# Patient Record
Sex: FEMALE | Race: WHITE | Employment: OTHER | URBAN - NONMETROPOLITAN AREA
[De-identification: names, ages, dates, MRNs, and addresses within clinical notes are randomized per-mention and may not be internally consistent; named-entity substitution may affect disease eponyms.]

---

## 2017-01-11 ENCOUNTER — ANTI-COAG VISIT (OUTPATIENT)
Dept: OTHER | Age: 77
End: 2017-01-11

## 2017-01-11 VITALS
DIASTOLIC BLOOD PRESSURE: 88 MMHG | BODY MASS INDEX: 23.05 KG/M2 | HEART RATE: 96 BPM | SYSTOLIC BLOOD PRESSURE: 142 MMHG | WEIGHT: 118 LBS

## 2017-01-11 DIAGNOSIS — I48.91 ATRIAL FIBRILLATION, UNSPECIFIED TYPE (HCC): ICD-10-CM

## 2017-01-11 LAB — POC INR: 1.8 (ref 0.8–1.2)

## 2017-01-11 PROCEDURE — 99999 PR OFFICE/OUTPT VISIT,PROCEDURE ONLY: CPT | Performed by: NURSE PRACTITIONER

## 2017-01-11 PROCEDURE — 85610 PROTHROMBIN TIME: CPT | Performed by: NURSE PRACTITIONER

## 2017-01-11 ASSESSMENT — ENCOUNTER SYMPTOMS
SHORTNESS OF BREATH: 0
CONSTIPATION: 0
BLOOD IN STOOL: 0
DIARRHEA: 0

## 2017-01-18 ENCOUNTER — TELEPHONE (OUTPATIENT)
Dept: OTHER | Age: 77
End: 2017-01-18

## 2017-01-23 ENCOUNTER — TELEPHONE (OUTPATIENT)
Dept: OTHER | Age: 77
End: 2017-01-23

## 2017-01-25 ENCOUNTER — ANTI-COAG VISIT (OUTPATIENT)
Dept: OTHER | Age: 77
End: 2017-01-25

## 2017-01-25 VITALS
WEIGHT: 116 LBS | DIASTOLIC BLOOD PRESSURE: 69 MMHG | BODY MASS INDEX: 20.55 KG/M2 | SYSTOLIC BLOOD PRESSURE: 146 MMHG | HEART RATE: 67 BPM

## 2017-01-25 DIAGNOSIS — I48.91 ATRIAL FIBRILLATION, UNSPECIFIED TYPE (HCC): ICD-10-CM

## 2017-01-25 LAB — POC INR: 2 (ref 0.8–1.2)

## 2017-01-25 PROCEDURE — 1090F PRES/ABSN URINE INCON ASSESS: CPT | Performed by: NURSE PRACTITIONER

## 2017-01-25 PROCEDURE — 99212 OFFICE O/P EST SF 10 MIN: CPT | Performed by: NURSE PRACTITIONER

## 2017-01-25 PROCEDURE — G8484 FLU IMMUNIZE NO ADMIN: HCPCS | Performed by: NURSE PRACTITIONER

## 2017-01-25 PROCEDURE — G8419 CALC BMI OUT NRM PARAM NOF/U: HCPCS | Performed by: NURSE PRACTITIONER

## 2017-01-25 PROCEDURE — G8400 PT W/DXA NO RESULTS DOC: HCPCS | Performed by: NURSE PRACTITIONER

## 2017-01-25 PROCEDURE — 4040F PNEUMOC VAC/ADMIN/RCVD: CPT | Performed by: NURSE PRACTITIONER

## 2017-01-25 PROCEDURE — 1036F TOBACCO NON-USER: CPT | Performed by: NURSE PRACTITIONER

## 2017-01-25 PROCEDURE — 1123F ACP DISCUSS/DSCN MKR DOCD: CPT | Performed by: NURSE PRACTITIONER

## 2017-01-25 PROCEDURE — 85610 PROTHROMBIN TIME: CPT | Performed by: NURSE PRACTITIONER

## 2017-01-25 PROCEDURE — G8427 DOCREV CUR MEDS BY ELIG CLIN: HCPCS | Performed by: NURSE PRACTITIONER

## 2017-01-25 ASSESSMENT — ENCOUNTER SYMPTOMS
BLOOD IN STOOL: 0
DIARRHEA: 0
CONSTIPATION: 0
SHORTNESS OF BREATH: 1

## 2017-02-22 ENCOUNTER — ANTI-COAG VISIT (OUTPATIENT)
Dept: OTHER | Age: 77
End: 2017-02-22

## 2017-02-22 VITALS
HEART RATE: 65 BPM | DIASTOLIC BLOOD PRESSURE: 71 MMHG | BODY MASS INDEX: 19.84 KG/M2 | WEIGHT: 112 LBS | SYSTOLIC BLOOD PRESSURE: 151 MMHG

## 2017-02-22 DIAGNOSIS — I48.91 ATRIAL FIBRILLATION, UNSPECIFIED TYPE (HCC): ICD-10-CM

## 2017-02-22 LAB — POC INR: 3.8 (ref 0.8–1.2)

## 2017-02-22 PROCEDURE — 85610 PROTHROMBIN TIME: CPT | Performed by: NURSE PRACTITIONER

## 2017-02-22 PROCEDURE — G8419 CALC BMI OUT NRM PARAM NOF/U: HCPCS | Performed by: NURSE PRACTITIONER

## 2017-02-22 PROCEDURE — 4040F PNEUMOC VAC/ADMIN/RCVD: CPT | Performed by: NURSE PRACTITIONER

## 2017-02-22 PROCEDURE — G8427 DOCREV CUR MEDS BY ELIG CLIN: HCPCS | Performed by: NURSE PRACTITIONER

## 2017-02-22 PROCEDURE — 99999 PR OFFICE/OUTPT VISIT,PROCEDURE ONLY: CPT | Performed by: NURSE PRACTITIONER

## 2017-02-22 RX ORDER — PREDNISONE 1 MG/1
1 TABLET ORAL 2 TIMES DAILY
COMMUNITY
End: 2017-08-02 | Stop reason: ALTCHOICE

## 2017-02-22 ASSESSMENT — ENCOUNTER SYMPTOMS
DIARRHEA: 0
BLOOD IN STOOL: 0
SHORTNESS OF BREATH: 0
CONSTIPATION: 0

## 2017-03-08 ENCOUNTER — ANTI-COAG VISIT (OUTPATIENT)
Dept: OTHER | Age: 77
End: 2017-03-08

## 2017-03-08 VITALS
DIASTOLIC BLOOD PRESSURE: 71 MMHG | SYSTOLIC BLOOD PRESSURE: 153 MMHG | HEART RATE: 61 BPM | WEIGHT: 111.2 LBS | BODY MASS INDEX: 19.7 KG/M2

## 2017-03-08 DIAGNOSIS — I48.91 ATRIAL FIBRILLATION, UNSPECIFIED TYPE (HCC): Primary | ICD-10-CM

## 2017-03-08 LAB — POC INR: 4.3 (ref 0.8–1.2)

## 2017-03-08 RX ORDER — AMLODIPINE BESYLATE 5 MG/1
5 TABLET ORAL DAILY
Status: ON HOLD | COMMUNITY
End: 2020-01-10

## 2017-03-08 ASSESSMENT — ENCOUNTER SYMPTOMS
SHORTNESS OF BREATH: 0
CONSTIPATION: 0
DIARRHEA: 0
BLOOD IN STOOL: 0

## 2017-03-20 DIAGNOSIS — I48.91 ATRIAL FIBRILLATION, UNSPECIFIED TYPE (HCC): Primary | ICD-10-CM

## 2017-03-22 ENCOUNTER — ANTI-COAG VISIT (OUTPATIENT)
Dept: OTHER | Age: 77
End: 2017-03-22

## 2017-03-22 VITALS
WEIGHT: 111.4 LBS | HEART RATE: 63 BPM | DIASTOLIC BLOOD PRESSURE: 69 MMHG | SYSTOLIC BLOOD PRESSURE: 155 MMHG | BODY MASS INDEX: 19.73 KG/M2

## 2017-03-22 DIAGNOSIS — I48.91 ATRIAL FIBRILLATION, UNSPECIFIED TYPE (HCC): ICD-10-CM

## 2017-03-22 LAB — POC INR: 3.5 (ref 0.8–1.2)

## 2017-03-22 ASSESSMENT — ENCOUNTER SYMPTOMS
CONSTIPATION: 0
DIARRHEA: 0
BLOOD IN STOOL: 0

## 2017-04-05 ENCOUNTER — ANTI-COAG VISIT (OUTPATIENT)
Dept: OTHER | Age: 77
End: 2017-04-05

## 2017-04-05 VITALS
WEIGHT: 110 LBS | HEART RATE: 73 BPM | BODY MASS INDEX: 19.49 KG/M2 | SYSTOLIC BLOOD PRESSURE: 134 MMHG | DIASTOLIC BLOOD PRESSURE: 69 MMHG

## 2017-04-05 DIAGNOSIS — I48.91 ATRIAL FIBRILLATION, UNSPECIFIED TYPE (HCC): ICD-10-CM

## 2017-04-05 LAB — POC INR: 2.8 (ref 0.8–1.2)

## 2017-04-05 ASSESSMENT — ENCOUNTER SYMPTOMS
SHORTNESS OF BREATH: 1
DIARRHEA: 0
CONSTIPATION: 0
BLOOD IN STOOL: 0
SHORTNESS OF BREATH: 0

## 2017-04-19 ENCOUNTER — ANTI-COAG VISIT (OUTPATIENT)
Dept: OTHER | Age: 77
End: 2017-04-19

## 2017-04-19 VITALS
WEIGHT: 112.6 LBS | DIASTOLIC BLOOD PRESSURE: 70 MMHG | BODY MASS INDEX: 19.95 KG/M2 | HEART RATE: 58 BPM | SYSTOLIC BLOOD PRESSURE: 138 MMHG

## 2017-04-19 DIAGNOSIS — I48.91 ATRIAL FIBRILLATION, UNSPECIFIED TYPE (HCC): ICD-10-CM

## 2017-04-19 LAB — POC INR: 2.1 (ref 0.8–1.2)

## 2017-04-19 RX ORDER — LIDOCAINE AND PRILOCAINE 25; 25 MG/G; MG/G
CREAM TOPICAL
COMMUNITY
Start: 2017-03-16 | End: 2017-08-02 | Stop reason: ALTCHOICE

## 2017-04-19 ASSESSMENT — ENCOUNTER SYMPTOMS
DIARRHEA: 0
BLOOD IN STOOL: 0
SHORTNESS OF BREATH: 1
CONSTIPATION: 0

## 2017-05-10 ENCOUNTER — ANTI-COAG VISIT (OUTPATIENT)
Dept: OTHER | Age: 77
End: 2017-05-10

## 2017-05-10 VITALS
DIASTOLIC BLOOD PRESSURE: 64 MMHG | HEART RATE: 61 BPM | WEIGHT: 112.8 LBS | SYSTOLIC BLOOD PRESSURE: 114 MMHG | BODY MASS INDEX: 19.98 KG/M2

## 2017-05-10 DIAGNOSIS — I48.91 ATRIAL FIBRILLATION, UNSPECIFIED TYPE (HCC): ICD-10-CM

## 2017-05-10 LAB — POC INR: 1.9 (ref 0.8–1.2)

## 2017-05-10 ASSESSMENT — ENCOUNTER SYMPTOMS
SHORTNESS OF BREATH: 1
DIARRHEA: 0
BLOOD IN STOOL: 0
CONSTIPATION: 0

## 2017-05-30 ENCOUNTER — TELEPHONE (OUTPATIENT)
Dept: OTHER | Age: 77
End: 2017-05-30

## 2017-06-07 ENCOUNTER — OFFICE VISIT (OUTPATIENT)
Dept: NEPHROLOGY | Age: 77
End: 2017-06-07

## 2017-06-07 VITALS
OXYGEN SATURATION: 96 % | WEIGHT: 109 LBS | DIASTOLIC BLOOD PRESSURE: 62 MMHG | RESPIRATION RATE: 18 BRPM | HEART RATE: 76 BPM | HEIGHT: 60 IN | BODY MASS INDEX: 21.4 KG/M2 | SYSTOLIC BLOOD PRESSURE: 128 MMHG

## 2017-06-07 DIAGNOSIS — N18.4 CHRONIC KIDNEY DISEASE (CKD), STAGE IV (SEVERE) (HCC): ICD-10-CM

## 2017-06-07 PROCEDURE — 1090F PRES/ABSN URINE INCON ASSESS: CPT | Performed by: INTERNAL MEDICINE

## 2017-06-07 PROCEDURE — 1036F TOBACCO NON-USER: CPT | Performed by: INTERNAL MEDICINE

## 2017-06-07 PROCEDURE — 99213 OFFICE O/P EST LOW 20 MIN: CPT | Performed by: INTERNAL MEDICINE

## 2017-06-07 PROCEDURE — 1123F ACP DISCUSS/DSCN MKR DOCD: CPT | Performed by: INTERNAL MEDICINE

## 2017-06-07 PROCEDURE — 4040F PNEUMOC VAC/ADMIN/RCVD: CPT | Performed by: INTERNAL MEDICINE

## 2017-06-07 PROCEDURE — G8419 CALC BMI OUT NRM PARAM NOF/U: HCPCS | Performed by: INTERNAL MEDICINE

## 2017-06-07 PROCEDURE — G8427 DOCREV CUR MEDS BY ELIG CLIN: HCPCS | Performed by: INTERNAL MEDICINE

## 2017-06-07 PROCEDURE — G8400 PT W/DXA NO RESULTS DOC: HCPCS | Performed by: INTERNAL MEDICINE

## 2017-06-07 RX ORDER — LOSARTAN POTASSIUM 100 MG/1
50 TABLET ORAL 2 TIMES DAILY
COMMUNITY
Start: 2017-05-26 | End: 2018-09-05 | Stop reason: DRUGHIGH

## 2017-07-26 ENCOUNTER — HOSPITAL ENCOUNTER (OUTPATIENT)
Age: 77
Discharge: HOME | End: 2017-07-26
Payer: MEDICARE

## 2017-07-26 DIAGNOSIS — N18.4 CHRONIC KIDNEY DISEASE (CKD), STAGE IV (SEVERE) (HCC): ICD-10-CM

## 2017-07-26 LAB
ANION GAP SERPL CALCULATED.3IONS-SCNC: 13 MEQ/L (ref 8–16)
BASOPHILS # BLD: 0.7 %
BASOPHILS ABSOLUTE: 0 THOU/MM3 (ref 0–0.1)
BUN BLDV-MCNC: 30 MG/DL (ref 7–22)
CALCIUM SERPL-MCNC: 9.9 MG/DL (ref 8.5–10.5)
CHLORIDE BLD-SCNC: 100 MEQ/L (ref 98–111)
CO2: 29 MEQ/L (ref 23–33)
CREAT SERPL-MCNC: 1.8 MG/DL (ref 0.4–1.2)
EOSINOPHIL # BLD: 6.5 %
EOSINOPHILS ABSOLUTE: 0.4 THOU/MM3 (ref 0–0.4)
GFR SERPL CREATININE-BSD FRML MDRD: 27 ML/MIN/1.73M2
GLUCOSE BLD-MCNC: 91 MG/DL (ref 70–108)
HCT VFR BLD CALC: 37.3 % (ref 37–47)
HEMOGLOBIN: 12.4 GM/DL (ref 12–16)
LYMPHOCYTES # BLD: 29.5 %
LYMPHOCYTES ABSOLUTE: 1.6 THOU/MM3 (ref 1–4.8)
MACROCYTES: ABNORMAL
MCH RBC QN AUTO: 33.5 PG (ref 27–31)
MCHC RBC AUTO-ENTMCNC: 33.3 GM/DL (ref 33–37)
MCV RBC AUTO: 100.8 FL (ref 81–99)
MONOCYTES # BLD: 12.8 %
MONOCYTES ABSOLUTE: 0.7 THOU/MM3 (ref 0.4–1.3)
NUCLEATED RED BLOOD CELLS: 0 /100 WBC
PDW BLD-RTO: 13.4 % (ref 11.5–14.5)
PLATELET # BLD: 181 THOU/MM3 (ref 130–400)
PMV BLD AUTO: 9.5 MCM (ref 7.4–10.4)
POTASSIUM SERPL-SCNC: 4.5 MEQ/L (ref 3.5–5.2)
RBC # BLD: 3.7 MILL/MM3 (ref 4.2–5.4)
RBC # BLD: NORMAL 10*6/UL
SEG NEUTROPHILS: 50.5 %
SEGMENTED NEUTROPHILS ABSOLUTE COUNT: 2.8 THOU/MM3 (ref 1.8–7.7)
SODIUM BLD-SCNC: 142 MEQ/L (ref 135–145)
WBC # BLD: 5.5 THOU/MM3 (ref 4.8–10.8)

## 2017-07-26 PROCEDURE — 36415 COLL VENOUS BLD VENIPUNCTURE: CPT

## 2017-07-26 PROCEDURE — 80048 BASIC METABOLIC PNL TOTAL CA: CPT

## 2017-07-26 PROCEDURE — 85025 COMPLETE CBC W/AUTO DIFF WBC: CPT

## 2017-08-02 ENCOUNTER — OFFICE VISIT (OUTPATIENT)
Dept: NEPHROLOGY | Age: 77
End: 2017-08-02
Payer: MEDICARE

## 2017-08-02 VITALS
SYSTOLIC BLOOD PRESSURE: 118 MMHG | HEIGHT: 60 IN | WEIGHT: 108 LBS | OXYGEN SATURATION: 97 % | HEART RATE: 63 BPM | RESPIRATION RATE: 18 BRPM | DIASTOLIC BLOOD PRESSURE: 68 MMHG | BODY MASS INDEX: 21.2 KG/M2

## 2017-08-02 DIAGNOSIS — N18.4 CKD (CHRONIC KIDNEY DISEASE), STAGE IV (HCC): Primary | ICD-10-CM

## 2017-08-02 PROCEDURE — 99213 OFFICE O/P EST LOW 20 MIN: CPT | Performed by: INTERNAL MEDICINE

## 2017-08-02 PROCEDURE — 1090F PRES/ABSN URINE INCON ASSESS: CPT | Performed by: INTERNAL MEDICINE

## 2017-08-02 PROCEDURE — 1123F ACP DISCUSS/DSCN MKR DOCD: CPT | Performed by: INTERNAL MEDICINE

## 2017-08-02 PROCEDURE — 4040F PNEUMOC VAC/ADMIN/RCVD: CPT | Performed by: INTERNAL MEDICINE

## 2017-08-02 PROCEDURE — G8420 CALC BMI NORM PARAMETERS: HCPCS | Performed by: INTERNAL MEDICINE

## 2017-08-02 PROCEDURE — G8427 DOCREV CUR MEDS BY ELIG CLIN: HCPCS | Performed by: INTERNAL MEDICINE

## 2017-08-02 PROCEDURE — G8400 PT W/DXA NO RESULTS DOC: HCPCS | Performed by: INTERNAL MEDICINE

## 2017-08-02 PROCEDURE — 1036F TOBACCO NON-USER: CPT | Performed by: INTERNAL MEDICINE

## 2017-08-11 ENCOUNTER — HOSPITAL ENCOUNTER (OUTPATIENT)
Dept: CT IMAGING | Age: 77
Discharge: HOME | End: 2017-08-11
Payer: MEDICARE

## 2017-08-11 VITALS
OXYGEN SATURATION: 98 % | HEART RATE: 59 BPM | SYSTOLIC BLOOD PRESSURE: 120 MMHG | TEMPERATURE: 97.6 F | HEIGHT: 60 IN | RESPIRATION RATE: 16 BRPM | WEIGHT: 107.6 LBS | DIASTOLIC BLOOD PRESSURE: 63 MMHG | BODY MASS INDEX: 21.13 KG/M2

## 2017-08-11 DIAGNOSIS — N18.4 CKD (CHRONIC KIDNEY DISEASE), STAGE IV (HCC): ICD-10-CM

## 2017-08-11 LAB
CREAT SERPL-MCNC: 1.6 MG/DL (ref 0.4–1.2)
GFR SERPL CREATININE-BSD FRML MDRD: 31 ML/MIN/1.73M2
HCT VFR BLD CALC: 39.3 % (ref 37–47)
HEMOGLOBIN: 13.1 GM/DL (ref 12–16)
MCH RBC QN AUTO: 33.6 PG (ref 27–31)
MCHC RBC AUTO-ENTMCNC: 33.5 GM/DL (ref 33–37)
MCV RBC AUTO: 100.5 FL (ref 81–99)
PDW BLD-RTO: 13.1 % (ref 11.5–14.5)
PLATELET # BLD: 145 THOU/MM3 (ref 130–400)
PMV BLD AUTO: 8.8 MCM (ref 7.4–10.4)
RBC # BLD: 3.91 MILL/MM3 (ref 4.2–5.4)
WBC # BLD: 5 THOU/MM3 (ref 4.8–10.8)

## 2017-08-11 PROCEDURE — 82565 ASSAY OF CREATININE: CPT

## 2017-08-11 PROCEDURE — 88313 SPECIAL STAINS GROUP 2: CPT

## 2017-08-11 PROCEDURE — 36415 COLL VENOUS BLD VENIPUNCTURE: CPT

## 2017-08-11 PROCEDURE — 85027 COMPLETE CBC AUTOMATED: CPT

## 2017-08-11 PROCEDURE — 6360000002 HC RX W HCPCS

## 2017-08-11 PROCEDURE — 88305 TISSUE EXAM BY PATHOLOGIST: CPT

## 2017-08-11 PROCEDURE — 88348 ELECTRON MICROSCOPY DX: CPT

## 2017-08-11 PROCEDURE — 6370000000 HC RX 637 (ALT 250 FOR IP)

## 2017-08-11 PROCEDURE — 77012 CT SCAN FOR NEEDLE BIOPSY: CPT

## 2017-08-11 PROCEDURE — 50200 RENAL BIOPSY PERQ: CPT

## 2017-08-11 PROCEDURE — 88350 IMFLUOR EA ADDL 1ANTB STN PX: CPT

## 2017-08-11 PROCEDURE — 2580000003 HC RX 258: Performed by: RADIOLOGY

## 2017-08-11 PROCEDURE — 88346 IMFLUOR 1ST 1ANTB STAIN PX: CPT

## 2017-08-11 RX ORDER — SODIUM CHLORIDE 450 MG/100ML
INJECTION, SOLUTION INTRAVENOUS CONTINUOUS
Status: DISCONTINUED | OUTPATIENT
Start: 2017-08-11 | End: 2017-08-12 | Stop reason: HOSPADM

## 2017-08-11 RX ORDER — FENTANYL CITRATE 50 UG/ML
50 INJECTION, SOLUTION INTRAMUSCULAR; INTRAVENOUS ONCE
Status: DISCONTINUED | OUTPATIENT
Start: 2017-08-11 | End: 2017-08-11

## 2017-08-11 RX ORDER — PROPAFENONE HYDROCHLORIDE 150 MG/1
150 TABLET, FILM COATED ORAL EVERY 8 HOURS
Status: ON HOLD | COMMUNITY
End: 2020-01-18 | Stop reason: HOSPADM

## 2017-08-11 RX ORDER — FENTANYL CITRATE 50 UG/ML
25 INJECTION, SOLUTION INTRAMUSCULAR; INTRAVENOUS ONCE
Status: COMPLETED | OUTPATIENT
Start: 2017-08-11 | End: 2017-08-11

## 2017-08-11 RX ORDER — DIAPER,BRIEF,INFANT-TODD,DISP
EACH MISCELLANEOUS ONCE
Status: COMPLETED | OUTPATIENT
Start: 2017-08-11 | End: 2017-08-11

## 2017-08-11 RX ORDER — MIDAZOLAM HYDROCHLORIDE 1 MG/ML
0.5 INJECTION INTRAMUSCULAR; INTRAVENOUS ONCE
Status: COMPLETED | OUTPATIENT
Start: 2017-08-11 | End: 2017-08-11

## 2017-08-11 RX ORDER — MIDAZOLAM HYDROCHLORIDE 1 MG/ML
1 INJECTION INTRAMUSCULAR; INTRAVENOUS ONCE
Status: DISCONTINUED | OUTPATIENT
Start: 2017-08-11 | End: 2017-08-11

## 2017-08-11 RX ADMIN — MIDAZOLAM HYDROCHLORIDE 0.5 MG: 1 INJECTION INTRAMUSCULAR; INTRAVENOUS at 10:40

## 2017-08-11 RX ADMIN — SODIUM CHLORIDE: 4.5 INJECTION, SOLUTION INTRAVENOUS at 09:08

## 2017-08-11 RX ADMIN — Medication 1 G: at 11:19

## 2017-08-11 RX ADMIN — FENTANYL CITRATE 25 MCG: 50 INJECTION, SOLUTION INTRAMUSCULAR; INTRAVENOUS at 10:40

## 2017-08-11 ASSESSMENT — PAIN SCALES - GENERAL
PAINLEVEL_OUTOF10: 2
PAINLEVEL_OUTOF10: 0
PAINLEVEL_OUTOF10: 3
PAINLEVEL_OUTOF10: 0
PAINLEVEL_OUTOF10: 3

## 2017-08-11 ASSESSMENT — PAIN DESCRIPTION - LOCATION
LOCATION: FLANK

## 2017-08-11 ASSESSMENT — PAIN DESCRIPTION - DESCRIPTORS
DESCRIPTORS: ACHING

## 2017-08-11 ASSESSMENT — PAIN DESCRIPTION - PAIN TYPE
TYPE: ACUTE PAIN

## 2017-08-11 ASSESSMENT — PAIN DESCRIPTION - ORIENTATION
ORIENTATION: LEFT

## 2017-08-11 ASSESSMENT — PAIN - FUNCTIONAL ASSESSMENT: PAIN_FUNCTIONAL_ASSESSMENT: 0-10

## 2017-08-16 LAB — MISC REFERENCE: NORMAL

## 2017-08-21 ENCOUNTER — HOSPITAL ENCOUNTER (OUTPATIENT)
Age: 77
Discharge: HOME | End: 2017-08-21
Payer: MEDICARE

## 2017-08-21 LAB
ANION GAP SERPL CALCULATED.3IONS-SCNC: 14 MEQ/L (ref 8–16)
BASOPHILS # BLD: 0.7 %
BASOPHILS ABSOLUTE: 0 THOU/MM3 (ref 0–0.1)
BUN BLDV-MCNC: 31 MG/DL (ref 7–22)
CALCIUM SERPL-MCNC: 9.9 MG/DL (ref 8.5–10.5)
CHLORIDE BLD-SCNC: 100 MEQ/L (ref 98–111)
CO2: 30 MEQ/L (ref 23–33)
CREAT SERPL-MCNC: 1.7 MG/DL (ref 0.4–1.2)
EOSINOPHIL # BLD: 6.6 %
EOSINOPHILS ABSOLUTE: 0.3 THOU/MM3 (ref 0–0.4)
GFR SERPL CREATININE-BSD FRML MDRD: 29 ML/MIN/1.73M2
GLUCOSE BLD-MCNC: 86 MG/DL (ref 70–108)
HCT VFR BLD CALC: 37.5 % (ref 37–47)
HEMOGLOBIN: 12.6 GM/DL (ref 12–16)
LYMPHOCYTES # BLD: 35.2 %
LYMPHOCYTES ABSOLUTE: 1.8 THOU/MM3 (ref 1–4.8)
MACROCYTES: ABNORMAL
MCH RBC QN AUTO: 34 PG (ref 27–31)
MCHC RBC AUTO-ENTMCNC: 33.6 GM/DL (ref 33–37)
MCV RBC AUTO: 101 FL (ref 81–99)
MONOCYTES # BLD: 12.5 %
MONOCYTES ABSOLUTE: 0.7 THOU/MM3 (ref 0.4–1.3)
NUCLEATED RED BLOOD CELLS: 0 /100 WBC
PDW BLD-RTO: 13.3 % (ref 11.5–14.5)
PLATELET # BLD: 185 THOU/MM3 (ref 130–400)
PMV BLD AUTO: 9.5 MCM (ref 7.4–10.4)
POTASSIUM SERPL-SCNC: 4.3 MEQ/L (ref 3.5–5.2)
RBC # BLD: 3.71 MILL/MM3 (ref 4.2–5.4)
RBC # BLD: NORMAL 10*6/UL
SEG NEUTROPHILS: 45 %
SEGMENTED NEUTROPHILS ABSOLUTE COUNT: 2.3 THOU/MM3 (ref 1.8–7.7)
SODIUM BLD-SCNC: 144 MEQ/L (ref 135–145)
WBC # BLD: 5.2 THOU/MM3 (ref 4.8–10.8)

## 2017-08-21 PROCEDURE — 85025 COMPLETE CBC W/AUTO DIFF WBC: CPT

## 2017-08-21 PROCEDURE — 80048 BASIC METABOLIC PNL TOTAL CA: CPT

## 2017-08-21 PROCEDURE — 36415 COLL VENOUS BLD VENIPUNCTURE: CPT

## 2017-08-28 ENCOUNTER — OFFICE VISIT (OUTPATIENT)
Dept: NEPHROLOGY | Age: 77
End: 2017-08-28
Payer: MEDICARE

## 2017-08-28 VITALS
WEIGHT: 108 LBS | SYSTOLIC BLOOD PRESSURE: 128 MMHG | HEIGHT: 60 IN | OXYGEN SATURATION: 97 % | BODY MASS INDEX: 21.2 KG/M2 | DIASTOLIC BLOOD PRESSURE: 68 MMHG | HEART RATE: 59 BPM | RESPIRATION RATE: 18 BRPM

## 2017-08-28 DIAGNOSIS — N18.4 CKD (CHRONIC KIDNEY DISEASE), STAGE IV (HCC): Primary | ICD-10-CM

## 2017-08-28 PROCEDURE — G8400 PT W/DXA NO RESULTS DOC: HCPCS | Performed by: INTERNAL MEDICINE

## 2017-08-28 PROCEDURE — 99213 OFFICE O/P EST LOW 20 MIN: CPT | Performed by: INTERNAL MEDICINE

## 2017-08-28 PROCEDURE — 1123F ACP DISCUSS/DSCN MKR DOCD: CPT | Performed by: INTERNAL MEDICINE

## 2017-08-28 PROCEDURE — 1036F TOBACCO NON-USER: CPT | Performed by: INTERNAL MEDICINE

## 2017-08-28 PROCEDURE — 1090F PRES/ABSN URINE INCON ASSESS: CPT | Performed by: INTERNAL MEDICINE

## 2017-08-28 PROCEDURE — G8427 DOCREV CUR MEDS BY ELIG CLIN: HCPCS | Performed by: INTERNAL MEDICINE

## 2017-08-28 PROCEDURE — 4040F PNEUMOC VAC/ADMIN/RCVD: CPT | Performed by: INTERNAL MEDICINE

## 2017-08-28 PROCEDURE — G8420 CALC BMI NORM PARAMETERS: HCPCS | Performed by: INTERNAL MEDICINE

## 2017-09-12 ENCOUNTER — HOSPITAL ENCOUNTER (OUTPATIENT)
Age: 77
Discharge: HOME | End: 2017-09-12
Payer: MEDICARE

## 2017-09-12 LAB
ALBUMIN SERPL-MCNC: 4.5 G/DL (ref 3.5–5.1)
ALP BLD-CCNC: 80 U/L (ref 38–126)
ALT SERPL-CCNC: 11 U/L (ref 11–66)
ANION GAP SERPL CALCULATED.3IONS-SCNC: 20 MEQ/L (ref 8–16)
AST SERPL-CCNC: 14 U/L (ref 5–40)
BILIRUB SERPL-MCNC: 0.4 MG/DL (ref 0.3–1.2)
BILIRUBIN DIRECT: < 0.2 MG/DL (ref 0–0.3)
BUN BLDV-MCNC: 24 MG/DL (ref 7–22)
CALCIUM SERPL-MCNC: 9.9 MG/DL (ref 8.5–10.5)
CHLORIDE BLD-SCNC: 97 MEQ/L (ref 98–111)
CHOLESTEROL, FASTING: 158 MG/DL (ref 100–199)
CO2: 28 MEQ/L (ref 23–33)
CREAT SERPL-MCNC: 1.7 MG/DL (ref 0.4–1.2)
GFR SERPL CREATININE-BSD FRML MDRD: 29 ML/MIN/1.73M2
GLUCOSE FASTING: 89 MG/DL (ref 70–108)
HCT VFR BLD CALC: 38.5 % (ref 37–47)
HDLC SERPL-MCNC: 66 MG/DL
HEMOGLOBIN: 12.9 GM/DL (ref 12–16)
LDL CHOLESTEROL CALCULATED: 75 MG/DL
MCH RBC QN AUTO: 33.8 PG (ref 27–31)
MCHC RBC AUTO-ENTMCNC: 33.6 GM/DL (ref 33–37)
MCV RBC AUTO: 100.6 FL (ref 81–99)
PDW BLD-RTO: 13.2 % (ref 11.5–14.5)
PLATELET # BLD: 159 THOU/MM3 (ref 130–400)
PMV BLD AUTO: 9.2 MCM (ref 7.4–10.4)
POTASSIUM SERPL-SCNC: 4.2 MEQ/L (ref 3.5–5.2)
RBC # BLD: 3.83 MILL/MM3 (ref 4.2–5.4)
SODIUM BLD-SCNC: 145 MEQ/L (ref 135–145)
TOTAL PROTEIN: 6.9 G/DL (ref 6.1–8)
TRIGLYCERIDE, FASTING: 83 MG/DL (ref 0–199)
TSH SERPL DL<=0.05 MIU/L-ACNC: 3.45 UIU/ML (ref 0.4–4.2)
WBC # BLD: 5 THOU/MM3 (ref 4.8–10.8)

## 2017-09-12 PROCEDURE — 80061 LIPID PANEL: CPT

## 2017-09-12 PROCEDURE — 85027 COMPLETE CBC AUTOMATED: CPT

## 2017-09-12 PROCEDURE — 36415 COLL VENOUS BLD VENIPUNCTURE: CPT

## 2017-09-12 PROCEDURE — 80076 HEPATIC FUNCTION PANEL: CPT

## 2017-09-12 PROCEDURE — 84443 ASSAY THYROID STIM HORMONE: CPT

## 2017-09-12 PROCEDURE — 80048 BASIC METABOLIC PNL TOTAL CA: CPT

## 2017-09-14 ENCOUNTER — TELEPHONE (OUTPATIENT)
Dept: ADMINISTRATIVE | Age: 77
End: 2017-09-14

## 2017-10-02 ENCOUNTER — HOSPITAL ENCOUNTER (OUTPATIENT)
Dept: GENERAL RADIOLOGY | Age: 77
Discharge: HOME | End: 2017-10-02
Payer: MEDICARE

## 2017-10-02 ENCOUNTER — HOSPITAL ENCOUNTER (OUTPATIENT)
Age: 77
Discharge: HOME | End: 2017-10-02
Payer: MEDICARE

## 2017-10-02 DIAGNOSIS — S60.211A CONTUSION OF RIGHT WRIST, INITIAL ENCOUNTER: ICD-10-CM

## 2017-10-02 PROCEDURE — 73110 X-RAY EXAM OF WRIST: CPT

## 2017-10-09 ENCOUNTER — HOSPITAL ENCOUNTER (OUTPATIENT)
Age: 77
Discharge: HOME | End: 2017-10-09
Payer: MEDICARE

## 2017-10-09 LAB
ANION GAP SERPL CALCULATED.3IONS-SCNC: 15 MEQ/L (ref 8–16)
BUN BLDV-MCNC: 27 MG/DL (ref 7–22)
CALCIUM SERPL-MCNC: 9.7 MG/DL (ref 8.5–10.5)
CHLORIDE BLD-SCNC: 99 MEQ/L (ref 98–111)
CO2: 26 MEQ/L (ref 23–33)
CREAT SERPL-MCNC: 1.6 MG/DL (ref 0.4–1.2)
GFR SERPL CREATININE-BSD FRML MDRD: 31 ML/MIN/1.73M2
GLUCOSE BLD-MCNC: 90 MG/DL (ref 70–108)
POTASSIUM SERPL-SCNC: 4.2 MEQ/L (ref 3.5–5.2)
SEDIMENTATION RATE, ERYTHROCYTE: 17 MM/HR (ref 0–20)
SODIUM BLD-SCNC: 140 MEQ/L (ref 135–145)
TSH SERPL DL<=0.05 MIU/L-ACNC: 2.7 UIU/ML (ref 0.4–4.2)

## 2017-10-09 PROCEDURE — 84443 ASSAY THYROID STIM HORMONE: CPT

## 2017-10-09 PROCEDURE — 85651 RBC SED RATE NONAUTOMATED: CPT

## 2017-10-09 PROCEDURE — 36415 COLL VENOUS BLD VENIPUNCTURE: CPT

## 2017-10-09 PROCEDURE — 80048 BASIC METABOLIC PNL TOTAL CA: CPT

## 2017-10-16 ENCOUNTER — HOSPITAL ENCOUNTER (OUTPATIENT)
Dept: GENERAL RADIOLOGY | Age: 77
Discharge: HOME | End: 2017-10-16
Payer: MEDICARE

## 2017-10-16 ENCOUNTER — HOSPITAL ENCOUNTER (OUTPATIENT)
Age: 77
Discharge: HOME | End: 2017-10-16
Payer: MEDICARE

## 2017-10-16 DIAGNOSIS — M25.551 RIGHT HIP PAIN: ICD-10-CM

## 2017-10-16 PROCEDURE — 73502 X-RAY EXAM HIP UNI 2-3 VIEWS: CPT

## 2017-10-23 ENCOUNTER — HOSPITAL ENCOUNTER (OUTPATIENT)
Age: 77
Discharge: HOME | End: 2017-10-23
Payer: MEDICARE

## 2017-10-23 DIAGNOSIS — N18.4 CKD (CHRONIC KIDNEY DISEASE), STAGE IV (HCC): ICD-10-CM

## 2017-10-23 LAB
ANION GAP SERPL CALCULATED.3IONS-SCNC: 15 MEQ/L (ref 8–16)
BASOPHILS # BLD: 0.6 %
BASOPHILS ABSOLUTE: 0 THOU/MM3 (ref 0–0.1)
BUN BLDV-MCNC: 32 MG/DL (ref 7–22)
CALCIUM SERPL-MCNC: 9.8 MG/DL (ref 8.5–10.5)
CHLORIDE BLD-SCNC: 101 MEQ/L (ref 98–111)
CO2: 26 MEQ/L (ref 23–33)
CREAT SERPL-MCNC: 1.6 MG/DL (ref 0.4–1.2)
EOSINOPHIL # BLD: 5.8 %
EOSINOPHILS ABSOLUTE: 0.3 THOU/MM3 (ref 0–0.4)
GFR SERPL CREATININE-BSD FRML MDRD: 31 ML/MIN/1.73M2
GLUCOSE BLD-MCNC: 96 MG/DL (ref 70–108)
HCT VFR BLD CALC: 37.2 % (ref 37–47)
HEMOGLOBIN: 12.2 GM/DL (ref 12–16)
LYMPHOCYTES # BLD: 32.9 %
LYMPHOCYTES ABSOLUTE: 1.9 THOU/MM3 (ref 1–4.8)
MACROCYTES: ABNORMAL
MCH RBC QN AUTO: 32.9 PG (ref 27–31)
MCHC RBC AUTO-ENTMCNC: 32.7 GM/DL (ref 33–37)
MCV RBC AUTO: 100.5 FL (ref 81–99)
MONOCYTES # BLD: 12.1 %
MONOCYTES ABSOLUTE: 0.7 THOU/MM3 (ref 0.4–1.3)
NUCLEATED RED BLOOD CELLS: 0 /100 WBC
PDW BLD-RTO: 14 % (ref 11.5–14.5)
PLATELET # BLD: 231 THOU/MM3 (ref 130–400)
PMV BLD AUTO: 8.6 MCM (ref 7.4–10.4)
POTASSIUM SERPL-SCNC: 4.3 MEQ/L (ref 3.5–5.2)
RBC # BLD: 3.7 MILL/MM3 (ref 4.2–5.4)
RBC # BLD: NORMAL 10*6/UL
SEG NEUTROPHILS: 48.6 %
SEGMENTED NEUTROPHILS ABSOLUTE COUNT: 2.9 THOU/MM3 (ref 1.8–7.7)
SODIUM BLD-SCNC: 142 MEQ/L (ref 135–145)
WBC # BLD: 5.9 THOU/MM3 (ref 4.8–10.8)

## 2017-10-23 PROCEDURE — 80048 BASIC METABOLIC PNL TOTAL CA: CPT

## 2017-10-23 PROCEDURE — 36415 COLL VENOUS BLD VENIPUNCTURE: CPT

## 2017-10-23 PROCEDURE — 85025 COMPLETE CBC W/AUTO DIFF WBC: CPT

## 2017-10-30 ENCOUNTER — OFFICE VISIT (OUTPATIENT)
Dept: NEPHROLOGY | Age: 77
End: 2017-10-30
Payer: MEDICARE

## 2017-10-30 VITALS
HEIGHT: 60 IN | HEART RATE: 86 BPM | WEIGHT: 107 LBS | BODY MASS INDEX: 21.01 KG/M2 | OXYGEN SATURATION: 96 % | DIASTOLIC BLOOD PRESSURE: 67 MMHG | SYSTOLIC BLOOD PRESSURE: 138 MMHG

## 2017-10-30 DIAGNOSIS — N18.4 CKD (CHRONIC KIDNEY DISEASE), STAGE IV (HCC): Primary | ICD-10-CM

## 2017-10-30 PROCEDURE — G8427 DOCREV CUR MEDS BY ELIG CLIN: HCPCS | Performed by: INTERNAL MEDICINE

## 2017-10-30 PROCEDURE — 99213 OFFICE O/P EST LOW 20 MIN: CPT | Performed by: INTERNAL MEDICINE

## 2017-10-30 PROCEDURE — G8420 CALC BMI NORM PARAMETERS: HCPCS | Performed by: INTERNAL MEDICINE

## 2017-10-30 PROCEDURE — 1123F ACP DISCUSS/DSCN MKR DOCD: CPT | Performed by: INTERNAL MEDICINE

## 2017-10-30 PROCEDURE — 1090F PRES/ABSN URINE INCON ASSESS: CPT | Performed by: INTERNAL MEDICINE

## 2017-10-30 PROCEDURE — 1036F TOBACCO NON-USER: CPT | Performed by: INTERNAL MEDICINE

## 2017-10-30 PROCEDURE — 4040F PNEUMOC VAC/ADMIN/RCVD: CPT | Performed by: INTERNAL MEDICINE

## 2017-10-30 PROCEDURE — G8400 PT W/DXA NO RESULTS DOC: HCPCS | Performed by: INTERNAL MEDICINE

## 2017-10-30 PROCEDURE — G8484 FLU IMMUNIZE NO ADMIN: HCPCS | Performed by: INTERNAL MEDICINE

## 2017-10-30 NOTE — PROGRESS NOTES
daily Indications: High Blood Pressure      vitamin D3 (CHOLECALCIFEROL) 400 UNITS TABS tablet Take 400 Units by mouth daily Indications: Treatment with Vitamin D       acetaminophen (TYLENOL ARTHRITIS PAIN) 650 MG extended release tablet Take 1,300 mg by mouth 2 times daily Indications: Arthritis      bumetanide (BUMEX) 2 MG tablet Take 2 mg by mouth daily Indications: Treatment with Diuretic Therapy       rosuvastatin (CRESTOR) 5 MG tablet Take 5 mg by mouth daily Indications: Blood Cholesterol Abnormal       potassium chloride SA (K-DUR;KLOR-CON M) 20 MEQ tablet Take 20 mEq by mouth daily Supplement; take with food.  levothyroxine (SYNTHROID) 50 MCG tablet Take 50 mcg by mouth daily Indications: Impaired Thyroid Function       Biotin 1000 MCG TABS Take 1,000 mcg by mouth daily as needed       aspirin 81 MG chewable tablet Take 81 mg by mouth daily Indications: Treatment to Prevent Blood Clotting Take with food. No current facility-administered medications for this visit. IMPRESSION:    Patient Active Problem List   Diagnosis Code    AF (atrial fibrillation) (HCC) I48.91    Anticoagulated on Coumadin Z51.81, Z79.01    HTN (hypertension) I10            PLAN:  1. We discussed the eGFR today. 2. We will continue all current medications without changes. 3. We will see the patient back in 4 months. Total time spent interviewing the patient and/or family, evaluating lab data and X-ray data and processing orders was 20 minutes. I personally spent more than 50% of the visit time face to face with the patient providing counseling and coordination of the patient's care.             _________________________________  Richard Garrett.  Shadia Mg DO  [facility]      CC  Francisca Tipton, ARIELA

## 2017-12-06 ENCOUNTER — HOSPITAL ENCOUNTER (OUTPATIENT)
Dept: GENERAL RADIOLOGY | Age: 77
Discharge: HOME | End: 2017-12-06
Payer: MEDICARE

## 2017-12-06 ENCOUNTER — HOSPITAL ENCOUNTER (OUTPATIENT)
Age: 77
Discharge: HOME | End: 2017-12-06
Payer: MEDICARE

## 2017-12-06 DIAGNOSIS — M25.561 RIGHT KNEE PAIN, UNSPECIFIED CHRONICITY: ICD-10-CM

## 2017-12-06 PROCEDURE — 73560 X-RAY EXAM OF KNEE 1 OR 2: CPT

## 2017-12-19 ENCOUNTER — HOSPITAL ENCOUNTER (OUTPATIENT)
Dept: WOMENS IMAGING | Age: 77
Discharge: HOME | End: 2017-12-19
Payer: MEDICARE

## 2017-12-19 DIAGNOSIS — Z12.31 VISIT FOR SCREENING MAMMOGRAM: ICD-10-CM

## 2017-12-19 PROCEDURE — 77063 BREAST TOMOSYNTHESIS BI: CPT

## 2018-02-26 ENCOUNTER — HOSPITAL ENCOUNTER (OUTPATIENT)
Age: 78
Discharge: HOME | End: 2018-02-26
Payer: MEDICARE

## 2018-02-26 LAB
ANION GAP SERPL CALCULATED.3IONS-SCNC: 14 MEQ/L (ref 8–16)
BASOPHILS # BLD: 0.7 %
BASOPHILS ABSOLUTE: 0 THOU/MM3 (ref 0–0.1)
BUN BLDV-MCNC: 28 MG/DL (ref 7–22)
CALCIUM SERPL-MCNC: 10 MG/DL (ref 8.5–10.5)
CHLORIDE BLD-SCNC: 102 MEQ/L (ref 98–111)
CO2: 27 MEQ/L (ref 23–33)
CREAT SERPL-MCNC: 1.6 MG/DL (ref 0.4–1.2)
EOSINOPHIL # BLD: 10.7 %
EOSINOPHILS ABSOLUTE: 0.5 THOU/MM3 (ref 0–0.4)
GFR SERPL CREATININE-BSD FRML MDRD: 31 ML/MIN/1.73M2
GLUCOSE BLD-MCNC: 91 MG/DL (ref 70–108)
HCT VFR BLD CALC: 36.4 % (ref 37–47)
HEMOGLOBIN: 12.2 GM/DL (ref 12–16)
LYMPHOCYTES # BLD: 34.6 %
LYMPHOCYTES ABSOLUTE: 1.6 THOU/MM3 (ref 1–4.8)
MACROCYTES: ABNORMAL
MCH RBC QN AUTO: 34 PG (ref 27–31)
MCHC RBC AUTO-ENTMCNC: 33.6 GM/DL (ref 33–37)
MCV RBC AUTO: 101.1 FL (ref 81–99)
MONOCYTES # BLD: 13.7 %
MONOCYTES ABSOLUTE: 0.6 THOU/MM3 (ref 0.4–1.3)
NUCLEATED RED BLOOD CELLS: 0 /100 WBC
PDW BLD-RTO: 12.8 % (ref 11.5–14.5)
PLATELET # BLD: 163 THOU/MM3 (ref 130–400)
PMV BLD AUTO: 9.2 FL (ref 7.4–10.4)
POTASSIUM SERPL-SCNC: 4.5 MEQ/L (ref 3.5–5.2)
RBC # BLD: 3.6 MILL/MM3 (ref 4.2–5.4)
SEG NEUTROPHILS: 40.3 %
SEGMENTED NEUTROPHILS ABSOLUTE COUNT: 1.8 THOU/MM3 (ref 1.8–7.7)
SODIUM BLD-SCNC: 143 MEQ/L (ref 135–145)
WBC # BLD: 4.5 THOU/MM3 (ref 4.8–10.8)

## 2018-02-26 PROCEDURE — 80048 BASIC METABOLIC PNL TOTAL CA: CPT

## 2018-02-26 PROCEDURE — 36415 COLL VENOUS BLD VENIPUNCTURE: CPT

## 2018-02-26 PROCEDURE — 85025 COMPLETE CBC W/AUTO DIFF WBC: CPT

## 2018-03-05 ENCOUNTER — OFFICE VISIT (OUTPATIENT)
Dept: NEPHROLOGY | Age: 78
End: 2018-03-05
Payer: MEDICARE

## 2018-03-05 VITALS
RESPIRATION RATE: 18 BRPM | WEIGHT: 109 LBS | BODY MASS INDEX: 21.4 KG/M2 | DIASTOLIC BLOOD PRESSURE: 64 MMHG | HEIGHT: 60 IN | HEART RATE: 63 BPM | SYSTOLIC BLOOD PRESSURE: 132 MMHG | OXYGEN SATURATION: 98 %

## 2018-03-05 DIAGNOSIS — N18.30 CKD (CHRONIC KIDNEY DISEASE), STAGE III (HCC): Primary | ICD-10-CM

## 2018-03-05 PROCEDURE — 4040F PNEUMOC VAC/ADMIN/RCVD: CPT | Performed by: INTERNAL MEDICINE

## 2018-03-05 PROCEDURE — 1090F PRES/ABSN URINE INCON ASSESS: CPT | Performed by: INTERNAL MEDICINE

## 2018-03-05 PROCEDURE — G8484 FLU IMMUNIZE NO ADMIN: HCPCS | Performed by: INTERNAL MEDICINE

## 2018-03-05 PROCEDURE — G8420 CALC BMI NORM PARAMETERS: HCPCS | Performed by: INTERNAL MEDICINE

## 2018-03-05 PROCEDURE — 1036F TOBACCO NON-USER: CPT | Performed by: INTERNAL MEDICINE

## 2018-03-05 PROCEDURE — 1123F ACP DISCUSS/DSCN MKR DOCD: CPT | Performed by: INTERNAL MEDICINE

## 2018-03-05 PROCEDURE — 99213 OFFICE O/P EST LOW 20 MIN: CPT | Performed by: INTERNAL MEDICINE

## 2018-03-05 PROCEDURE — G8427 DOCREV CUR MEDS BY ELIG CLIN: HCPCS | Performed by: INTERNAL MEDICINE

## 2018-03-05 PROCEDURE — G8400 PT W/DXA NO RESULTS DOC: HCPCS | Performed by: INTERNAL MEDICINE

## 2018-03-05 RX ORDER — POTASSIUM CHLORIDE 750 MG/1
10 CAPSULE, EXTENDED RELEASE ORAL DAILY
Status: ON HOLD | COMMUNITY
End: 2020-01-18 | Stop reason: HOSPADM

## 2018-03-05 RX ORDER — HYDROXYCHLOROQUINE SULFATE 200 MG/1
TABLET, FILM COATED ORAL DAILY
COMMUNITY
End: 2018-09-05 | Stop reason: ALTCHOICE

## 2018-03-05 NOTE — PROGRESS NOTES
[facility]    [facility]  [address], [address]  [phone]       Progress Note    3/5/2018 10:19 AM    Pt Name:    Amy Bennett  YOB: 1940  Primary Care Physician:  Norah Sanches CNP       Chief Complaint:   Chief Complaint   Patient presents with    Chronic Kidney Disease     iv        History of Chief Complaint: CKD stage IIIB from HTN and valvular heart disease. Subjective:  I last saw the patient in clinic 08/28/17. I follow the patient for Chronic Kidney disease stage IIIB. Since our last visit the patient has not been hospitalized. The patient is sleeping well at night with 1-2 times per night nocturia. The patient has a good appetite and is remaining active. The patient denied N/V/C/D/SOB/CP. EGFR=31 Ml/Min       Objective:  VITALS:  /64 (Site: Right Arm, Position: Sitting, Cuff Size: Small Adult)   Pulse 63   Resp 18   Ht 5' (1.524 m)   Wt 109 lb (49.4 kg)   SpO2 98%   BMI 21.29 kg/m²   Weight:   Wt Readings from Last 3 Encounters:   03/05/18 109 lb (49.4 kg)   10/30/17 107 lb (48.5 kg)   08/28/17 108 lb (49 kg)     Body mass index is 21.29 kg/m². Physical examination    General:  Alert and cooperative with exam  HEENT:  Head: Normocephalic, no lesions, without obvious abnormality. Neck:   No JVD and no bruits. Thyroid gland is normal  Lungs:  clear to auscultation bilaterally  Heart:  regular rate and rhythm, S1, S2 normal, no murmur, click, rub or gallop  Abdomen:  soft, non-tender; bowel sounds normal; no masses,  no organomegaly  Extremities:  extremities normal, atraumatic, no cyanosis or edema  Neurologic:  Mental status: Alert, oriented, thought content appropriate  Skin:                Warm and dry with no rashes. Muscles:         Hand  and leg strength are equal and strong bilaterally.      Lab Data      CBC:   Lab Results   Component Value Date    WBC 4.5 (L) 02/26/2018    HGB 12.2 02/26/2018    HCT

## 2018-04-03 ENCOUNTER — HOSPITAL ENCOUNTER (OUTPATIENT)
Age: 78
Discharge: HOME | End: 2018-04-03
Payer: MEDICARE

## 2018-04-03 LAB
ALBUMIN SERPL-MCNC: 4.3 G/DL (ref 3.5–5.1)
ALP BLD-CCNC: 93 U/L (ref 38–126)
ALT SERPL-CCNC: 8 U/L (ref 11–66)
ANION GAP SERPL CALCULATED.3IONS-SCNC: 13 MEQ/L (ref 8–16)
AST SERPL-CCNC: 19 U/L (ref 5–40)
BILIRUB SERPL-MCNC: 0.3 MG/DL (ref 0.3–1.2)
BILIRUBIN DIRECT: < 0.2 MG/DL (ref 0–0.3)
BUN BLDV-MCNC: 21 MG/DL (ref 7–22)
CALCIUM SERPL-MCNC: 10 MG/DL (ref 8.5–10.5)
CHLORIDE BLD-SCNC: 102 MEQ/L (ref 98–111)
CHOLESTEROL, FASTING: 153 MG/DL (ref 100–199)
CO2: 30 MEQ/L (ref 23–33)
CREAT SERPL-MCNC: 1.4 MG/DL (ref 0.4–1.2)
GFR SERPL CREATININE-BSD FRML MDRD: 36 ML/MIN/1.73M2
GLUCOSE FASTING: 83 MG/DL (ref 70–108)
HCT VFR BLD CALC: 37.8 % (ref 37–47)
HDLC SERPL-MCNC: 74 MG/DL
HEMOGLOBIN: 12.4 GM/DL (ref 12–16)
LDL CHOLESTEROL CALCULATED: 60 MG/DL
MCH RBC QN AUTO: 32.6 PG (ref 27–31)
MCHC RBC AUTO-ENTMCNC: 32.9 GM/DL (ref 33–37)
MCV RBC AUTO: 99 FL (ref 81–99)
PDW BLD-RTO: 13.3 % (ref 11.5–14.5)
PLATELET # BLD: 172 THOU/MM3 (ref 130–400)
PMV BLD AUTO: 9.1 FL (ref 7.4–10.4)
POTASSIUM SERPL-SCNC: 4.2 MEQ/L (ref 3.5–5.2)
RBC # BLD: 3.82 MILL/MM3 (ref 4.2–5.4)
SODIUM BLD-SCNC: 145 MEQ/L (ref 135–145)
TOTAL PROTEIN: 6.9 G/DL (ref 6.1–8)
TRIGLYCERIDE, FASTING: 97 MG/DL (ref 0–199)
TSH SERPL DL<=0.05 MIU/L-ACNC: 1.68 UIU/ML (ref 0.4–4.2)
WBC # BLD: 7.3 THOU/MM3 (ref 4.8–10.8)

## 2018-04-03 PROCEDURE — 84443 ASSAY THYROID STIM HORMONE: CPT

## 2018-04-03 PROCEDURE — 36415 COLL VENOUS BLD VENIPUNCTURE: CPT

## 2018-04-03 PROCEDURE — 80048 BASIC METABOLIC PNL TOTAL CA: CPT

## 2018-04-03 PROCEDURE — 85027 COMPLETE CBC AUTOMATED: CPT

## 2018-04-03 PROCEDURE — 80061 LIPID PANEL: CPT

## 2018-04-03 PROCEDURE — 80076 HEPATIC FUNCTION PANEL: CPT

## 2018-04-13 ENCOUNTER — APPOINTMENT (OUTPATIENT)
Dept: CT IMAGING | Age: 78
End: 2018-04-13
Payer: OTHER MISCELLANEOUS

## 2018-04-13 ENCOUNTER — APPOINTMENT (OUTPATIENT)
Dept: GENERAL RADIOLOGY | Age: 78
End: 2018-04-13
Payer: OTHER MISCELLANEOUS

## 2018-04-13 ENCOUNTER — HOSPITAL ENCOUNTER (EMERGENCY)
Age: 78
Discharge: HOME | End: 2018-04-13
Attending: EMERGENCY MEDICINE
Payer: OTHER MISCELLANEOUS

## 2018-04-13 VITALS
WEIGHT: 106 LBS | OXYGEN SATURATION: 98 % | HEART RATE: 68 BPM | SYSTOLIC BLOOD PRESSURE: 178 MMHG | TEMPERATURE: 98.8 F | HEIGHT: 60 IN | DIASTOLIC BLOOD PRESSURE: 79 MMHG | RESPIRATION RATE: 15 BRPM | BODY MASS INDEX: 20.81 KG/M2
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
## 2018-04-13 DIAGNOSIS — S16.1XXA ACUTE STRAIN OF NECK MUSCLE, INITIAL ENCOUNTER: ICD-10-CM

## 2018-04-13 DIAGNOSIS — S09.90XA CLOSED HEAD INJURY, INITIAL ENCOUNTER: Primary | ICD-10-CM

## 2018-04-13 DIAGNOSIS — S60.031A CONTUSION OF RIGHT MIDDLE FINGER WITHOUT DAMAGE TO NAIL, INITIAL ENCOUNTER: ICD-10-CM

## 2018-04-13 PROCEDURE — 72125 CT NECK SPINE W/O DYE: CPT

## 2018-04-13 PROCEDURE — 73140 X-RAY EXAM OF FINGER(S): CPT

## 2018-04-13 PROCEDURE — 99284 EMERGENCY DEPT VISIT MOD MDM: CPT

## 2018-04-13 PROCEDURE — 70450 CT HEAD/BRAIN W/O DYE: CPT

## 2018-04-13 ASSESSMENT — PAIN DESCRIPTION - LOCATION
LOCATION: NECK
LOCATION: NECK

## 2018-04-13 ASSESSMENT — PAIN SCALES - GENERAL
PAINLEVEL_OUTOF10: 6
PAINLEVEL_OUTOF10: 6

## 2018-04-18 ENCOUNTER — HOSPITAL ENCOUNTER (OUTPATIENT)
Age: 78
Discharge: HOME | End: 2018-04-18
Payer: MEDICARE

## 2018-04-18 LAB
ALBUMIN SERPL-MCNC: 4.2 G/DL (ref 3.5–5.1)
ALP BLD-CCNC: 94 U/L (ref 38–126)
ALT SERPL-CCNC: 9 U/L (ref 11–66)
ANION GAP SERPL CALCULATED.3IONS-SCNC: 15 MEQ/L (ref 8–16)
AST SERPL-CCNC: 17 U/L (ref 5–40)
BASOPHILS # BLD: 0.4 %
BASOPHILS ABSOLUTE: 0 THOU/MM3 (ref 0–0.1)
BILIRUB SERPL-MCNC: 0.4 MG/DL (ref 0.3–1.2)
BUN BLDV-MCNC: 28 MG/DL (ref 7–22)
CALCIUM SERPL-MCNC: 10.4 MG/DL (ref 8.5–10.5)
CHLORIDE BLD-SCNC: 103 MEQ/L (ref 98–111)
CO2: 27 MEQ/L (ref 23–33)
CREAT SERPL-MCNC: 1.6 MG/DL (ref 0.4–1.2)
EOSINOPHIL # BLD: 5.6 %
EOSINOPHILS ABSOLUTE: 0.3 THOU/MM3 (ref 0–0.4)
GFR SERPL CREATININE-BSD FRML MDRD: 31 ML/MIN/1.73M2
GLUCOSE BLD-MCNC: 93 MG/DL (ref 70–108)
HCT VFR BLD CALC: 36.9 % (ref 37–47)
HEMOGLOBIN: 12.4 GM/DL (ref 12–16)
LYMPHOCYTES # BLD: 27 %
LYMPHOCYTES ABSOLUTE: 1.6 THOU/MM3 (ref 1–4.8)
MCH RBC QN AUTO: 33 PG (ref 27–31)
MCHC RBC AUTO-ENTMCNC: 33.5 GM/DL (ref 33–37)
MCV RBC AUTO: 98.5 FL (ref 81–99)
MONOCYTES # BLD: 11.2 %
MONOCYTES ABSOLUTE: 0.7 THOU/MM3 (ref 0.4–1.3)
NUCLEATED RED BLOOD CELLS: 0 /100 WBC
PDW BLD-RTO: 13.6 % (ref 11.5–14.5)
PLATELET # BLD: 195 THOU/MM3 (ref 130–400)
PMV BLD AUTO: 9.2 FL (ref 7.4–10.4)
POTASSIUM SERPL-SCNC: 4.6 MEQ/L (ref 3.5–5.2)
RBC # BLD: 3.75 MILL/MM3 (ref 4.2–5.4)
SEG NEUTROPHILS: 55.8 %
SEGMENTED NEUTROPHILS ABSOLUTE COUNT: 3.4 THOU/MM3 (ref 1.8–7.7)
SODIUM BLD-SCNC: 145 MEQ/L (ref 135–145)
TOTAL PROTEIN: 7.2 G/DL (ref 6.1–8)
WBC # BLD: 6.1 THOU/MM3 (ref 4.8–10.8)

## 2018-04-18 PROCEDURE — 36415 COLL VENOUS BLD VENIPUNCTURE: CPT

## 2018-04-18 PROCEDURE — 85025 COMPLETE CBC W/AUTO DIFF WBC: CPT

## 2018-04-18 PROCEDURE — 80053 COMPREHEN METABOLIC PANEL: CPT

## 2018-04-18 PROCEDURE — 86300 IMMUNOASSAY TUMOR CA 15-3: CPT

## 2018-04-21 LAB — CA 27-29: 21 U/ML (ref 0–38)

## 2018-05-09 ENCOUNTER — HOSPITAL ENCOUNTER (OUTPATIENT)
Age: 78
Discharge: HOME | End: 2018-05-09
Payer: MEDICARE

## 2018-05-09 LAB
ALT SERPL-CCNC: 9 U/L (ref 11–66)
ANISOCYTOSIS: ABNORMAL
BASOPHILS # BLD: 0.6 %
BASOPHILS ABSOLUTE: 0 THOU/MM3 (ref 0–0.1)
CREAT SERPL-MCNC: 1.7 MG/DL (ref 0.4–1.2)
EOSINOPHIL # BLD: 6.7 %
EOSINOPHILS ABSOLUTE: 0.4 THOU/MM3 (ref 0–0.4)
FERRITIN: 64 NG/ML (ref 10–291)
FOLATE: 15.8 NG/ML (ref 4.8–24.2)
GFR SERPL CREATININE-BSD FRML MDRD: 29 ML/MIN/1.73M2
HCT VFR BLD CALC: 36.6 % (ref 37–47)
HEMOGLOBIN: 12.2 GM/DL (ref 12–16)
IRON SATURATION: 28 % (ref 20–50)
IRON: 74 UG/DL (ref 50–170)
LYMPHOCYTES # BLD: 35.3 %
LYMPHOCYTES ABSOLUTE: 1.9 THOU/MM3 (ref 1–4.8)
MCH RBC QN AUTO: 32.7 PG (ref 27–31)
MCHC RBC AUTO-ENTMCNC: 33.3 GM/DL (ref 33–37)
MCV RBC AUTO: 98 FL (ref 81–99)
MONOCYTES # BLD: 12.4 %
MONOCYTES ABSOLUTE: 0.7 THOU/MM3 (ref 0.4–1.3)
NUCLEATED RED BLOOD CELLS: 0 /100 WBC
PDW BLD-RTO: 14.6 % (ref 11.5–14.5)
PLATELET # BLD: 184 THOU/MM3 (ref 130–400)
PMV BLD AUTO: 9.1 FL (ref 7.4–10.4)
RBC # BLD: 3.73 MILL/MM3 (ref 4.2–5.4)
SEDIMENTATION RATE, ERYTHROCYTE: 14 MM/HR (ref 0–20)
SEG NEUTROPHILS: 45 %
SEGMENTED NEUTROPHILS ABSOLUTE COUNT: 2.5 THOU/MM3 (ref 1.8–7.7)
TOTAL IRON BINDING CAPACITY: 260 UG/DL (ref 171–450)
VITAMIN B-12: 569 PG/ML (ref 211–911)
WBC # BLD: 5.5 THOU/MM3 (ref 4.8–10.8)

## 2018-05-09 PROCEDURE — 82565 ASSAY OF CREATININE: CPT

## 2018-05-09 PROCEDURE — 85025 COMPLETE CBC W/AUTO DIFF WBC: CPT

## 2018-05-09 PROCEDURE — 83550 IRON BINDING TEST: CPT

## 2018-05-09 PROCEDURE — 82607 VITAMIN B-12: CPT

## 2018-05-09 PROCEDURE — 85651 RBC SED RATE NONAUTOMATED: CPT

## 2018-05-09 PROCEDURE — 82728 ASSAY OF FERRITIN: CPT

## 2018-05-09 PROCEDURE — 36415 COLL VENOUS BLD VENIPUNCTURE: CPT

## 2018-05-09 PROCEDURE — 84460 ALANINE AMINO (ALT) (SGPT): CPT

## 2018-05-09 PROCEDURE — 83540 ASSAY OF IRON: CPT

## 2018-05-09 PROCEDURE — 82746 ASSAY OF FOLIC ACID SERUM: CPT

## 2018-08-09 ENCOUNTER — HOSPITAL ENCOUNTER (OUTPATIENT)
Age: 78
Discharge: HOME | End: 2018-08-09
Payer: MEDICARE

## 2018-08-09 LAB
ALT SERPL-CCNC: 11 U/L (ref 11–66)
BASOPHILS # BLD: 0.9 %
BASOPHILS ABSOLUTE: 0 THOU/MM3 (ref 0–0.1)
CREAT SERPL-MCNC: 1.5 MG/DL (ref 0.4–1.2)
EOSINOPHIL # BLD: 11.2 %
EOSINOPHILS ABSOLUTE: 0.6 THOU/MM3 (ref 0–0.4)
ERYTHROCYTE [DISTWIDTH] IN BLOOD BY AUTOMATED COUNT: 13.2 % (ref 11.5–14.5)
ERYTHROCYTE [DISTWIDTH] IN BLOOD BY AUTOMATED COUNT: 51.2 FL (ref 35–45)
GFR SERPL CREATININE-BSD FRML MDRD: 33 ML/MIN/1.73M2
HCT VFR BLD CALC: 39.9 % (ref 37–47)
HEMOGLOBIN: 12.7 GM/DL (ref 12–16)
IMMATURE GRANS (ABS): 0.01 THOU/MM3 (ref 0–0.07)
IMMATURE GRANULOCYTES: 0.2 %
LYMPHOCYTES # BLD: 37.9 %
LYMPHOCYTES ABSOLUTE: 2 THOU/MM3 (ref 1–4.8)
MCH RBC QN AUTO: 33.2 PG (ref 26–33)
MCHC RBC AUTO-ENTMCNC: 31.8 GM/DL (ref 32.2–35.5)
MCV RBC AUTO: 104.2 FL (ref 81–99)
MONOCYTES # BLD: 13.3 %
MONOCYTES ABSOLUTE: 0.7 THOU/MM3 (ref 0.4–1.3)
NUCLEATED RED BLOOD CELLS: 0 /100 WBC
PLATELET # BLD: 171 THOU/MM3 (ref 130–400)
PMV BLD AUTO: 10.9 FL (ref 9.4–12.4)
RBC # BLD: 3.83 MILL/MM3 (ref 4.2–5.4)
SEG NEUTROPHILS: 36.5 %
SEGMENTED NEUTROPHILS ABSOLUTE COUNT: 1.9 THOU/MM3 (ref 1.8–7.7)
WBC # BLD: 5.3 THOU/MM3 (ref 4.8–10.8)

## 2018-08-09 PROCEDURE — 85025 COMPLETE CBC W/AUTO DIFF WBC: CPT

## 2018-08-09 PROCEDURE — 36415 COLL VENOUS BLD VENIPUNCTURE: CPT

## 2018-08-09 PROCEDURE — 82565 ASSAY OF CREATININE: CPT

## 2018-08-09 PROCEDURE — 84460 ALANINE AMINO (ALT) (SGPT): CPT

## 2018-08-29 ENCOUNTER — HOSPITAL ENCOUNTER (OUTPATIENT)
Age: 78
Discharge: HOME | End: 2018-08-29
Payer: MEDICARE

## 2018-08-29 DIAGNOSIS — N18.30 CKD (CHRONIC KIDNEY DISEASE), STAGE III (HCC): ICD-10-CM

## 2018-08-29 LAB
ANION GAP SERPL CALCULATED.3IONS-SCNC: 13 MEQ/L (ref 8–16)
BASOPHILS # BLD: 0.6 %
BASOPHILS ABSOLUTE: 0 THOU/MM3 (ref 0–0.1)
BUN BLDV-MCNC: 27 MG/DL (ref 7–22)
CALCIUM SERPL-MCNC: 10.2 MG/DL (ref 8.5–10.5)
CHLORIDE BLD-SCNC: 101 MEQ/L (ref 98–111)
CO2: 28 MEQ/L (ref 23–33)
CREAT SERPL-MCNC: 1.7 MG/DL (ref 0.4–1.2)
EOSINOPHIL # BLD: 8.1 %
EOSINOPHILS ABSOLUTE: 0.4 THOU/MM3 (ref 0–0.4)
ERYTHROCYTE [DISTWIDTH] IN BLOOD BY AUTOMATED COUNT: 13.4 % (ref 11.5–14.5)
ERYTHROCYTE [DISTWIDTH] IN BLOOD BY AUTOMATED COUNT: 51.7 FL (ref 35–45)
GFR SERPL CREATININE-BSD FRML MDRD: 29 ML/MIN/1.73M2
GLUCOSE BLD-MCNC: 90 MG/DL (ref 70–108)
HCT VFR BLD CALC: 40.5 % (ref 37–47)
HEMOGLOBIN: 13 GM/DL (ref 12–16)
IMMATURE GRANS (ABS): 0.01 THOU/MM3 (ref 0–0.07)
IMMATURE GRANULOCYTES: 0.2 %
LYMPHOCYTES # BLD: 35.4 %
LYMPHOCYTES ABSOLUTE: 1.9 THOU/MM3 (ref 1–4.8)
MCH RBC QN AUTO: 33.4 PG (ref 26–33)
MCHC RBC AUTO-ENTMCNC: 32.1 GM/DL (ref 32.2–35.5)
MCV RBC AUTO: 104.1 FL (ref 81–99)
MONOCYTES # BLD: 11.6 %
MONOCYTES ABSOLUTE: 0.6 THOU/MM3 (ref 0.4–1.3)
NUCLEATED RED BLOOD CELLS: 0 /100 WBC
PLATELET # BLD: 178 THOU/MM3 (ref 130–400)
PMV BLD AUTO: 10.8 FL (ref 9.4–12.4)
POTASSIUM SERPL-SCNC: 4 MEQ/L (ref 3.5–5.2)
RBC # BLD: 3.89 MILL/MM3 (ref 4.2–5.4)
SEG NEUTROPHILS: 44.1 %
SEGMENTED NEUTROPHILS ABSOLUTE COUNT: 2.4 THOU/MM3 (ref 1.8–7.7)
SODIUM BLD-SCNC: 142 MEQ/L (ref 135–145)
WBC # BLD: 5.5 THOU/MM3 (ref 4.8–10.8)

## 2018-08-29 PROCEDURE — 36415 COLL VENOUS BLD VENIPUNCTURE: CPT

## 2018-08-29 PROCEDURE — 80048 BASIC METABOLIC PNL TOTAL CA: CPT

## 2018-08-29 PROCEDURE — 85025 COMPLETE CBC W/AUTO DIFF WBC: CPT

## 2018-09-05 ENCOUNTER — OFFICE VISIT (OUTPATIENT)
Dept: NEPHROLOGY | Age: 78
End: 2018-09-05
Payer: MEDICARE

## 2018-09-05 VITALS
BODY MASS INDEX: 21.2 KG/M2 | HEIGHT: 60 IN | HEART RATE: 59 BPM | DIASTOLIC BLOOD PRESSURE: 67 MMHG | SYSTOLIC BLOOD PRESSURE: 129 MMHG | RESPIRATION RATE: 18 BRPM | WEIGHT: 108 LBS | OXYGEN SATURATION: 98 %

## 2018-09-05 DIAGNOSIS — N18.30 CKD (CHRONIC KIDNEY DISEASE), STAGE III (HCC): Primary | ICD-10-CM

## 2018-09-05 PROCEDURE — 4040F PNEUMOC VAC/ADMIN/RCVD: CPT | Performed by: INTERNAL MEDICINE

## 2018-09-05 PROCEDURE — 1101F PT FALLS ASSESS-DOCD LE1/YR: CPT | Performed by: INTERNAL MEDICINE

## 2018-09-05 PROCEDURE — G8427 DOCREV CUR MEDS BY ELIG CLIN: HCPCS | Performed by: INTERNAL MEDICINE

## 2018-09-05 PROCEDURE — 99213 OFFICE O/P EST LOW 20 MIN: CPT | Performed by: INTERNAL MEDICINE

## 2018-09-05 PROCEDURE — G8420 CALC BMI NORM PARAMETERS: HCPCS | Performed by: INTERNAL MEDICINE

## 2018-09-05 PROCEDURE — 1123F ACP DISCUSS/DSCN MKR DOCD: CPT | Performed by: INTERNAL MEDICINE

## 2018-09-05 PROCEDURE — G8400 PT W/DXA NO RESULTS DOC: HCPCS | Performed by: INTERNAL MEDICINE

## 2018-09-05 PROCEDURE — 1090F PRES/ABSN URINE INCON ASSESS: CPT | Performed by: INTERNAL MEDICINE

## 2018-09-05 PROCEDURE — 1036F TOBACCO NON-USER: CPT | Performed by: INTERNAL MEDICINE

## 2018-09-05 RX ORDER — ATENOLOL 25 MG/1
25 TABLET ORAL DAILY
COMMUNITY
Start: 2018-08-29 | End: 2018-09-05 | Stop reason: SDUPTHER

## 2018-09-05 RX ORDER — LOSARTAN POTASSIUM 50 MG/1
50 TABLET ORAL 2 TIMES DAILY
Status: ON HOLD | COMMUNITY
Start: 2018-06-27 | End: 2020-01-18 | Stop reason: HOSPADM

## 2018-09-05 NOTE — PROGRESS NOTES
tablet 50 mg 2 times daily      ATENOLOL PO Take 12.5 mg by mouth daily      potassium chloride (MICRO-K) 10 MEQ extended release capsule Take 10 mEq by mouth daily      propafenone (RYTHMOL) 150 MG tablet Take 150 mg by mouth every 8 hours      amLODIPine (NORVASC) 5 MG tablet Take 5 mg by mouth daily Indications: High Blood Pressure      acetaminophen (TYLENOL ARTHRITIS PAIN) 650 MG extended release tablet Take 650 mg by mouth as needed       bumetanide (BUMEX) 2 MG tablet Take 2 mg by mouth daily Indications: Treatment with Diuretic Therapy       rosuvastatin (CRESTOR) 5 MG tablet Take 5 mg by mouth daily Indications: Blood Cholesterol Abnormal       levothyroxine (SYNTHROID) 50 MCG tablet Take 50 mcg by mouth daily Indications: Impaired Thyroid Function       Biotin 1000 MCG TABS Take 1,000 mcg by mouth daily as needed       aspirin 81 MG chewable tablet Take 81 mg by mouth daily Indications: Treatment to Prevent Blood Clotting Take with food. No current facility-administered medications for this visit. IMPRESSIONS:      1. CKD stage IIIB  2. HTN  3. dehydration      Kidney disease:  Anemia:  Bone and mineral metabolism:       PLAN:  1. We discussed the eGFR today. 2. We will continue all current medications without changes. 3. She will drink more fluids  4. We will see the patient back in 3 months.               _________________________________  Mio Hardwick.  Susanne Dash DO  [facility]      CC  Kathy Fabian, APRN - CNP

## 2018-11-12 ENCOUNTER — HOSPITAL ENCOUNTER (OUTPATIENT)
Age: 78
Discharge: HOME | End: 2018-11-12
Payer: MEDICARE

## 2018-11-12 LAB
BASOPHILS # BLD: 0.7 %
BASOPHILS ABSOLUTE: 0 THOU/MM3 (ref 0–0.1)
EOSINOPHIL # BLD: 2.7 %
EOSINOPHILS ABSOLUTE: 0.2 THOU/MM3 (ref 0–0.4)
ERYTHROCYTE [DISTWIDTH] IN BLOOD BY AUTOMATED COUNT: 13 % (ref 11.5–14.5)
ERYTHROCYTE [DISTWIDTH] IN BLOOD BY AUTOMATED COUNT: 50.4 FL (ref 35–45)
FOLATE: 11.4 NG/ML (ref 4.8–24.2)
HCT VFR BLD CALC: 40 % (ref 37–47)
HEMOGLOBIN: 12.8 GM/DL (ref 12–16)
IMMATURE GRANS (ABS): 0.03 THOU/MM3 (ref 0–0.07)
IMMATURE GRANULOCYTES: 0.5 %
LYMPHOCYTES # BLD: 31 %
LYMPHOCYTES ABSOLUTE: 1.7 THOU/MM3 (ref 1–4.8)
MCH RBC QN AUTO: 33.6 PG (ref 26–33)
MCHC RBC AUTO-ENTMCNC: 32 GM/DL (ref 32.2–35.5)
MCV RBC AUTO: 105 FL (ref 81–99)
MONOCYTES # BLD: 12.6 %
MONOCYTES ABSOLUTE: 0.7 THOU/MM3 (ref 0.4–1.3)
NUCLEATED RED BLOOD CELLS: 0 /100 WBC
PLATELET # BLD: 194 THOU/MM3 (ref 130–400)
PMV BLD AUTO: 10.2 FL (ref 9.4–12.4)
RBC # BLD: 3.81 MILL/MM3 (ref 4.2–5.4)
SEG NEUTROPHILS: 52.5 %
SEGMENTED NEUTROPHILS ABSOLUTE COUNT: 2.9 THOU/MM3 (ref 1.8–7.7)
VITAMIN B-12: 429 PG/ML (ref 211–911)
WBC # BLD: 5.6 THOU/MM3 (ref 4.8–10.8)

## 2018-11-12 PROCEDURE — 36415 COLL VENOUS BLD VENIPUNCTURE: CPT

## 2018-11-12 PROCEDURE — 82746 ASSAY OF FOLIC ACID SERUM: CPT

## 2018-11-12 PROCEDURE — 85025 COMPLETE CBC W/AUTO DIFF WBC: CPT

## 2018-11-12 PROCEDURE — 82607 VITAMIN B-12: CPT

## 2018-11-28 ENCOUNTER — HOSPITAL ENCOUNTER (OUTPATIENT)
Age: 78
Discharge: HOME | End: 2018-11-28
Payer: MEDICARE

## 2018-11-28 DIAGNOSIS — N18.30 CKD (CHRONIC KIDNEY DISEASE), STAGE III (HCC): ICD-10-CM

## 2018-11-28 LAB
ANION GAP SERPL CALCULATED.3IONS-SCNC: 14 MEQ/L (ref 8–16)
BASOPHILS # BLD: 1 %
BASOPHILS ABSOLUTE: 0.1 THOU/MM3 (ref 0–0.1)
BUN BLDV-MCNC: 26 MG/DL (ref 7–22)
CALCIUM SERPL-MCNC: 10.4 MG/DL (ref 8.5–10.5)
CHLORIDE BLD-SCNC: 102 MEQ/L (ref 98–111)
CO2: 26 MEQ/L (ref 23–33)
CREAT SERPL-MCNC: 1.7 MG/DL (ref 0.4–1.2)
EOSINOPHIL # BLD: 5.5 %
EOSINOPHILS ABSOLUTE: 0.3 THOU/MM3 (ref 0–0.4)
ERYTHROCYTE [DISTWIDTH] IN BLOOD BY AUTOMATED COUNT: 13.3 % (ref 11.5–14.5)
ERYTHROCYTE [DISTWIDTH] IN BLOOD BY AUTOMATED COUNT: 53.2 FL (ref 35–45)
GFR SERPL CREATININE-BSD FRML MDRD: 29 ML/MIN/1.73M2
GLUCOSE BLD-MCNC: 84 MG/DL (ref 70–108)
HCT VFR BLD CALC: 40.9 % (ref 37–47)
HEMOGLOBIN: 12.7 GM/DL (ref 12–16)
IMMATURE GRANS (ABS): 0.01 THOU/MM3 (ref 0–0.07)
IMMATURE GRANULOCYTES: 0.2 %
LYMPHOCYTES # BLD: 36.4 %
LYMPHOCYTES ABSOLUTE: 1.9 THOU/MM3 (ref 1–4.8)
MCH RBC QN AUTO: 33.7 PG (ref 26–33)
MCHC RBC AUTO-ENTMCNC: 31.1 GM/DL (ref 32.2–35.5)
MCV RBC AUTO: 108.5 FL (ref 81–99)
MONOCYTES # BLD: 11.1 %
MONOCYTES ABSOLUTE: 0.6 THOU/MM3 (ref 0.4–1.3)
NUCLEATED RED BLOOD CELLS: 0 /100 WBC
PLATELET # BLD: 173 THOU/MM3 (ref 130–400)
PMV BLD AUTO: 10.8 FL (ref 9.4–12.4)
POTASSIUM SERPL-SCNC: 4.2 MEQ/L (ref 3.5–5.2)
RBC # BLD: 3.77 MILL/MM3 (ref 4.2–5.4)
SEG NEUTROPHILS: 45.8 %
SEGMENTED NEUTROPHILS ABSOLUTE COUNT: 2.3 THOU/MM3 (ref 1.8–7.7)
SODIUM BLD-SCNC: 142 MEQ/L (ref 135–145)
WBC # BLD: 5.1 THOU/MM3 (ref 4.8–10.8)

## 2018-11-28 PROCEDURE — 36415 COLL VENOUS BLD VENIPUNCTURE: CPT

## 2018-11-28 PROCEDURE — 85025 COMPLETE CBC W/AUTO DIFF WBC: CPT

## 2018-11-28 PROCEDURE — 80048 BASIC METABOLIC PNL TOTAL CA: CPT

## 2018-12-04 ENCOUNTER — HOSPITAL ENCOUNTER (OUTPATIENT)
Age: 78
Discharge: HOME | End: 2018-12-04
Payer: MEDICARE

## 2018-12-04 LAB
ALBUMIN SERPL-MCNC: 4.4 G/DL (ref 3.5–5.1)
ALP BLD-CCNC: 70 U/L (ref 38–126)
ALT SERPL-CCNC: 12 U/L (ref 11–66)
ANION GAP SERPL CALCULATED.3IONS-SCNC: 12 MEQ/L (ref 8–16)
AST SERPL-CCNC: 17 U/L (ref 5–40)
BILIRUB SERPL-MCNC: 0.5 MG/DL (ref 0.3–1.2)
BILIRUBIN DIRECT: < 0.2 MG/DL (ref 0–0.3)
BUN BLDV-MCNC: 36 MG/DL (ref 7–22)
CALCIUM SERPL-MCNC: 9.8 MG/DL (ref 8.5–10.5)
CHLORIDE BLD-SCNC: 102 MEQ/L (ref 98–111)
CHOLESTEROL, FASTING: 151 MG/DL (ref 100–199)
CO2: 29 MEQ/L (ref 23–33)
CREAT SERPL-MCNC: 1.7 MG/DL (ref 0.4–1.2)
ERYTHROCYTE [DISTWIDTH] IN BLOOD BY AUTOMATED COUNT: 13 % (ref 11.5–14.5)
ERYTHROCYTE [DISTWIDTH] IN BLOOD BY AUTOMATED COUNT: 50.4 FL (ref 35–45)
GFR SERPL CREATININE-BSD FRML MDRD: 29 ML/MIN/1.73M2
GLUCOSE FASTING: 89 MG/DL (ref 70–108)
HCT VFR BLD CALC: 38.9 % (ref 37–47)
HDLC SERPL-MCNC: 68 MG/DL
HEMOGLOBIN: 12.5 GM/DL (ref 12–16)
LDL CHOLESTEROL CALCULATED: 67 MG/DL
MCH RBC QN AUTO: 33.9 PG (ref 26–33)
MCHC RBC AUTO-ENTMCNC: 32.1 GM/DL (ref 32.2–35.5)
MCV RBC AUTO: 105.4 FL (ref 81–99)
PLATELET # BLD: 186 THOU/MM3 (ref 130–400)
PMV BLD AUTO: 10 FL (ref 9.4–12.4)
POTASSIUM SERPL-SCNC: 4.3 MEQ/L (ref 3.5–5.2)
RBC # BLD: 3.69 MILL/MM3 (ref 4.2–5.4)
SODIUM BLD-SCNC: 143 MEQ/L (ref 135–145)
TOTAL PROTEIN: 6.7 G/DL (ref 6.1–8)
TRIGLYCERIDE, FASTING: 80 MG/DL (ref 0–199)
TSH SERPL DL<=0.05 MIU/L-ACNC: 2.36 UIU/ML (ref 0.4–4.2)
WBC # BLD: 5.5 THOU/MM3 (ref 4.8–10.8)

## 2018-12-04 PROCEDURE — 84443 ASSAY THYROID STIM HORMONE: CPT

## 2018-12-04 PROCEDURE — 80048 BASIC METABOLIC PNL TOTAL CA: CPT

## 2018-12-04 PROCEDURE — 80076 HEPATIC FUNCTION PANEL: CPT

## 2018-12-04 PROCEDURE — 36415 COLL VENOUS BLD VENIPUNCTURE: CPT

## 2018-12-04 PROCEDURE — 85027 COMPLETE CBC AUTOMATED: CPT

## 2018-12-04 PROCEDURE — 80061 LIPID PANEL: CPT

## 2018-12-05 ENCOUNTER — OFFICE VISIT (OUTPATIENT)
Dept: NEPHROLOGY | Age: 78
End: 2018-12-05
Payer: MEDICARE

## 2018-12-05 VITALS
WEIGHT: 105 LBS | HEIGHT: 60 IN | RESPIRATION RATE: 18 BRPM | BODY MASS INDEX: 20.62 KG/M2 | SYSTOLIC BLOOD PRESSURE: 130 MMHG | HEART RATE: 62 BPM | OXYGEN SATURATION: 100 % | DIASTOLIC BLOOD PRESSURE: 64 MMHG

## 2018-12-05 DIAGNOSIS — N18.30 CKD (CHRONIC KIDNEY DISEASE), STAGE III (HCC): Primary | ICD-10-CM

## 2018-12-05 DIAGNOSIS — N18.4 CHRONIC KIDNEY DISEASE, STAGE IV (SEVERE) (HCC): ICD-10-CM

## 2018-12-05 PROCEDURE — G8400 PT W/DXA NO RESULTS DOC: HCPCS | Performed by: INTERNAL MEDICINE

## 2018-12-05 PROCEDURE — 4040F PNEUMOC VAC/ADMIN/RCVD: CPT | Performed by: INTERNAL MEDICINE

## 2018-12-05 PROCEDURE — 1123F ACP DISCUSS/DSCN MKR DOCD: CPT | Performed by: INTERNAL MEDICINE

## 2018-12-05 PROCEDURE — 1036F TOBACCO NON-USER: CPT | Performed by: INTERNAL MEDICINE

## 2018-12-05 PROCEDURE — G8484 FLU IMMUNIZE NO ADMIN: HCPCS | Performed by: INTERNAL MEDICINE

## 2018-12-05 PROCEDURE — 1090F PRES/ABSN URINE INCON ASSESS: CPT | Performed by: INTERNAL MEDICINE

## 2018-12-05 PROCEDURE — G8427 DOCREV CUR MEDS BY ELIG CLIN: HCPCS | Performed by: INTERNAL MEDICINE

## 2018-12-05 PROCEDURE — 1101F PT FALLS ASSESS-DOCD LE1/YR: CPT | Performed by: INTERNAL MEDICINE

## 2018-12-05 PROCEDURE — 99213 OFFICE O/P EST LOW 20 MIN: CPT | Performed by: INTERNAL MEDICINE

## 2018-12-05 PROCEDURE — G8420 CALC BMI NORM PARAMETERS: HCPCS | Performed by: INTERNAL MEDICINE

## 2018-12-20 ENCOUNTER — HOSPITAL ENCOUNTER (OUTPATIENT)
Dept: MAMMOGRAPHY | Age: 78
Discharge: HOME | End: 2018-12-20
Payer: MEDICARE

## 2018-12-20 DIAGNOSIS — Z12.39 BREAST SCREENING: ICD-10-CM

## 2018-12-20 PROCEDURE — 77063 BREAST TOMOSYNTHESIS BI: CPT

## 2019-02-12 ENCOUNTER — HOSPITAL ENCOUNTER (OUTPATIENT)
Age: 79
Discharge: HOME | End: 2019-02-12
Payer: MEDICARE

## 2019-02-12 LAB
BASOPHILS # BLD: 0.7 %
BASOPHILS ABSOLUTE: 0 THOU/MM3 (ref 0–0.1)
EOSINOPHIL # BLD: 2.8 %
EOSINOPHILS ABSOLUTE: 0.2 THOU/MM3 (ref 0–0.4)
ERYTHROCYTE [DISTWIDTH] IN BLOOD BY AUTOMATED COUNT: 13.1 % (ref 11.5–14.5)
ERYTHROCYTE [DISTWIDTH] IN BLOOD BY AUTOMATED COUNT: 52.3 FL (ref 35–45)
HCT VFR BLD CALC: 40.2 % (ref 37–47)
HEMOGLOBIN: 12.9 GM/DL (ref 12–16)
IMMATURE GRANS (ABS): 0.02 THOU/MM3 (ref 0–0.07)
IMMATURE GRANULOCYTES: 0.3 %
LYMPHOCYTES # BLD: 28.9 %
LYMPHOCYTES ABSOLUTE: 1.8 THOU/MM3 (ref 1–4.8)
MCH RBC QN AUTO: 34.5 PG (ref 26–33)
MCHC RBC AUTO-ENTMCNC: 32.1 GM/DL (ref 32.2–35.5)
MCV RBC AUTO: 107.5 FL (ref 81–99)
MONOCYTES # BLD: 10.2 %
MONOCYTES ABSOLUTE: 0.6 THOU/MM3 (ref 0.4–1.3)
NUCLEATED RED BLOOD CELLS: 0 /100 WBC
PLATELET # BLD: 199 THOU/MM3 (ref 130–400)
PMV BLD AUTO: 10.3 FL (ref 9.4–12.4)
RBC # BLD: 3.74 MILL/MM3 (ref 4.2–5.4)
SEG NEUTROPHILS: 57.1 %
SEGMENTED NEUTROPHILS ABSOLUTE COUNT: 3.5 THOU/MM3 (ref 1.8–7.7)
WBC # BLD: 6.1 THOU/MM3 (ref 4.8–10.8)

## 2019-02-12 PROCEDURE — 85025 COMPLETE CBC W/AUTO DIFF WBC: CPT

## 2019-02-12 PROCEDURE — 36415 COLL VENOUS BLD VENIPUNCTURE: CPT

## 2019-02-28 ENCOUNTER — HOSPITAL ENCOUNTER (OUTPATIENT)
Dept: MRI IMAGING | Age: 79
Discharge: HOME | End: 2019-02-28
Payer: MEDICARE

## 2019-02-28 DIAGNOSIS — M51.26 DISPLACEMENT OF LUMBAR INTERVERTEBRAL DISC WITHOUT MYELOPATHY: ICD-10-CM

## 2019-02-28 PROCEDURE — 72148 MRI LUMBAR SPINE W/O DYE: CPT

## 2019-03-06 ENCOUNTER — HOSPITAL ENCOUNTER (OUTPATIENT)
Age: 79
Discharge: HOME | End: 2019-03-06
Payer: MEDICARE

## 2019-03-06 DIAGNOSIS — N18.30 CKD (CHRONIC KIDNEY DISEASE), STAGE III (HCC): ICD-10-CM

## 2019-03-06 LAB
ANION GAP SERPL CALCULATED.3IONS-SCNC: 17 MEQ/L (ref 8–16)
BASOPHILS # BLD: 0.8 %
BASOPHILS ABSOLUTE: 0 THOU/MM3 (ref 0–0.1)
BUN BLDV-MCNC: 18 MG/DL (ref 7–22)
CALCIUM SERPL-MCNC: 10.1 MG/DL (ref 8.5–10.5)
CHLORIDE BLD-SCNC: 101 MEQ/L (ref 98–111)
CO2: 25 MEQ/L (ref 23–33)
CREAT SERPL-MCNC: 1.7 MG/DL (ref 0.4–1.2)
EOSINOPHIL # BLD: 5.8 %
EOSINOPHILS ABSOLUTE: 0.3 THOU/MM3 (ref 0–0.4)
ERYTHROCYTE [DISTWIDTH] IN BLOOD BY AUTOMATED COUNT: 12.7 % (ref 11.5–14.5)
ERYTHROCYTE [DISTWIDTH] IN BLOOD BY AUTOMATED COUNT: 51.2 FL (ref 35–45)
GFR SERPL CREATININE-BSD FRML MDRD: 29 ML/MIN/1.73M2
GLUCOSE BLD-MCNC: 89 MG/DL (ref 70–108)
HCT VFR BLD CALC: 38.4 % (ref 37–47)
HEMOGLOBIN: 12.1 GM/DL (ref 12–16)
IMMATURE GRANS (ABS): 0.01 THOU/MM3 (ref 0–0.07)
IMMATURE GRANULOCYTES: 0.2 %
LYMPHOCYTES # BLD: 34.1 %
LYMPHOCYTES ABSOLUTE: 1.8 THOU/MM3 (ref 1–4.8)
MCH RBC QN AUTO: 34.2 PG (ref 26–33)
MCHC RBC AUTO-ENTMCNC: 31.5 GM/DL (ref 32.2–35.5)
MCV RBC AUTO: 108.5 FL (ref 81–99)
MONOCYTES # BLD: 10.9 %
MONOCYTES ABSOLUTE: 0.6 THOU/MM3 (ref 0.4–1.3)
NUCLEATED RED BLOOD CELLS: 0 /100 WBC
PLATELET # BLD: 189 THOU/MM3 (ref 130–400)
PMV BLD AUTO: 10.6 FL (ref 9.4–12.4)
POTASSIUM SERPL-SCNC: 4.4 MEQ/L (ref 3.5–5.2)
RBC # BLD: 3.54 MILL/MM3 (ref 4.2–5.4)
SEG NEUTROPHILS: 48.2 %
SEGMENTED NEUTROPHILS ABSOLUTE COUNT: 2.6 THOU/MM3 (ref 1.8–7.7)
SODIUM BLD-SCNC: 143 MEQ/L (ref 135–145)
WBC # BLD: 5.3 THOU/MM3 (ref 4.8–10.8)

## 2019-03-06 PROCEDURE — 85025 COMPLETE CBC W/AUTO DIFF WBC: CPT

## 2019-03-06 PROCEDURE — 80048 BASIC METABOLIC PNL TOTAL CA: CPT

## 2019-03-06 PROCEDURE — 36415 COLL VENOUS BLD VENIPUNCTURE: CPT

## 2019-03-13 ENCOUNTER — OFFICE VISIT (OUTPATIENT)
Dept: NEPHROLOGY | Age: 79
End: 2019-03-13
Payer: MEDICARE

## 2019-03-13 VITALS
HEIGHT: 60 IN | SYSTOLIC BLOOD PRESSURE: 137 MMHG | OXYGEN SATURATION: 99 % | DIASTOLIC BLOOD PRESSURE: 70 MMHG | WEIGHT: 105 LBS | BODY MASS INDEX: 20.62 KG/M2 | RESPIRATION RATE: 18 BRPM | HEART RATE: 58 BPM

## 2019-03-13 DIAGNOSIS — N18.30 CKD (CHRONIC KIDNEY DISEASE), STAGE III (HCC): Primary | ICD-10-CM

## 2019-03-13 PROCEDURE — 1123F ACP DISCUSS/DSCN MKR DOCD: CPT | Performed by: INTERNAL MEDICINE

## 2019-03-13 PROCEDURE — G8484 FLU IMMUNIZE NO ADMIN: HCPCS | Performed by: INTERNAL MEDICINE

## 2019-03-13 PROCEDURE — 99213 OFFICE O/P EST LOW 20 MIN: CPT | Performed by: INTERNAL MEDICINE

## 2019-03-13 PROCEDURE — 1090F PRES/ABSN URINE INCON ASSESS: CPT | Performed by: INTERNAL MEDICINE

## 2019-03-13 PROCEDURE — 1101F PT FALLS ASSESS-DOCD LE1/YR: CPT | Performed by: INTERNAL MEDICINE

## 2019-03-13 PROCEDURE — 4040F PNEUMOC VAC/ADMIN/RCVD: CPT | Performed by: INTERNAL MEDICINE

## 2019-03-13 PROCEDURE — G8427 DOCREV CUR MEDS BY ELIG CLIN: HCPCS | Performed by: INTERNAL MEDICINE

## 2019-03-13 PROCEDURE — G8420 CALC BMI NORM PARAMETERS: HCPCS | Performed by: INTERNAL MEDICINE

## 2019-03-13 PROCEDURE — G8400 PT W/DXA NO RESULTS DOC: HCPCS | Performed by: INTERNAL MEDICINE

## 2019-03-13 PROCEDURE — 1036F TOBACCO NON-USER: CPT | Performed by: INTERNAL MEDICINE

## 2019-03-13 RX ORDER — ATENOLOL 25 MG/1
12.5 TABLET ORAL DAILY
Status: ON HOLD | COMMUNITY
Start: 2019-01-02 | End: 2019-07-31 | Stop reason: SDUPTHER

## 2019-03-13 RX ORDER — MELOXICAM 7.5 MG/1
7.5 TABLET ORAL DAILY
Qty: 90 TABLET | Refills: 1 | Status: SHIPPED | OUTPATIENT
Start: 2019-03-13 | End: 2019-12-26

## 2019-03-13 RX ORDER — POTASSIUM CHLORIDE 750 MG/1
10 TABLET, EXTENDED RELEASE ORAL DAILY
COMMUNITY
Start: 2019-01-22 | End: 2019-03-13 | Stop reason: SDUPTHER

## 2019-04-17 ENCOUNTER — HOSPITAL ENCOUNTER (OUTPATIENT)
Age: 79
Discharge: HOME | End: 2019-04-17
Payer: MEDICARE

## 2019-04-17 LAB
ALBUMIN SERPL-MCNC: 4.3 G/DL (ref 3.5–5.1)
ALP BLD-CCNC: 61 U/L (ref 38–126)
ALT SERPL-CCNC: 9 U/L (ref 11–66)
ANION GAP SERPL CALCULATED.3IONS-SCNC: 13 MEQ/L (ref 8–16)
AST SERPL-CCNC: 16 U/L (ref 5–40)
BASOPHILS # BLD: 0.9 %
BASOPHILS ABSOLUTE: 0 THOU/MM3 (ref 0–0.1)
BILIRUB SERPL-MCNC: 0.4 MG/DL (ref 0.3–1.2)
BUN BLDV-MCNC: 32 MG/DL (ref 7–22)
CALCIUM SERPL-MCNC: 10.3 MG/DL (ref 8.5–10.5)
CHLORIDE BLD-SCNC: 101 MEQ/L (ref 98–111)
CO2: 27 MEQ/L (ref 23–33)
CREAT SERPL-MCNC: 1.7 MG/DL (ref 0.4–1.2)
EOSINOPHIL # BLD: 8.7 %
EOSINOPHILS ABSOLUTE: 0.5 THOU/MM3 (ref 0–0.4)
ERYTHROCYTE [DISTWIDTH] IN BLOOD BY AUTOMATED COUNT: 12.8 % (ref 11.5–14.5)
ERYTHROCYTE [DISTWIDTH] IN BLOOD BY AUTOMATED COUNT: 50.6 FL (ref 35–45)
GFR SERPL CREATININE-BSD FRML MDRD: 29 ML/MIN/1.73M2
GLUCOSE BLD-MCNC: 84 MG/DL (ref 70–108)
HCT VFR BLD CALC: 37 % (ref 37–47)
HEMOGLOBIN: 11.7 GM/DL (ref 12–16)
IMMATURE GRANS (ABS): 0.02 THOU/MM3 (ref 0–0.07)
IMMATURE GRANULOCYTES: 0.4 %
LYMPHOCYTES # BLD: 33.5 %
LYMPHOCYTES ABSOLUTE: 1.8 THOU/MM3 (ref 1–4.8)
MCH RBC QN AUTO: 34 PG (ref 26–33)
MCHC RBC AUTO-ENTMCNC: 31.6 GM/DL (ref 32.2–35.5)
MCV RBC AUTO: 107.6 FL (ref 81–99)
MONOCYTES # BLD: 12.8 %
MONOCYTES ABSOLUTE: 0.7 THOU/MM3 (ref 0.4–1.3)
NUCLEATED RED BLOOD CELLS: 0 /100 WBC
PLATELET # BLD: 191 THOU/MM3 (ref 130–400)
PMV BLD AUTO: 10.7 FL (ref 9.4–12.4)
POTASSIUM SERPL-SCNC: 4.5 MEQ/L (ref 3.5–5.2)
RBC # BLD: 3.44 MILL/MM3 (ref 4.2–5.4)
SEG NEUTROPHILS: 43.7 %
SEGMENTED NEUTROPHILS ABSOLUTE COUNT: 2.3 THOU/MM3 (ref 1.8–7.7)
SODIUM BLD-SCNC: 141 MEQ/L (ref 135–145)
TOTAL PROTEIN: 6.6 G/DL (ref 6.1–8)
WBC # BLD: 5.3 THOU/MM3 (ref 4.8–10.8)

## 2019-04-17 PROCEDURE — 80053 COMPREHEN METABOLIC PANEL: CPT

## 2019-04-17 PROCEDURE — 36415 COLL VENOUS BLD VENIPUNCTURE: CPT

## 2019-04-17 PROCEDURE — 86300 IMMUNOASSAY TUMOR CA 15-3: CPT

## 2019-04-17 PROCEDURE — 85025 COMPLETE CBC W/AUTO DIFF WBC: CPT

## 2019-04-19 LAB — CA 27-29: 21 U/ML (ref 0–38)

## 2019-05-15 ENCOUNTER — HOSPITAL ENCOUNTER (OUTPATIENT)
Age: 79
Discharge: HOME | End: 2019-05-15
Payer: MEDICARE

## 2019-05-15 LAB
ALT SERPL-CCNC: 9 U/L (ref 11–66)
BASOPHILS # BLD: 1 %
BASOPHILS ABSOLUTE: 0 THOU/MM3 (ref 0–0.1)
CREAT SERPL-MCNC: 1.8 MG/DL (ref 0.4–1.2)
EOSINOPHIL # BLD: 10.4 %
EOSINOPHILS ABSOLUTE: 0.5 THOU/MM3 (ref 0–0.4)
ERYTHROCYTE [DISTWIDTH] IN BLOOD BY AUTOMATED COUNT: 12.2 % (ref 11.5–14.5)
ERYTHROCYTE [DISTWIDTH] IN BLOOD BY AUTOMATED COUNT: 48.3 FL (ref 35–45)
FOLATE: > 20 NG/ML (ref 4.8–24.2)
GFR SERPL CREATININE-BSD FRML MDRD: 27 ML/MIN/1.73M2
HCT VFR BLD CALC: 38.5 % (ref 37–47)
HEMOGLOBIN: 12.1 GM/DL (ref 12–16)
IMMATURE GRANS (ABS): 0.01 THOU/MM3 (ref 0–0.07)
IMMATURE GRANULOCYTES: 0.2 %
LYMPHOCYTES # BLD: 33.9 %
LYMPHOCYTES ABSOLUTE: 1.7 THOU/MM3 (ref 1–4.8)
MCH RBC QN AUTO: 33.7 PG (ref 26–33)
MCHC RBC AUTO-ENTMCNC: 31.4 GM/DL (ref 32.2–35.5)
MCV RBC AUTO: 107.2 FL (ref 81–99)
MONOCYTES # BLD: 13.8 %
MONOCYTES ABSOLUTE: 0.7 THOU/MM3 (ref 0.4–1.3)
NUCLEATED RED BLOOD CELLS: 0 /100 WBC
PLATELET # BLD: 180 THOU/MM3 (ref 130–400)
PMV BLD AUTO: 10.4 FL (ref 9.4–12.4)
RBC # BLD: 3.59 MILL/MM3 (ref 4.2–5.4)
SEG NEUTROPHILS: 40.7 %
SEGMENTED NEUTROPHILS ABSOLUTE COUNT: 2 THOU/MM3 (ref 1.8–7.7)
VITAMIN B-12: 578 PG/ML (ref 211–911)
WBC # BLD: 4.9 THOU/MM3 (ref 4.8–10.8)

## 2019-05-15 PROCEDURE — 84460 ALANINE AMINO (ALT) (SGPT): CPT

## 2019-05-15 PROCEDURE — 82565 ASSAY OF CREATININE: CPT

## 2019-05-15 PROCEDURE — 85025 COMPLETE CBC W/AUTO DIFF WBC: CPT

## 2019-05-15 PROCEDURE — 36415 COLL VENOUS BLD VENIPUNCTURE: CPT

## 2019-05-15 PROCEDURE — 82746 ASSAY OF FOLIC ACID SERUM: CPT

## 2019-05-15 PROCEDURE — 82607 VITAMIN B-12: CPT

## 2019-07-17 ENCOUNTER — HOSPITAL ENCOUNTER (OUTPATIENT)
Age: 79
Discharge: HOME | End: 2019-07-17
Payer: MEDICARE

## 2019-07-17 LAB
ANION GAP SERPL CALCULATED.3IONS-SCNC: 17 MEQ/L (ref 8–16)
BUN BLDV-MCNC: 25 MG/DL (ref 7–22)
CALCIUM SERPL-MCNC: 10.6 MG/DL (ref 8.5–10.5)
CHLORIDE BLD-SCNC: 106 MEQ/L (ref 98–111)
CO2: 24 MEQ/L (ref 23–33)
CREAT SERPL-MCNC: 1.5 MG/DL (ref 0.4–1.2)
ERYTHROCYTE [DISTWIDTH] IN BLOOD BY AUTOMATED COUNT: 13.3 % (ref 11.5–14.5)
ERYTHROCYTE [DISTWIDTH] IN BLOOD BY AUTOMATED COUNT: 52.8 FL (ref 35–45)
GFR SERPL CREATININE-BSD FRML MDRD: 33 ML/MIN/1.73M2
GLUCOSE BLD-MCNC: 88 MG/DL (ref 70–108)
HCT VFR BLD CALC: 36.8 % (ref 37–47)
HEMOGLOBIN: 11.4 GM/DL (ref 12–16)
MCH RBC QN AUTO: 33.3 PG (ref 26–33)
MCHC RBC AUTO-ENTMCNC: 31 GM/DL (ref 32.2–35.5)
MCV RBC AUTO: 107.6 FL (ref 81–99)
PLATELET # BLD: 209 THOU/MM3 (ref 130–400)
PMV BLD AUTO: 9.9 FL (ref 9.4–12.4)
POTASSIUM SERPL-SCNC: 4.3 MEQ/L (ref 3.5–5.2)
RBC # BLD: 3.42 MILL/MM3 (ref 4.2–5.4)
SODIUM BLD-SCNC: 147 MEQ/L (ref 135–145)
T4 FREE: 2.08 NG/DL (ref 0.93–1.76)
TSH SERPL DL<=0.05 MIU/L-ACNC: 1.06 UIU/ML (ref 0.4–4.2)
WBC # BLD: 4.3 THOU/MM3 (ref 4.8–10.8)

## 2019-07-17 PROCEDURE — 84439 ASSAY OF FREE THYROXINE: CPT

## 2019-07-17 PROCEDURE — 80048 BASIC METABOLIC PNL TOTAL CA: CPT

## 2019-07-17 PROCEDURE — 84443 ASSAY THYROID STIM HORMONE: CPT

## 2019-07-17 PROCEDURE — 85027 COMPLETE CBC AUTOMATED: CPT

## 2019-07-17 PROCEDURE — 36415 COLL VENOUS BLD VENIPUNCTURE: CPT

## 2019-07-24 VITALS — HEIGHT: 60 IN | WEIGHT: 97 LBS | BODY MASS INDEX: 19.04 KG/M2

## 2019-07-31 ENCOUNTER — HOSPITAL ENCOUNTER (OUTPATIENT)
Dept: INPATIENT UNIT | Age: 79
Discharge: HOME | End: 2019-07-31
Attending: INTERNAL MEDICINE | Admitting: INTERNAL MEDICINE
Payer: MEDICARE

## 2019-07-31 ENCOUNTER — HOSPITAL ENCOUNTER (OUTPATIENT)
Dept: INPATIENT UNIT | Age: 79
Discharge: HOME | End: 2019-07-31

## 2019-07-31 VITALS
WEIGHT: 97 LBS | RESPIRATION RATE: 18 BRPM | OXYGEN SATURATION: 97 % | SYSTOLIC BLOOD PRESSURE: 119 MMHG | HEIGHT: 60 IN | HEART RATE: 71 BPM | DIASTOLIC BLOOD PRESSURE: 71 MMHG | TEMPERATURE: 98.2 F | BODY MASS INDEX: 19.04 KG/M2

## 2019-07-31 LAB
ALBUMIN SERPL-MCNC: 4.5 G/DL (ref 3.5–5.1)
ALP BLD-CCNC: 58 U/L (ref 38–126)
ALT SERPL-CCNC: 10 U/L (ref 11–66)
ANION GAP SERPL CALCULATED.3IONS-SCNC: 18 MEQ/L (ref 8–16)
AST SERPL-CCNC: 19 U/L (ref 5–40)
BILIRUB SERPL-MCNC: 0.5 MG/DL (ref 0.3–1.2)
BUN BLDV-MCNC: 21 MG/DL (ref 7–22)
CALCIUM SERPL-MCNC: 10.3 MG/DL (ref 8.5–10.5)
CHLORIDE BLD-SCNC: 102 MEQ/L (ref 98–111)
CO2: 24 MEQ/L (ref 23–33)
CREAT SERPL-MCNC: 1.6 MG/DL (ref 0.4–1.2)
EKG ATRIAL RATE: 75 BPM
EKG ATRIAL RATE: 91 BPM
EKG P AXIS: 66 DEGREES
EKG P-R INTERVAL: 384 MS
EKG Q-T INTERVAL: 390 MS
EKG Q-T INTERVAL: 422 MS
EKG QRS DURATION: 100 MS
EKG QRS DURATION: 102 MS
EKG QTC CALCULATION (BAZETT): 453 MS
EKG QTC CALCULATION (BAZETT): 471 MS
EKG R AXIS: -15 DEGREES
EKG R AXIS: -19 DEGREES
EKG T AXIS: 41 DEGREES
EKG T AXIS: 7 DEGREES
EKG VENTRICULAR RATE: 75 BPM
EKG VENTRICULAR RATE: 81 BPM
ERYTHROCYTE [DISTWIDTH] IN BLOOD BY AUTOMATED COUNT: 13.3 % (ref 11.5–14.5)
ERYTHROCYTE [DISTWIDTH] IN BLOOD BY AUTOMATED COUNT: 52.3 FL (ref 35–45)
GFR SERPL CREATININE-BSD FRML MDRD: 31 ML/MIN/1.73M2
GLUCOSE BLD-MCNC: 109 MG/DL (ref 70–108)
HCT VFR BLD CALC: 39.3 % (ref 37–47)
HEMOGLOBIN: 12.3 GM/DL (ref 12–16)
INR BLD: 1.14 (ref 0.85–1.13)
MCH RBC QN AUTO: 33.1 PG (ref 26–33)
MCHC RBC AUTO-ENTMCNC: 31.3 GM/DL (ref 32.2–35.5)
MCV RBC AUTO: 105.6 FL (ref 81–99)
PLATELET # BLD: 184 THOU/MM3 (ref 130–400)
PMV BLD AUTO: 9.6 FL (ref 9.4–12.4)
POTASSIUM SERPL-SCNC: 4.2 MEQ/L (ref 3.5–5.2)
RBC # BLD: 3.72 MILL/MM3 (ref 4.2–5.4)
SODIUM BLD-SCNC: 144 MEQ/L (ref 135–145)
TOTAL PROTEIN: 6.6 G/DL (ref 6.1–8)
WBC # BLD: 4.6 THOU/MM3 (ref 4.8–10.8)

## 2019-07-31 PROCEDURE — 2500000003 HC RX 250 WO HCPCS: Performed by: INTERNAL MEDICINE

## 2019-07-31 PROCEDURE — 85610 PROTHROMBIN TIME: CPT

## 2019-07-31 PROCEDURE — 2709999900 HC NON-CHARGEABLE SUPPLY

## 2019-07-31 PROCEDURE — 93010 ELECTROCARDIOGRAM REPORT: CPT | Performed by: INTERNAL MEDICINE

## 2019-07-31 PROCEDURE — 93005 ELECTROCARDIOGRAM TRACING: CPT | Performed by: INTERNAL MEDICINE

## 2019-07-31 PROCEDURE — 85027 COMPLETE CBC AUTOMATED: CPT

## 2019-07-31 PROCEDURE — 94761 N-INVAS EAR/PLS OXIMETRY MLT: CPT

## 2019-07-31 PROCEDURE — 2500000003 HC RX 250 WO HCPCS

## 2019-07-31 PROCEDURE — 6360000002 HC RX W HCPCS

## 2019-07-31 PROCEDURE — 2580000003 HC RX 258: Performed by: INTERNAL MEDICINE

## 2019-07-31 PROCEDURE — 92960 CARDIOVERSION ELECTRIC EXT: CPT | Performed by: INTERNAL MEDICINE

## 2019-07-31 PROCEDURE — 36415 COLL VENOUS BLD VENIPUNCTURE: CPT

## 2019-07-31 PROCEDURE — 80053 COMPREHEN METABOLIC PANEL: CPT

## 2019-07-31 PROCEDURE — 6360000002 HC RX W HCPCS: Performed by: INTERNAL MEDICINE

## 2019-07-31 RX ORDER — SODIUM CHLORIDE 0.9 % (FLUSH) 0.9 %
10 SYRINGE (ML) INJECTION PRN
Status: DISCONTINUED | OUTPATIENT
Start: 2019-07-31 | End: 2019-07-31 | Stop reason: HOSPADM

## 2019-07-31 RX ORDER — METOPROLOL TARTRATE 5 MG/5ML
5 INJECTION INTRAVENOUS EVERY 5 MIN PRN
Status: COMPLETED | OUTPATIENT
Start: 2019-07-31 | End: 2019-07-31

## 2019-07-31 RX ORDER — ATENOLOL 25 MG/1
25 TABLET ORAL DAILY
Qty: 30 TABLET | Refills: 3 | Status: SHIPPED | OUTPATIENT
Start: 2019-07-31 | End: 2019-08-28

## 2019-07-31 RX ORDER — NALOXONE HYDROCHLORIDE 0.4 MG/ML
0.4 INJECTION, SOLUTION INTRAMUSCULAR; INTRAVENOUS; SUBCUTANEOUS ONCE
Status: COMPLETED | OUTPATIENT
Start: 2019-07-31 | End: 2019-07-31

## 2019-07-31 RX ORDER — FENTANYL CITRATE 50 UG/ML
50 INJECTION, SOLUTION INTRAMUSCULAR; INTRAVENOUS
Status: COMPLETED | OUTPATIENT
Start: 2019-07-31 | End: 2019-07-31

## 2019-07-31 RX ORDER — ATENOLOL 25 MG/1
12.5 TABLET ORAL 2 TIMES DAILY
Status: ON HOLD | COMMUNITY
End: 2020-01-18 | Stop reason: HOSPADM

## 2019-07-31 RX ORDER — SODIUM CHLORIDE 9 MG/ML
INJECTION, SOLUTION INTRAVENOUS CONTINUOUS
Status: DISCONTINUED | OUTPATIENT
Start: 2019-07-31 | End: 2019-07-31 | Stop reason: HOSPADM

## 2019-07-31 RX ORDER — SODIUM CHLORIDE 0.9 % (FLUSH) 0.9 %
10 SYRINGE (ML) INJECTION EVERY 12 HOURS SCHEDULED
Status: DISCONTINUED | OUTPATIENT
Start: 2019-07-31 | End: 2019-07-31 | Stop reason: HOSPADM

## 2019-07-31 RX ORDER — MIDAZOLAM HYDROCHLORIDE 1 MG/ML
2 INJECTION INTRAMUSCULAR; INTRAVENOUS
Status: COMPLETED | OUTPATIENT
Start: 2019-07-31 | End: 2019-07-31

## 2019-07-31 RX ORDER — FLUMAZENIL 0.1 MG/ML
0.4 INJECTION, SOLUTION INTRAVENOUS ONCE
Status: COMPLETED | OUTPATIENT
Start: 2019-07-31 | End: 2019-07-31

## 2019-07-31 RX ADMIN — METOPROLOL TARTRATE 5 MG: 5 INJECTION, SOLUTION INTRAVENOUS at 09:30

## 2019-07-31 RX ADMIN — MIDAZOLAM HYDROCHLORIDE 2 MG: 1 INJECTION, SOLUTION INTRAMUSCULAR; INTRAVENOUS at 10:07

## 2019-07-31 RX ADMIN — FENTANYL CITRATE 50 MCG: 50 INJECTION INTRAMUSCULAR; INTRAVENOUS at 10:03

## 2019-07-31 RX ADMIN — FLUMAZENIL 0.4 MG: 0.1 INJECTION, SOLUTION INTRAVENOUS at 10:05

## 2019-07-31 RX ADMIN — METOPROLOL TARTRATE 5 MG: 5 INJECTION, SOLUTION INTRAVENOUS at 09:33

## 2019-07-31 RX ADMIN — NALOXONE HYDROCHLORIDE 0.4 MG: 0.4 INJECTION, SOLUTION INTRAMUSCULAR; INTRAVENOUS; SUBCUTANEOUS at 10:06

## 2019-07-31 RX ADMIN — MIDAZOLAM HYDROCHLORIDE 2 MG: 1 INJECTION, SOLUTION INTRAMUSCULAR; INTRAVENOUS at 10:06

## 2019-07-31 RX ADMIN — SODIUM CHLORIDE: 9 INJECTION, SOLUTION INTRAVENOUS at 08:12

## 2019-07-31 RX ADMIN — MIDAZOLAM HYDROCHLORIDE 1 MG: 1 INJECTION, SOLUTION INTRAMUSCULAR; INTRAVENOUS at 10:08

## 2019-07-31 RX ADMIN — FENTANYL CITRATE 50 MCG: 50 INJECTION INTRAMUSCULAR; INTRAVENOUS at 10:30

## 2019-07-31 ASSESSMENT — PAIN SCALES - GENERAL
PAINLEVEL_OUTOF10: 0

## 2019-07-31 NOTE — H&P
[address] [address]   Sedation/Analgesia History and Physical    Pt Name: Angelic Da Silva  MRN: [id number]  YOB: 1940  Provider Performing Procedure: Maria C Rogers MD  Primary Care Physician: Lenka Melvin, APRN - CNP      Pre-Procedure: {CARDIAC SYMPTOMS:[id number]::\"Chest pain, possible coronary artery disease,  symotomatic atrial fib  Consent: I have discussed with the patient and/or the patient representative the indication, alternatives, and the possible risks and/or complications of the planned procedure and the anesthesia methods. The patient and/or representative appear to understand and agree to proceed. Medical History:   has a past medical history of Arthritis, Atrial fibrillation ([facility] [address] CAD (coronary artery disease), Cancer ([facility] [address] HTN (hypertension), Hyperlipidemia, Hypertension, Nausea & vomiting, Osteopenia, Rheumatic fever, Sinus infection, and Thyroid disease. .    Surgical History:     has a past surgical history that includes joint replacement (Right, 2010); Bunionectomy (Left, 2009); Dilation and curettage of uterus; Cardiac catheterization (2010); Tonsillectomy; and Breast lumpectomy (Left, 2011). Allergies: Allergies as of 07/31/2019 - Review Complete 07/31/2019   Allergen Reaction Noted    Ultram [tramadol] Nausea And Vomiting 02/22/2013       Medications:   Coumadin use last 5 days:  No  Antiplatelet drug therapy use last 5 days:  Yes  Other anticoagulant use last 5 days:  No   sodium chloride flush  10 mL Intravenous 2 times per day     Prior to Admission medications    Medication Sig Start Date End Date Taking?  Authorizing Provider   apixaban (ELIQUIS) 2.5 MG TABS tablet Take by mouth 2 times daily   Yes Historical Provider, MD   atenolol (TENORMIN) 25 MG tablet 12.5 mg daily  1/2/19  Yes Historical Provider, MD   Calcium Carbonate (CALCIUM 600 PO) Take by mouth 2 times daily   Yes Historical Provider, MD   meloxicam (MOBIC) 7.5 MG tablet Take 1 tablet by

## 2019-07-31 NOTE — PROGRESS NOTES
Assumed care from cath lab staff. Patient awake, alert, and denies discomfort. IV 0.9 NS infusing. See post procedure flow sheet.

## 2019-07-31 NOTE — PROGRESS NOTES
Patient admitted to  8  Ambulatory for cardioversion. Patient NPO. Patient accompanied by sister. Vital signs obtained. Assessment and data collection intiated. Oriented to room. Policies and procedures for  explained. All questions answered with no further questions at this time. Fall prevention and safety precautions discussed with patient. Care plan reviewed with patient and sister. Patient and sister verbalize understanding of the plan of care and contribute to goal setting. Reviewed with the patient the planned procedure, cardioverion, and patient verbalized the understanding. Questions and concerns addressed. Home medications with Patient.   Patient instructed to not take any home medications without first checking with staff first.

## 2019-09-04 ENCOUNTER — HOSPITAL ENCOUNTER (OUTPATIENT)
Age: 79
Discharge: HOME | End: 2019-09-04
Payer: MEDICARE

## 2019-09-04 DIAGNOSIS — N18.30 CKD (CHRONIC KIDNEY DISEASE), STAGE III (HCC): ICD-10-CM

## 2019-09-04 LAB
ANION GAP SERPL CALCULATED.3IONS-SCNC: 18 MEQ/L (ref 8–16)
BASOPHILS # BLD: 0.9 %
BASOPHILS ABSOLUTE: 0 THOU/MM3 (ref 0–0.1)
BUN BLDV-MCNC: 26 MG/DL (ref 7–22)
CALCIUM SERPL-MCNC: 11 MG/DL (ref 8.5–10.5)
CHLORIDE BLD-SCNC: 100 MEQ/L (ref 98–111)
CO2: 24 MEQ/L (ref 23–33)
CREAT SERPL-MCNC: 2.1 MG/DL (ref 0.4–1.2)
EOSINOPHIL # BLD: 9.8 %
EOSINOPHILS ABSOLUTE: 0.5 THOU/MM3 (ref 0–0.4)
ERYTHROCYTE [DISTWIDTH] IN BLOOD BY AUTOMATED COUNT: 13.9 % (ref 11.5–14.5)
ERYTHROCYTE [DISTWIDTH] IN BLOOD BY AUTOMATED COUNT: 55.2 FL (ref 35–45)
GFR SERPL CREATININE-BSD FRML MDRD: 23 ML/MIN/1.73M2
GLUCOSE BLD-MCNC: 91 MG/DL (ref 70–108)
HCT VFR BLD CALC: 37.3 % (ref 37–47)
HEMOGLOBIN: 11.4 GM/DL (ref 12–16)
IMMATURE GRANS (ABS): 0.01 THOU/MM3 (ref 0–0.07)
IMMATURE GRANULOCYTES: 0 %
LYMPHOCYTES # BLD: 35.9 %
LYMPHOCYTES ABSOLUTE: 1.9 THOU/MM3 (ref 1–4.8)
MCH RBC QN AUTO: 32.9 PG (ref 26–33)
MCHC RBC AUTO-ENTMCNC: 30.6 GM/DL (ref 32.2–35.5)
MCV RBC AUTO: 107.8 FL (ref 81–99)
MONOCYTES # BLD: 11.7 %
MONOCYTES ABSOLUTE: 0.6 THOU/MM3 (ref 0.4–1.3)
NUCLEATED RED BLOOD CELLS: 0 /100 WBC
PLATELET # BLD: 211 THOU/MM3 (ref 130–400)
PMV BLD AUTO: 10.5 FL (ref 9.4–12.4)
POTASSIUM SERPL-SCNC: 4.1 MEQ/L (ref 3.5–5.2)
RBC # BLD: 3.46 MILL/MM3 (ref 4.2–5.4)
SEG NEUTROPHILS: 41.5 %
SEGMENTED NEUTROPHILS ABSOLUTE COUNT: 2.2 THOU/MM3 (ref 1.8–7.7)
SODIUM BLD-SCNC: 142 MEQ/L (ref 135–145)
WBC # BLD: 5.4 THOU/MM3 (ref 4.8–10.8)

## 2019-09-04 PROCEDURE — 85025 COMPLETE CBC W/AUTO DIFF WBC: CPT

## 2019-09-04 PROCEDURE — 36415 COLL VENOUS BLD VENIPUNCTURE: CPT

## 2019-09-04 PROCEDURE — 80048 BASIC METABOLIC PNL TOTAL CA: CPT

## 2019-09-05 ENCOUNTER — HOSPITAL ENCOUNTER (OUTPATIENT)
Dept: INPATIENT UNIT | Age: 79
Discharge: HOME | End: 2019-09-05
Attending: INTERNAL MEDICINE | Admitting: INTERNAL MEDICINE
Payer: MEDICARE

## 2019-09-05 VITALS
OXYGEN SATURATION: 91 % | TEMPERATURE: 97.6 F | RESPIRATION RATE: 16 BRPM | HEIGHT: 60 IN | DIASTOLIC BLOOD PRESSURE: 72 MMHG | BODY MASS INDEX: 18.46 KG/M2 | SYSTOLIC BLOOD PRESSURE: 131 MMHG | WEIGHT: 94 LBS | HEART RATE: 62 BPM

## 2019-09-05 LAB
ABO: NORMAL
ALBUMIN SERPL-MCNC: 4.5 G/DL (ref 3.5–5.1)
ALP BLD-CCNC: 65 U/L (ref 38–126)
ALT SERPL-CCNC: 9 U/L (ref 11–66)
ANION GAP SERPL CALCULATED.3IONS-SCNC: 15 MEQ/L (ref 8–16)
ANTIBODY SCREEN: NORMAL
AST SERPL-CCNC: 15 U/L (ref 5–40)
BILIRUB SERPL-MCNC: 0.4 MG/DL (ref 0.3–1.2)
BUN BLDV-MCNC: 32 MG/DL (ref 7–22)
CALCIUM SERPL-MCNC: 10.6 MG/DL (ref 8.5–10.5)
CHLORIDE BLD-SCNC: 102 MEQ/L (ref 98–111)
CHOLESTEROL, TOTAL: 134 MG/DL (ref 100–199)
CO2: 23 MEQ/L (ref 23–33)
CREAT SERPL-MCNC: 1.9 MG/DL (ref 0.4–1.2)
EKG ATRIAL RATE: 42 BPM
EKG Q-T INTERVAL: 438 MS
EKG QRS DURATION: 104 MS
EKG QTC CALCULATION (BAZETT): 391 MS
EKG R AXIS: -13 DEGREES
EKG T AXIS: 35 DEGREES
EKG VENTRICULAR RATE: 48 BPM
ERYTHROCYTE [DISTWIDTH] IN BLOOD BY AUTOMATED COUNT: 13.8 % (ref 11.5–14.5)
ERYTHROCYTE [DISTWIDTH] IN BLOOD BY AUTOMATED COUNT: 54 FL (ref 35–45)
GFR SERPL CREATININE-BSD FRML MDRD: 25 ML/MIN/1.73M2
GLUCOSE BLD-MCNC: 98 MG/DL (ref 70–108)
HCT VFR BLD CALC: 37.9 % (ref 37–47)
HDLC SERPL-MCNC: 60 MG/DL
HEMOGLOBIN: 11.7 GM/DL (ref 12–16)
INR BLD: 0.98 (ref 0.85–1.13)
LDL CHOLESTEROL CALCULATED: 53 MG/DL
MCH RBC QN AUTO: 33.3 PG (ref 26–33)
MCHC RBC AUTO-ENTMCNC: 30.9 GM/DL (ref 32.2–35.5)
MCV RBC AUTO: 108 FL (ref 81–99)
PLATELET # BLD: 203 THOU/MM3 (ref 130–400)
PMV BLD AUTO: 9.6 FL (ref 9.4–12.4)
POTASSIUM REFLEX MAGNESIUM: 4 MEQ/L (ref 3.5–5.2)
RBC # BLD: 3.51 MILL/MM3 (ref 4.2–5.4)
RH FACTOR: NORMAL
SODIUM BLD-SCNC: 140 MEQ/L (ref 135–145)
TOTAL PROTEIN: 6.8 G/DL (ref 6.1–8)
TRIGL SERPL-MCNC: 103 MG/DL (ref 0–199)
WBC # BLD: 6.2 THOU/MM3 (ref 4.8–10.8)

## 2019-09-05 PROCEDURE — 6360000002 HC RX W HCPCS: Performed by: INTERNAL MEDICINE

## 2019-09-05 PROCEDURE — 6360000004 HC RX CONTRAST MEDICATION: Performed by: INTERNAL MEDICINE

## 2019-09-05 PROCEDURE — 80061 LIPID PANEL: CPT

## 2019-09-05 PROCEDURE — 86901 BLOOD TYPING SEROLOGIC RH(D): CPT

## 2019-09-05 PROCEDURE — C1769 GUIDE WIRE: HCPCS

## 2019-09-05 PROCEDURE — 2709999900 HC NON-CHARGEABLE SUPPLY

## 2019-09-05 PROCEDURE — C1894 INTRO/SHEATH, NON-LASER: HCPCS

## 2019-09-05 PROCEDURE — C1760 CLOSURE DEV, VASC: HCPCS

## 2019-09-05 PROCEDURE — 93005 ELECTROCARDIOGRAM TRACING: CPT | Performed by: INTERNAL MEDICINE

## 2019-09-05 PROCEDURE — 86900 BLOOD TYPING SEROLOGIC ABO: CPT

## 2019-09-05 PROCEDURE — 2580000003 HC RX 258: Performed by: INTERNAL MEDICINE

## 2019-09-05 PROCEDURE — 2500000003 HC RX 250 WO HCPCS

## 2019-09-05 PROCEDURE — 96360 HYDRATION IV INFUSION INIT: CPT

## 2019-09-05 PROCEDURE — 6360000002 HC RX W HCPCS

## 2019-09-05 PROCEDURE — 80053 COMPREHEN METABOLIC PANEL: CPT

## 2019-09-05 PROCEDURE — 96361 HYDRATE IV INFUSION ADD-ON: CPT

## 2019-09-05 PROCEDURE — 85610 PROTHROMBIN TIME: CPT

## 2019-09-05 PROCEDURE — 85027 COMPLETE CBC AUTOMATED: CPT

## 2019-09-05 PROCEDURE — 93458 L HRT ARTERY/VENTRICLE ANGIO: CPT | Performed by: INTERNAL MEDICINE

## 2019-09-05 PROCEDURE — 36415 COLL VENOUS BLD VENIPUNCTURE: CPT

## 2019-09-05 PROCEDURE — 86850 RBC ANTIBODY SCREEN: CPT

## 2019-09-05 RX ORDER — ASPIRIN 325 MG
325 TABLET ORAL ONCE
Status: DISCONTINUED | OUTPATIENT
Start: 2019-09-05 | End: 2019-09-05 | Stop reason: HOSPADM

## 2019-09-05 RX ORDER — SODIUM CHLORIDE 9 MG/ML
INJECTION, SOLUTION INTRAVENOUS CONTINUOUS
Status: DISCONTINUED | OUTPATIENT
Start: 2019-09-05 | End: 2019-09-05 | Stop reason: SDUPTHER

## 2019-09-05 RX ORDER — SODIUM CHLORIDE 0.9 % (FLUSH) 0.9 %
10 SYRINGE (ML) INJECTION PRN
Status: DISCONTINUED | OUTPATIENT
Start: 2019-09-05 | End: 2019-09-05 | Stop reason: SDUPTHER

## 2019-09-05 RX ORDER — NITROGLYCERIN 0.4 MG/1
0.4 TABLET SUBLINGUAL EVERY 5 MIN PRN
Status: DISCONTINUED | OUTPATIENT
Start: 2019-09-05 | End: 2019-09-05 | Stop reason: HOSPADM

## 2019-09-05 RX ORDER — SODIUM CHLORIDE 0.9 % (FLUSH) 0.9 %
10 SYRINGE (ML) INJECTION PRN
Status: DISCONTINUED | OUTPATIENT
Start: 2019-09-05 | End: 2019-09-05 | Stop reason: HOSPADM

## 2019-09-05 RX ORDER — SODIUM CHLORIDE 9 MG/ML
100 INJECTION, SOLUTION INTRAVENOUS CONTINUOUS
Status: DISCONTINUED | OUTPATIENT
Start: 2019-09-05 | End: 2019-09-05 | Stop reason: ALTCHOICE

## 2019-09-05 RX ORDER — SODIUM CHLORIDE 0.9 % (FLUSH) 0.9 %
10 SYRINGE (ML) INJECTION EVERY 12 HOURS SCHEDULED
Status: DISCONTINUED | OUTPATIENT
Start: 2019-09-05 | End: 2019-09-05 | Stop reason: SDUPTHER

## 2019-09-05 RX ORDER — SODIUM CHLORIDE 9 MG/ML
100 INJECTION, SOLUTION INTRAVENOUS CONTINUOUS
Status: DISCONTINUED | OUTPATIENT
Start: 2019-09-05 | End: 2019-09-05 | Stop reason: HOSPADM

## 2019-09-05 RX ORDER — SODIUM CHLORIDE 0.9 % (FLUSH) 0.9 %
10 SYRINGE (ML) INJECTION EVERY 12 HOURS SCHEDULED
Status: DISCONTINUED | OUTPATIENT
Start: 2019-09-05 | End: 2019-09-05 | Stop reason: HOSPADM

## 2019-09-05 RX ORDER — ONDANSETRON 2 MG/ML
4 INJECTION INTRAMUSCULAR; INTRAVENOUS EVERY 6 HOURS PRN
Status: DISCONTINUED | OUTPATIENT
Start: 2019-09-05 | End: 2019-09-05 | Stop reason: HOSPADM

## 2019-09-05 RX ORDER — ATROPINE SULFATE 0.4 MG/ML
0.5 AMPUL (ML) INJECTION
Status: DISCONTINUED | OUTPATIENT
Start: 2019-09-05 | End: 2019-09-05 | Stop reason: HOSPADM

## 2019-09-05 RX ORDER — ONDANSETRON 2 MG/ML
4 INJECTION INTRAMUSCULAR; INTRAVENOUS EVERY 6 HOURS PRN
Status: DISCONTINUED | OUTPATIENT
Start: 2019-09-05 | End: 2019-09-05 | Stop reason: SDUPTHER

## 2019-09-05 RX ORDER — ATROPINE SULFATE 0.4 MG/ML
0.5 AMPUL (ML) INJECTION
Status: DISCONTINUED | OUTPATIENT
Start: 2019-09-05 | End: 2019-09-05 | Stop reason: SDUPTHER

## 2019-09-05 RX ORDER — ACETYLCYSTEINE 200 MG/ML
1200 SOLUTION ORAL; RESPIRATORY (INHALATION) ONCE
Status: COMPLETED | OUTPATIENT
Start: 2019-09-05 | End: 2019-09-05

## 2019-09-05 RX ORDER — ACETAMINOPHEN 325 MG/1
650 TABLET ORAL EVERY 4 HOURS PRN
Status: DISCONTINUED | OUTPATIENT
Start: 2019-09-05 | End: 2019-09-05 | Stop reason: ALTCHOICE

## 2019-09-05 RX ORDER — SODIUM CHLORIDE 0.9 % (FLUSH) 0.9 %
10 SYRINGE (ML) INJECTION PRN
Status: DISCONTINUED | OUTPATIENT
Start: 2019-09-05 | End: 2019-09-05 | Stop reason: ALTCHOICE

## 2019-09-05 RX ORDER — ACETAMINOPHEN 325 MG/1
650 TABLET ORAL EVERY 4 HOURS PRN
Status: DISCONTINUED | OUTPATIENT
Start: 2019-09-05 | End: 2019-09-05 | Stop reason: HOSPADM

## 2019-09-05 RX ADMIN — ACETYLCYSTEINE 1200 MG: 200 SOLUTION ORAL; RESPIRATORY (INHALATION) at 08:38

## 2019-09-05 RX ADMIN — IOPAMIDOL 140 ML: 755 INJECTION, SOLUTION INTRAVENOUS at 09:25

## 2019-09-05 RX ADMIN — SODIUM CHLORIDE: 9 INJECTION, SOLUTION INTRAVENOUS at 07:01

## 2019-09-05 NOTE — PROGRESS NOTES
Discharged home in stable condition.   Patient and family verbalize understanding of discharge instructions and follow-up

## 2019-09-05 NOTE — OP NOTE
[address] [address]  Sedation/Analgesia Post Sedation Record      Pt Name: Ruby Ulloa  MRN: [id number]  YOB: 1940  Procedure Performed By: Collis Sever, MD  Primary Care Physician: VANDANA Levine - CNP    POST-PROCEDURE    Physician: Collis Sever, MD    Procedure Performed:  Left Heart Cath    Sedation/Anesthesia:  Local Anesthesia and IV Conscious Sedation with continuous O2 monitoring    Estimated Blood Loss:  Minimal    Specimens Removed:  None    Complications:  None     Post Procedure Diagnosis/Findings:  Coronary Artery Disease    Recommendations:  Medical treatment and review films.        Collis Sever, MD  Electronically signed 9/5/2019 at 9:31 AM

## 2019-09-06 PROCEDURE — 93010 ELECTROCARDIOGRAM REPORT: CPT | Performed by: INTERNAL MEDICINE

## 2019-09-11 ENCOUNTER — OFFICE VISIT (OUTPATIENT)
Dept: NEPHROLOGY | Age: 79
End: 2019-09-11
Payer: MEDICARE

## 2019-09-11 VITALS
DIASTOLIC BLOOD PRESSURE: 80 MMHG | SYSTOLIC BLOOD PRESSURE: 135 MMHG | OXYGEN SATURATION: 99 % | HEIGHT: 60 IN | HEART RATE: 60 BPM | WEIGHT: 95 LBS | RESPIRATION RATE: 18 BRPM | BODY MASS INDEX: 18.65 KG/M2

## 2019-09-11 DIAGNOSIS — N18.30 CKD (CHRONIC KIDNEY DISEASE), STAGE III (HCC): Primary | ICD-10-CM

## 2019-09-11 PROCEDURE — 99213 OFFICE O/P EST LOW 20 MIN: CPT | Performed by: INTERNAL MEDICINE

## 2019-09-11 PROCEDURE — 1090F PRES/ABSN URINE INCON ASSESS: CPT | Performed by: INTERNAL MEDICINE

## 2019-09-11 PROCEDURE — 1036F TOBACCO NON-USER: CPT | Performed by: INTERNAL MEDICINE

## 2019-09-11 PROCEDURE — G8420 CALC BMI NORM PARAMETERS: HCPCS | Performed by: INTERNAL MEDICINE

## 2019-09-11 PROCEDURE — G8400 PT W/DXA NO RESULTS DOC: HCPCS | Performed by: INTERNAL MEDICINE

## 2019-09-11 PROCEDURE — 1123F ACP DISCUSS/DSCN MKR DOCD: CPT | Performed by: INTERNAL MEDICINE

## 2019-09-11 PROCEDURE — G8427 DOCREV CUR MEDS BY ELIG CLIN: HCPCS | Performed by: INTERNAL MEDICINE

## 2019-09-11 PROCEDURE — 4040F PNEUMOC VAC/ADMIN/RCVD: CPT | Performed by: INTERNAL MEDICINE

## 2019-12-23 ENCOUNTER — APPOINTMENT (OUTPATIENT)
Dept: GENERAL RADIOLOGY | Age: 79
End: 2019-12-23
Payer: MEDICARE

## 2019-12-23 ENCOUNTER — HOSPITAL ENCOUNTER (EMERGENCY)
Age: 79
Discharge: HOME | End: 2019-12-23
Attending: FAMILY MEDICINE
Payer: MEDICARE

## 2019-12-23 VITALS
RESPIRATION RATE: 18 BRPM | SYSTOLIC BLOOD PRESSURE: 125 MMHG | TEMPERATURE: 97.4 F | DIASTOLIC BLOOD PRESSURE: 91 MMHG | OXYGEN SATURATION: 96 % | HEART RATE: 72 BPM

## 2019-12-23 DIAGNOSIS — N18.30 CHRONIC RENAL IMPAIRMENT, STAGE 3 (MODERATE) (HCC): Primary | ICD-10-CM

## 2019-12-23 DIAGNOSIS — E86.0 MILD DEHYDRATION: ICD-10-CM

## 2019-12-23 DIAGNOSIS — R11.0 NAUSEA: ICD-10-CM

## 2019-12-23 LAB
ALBUMIN SERPL-MCNC: 3.3 GM/DL (ref 3.4–5)
ALP BLD-CCNC: 65 U/L (ref 46–116)
ALT SERPL-CCNC: 17 U/L (ref 14–63)
AMORPHOUS: ABNORMAL
ANION GAP: 9 MEQ/L (ref 8–16)
AST SERPL-CCNC: 21 U/L (ref 15–37)
BACTERIA: ABNORMAL
BASOPHILS # BLD: 0.5 % (ref 0–3)
BILIRUB SERPL-MCNC: 0.9 MG/DL (ref 0.2–1)
BILIRUBIN URINE: ABNORMAL
BLOOD, URINE: NEGATIVE
BUN BLDV-MCNC: 44 MG/DL (ref 7–18)
CASTS UA: ABNORMAL /LPF
CHARACTER, URINE: ABNORMAL
CHLORIDE BLD-SCNC: 96 MEQ/L (ref 98–107)
CO2: 30 MEQ/L (ref 21–32)
COLOR: ABNORMAL
CREAT SERPL-MCNC: 2.1 MG/DL (ref 0.6–1.3)
CRYSTALS, UA: ABNORMAL
EOSINOPHILS RELATIVE PERCENT: 1.5 % (ref 0–4)
EPITHELIAL CELLS, UA: ABNORMAL /HPF
GFR, ESTIMATED: 24 ML/MIN/1.73M2
GLUCOSE BLD-MCNC: 113 MG/DL (ref 74–106)
GLUCOSE, URINE: NEGATIVE MG/DL
HCT VFR BLD CALC: 43.7 % (ref 37–47)
HEMOGLOBIN: 13.7 GM/DL (ref 12–16)
ICTOTEST: NEGATIVE
KETONES, URINE: NEGATIVE
LEUKOCYTE ESTERASE, URINE: ABNORMAL
LYMPHOCYTES # BLD: 15.5 % (ref 15–47)
MAGNESIUM: 2.4 MG/DL (ref 1.8–2.4)
MCH RBC QN AUTO: 31.4 PG (ref 27–31)
MCHC RBC AUTO-ENTMCNC: 31.3 GM/DL (ref 33–37)
MCV RBC AUTO: 100.3 FL (ref 81–99)
MONOCYTES: 6.1 % (ref 0–12)
MUCUS: ABNORMAL
NITRITE, URINE: NEGATIVE
PDW BLD-RTO: 15.1 % (ref 11.5–14.5)
PH UA: 5 (ref 5–9)
PLATELET # BLD: 142 THOU/MM3 (ref 130–400)
PMV BLD AUTO: 7.8 FL (ref 7.4–10.4)
POC CALCIUM: 10.1 MG/DL (ref 8.5–10.1)
POTASSIUM SERPL-SCNC: 3.4 MEQ/L (ref 3.5–5.1)
PROTEIN UA: ABNORMAL MG/DL
RBC # BLD: 4.35 MILL/MM3 (ref 4.2–5.4)
RBC UA: ABNORMAL /HPF
REFLEX TO URINE C & S: ABNORMAL
SEGS: 76.4 % (ref 43–75)
SODIUM BLD-SCNC: 135 MEQ/L (ref 136–145)
SPECIFIC GRAVITY UA: 1.02 (ref 1–1.03)
TOTAL PROTEIN: 6.7 GM/DL (ref 6.4–8.2)
UROBILINOGEN, URINE: 0.2 EU/DL (ref 0–1)
WBC # BLD: 7.1 THOU/MM3 (ref 4.8–10.8)
WBC UA: ABNORMAL /HPF

## 2019-12-23 PROCEDURE — 36415 COLL VENOUS BLD VENIPUNCTURE: CPT

## 2019-12-23 PROCEDURE — 2580000003 HC RX 258: Performed by: FAMILY MEDICINE

## 2019-12-23 PROCEDURE — 99283 EMERGENCY DEPT VISIT LOW MDM: CPT

## 2019-12-23 PROCEDURE — 80053 COMPREHEN METABOLIC PANEL: CPT

## 2019-12-23 PROCEDURE — 96360 HYDRATION IV INFUSION INIT: CPT

## 2019-12-23 PROCEDURE — 87186 SC STD MICRODIL/AGAR DIL: CPT

## 2019-12-23 PROCEDURE — 83735 ASSAY OF MAGNESIUM: CPT

## 2019-12-23 PROCEDURE — 81001 URINALYSIS AUTO W/SCOPE: CPT

## 2019-12-23 PROCEDURE — 85025 COMPLETE CBC W/AUTO DIFF WBC: CPT

## 2019-12-23 PROCEDURE — 87077 CULTURE AEROBIC IDENTIFY: CPT

## 2019-12-23 PROCEDURE — 87086 URINE CULTURE/COLONY COUNT: CPT

## 2019-12-23 PROCEDURE — 71046 X-RAY EXAM CHEST 2 VIEWS: CPT

## 2019-12-23 PROCEDURE — 2709999900 HC NON-CHARGEABLE SUPPLY

## 2019-12-23 RX ORDER — 0.9 % SODIUM CHLORIDE 0.9 %
500 INTRAVENOUS SOLUTION INTRAVENOUS ONCE
Status: COMPLETED | OUTPATIENT
Start: 2019-12-23 | End: 2019-12-23

## 2019-12-23 RX ADMIN — SODIUM CHLORIDE 500 ML: 9 INJECTION, SOLUTION INTRAVENOUS at 15:59

## 2019-12-23 ASSESSMENT — ENCOUNTER SYMPTOMS
WHEEZING: 0
ABDOMINAL DISTENTION: 0
ABDOMINAL PAIN: 0
FACIAL SWELLING: 0
EYE REDNESS: 0
EYE PAIN: 0
COLOR CHANGE: 0
SORE THROAT: 0
EYE DISCHARGE: 0
RHINORRHEA: 0
SHORTNESS OF BREATH: 0
VOICE CHANGE: 0
PHOTOPHOBIA: 0
CONSTIPATION: 0
VOMITING: 0
NAUSEA: 1
CHEST TIGHTNESS: 0
BACK PAIN: 0
DIARRHEA: 0
COUGH: 1
STRIDOR: 0

## 2019-12-25 LAB
ORGANISM: ABNORMAL
URINE CULTURE REFLEX: ABNORMAL

## 2019-12-26 ENCOUNTER — HOSPITAL ENCOUNTER (EMERGENCY)
Age: 79
Discharge: HOME | End: 2019-12-26
Attending: FAMILY MEDICINE
Payer: MEDICARE

## 2019-12-26 VITALS
DIASTOLIC BLOOD PRESSURE: 76 MMHG | TEMPERATURE: 98.9 F | WEIGHT: 90 LBS | SYSTOLIC BLOOD PRESSURE: 129 MMHG | RESPIRATION RATE: 14 BRPM | OXYGEN SATURATION: 99 % | HEART RATE: 73 BPM | BODY MASS INDEX: 17.67 KG/M2 | HEIGHT: 60 IN

## 2019-12-26 DIAGNOSIS — N30.00 ACUTE CYSTITIS WITHOUT HEMATURIA: ICD-10-CM

## 2019-12-26 DIAGNOSIS — R11.0 NAUSEA: Primary | ICD-10-CM

## 2019-12-26 PROCEDURE — 99282 EMERGENCY DEPT VISIT SF MDM: CPT

## 2019-12-26 RX ORDER — CIPROFLOXACIN 500 MG/1
500 TABLET, FILM COATED ORAL 2 TIMES DAILY
Qty: 14 TABLET | Refills: 0 | Status: ON HOLD | OUTPATIENT
Start: 2019-12-26 | End: 2020-01-10

## 2019-12-26 RX ORDER — ONDANSETRON 4 MG/1
4 TABLET, FILM COATED ORAL EVERY 8 HOURS PRN
Status: ON HOLD | COMMUNITY
End: 2020-01-10

## 2019-12-26 RX ORDER — PROMETHAZINE HYDROCHLORIDE 25 MG/1
25 TABLET ORAL EVERY 6 HOURS PRN
Qty: 20 TABLET | Refills: 0 | Status: ON HOLD | OUTPATIENT
Start: 2019-12-26 | End: 2020-01-10

## 2019-12-26 ASSESSMENT — PAIN DESCRIPTION - LOCATION
LOCATION: ABDOMEN
LOCATION: ABDOMEN

## 2019-12-26 ASSESSMENT — PAIN DESCRIPTION - DESCRIPTORS: DESCRIPTORS: DISCOMFORT

## 2019-12-26 ASSESSMENT — ENCOUNTER SYMPTOMS
SINUS PRESSURE: 0
NAUSEA: 1
VOMITING: 0
ABDOMINAL PAIN: 0
SINUS PAIN: 0

## 2019-12-26 ASSESSMENT — PAIN DESCRIPTION - ORIENTATION: ORIENTATION: MID;UPPER

## 2019-12-26 ASSESSMENT — PAIN SCALES - GENERAL
PAINLEVEL_OUTOF10: 4
PAINLEVEL_OUTOF10: 4

## 2020-01-02 ENCOUNTER — APPOINTMENT (OUTPATIENT)
Dept: GENERAL RADIOLOGY | Age: 80
DRG: 377 | End: 2020-01-02
Payer: MEDICARE

## 2020-01-02 ENCOUNTER — HOSPITAL ENCOUNTER (INPATIENT)
Age: 80
LOS: 9 days | Discharge: NURSING, REHAB OR LONG-TERM CARE | DRG: 377 | End: 2020-01-13
Attending: EMERGENCY MEDICINE | Admitting: OPHTHALMOLOGY
Payer: MEDICARE

## 2020-01-02 ENCOUNTER — APPOINTMENT (OUTPATIENT)
Dept: CT IMAGING | Age: 80
DRG: 377 | End: 2020-01-02
Payer: MEDICARE

## 2020-01-02 PROBLEM — R79.89 ELEVATED TROPONIN: Status: ACTIVE | Noted: 2020-01-02

## 2020-01-02 PROBLEM — R11.0 NAUSEA: Status: ACTIVE | Noted: 2020-01-02

## 2020-01-02 PROBLEM — R53.81 PHYSICAL DECONDITIONING: Status: ACTIVE | Noted: 2020-01-02

## 2020-01-02 PROBLEM — N18.30 CHRONIC RENAL FAILURE, STAGE 3 (MODERATE) (HCC): Status: ACTIVE | Noted: 2020-01-02

## 2020-01-02 PROBLEM — R63.0 LOSS OF APPETITE: Status: ACTIVE | Noted: 2020-01-02

## 2020-01-02 PROBLEM — R77.8 ELEVATED TROPONIN: Status: ACTIVE | Noted: 2020-01-02

## 2020-01-02 LAB
ANION GAP SERPL CALCULATED.3IONS-SCNC: 14 MEQ/L (ref 8–16)
BASOPHILS # BLD: 0.4 %
BASOPHILS ABSOLUTE: 0 THOU/MM3 (ref 0–0.1)
BUN BLDV-MCNC: 26 MG/DL (ref 7–22)
CALCIUM SERPL-MCNC: 10.1 MG/DL (ref 8.5–10.5)
CHLORIDE BLD-SCNC: 94 MEQ/L (ref 98–111)
CO2: 25 MEQ/L (ref 23–33)
CREAT SERPL-MCNC: 2 MG/DL (ref 0.4–1.2)
EKG ATRIAL RATE: 416 BPM
EKG Q-T INTERVAL: 372 MS
EKG QRS DURATION: 104 MS
EKG QTC CALCULATION (BAZETT): 465 MS
EKG R AXIS: -30 DEGREES
EKG T AXIS: 29 DEGREES
EKG VENTRICULAR RATE: 94 BPM
EOSINOPHIL # BLD: 1.3 %
EOSINOPHILS ABSOLUTE: 0.1 THOU/MM3 (ref 0–0.4)
ERYTHROCYTE [DISTWIDTH] IN BLOOD BY AUTOMATED COUNT: 14.5 % (ref 11.5–14.5)
ERYTHROCYTE [DISTWIDTH] IN BLOOD BY AUTOMATED COUNT: 54.1 FL (ref 35–45)
FLU A ANTIGEN: NEGATIVE
FLU B ANTIGEN: NEGATIVE
FOLATE: 17.9 NG/ML (ref 4.8–24.2)
GFR SERPL CREATININE-BSD FRML MDRD: 24 ML/MIN/1.73M2
GLUCOSE BLD-MCNC: 118 MG/DL (ref 70–108)
HCT VFR BLD CALC: 40.1 % (ref 37–47)
HEMOGLOBIN: 12.9 GM/DL (ref 12–16)
IMMATURE GRANS (ABS): 0.06 THOU/MM3 (ref 0–0.07)
IMMATURE GRANULOCYTES: 0.7 %
INR BLD: 1.59 (ref 0.85–1.13)
LYMPHOCYTES # BLD: 10.4 %
LYMPHOCYTES ABSOLUTE: 0.9 THOU/MM3 (ref 1–4.8)
MCH RBC QN AUTO: 32.5 PG (ref 26–33)
MCHC RBC AUTO-ENTMCNC: 32.2 GM/DL (ref 32.2–35.5)
MCV RBC AUTO: 101 FL (ref 81–99)
MONOCYTES # BLD: 7.1 %
MONOCYTES ABSOLUTE: 0.6 THOU/MM3 (ref 0.4–1.3)
NUCLEATED RED BLOOD CELLS: 0 /100 WBC
OSMOLALITY CALCULATION: 272.2 MOSMOL/KG (ref 275–300)
PLATELET # BLD: 333 THOU/MM3 (ref 130–400)
PMV BLD AUTO: 9.4 FL (ref 9.4–12.4)
POTASSIUM SERPL-SCNC: 4.5 MEQ/L (ref 3.5–5.2)
RBC # BLD: 3.97 MILL/MM3 (ref 4.2–5.4)
SEG NEUTROPHILS: 80.1 %
SEGMENTED NEUTROPHILS ABSOLUTE COUNT: 6.6 THOU/MM3 (ref 1.8–7.7)
SODIUM BLD-SCNC: 133 MEQ/L (ref 135–145)
TROPONIN T: 0.01 NG/ML
TROPONIN T: 0.02 NG/ML
TROPONIN T: < 0.01 NG/ML
TSH SERPL DL<=0.05 MIU/L-ACNC: 0.96 UIU/ML (ref 0.4–4.2)
VITAMIN B-12: > 2000 PG/ML (ref 211–911)
WBC # BLD: 8.2 THOU/MM3 (ref 4.8–10.8)

## 2020-01-02 PROCEDURE — 6370000000 HC RX 637 (ALT 250 FOR IP): Performed by: OPHTHALMOLOGY

## 2020-01-02 PROCEDURE — 85610 PROTHROMBIN TIME: CPT

## 2020-01-02 PROCEDURE — 82746 ASSAY OF FOLIC ACID SERUM: CPT

## 2020-01-02 PROCEDURE — 93005 ELECTROCARDIOGRAM TRACING: CPT | Performed by: EMERGENCY MEDICINE

## 2020-01-02 PROCEDURE — G0378 HOSPITAL OBSERVATION PER HR: HCPCS

## 2020-01-02 PROCEDURE — 82607 VITAMIN B-12: CPT

## 2020-01-02 PROCEDURE — 36415 COLL VENOUS BLD VENIPUNCTURE: CPT

## 2020-01-02 PROCEDURE — 93010 ELECTROCARDIOGRAM REPORT: CPT | Performed by: INTERNAL MEDICINE

## 2020-01-02 PROCEDURE — 87804 INFLUENZA ASSAY W/OPTIC: CPT

## 2020-01-02 PROCEDURE — 2580000003 HC RX 258: Performed by: INTERNAL MEDICINE

## 2020-01-02 PROCEDURE — 80048 BASIC METABOLIC PNL TOTAL CA: CPT

## 2020-01-02 PROCEDURE — 99205 OFFICE O/P NEW HI 60 MIN: CPT | Performed by: INTERNAL MEDICINE

## 2020-01-02 PROCEDURE — 71046 X-RAY EXAM CHEST 2 VIEWS: CPT

## 2020-01-02 PROCEDURE — 99285 EMERGENCY DEPT VISIT HI MDM: CPT

## 2020-01-02 PROCEDURE — 84443 ASSAY THYROID STIM HORMONE: CPT

## 2020-01-02 PROCEDURE — 84484 ASSAY OF TROPONIN QUANT: CPT

## 2020-01-02 PROCEDURE — 85025 COMPLETE CBC W/AUTO DIFF WBC: CPT

## 2020-01-02 PROCEDURE — 2580000003 HC RX 258: Performed by: EMERGENCY MEDICINE

## 2020-01-02 PROCEDURE — 2709999900 HC NON-CHARGEABLE SUPPLY

## 2020-01-02 PROCEDURE — 99220 PR INITIAL OBSERVATION CARE/DAY 70 MINUTES: CPT | Performed by: OPHTHALMOLOGY

## 2020-01-02 PROCEDURE — 96361 HYDRATE IV INFUSION ADD-ON: CPT

## 2020-01-02 RX ORDER — ACETAMINOPHEN 325 MG/1
650 TABLET ORAL PRN
Status: DISCONTINUED | OUTPATIENT
Start: 2020-01-02 | End: 2020-01-09 | Stop reason: SDUPTHER

## 2020-01-02 RX ORDER — POTASSIUM CHLORIDE 7.45 MG/ML
10 INJECTION INTRAVENOUS PRN
Status: DISCONTINUED | OUTPATIENT
Start: 2020-01-02 | End: 2020-01-13 | Stop reason: HOSPADM

## 2020-01-02 RX ORDER — LEVOTHYROXINE SODIUM 0.05 MG/1
50 TABLET ORAL DAILY
Status: DISCONTINUED | OUTPATIENT
Start: 2020-01-03 | End: 2020-01-05

## 2020-01-02 RX ORDER — ATENOLOL 25 MG/1
12.5 TABLET ORAL 2 TIMES DAILY
Status: DISCONTINUED | OUTPATIENT
Start: 2020-01-02 | End: 2020-01-05

## 2020-01-02 RX ORDER — BUMETANIDE 1 MG/1
2 TABLET ORAL DAILY
Status: DISCONTINUED | OUTPATIENT
Start: 2020-01-02 | End: 2020-01-03

## 2020-01-02 RX ORDER — ROSUVASTATIN CALCIUM 10 MG/1
5 TABLET, COATED ORAL EVERY EVENING
Status: DISCONTINUED | OUTPATIENT
Start: 2020-01-02 | End: 2020-01-13 | Stop reason: HOSPADM

## 2020-01-02 RX ORDER — SODIUM CHLORIDE 9 MG/ML
INJECTION, SOLUTION INTRAVENOUS CONTINUOUS
Status: ACTIVE | OUTPATIENT
Start: 2020-01-02 | End: 2020-01-03

## 2020-01-02 RX ORDER — PROPAFENONE HYDROCHLORIDE 150 MG/1
150 TABLET, FILM COATED ORAL EVERY 8 HOURS
Status: DISCONTINUED | OUTPATIENT
Start: 2020-01-02 | End: 2020-01-05

## 2020-01-02 RX ORDER — SENNA PLUS 8.6 MG/1
1 TABLET ORAL 2 TIMES DAILY
Status: DISCONTINUED | OUTPATIENT
Start: 2020-01-02 | End: 2020-01-13 | Stop reason: HOSPADM

## 2020-01-02 RX ORDER — PROMETHAZINE HYDROCHLORIDE 25 MG/1
25 TABLET ORAL EVERY 6 HOURS PRN
Status: DISCONTINUED | OUTPATIENT
Start: 2020-01-02 | End: 2020-01-05

## 2020-01-02 RX ORDER — VITAMIN B COMPLEX
1000 TABLET ORAL DAILY
Status: DISCONTINUED | OUTPATIENT
Start: 2020-01-02 | End: 2020-01-13 | Stop reason: HOSPADM

## 2020-01-02 RX ORDER — SODIUM CHLORIDE 9 MG/ML
INJECTION, SOLUTION INTRAVENOUS CONTINUOUS
Status: DISCONTINUED | OUTPATIENT
Start: 2020-01-02 | End: 2020-01-02

## 2020-01-02 RX ORDER — SODIUM CHLORIDE, SODIUM LACTATE, POTASSIUM CHLORIDE, CALCIUM CHLORIDE 600; 310; 30; 20 MG/100ML; MG/100ML; MG/100ML; MG/100ML
1000 INJECTION, SOLUTION INTRAVENOUS ONCE
Status: COMPLETED | OUTPATIENT
Start: 2020-01-02 | End: 2020-01-02

## 2020-01-02 RX ORDER — CIPROFLOXACIN 500 MG/1
500 TABLET, FILM COATED ORAL 2 TIMES DAILY
Status: DISCONTINUED | OUTPATIENT
Start: 2020-01-02 | End: 2020-01-03

## 2020-01-02 RX ORDER — GUAIFENESIN/DEXTROMETHORPHAN 100-10MG/5
10 SYRUP ORAL EVERY 4 HOURS PRN
Status: DISCONTINUED | OUTPATIENT
Start: 2020-01-02 | End: 2020-01-06

## 2020-01-02 RX ORDER — ONDANSETRON 2 MG/ML
4 INJECTION INTRAMUSCULAR; INTRAVENOUS EVERY 6 HOURS PRN
Status: DISCONTINUED | OUTPATIENT
Start: 2020-01-02 | End: 2020-01-13 | Stop reason: HOSPADM

## 2020-01-02 RX ORDER — LOSARTAN POTASSIUM 50 MG/1
50 TABLET ORAL 2 TIMES DAILY
Status: DISCONTINUED | OUTPATIENT
Start: 2020-01-02 | End: 2020-01-03

## 2020-01-02 RX ORDER — AMLODIPINE BESYLATE 5 MG/1
5 TABLET ORAL DAILY
Status: DISCONTINUED | OUTPATIENT
Start: 2020-01-02 | End: 2020-01-06

## 2020-01-02 RX ORDER — ASPIRIN 81 MG/1
81 TABLET, CHEWABLE ORAL DAILY
Status: DISCONTINUED | OUTPATIENT
Start: 2020-01-02 | End: 2020-01-05

## 2020-01-02 RX ORDER — POTASSIUM CHLORIDE 750 MG/1
10 TABLET, FILM COATED, EXTENDED RELEASE ORAL DAILY
Status: DISCONTINUED | OUTPATIENT
Start: 2020-01-02 | End: 2020-01-05

## 2020-01-02 RX ADMIN — SODIUM CHLORIDE, POTASSIUM CHLORIDE, SODIUM LACTATE AND CALCIUM CHLORIDE 1000 ML: 600; 310; 30; 20 INJECTION, SOLUTION INTRAVENOUS at 14:57

## 2020-01-02 RX ADMIN — ROSUVASTATIN CALCIUM 5 MG: 10 TABLET, FILM COATED ORAL at 20:54

## 2020-01-02 RX ADMIN — SENNOSIDES 8.6 MG: 8.6 TABLET ORAL at 20:47

## 2020-01-02 RX ADMIN — CIPROFLOXACIN 500 MG: 500 TABLET, FILM COATED ORAL at 20:54

## 2020-01-02 RX ADMIN — SODIUM CHLORIDE: 9 INJECTION, SOLUTION INTRAVENOUS at 20:44

## 2020-01-02 ASSESSMENT — ENCOUNTER SYMPTOMS
COUGH: 0
BACK PAIN: 0
VOMITING: 0
EYE PAIN: 0
RECTAL PAIN: 0
CHEST TIGHTNESS: 0
ABDOMINAL PAIN: 1
SINUS PRESSURE: 0
SINUS PAIN: 0
DIARRHEA: 1
SHORTNESS OF BREATH: 0
CONSTIPATION: 0
NAUSEA: 1
BLOOD IN STOOL: 0

## 2020-01-02 ASSESSMENT — PAIN DESCRIPTION - PAIN TYPE: TYPE: ACUTE PAIN

## 2020-01-02 ASSESSMENT — PAIN SCALES - GENERAL
PAINLEVEL_OUTOF10: 4
PAINLEVEL_OUTOF10: 0
PAINLEVEL_OUTOF10: 0

## 2020-01-02 NOTE — ED NOTES
Pt states symptoms have been on going for the past 3 weeks or so     Maurilio Norman, RN  01/02/20 [id number]

## 2020-01-02 NOTE — ED PROVIDER NOTES
[facility] [address]        CHIEF COMPLAINT       Chief Complaint   Patient presents with    Extremity Weakness    Shortness of Breath    Dizziness     History obtained from the patient. HISTORY OF PRESENT ILLNESS    HPI  Surya Arnold is a [de-identified] y.o. female who presents to the emergency department for evaluation of weakness, 2 falls, and decreased appetite. Patient's family reports she has been sick since 12/19 with flu-like symptoms which has gradually worsened since onset. She reports experiencing nausea, diarrhea for 1-2 days (resolved 5 days ago), abdominal pain with ingestion of food, increased fatigue, and weight loss. She denies vomiting and fever since onset. Patient reports being evaluated by her PCP 3 days ago due to not being able to eat food, and she was prescribed medication to help increased her appetite. Patient states this has not help, and has since noted her vision becoming blurry. Patient reports extending her arms to place items in the top of the closet when she \"blacked out\" and fell to the ground. She denies hitting her head. Patient's family reports she was unable to ambulate following the fall. They state she had to crawl and then be assisted back to her feet. Patient's family states she then fell again while trying to walk from the parking lot into the ED. She denies hitting her head with this incident. She states she currently is feeling fatigue. Patient has a past medical history of HTN, atrial fibrillation, CKD, CAD, Osteopenia, and thyroid disease. The patient has no other acute complaints at this time. HPI provided by the patient and patient's family. REVIEW OF SYSTEMS   Review of Systems   Constitutional: Positive for appetite change (decreased ) and fatigue. Negative for chills, fever and unexpected weight change. HENT: Negative for congestion, ear pain, nosebleeds, sinus pressure and sinus pain.     Eyes: Positive for visual disturbance (blurred ). Negative for pain. Respiratory: Negative for cough, chest tightness and shortness of breath. Cardiovascular: Negative for chest pain. Gastrointestinal: Positive for abdominal pain (after ingestion of food), diarrhea (occurred 5 days ago for 1-2 days now resolved) and nausea. Negative for blood in stool, constipation, rectal pain and vomiting. Endocrine: Negative for polydipsia and polyuria. Genitourinary: Negative for dysuria and flank pain. Musculoskeletal: Negative for arthralgias, back pain, myalgias and neck pain. Skin: Negative for rash. Neurological: Positive for syncope (with two falls) and weakness (generalized). Negative for tremors, seizures and headaches. All other systems reviewed and are negative.         PAST MEDICAL AND SURGICAL HISTORY     Past Medical History:   Diagnosis Date    Arthritis     Atrial fibrillation ([facility] [address]     CAD (coronary artery disease)     Cancer ([facility] [address]     BREAST    Chronic kidney disease     HTN (hypertension) 4/13/2016    Hypertension     Nausea & vomiting     Osteopenia     Rheumatic fever     as a child    Sinus infection     Thyroid disease      Past Surgical History:   Procedure Laterality Date    BREAST LUMPECTOMY Left 2011    BUNIONECTOMY Left 2009    CARDIAC CATHETERIZATION  2010    DILATION AND CURETTAGE OF UTERUS      X2; SEVERAL YEARS AGO    JOINT REPLACEMENT Right 2010    KNEE    TONSILLECTOMY           MEDICATIONS     Current Facility-Administered Medications:     aspirin chewable tablet 81 mg, 81 mg, Oral, Daily, MD Wendy Calderon  [START ON 1/3/2020] levothyroxine (SYNTHROID) tablet 50 mcg, 50 mcg, Oral, Daily, Gail Cuevas MD    [facility]) tablet 2 mg, 2 mg, Oral, Daily, Gail Cuevas MD    rosuvastatin (CRESTOR) tablet 5 mg, 5 mg, Oral, QPM, Gail Cuevas MD, 5 mg at 01/02/20 2054    amLODIPine (NORVASC) tablet 5 mg, 5 mg, Oral, Daily, Melodie Melvin Mike Mendez MD    propafenone (RYTHMOL) tablet 150 mg, 150 mg, Oral, Q8H, Jacquelynn Bosworth, MD    potassium chloride (KLOR-CON) extended release tablet 10 mEq, 10 mEq, Oral, Daily, Jacquelynn Bosworth, MD    losartan (COZAAR) tablet 50 mg, 50 mg, Oral, BID, Jacquelynn Bosworth, MD    vitamin D (CHOLECALCIFEROL) tablet 1,000 Units, 1,000 Units, Oral, Daily, Jacquelynn Bosworth, MD    atenolol (TENORMIN) tablet 12.5 mg, 12.5 mg, Oral, BID, Jacquelynn Bosworth, MD    promethazine (PHENERGAN) tablet 25 mg, 25 mg, Oral, Q6H PRN, Jacquelynn Bosworth, MD    ciprofloxacin (CIPRO) tablet 500 mg, 500 mg, Oral, BID, Jacquelynn Bosworth, MD, 500 mg at 01/02/20 2054    acetaminophen (TYLENOL) tablet 650 mg, 650 mg, Oral, PRN, Jacquelynn Bosworth, MD    [facility]) tablet 2.5 mg, 2.5 mg, Oral, BID, Jacquelynn Bosworth, MD, 2.5 mg at 01/02/20 2053    senna (SENOKOT) tablet 8.6 mg, 1 tablet, Oral, BID, Jacquelynn Bosworth, MD, 8.6 mg at 01/02/20 2047    potassium chloride 10 mEq/100 mL IVPB (Peripheral Line), 10 mEq, Intravenous, PRN, Jacquelynn Bosworth, MD    guaiFENesin-dextromethorphan (ROBITUSSIN DM) 100-10 MG/5ML syrup 10 mL, 10 mL, Oral, Q4H PRN, Jacquelynn Bosworth, MD    ondansetron [facility]) injection 4 mg, 4 mg, Intravenous, Q6H PRN, Jacquelynn Bosworth, MD    0.9 % sodium chloride infusion, , Intravenous, Continuous, Xavi Menendez MD, Last Rate: 75 mL/hr at 01/02/20 2044      SOCIAL HISTORY     Social History     Patient does not qualify to have social determinant information on file (likely too young).    Social History Narrative    Not on file     Social History     Tobacco Use    Smoking status: Never Smoker    Smokeless tobacco: Never Used   Substance Use Topics    Alcohol use: Not Currently     Alcohol/week: 1.0 standard drinks     Types: 1 Glasses of wine per week     Comment: A  MONTH    Drug use: No         ALLERGIES     Allergies   Allergen Reactions    Tramadol Nausea And Vomiting Given 1/2/20 1811)   rosuvastatin (CRESTOR) tablet 5 mg (5 mg Oral Given 1/2/20 2054)   amLODIPine (NORVASC) tablet 5 mg (5 mg Oral Not Given 1/2/20 1811)   propafenone (RYTHMOL) tablet 150 mg (150 mg Oral Not Given 1/2/20 1812)   potassium chloride (KLOR-CON) extended release tablet 10 mEq (10 mEq Oral Not Given 1/2/20 1812)   losartan (COZAAR) tablet 50 mg (50 mg Oral Not Given 1/2/20 2046)   vitamin D (CHOLECALCIFEROL) tablet 1,000 Units (1,000 Units Oral Not Given 1/2/20 1813)   atenolol (TENORMIN) tablet 12.5 mg (12.5 mg Oral Not Given 1/2/20 2045)   promethazine (PHENERGAN) tablet 25 mg (has no administration in time range)   ciprofloxacin (CIPRO) tablet 500 mg (500 mg Oral Given 1/2/20 2054)   acetaminophen (TYLENOL) tablet 650 mg (has no administration in time range)   apixaban (ELIQUIS) tablet 2.5 mg (2.5 mg Oral Given 1/2/20 2053)   senna (SENOKOT) tablet 8.6 mg (8.6 mg Oral Given 1/2/20 2047)   potassium chloride 10 mEq/100 mL IVPB (Peripheral Line) (has no administration in time range)   guaiFENesin-dextromethorphan (ROBITUSSIN DM) 100-10 MG/5ML syrup 10 mL (has no administration in time range)   ondansetron (ZOFRAN) injection 4 mg (has no administration in time range)   0.9 % sodium chloride infusion ( Intravenous New Bag 1/2/20 2044)   lactated ringers infusion 1,000 mL (0 mLs Intravenous Stopped 1/2/20 1815)         XR CHEST STANDARD (2 VW)   Final Result      No acute findings. **This report has been created using voice recognition software. It may contain minor errors which are inherent in voice recognition technology. **      Final report electronically signed by Dr. Calderon Leader on 1/2/2020 1:03 PM      CT ABDOMEN PELVIS WO CONTRAST Additional Contrast? Oral    (Results Pending)           ED COURSE          MEDICATION CHANGES     Current Discharge Medication List            FINAL DISPOSITION     Final diagnoses:   Dehydration   Falls frequently   Failure to thrive in adult     Condition:

## 2020-01-02 NOTE — H&P
History & Physical        Patient:  González Calloway  YOB: 1940    MRN: [id number]   Acct:  [de-identified]   Primary Care Physician: Kristian Rosas, APRN - CNP       Chief Complaint:  weakness    History of Present Illness:   History obtained from chart review and the patient. The patient is a [de-identified] y.o. female who presented to [place] [address] with weakness. Patient has not been eating well and losing weight for the past few months. She contributes that to poor PO intake and nausea. She came to ER twice last month for nausea. Zofran did not work well. She used phenergan which made her \"goofy\". She fell yesterday at home. No head injury. She dos not use waker at home but occasional use of cane. She was started on Cipro for UTI yesterday after having the cultures positive. Patient was not symptomatic though per her report.  + Dizziness  She was taking off Norvasc for a month but her BP was up so she was restarted on it again. Past Medical History:        Diagnosis Date    Arthritis     Atrial fibrillation ([address]     CAD (coronary artery disease)     Cancer ([facility] [address]     BREAST    Chronic kidney disease     HTN (hypertension) 4/13/2016    Hypertension     Nausea & vomiting     Osteopenia     Rheumatic fever     as a child    Sinus infection     Thyroid disease        Past Surgical History:        Procedure Laterality Date    BREAST LUMPECTOMY Left 2011    BUNIONECTOMY Left 2009    CARDIAC CATHETERIZATION  2010    DILATION AND CURETTAGE OF UTERUS      X2; SEVERAL YEARS AGO    JOINT REPLACEMENT Right 2010    KNEE    TONSILLECTOMY         Home Medications:    Prior to Admission medications    Medication Sig Start Date End Date Taking?  Authorizing Provider   ondansetron (ZOFRAN) 4 MG tablet Take 4 mg by mouth every 8 hours as needed for Nausea or Vomiting    Historical Provider, MD   promethazine (PHENERGAN) 25 MG tablet Take 1 tablet by mouth every 6 hours as needed for Nausea WARNING:  May cause drowsiness. May impair ability to operate vehicles or machinery. Do not use in combination with alcohol.  12/26/19 1/2/20  Melly Felipe MD   ciprofloxacin (CIPRO) 500 MG tablet Take 1 tablet by mouth 2 times daily for 7 days 12/26/19 1/2/20  Melly Felipe MD   atenolol (TENORMIN) 25 MG tablet Take 12.5 mg by mouth 2 times daily     Historical Provider, MD   apixaban (ELIQUIS) 2.5 MG TABS tablet Take by mouth 2 times daily    Historical Provider, MD   Calcium Carbonate (CALCIUM 600 PO) Take by mouth 2 times daily    Historical Provider, MD   Probiotic Product (PROBIOTIC-10 PO) Take by mouth daily    Historical Provider, MD   vitamin D (CHOLECALCIFEROL) 1000 UNIT TABS tablet Take 1,000 Units by mouth daily    Historical Provider, MD   losartan (COZAAR) 50 MG tablet 50 mg 2 times daily 6/27/18   Historical Provider, MD   potassium chloride (MICRO-K) 10 MEQ extended release capsule Take 10 mEq by mouth daily    Historical Provider, MD   propafenone (RYTHMOL) 150 MG tablet Take 150 mg by mouth every 8 hours    Historical Provider, MD   amLODIPine (NORVASC) 5 MG tablet Take 5 mg by mouth daily Indications: High Blood Pressure    Historical Provider, MD   acetaminophen (TYLENOL ARTHRITIS PAIN) 650 MG extended release tablet Take 650 mg by mouth as needed     Historical Provider, MD   bumetanide (BUMEX) 2 MG tablet Take 2 mg by mouth daily Indications: Treatment with Diuretic Therapy     Historical Provider, MD   rosuvastatin (CRESTOR) 5 MG tablet Take 5 mg by mouth every evening Indications: Blood Cholesterol Abnormal     Historical Provider, MD   levothyroxine (SYNTHROID) 50 MCG tablet Take 50 mcg by mouth daily Indications: Impaired Thyroid Function  1/11/16   Historical Provider, MD   Biotin 1000 MCG TABS Take 1,000 mcg by mouth nightly     Historical Provider, MD   aspirin 81 MG chewable tablet Take 81 mg by mouth daily Indications: Treatment to Prevent Blood Clotting Take with midline. Respiratory:  Normal respiratory effort. Decreased BS at bases bilaterally  without Rales/Wheezes/Rhonchi. Cardiovascular:  Regular rate and regular regular rhythm with normal S1/S2 without murmurs, rubs or gallops. Abdomen: Soft, non-tender, non-distended with normal bowel sounds. Musculoskeletal:  No clubbing, cyanosis. No edema bilaterally. Full range of motion without deformity. Skin: Skin color, texture, turgor normal.  No rashes or lesions. Neurologic:  Neurovascularly intact without any focal sensory/motor deficits. grossly non-focal.  Psychiatric:  Alert and oriented, thought content appropriate, normal insight  Capillary Refill: Brisk,< 3 seconds   Peripheral Pulses: +2 palpable, equal bilaterally     Review of Labs and Diagnostic Testing:  Labs:     Recent Labs     01/02/20  1248   WBC 8.2   HGB 12.9   HCT 40.1        Recent Labs     01/02/20  1248   *   K 4.5   CL 94*   CO2 25   BUN 26*   CREATININE 2.0*   CALCIUM 10.1     No results for input(s): AST, ALT, BILIDIR, BILITOT, ALKPHOS in the last 72 hours. No results found for: AMYLASE  Recent Labs     01/02/20  1248   INR 1.59*     Recent Labs     01/02/20  1248   TROPONINT 0.017*     No results for input(s): BNP in the last 72 hours. No results for input(s): LACTA in the last 72 hours. No results found for: PROCAL  No results found for: LABA1C  Lab Results   Component Value Date    TSH 1.060 07/17/2019       Urinalysis:   Lab Results   Component Value Date    NITRU NEGATIVE 12/23/2019    WBCUA 25-50W/CLUMPS 12/23/2019    BACTERIA MODERATE 12/23/2019    RBCUA NONE 12/23/2019    BLOODU NEGATIVE 12/23/2019    SPECGRAV 1.020 12/23/2019     Radiology:   Reviewed: see report for details  CXR: I have reviewed the report  EKG:  No acute changes:   XR CHEST STANDARD (2 VW)   Final Result      No acute findings. **This report has been created using voice recognition software.  It may contain minor errors which are inherent in voice recognition technology. **      Final report electronically signed by Dr. Estephanie Griffith on 1/2/2020 1:03 PM          Code Status: Prior    Assessment/Plan:  Active Problems:    Nausea    Physical deconditioning    Loss of appetite    Chronic renal failure, stage 3 (moderate) (HCC)    Elevated troponin  Resolved Problems:    * No resolved hospital problems. *     Patient with declining health and mobility. Kidney function appears stable. She has never had any colonoscopy in the past: she has nausea/constipation/weight loss: she may need GI workup. Will check CT of abd with oral contrast only and consult GI. Will cycle troponin: resume cardiac meds with parameters. Will consult Dr. Susan Mccoy since follows her regularly.    Will continue oral cipro given the recent urine culture    PT/OT Eval Status:consult  DVT prophylaxis: [] Lovenox                                 [] SCDs                                 [] SQ Heparin                                 [] Encourage ambulation           [x] Already on Anticoagulation    Nabor Mendez MD on 1/2/2020 at 2:30 PM  Admitting Hospitalist

## 2020-01-02 NOTE — ED NOTES
Pt transported to [facility] on cart in stable condition. Floor contacted before transport. Spoke with Anamika Granados.      Delano Thrasher  01/02/20 [id number]

## 2020-01-02 NOTE — CONSULTS
Chief Complaint   Patient presents with    Extremity Weakness    Shortness of Breath    Dizziness   Veronica Allen is a [de-identified] y.o. female who presents to the emergency department for evaluation of weakness, 2 falls, and decreased appetite. Patient's family reports she has been sick since 12/19 with flu-like symptoms which has gradually worsened since onset. She reports experiencing nausea, diarrhea for 1-2 days (resolved 5 days ago), abdominal pain with ingestion of food, increased fatigue, and weight loss. She denies vomiting and fever since onset. Patient reports being evaluated by her PCP 3 days ago due to not being able to eat food, and she was prescribed medication to help increased her appetite. Patient states this has not help, and has since noted her vision becoming blurry. Patient reports extending her arms to place items in the top of the closet when she \"blacked out\" and fell to the ground. She denies hitting her head. Patient's family reports she was unable to ambulate following the fall. They state she had to crawl and then be assisted back to her feet. Patient's family states she then fell again while trying to walk from the parking lot into the ED. She denies hitting her head with this incident.  She states she currently is feeling fatigue    Denied cp    Poor oral intake and lost 20 lb in 2 weeks      Patient Active Problem List   Diagnosis    AF (atrial fibrillation) (HCC)    Anticoagulated on Coumadin    HTN (hypertension)    Nausea    Physical deconditioning    Loss of appetite    Chronic renal failure, stage 3 (moderate) (HCC)    Elevated troponin       Past Surgical History:   Procedure Laterality Date    BREAST LUMPECTOMY Left 2011    BUNIONECTOMY Left 2009    CARDIAC CATHETERIZATION  2010    DILATION AND CURETTAGE OF UTERUS      X2; SEVERAL YEARS AGO    JOINT REPLACEMENT Right 2010    KNEE    TONSILLECTOMY         Allergies   Allergen Reactions    Tramadol Nausea And Vomiting 50 mcg Oral Daily Erika Snider MD        bumetanide [facility]) tablet 2 mg  2 mg Oral Daily Erika Snider MD        rosuvastatin (CRESTOR) tablet 5 mg  5 mg Oral QPM Erika Snider MD        amLODIPine (NORVASC) tablet 5 mg  5 mg Oral Daily Erika Snider MD        propafenone [facility]) tablet 150 mg  150 mg Oral Q8H Erika Snider MD        potassium chloride (KLOR-CON) extended release tablet 10 mEq  10 mEq Oral Daily Erika Snider MD        losartan (COZAAR) tablet 50 mg  50 mg Oral BID Erika Snider MD        vitamin D (CHOLECALCIFEROL) tablet 1,000 Units  1,000 Units Oral Daily Erika Snider MD        atenolol (TENORMIN) tablet 12.5 mg  12.5 mg Oral BID Erika Snider MD        promethazine (PHENERGAN) tablet 25 mg  25 mg Oral Q6H PRN Erika Snider MD        ciprofloxacin (CIPRO) tablet 500 mg  500 mg Oral BID Erika Snider MD        acetaminophen (TYLENOL) tablet 650 mg  650 mg Oral PRN Erika Snider MD        [facility]) tablet 2.5 mg  2.5 mg Oral BID Erika Snider MD        [facility]) tablet 8.6 mg  1 tablet Oral BID Erika Snider MD        potassium chloride 10 mEq/100 mL IVPB (Peripheral Line)  10 mEq Intravenous PRN Erika Snider MD        guaiFENesin-dextromethorphan [facility]) 100-10 MG/5ML syrup 10 mL  10 mL Oral Q4H PRN Erika Snider MD        ondansetron [facility]) injection 4 mg  4 mg Intravenous Q6H PRN Erika Snider MD           Review of Systems -     General ROS: negative  Psychological ROS: negative  Hematological and Lymphatic ROS: No history of blood clots or bleeding disorder.    Respiratory ROS: no cough,  or wheezing, the rest see HPI  Cardiovascular ROS: See HPI  Gastrointestinal ROS: negative  Genito-Urinary ROS: no dysuria, trouble voiding, or hematuria  Musculoskeletal ROS: negative  Neurological ROS: no TIA or stroke symptoms  Dermatological 12/23/2019    AST 21 12/23/2019    PROT 6.7 12/23/2019    BILITOT 0.9 12/23/2019    BILIDIR <0.2 12/04/2018    LABALBU 3.3 12/23/2019    LABALBU 4.5 03/15/2012     Magnesium:    Lab Results   Component Value Date    MG 2.4 12/23/2019     Warfarin PT/INR:  No components found for: PTPATWAR, PTINRWAR  HgBA1c:  No results found for: LABA1C  FLP:    Lab Results   Component Value Date    TRIG 103 09/05/2019    HDL 60 09/05/2019    LDLCALC 53 09/05/2019     TSH:    Lab Results   Component Value Date    TSH 0.962 01/02/2020          cath  IMPRESSION:       1. Mild elevation of the hemodynamics of the right side of the heart,             at this point it is insignificant. 2.    Preserved systolic function of the left ventricle, ejection             fraction about 55-60 percent. No gross wall motion abnormalities             were seen. 3.    No gradient across the aortic valve. 4.    No significant mitral regurgitation seen. 5.    Patent dominant right coronary artery. 6.    Short left main. 7.    Mild atheromatous nonobstructive disease in the proximal left             anterior descending coronary artery, distally the left anterior             descending coronary artery was patent. 8.    Circumflex artery was patent. 9.    No step-up in oxygen saturation from different chambers of the             right side of the heart. 10.   Hemodynamics of the rest of the heart are slightly elevated. This patient tolerated the procedure well. She has no acute complication  from the procedure. Kit Mcdonald M.D.     D: 02/28/2013 17:55         EKG atr fib with CVR      Assessment    S/p Fall with no LOC _ two episodes at home   Dizziness on standing and unable to stand  Generalized body weakness  Anorexia for 2 weeks  DHN  JONATHAN on CKD  Elevated troponin due to above or ? ?   Marked lose of wt 20 in 2 weeks  atr fib CVR  SOB  Hx of CHF was on bumex  Hx of HTN    Plan

## 2020-01-03 ENCOUNTER — APPOINTMENT (OUTPATIENT)
Dept: CT IMAGING | Age: 80
DRG: 377 | End: 2020-01-03
Payer: MEDICARE

## 2020-01-03 LAB
AMMONIA: 27 UMOL/L (ref 11–60)
ANION GAP SERPL CALCULATED.3IONS-SCNC: 11 MEQ/L (ref 8–16)
BILIRUBIN URINE: NEGATIVE
BLOOD, URINE: NEGATIVE
BUN BLDV-MCNC: 21 MG/DL (ref 7–22)
CALCIUM SERPL-MCNC: 8.9 MG/DL (ref 8.5–10.5)
CHARACTER, URINE: CLEAR
CHLORIDE BLD-SCNC: 98 MEQ/L (ref 98–111)
CO2: 23 MEQ/L (ref 23–33)
COLOR: YELLOW
CREAT SERPL-MCNC: 1.5 MG/DL (ref 0.4–1.2)
EKG ATRIAL RATE: 94 BPM
EKG Q-T INTERVAL: 396 MS
EKG QRS DURATION: 102 MS
EKG QTC CALCULATION (BAZETT): 467 MS
EKG R AXIS: 29 DEGREES
EKG T AXIS: 67 DEGREES
EKG VENTRICULAR RATE: 84 BPM
GFR SERPL CREATININE-BSD FRML MDRD: 33 ML/MIN/1.73M2
GLUCOSE BLD-MCNC: 73 MG/DL (ref 70–108)
GLUCOSE URINE: NEGATIVE MG/DL
KETONES, URINE: NEGATIVE
LACTIC ACID: 1 MMOL/L (ref 0.5–2.2)
LEUKOCYTE ESTERASE, URINE: NEGATIVE
LIPASE: 19.3 U/L (ref 5.6–51.3)
LV EF: 60 %
LVEF MODALITY: NORMAL
NITRITE, URINE: NEGATIVE
PH UA: 7.5 (ref 5–9)
PHOSPHORUS: 2.1 MG/DL (ref 2.4–4.7)
POTASSIUM SERPL-SCNC: 3.9 MEQ/L (ref 3.5–5.2)
PROTEIN UA: NEGATIVE
SODIUM BLD-SCNC: 132 MEQ/L (ref 135–145)
SPECIFIC GRAVITY, URINE: 1.01 (ref 1–1.03)
TROPONIN T: 0.01 NG/ML
UROBILINOGEN, URINE: 0.2 EU/DL (ref 0–1)

## 2020-01-03 PROCEDURE — 82140 ASSAY OF AMMONIA: CPT

## 2020-01-03 PROCEDURE — 97166 OT EVAL MOD COMPLEX 45 MIN: CPT

## 2020-01-03 PROCEDURE — G0378 HOSPITAL OBSERVATION PER HR: HCPCS

## 2020-01-03 PROCEDURE — 96366 THER/PROPH/DIAG IV INF ADDON: CPT

## 2020-01-03 PROCEDURE — 99232 SBSQ HOSP IP/OBS MODERATE 35: CPT | Performed by: NURSE PRACTITIONER

## 2020-01-03 PROCEDURE — 2500000003 HC RX 250 WO HCPCS: Performed by: NURSE PRACTITIONER

## 2020-01-03 PROCEDURE — 97535 SELF CARE MNGMENT TRAINING: CPT

## 2020-01-03 PROCEDURE — 36415 COLL VENOUS BLD VENIPUNCTURE: CPT

## 2020-01-03 PROCEDURE — 83605 ASSAY OF LACTIC ACID: CPT

## 2020-01-03 PROCEDURE — 93010 ELECTROCARDIOGRAM REPORT: CPT | Performed by: INTERNAL MEDICINE

## 2020-01-03 PROCEDURE — 6360000002 HC RX W HCPCS: Performed by: INTERNAL MEDICINE

## 2020-01-03 PROCEDURE — 83690 ASSAY OF LIPASE: CPT

## 2020-01-03 PROCEDURE — 6370000000 HC RX 637 (ALT 250 FOR IP): Performed by: OPHTHALMOLOGY

## 2020-01-03 PROCEDURE — 3609017100 HC EGD: Performed by: INTERNAL MEDICINE

## 2020-01-03 PROCEDURE — 99226 PR SBSQ OBSERVATION CARE/DAY 35 MINUTES: CPT | Performed by: NURSE PRACTITIONER

## 2020-01-03 PROCEDURE — 93005 ELECTROCARDIOGRAM TRACING: CPT | Performed by: INTERNAL MEDICINE

## 2020-01-03 PROCEDURE — 74176 CT ABD & PELVIS W/O CONTRAST: CPT

## 2020-01-03 PROCEDURE — 81003 URINALYSIS AUTO W/O SCOPE: CPT

## 2020-01-03 PROCEDURE — 2580000003 HC RX 258: Performed by: NURSE PRACTITIONER

## 2020-01-03 PROCEDURE — 96365 THER/PROPH/DIAG IV INF INIT: CPT

## 2020-01-03 PROCEDURE — 2580000003 HC RX 258: Performed by: INTERNAL MEDICINE

## 2020-01-03 PROCEDURE — 84484 ASSAY OF TROPONIN QUANT: CPT

## 2020-01-03 PROCEDURE — 6370000000 HC RX 637 (ALT 250 FOR IP): Performed by: INTERNAL MEDICINE

## 2020-01-03 PROCEDURE — 0DJ08ZZ INSPECTION OF UPPER INTESTINAL TRACT, VIA NATURAL OR ARTIFICIAL OPENING ENDOSCOPIC: ICD-10-PCS | Performed by: INTERNAL MEDICINE

## 2020-01-03 PROCEDURE — 93306 TTE W/DOPPLER COMPLETE: CPT

## 2020-01-03 PROCEDURE — 2709999900 HC NON-CHARGEABLE SUPPLY

## 2020-01-03 PROCEDURE — 84100 ASSAY OF PHOSPHORUS: CPT

## 2020-01-03 PROCEDURE — 80048 BASIC METABOLIC PNL TOTAL CA: CPT

## 2020-01-03 RX ORDER — MIDAZOLAM HYDROCHLORIDE 1 MG/ML
INJECTION INTRAMUSCULAR; INTRAVENOUS PRN
Status: DISCONTINUED | OUTPATIENT
Start: 2020-01-03 | End: 2020-01-03 | Stop reason: ALTCHOICE

## 2020-01-03 RX ORDER — PANTOPRAZOLE SODIUM 40 MG/1
40 TABLET, DELAYED RELEASE ORAL
Status: DISCONTINUED | OUTPATIENT
Start: 2020-01-03 | End: 2020-01-05

## 2020-01-03 RX ORDER — FENTANYL CITRATE 50 UG/ML
INJECTION, SOLUTION INTRAMUSCULAR; INTRAVENOUS PRN
Status: DISCONTINUED | OUTPATIENT
Start: 2020-01-03 | End: 2020-01-03 | Stop reason: ALTCHOICE

## 2020-01-03 RX ADMIN — POTASSIUM PHOSPHATE, MONOBASIC AND POTASSIUM PHOSPHATE, DIBASIC 9 MMOL: 224; 236 INJECTION, SOLUTION, CONCENTRATE INTRAVENOUS at 11:42

## 2020-01-03 RX ADMIN — VITAMIN D, TAB 1000IU (100/BT) 1000 UNITS: 25 TAB at 11:37

## 2020-01-03 RX ADMIN — PROPAFENONE HYDROCHLORIDE 150 MG: 150 TABLET, FILM COATED ORAL at 00:34

## 2020-01-03 RX ADMIN — SENNOSIDES 8.6 MG: 8.6 TABLET ORAL at 20:01

## 2020-01-03 RX ADMIN — LEVOTHYROXINE SODIUM 50 MCG: 0.05 TABLET ORAL at 11:34

## 2020-01-03 RX ADMIN — PANTOPRAZOLE SODIUM 40 MG: 40 TABLET, DELAYED RELEASE ORAL at 18:55

## 2020-01-03 RX ADMIN — SODIUM CHLORIDE: 9 INJECTION, SOLUTION INTRAVENOUS at 09:04

## 2020-01-03 RX ADMIN — POTASSIUM CHLORIDE 10 MEQ: 750 TABLET, FILM COATED, EXTENDED RELEASE ORAL at 11:34

## 2020-01-03 RX ADMIN — SENNOSIDES 8.6 MG: 8.6 TABLET ORAL at 11:37

## 2020-01-03 RX ADMIN — PROPAFENONE HYDROCHLORIDE 150 MG: 150 TABLET, FILM COATED ORAL at 18:51

## 2020-01-03 RX ADMIN — PROPAFENONE HYDROCHLORIDE 150 MG: 150 TABLET, FILM COATED ORAL at 11:34

## 2020-01-03 RX ADMIN — ROSUVASTATIN CALCIUM 5 MG: 10 TABLET, FILM COATED ORAL at 18:51

## 2020-01-03 RX ADMIN — ATENOLOL 12.5 MG: 25 TABLET ORAL at 11:34

## 2020-01-03 ASSESSMENT — PAIN SCALES - GENERAL
PAINLEVEL_OUTOF10: 0

## 2020-01-03 NOTE — PLAN OF CARE
Problem: Falls - Risk of:  Goal: Will remain free from falls  Description  Will remain free from falls  Outcome: Ongoing  Note:   The patient has been free of falls this shift. Bed is in low position, 2/4 rails are up, and alarm is active. Call light is in reach, 2/4 rails are up, and hourly rounding performed. Goal: Absence of physical injury  Description  Absence of physical injury  Outcome: Ongoing     Problem: Infection:  Goal: Will remain free from infection  Description  Will remain free from infection  Outcome: Ongoing     Problem: Safety:  Goal: Free from accidental physical injury  Description  Free from accidental physical injury  Outcome: Ongoing     Problem: Daily Care:  Goal: Daily care needs are met  Description  Daily care needs are met  Outcome: Ongoing     Problem: Pain:  Goal: Patient's pain/discomfort is manageable  Description  Patient's pain/discomfort is manageable  Outcome: Ongoing  Note:   The patient states that she gets intermittent discomfort in the abdomen. No c/o of it this shift. Problem: Skin Integrity:  Goal: Skin integrity will stabilize  Description  Skin integrity will stabilize  Outcome: Ongoing  Note:   No new skin breakdown this shift. Problem: Discharge Planning:  Goal: Patients continuum of care needs are met  Description  Patients continuum of care needs are met  Outcome: Ongoing  Note:   Discharge is pending at this time. Problem: Bowel Function - Altered:  Goal: Bowel elimination is within specified parameters  Description  Bowel elimination is within specified parameters  Outcome: Ongoing  Note:   The patient has been able to pass gas, but no BM.  Planning for an EGD in the AM.

## 2020-01-03 NOTE — BRIEF OP NOTE
Brief Postoperative Note  ______________________________________________________________    Patient: Delgado Kirby  YOB: 1940  MRN: [id number]  Date of Procedure: 1/3/2020    Pre-Op Diagnosis: nausea, abdominal pain , weight loss and dysphagia      Post-Op Diagnosis: GERD, erosive ulcerative gastritis and erosive duodenitis        Procedure(s):  EGD ESOPHAGOGASTRODUODENOSCOPY    Anesthesia: Anesthesia type not filed in the log.     Surgeon(s):  Gina Lomeli MD    Assistant: Ángel Villanueva RN     Estimated Blood Loss (mL): none    Complications: None    Specimens:   * No specimens in log *    Implants:  * No implants in log *      Drains: * No LDAs found *    Findings:  GERD, erosive ulcerative gastritis and erosive duodenitis     Gina Lomeli MD  Date: 1/3/2020  Time: 4:28 PM

## 2020-01-03 NOTE — PRE SEDATION
tablet 150 mg, 150 mg, Oral, Q8H, Laura Sahu MD, 150 mg at 01/03/20 1134    potassium chloride (KLOR-CON) extended release tablet 10 mEq, 10 mEq, Oral, Daily, Laura Sahu MD, 10 mEq at 01/03/20 1134    vitamin D (CHOLECALCIFEROL) tablet 1,000 Units, 1,000 Units, Oral, Daily, Laura Sahu MD, 1,000 Units at 01/03/20 1137    atenolol (TENORMIN) tablet 12.5 mg, 12.5 mg, Oral, BID, Laura Sahu MD, 12.5 mg at 01/03/20 1134    promethazine (PHENERGAN) tablet 25 mg, 25 mg, Oral, Q6H PRN, Laura Sahu MD    acetaminophen (TYLENOL) tablet 650 mg, 650 mg, Oral, PRN, Laura Sahu MD    apixaban [facility]) tablet 2.5 mg, 2.5 mg, Oral, BID, Laura Sahu MD, 2.5 mg at 01/02/20 2053    senna (SENOKOT) tablet 8.6 mg, 1 tablet, Oral, BID, Laura Sahu MD, 8.6 mg at 01/03/20 1137    potassium chloride 10 mEq/100 mL IVPB (Peripheral Line), 10 mEq, Intravenous, PRN, Laura Sahu MD    guaiFENesin-dextromethorphan (ROBITUSSIN DM) 100-10 MG/5ML syrup 10 mL, 10 mL, Oral, Q4H PRN, Laura Sahu MD    ondansetron [facility]) injection 4 mg, 4 mg, Intravenous, Q6H PRN, Laura Sahu MD    0.9 % sodium chloride infusion, , Intravenous, Continuous, Max Galdamez MD, Last Rate: 75 mL/hr at 01/03/20 8963  Prior to Admission medications    Medication Sig Start Date End Date Taking?  Authorizing Provider   ondansetron (ZOFRAN) 4 MG tablet Take 4 mg by mouth every 8 hours as needed for Nausea or Vomiting   Yes Historical Provider, MD   atenolol (TENORMIN) 25 MG tablet Take 12.5 mg by mouth 2 times daily    Yes Historical Provider, MD   apixaban (ELIQUIS) 2.5 MG TABS tablet Take by mouth 2 times daily   Yes Historical Provider, MD   Calcium Carbonate (CALCIUM 600 PO) Take by mouth 2 times daily   Yes Historical Provider, MD   Probiotic Product (PROBIOTIC-10 PO) Take by mouth daily   Yes Historical Provider, MD   vitamin D (CHOLECALCIFEROL) 1000 UNIT

## 2020-01-03 NOTE — PROGRESS NOTES
Cardiology Progress Note      Patient:  Keisha Van  YOB: 1940  MRN: [id number]   Acct: [de-identified]  [facility] Date:  1/2/2020  Primary Cardiologist: Dr. Sven Teran   Seen by Dr. Joyce Holter    Per prior cardiology consult note-  Aneesh Titus is a [de-identified] y.o. female who presents to the emergency department for evaluation of weakness, 2 falls, and decreased appetite. Patient's family reports she has been sick since 12/19 with flu-like symptoms which has gradually worsened since onset. She reports experiencing nausea, diarrhea for 1-2 days (resolved 5 days ago), abdominal pain with ingestion of food, increased fatigue, and weight loss. She denies vomiting and fever since onset. Patient reports being evaluated by her PCP 3 days ago due to not being able to eat food, and she was prescribed medication to help increased her appetite. Patient states this has not help, and has since noted her vision becoming blurry. Patient reports extending her arms to place items in the top of the closet when she \"blacked out\" and fell to the ground. She denies hitting her head. Patient's family reports she was unable to ambulate following the fall. They state she had to crawl and then be assisted back to her feet. Patient's family states she then fell again while trying to walk from the parking lot into the ED. She denies hitting her head with this incident.  She states she currently is feeling fatigue     Denied cp      Subjective (Events in last 24 hours):     Pt getting echo   She has no cardiac c/o- no chest pain or SOB or palpitations     She states dizziness for the past few weeks but no heart racing to correlate with it     AFB - CVR  VSS      Objective:   /75   Pulse 91   Temp 98.2 °F (36.8 °C) (Oral)   Resp 16   Ht 5' (1.524 m)   Wt 95 lb 14.4 oz (43.5 kg)   SpO2 95%   BMI 18.73 kg/m²        TELEMETRY: AFB CVR    Physical Exam:  General Appearance: alert and oriented to person, place and time, in no acute stenosis. There was mild regurgitation.     Aortic valve: There was mild regurgitation.     Tricuspid valve: There was mild regurgitation.     Atrial septum:  Contrast injection was performed. There was no right-to-left shunt, with provocative maneuvers to increase right atrial  pressure.     Prepared and signed by  Wale Ohara MD  Signed 19-Jul-2010      Left Heart Cath:   Left heart cath. Right femoral artery cannulated with a 6-Arabic  long sheath. The patient has extremely tortuous iliac artery and  abdominal aortic artery. Coronary angiogram showed the RCA is a  dominant artery and it was patent. Left main was patent, bifurcates to  the LAD and circumflex. The circumflex was patent. The LAD was patent. Nonobstructive disease of about 20% in mid LAD and the origin of the  diagonal artery, which the diagonal artery has about 70% narrowing in  its mid course. It is a small branch.     The left ventriculogram showed a preserved systolic function of left  ventricle and ejection fraction of about 55% to 60%, mitral valve  prolapse. No mitral regurgitation. No gradient across the aortic  valve. IMPRESSION:  1. Preserved systolic function of the left ventricle. Ejection  fraction about 55%. No mitral regurgitation. No gradient across the  aortic valve. 2.  Mitral valve prolapse. 3.  Patent dominant RCA. 4.  Patent left main. 5.  Patent circumflex. 6.  Nonobstructive disease of mid LAD about 20%. 7.  About 70% narrowing of the mid, moderate sized diagonal artery. 8.  Very tortuous abdominal aorta and successful deployment of the  Angio-Seal closure device.     RECOMMENDATION:  At this point, continue with the current treatment,  risk factor modification, periodic followup. Consider ablation  treatment for her atrial fib.   Deepak Morris M.D.  Lani Howard: 09/05/2019     Lab Data:    Cardiac Enzymes:  No results for input(s): CKTOTAL, CKMB, Earnest Brazen in the last 67

## 2020-01-03 NOTE — PROGRESS NOTES
with with CGA and no UE support in rpe for sinkside ADL asks and simple homemaking tasks   Short term goal 3: Pt to increase endurance to navigate throughout her environment using AD as needed with CGA, no LOB, no increase in fatigue or requiring vcs for safety to be able to obtain needed items from various locations for ADL supplies. Short term goal 4: Pt to be educated on bilateral UE strengthening HEp with use of handout requiring min cues for ease of ADL asks   Long term goals  Time Frame for Long term goals : nto est d/t ELOS          Following session, patient left in safe position with all fall risk precautions in place.

## 2020-01-03 NOTE — PROGRESS NOTES
from 12/23 with E coli    Urinalysis:      Lab Results   Component Value Date    NITRU NEGATIVE 12/23/2019    WBCUA 25-50W/CLUMPS 12/23/2019    BACTERIA MODERATE 12/23/2019    RBCUA NONE 12/23/2019    BLOODU NEGATIVE 12/23/2019    SPECGRAV 1.020 12/23/2019       Radiology:  CT ABDOMEN PELVIS WO CONTRAST Additional Contrast? Oral   Final Result   1. Bibasilar atelectasis/infiltrate. 2. No acute intra-abdominal or intrapelvic findings. Final report electronically signed by Dr. Alisa Campbell on 1/3/2020 10:13 AM      XR CHEST STANDARD (2 VW)   Final Result      No acute findings. **This report has been created using voice recognition software. It may contain minor errors which are inherent in voice recognition technology. **      Final report electronically signed by Dr. Allan Cantor on 1/2/2020 1:03 PM          DVT prophylaxis: [] Lovenox                                 [] SCDs                                 [] SQ Heparin                                 [] Encourage ambulation           [x] Already on Anticoagulation     Code Status: Prior    Tele:   [x] yes AF with HR 80's             [] no    Active Hospital Problems    Diagnosis Date Noted    Nausea [R11.0] 01/02/2020    Physical deconditioning [R53.81] 01/02/2020    Loss of appetite [R63.0] 01/02/2020    Chronic renal failure, stage 3 (moderate) ([facility] [address] [N18.3] 01/02/2020    Elevated troponin [R79.89] 01/02/2020       Electronically signed by VANDANA Villavicencio CNP on 1/3/2020 at 10:48 AM

## 2020-01-03 NOTE — CONSULTS
[address] [place] [address]                                  CONSULTATION    PATIENT NAME: Michelle Ohara                     :        1940  MED REC NO:   [id number]                           ROOM:       0010  ACCOUNT NO:   [de-identified]                           ADMIT DATE: 2020  PROVIDER:     GARO Omalley DATE:  2020    GI CONSULT    HISTORY OF PRESENT ILLNESS:  The patient is known to the practice. She  is with weakness . At this time the patient was admitted with weakness and  has not been eating well. She is eating snack. She lost weight. She has   got poor oral intake and fullness as well as abdominal discomfort. She has poor  intake. She had vomited twice with nausea. She has been given Zofran to  help with her nausea, it did not resolve completely. She has also taken  Phenergan, which made her more dizzy, goofy, not feeling well. Her  bowel movement usually is normal.  Has not moved her bowel over the last  two days. She has no blood with the stool. There is no mucus discharge  in stool. She has no melena. Appetite is not that good at  this time. She has been diagnosed with UTI lately. She was on Cipro. She is feeling an oral cavity kind of burning sensation. She has  fullness. Started on antibiotics. She was seen in my office in the  past for colon cancer screening on 2013 with a plan of  colonoscopy, but she changed her mind and did not go through with  procedure. PAST MEDICAL HISTORY:  Significant for AFib, coronary artery disease,  breast cancer, chronic kidney disease, nausea, vomit, hypertension,  osteopenia, Rheumatic fever as a child, thyroid disease. PAST SURGICAL HISTORY:  Breast lumpectomy, bunionectomy, D and C in the  past, tonsillectomy, joint replacement of the knee. MEDICATIONS:  She takes Zofran as needed, Phenergan as needed.   She has  been on

## 2020-01-04 PROBLEM — E43 SEVERE MALNUTRITION (HCC): Status: ACTIVE | Noted: 2020-01-04

## 2020-01-04 PROBLEM — R53.1 WEAKNESS: Status: ACTIVE | Noted: 2020-01-04

## 2020-01-04 LAB
ANION GAP SERPL CALCULATED.3IONS-SCNC: 13 MEQ/L (ref 8–16)
BUN BLDV-MCNC: 14 MG/DL (ref 7–22)
CALCIUM SERPL-MCNC: 8.8 MG/DL (ref 8.5–10.5)
CHLORIDE BLD-SCNC: 98 MEQ/L (ref 98–111)
CO2: 19 MEQ/L (ref 23–33)
CREAT SERPL-MCNC: 1.1 MG/DL (ref 0.4–1.2)
ERYTHROCYTE [DISTWIDTH] IN BLOOD BY AUTOMATED COUNT: 14.5 % (ref 11.5–14.5)
ERYTHROCYTE [DISTWIDTH] IN BLOOD BY AUTOMATED COUNT: 54.2 FL (ref 35–45)
GFR SERPL CREATININE-BSD FRML MDRD: 48 ML/MIN/1.73M2
GLUCOSE BLD-MCNC: 76 MG/DL (ref 70–108)
HCT VFR BLD CALC: 36.1 % (ref 37–47)
HEMOGLOBIN: 11.3 GM/DL (ref 12–16)
MCH RBC QN AUTO: 32.2 PG (ref 26–33)
MCHC RBC AUTO-ENTMCNC: 31.3 GM/DL (ref 32.2–35.5)
MCV RBC AUTO: 102.8 FL (ref 81–99)
PHOSPHORUS: 2.3 MG/DL (ref 2.4–4.7)
PLATELET # BLD: 283 THOU/MM3 (ref 130–400)
PMV BLD AUTO: 9 FL (ref 9.4–12.4)
POTASSIUM REFLEX MAGNESIUM: 4 MEQ/L (ref 3.5–5.2)
RBC # BLD: 3.51 MILL/MM3 (ref 4.2–5.4)
SODIUM BLD-SCNC: 130 MEQ/L (ref 135–145)
WBC # BLD: 7.3 THOU/MM3 (ref 4.8–10.8)

## 2020-01-04 PROCEDURE — 6370000000 HC RX 637 (ALT 250 FOR IP): Performed by: OPHTHALMOLOGY

## 2020-01-04 PROCEDURE — 6370000000 HC RX 637 (ALT 250 FOR IP): Performed by: INTERNAL MEDICINE

## 2020-01-04 PROCEDURE — 1200000003 HC TELEMETRY R&B

## 2020-01-04 PROCEDURE — 84100 ASSAY OF PHOSPHORUS: CPT

## 2020-01-04 PROCEDURE — 36415 COLL VENOUS BLD VENIPUNCTURE: CPT

## 2020-01-04 PROCEDURE — 99232 SBSQ HOSP IP/OBS MODERATE 35: CPT | Performed by: NURSE PRACTITIONER

## 2020-01-04 PROCEDURE — 85027 COMPLETE CBC AUTOMATED: CPT

## 2020-01-04 PROCEDURE — 2709999900 HC NON-CHARGEABLE SUPPLY

## 2020-01-04 PROCEDURE — 80048 BASIC METABOLIC PNL TOTAL CA: CPT

## 2020-01-04 RX ADMIN — VITAMIN D, TAB 1000IU (100/BT) 1000 UNITS: 25 TAB at 10:35

## 2020-01-04 RX ADMIN — PANTOPRAZOLE SODIUM 40 MG: 40 TABLET, DELAYED RELEASE ORAL at 05:26

## 2020-01-04 RX ADMIN — ROSUVASTATIN CALCIUM 5 MG: 10 TABLET, FILM COATED ORAL at 20:10

## 2020-01-04 RX ADMIN — ATENOLOL 12.5 MG: 25 TABLET ORAL at 10:36

## 2020-01-04 RX ADMIN — LEVOTHYROXINE SODIUM 50 MCG: 0.05 TABLET ORAL at 10:36

## 2020-01-04 RX ADMIN — PROPAFENONE HYDROCHLORIDE 150 MG: 150 TABLET, FILM COATED ORAL at 10:36

## 2020-01-04 RX ADMIN — GUAIFENESIN AND DEXTROMETHORPHAN 10 ML: 100; 10 SYRUP ORAL at 10:36

## 2020-01-04 RX ADMIN — PANTOPRAZOLE SODIUM 40 MG: 40 TABLET, DELAYED RELEASE ORAL at 17:35

## 2020-01-04 RX ADMIN — SENNOSIDES 8.6 MG: 8.6 TABLET ORAL at 20:10

## 2020-01-04 RX ADMIN — ACETAMINOPHEN 650 MG: 325 TABLET ORAL at 17:48

## 2020-01-04 RX ADMIN — PROPAFENONE HYDROCHLORIDE 150 MG: 150 TABLET, FILM COATED ORAL at 00:53

## 2020-01-04 RX ADMIN — PROPAFENONE HYDROCHLORIDE 150 MG: 150 TABLET, FILM COATED ORAL at 17:35

## 2020-01-04 RX ADMIN — ATENOLOL 12.5 MG: 25 TABLET ORAL at 20:10

## 2020-01-04 RX ADMIN — POTASSIUM CHLORIDE 10 MEQ: 750 TABLET, FILM COATED, EXTENDED RELEASE ORAL at 12:14

## 2020-01-04 RX ADMIN — AMLODIPINE BESYLATE 5 MG: 5 TABLET ORAL at 10:36

## 2020-01-04 RX ADMIN — SENNOSIDES 8.6 MG: 8.6 TABLET ORAL at 10:37

## 2020-01-04 RX ADMIN — ASPIRIN 81 MG: 81 TABLET, CHEWABLE ORAL at 12:14

## 2020-01-04 ASSESSMENT — PAIN SCALES - GENERAL
PAINLEVEL_OUTOF10: 0
PAINLEVEL_OUTOF10: 1

## 2020-01-04 NOTE — PROGRESS NOTES
III.     Estimated blood loss in none      DESCRIPTION OF PROCEDURE:  The patient was brought to GI lab. Consent  was obtained. Risks involved with the procedure were explained to the  patient. Informed consent was obtained. The patient was monitored  during the procedure with pulse oximetry, blood pressure monitoring, and  oxygen by nasal cannula. Sedation done by incremental doses of IV  Versed, total 1 mg of Versed and 50 mcg of fentanyl given in incremental  dosage during the procedure to achieve continuous conscious sedation.     PROCEDURE PERFORMED:  EGD.     Standard video 190 Olympus upper scope was advanced under direct vision  from the oral cavity up to the duodenum. Esophagus appeared normal.  No  erosions or ulcerations seen. The gastroesophageal junction was at 38  cm from the incisors. Scope was advanced to the stomach. Retroflex  examination of the cardia revealed mild gastritis. Ulcerative erosive  gastritis seen, distal part of the body of the antrum. No bleeding  seen. The ulcers are superficial.  Scope was advanced to the duodenum,  showed erosive duodenitis. The findings really cannot explain the  patient's discomfort. I elected not to take a biopsy as the patient  likewise at this time has high risk to bleed. The scope was withdrawn  with no immediate complications.     IMPRESSION:  1. Ulcerative, erosive gastritis in the antrum. 2.  Erosive duodenitis. 3.  Gastritis.     PLAN:  1. Start PPI, should be twice a day for now. Dose can be reduced to  once a day later once the patient clinically improves. 2.  Advance the diet as tolerated. 3.  Elective colonoscopy to be done as an outpatient.           Franci Kramer M.D.     Objective:   Vitals: /66   Pulse 77   Temp 97.9 °F (36.6 °C) (Oral)   Resp 16   Ht 5' (1.524 m)   Wt 104 lb 1.6 oz (47.2 kg)   SpO2 95%   BMI 20.33 kg/m²     Intake/Output Summary (Last 24 hours) at 1/4/2020 6097  Last data filed at 1/3/2020

## 2020-01-04 NOTE — CONSULTS
Nutrition Assessment    Type and Reason for Visit: Initial, Positive Nutrition Screen, Consult(Weight Loss/Decreased Appetite/Intake)    Nutrition Recommendations:   *Recommend a Multivitamin w/minerals daily. *Consider an appetite stimulant if po intake remains less than 50% meals. *Continue current diet. *Started Ensure [employer] and Niya-Tc TID. Nutrition Assessment: Pt. severely malnourished AEB criteria listed below. At risk for further nutritional compromise r/t ongoing decreased oral intake 2/2 poor appetite, underlying medical condition (hx CAD, CKD, HTN and breast cancer) and need for nutrition support. Nutrition recommendations/interventions as per above. Malnutrition Assessment:  · Malnutrition Status: Meets the criteria for severe malnutrition  · Context: Acute illness or injury  · Findings of the 6 clinical characteristics of malnutrition (Minimum of 2 out of 6 clinical characteristics is required to make the diagnosis of moderate or severe Protein Calorie Malnutrition based on AND/ASPEN Guidelines):  1. Energy Intake-Less than or equal to 50% of estimated energy requirement, Greater than or equal to 7 days    2. Fat Loss-Moderate subcutaneous fat loss, Orbital  3.  Muscle Loss-Moderate muscle mass loss, Temples (temporalis muscle), Clavicles (pectoralis and deltoids)    Nutrition Risk Level: High    Nutrient Needs:  · Estimated Daily Total Kcal: 2006-5179 kcal/day (30-35 kcal/kg - 47.2 kg on 1/4)  · Estimated Daily Protein (g): 47-56 g/day (1-1.2 g/kg - 47.2 kg on 1/4)- monitoring renal status    Nutrition Diagnosis:   · Problem: Severe malnutrition, In context of acute illness or injury  · Etiology: related to Insufficient energy/nutrient consumption     Signs and symptoms:  as evidenced by Diet history of poor intake, Moderate loss of subcutaneous fat, Moderate muscle loss    Objective Information:  · Nutrition-Focused Physical Findings: thin; frail; pt reports occasional nausea with no emesis; pt denies chewing/swallowing difficulty with food. Per family, pt ate nothing over the past week. Last BM x1 on 1/4.  Rx includes: Senna & Vitamin D  · Wound Type: None  · Current Nutrition Therapies:  · Oral Diet Orders: Cardiac   · Oral Diet intake: 1-25%, 0%, 26-50%(pt ate bites of lunch and 50% of a burger last night for suppoer)  · Oral Nutrition Supplement (ONS) Orders: Standard High Calorie Oral Supplement, Frozen Oral Supplement(Ensure Enlive (likes Strawberry best) & Magic Cup TID (Jerl Simba))  · ONS intake: Initiated today  · Anthropometric Measures:  · Ht: 5' (152.4 cm)   · Current Body Wt: 104 lb 1.6 oz (47.2 kg)(1/4; no edema noted)  · Admission Body Wt: 95 lb 14.4 oz (43.5 kg)(1/3; no edema noted)  · Usual Body Wt: 108 lb (49 kg)(per pt report.)  · % Weight Change: no actual weights per EMR to assess weight changes  · Ideal Body Wt: 100 lb (45.4 kg)   · BMI Classification: BMI 18.5 - 24.9 Normal Weight(20.4)    Nutrition Interventions:   Continue current diet, Start ONS, Vitamin Supplement  Continued Inpatient Monitoring, Education Initiated, Coordination of Care(Encouraged po intake of meals at best effort)    Nutrition Evaluation:   · Evaluation: Goals set   · Goals: Pt will consume 75% or more of meals during LOS    · Monitoring: Nutrition Progression, Meal Intake, Supplement Intake, Diet Tolerance, Weight, Pertinent Labs, Nausea or Vomiting, Constipation, Monitor Bowel Function    Electronically signed by Shaji Dobbs RD, LD on 1/4/20 at 2:10 PM    Contact Number: [phone]

## 2020-01-04 NOTE — PROGRESS NOTES
Pt is having some anxiety this afternoon. She states that she feels like the walls are coming in on her. She states that she needs out of \"this closet\". Pt requesting to move rooms closer to the nurses station. She states that it is too quiet in her room. TV is on. Lights were turned up. Pt vs are stable. Pt refuses to go for a walk states that she will just go to sleep.  Tylenol given to help with comfort suggested by Sunita Fernández NP

## 2020-01-04 NOTE — PLAN OF CARE
Problem: Falls - Risk of:  Goal: Will remain free from falls  Description  Will remain free from falls  Outcome: Ongoing  Note:   Assessment & interventions provided throughout shift. Bed locked & in low position, call light in reach, side-rails up x2, non-slip socks on when ambulating, reminded patient to use call light to call for assistance. Patient remained free from falls throughout shift. Patient is a high fall risk. Patient is unsteady with ambulation and needs redirected to walk slowly and steadily. Patient has bed alarm on zone 2. Problem: Infection:  Goal: Will remain free from infection  Description  Will remain free from infection  Outcome: Ongoing  Note:   Patient remained afebrile throughout the night. Monitoring labs for signs of infection. Problem: Daily Care:  Goal: Daily care needs are met  Description  Daily care needs are met  Outcome: Ongoing  Note:   Patient readily discusses care with the care team.  Patient states that their daily care needs are being met and denies further needs/questions/concerns upon hourly rounding assessing. Problem: Pain:  Goal: Patient's pain/discomfort is manageable  Description  Patient's pain/discomfort is manageable  Outcome: Ongoing  Note:   Patient denies pain so far this shift. Reminded patient to report any pain, pressure, or shortness of breath to the nurse. Will continue to monitor. Care plan reviewed with patient. Patient verbalizes understanding of the care plan and contributed to goal setting.

## 2020-01-04 NOTE — OP NOTE
[address] [place] [address]                                OPERATIVE REPORT    PATIENT NAME: Genie Prabhakar                     :        1940  MED REC NO:   [id number]                           ROOM:       0010  ACCOUNT NO:   [de-identified]                           ADMIT DATE: 2020  PROVIDER:     Marizol Aguilera M.D.    DATE OF PROCEDURE:  2020    INDICATIONS:  The patient with history of weight loss, abdominal  discomfort, inability to eat a lot, feels, nausea, vomit lately. Slight  difficulty to swallow. Plan today for EGD to evaluate. SURGEON:  Marizol Aguilera MD    ASA CLASSIFICATION:  III. Estimated blood loss in none     DESCRIPTION OF PROCEDURE:  The patient was brought to GI lab. Consent  was obtained. Risks involved with the procedure were explained to the  patient. Informed consent was obtained. The patient was monitored  during the procedure with pulse oximetry, blood pressure monitoring, and  oxygen by nasal cannula. Sedation done by incremental doses of IV  Versed, total 1 mg of Versed and 50 mcg of fentanyl given in incremental  dosage during the procedure to achieve continuous conscious sedation. PROCEDURE PERFORMED:  EGD. Standard video 190 Olympus upper scope was advanced under direct vision  from the oral cavity up to the duodenum. Esophagus appeared normal.  No  erosions or ulcerations seen. The gastroesophageal junction was at 38  cm from the incisors. Scope was advanced to the stomach. Retroflex  examination of the cardia revealed mild gastritis. Ulcerative erosive  gastritis seen, distal part of the body of the antrum. No bleeding  seen. The ulcers are superficial.  Scope was advanced to the duodenum,  showed erosive duodenitis. The findings really cannot explain the  patient's discomfort.   I elected not to take a biopsy as the patient  likewise at this time has high risk to bleed.  The scope was withdrawn  with no immediate complications. IMPRESSION:  1. Ulcerative, erosive gastritis in the antrum. 2.  Erosive duodenitis. 3.  Gastritis. PLAN:  1. Start PPI, should be twice a day for now. Dose can be reduced to  once a day later once the patient clinically improves. 2.  Advance the diet as tolerated. 3.  Elective colonoscopy to be done as an outpatient.         Aiden Jain M.D.    D: 01/03/2020 16:45:37       T: 01/03/2020 20:23:03     AT/ALMA [email or web address]  Job#: [id number]     Doc#: [id number]    CC:  Thiago Alcantara C.N.P.

## 2020-01-05 ENCOUNTER — APPOINTMENT (OUTPATIENT)
Dept: GENERAL RADIOLOGY | Age: 80
DRG: 377 | End: 2020-01-05
Payer: MEDICARE

## 2020-01-05 ENCOUNTER — APPOINTMENT (OUTPATIENT)
Dept: CT IMAGING | Age: 80
DRG: 377 | End: 2020-01-05
Payer: MEDICARE

## 2020-01-05 LAB
ANION GAP SERPL CALCULATED.3IONS-SCNC: 11 MEQ/L (ref 8–16)
BUN BLDV-MCNC: 14 MG/DL (ref 7–22)
CALCIUM SERPL-MCNC: 9.1 MG/DL (ref 8.5–10.5)
CHLORIDE BLD-SCNC: 93 MEQ/L (ref 98–111)
CO2: 20 MEQ/L (ref 23–33)
CREAT SERPL-MCNC: 1.2 MG/DL (ref 0.4–1.2)
EKG ATRIAL RATE: 170 BPM
EKG Q-T INTERVAL: 348 MS
EKG QRS DURATION: 128 MS
EKG QTC CALCULATION (BAZETT): 437 MS
EKG R AXIS: 92 DEGREES
EKG VENTRICULAR RATE: 95 BPM
ERYTHROCYTE [DISTWIDTH] IN BLOOD BY AUTOMATED COUNT: 14.5 % (ref 11.5–14.5)
ERYTHROCYTE [DISTWIDTH] IN BLOOD BY AUTOMATED COUNT: 52.5 FL (ref 35–45)
GFR SERPL CREATININE-BSD FRML MDRD: 43 ML/MIN/1.73M2
GLUCOSE BLD-MCNC: 138 MG/DL (ref 70–108)
GLUCOSE BLD-MCNC: 149 MG/DL (ref 70–108)
HCT VFR BLD CALC: 34 % (ref 37–47)
HEMOGLOBIN: 11.2 GM/DL (ref 12–16)
MAGNESIUM: 2.1 MG/DL (ref 1.6–2.4)
MCH RBC QN AUTO: 33 PG (ref 26–33)
MCHC RBC AUTO-ENTMCNC: 32.9 GM/DL (ref 32.2–35.5)
MCV RBC AUTO: 100.3 FL (ref 81–99)
MRSA SCREEN RT-PCR: NEGATIVE
PHOSPHORUS: 2.2 MG/DL (ref 2.4–4.7)
PLATELET # BLD: 328 THOU/MM3 (ref 130–400)
PMV BLD AUTO: 9.4 FL (ref 9.4–12.4)
POTASSIUM SERPL-SCNC: 4.5 MEQ/L (ref 3.5–5.2)
PRO-BNP: ABNORMAL PG/ML (ref 0–1800)
PROCALCITONIN: 0.24 NG/ML (ref 0.01–0.09)
RBC # BLD: 3.39 MILL/MM3 (ref 4.2–5.4)
SODIUM BLD-SCNC: 124 MEQ/L (ref 135–145)
TROPONIN T: 0.01 NG/ML
VANCOMYCIN RESISTANT ENTEROCOCCUS: ABNORMAL
WBC # BLD: 10.8 THOU/MM3 (ref 4.8–10.8)

## 2020-01-05 PROCEDURE — 99153 MOD SED SAME PHYS/QHP EA: CPT | Performed by: INTERNAL MEDICINE

## 2020-01-05 PROCEDURE — 2500000003 HC RX 250 WO HCPCS: Performed by: INTERNAL MEDICINE

## 2020-01-05 PROCEDURE — 2709999900 HC NON-CHARGEABLE SUPPLY: Performed by: INTERNAL MEDICINE

## 2020-01-05 PROCEDURE — 2580000003 HC RX 258: Performed by: NURSE PRACTITIONER

## 2020-01-05 PROCEDURE — 87205 SMEAR GRAM STAIN: CPT

## 2020-01-05 PROCEDURE — 0BH17EZ INSERTION OF ENDOTRACHEAL AIRWAY INTO TRACHEA, VIA NATURAL OR ARTIFICIAL OPENING: ICD-10-PCS | Performed by: INTERNAL MEDICINE

## 2020-01-05 PROCEDURE — 6360000002 HC RX W HCPCS

## 2020-01-05 PROCEDURE — 87081 CULTURE SCREEN ONLY: CPT

## 2020-01-05 PROCEDURE — 71046 X-RAY EXAM CHEST 2 VIEWS: CPT

## 2020-01-05 PROCEDURE — 6370000000 HC RX 637 (ALT 250 FOR IP): Performed by: INTERNAL MEDICINE

## 2020-01-05 PROCEDURE — 84145 PROCALCITONIN (PCT): CPT

## 2020-01-05 PROCEDURE — 84484 ASSAY OF TROPONIN QUANT: CPT

## 2020-01-05 PROCEDURE — 6370000000 HC RX 637 (ALT 250 FOR IP): Performed by: NURSE PRACTITIONER

## 2020-01-05 PROCEDURE — 93010 ELECTROCARDIOGRAM REPORT: CPT | Performed by: INTERNAL MEDICINE

## 2020-01-05 PROCEDURE — 2709999900 HC NON-CHARGEABLE SUPPLY

## 2020-01-05 PROCEDURE — 36415 COLL VENOUS BLD VENIPUNCTURE: CPT

## 2020-01-05 PROCEDURE — 94761 N-INVAS EAR/PLS OXIMETRY MLT: CPT

## 2020-01-05 PROCEDURE — 87070 CULTURE OTHR SPECIMN AEROBIC: CPT

## 2020-01-05 PROCEDURE — 2580000003 HC RX 258: Performed by: INTERNAL MEDICINE

## 2020-01-05 PROCEDURE — 94002 VENT MGMT INPAT INIT DAY: CPT

## 2020-01-05 PROCEDURE — 5A1945Z RESPIRATORY VENTILATION, 24-96 CONSECUTIVE HOURS: ICD-10-PCS | Performed by: INTERNAL MEDICINE

## 2020-01-05 PROCEDURE — 87086 URINE CULTURE/COLONY COUNT: CPT

## 2020-01-05 PROCEDURE — C9113 INJ PANTOPRAZOLE SODIUM, VIA: HCPCS | Performed by: INTERNAL MEDICINE

## 2020-01-05 PROCEDURE — 0BJ08ZZ INSPECTION OF TRACHEOBRONCHIAL TREE, VIA NATURAL OR ARTIFICIAL OPENING ENDOSCOPIC: ICD-10-PCS | Performed by: INTERNAL MEDICINE

## 2020-01-05 PROCEDURE — 83735 ASSAY OF MAGNESIUM: CPT

## 2020-01-05 PROCEDURE — 6360000002 HC RX W HCPCS: Performed by: NURSE PRACTITIONER

## 2020-01-05 PROCEDURE — 36556 INSERT NON-TUNNEL CV CATH: CPT | Performed by: INTERNAL MEDICINE

## 2020-01-05 PROCEDURE — 94640 AIRWAY INHALATION TREATMENT: CPT

## 2020-01-05 PROCEDURE — 99152 MOD SED SAME PHYS/QHP 5/>YRS: CPT | Performed by: INTERNAL MEDICINE

## 2020-01-05 PROCEDURE — 87641 MR-STAPH DNA AMP PROBE: CPT

## 2020-01-05 PROCEDURE — 2000000000 HC ICU R&B

## 2020-01-05 PROCEDURE — C1751 CATH, INF, PER/CENT/MIDLINE: HCPCS

## 2020-01-05 PROCEDURE — 3609027000 HC BRONCHOSCOPY: Performed by: INTERNAL MEDICINE

## 2020-01-05 PROCEDURE — 82948 REAGENT STRIP/BLOOD GLUCOSE: CPT

## 2020-01-05 PROCEDURE — 99233 SBSQ HOSP IP/OBS HIGH 50: CPT | Performed by: NURSE PRACTITIONER

## 2020-01-05 PROCEDURE — 71045 X-RAY EXAM CHEST 1 VIEW: CPT

## 2020-01-05 PROCEDURE — 87500 VANOMYCIN DNA AMP PROBE: CPT

## 2020-01-05 PROCEDURE — 6370000000 HC RX 637 (ALT 250 FOR IP): Performed by: OPHTHALMOLOGY

## 2020-01-05 PROCEDURE — 31622 DX BRONCHOSCOPE/WASH: CPT | Performed by: INTERNAL MEDICINE

## 2020-01-05 PROCEDURE — 80048 BASIC METABOLIC PNL TOTAL CA: CPT

## 2020-01-05 PROCEDURE — 94770 HC ETCO2 MONITOR DAILY: CPT

## 2020-01-05 PROCEDURE — 31500 INSERT EMERGENCY AIRWAY: CPT | Performed by: INTERNAL MEDICINE

## 2020-01-05 PROCEDURE — 6360000002 HC RX W HCPCS: Performed by: INTERNAL MEDICINE

## 2020-01-05 PROCEDURE — 99291 CRITICAL CARE FIRST HOUR: CPT | Performed by: INTERNAL MEDICINE

## 2020-01-05 PROCEDURE — 83880 ASSAY OF NATRIURETIC PEPTIDE: CPT

## 2020-01-05 PROCEDURE — 93005 ELECTROCARDIOGRAM TRACING: CPT | Performed by: NURSE PRACTITIONER

## 2020-01-05 PROCEDURE — 2700000000 HC OXYGEN THERAPY PER DAY

## 2020-01-05 PROCEDURE — 85027 COMPLETE CBC AUTOMATED: CPT

## 2020-01-05 PROCEDURE — 02HV33Z INSERTION OF INFUSION DEVICE INTO SUPERIOR VENA CAVA, PERCUTANEOUS APPROACH: ICD-10-PCS | Performed by: INTERNAL MEDICINE

## 2020-01-05 PROCEDURE — 84100 ASSAY OF PHOSPHORUS: CPT

## 2020-01-05 RX ORDER — FENTANYL CITRATE 50 UG/ML
INJECTION, SOLUTION INTRAMUSCULAR; INTRAVENOUS
Status: COMPLETED | OUTPATIENT
Start: 2020-01-05 | End: 2020-01-05

## 2020-01-05 RX ORDER — MIDAZOLAM HYDROCHLORIDE 1 MG/ML
INJECTION INTRAMUSCULAR; INTRAVENOUS
Status: COMPLETED | OUTPATIENT
Start: 2020-01-05 | End: 2020-01-05

## 2020-01-05 RX ORDER — MIDAZOLAM HYDROCHLORIDE 1 MG/ML
INJECTION INTRAMUSCULAR; INTRAVENOUS
Status: DISPENSED
Start: 2020-01-05 | End: 2020-01-05

## 2020-01-05 RX ORDER — PANTOPRAZOLE SODIUM 40 MG/10ML
40 INJECTION, POWDER, LYOPHILIZED, FOR SOLUTION INTRAVENOUS 2 TIMES DAILY
Status: DISCONTINUED | OUTPATIENT
Start: 2020-01-05 | End: 2020-01-07

## 2020-01-05 RX ORDER — PANTOPRAZOLE SODIUM 40 MG/1
40 TABLET, DELAYED RELEASE ORAL
Status: DISCONTINUED | OUTPATIENT
Start: 2020-01-05 | End: 2020-01-05

## 2020-01-05 RX ORDER — POTASSIUM CHLORIDE 750 MG/1
10 TABLET, FILM COATED, EXTENDED RELEASE ORAL DAILY
Status: DISCONTINUED | OUTPATIENT
Start: 2020-01-05 | End: 2020-01-05 | Stop reason: ALTCHOICE

## 2020-01-05 RX ORDER — LEVOTHYROXINE SODIUM 0.05 MG/1
50 TABLET ORAL DAILY
Status: DISCONTINUED | OUTPATIENT
Start: 2020-01-05 | End: 2020-01-13 | Stop reason: HOSPADM

## 2020-01-05 RX ORDER — PROPAFENONE HYDROCHLORIDE 150 MG/1
150 TABLET, FILM COATED ORAL EVERY 8 HOURS
Status: DISCONTINUED | OUTPATIENT
Start: 2020-01-05 | End: 2020-01-10

## 2020-01-05 RX ORDER — FENTANYL CITRATE 50 UG/ML
INJECTION, SOLUTION INTRAMUSCULAR; INTRAVENOUS
Status: DISPENSED
Start: 2020-01-05 | End: 2020-01-05

## 2020-01-05 RX ORDER — ATENOLOL 25 MG/1
12.5 TABLET ORAL 2 TIMES DAILY
Status: DISCONTINUED | OUTPATIENT
Start: 2020-01-05 | End: 2020-01-06

## 2020-01-05 RX ORDER — IPRATROPIUM BROMIDE AND ALBUTEROL SULFATE 2.5; .5 MG/3ML; MG/3ML
1 SOLUTION RESPIRATORY (INHALATION)
Status: DISCONTINUED | OUTPATIENT
Start: 2020-01-05 | End: 2020-01-06

## 2020-01-05 RX ORDER — SODIUM CHLORIDE 9 MG/ML
INJECTION, SOLUTION INTRAVENOUS CONTINUOUS
Status: DISCONTINUED | OUTPATIENT
Start: 2020-01-05 | End: 2020-01-07

## 2020-01-05 RX ORDER — ASPIRIN 81 MG/1
81 TABLET, CHEWABLE ORAL DAILY
Status: DISCONTINUED | OUTPATIENT
Start: 2020-01-05 | End: 2020-01-06

## 2020-01-05 RX ORDER — PROMETHAZINE HYDROCHLORIDE 25 MG/1
25 TABLET ORAL EVERY 6 HOURS PRN
Status: DISCONTINUED | OUTPATIENT
Start: 2020-01-05 | End: 2020-01-13 | Stop reason: HOSPADM

## 2020-01-05 RX ORDER — PROPOFOL 10 MG/ML
10 INJECTION, EMULSION INTRAVENOUS
Status: DISCONTINUED | OUTPATIENT
Start: 2020-01-05 | End: 2020-01-06

## 2020-01-05 RX ORDER — PROPOFOL 10 MG/ML
INJECTION, EMULSION INTRAVENOUS
Status: COMPLETED
Start: 2020-01-05 | End: 2020-01-05

## 2020-01-05 RX ADMIN — FENTANYL CITRATE 50 MCG: 50 INJECTION INTRAMUSCULAR; INTRAVENOUS at 09:27

## 2020-01-05 RX ADMIN — MIDAZOLAM 2 MG: 1 INJECTION INTRAMUSCULAR; INTRAVENOUS at 09:25

## 2020-01-05 RX ADMIN — ASPIRIN 81 MG 81 MG: 81 TABLET ORAL at 12:50

## 2020-01-05 RX ADMIN — MIDAZOLAM 2 MG: 1 INJECTION INTRAMUSCULAR; INTRAVENOUS at 09:31

## 2020-01-05 RX ADMIN — POTASSIUM BICARBONATE 10 MEQ: 391 TABLET, EFFERVESCENT ORAL at 12:50

## 2020-01-05 RX ADMIN — PIPERACILLIN AND TAZOBACTAM 3.38 G: 3; .375 INJECTION, POWDER, FOR SOLUTION INTRAVENOUS at 22:12

## 2020-01-05 RX ADMIN — SENNOSIDES 8.6 MG: 8.6 TABLET ORAL at 14:23

## 2020-01-05 RX ADMIN — Medication 5 MCG/MIN: at 10:12

## 2020-01-05 RX ADMIN — MIDAZOLAM 2 MG: 1 INJECTION INTRAMUSCULAR; INTRAVENOUS at 09:30

## 2020-01-05 RX ADMIN — PROPOFOL 20 MCG/KG/MIN: 10 INJECTION, EMULSION INTRAVENOUS at 09:27

## 2020-01-05 RX ADMIN — PANTOPRAZOLE SODIUM 40 MG: 40 TABLET, DELAYED RELEASE ORAL at 06:48

## 2020-01-05 RX ADMIN — PANTOPRAZOLE SODIUM 40 MG: 40 INJECTION, POWDER, FOR SOLUTION INTRAVENOUS at 21:00

## 2020-01-05 RX ADMIN — PROPOFOL 25 MCG/KG/MIN: 10 INJECTION, EMULSION INTRAVENOUS at 22:05

## 2020-01-05 RX ADMIN — VITAMIN D, TAB 1000IU (100/BT) 1000 UNITS: 25 TAB at 14:23

## 2020-01-05 RX ADMIN — APIXABAN 2.5 MG: 2.5 TABLET, FILM COATED ORAL at 12:51

## 2020-01-05 RX ADMIN — FENTANYL CITRATE 50 MCG: 50 INJECTION INTRAMUSCULAR; INTRAVENOUS at 09:38

## 2020-01-05 RX ADMIN — PROPAFENONE HYDROCHLORIDE 150 MG: 150 TABLET, FILM COATED ORAL at 18:01

## 2020-01-05 RX ADMIN — SODIUM CHLORIDE: 9 INJECTION, SOLUTION INTRAVENOUS at 18:01

## 2020-01-05 RX ADMIN — PIPERACILLIN AND TAZOBACTAM 3.38 G: 3; .375 INJECTION, POWDER, FOR SOLUTION INTRAVENOUS at 14:24

## 2020-01-05 RX ADMIN — IPRATROPIUM BROMIDE AND ALBUTEROL SULFATE 1 AMPULE: .5; 3 SOLUTION RESPIRATORY (INHALATION) at 12:50

## 2020-01-05 RX ADMIN — APIXABAN 2.5 MG: 2.5 TABLET, FILM COATED ORAL at 21:00

## 2020-01-05 RX ADMIN — PROPAFENONE HYDROCHLORIDE 150 MG: 150 TABLET, FILM COATED ORAL at 01:33

## 2020-01-05 RX ADMIN — FENTANYL CITRATE 25 MCG/HR: 50 INJECTION, SOLUTION INTRAMUSCULAR; INTRAVENOUS at 12:46

## 2020-01-05 RX ADMIN — SENNOSIDES 8.6 MG: 8.6 TABLET ORAL at 21:00

## 2020-01-05 RX ADMIN — IPRATROPIUM BROMIDE AND ALBUTEROL SULFATE 1 AMPULE: .5; 3 SOLUTION RESPIRATORY (INHALATION) at 17:47

## 2020-01-05 RX ADMIN — LEVOTHYROXINE SODIUM 50 MCG: 50 TABLET ORAL at 12:51

## 2020-01-05 RX ADMIN — SODIUM CHLORIDE: 9 INJECTION, SOLUTION INTRAVENOUS at 06:49

## 2020-01-05 RX ADMIN — ROSUVASTATIN CALCIUM 5 MG: 10 TABLET, FILM COATED ORAL at 21:00

## 2020-01-05 RX ADMIN — IPRATROPIUM BROMIDE AND ALBUTEROL SULFATE 1 AMPULE: .5; 3 SOLUTION RESPIRATORY (INHALATION) at 22:28

## 2020-01-05 ASSESSMENT — PULMONARY FUNCTION TESTS
PIF_VALUE: 25
PIF_VALUE: 23
PIF_VALUE: 23
PIF_VALUE: 21

## 2020-01-05 NOTE — H&P
NEGATIVE   BILIRUBINUR NEGATIVE   UROBILINOGEN 0.2   KETUA NEGATIVE     Echo     Summary   Normal left ventricle size and systolic function. Ejection fraction was   estimated at 60 %. There were no regional left ventricular wall motion   abnormalities and wall thickness was within normal limits. The left atrium is Severely dilated. Moderately enlarged right atrium size. Moderate tricuspid regurgitation visualized. Right ventricular systolic pressure measures 40mmhg. Moderate-to-severe mitral regurgitation with centrally directed jet. Mild to moderate mitral stenosis. Mitral valve area by doppler pressure half-time 1.6 cm2 . The peak mitral valve velocity was 2.5 m/s, peak gradient was 20 mmHg, and   the mean gradient was 9 mmHg. Mild-to-moderate aortic regurgitation is noted.     Cultures    Procalcitonin  Lab Results   Component Value Date    PROCAL 0.24 01/05/2020     Radiology    CXR    CT Scans    (See actual reports for details)    Assessment   Acute hypoxic respiratory failure on MV  Bilateral pneumonia vs fluid overload   Cachexia  Generalized weakness  JONATHAN on Chronic kidney disease stage 3  Metabolic acidosis  Hyponatremia  Recommendations     Patient was intubated and started on MV 01/05/20  Bronchoscopy was performed and showed purulent secretions in the central airway   Will start the patient on IV antibiotics  Obtain CT chest  Repeat 2D ECHO to rule out CHF  DVT prophylaxis with Eliquis-renally adjusted   GI prophylaxis with PPI     CCT was 45 mins excluding procedure time     Electronically signed by     Jamel Ordaz MD on 1/5/2020 at 1:06 PM

## 2020-01-05 NOTE — PROGRESS NOTES
Pt received in 4d01 from [address] [place] [address] . jessica rn for primary . Dr Dahlia Davis in to see pt . Pt awakens readily to verbal stimuli . Pt does bilateral hand graps to command. Wiggles toes bilateral to command . Pt on NRB mask upon arrival .   0920 called pts daughter nura and informed of pt condtition and plans for bronch and intubation  - pt in agreement  And expresses understanding of procedure . informed consent signed per pt .   4241 endo cart here- Dr Aleta Crouch at bedside . Preparing for intubation . RT at bedside assisting 1335 successfully intubated with # 7.0 oral ett per Dr Aleta Crouch - positive color change , bilateral breath sounds . ett pulled back to 21 cm after tube placement visualized during bronch .    1000 peripheral access gone - Dr Aleta Crouch here to place central line   1010 right subclavian central line placed by Dr Aleta Crouch without difficulty

## 2020-01-05 NOTE — PROGRESS NOTES
Gastroenterology  Progress Note    2020 8:32 AM  Subjective:   Admit Date: 2020    Interval History: She is BPB today, no chest pain , discuss care with hospitalist to evaluated respiratory status ,. Stable from GI point, need to eat , hs has no nausea, no vomiting, no abdominal pain , poor oral intake , discuss the EGD finding with patient , plan colonoscopy on hold for out patient, continue PPI   Diet: DIET CARDIAC; Dietary Nutrition Supplements: Standard High Calorie Oral Supplement  Dietary Nutrition Supplements: Frozen Oral Supplement    Medications:   Scheduled Meds:   piperacillin-tazobactam  3.375 g Intravenous Q8H    phosphorus replacement protocol   Other RX Placeholder    pantoprazole  40 mg Oral BID AC    aspirin  81 mg Oral Daily    levothyroxine  50 mcg Oral Daily    rosuvastatin  5 mg Oral QPM    amLODIPine  5 mg Oral Daily    propafenone  150 mg Oral Q8H    potassium chloride  10 mEq Oral Daily    Vitamin D  1,000 Units Oral Daily    atenolol  12.5 mg Oral BID    apixaban  2.5 mg Oral BID    senna  1 tablet Oral BID     Continuous Infusions:   sodium chloride 75 mL/hr at 20 0649     CBC:   Recent Labs     20  1248 20  0748   WBC 8.2 7.3   HGB 12.9 11.3*    283     BMP:    Recent Labs     20  0450 20  0748 20  0524   * 130* 124*   K 3.9 4.0 4.5   CL 98 98 93*   CO2 23 19* 20*   BUN 21 14 14   CREATININE 1.5* 1.1 1.2   GLUCOSE 73 76 138*     Hepatic: No results for input(s): AST, ALT, ALB, BILITOT, ALKPHOS in the last 72 hours. INR:   Recent Labs     20  1248   INR 1.59*     Xray:   [place] [address]                                 OPERATIVE REPORT     PATIENT NAME: Samuel Card                     :        1940  MED REC NO:   [id number]                           ROOM:       0010  ACCOUNT NO:   [de-identified] tolerated. 3.  Elective colonoscopy to be done as an outpatient.           Florence Mei M.D. Objective:   Vitals: BP (!) 143/75   Pulse 90   Temp 97.7 °F (36.5 °C) (Oral)   Resp 16   Ht 5' (1.524 m)   Wt 101 lb 9.6 oz (46.1 kg)   SpO2 91%   BMI 19.84 kg/m²     Intake/Output Summary (Last 24 hours) at 1/5/2020 8610  Last data filed at 1/4/2020 2046  Gross per 24 hour   Intake 250 ml   Output --   Net 250 ml     General appearance: alert and cooperative with exam  Lungs: clear to auscultation bilaterally  Heart: regular rate and rhythm, S1, S2 normal, no murmur, click, rub or gallop  Abdomen: soft, non-tender; bowel sounds normal; no masses,  no organomegaly  Extremities: extremities normal, atraumatic, no cyanosis or edema    Assessment and Plan:   1. Erosive ulcerative gastris and erosive duodenitis, continue PPI, avoid NSIAD's   2.  Elective colonoscopy as out patient       Follow up in GI Clinic after discharge in 2 week(s)    Patient Active Problem List:     AF (atrial fibrillation) (HCC)     Anticoagulated on Coumadin     HTN (hypertension)     Nausea     Physical deconditioning     Loss of appetite     Chronic renal failure, stage 3 (moderate) (HCC)     Elevated troponin      Taras Koenig MD

## 2020-01-05 NOTE — PROCEDURES
Bronchoscopy Procedure Note      Pre-op Diagnosis: Acute respiratory failure, multilobar pneumonia     Post-op Diagnosis: Diffuse mucopurulent secretion of the right and left bronchial segments     Anesthesia: Propofol drip     Procedure: Flexible fiberoptic bronchoscopy     Estimated Blood Loss: Minimal    Complications: No immediate complications       Consent to Procedure  The risks, benefits, complications, treatment options and expected outcomes were discussed with the family. The possibilities of reaction to medication, pulmonary aspiration, perforation of a viscus, bleeding, failure to diagnose a condition and creating a complication requiring transfusion or operation were discussed with the family who agreed to proceed with the procedure. Description of Procedure  Procedure verified as Flexible Fiberoptic Bronchoscopy was done by bedside. A Time Out was held and the above information confirmed. The patient was monitored with non-invasive blood pressure monitoring, pulse oximetry, and continuoius ECG. The bronchoscope was then passed into the right nares, larynx, vocal cords, and trachea. After careful inspection of the tracheal, the bronchoscope was sequentially passed into all segments of the left and right endobronchial trees to the second and/or third divisions.     Endobronchial findings    Trachea  Normal mucosa  Jojo  Normal mucosa    Right Main Stem Bronchus  Normal mucosa, purulent secretions   Right Upper Lobe Bronchi Normal mucosa, purulent secretions   Right Middle Lobe Bronchi  Normal mucosa  Right Lower Lobe Bronchi (including the Superior segment)  purulent secretions     Left Main Stem Bronchus Normal Mucosa  Left Upper Lobe Bronchus, Upper Division purulent secretions   Left Upper Lobe Bronchus, Lingula  Normal mucosa  Left Lower Lobe Bronchus (including the Superior segment)  purulent secretions     Samples:     Bronchial washings: Culture       Leila Adkins MD  Pulmonary/CCM

## 2020-01-05 NOTE — PROGRESS NOTES
Hospitalist Progress Note    Patient:  [facility]      Unit/Bed:8A-10/010-A    YOB: 1940    MRN: [id number]       Acct: [de-identified]     PCP: VANDANA Puri CNP    Date of Admission: 1/2/2020    Assessment/Plan:    1. Acute Hypoxic Respiratory Failure--on Eliquis; check CXR STAT  2. Pneumonia, not POA, possible aspiration-- add Zosyn 1/5  3. Weakness--TSH at 0.962; appreciate cardiology and GI input; BP on lower side~2nd to too many medications? 4. Falls at home--orthostatics (-) but BP on lower side; PT/OT to eval  5. Minimal troponin elevation--per cardiology; echo with EF 60%; needs F/U per Dr Junaid King as cardiology feels mild elevation 2nd to falls  6. Unintentional weight loss--per GI; EGD 1/3; CT abd/pelvis (-); planning outpt colonoscopy  7. JONATHAN on CKD Stage 3--better with gentle hydration; stopped Bumex and ARB  8. GERD, erosive ulcerative gastritis and erosive duodenitis per EGD 1/3/2010--per GI; started on Protonix BID  9. Hyponatremia-- monitor  10. Hypophosphatemia--replace per protocol  11. Recent UTI with E coli (POA)--Cipro~stopped as pt asymptomatic and recheck U/A is clean  12. Essential HTN, uncontrolled--Norvasc, BB; monitor  13. PAF--Eliquis, ASA, BB, Rhythmol; maintain tele  14. Moderate TVR, moderate to severe MR, mild to moderate MS, mild to moderate AR  15. Severe Malnutrition--per dietician  16. Hx breast cancer  17. Chronic Diastolic HF--EF 80% per echo 1/3/2020  18.  Physical deconditioning--PT/OT; SW to see    Expected discharge date:     Disposition:    [x] Home       [] TCU       [] Rehab       [] Psych       [] SNF       [] [place]       [] Other-    Chief Complaint: weakness    Hospital Course:  Presented with a few month hx of weakness, loosing weight, nausea; states she has lost 20# in past few months; she fell yesterday 2nd to being weak; states she is on a lot of BP meds; denies pain; relates to dizziness, lightheadedness    1/4-->had EGD~GERD, erosive ulcerative gastritis and erosive duodenitis; VSS; planning outpt colonoscopy    1/5-->pt in respiratory distress; sats noted of 60% in the night; pt tachypneic and using accessory muscles to breath; I ordered STAT CXR and spoke with Dr Isac Martinez from ICU who came to evaluate and agreed needed transferred to ICU for intubation and bronch    Subjective (past 24 hours): c/o shortness of breath; denies chest pain; awake and alert x 4    Medications:  Reviewed    Infusion Medications     Scheduled Medications    phosphorus replacement protocol   Other RX Placeholder    pantoprazole  40 mg Oral BID AC    aspirin  81 mg Oral Daily    levothyroxine  50 mcg Oral Daily    rosuvastatin  5 mg Oral QPM    amLODIPine  5 mg Oral Daily    propafenone  150 mg Oral Q8H    potassium chloride  10 mEq Oral Daily    Vitamin D  1,000 Units Oral Daily    atenolol  12.5 mg Oral BID    apixaban  2.5 mg Oral BID    senna  1 tablet Oral BID     PRN Meds: promethazine, acetaminophen, potassium chloride, guaiFENesin-dextromethorphan, ondansetron      Intake/Output Summary (Last 24 hours) at 1/5/2020 0606  Last data filed at 1/4/2020 2046  Gross per 24 hour   Intake 250 ml   Output --   Net 250 ml       Diet:  DIET CARDIAC; Dietary Nutrition Supplements: Standard High Calorie Oral Supplement  Dietary Nutrition Supplements: Frozen Oral Supplement    Exam:  BP (!) 143/75   Pulse 90   Temp 97.7 °F (36.5 °C) (Oral)   Resp 16   Ht 5' (1.524 m)   Wt 104 lb 1.6 oz (47.2 kg)   SpO2 91%   BMI 20.33 kg/m²     General appearance: in respiratory distress, appears stated age and cooperative. HEENT: Pupils equal, round, and reactive to light. Conjunctivae/corneas clear. Neck: Supple, with full range of motion. No jugular venous distention. Trachea midline. Respiratory:  Tachypneic, using accessory muscles; (+) rhonchi throughout.   Cardiovascular: Regular rate and rhythm with normal S1/S2 without murmurs, rubs or gallops. Abdomen: Soft, non-tender, non-distended with normal bowel sounds. Musculoskeletal: passive and active ROM x 4 extremities. Skin: Skin color, texture, turgor normal.    Neurologic:  Neurovascularly intact without any focal sensory/motor deficits. Cranial nerves: II-XII intact, grossly non-focal.  Psychiatric: Alert and oriented x 4  Capillary Refill: Brisk,< 3 seconds   Peripheral Pulses: +2 palpable, equal bilaterally       Labs:   Recent Labs     01/02/20  1248 01/04/20  0748   WBC 8.2 7.3   HGB 12.9 11.3*   HCT 40.1 36.1*    283     Recent Labs     01/03/20  0450 01/04/20  0748 01/05/20  0524   * 130* 124*   K 3.9 4.0 4.5   CL 98 98 93*   CO2 23 19* 20*   BUN 21 14 14   CREATININE 1.5* 1.1 1.2   CALCIUM 8.9 8.8 9.1   PHOS 2.1* 2.3* 2.2*     No results for input(s): AST, ALT, BILIDIR, BILITOT, ALKPHOS in the last 72 hours. Recent Labs     01/02/20  1248   INR 1.59*     No results for input(s): Antonio Shall in the last 72 hours. Microbiology:    Influenza A/B (-)  Urine cx from 12/23 with E coli    Urinalysis:      Lab Results   Component Value Date    NITRU NEGATIVE 01/03/2020    WBCUA 25-50W/CLUMPS 12/23/2019    BACTERIA MODERATE 12/23/2019    RBCUA NONE 12/23/2019    BLOODU NEGATIVE 01/03/2020    SPECGRAV 1.020 12/23/2019    GLUCOSEU NEGATIVE 01/03/2020       Radiology:  CT ABDOMEN PELVIS WO CONTRAST Additional Contrast? Oral   Final Result   1. Bibasilar atelectasis/infiltrate. 2. No acute intra-abdominal or intrapelvic findings. Final report electronically signed by Dr. Tyesha Walton on 1/3/2020 10:13 AM      XR CHEST STANDARD (2 VW)   Final Result      No acute findings. **This report has been created using voice recognition software. It may contain minor errors which are inherent in voice recognition technology. **      Final report electronically signed by Dr. Alan Montenegro on 1/2/2020 1:03 PM          DVT prophylaxis: [] Lovenox [] SCDs                                 [] SQ Heparin                                 [] Encourage ambulation           [x] Already on Anticoagulation     Code Status: Prior    Tele:   [x] yes AF with HR 92             [] no    Active Hospital Problems    Diagnosis Date Noted    Weakness [R53.1] 01/04/2020    [facility] [E43] 01/04/2020     Class: Acute    Nausea [R11.0] 01/02/2020    Physical deconditioning [R53.81] 01/02/2020    Loss of appetite [R63.0] 01/02/2020    Chronic renal failure, stage 3 (moderate) ([facility] [address] [N18.3] 01/02/2020    Elevated troponin [R79.89] 01/02/2020       Electronically signed by VANDANA Shelby CNP on 1/5/2020 at 6:06 AM

## 2020-01-05 NOTE — PROCEDURES
PROCEDURE: Right SC Central line Placement     INDICATION: Venous access    CONTRAINDICATIONS: None    PROCEDURE : Zayda Archibald MD      CONSENT: Consent obtained from patient family      A time-out was completed verifying correct patient, procedure, site, positioning, and equipment. PROCEDURE SUMMARY:    The patient was placed in supine position. The Right chest region were prepped and draped in sterile fashion using chlorohexidine scrub. The Right SC vein was accessed with 18 G needle using landmark and non-pulsatile venous blood was withdrawn. Using modified Saldinger technique a guide wire was then advanced into the lumen of the vein. A 2 mm incision was then made at needle-wire entry site. While maintaining control of the guide wire, the needle was removed from the site. A dilator was advanced and then removed over the wire successfully dilating the vein. A 16 cm line, which was flushed prior to insertion, was then advanced into place and guide wire was removed. All the ports were flushed successfully. The line was secured in place with simple sutures. A sterile occlusive dressing was applied over catheter hub. CXR was ordered. No evidence of pneumothorax was appreciated.  Line position was confirmed in the SVC           COMPLICATIONS: None    ESTIMATED BLOOD LOSS: 5 ml    Electronically signed by Zayda Archibald MD

## 2020-01-06 ENCOUNTER — APPOINTMENT (OUTPATIENT)
Dept: GENERAL RADIOLOGY | Age: 80
DRG: 377 | End: 2020-01-06
Payer: MEDICARE

## 2020-01-06 PROCEDURE — 6370000000 HC RX 637 (ALT 250 FOR IP): Performed by: INTERNAL MEDICINE

## 2020-01-06 PROCEDURE — 71045 X-RAY EXAM CHEST 1 VIEW: CPT

## 2020-01-06 PROCEDURE — 99291 CRITICAL CARE FIRST HOUR: CPT | Performed by: INTERNAL MEDICINE

## 2020-01-06 PROCEDURE — 6360000002 HC RX W HCPCS: Performed by: NURSE PRACTITIONER

## 2020-01-06 PROCEDURE — C9113 INJ PANTOPRAZOLE SODIUM, VIA: HCPCS | Performed by: INTERNAL MEDICINE

## 2020-01-06 PROCEDURE — 2580000003 HC RX 258: Performed by: NURSE PRACTITIONER

## 2020-01-06 PROCEDURE — 97530 THERAPEUTIC ACTIVITIES: CPT

## 2020-01-06 PROCEDURE — 97110 THERAPEUTIC EXERCISES: CPT

## 2020-01-06 PROCEDURE — 6370000000 HC RX 637 (ALT 250 FOR IP): Performed by: OPHTHALMOLOGY

## 2020-01-06 PROCEDURE — 94003 VENT MGMT INPAT SUBQ DAY: CPT

## 2020-01-06 PROCEDURE — 6360000002 HC RX W HCPCS: Performed by: INTERNAL MEDICINE

## 2020-01-06 PROCEDURE — 94640 AIRWAY INHALATION TREATMENT: CPT

## 2020-01-06 PROCEDURE — 93005 ELECTROCARDIOGRAM TRACING: CPT | Performed by: PHYSICIAN ASSISTANT

## 2020-01-06 PROCEDURE — 2709999900 HC NON-CHARGEABLE SUPPLY

## 2020-01-06 PROCEDURE — 94761 N-INVAS EAR/PLS OXIMETRY MLT: CPT

## 2020-01-06 PROCEDURE — 99292 CRITICAL CARE ADDL 30 MIN: CPT | Performed by: INTERNAL MEDICINE

## 2020-01-06 PROCEDURE — 1200000003 HC TELEMETRY R&B

## 2020-01-06 PROCEDURE — 2700000000 HC OXYGEN THERAPY PER DAY

## 2020-01-06 PROCEDURE — 6370000000 HC RX 637 (ALT 250 FOR IP): Performed by: NURSE PRACTITIONER

## 2020-01-06 PROCEDURE — 94770 HC ETCO2 MONITOR DAILY: CPT

## 2020-01-06 PROCEDURE — 2580000003 HC RX 258: Performed by: INTERNAL MEDICINE

## 2020-01-06 RX ORDER — LISINOPRIL 5 MG/1
5 TABLET ORAL DAILY
Status: DISCONTINUED | OUTPATIENT
Start: 2020-01-06 | End: 2020-01-10

## 2020-01-06 RX ORDER — IPRATROPIUM BROMIDE AND ALBUTEROL SULFATE 2.5; .5 MG/3ML; MG/3ML
1 SOLUTION RESPIRATORY (INHALATION)
Status: DISCONTINUED | OUTPATIENT
Start: 2020-01-07 | End: 2020-01-06

## 2020-01-06 RX ORDER — POTASSIUM CHLORIDE 20 MEQ/1
20 TABLET, EXTENDED RELEASE ORAL
Status: DISCONTINUED | OUTPATIENT
Start: 2020-01-06 | End: 2020-01-13 | Stop reason: HOSPADM

## 2020-01-06 RX ORDER — IPRATROPIUM BROMIDE AND ALBUTEROL SULFATE 2.5; .5 MG/3ML; MG/3ML
1 SOLUTION RESPIRATORY (INHALATION) EVERY 4 HOURS PRN
Status: DISCONTINUED | OUTPATIENT
Start: 2020-01-06 | End: 2020-01-07

## 2020-01-06 RX ORDER — FUROSEMIDE 10 MG/ML
40 INJECTION INTRAMUSCULAR; INTRAVENOUS DAILY
Status: DISCONTINUED | OUTPATIENT
Start: 2020-01-06 | End: 2020-01-13 | Stop reason: HOSPADM

## 2020-01-06 RX ADMIN — LISINOPRIL 5 MG: 5 TABLET ORAL at 12:09

## 2020-01-06 RX ADMIN — FUROSEMIDE 40 MG: 40 INJECTION, SOLUTION INTRAMUSCULAR; INTRAVENOUS at 12:04

## 2020-01-06 RX ADMIN — Medication 500000 UNITS: at 12:16

## 2020-01-06 RX ADMIN — ROSUVASTATIN CALCIUM 5 MG: 10 TABLET, FILM COATED ORAL at 23:49

## 2020-01-06 RX ADMIN — PROPAFENONE HYDROCHLORIDE 150 MG: 150 TABLET, FILM COATED ORAL at 12:04

## 2020-01-06 RX ADMIN — SODIUM CHLORIDE: 9 INJECTION, SOLUTION INTRAVENOUS at 15:16

## 2020-01-06 RX ADMIN — PANTOPRAZOLE SODIUM 40 MG: 40 INJECTION, POWDER, FOR SOLUTION INTRAVENOUS at 23:50

## 2020-01-06 RX ADMIN — SENNOSIDES 8.6 MG: 8.6 TABLET ORAL at 10:45

## 2020-01-06 RX ADMIN — IPRATROPIUM BROMIDE AND ALBUTEROL SULFATE 1 AMPULE: .5; 3 SOLUTION RESPIRATORY (INHALATION) at 08:03

## 2020-01-06 RX ADMIN — PIPERACILLIN AND TAZOBACTAM 3.38 G: 3; .375 INJECTION, POWDER, FOR SOLUTION INTRAVENOUS at 05:58

## 2020-01-06 RX ADMIN — SENNOSIDES 8.6 MG: 8.6 TABLET ORAL at 23:49

## 2020-01-06 RX ADMIN — ACETAMINOPHEN 650 MG: 325 TABLET ORAL at 22:25

## 2020-01-06 RX ADMIN — APIXABAN 2.5 MG: 2.5 TABLET, FILM COATED ORAL at 10:45

## 2020-01-06 RX ADMIN — POTASSIUM BICARBONATE 20 MEQ: 782 TABLET, EFFERVESCENT ORAL at 17:20

## 2020-01-06 RX ADMIN — VITAMIN D, TAB 1000IU (100/BT) 1000 UNITS: 25 TAB at 10:45

## 2020-01-06 RX ADMIN — PROPOFOL 30 MCG/KG/MIN: 10 INJECTION, EMULSION INTRAVENOUS at 05:58

## 2020-01-06 RX ADMIN — PANTOPRAZOLE SODIUM 40 MG: 40 INJECTION, POWDER, FOR SOLUTION INTRAVENOUS at 10:45

## 2020-01-06 RX ADMIN — PIPERACILLIN AND TAZOBACTAM 3.38 G: 3; .375 INJECTION, POWDER, FOR SOLUTION INTRAVENOUS at 22:25

## 2020-01-06 RX ADMIN — LEVOTHYROXINE SODIUM 50 MCG: 50 TABLET ORAL at 05:59

## 2020-01-06 RX ADMIN — FENTANYL CITRATE 25 MCG/HR: 50 INJECTION, SOLUTION INTRAMUSCULAR; INTRAVENOUS at 04:56

## 2020-01-06 RX ADMIN — Medication 500000 UNITS: at 17:22

## 2020-01-06 RX ADMIN — PROPAFENONE HYDROCHLORIDE 150 MG: 150 TABLET, FILM COATED ORAL at 00:39

## 2020-01-06 RX ADMIN — PIPERACILLIN AND TAZOBACTAM 3.38 G: 3; .375 INJECTION, POWDER, FOR SOLUTION INTRAVENOUS at 15:16

## 2020-01-06 RX ADMIN — APIXABAN 2.5 MG: 2.5 TABLET, FILM COATED ORAL at 23:49

## 2020-01-06 RX ADMIN — PROPAFENONE HYDROCHLORIDE 150 MG: 150 TABLET, FILM COATED ORAL at 23:48

## 2020-01-06 ASSESSMENT — PULMONARY FUNCTION TESTS
PIF_VALUE: 24
PIF_VALUE: 25
PIF_VALUE: 24

## 2020-01-06 ASSESSMENT — PAIN SCALES - GENERAL
PAINLEVEL_OUTOF10: 3
PAINLEVEL_OUTOF10: 2
PAINLEVEL_OUTOF10: 2

## 2020-01-06 ASSESSMENT — PAIN DESCRIPTION - DESCRIPTORS
DESCRIPTORS: ACHING;PRESSURE
DESCRIPTORS: ACHING;PRESSURE

## 2020-01-06 ASSESSMENT — PAIN DESCRIPTION - PAIN TYPE
TYPE: ACUTE PAIN
TYPE: ACUTE PAIN

## 2020-01-06 ASSESSMENT — PAIN DESCRIPTION - LOCATION
LOCATION: RIB CAGE
LOCATION: RIB CAGE

## 2020-01-06 ASSESSMENT — PAIN DESCRIPTION - PROGRESSION
CLINICAL_PROGRESSION: NOT CHANGED
CLINICAL_PROGRESSION: GRADUALLY IMPROVING

## 2020-01-06 ASSESSMENT — PAIN DESCRIPTION - FREQUENCY
FREQUENCY: INTERMITTENT
FREQUENCY: INTERMITTENT

## 2020-01-06 ASSESSMENT — PAIN DESCRIPTION - ORIENTATION
ORIENTATION: RIGHT;LEFT
ORIENTATION: RIGHT;LEFT

## 2020-01-06 ASSESSMENT — PAIN DESCRIPTION - ONSET
ONSET: ON-GOING
ONSET: AWAKENED FROM SLEEP

## 2020-01-06 ASSESSMENT — PAIN - FUNCTIONAL ASSESSMENT
PAIN_FUNCTIONAL_ASSESSMENT: ACTIVITIES ARE NOT PREVENTED
PAIN_FUNCTIONAL_ASSESSMENT: ACTIVITIES ARE NOT PREVENTED

## 2020-01-06 NOTE — PROGRESS NOTES
6847-2725 kcal/day (30-35 kcal/kg - 47.2 kg on 1/4)  · Estimated Daily Protein (g): 47-56 g/day (1-1.2 g/kg - 47.2 kg on 1/4)- monitoring renal status  Nutrition Diagnosis:   · Problem: Severe malnutrition, In context of acute illness or injury  · Etiology: related to Insufficient energy/nutrient consumption     Signs and symptoms:  as evidenced by Diet history of poor intake, Moderate loss of subcutaneous fat, Moderate muscle loss    Objective Information:  · Nutrition-Focused Physical Findings: Pt intubated, diprivan off. Pt NPO & per RN plan extubating this morning & Dr as already ordered [place] diet. Pt had reported prior to intubation no chewing or swallowing difficultes. Pt is malnourished, 2 bms recorded 1/4  · Wound Type: None  · Current Nutrition Therapies:   · Oral Diet intake: NPO(Dr has ordered [place] diet when extubated)  · Oral Nutrition Supplement (ONS) Orders: Standard High Calorie Oral Supplement, Frozen Oral Supplement(Ensure Enlive (likes Strawberry best) & Magic Cup TID (Berry & Vanilla))  · ONS intake: (restarting diet when extubated)  · Anthropometric Measures:  · Ht: 5' (152.4 cm)   · Current Body Wt: 104 lb 1.6 oz (47.2 kg)(1/4; no edema noted)  · Admission Body Wt: 95 lb 14.4 oz (43.5 kg)(1/3; no edema noted)  · Usual Body Wt: 108 lb (49 kg)(per pt report.)  · % Weight Change:  ,  no actual weights per EMR to assess weight changes  · Ideal Body Wt: 100 lb (45.4 kg),     · BMI Classification: BMI 18.5 - 24.9 Normal Weight(20.4)    Nutrition Interventions:   (Diet per Dr & SLP.  Restarting ONS when diet is [place] more)  Continued Inpatient Monitoring, Education Initiated, Coordination of Care(Encouraged po intake of meals at best effort)    Nutrition Evaluation:   · Evaluation: No progress toward goals   · Goals: Pt will consume 75% or more of meals during LOS    · Monitoring: Nutrition Progression, Meal Intake, Supplement Intake, Diet Tolerance, Weight, Pertinent Labs, Nausea or Vomiting, Constipation, Monitor Bowel Function      Electronically signed by Rossy Carrillo RD, LD on 1/6/20 at 10:35 AM    Contact Number: [phone]

## 2020-01-06 NOTE — PROGRESS NOTES
[facility] THERAPY MISSED TREATMENT NOTE  [facility] ICU 4D      Date: 2020  Patient Name: Mateus Canales        MRN: [id number]    : 1940  ([de-identified] y.o.)    REASON FOR MISSED TREATMENT: Patient not medically appropriate for skilled ST on this date d/t intubation/ventilation. ST will continue to monitor and complete evaluation as medically appropriate and/or available. Yaya Conner MA., OHE-EUO

## 2020-01-06 NOTE — PLAN OF CARE
Problem: Falls - Risk of:  Goal: Will remain free from falls  Description  Will remain free from falls  1/6/2020 1452 by Janeth Hogue RN  Outcome: Met This Shift     Problem: Falls - Risk of:  Goal: Absence of physical injury  Description  Absence of physical injury  1/6/2020 1452 by Janeth Hogue RN  Outcome: Met This Shift     Problem: Safety:  Goal: Free from accidental physical injury  Description  Free from accidental physical injury  1/6/2020 1452 by Janeth Hogue RN  Outcome: Met This Shift     Problem: Daily Care:  Goal: Daily care needs are met  Description  Daily care needs are met  1/6/2020 1452 by Janeth Hogue RN  Outcome: Met This Shift     Problem: Skin Integrity:  Goal: Skin integrity will stabilize  Description  Skin integrity will stabilize  1/6/2020 1452 by Janeth Hogue RN  Outcome: Met This Shift     Problem: Cardiac:  Goal: Hemodynamic stability will improve  Description  Hemodynamic stability will improve  1/6/2020 1452 by Janeth Hogue RN  Outcome: Met This Shift     Problem: Respiratory:  Goal: Ability to maintain a clear airway will improve  Description  Ability to maintain a clear airway will improve  1/6/2020 1452 by Janeth Hogue RN  Outcome: Met This Shift     Problem: Infection:  Goal: Will remain free from infection  Description  Will remain free from infection  1/6/2020 1452 by Janeth Hogue RN  Outcome: Ongoing     Problem: Discharge Planning:  Goal: Patients continuum of care needs are met  Description  Patients continuum of care needs are met  1/6/2020 1452 by Mike Alvarez RN  Outcome: Ongoing     Problem:  Bowel Function - Altered:  Goal: Bowel elimination is within specified parameters  Description  Bowel elimination is within specified parameters  1/6/2020 1452 by Janeth Hogue RN  Outcome: Ongoing     Problem: Nutrition  Goal: Optimal nutrition therapy  1/6/2020 1452 by Janeth Hogue RN  Outcome: Ongoing     Problem: Pain:  Goal: Patient's pain/discomfort is manageable  Description  Patient's pain/discomfort is manageable  1/6/2020 0058 by Lico Peterson  Outcome: Ongoing  Note:   Patient at risk for increased pain due to ongoing chronic health issues. Pt unable to verbalize the 0-10 pain scale or utilize appropriately. Pt pain scaled off of RASS assessment and CPOT (intubated) scores. Patient is unable to state pain goal, so goal set at 4/10. Interventions in place to manage pain level include pain medication per [facility] and non pharmacological options.            Problem: Pain:  Goal: Patient's pain/discomfort is manageable  Description  Patient's pain/discomfort is manageable  1/6/2020 1452 by Kala Hernandez RN  Outcome: Completed

## 2020-01-06 NOTE — FLOWSHEET NOTE
[address]  PHYSICAL THERAPY MISSED TREATMENT NOTE  ACUTE CARE    Date: 2020  Patient Name: Keisha Van        MRN: [id number]   : 1940  ([de-identified] y.o.)  Gender: female   Referring Practitioner: Dr. Edna Gavin  Diagnosis: elevated troponin         REASON FOR MISSED TREATMENT:  Hold treatment per nursing request. Pt on spontaneous breathing trial with possible extubation today, will check back tomorrow.

## 2020-01-06 NOTE — PROGRESS NOTES
[address] ICU 4D  Occupational Therapy  Daily Note  Time:    Time In: 1734  Time Out: 1425  Timed Code Treatment Minutes: 30 Minutes  Minutes: 30          Date: 2020  Patient Name: Kaylah Golden,   Gender: female      Room: -001-A  MRN: [id number]  : 1940  ([de-identified] y.o.)  Referring Practitioner: Dr. Bryant Brooke  Diagnosis: elevated troponin  Additional Pertinent Hx: [de-identified] y.o. female who presents to the emergency department on 20 for evaluation of weakness, 2 falls, and decreased appetite. Patient's family reports she has been sick since  with flu-like symptoms which has gradually worsened since onset. She reports experiencing nausea, diarrhea for 1-2 days (resolved 5 days ago), abdominal pain with ingestion of food, increased fatigue, and weight loss. She denies vomiting and fever since onset. Patient reports being evaluated by her PCP 3 days ago due to not being able to eat food, and she was prescribed medication to help increased her appetite. Patient states this has not help, and has since noted her vision becoming blurry. Patient reports extending her arms to place items in the top of the closet when she \"blacked out\" and fell to the ground. She denies hitting her head. Patient's family reports she was unable to ambulate following the fall    Restrictions/Precautions:  Restrictions/Precautions: Fall Risk, General Precautions    SUBJECTIVE: Initially attempt pt continued to be intubated with expectation of extubation soon. 2nd attemtp this pm with Brianna Moore RN approving session and OOB as pts tolerates. Pt was transferred to ICU from [address] [place] [address] on 19 with respiratory distress. Pt had a bronch completed and remained intubated after. PAIN: did not stat/10: Pt stating she was feeling better and had no complaints of pain     COGNITION: Slow Processing, Decreased Problem Solving, Decreased Safety Awareness and Difficulty Following Commands    ADL:   Grooming: Stand By Assistance.   use 4: Pt to be educated on bilateral UE strengthening HEp with use of handout requiring min cues for ease of ADL asks   Long term goals  Time Frame for Long term goals : nto est d/t ELOS     Following session, patient left in safe position with all fall risk precautions in place.

## 2020-01-06 NOTE — PLAN OF CARE
(intubated) scores. Patient is unable to state pain goal, so goal set at 4/10. Interventions in place to manage pain level include pain medication per [facility] and non pharmacological options. Problem: Skin Integrity:  Goal: Skin integrity will stabilize  Description  Skin integrity will stabilize  Outcome: Met This Shift  Note:   Pt at increased risk of decreased skin integrity due to patient intubated, on bedrest, and unable to reposition self. Pt turned q2 hours and prn. Skin assessments performed q4 hours and prn. Pt remains free from new skin breakdown at this time. Pt admitted to unit with multiple bruises. Will continue to monitor     Problem: Discharge Planning:  Goal: Patients continuum of care needs are met  Description  Patients continuum of care needs are met  Outcome: Met This Shift  Note:   Pt not a candidate for discharge at this time. Pt remains in ICU on mechanical ventilator support. Pt to transition to a lower level of care when appropriate. Discussed plan of care with pt, pt unable to verbalize understanding/agreeance at this time due to ventilator support. Case management on board. Problem: Bowel Function - Altered:  Goal: Bowel elimination is within specified parameters  Description  Bowel elimination is within specified parameters  Outcome: Met This Shift  Note:   Pt with small bowel movement this shift. Bowel sounds active. Problem: Nutrition  Goal: Optimal nutrition therapy  Outcome: Not Met This Shift  Note:   Pt NPO this shift as pt intubated without enteral feeding orders. Pt on IV fluids and receiving some fats from propofol for sedation     Problem: Cardiac:  Goal: Hemodynamic stability will improve  Description  Hemodynamic stability will improve  Outcome: Ongoing  Note:   Pt weaned of Levo this shift. Goal map>65. Pt remains in controlled afib. Pulses palpable to all extremities and pt free from edema.       Problem: Respiratory:  Goal: Ability to maintain a clear airway will improve  Description  Ability to maintain a clear airway will improve  Outcome: Ongoing

## 2020-01-06 NOTE — PROGRESS NOTES
Assessment and Plan:        1. Acute respiratory failure: Associated with hypoxemia. Patient intubated on 1/5/2020. Associated with a combination of pulmonary edema and multi lobar pneumonia. Pulmonary edema related to valvular heart disease. Patient doing well with spontaneous breathing trial.  On CPAP 6, pressure support 10, FiO2 30%: Oxygen saturation is 95% with a spontaneous respiratory rate of 14 and a spontaneous tidal volume of 320. Proceed with extubation. 2. Multi lobar pneumonia: Status post bronchoscopy 1/5/2019. Was not noted on admission. Zosyn initiated 1/5/2019. Gram-positive rods noted on Gram stain. Corynebacterium versus Listeria are the most common organisms associated with the respiratory tract. Bacillus species and clostridia species are much less commonly associated with respiratory tract infections. 3. Pulmonary edema: Secondary to valvular heart disease. Diurese. 4. Valvular heart disease: Mitral valve includes moderate to severe mitral regurgitation with mild to moderate mitral stenosis. Aortic valve is mild to moderate with regurgitation. Patient will need ACE inhibitor therapy to decrease the afterload and improve forward flow. Clearly she will require diuretics. Rufina Griffin 5. Atrial fibrillation: Chronically on Eliquis. 6. Hypotension: Primarily secondary to sedation and positive pressure ventilation. Weaning pressors as tolerated. 7. Hypertension: Patient will require adjustment of her medications given the Jehovah's witness of valvular heart disease. 8. Chronic renal failure: Stage III disease. Assess response to ACE inhibitor therapy and diuretics. 9. Failure to thrive: Depleted status secondary to severe malnutrition worsened by progressive valvular dysfunction. 10. Severe protein calorie malnutrition: Secondary to poor oral intake over the past 3 to 4 months. Primarily due to symptomatic gastritis. 11. Gastritis: Diagnosed on EGD completed 1/3/2020.   Treat with proton pump Inhalation Q4H [place]    apixaban  2.5 mg Oral BID    aspirin  81 mg Oral Daily    atenolol  12.5 mg Oral BID    levothyroxine  50 mcg Oral Daily    propafenone  150 mg Oral Q8H    potassium bicarb-citric acid  10 mEq Oral Daily    pantoprazole  40 mg Intravenous BID    phosphorus replacement protocol   Other RX Placeholder    rosuvastatin  5 mg Oral QPM    amLODIPine  5 mg Oral Daily    Vitamin D  1,000 Units Oral Daily    senna  1 tablet Oral BID       Vital Signs: T: 99.1: P: 79 RR: 17 B/P: 117/71: FiO2: 30: O2 Sat:95: I/O: 1693/1195  CAM-ICU:   RASS -2. General:   Frail appearing elderly white female. HEENT:  normocephalic and atraumatic. No scleral icterus. PERR  Neck: supple. No Thyromegaly. Lungs: Soft rhonchi centrally. No retractions  Cardiac: Irregular rhythm. No JVD. Abdomen: soft. Nontender. Extremities:  No clubbing, cyanosis, or edema x 4. Vasculature: capillary refill < 3 seconds. Palpable dorsalis pedis pulses. Skin:  warm and dry. Psych: Arousable on mechanical ventilator. Able to follow simple requests. Lymph:  No supraclavicular adenopathy. Neurologic:  No focal deficit. No seizures. Data: (All radiographs, tracings, PFTs, and imaging are personally viewed and interpreted unless otherwise noted).  Chest x-ray is consistent with pulmonary edema and shows cardiomegaly.  Echocardiogram report 1/3/2020: Ejection fraction 60%. Moderately enlarged right atrium. Moderate tricuspid regurgitation. Pulmonary artery pressure estimated at 40 mmHg. Moderate to severe mitral regurgitation. Mild to moderate mitral stenosis. Mild to moderate aortic regurgitation.  Bronchoscopy report 1/5/2020: Purulent secretions noted from the left lower lobe, left upper lobe, right upper lobe, right mainstem bronchus, and the right lower lobe.  Bronchoscopy culture collected 1/5/2020: Positive for gram-positive rods.  EGD report 1/3/2020: Ulcerative erosive gastritis at the antrum. Erosive duodenitis.  Cardiac catheterization report 9/5/2019: Mitral valve prolapse. Dominant right coronary artery. Patent left main. Patent circumflex. Nonobstructive disease to the mid LAD of 20%. 70% narrowing of the mid moderate size diagonal artery.  Sodium 124, potassium 4.5, chloride 93, bicarb 20, BUN 14, creatinine 1.2, glucose 138. Procalcitonin 0.24.  proBNP 14,807. Troponin 0 0.015. White blood cell count 10.8, hemoglobin 11.2, platelets 835.  Telemetry shows atrial flutter/atrial fibrillation. .    CC time 80 minutes. Time was discontiguous. Electronically signed by Bijan Barlow M.D.

## 2020-01-07 ENCOUNTER — APPOINTMENT (OUTPATIENT)
Dept: CT IMAGING | Age: 80
DRG: 377 | End: 2020-01-07
Payer: MEDICARE

## 2020-01-07 ENCOUNTER — APPOINTMENT (OUTPATIENT)
Dept: GENERAL RADIOLOGY | Age: 80
DRG: 377 | End: 2020-01-07
Payer: MEDICARE

## 2020-01-07 ENCOUNTER — APPOINTMENT (OUTPATIENT)
Dept: ULTRASOUND IMAGING | Age: 80
DRG: 377 | End: 2020-01-07
Payer: MEDICARE

## 2020-01-07 LAB
ANION GAP SERPL CALCULATED.3IONS-SCNC: 11 MEQ/L (ref 8–16)
APTT: 30.3 SECONDS (ref 22–38)
BASOPHILS # BLD: 0.1 %
BASOPHILS ABSOLUTE: 0 THOU/MM3 (ref 0–0.1)
BUN BLDV-MCNC: 14 MG/DL (ref 7–22)
CALCIUM SERPL-MCNC: 8.6 MG/DL (ref 8.5–10.5)
CHLORIDE BLD-SCNC: 93 MEQ/L (ref 98–111)
CO2: 24 MEQ/L (ref 23–33)
CREAT SERPL-MCNC: 1.1 MG/DL (ref 0.4–1.2)
EKG ATRIAL RATE: 241 BPM
EKG Q-T INTERVAL: 386 MS
EKG QRS DURATION: 102 MS
EKG QTC CALCULATION (BAZETT): 453 MS
EKG R AXIS: -23 DEGREES
EKG T AXIS: -13 DEGREES
EKG VENTRICULAR RATE: 83 BPM
EOSINOPHIL # BLD: 0.5 %
EOSINOPHILS ABSOLUTE: 0 THOU/MM3 (ref 0–0.4)
ERYTHROCYTE [DISTWIDTH] IN BLOOD BY AUTOMATED COUNT: 15.1 % (ref 11.5–14.5)
ERYTHROCYTE [DISTWIDTH] IN BLOOD BY AUTOMATED COUNT: 15.3 % (ref 11.5–14.5)
ERYTHROCYTE [DISTWIDTH] IN BLOOD BY AUTOMATED COUNT: 54.3 FL (ref 35–45)
ERYTHROCYTE [DISTWIDTH] IN BLOOD BY AUTOMATED COUNT: 54.7 FL (ref 35–45)
FILM ARRAY ADENOVIRUS: NOT DETECTED
FILM ARRAY BORDETELLA PERTUSSIS: NOT DETECTED
FILM ARRAY CHLAMYDOPHILIA PNEUMONIAE: NOT DETECTED
FILM ARRAY CORONAVIRUS 229E: NOT DETECTED
FILM ARRAY CORONAVIRUS HKU1: NOT DETECTED
FILM ARRAY CORONAVIRUS NL63: NOT DETECTED
FILM ARRAY CORONAVIRUS OC43: NOT DETECTED
FILM ARRAY INFLUENZA A VIRUS 09H1: NORMAL
FILM ARRAY INFLUENZA A VIRUS H1: NORMAL
FILM ARRAY INFLUENZA A VIRUS H3: NORMAL
FILM ARRAY INFLUENZA A VIRUS: NOT DETECTED
FILM ARRAY INFLUENZA B: NOT DETECTED
FILM ARRAY METAPNEUMOVIRUS: NOT DETECTED
FILM ARRAY MYCOPLASMA PNEUMONIAE: NOT DETECTED
FILM ARRAY PARAINFLUENZA VIRUS 1: NOT DETECTED
FILM ARRAY PARAINFLUENZA VIRUS 2: NOT DETECTED
FILM ARRAY PARAINFLUENZA VIRUS 3: NOT DETECTED
FILM ARRAY PARAINFLUENZA VIRUS 4: NOT DETECTED
FILM ARRAY RESPIRATORY SYNCITIAL VIRUS: NOT DETECTED
FILM ARRAY RHINOVIRUS/ENTEROVIRUS: NOT DETECTED
GFR SERPL CREATININE-BSD FRML MDRD: 48 ML/MIN/1.73M2
GLUCOSE BLD-MCNC: 112 MG/DL (ref 70–108)
HCT VFR BLD CALC: 32.2 % (ref 37–47)
HCT VFR BLD CALC: 33.3 % (ref 37–47)
HEMOGLOBIN: 10.5 GM/DL (ref 12–16)
HEMOGLOBIN: 11.1 GM/DL (ref 12–16)
IMMATURE GRANS (ABS): 0.05 THOU/MM3 (ref 0–0.07)
IMMATURE GRANULOCYTES: 0.7 %
LYMPHOCYTES # BLD: 6.7 %
LYMPHOCYTES ABSOLUTE: 0.5 THOU/MM3 (ref 1–4.8)
MCH RBC QN AUTO: 32 PG (ref 26–33)
MCH RBC QN AUTO: 32.7 PG (ref 26–33)
MCHC RBC AUTO-ENTMCNC: 32.6 GM/DL (ref 32.2–35.5)
MCHC RBC AUTO-ENTMCNC: 33.3 GM/DL (ref 32.2–35.5)
MCV RBC AUTO: 98.2 FL (ref 81–99)
MCV RBC AUTO: 98.2 FL (ref 81–99)
MONOCYTES # BLD: 9.5 %
MONOCYTES ABSOLUTE: 0.7 THOU/MM3 (ref 0.4–1.3)
MRSA SCREEN: NORMAL
NUCLEATED RED BLOOD CELLS: 0 /100 WBC
PLATELET # BLD: 267 THOU/MM3 (ref 130–400)
PLATELET # BLD: 279 THOU/MM3 (ref 130–400)
PMV BLD AUTO: 9.3 FL (ref 9.4–12.4)
PMV BLD AUTO: 9.3 FL (ref 9.4–12.4)
POTASSIUM SERPL-SCNC: 3.8 MEQ/L (ref 3.5–5.2)
PRO-BNP: 9662 PG/ML (ref 0–1800)
PROCALCITONIN: 0.53 NG/ML (ref 0.01–0.09)
RBC # BLD: 3.28 MILL/MM3 (ref 4.2–5.4)
RBC # BLD: 3.39 MILL/MM3 (ref 4.2–5.4)
SEG NEUTROPHILS: 82.5 %
SEGMENTED NEUTROPHILS ABSOLUTE COUNT: 6.1 THOU/MM3 (ref 1.8–7.7)
SODIUM BLD-SCNC: 128 MEQ/L (ref 135–145)
URINE CULTURE, ROUTINE: NORMAL
VRE CULTURE: NORMAL
WBC # BLD: 6.9 THOU/MM3 (ref 4.8–10.8)
WBC # BLD: 7.4 THOU/MM3 (ref 4.8–10.8)

## 2020-01-07 PROCEDURE — 6370000000 HC RX 637 (ALT 250 FOR IP): Performed by: NURSE PRACTITIONER

## 2020-01-07 PROCEDURE — 6360000002 HC RX W HCPCS: Performed by: NURSE PRACTITIONER

## 2020-01-07 PROCEDURE — 85025 COMPLETE CBC W/AUTO DIFF WBC: CPT

## 2020-01-07 PROCEDURE — 1200000003 HC TELEMETRY R&B

## 2020-01-07 PROCEDURE — 2580000003 HC RX 258: Performed by: NURSE PRACTITIONER

## 2020-01-07 PROCEDURE — 6370000000 HC RX 637 (ALT 250 FOR IP): Performed by: INTERNAL MEDICINE

## 2020-01-07 PROCEDURE — 80048 BASIC METABOLIC PNL TOTAL CA: CPT

## 2020-01-07 PROCEDURE — 97162 PT EVAL MOD COMPLEX 30 MIN: CPT

## 2020-01-07 PROCEDURE — C9113 INJ PANTOPRAZOLE SODIUM, VIA: HCPCS | Performed by: INTERNAL MEDICINE

## 2020-01-07 PROCEDURE — 99233 SBSQ HOSP IP/OBS HIGH 50: CPT | Performed by: INTERNAL MEDICINE

## 2020-01-07 PROCEDURE — 87633 RESP VIRUS 12-25 TARGETS: CPT

## 2020-01-07 PROCEDURE — 76604 US EXAM CHEST: CPT

## 2020-01-07 PROCEDURE — 36415 COLL VENOUS BLD VENIPUNCTURE: CPT

## 2020-01-07 PROCEDURE — 6360000002 HC RX W HCPCS: Performed by: INTERNAL MEDICINE

## 2020-01-07 PROCEDURE — 94640 AIRWAY INHALATION TREATMENT: CPT

## 2020-01-07 PROCEDURE — 94761 N-INVAS EAR/PLS OXIMETRY MLT: CPT

## 2020-01-07 PROCEDURE — 83880 ASSAY OF NATRIURETIC PEPTIDE: CPT

## 2020-01-07 PROCEDURE — 71046 X-RAY EXAM CHEST 2 VIEWS: CPT

## 2020-01-07 PROCEDURE — 6370000000 HC RX 637 (ALT 250 FOR IP): Performed by: PHYSICIAN ASSISTANT

## 2020-01-07 PROCEDURE — 99233 SBSQ HOSP IP/OBS HIGH 50: CPT | Performed by: NURSE PRACTITIONER

## 2020-01-07 PROCEDURE — 6370000000 HC RX 637 (ALT 250 FOR IP): Performed by: OPHTHALMOLOGY

## 2020-01-07 PROCEDURE — 84145 PROCALCITONIN (PCT): CPT

## 2020-01-07 PROCEDURE — 71275 CT ANGIOGRAPHY CHEST: CPT

## 2020-01-07 PROCEDURE — 85027 COMPLETE CBC AUTOMATED: CPT

## 2020-01-07 PROCEDURE — 85730 THROMBOPLASTIN TIME PARTIAL: CPT

## 2020-01-07 PROCEDURE — 92610 EVALUATE SWALLOWING FUNCTION: CPT

## 2020-01-07 PROCEDURE — 97110 THERAPEUTIC EXERCISES: CPT

## 2020-01-07 PROCEDURE — APPSS30 APP SPLIT SHARED TIME 16-30 MINUTES: Performed by: NURSE PRACTITIONER

## 2020-01-07 PROCEDURE — 2700000000 HC OXYGEN THERAPY PER DAY

## 2020-01-07 PROCEDURE — 6360000004 HC RX CONTRAST MEDICATION: Performed by: INTERNAL MEDICINE

## 2020-01-07 PROCEDURE — 93010 ELECTROCARDIOGRAM REPORT: CPT | Performed by: INTERNAL MEDICINE

## 2020-01-07 PROCEDURE — 2709999900 HC NON-CHARGEABLE SUPPLY

## 2020-01-07 PROCEDURE — 97530 THERAPEUTIC ACTIVITIES: CPT

## 2020-01-07 RX ORDER — HEPARIN SODIUM 1000 [USP'U]/ML
80 INJECTION, SOLUTION INTRAVENOUS; SUBCUTANEOUS PRN
Status: DISCONTINUED | OUTPATIENT
Start: 2020-01-07 | End: 2020-01-11

## 2020-01-07 RX ORDER — PANTOPRAZOLE SODIUM 40 MG/1
40 TABLET, DELAYED RELEASE ORAL 2 TIMES DAILY
Status: DISCONTINUED | OUTPATIENT
Start: 2020-01-07 | End: 2020-01-09 | Stop reason: DRUGHIGH

## 2020-01-07 RX ORDER — HEPARIN SODIUM 1000 [USP'U]/ML
80 INJECTION, SOLUTION INTRAVENOUS; SUBCUTANEOUS ONCE
Status: COMPLETED | OUTPATIENT
Start: 2020-01-07 | End: 2020-01-07

## 2020-01-07 RX ORDER — HEPARIN SODIUM 1000 [USP'U]/ML
40 INJECTION, SOLUTION INTRAVENOUS; SUBCUTANEOUS PRN
Status: DISCONTINUED | OUTPATIENT
Start: 2020-01-07 | End: 2020-01-11

## 2020-01-07 RX ORDER — IPRATROPIUM BROMIDE AND ALBUTEROL SULFATE 2.5; .5 MG/3ML; MG/3ML
1 SOLUTION RESPIRATORY (INHALATION)
Status: DISCONTINUED | OUTPATIENT
Start: 2020-01-07 | End: 2020-01-10

## 2020-01-07 RX ORDER — HEPARIN SODIUM 10000 [USP'U]/100ML
18 INJECTION, SOLUTION INTRAVENOUS CONTINUOUS
Status: DISCONTINUED | OUTPATIENT
Start: 2020-01-07 | End: 2020-01-11

## 2020-01-07 RX ADMIN — IPRATROPIUM BROMIDE AND ALBUTEROL SULFATE 1 AMPULE: .5; 3 SOLUTION RESPIRATORY (INHALATION) at 01:01

## 2020-01-07 RX ADMIN — IPRATROPIUM BROMIDE AND ALBUTEROL SULFATE 1 AMPULE: .5; 3 SOLUTION RESPIRATORY (INHALATION) at 17:22

## 2020-01-07 RX ADMIN — Medication 500000 UNITS: at 19:05

## 2020-01-07 RX ADMIN — IOPAMIDOL 80 ML: 755 INJECTION, SOLUTION INTRAVENOUS at 07:34

## 2020-01-07 RX ADMIN — Medication 500000 UNITS: at 12:52

## 2020-01-07 RX ADMIN — Medication 500000 UNITS: at 21:37

## 2020-01-07 RX ADMIN — HEPARIN SODIUM 3790 UNITS: 1000 INJECTION INTRAVENOUS; SUBCUTANEOUS at 15:34

## 2020-01-07 RX ADMIN — ACETAMINOPHEN 650 MG: 325 TABLET ORAL at 17:41

## 2020-01-07 RX ADMIN — PROPAFENONE HYDROCHLORIDE 150 MG: 150 TABLET, FILM COATED ORAL at 03:31

## 2020-01-07 RX ADMIN — HEPARIN SODIUM AND DEXTROSE 18 UNITS/KG/HR: 10000; 5 INJECTION INTRAVENOUS at 15:34

## 2020-01-07 RX ADMIN — PANTOPRAZOLE SODIUM 40 MG: 40 TABLET, DELAYED RELEASE ORAL at 21:37

## 2020-01-07 RX ADMIN — LEVOTHYROXINE SODIUM 50 MCG: 50 TABLET ORAL at 05:42

## 2020-01-07 RX ADMIN — PIPERACILLIN AND TAZOBACTAM 3.38 G: 3; .375 INJECTION, POWDER, FOR SOLUTION INTRAVENOUS at 05:38

## 2020-01-07 RX ADMIN — PHENOL 1 SPRAY: 1.4 SPRAY ORAL at 21:52

## 2020-01-07 RX ADMIN — Medication 500000 UNITS: at 08:35

## 2020-01-07 RX ADMIN — IPRATROPIUM BROMIDE AND ALBUTEROL SULFATE 1 AMPULE: .5; 3 SOLUTION RESPIRATORY (INHALATION) at 06:29

## 2020-01-07 RX ADMIN — ROSUVASTATIN CALCIUM 5 MG: 10 TABLET, FILM COATED ORAL at 21:37

## 2020-01-07 RX ADMIN — PIPERACILLIN AND TAZOBACTAM 3.38 G: 3; .375 INJECTION, POWDER, FOR SOLUTION INTRAVENOUS at 21:37

## 2020-01-07 RX ADMIN — PIPERACILLIN AND TAZOBACTAM 3.38 G: 3; .375 INJECTION, POWDER, FOR SOLUTION INTRAVENOUS at 15:11

## 2020-01-07 RX ADMIN — POTASSIUM CHLORIDE 20 MEQ: 1500 TABLET, EXTENDED RELEASE ORAL at 08:35

## 2020-01-07 RX ADMIN — PANTOPRAZOLE SODIUM 40 MG: 40 INJECTION, POWDER, FOR SOLUTION INTRAVENOUS at 08:28

## 2020-01-07 RX ADMIN — APIXABAN 2.5 MG: 2.5 TABLET, FILM COATED ORAL at 08:35

## 2020-01-07 RX ADMIN — IPRATROPIUM BROMIDE AND ALBUTEROL SULFATE 1 AMPULE: .5; 3 SOLUTION RESPIRATORY (INHALATION) at 10:37

## 2020-01-07 RX ADMIN — FUROSEMIDE 40 MG: 40 INJECTION, SOLUTION INTRAMUSCULAR; INTRAVENOUS at 08:28

## 2020-01-07 RX ADMIN — SENNOSIDES 8.6 MG: 8.6 TABLET ORAL at 08:35

## 2020-01-07 RX ADMIN — VITAMIN D, TAB 1000IU (100/BT) 1000 UNITS: 25 TAB at 10:05

## 2020-01-07 RX ADMIN — PROPAFENONE HYDROCHLORIDE 150 MG: 150 TABLET, FILM COATED ORAL at 10:05

## 2020-01-07 RX ADMIN — PROPAFENONE HYDROCHLORIDE 150 MG: 150 TABLET, FILM COATED ORAL at 18:39

## 2020-01-07 RX ADMIN — PHENOL 1 SPRAY: 1.4 SPRAY ORAL at 12:04

## 2020-01-07 ASSESSMENT — PAIN DESCRIPTION - LOCATION
LOCATION: RIB CAGE
LOCATION: RIB CAGE

## 2020-01-07 ASSESSMENT — PAIN DESCRIPTION - PROGRESSION
CLINICAL_PROGRESSION: GRADUALLY IMPROVING
CLINICAL_PROGRESSION: NOT CHANGED

## 2020-01-07 ASSESSMENT — PAIN DESCRIPTION - PAIN TYPE
TYPE: ACUTE PAIN
TYPE: ACUTE PAIN

## 2020-01-07 ASSESSMENT — PAIN DESCRIPTION - FREQUENCY
FREQUENCY: INTERMITTENT
FREQUENCY: INTERMITTENT

## 2020-01-07 ASSESSMENT — PAIN DESCRIPTION - DIRECTION: RADIATING_TOWARDS: NO

## 2020-01-07 ASSESSMENT — PAIN DESCRIPTION - ORIENTATION
ORIENTATION: RIGHT;LEFT
ORIENTATION: RIGHT;LEFT

## 2020-01-07 ASSESSMENT — PAIN SCALES - GENERAL
PAINLEVEL_OUTOF10: 3
PAINLEVEL_OUTOF10: 2
PAINLEVEL_OUTOF10: 1
PAINLEVEL_OUTOF10: 2
PAINLEVEL_OUTOF10: 0

## 2020-01-07 ASSESSMENT — PAIN DESCRIPTION - DESCRIPTORS
DESCRIPTORS: ACHING;DISCOMFORT
DESCRIPTORS: ACHING

## 2020-01-07 ASSESSMENT — PAIN DESCRIPTION - ONSET
ONSET: ON-GOING
ONSET: ON-GOING

## 2020-01-07 NOTE — CONSULTS
[facility]      Patient - Veronica Allen   MRN -  [id number]   [facility]   - 1940      Date of Admission -  2020 11:50 AM  Date of evaluation -  2020  Room - --A   Hospital Day - [address], [place] Primary Care Physician - Janine Rosas, APRN - CNP     Problem List      Active Hospital Problems    Diagnosis Date Noted    Acute respiratory failure with hypoxia ([address] [J96.01]     Pneumonia of both lungs due to infectious organism [J18.9]     Hyponatremia [Y12.7]     Metabolic acidosis [X05.4]     Weakness [R53.1] 2020    Severe malnutrition ([address] [E43] 2020     Class: Acute    Nausea [R11.0] 2020    Physical deconditioning [R53.81] 2020    Loss of appetite [R63.0] 2020    Chronic renal failure, stage 3 (moderate) ([address] [N18.3] 2020    Elevated troponin [R79.89] 2020     Reason for Consult    For management of pleural effusion. HPI   History Obtained From: Patient and electronic medical record. Veronica Allen is an 77-year-old white female lifetime non-smoker. Patient has a history of left breast cancer diagnosed in  and treated with lumpectomy. She receives chronic anticoagulation with Eliquis for atrial fibrillation. She has a history of hypothyroidism, osteopenia, hypertension, and chronic kidney disease. Patient presented to the emergency room and was admitted on 2020. She presented with complaints of poor oral intake with significant weight loss over the past 3 months. She has a very poor appetite and feels nauseated when eating. As her nutritional status depleted, she became more and more fatigued. Patient was seen by gastroenterology and underwent EGD on 1/3/2020. This demonstrated ulcerative erosive gastritis and duodenitis. Patient deteriorated on 2020 associated with respiratory failure.   She developed hypoxemia and required intubation. She underwent bronchoscopy 1/5/2020. This demonstrated purulent pneumonia bilaterally. She was started on Zosyn 1/5/2020. Pulmonary medicine was consulted for further management of pleural effusion. 1/3 CT Abd/pelvis: Bibasilar atelectasis/infiltrate; No acute intra-abdominal or intrapelvic findings  1/3 Echo with EF 60%; mod to severe mitral regurg; mild to mod mitral stenosis; mild to mod aortic regurg  1/3 EGD: GERD, erosive ulcerative gastritis and erosive duodenitis   1/5 Intubated  1/5 Bronch w/washings sent  1/5 CVC Right subclavian  1/5 CXR: Scattered opacities are seen in the right lung which are new since the previous exam which was performed less than 4 hours prior. This could be related to developing infiltrates.  Aspiration could have a similar appearance; Cardiomegaly with mild pulmonary vascular congestion  1/6 Extubated moved to  31  PMHx   Past Medical History      Diagnosis Date    Arthritis     [facility]     CAD (coronary artery disease)     Cancer ([facility]     BREAST    Chronic kidney disease     HTN (hypertension) 4/13/2016    Hypertension     Nausea & vomiting     Osteopenia     Pneumonia of both lungs due to infectious organism     Rheumatic fever     as a child    Sinus infection     Thyroid disease       Past Surgical History        Procedure Laterality Date    BREAST LUMPECTOMY Left 2011    BRONCHOSCOPY N/A 1/5/2020    BRONCHOSCOPY performed by Mickie Bhakta MD at [address] [place] [address] Left 2009   [address]  2010    DILATION AND CURETTAGE OF UTERUS      X2; SEVERAL YEARS AGO    JOINT REPLACEMENT Right 2010    KNEE    TONSILLECTOMY      UPPER GASTROINTESTINAL ENDOSCOPY N/A 1/3/2020    EGD ESOPHAGOGASTRODUODENOSCOPY performed by Eldred Goodell, MD at [facility]     Meds    Current Medications    nystatin  5 mL Oral 4x Daily    lisinopril  5 mg Oral Daily    furosemide  40 mg Intravenous Daily    potassium chloride  20 mEq Oral Daily with breakfast    piperacillin-tazobactam  3.375 g Intravenous Q8H    apixaban  2.5 mg Oral BID    levothyroxine  50 mcg Oral Daily    propafenone  150 mg Oral Q8H    pantoprazole  40 mg Intravenous BID    phosphorus replacement protocol   Other RX Placeholder    rosuvastatin  5 mg Oral QPM    Vitamin D  1,000 Units Oral Daily    senna  1 tablet Oral BID     phenol, ipratropium-albuterol, promethazine, acetaminophen, potassium chloride, ondansetron  IV Drips/Infusions    Home Medications  Medications Prior to Admission: ondansetron (ZOFRAN) 4 MG tablet, Take 4 mg by mouth every 8 hours as needed for Nausea or Vomiting  [] promethazine (PHENERGAN) 25 MG tablet, Take 1 tablet by mouth every 6 hours as needed for Nausea WARNING:  May cause drowsiness. May impair ability to operate vehicles or machinery. Do not use in combination with alcohol.   [] ciprofloxacin (CIPRO) 500 MG tablet, Take 1 tablet by mouth 2 times daily for 7 days  atenolol (TENORMIN) 25 MG tablet, Take 12.5 mg by mouth 2 times daily   apixaban (ELIQUIS) 2.5 MG TABS tablet, Take by mouth 2 times daily  Calcium Carbonate (CALCIUM 600 PO), Take by mouth 2 times daily  Probiotic Product (PROBIOTIC-10 PO), Take by mouth daily  vitamin D (CHOLECALCIFEROL) 1000 UNIT TABS tablet, Take 1,000 Units by mouth daily  losartan (COZAAR) 50 MG tablet, 50 mg 2 times daily  potassium chloride (MICRO-K) 10 MEQ extended release capsule, Take 10 mEq by mouth daily  propafenone (RYTHMOL) 150 MG tablet, Take 150 mg by mouth every 8 hours  amLODIPine (NORVASC) 5 MG tablet, Take 5 mg by mouth daily Indications: High Blood Pressure  acetaminophen (TYLENOL ARTHRITIS PAIN) 650 MG extended release tablet, Take 650 mg by mouth as needed   bumetanide (BUMEX) 2 MG tablet, Take 2 mg by mouth daily Indications: Treatment with Diuretic Therapy   rosuvastatin (CRESTOR) 5 MG tablet, Take 5 mg by mouth every evening Indications: Blood NITRU, GLUCOSEU, BILIRUBINUR, UROBILINOGEN, KETUA, LABCAST, LABCASTTY, AMORPHOS in the last 72 hours. Invalid input(s): CRYSTALS. PFTs               Sleep studies   N/A    Cultures    VRE (-)  MRSA (-)  Urine Culture: no growth prelim  Rapid Influenza A/B (-)    1/5/20  Respiratory Culture Normal gabbie- preliminary      Gram Stain Result Moderate segmented neutrophils observed. Rare epithelial cells observed. Many small gram positive bacilli. EKG     Echocardiogram   01/03/2020   ECHOCARDIOGRAM COMPLETE 2D W DOPPLER W COLOR. Conclusions      Summary   Normal left ventricle size and systolic function. Ejection fraction was   estimated at 60 %. There were no regional left ventricular wall motion   abnormalities and wall thickness was within normal limits. The left atrium is Severely dilated. Moderately enlarged right atrium size. Moderate tricuspid regurgitation visualized. Right ventricular systolic pressure measures 40mmhg. Moderate-to-severe mitral regurgitation with centrally directed jet. Mild to moderate mitral stenosis. Mitral valve area by doppler pressure half-time 1.6 cm2 . The peak mitral valve velocity was 2.5 m/s, peak gradient was 20 mmHg, and   the mean gradient was 9 mmHg. Mild-to-moderate aortic regurgitation is noted. Signature      ----------------------------------------------------------------   Electronically signed by Marybeth Cornejo MD (Interpreting   physician) on 01/03/2020 at 09:04 PM      Radiology    CXR  1/7/2020   XR CHEST (2 VW)   Bilateral pleural effusions with bibasilar atelectasis or infiltrate. Mild cardiomegaly. 1/6/2020   XR CHEST PORTABLE   Interval removal of the endotracheal and nasogastric tubes. Worsening bibasilar pneumonia and/or atelectasis. Resolved interstitial pulmonary edema. Small right pleural effusion. CT Scans  (See actual reports for details)  Jan 7, 2020   PROCEDURE: CTA CHEST W WO CONTRAST   1.  There is no pulmonary embolus. 2. There are bilateral pleural effusions, moderate on the right and small on the left. There is consolidation adjacent to both pleural effusions. There are also mild scattered groundglass infiltrates within the lung fields bilaterally. In addition, there  is pleural-based consolidation along the anterior aspect of the left mid chest.    3. Numerous additional findings as described in the body the report. 4. Appropriate clinical management recommended. A follow-up CT examination of the chest with intravenous contrast in one month is also recommended to confirm resolution. 1/7/2020   US CHEST INCLUDING MEDIASTINUM   1.  Mild to moderate sized pleural effusions bilaterally Correlation advised       Assessment   -Pleural effusion on right side of uncertain etiology. Differential includes benign Vs neoplastic process. -Dyspnea due to pleural effusion Vs other etiologies. -Acute respiratory failure with hypoxia  -Multi lobar Pneumonia  -Pulmonary Edema  -Valvular heart disease  -Afib  -Hypotension- resolved  -HTN  -CRF III  -Failure to thrive  -Remote hx of breast CA  -Secondary pulmonary HTN- moderate in severity with pulmonary arterial pressure 40 mmHg. Secondary to valvular heart disease   -Full Code    Plan   -US chest today to evaluate for right thoracentesis- reviewed   -Improved SOB over night no hypoxia no distress per primary RN   -CXR 2-view STAT today to evaluate for right pleural effusion- reviewed will proceed with thoracentesis  -Dr. Nima Salazar pts cardiologist discussed holding Eliquis if needed for tap (pt on for chronic Afib) - ok with this  -Will schedule patient for ultrasound guided thoracentesis on right side of chest with interventional radiology service.   Bailee Sher was educated and informed about planned thoracentesis procedure and complications associated with it including but not limited to bleeding, infection, development of pneumothorax with requirement of development of pneumothorax with requirement of chest tube placement and prolonged hospital stay. She agreed to take risk. She verbalizes understading of complications.     David Verde MD 1/7/2020 7:23 PM

## 2020-01-07 NOTE — PROGRESS NOTES
met and/or pt plateaus in function. Specific interventions for next session may include: dysphagia tx, monitor dysphonia. PATIENT GOAL:    Return to prior level of function. SHORT TERM GOALS:  Short-term Goals  Timeframe for Short-term Goals: 2 weeks  Goal 1: Patient will consume regular diet with thin liquids without s/s of aspiration in order to safely maintain adequate hydration and nutrition  Goal 2: Monitor vocal quality, if baseline is not acheived recommend referal to ENT for furthur evaluation or completion of VLS as clinicialy appropriate.        LONG TERM GOALS:  No LTGs due to short [address] [place] [address].[place] [address]

## 2020-01-07 NOTE — PROGRESS NOTES
2245: patient suddenly short of breath at rest, elevated head of bed all the way. Oxygen 93% on 3L NC. Paged [facility], new orders for PRN duonebs, Stat EKG, Stat Chest XR, BMP, CBC, BNP, ProCal and Respiratory virus antigen panel. CT of chest for AM.     0100: Duoneb with minimal help, patient still elevated in bed all the way, continues to tolerate 3L NC but dyspnea continues. BNP from 40349 to 9000, gets daily Lasix. 0630: Suddenly SOB again. Updated Hospitalist Thu and questioned possible need for stepdown bed with patient acuity, called respiratory for Tx. New orders for CT of chest to be STAT. Updated order and called CT for transport.

## 2020-01-07 NOTE — FLOWSHEET NOTE
Pt was alone at the time of the visit. She was weak but hopeful. She was anointed     01/07/20 1523   Encounter Summary   Services provided to: Patient   Referral/Consult From: Rounding   Place of [address] [place] [address] Visiting Yes  (1/7 )   Complexity of Encounter Low   Length of Encounter 15 minutes   Routine   Type Follow up   Assessment Approachable;Calm   Intervention [place];Prayer;Nurtured hope   Outcome Acceptance;Expressed gratitude;Encouraged; Hopeful;Receptive

## 2020-01-07 NOTE — PROGRESS NOTES
[facility]       [] [place]       [] Other-    Chief Complaint: weakness    Hospital Course:  Presented with a few month hx of weakness, loosing weight, nausea; states she has lost 20# in past few months; she fell yesterday 2nd to being weak; states she is on a lot of BP meds; denies pain; relates to dizziness, lightheadedness    1/4-->had EGD~GERD, erosive ulcerative gastritis and erosive duodenitis; VSS; planning outpt colonoscopy    1/5-->pt in respiratory distress; sats noted of 60% in the night; pt tachypneic and using accessory muscles to breath; I ordered STAT CXR and spoke with Dr Charbel Billingsley from ICU who came to evaluate and agreed needed transferred to ICU for intubation and bronch    1/7-->was extubated 1/6 and transferred out of ICU; pt c/o dyspnea all night; CXR done along with CTA chest; currently pt is sitting in high fowlers and feeling better; denies pain except a sore throat; able to speak in complete sentences; CTA chest reviewed that showed bilateral pleural effusions    Subjective (past 24 hours): c/o shortness of breath and sore throat ; denies chest pain; awake and alert x 4    Medications:  Reviewed    Infusion Medications     Scheduled Medications    nystatin  5 mL Oral 4x Daily    lisinopril  5 mg Oral Daily    furosemide  40 mg Intravenous Daily    potassium chloride  20 mEq Oral Daily with breakfast    piperacillin-tazobactam  3.375 g Intravenous Q8H    apixaban  2.5 mg Oral BID    levothyroxine  50 mcg Oral Daily    propafenone  150 mg Oral Q8H    pantoprazole  40 mg Intravenous BID    phosphorus replacement protocol   Other RX Placeholder    rosuvastatin  5 mg Oral QPM    Vitamin D  1,000 Units Oral Daily    senna  1 tablet Oral BID     PRN Meds: ipratropium-albuterol, promethazine, acetaminophen, potassium chloride, ondansetron      Intake/Output Summary (Last 24 hours) at 1/7/2020 0905  Last data filed at 1/7/2020 0323  Gross per 24 hour   Intake 2057.41 ml   Output 3400 ml   Net -1342.59 ml       Diet:  Dietary Nutrition Supplements: Standard High Calorie Oral Supplement  Dietary Nutrition Supplements: Frozen Oral Supplement  DIET FULL LIQUID;    Exam:  /80   Pulse 99   Temp 98.3 °F (36.8 °C) (Oral)   Resp 16   Ht 5' (1.524 m)   Wt 104 lb 8 oz (47.4 kg)   SpO2 97%   BMI 20.41 kg/m²     General appearance: not in distress, appears stated age and cooperative. HEENT: Pupils equal, round, and reactive to light. Conjunctivae/corneas clear. Neck: Supple, with full range of motion. No jugular venous distention. Trachea midline. Respiratory:  Diminished with occasional rhonchi throughout. Cardiovascular: Regular rate and rhythm with normal S1/S2 without murmurs, rubs or gallops. Abdomen: Soft, non-tender, non-distended with normal bowel sounds. Musculoskeletal: passive and active ROM x 4 extremities. Skin: Skin color, texture, turgor normal.    Neurologic:  Neurovascularly intact without any focal sensory/motor deficits. Cranial nerves: II-XII intact, grossly non-focal.  Psychiatric: Alert and oriented x 4  Capillary Refill: Brisk,< 3 seconds   Peripheral Pulses: +2 palpable, equal bilaterally       Labs:   Recent Labs     01/05/20  1050 01/07/20  0040   WBC 10.8 7.4   HGB 11.2* 10.5*   HCT 34.0* 32.2*    267     Recent Labs     01/05/20  0524 01/07/20  0040   * 128*   K 4.5 3.8   CL 93* 93*   CO2 20* 24   BUN 14 14   CREATININE 1.2 1.1   CALCIUM 9.1 8.6   PHOS 2.2*  --      No results for input(s): AST, ALT, BILIDIR, BILITOT, ALKPHOS in the last 72 hours. No results for input(s): INR in the last 72 hours. No results for input(s): Eugune Ran in the last 72 hours.     Microbiology:    Influenza A/B (-)  Urine cx from 12/23 with E coli    Urinalysis:      Lab Results   Component Value Date    NITRU NEGATIVE 01/03/2020    WBCUA 25-50W/CLUMPS 12/23/2019    BACTERIA MODERATE 12/23/2019    RBCUA NONE 12/23/2019    BLOODU NEGATIVE 01/03/2020 recognition technology. **      Final report electronically signed by Dr Ninfa Wooten on 1/5/2020 10:51 AM      XR CHEST STANDARD (2 VW)   Final Result   Increasing opacities in the right hilar and suprahilar regions which may be due to developing pneumonic infiltrates. **This report has been created using voice recognition software. It may contain minor errors which are inherent in voice recognition technology. **      Final report electronically signed by Dr Ninfa Wooten on 1/5/2020 7:11 AM      CT ABDOMEN PELVIS WO CONTRAST Additional Contrast? Oral   Final Result   1. Bibasilar atelectasis/infiltrate. 2. No acute intra-abdominal or intrapelvic findings. Final report electronically signed by Dr. Isabella Mejía on 1/3/2020 10:13 AM      XR CHEST STANDARD (2 VW)   Final Result      No acute findings. **This report has been created using voice recognition software. It may contain minor errors which are inherent in voice recognition technology. **      Final report electronically signed by Dr. Natalia Willis on 1/2/2020 1:03 PM          DVT prophylaxis: [] Lovenox                                 [] SCDs                                 [] SQ Heparin                                 [] Encourage ambulation           [x] Already on Anticoagulation     Code Status: Prior    Tele:   [x] yes AF with              [] no    Active Hospital Problems    Diagnosis Date Noted    Acute respiratory failure with hypoxia ([address] [J96.01]     Pneumonia of both lungs due to infectious organism [J18.9]     Hyponatremia [O84.4]     Metabolic acidosis [I77.7]     Weakness [R53.1] 01/04/2020    Severe malnutrition ([address] [E43] 01/04/2020     Class: Acute    Nausea [R11.0] 01/02/2020    Physical deconditioning [R53.81] 01/02/2020    Loss of appetite [R63.0] 01/02/2020    Chronic renal failure, stage 3 (moderate) ([address] [N18.3] 01/02/2020    Elevated troponin [R79.89] 01/02/2020       Electronically

## 2020-01-07 NOTE — PROGRESS NOTES
[address] [place] [address] 8B  Occupational Therapy  Daily Note  Time:     Time In: 981  Time Out: 4298  Timed Code Treatment Minutes: 12 Minutes  Minutes: 12          Date: 2020  Patient Name: Emily Moran,   Gender: female      Room: -031-A  MRN: [id number]  : 1940  ([de-identified] y.o.)  Referring Practitioner: Dr. Rocio Miranda  Diagnosis: elevated troponin  Additional Pertinent Hx: [de-identified] y.o. female who presents to the emergency department on 20 for evaluation of weakness, 2 falls, and decreased appetite. Patient's family reports she has been sick since  with flu-like symptoms which has gradually worsened since onset. She reports experiencing nausea, diarrhea for 1-2 days (resolved 5 days ago), abdominal pain with ingestion of food, increased fatigue, and weight loss. She denies vomiting and fever since onset. Patient reports being evaluated by her PCP 3 days ago due to not being able to eat food, and she was prescribed medication to help increased her appetite. Patient states this has not help, and has since noted her vision becoming blurry. Patient reports extending her arms to place items in the top of the closet when she \"blacked out\" and fell to the ground. She denies hitting her head. Patient's family reports she was unable to ambulate following the fall    Restrictions/Precautions:  Restrictions/Precautions: Fall Risk, General Precautions    SUBJECTIVE: Pt up in recliner, reluctant to participation. Nurse okayed session to what the Pt can tolerate. Reports feeling SOB, pulse O2 at 98% upon arrival of therapist & maintained above 90%    PAIN: 0/10: no c/o pain    COGNITION: WFL    ADL:   No ADL's completed this session. Ana Dickinson BALANCE:  Standing Balance: Minimal Assistance, unsteadiness noted    TRANSFERS:  Sit to Stand:  Minimal Assistance.  from recliner (Pt reported bottom hurting, therapist helping Pt reposition for comfort-pillow placed underneath-nurse getting air

## 2020-01-07 NOTE — CARE COORDINATION
1/7/20, 12:20 PM    DISCHARGE ONGOING EVALUATION:     Srinivasan 90 day: 3  Location: [facility]/031-A Reason for admit: Elevated troponin [R79.89]  Weakness [R53.1]   Treatment Plan of Care: Transferred from ICU last evening, extubated yesterday. Pt on 3L/NC with exp wheezes and rhonchi. PRN duonebs available. Still c/o SOB but improved from night. Pt uses tripod positioning. CTA Chest today reveals bilat pleural effusions with additional consolidation adjacent to effusions. IV lasix 40 mg daily. IV Zosyn. PT/OT/ST. Barriers to Discharge: Discharge plan uncertain pending pt progression.

## 2020-01-07 NOTE — PLAN OF CARE
Problem: Falls - Risk of:  Goal: Will remain free from falls  Description  Will remain free from falls  Outcome: Ongoing  Note:   Call light within reach, bed in lowest position, non skid footwear on, room door open, bed alarm on. Pt using call light appropriately. Problem: Infection:  Goal: Will remain free from infection  Description  Will remain free from infection  Outcome: Ongoing  Note:   Afebrile. Denies chills/sweats. Problem: Pain:  Goal: Pain level will decrease  Description  Pain level will decrease  Outcome: Ongoing  Note:   Patient has rib cage pain that is chronic rating pain 2/10 with a goal of 2/10. Pt denies wanting pain medications at this time. Pt states repositioning in bed helps with pain      Problem: Skin Integrity:  Goal: Skin integrity will stabilize  Description  Skin integrity will stabilize  Outcome: Ongoing  Note:   Pt educated on repositioning every 2 hours and prn. Patient refuses to turn as recommended. Pt states she wants sitting up in high fowlers due to breathing. Education provided regarding the benefits of repositioning and the risk to skin integrity associated with not repositioning as recommended. Problem: Discharge Planning:  Goal: Patients continuum of care needs are met  Description  Patients continuum of care needs are met  Outcome: Ongoing  Note:   Patient comes from home alone.  following for discharge needs. Problem: Bowel Function - Altered:  Goal: Bowel elimination is within specified parameters  Description  Bowel elimination is within specified parameters  Outcome: Ongoing  Note:   Patient having active bowl sounds. Denies abdominal pain. Denies nausea. Problem: Cardiac:  Goal: Hemodynamic stability will improve  Description  Hemodynamic stability will improve  Outcome: Ongoing  Note:   Denies chest pain, pt does have rib cage pain. No edema noted. Afib controlled on rhythm.       Problem: Respiratory:  Goal: Ability to maintain a clear airway will improve  Description  Ability to maintain a clear airway will improve  Outcome: Ongoing  Note:   Pt on 3 liters nasal cannula. Expiratory wheezing and rhonchi noted in lung sounds. Pt short of breath, but states her breathing is better than during the night. Pt leaning over in tripod position and states it helps with her breathing. Care plan reviewed with patient. Patient verbalizes understanding of the plan of care and contributes to goal setting.

## 2020-01-08 ENCOUNTER — APPOINTMENT (OUTPATIENT)
Dept: GENERAL RADIOLOGY | Age: 80
DRG: 377 | End: 2020-01-08
Payer: MEDICARE

## 2020-01-08 LAB
ANION GAP SERPL CALCULATED.3IONS-SCNC: 12 MEQ/L (ref 8–16)
APTT: 117.5 SECONDS (ref 22–38)
APTT: 36.9 SECONDS (ref 22–38)
APTT: 67.9 SECONDS (ref 22–38)
APTT: 81.1 SECONDS (ref 22–38)
BUN BLDV-MCNC: 13 MG/DL (ref 7–22)
CALCIUM IONIZED: 1.11 MMOL/L (ref 1.12–1.32)
CALCIUM SERPL-MCNC: 8.4 MG/DL (ref 8.5–10.5)
CHLORIDE BLD-SCNC: 98 MEQ/L (ref 98–111)
CO2: 26 MEQ/L (ref 23–33)
CREAT SERPL-MCNC: 1.5 MG/DL (ref 0.4–1.2)
ERYTHROCYTE [DISTWIDTH] IN BLOOD BY AUTOMATED COUNT: 15.4 % (ref 11.5–14.5)
ERYTHROCYTE [DISTWIDTH] IN BLOOD BY AUTOMATED COUNT: 56.2 FL (ref 35–45)
GFR SERPL CREATININE-BSD FRML MDRD: 33 ML/MIN/1.73M2
GLUCOSE BLD-MCNC: 75 MG/DL (ref 70–108)
HCT VFR BLD CALC: 31.7 % (ref 37–47)
HEMOGLOBIN: 10.2 GM/DL (ref 12–16)
MAGNESIUM: 2.1 MG/DL (ref 1.6–2.4)
MCH RBC QN AUTO: 32.4 PG (ref 26–33)
MCHC RBC AUTO-ENTMCNC: 32.2 GM/DL (ref 32.2–35.5)
MCV RBC AUTO: 100.6 FL (ref 81–99)
PHOSPHORUS: 2.5 MG/DL (ref 2.4–4.7)
PLATELET # BLD: 250 THOU/MM3 (ref 130–400)
PMV BLD AUTO: 9.6 FL (ref 9.4–12.4)
POTASSIUM SERPL-SCNC: 3.2 MEQ/L (ref 3.5–5.2)
RBC # BLD: 3.15 MILL/MM3 (ref 4.2–5.4)
SODIUM BLD-SCNC: 136 MEQ/L (ref 135–145)
WBC # BLD: 6 THOU/MM3 (ref 4.8–10.8)

## 2020-01-08 PROCEDURE — 6360000002 HC RX W HCPCS: Performed by: INTERNAL MEDICINE

## 2020-01-08 PROCEDURE — 6360000002 HC RX W HCPCS: Performed by: NURSE PRACTITIONER

## 2020-01-08 PROCEDURE — 71045 X-RAY EXAM CHEST 1 VIEW: CPT

## 2020-01-08 PROCEDURE — 85027 COMPLETE CBC AUTOMATED: CPT

## 2020-01-08 PROCEDURE — 82330 ASSAY OF CALCIUM: CPT

## 2020-01-08 PROCEDURE — 6370000000 HC RX 637 (ALT 250 FOR IP): Performed by: INTERNAL MEDICINE

## 2020-01-08 PROCEDURE — 94761 N-INVAS EAR/PLS OXIMETRY MLT: CPT

## 2020-01-08 PROCEDURE — 85730 THROMBOPLASTIN TIME PARTIAL: CPT

## 2020-01-08 PROCEDURE — 99233 SBSQ HOSP IP/OBS HIGH 50: CPT | Performed by: INTERNAL MEDICINE

## 2020-01-08 PROCEDURE — 6370000000 HC RX 637 (ALT 250 FOR IP): Performed by: OPHTHALMOLOGY

## 2020-01-08 PROCEDURE — 6370000000 HC RX 637 (ALT 250 FOR IP): Performed by: NURSE PRACTITIONER

## 2020-01-08 PROCEDURE — 2700000000 HC OXYGEN THERAPY PER DAY

## 2020-01-08 PROCEDURE — APPSS30 APP SPLIT SHARED TIME 16-30 MINUTES: Performed by: NURSE PRACTITIONER

## 2020-01-08 PROCEDURE — 2580000003 HC RX 258: Performed by: NURSE PRACTITIONER

## 2020-01-08 PROCEDURE — 97530 THERAPEUTIC ACTIVITIES: CPT

## 2020-01-08 PROCEDURE — 97110 THERAPEUTIC EXERCISES: CPT

## 2020-01-08 PROCEDURE — 99232 SBSQ HOSP IP/OBS MODERATE 35: CPT | Performed by: INTERNAL MEDICINE

## 2020-01-08 PROCEDURE — 36415 COLL VENOUS BLD VENIPUNCTURE: CPT

## 2020-01-08 PROCEDURE — 80048 BASIC METABOLIC PNL TOTAL CA: CPT

## 2020-01-08 PROCEDURE — P9045 ALBUMIN (HUMAN), 5%, 250 ML: HCPCS | Performed by: INTERNAL MEDICINE

## 2020-01-08 PROCEDURE — 2709999900 HC NON-CHARGEABLE SUPPLY

## 2020-01-08 PROCEDURE — 83735 ASSAY OF MAGNESIUM: CPT

## 2020-01-08 PROCEDURE — 1200000003 HC TELEMETRY R&B

## 2020-01-08 PROCEDURE — 84100 ASSAY OF PHOSPHORUS: CPT

## 2020-01-08 PROCEDURE — 94640 AIRWAY INHALATION TREATMENT: CPT

## 2020-01-08 RX ORDER — ALBUMIN, HUMAN INJ 5% 5 %
12.5 SOLUTION INTRAVENOUS
Status: COMPLETED | OUTPATIENT
Start: 2020-01-08 | End: 2020-01-09

## 2020-01-08 RX ORDER — MULTIVITAMIN WITH FOLIC ACID 400 MCG
1 TABLET ORAL DAILY
Status: DISCONTINUED | OUTPATIENT
Start: 2020-01-09 | End: 2020-01-13 | Stop reason: HOSPADM

## 2020-01-08 RX ORDER — POTASSIUM CHLORIDE 750 MG/1
40 TABLET, FILM COATED, EXTENDED RELEASE ORAL ONCE
Status: COMPLETED | OUTPATIENT
Start: 2020-01-08 | End: 2020-01-08

## 2020-01-08 RX ADMIN — FUROSEMIDE 40 MG: 40 INJECTION, SOLUTION INTRAMUSCULAR; INTRAVENOUS at 09:38

## 2020-01-08 RX ADMIN — ROSUVASTATIN CALCIUM 5 MG: 10 TABLET, FILM COATED ORAL at 20:27

## 2020-01-08 RX ADMIN — ACETAMINOPHEN 650 MG: 325 TABLET ORAL at 20:26

## 2020-01-08 RX ADMIN — PROPAFENONE HYDROCHLORIDE 150 MG: 150 TABLET, FILM COATED ORAL at 01:35

## 2020-01-08 RX ADMIN — PANTOPRAZOLE SODIUM 40 MG: 40 TABLET, DELAYED RELEASE ORAL at 09:37

## 2020-01-08 RX ADMIN — ALBUMIN (HUMAN) 12.5 G: 12.5 INJECTION, SOLUTION INTRAVENOUS at 15:37

## 2020-01-08 RX ADMIN — IPRATROPIUM BROMIDE AND ALBUTEROL SULFATE 1 AMPULE: .5; 3 SOLUTION RESPIRATORY (INHALATION) at 12:06

## 2020-01-08 RX ADMIN — LEVOTHYROXINE SODIUM 50 MCG: 50 TABLET ORAL at 06:40

## 2020-01-08 RX ADMIN — IPRATROPIUM BROMIDE AND ALBUTEROL SULFATE 1 AMPULE: .5; 3 SOLUTION RESPIRATORY (INHALATION) at 15:48

## 2020-01-08 RX ADMIN — SENNOSIDES 8.6 MG: 8.6 TABLET ORAL at 09:37

## 2020-01-08 RX ADMIN — PROPAFENONE HYDROCHLORIDE 150 MG: 150 TABLET, FILM COATED ORAL at 17:21

## 2020-01-08 RX ADMIN — SENNOSIDES 8.6 MG: 8.6 TABLET ORAL at 20:27

## 2020-01-08 RX ADMIN — HEPARIN SODIUM AND DEXTROSE 18 UNITS/KG/HR: 10000; 5 INJECTION INTRAVENOUS at 20:24

## 2020-01-08 RX ADMIN — Medication 500000 UNITS: at 17:21

## 2020-01-08 RX ADMIN — VITAMIN D, TAB 1000IU (100/BT) 1000 UNITS: 25 TAB at 09:37

## 2020-01-08 RX ADMIN — HEPARIN SODIUM 1.9 UNITS: 1000 INJECTION INTRAVENOUS; SUBCUTANEOUS at 14:08

## 2020-01-08 RX ADMIN — PIPERACILLIN AND TAZOBACTAM 3.38 G: 3; .375 INJECTION, POWDER, FOR SOLUTION INTRAVENOUS at 06:40

## 2020-01-08 RX ADMIN — PROPAFENONE HYDROCHLORIDE 150 MG: 150 TABLET, FILM COATED ORAL at 09:37

## 2020-01-08 RX ADMIN — LISINOPRIL 5 MG: 5 TABLET ORAL at 09:37

## 2020-01-08 RX ADMIN — PANTOPRAZOLE SODIUM 40 MG: 40 TABLET, DELAYED RELEASE ORAL at 20:27

## 2020-01-08 RX ADMIN — Medication 500000 UNITS: at 13:32

## 2020-01-08 RX ADMIN — PIPERACILLIN AND TAZOBACTAM 3.38 G: 3; .375 INJECTION, POWDER, FOR SOLUTION INTRAVENOUS at 17:21

## 2020-01-08 RX ADMIN — POTASSIUM CHLORIDE 40 MEQ: 750 TABLET, FILM COATED, EXTENDED RELEASE ORAL at 09:37

## 2020-01-08 RX ADMIN — IPRATROPIUM BROMIDE AND ALBUTEROL SULFATE 1 AMPULE: .5; 3 SOLUTION RESPIRATORY (INHALATION) at 06:08

## 2020-01-08 RX ADMIN — POTASSIUM CHLORIDE 20 MEQ: 1500 TABLET, EXTENDED RELEASE ORAL at 09:37

## 2020-01-08 RX ADMIN — Medication 500000 UNITS: at 09:37

## 2020-01-08 ASSESSMENT — PAIN DESCRIPTION - DESCRIPTORS: DESCRIPTORS: ACHING;SHARP

## 2020-01-08 ASSESSMENT — PAIN DESCRIPTION - LOCATION: LOCATION: HIP

## 2020-01-08 ASSESSMENT — PAIN - FUNCTIONAL ASSESSMENT: PAIN_FUNCTIONAL_ASSESSMENT: PREVENTS OR INTERFERES SOME ACTIVE ACTIVITIES AND ADLS

## 2020-01-08 ASSESSMENT — PAIN DESCRIPTION - ORIENTATION: ORIENTATION: LEFT

## 2020-01-08 ASSESSMENT — PAIN DESCRIPTION - PAIN TYPE: TYPE: ACUTE PAIN

## 2020-01-08 ASSESSMENT — PAIN DESCRIPTION - PROGRESSION: CLINICAL_PROGRESSION: GRADUALLY WORSENING

## 2020-01-08 ASSESSMENT — PAIN SCALES - GENERAL
PAINLEVEL_OUTOF10: 0
PAINLEVEL_OUTOF10: 9
PAINLEVEL_OUTOF10: 0

## 2020-01-08 ASSESSMENT — PAIN DESCRIPTION - FREQUENCY: FREQUENCY: INTERMITTENT

## 2020-01-08 ASSESSMENT — PAIN DESCRIPTION - ONSET: ONSET: GRADUAL

## 2020-01-08 NOTE — PROGRESS NOTES
New Consult -   0830 - Rose Marie - Met with pt. Who is agreeable. Pt. In bed, denies pain, shortness of breath or other complaints, is a & O. Pt. Came to hospital with weakness and ended up with respiratory failure on the ventilator for a short period. She is feeling better each day and hopes to go home per self when discharged. Pt. Has a son and 2 daughters as well as siblings who live in the area - she is in contact with family daily. Her son stays with her sometimes. Palliative care and concepts discussed, reviewed and acknowledged as understood. Pt. Denies needs at this time. Brochure left for further reference. In discussing pt. Goals of care, she states she would like to continue with aggressive treatments including being intubated again, having CPR and or shock if necessary. She will remain a FULL code. Will see as needed. Primary RN, Derian Apo is updated.

## 2020-01-08 NOTE — PROGRESS NOTES
Ultrasound called and said they would want a 3 day hold of eliquis before doing thoracentesis unless the physician would like to order any vitamin K, or other  Medications. To clarify Fani Hatfield NP was given Dr. Emily Powell, radiology, extension to discuss further details.

## 2020-01-08 NOTE — CARE COORDINATION
1/8/20, 2:51 PM    DISCHARGE ONGOING EVALUATION:     Srinivasan 90 day: 4  Location: [facility]31/031-A Reason for admit: Elevated troponin [R79.89]  Weakness [R53.1]   Treatment Plan of Care: CXR yesterday showed small bilat pleural effusions. Thoracentesis needed but on thinners. Transitioned to Heparin gtt now, Eliquis on hold. Plan thoracentesis for 1-10. Pneumonia continues to be treated with Zosyn. Afebrile. Pt now on room air with O2 sat 94%. Barriers to Discharge: Consult to TCU/Rehab.

## 2020-01-08 NOTE — PROGRESS NOTES
Attempted to place IV in right arm patient is heavily bruised and has a limb alert on right. Explained to primary nurse Anna the dilemma we have with placing a PIV. Patient is on Heparin and with out the CVC she will not have any where to draw labs from.

## 2020-01-08 NOTE — PROGRESS NOTES
[facility]      Patient - Candelaria Patrick   MRN -  [id number]   [facility]   - 1940      Date of Admission -  2020 11:50 AM  Date of evaluation -  2020  [facility]   Hospital Day - 3692 Veronika Holland MD Primary Care Physician - Baltazar Rosas, APRN - CNP     Problem List      Active Hospital Problems    Diagnosis Date Noted    Acute respiratory failure with hypoxia ([address] [J96.01]     Pneumonia of both lungs due to infectious organism [J18.9]     Hyponatremia [V60.2]     Metabolic acidosis [T27.9]     Weakness [R53.1] 2020    Severe malnutrition ([address] [E43] 2020     Class: Acute    Nausea [R11.0] 2020    Physical deconditioning [R53.81] 2020    Loss of appetite [R63.0] 2020    Chronic renal failure, stage 3 (moderate) ([address] [N18.3] 2020    Elevated troponin [R79.89] 2020     Reason for Consult    For management of pleural effusion. HPI   History Obtained From: Patient and electronic medical record. Candelaria Patrick is an 30-year-old white female lifetime non-smoker. Patient has a history of left breast cancer diagnosed in  and treated with lumpectomy. She receives chronic anticoagulation with Eliquis for atrial fibrillation. She has a history of hypothyroidism, osteopenia, hypertension, and chronic kidney disease. Patient presented to the emergency room and was admitted on 2020. She presented with complaints of poor oral intake with significant weight loss over the past 3 months. She has a very poor appetite and feels nauseated when eating. As her nutritional status depleted, she became more and more fatigued. Patient was seen by gastroenterology and underwent EGD on 1/3/2020. This demonstrated ulcerative erosive gastritis and duodenitis. Patient deteriorated on 2020 associated with respiratory failure.   She developed hypoxemia and required intubation. She underwent bronchoscopy 1/5/2020. This demonstrated purulent pneumonia bilaterally. She was started on Zosyn 1/5/2020. Pulmonary medicine was consulted for further management of pleural effusion. 1/3 CT Abd/pelvis: Bibasilar atelectasis/infiltrate; No acute intra-abdominal or intrapelvic findings  1/3 Echo with EF 60%; mod to severe mitral regurg; mild to mod mitral stenosis; mild to mod aortic regurg  1/3 EGD: GERD, erosive ulcerative gastritis and erosive duodenitis   1/5 Intubated  1/5 Bronch w/washings sent  1/5 CVC Right subclavian  1/5 CXR: Scattered opacities are seen in the right lung which are new since the previous exam which was performed less than 4 hours prior. This could be related to developing infiltrates. Aspiration could have a similar appearance; Cardiomegaly with mild pulmonary vascular congestion  1/6 Extubated moved to  31      Past 24 Hours   -Stable on 1LPM -98%  -Afebrile  -No increased SOB/CP    -All systems reviewed.    PMHx   Past Medical History      Diagnosis Date    Arthritis     Atrial fibrillation ([address]     CAD (coronary artery disease)     Cancer (HCC)     BREAST    Chronic kidney disease     HTN (hypertension) 4/13/2016    Hypertension     Nausea & vomiting     Osteopenia     Pneumonia of both lungs due to infectious organism     Rheumatic fever     as a child    Sinus infection     Thyroid disease       Past Surgical History        Procedure Laterality Date    BREAST LUMPECTOMY Left 2011    BRONCHOSCOPY N/A 1/5/2020    BRONCHOSCOPY performed by Jono Hong MD at [address] [place] [address] Left 2009   [address]  2010    DILATION AND CURETTAGE OF UTERUS      X2; SEVERAL YEARS AGO    JOINT REPLACEMENT Right 2010    KNEE    TONSILLECTOMY      UPPER GASTROINTESTINAL ENDOSCOPY N/A 1/3/2020    EGD ESOPHAGOGASTRODUODENOSCOPY performed by Eileen Erickson MD at [facility]     Meds    Current Medications    extended release tablet, Take 650 mg by mouth as needed   bumetanide (BUMEX) 2 MG tablet, Take 2 mg by mouth daily Indications: Treatment with Diuretic Therapy   rosuvastatin (CRESTOR) 5 MG tablet, Take 5 mg by mouth every evening Indications: Blood Cholesterol Abnormal   levothyroxine (SYNTHROID) 50 MCG tablet, Take 50 mcg by mouth daily Indications: Impaired Thyroid Function   Biotin 1000 MCG TABS, Take 1,000 mcg by mouth nightly   aspirin 81 MG chewable tablet, Take 81 mg by mouth daily Indications: Treatment to Prevent Blood Clotting Take with food. Diet    Dietary Nutrition Supplements: Standard High Calorie Oral Supplement  Dietary Nutrition Supplements: Frozen Oral Supplement  DIET GENERAL;  Allergies    Tramadol  Family History          Problem Relation Age of Onset    Arthritis Mother     High Blood Pressure Mother     Cancer Sister         LEUKEMIA    Arthritis Brother     Asthma Brother     Colon Polyps Brother 58    Heart Disease Brother      Sleep History    Never diagnosed with sleep apnea in the past    Social History     Social History     Socioeconomic History    Marital status:       Spouse name: Not on file    Number of children: Not on file    Years of education: Not on file    Highest education level: Not on file   Occupational History    Not on file   Social Needs    Financial resource strain: Not on file    Food insecurity:     Worry: Not on file     Inability: Not on file    Transportation needs:     Medical: Not on file     Non-medical: Not on file   Tobacco Use    Smoking status: Never Smoker    Smokeless tobacco: Never Used   Substance and Sexual Activity    Alcohol use: Not Currently     Alcohol/week: 1.0 standard drinks     Types: 1 Glasses of wine per week     Comment: A  MONTH    Drug use: No    Sexual activity: Not on file   Lifestyle    Physical activity:     Days per week: Not on file     Minutes per session: Not on file    Stress: Not on file Relationships    Social connections:     Talks on phone: Not on file     Gets together: Not on file     Attends Alevism service: Not on file     Active member of club or organization: Not on file     Attends meetings of clubs or organizations: Not on file     Relationship status: Not on file    Intimate partner violence:     Fear of current or ex partner: Not on file     Emotionally abused: Not on file     Physically abused: Not on file     Forced sexual activity: Not on file   Other Topics Concern    Not on file   Social History Narrative    Not on file       Vitals     height is 5' (1.524 m) and weight is 104 lb 8 oz (47.4 kg). Her oral temperature is 97.8 °F (36.6 °C). Her blood pressure is 114/78 and her pulse is 107. Her respiration is 18 and oxygen saturation is 98%. Body mass index is 20.41 kg/m². SUPPLEMENTAL O2: O2 Flow Rate (L/min): 1 L/min       I/O        Intake/Output Summary (Last 24 hours) at 1/8/2020 1120  Last data filed at 1/8/2020 0503  Gross per 24 hour   Intake 556.32 ml   Output 3450 ml   Net -2893.68 ml     I/O last 3 completed shifts: In: 556.3 [P.O.:320; I.V.:236.3]  Out: 3450 [Urine:3450]   Patient Vitals for the past 96 hrs (Last 3 readings):   Weight   01/06/20 0400 104 lb 8 oz (47.4 kg)   01/05/20 0645 101 lb 9.6 oz (46.1 kg)       Exam   Physical Exam   Constitutional: No distress on O2 1LPM per NC. Patient appears elderly and grail. OOB to chair  Head: Normocephalic and atraumatic. Mouth/Throat: Oropharynx is clear and moist.  No oral thrush. Eyes: Conjunctivae are normal. Pupils are equal, round. No scleral icterus. Neck: Neck supple. No tracheal deviation present. No JVD  Cardiovascular: IRR. No peripheral edema  Pulmonary/Chest: Normal effort with bilateral air entry, clear breath sounds. No stridor. No respiratory distress. Patient exhibits no tenderness. Abdominal: Soft. Bowel sounds audible. No distension or tenderness to palp.    Musculoskeletal: Moves all gram positive bacilli. EKG     Echocardiogram   01/03/2020   ECHOCARDIOGRAM COMPLETE 2D W DOPPLER W COLOR. Conclusions      Summary   Normal left ventricle size and systolic function. Ejection fraction was   estimated at 60 %. There were no regional left ventricular wall motion   abnormalities and wall thickness was within normal limits. The left atrium is Severely dilated. Moderately enlarged right atrium size. Moderate tricuspid regurgitation visualized. Right ventricular systolic pressure measures 40mmhg. Moderate-to-severe mitral regurgitation with centrally directed jet. Mild to moderate mitral stenosis. Mitral valve area by doppler pressure half-time 1.6 cm2 . The peak mitral valve velocity was 2.5 m/s, peak gradient was 20 mmHg, and   the mean gradient was 9 mmHg. Mild-to-moderate aortic regurgitation is noted. Signature      ----------------------------------------------------------------   Electronically signed by Kate Aquino MD (Interpreting   physician) on 01/03/2020 at 09:04 PM      Radiology    CXR  1/8/2020   XR CHEST PORTABLE    Stable small bilateral pleural effusions, larger on the right. Improved right basilar pneumonia and left lower lobe atelectasis versus pneumonia. 1/7/2020   XR CHEST (2 VW)   Bilateral pleural effusions with bibasilar atelectasis or infiltrate. Mild cardiomegaly. 1/6/2020   XR CHEST PORTABLE   Interval removal of the endotracheal and nasogastric tubes. Worsening bibasilar pneumonia and/or atelectasis. Resolved interstitial pulmonary edema. Small right pleural effusion. CT Scans  (See actual reports for details)  Jan 7, 2020   PROCEDURE: CTA CHEST W WO CONTRAST   1. There is no pulmonary embolus. 2. There are bilateral pleural effusions, moderate on the right and small on the left. There is consolidation adjacent to both pleural effusions.  There are also mild scattered groundglass infiltrates within the lung fields

## 2020-01-08 NOTE — PROGRESS NOTES
[address] [place] [address] 8B  Occupational Therapy  Daily Note  Time:    Time In: 1520  Time Out: 1536  Timed Code Treatment Minutes: 16 Minutes  Minutes: 16          Date: 2020  Patient Name: Rebecca Montenegro,   Gender: female      Room: [facility]031-A  MRN: [id number]  : 1940  ([de-identified] y.o.)  Referring Practitioner: Dr. Jia Goldstein  Diagnosis: elevated troponin  Additional Pertinent Hx: [de-identified] y.o. female who presents to the emergency department on 20 for evaluation of weakness, 2 falls, and decreased appetite. Patient's family reports she has been sick since  with flu-like symptoms which has gradually worsened since onset. She reports experiencing nausea, diarrhea for 1-2 days (resolved 5 days ago), abdominal pain with ingestion of food, increased fatigue, and weight loss. She denies vomiting and fever since onset. Patient reports being evaluated by her PCP 3 days ago due to not being able to eat food, and she was prescribed medication to help increased her appetite. Patient states this has not help, and has since noted her vision becoming blurry. Patient reports extending her arms to place items in the top of the closet when she \"blacked out\" and fell to the ground. She denies hitting her head. Patient's family reports she was unable to ambulate following the fall    Restrictions/Precautions:  Restrictions/Precautions: Fall Risk, General Precautions    SUBJECTIVE: cooperative with encouragement    PAIN: 0/10:     COGNITION: WFL    ADL:   Grooming: Contact Guard Assistance. stood 2 min then 1 min with brushing teeth then combing hair. BALANCE:  Standing Balance: Contact Guard Assistance    BED MOBILITY:  Supine to Sit: Stand By Assistance HOB elevated & using bedrail  Sit to Supine: Minimal Assistance      TRANSFERS:  Sit to Stand:  Contact Guard Assistance.  from EOB      ADDITIONAL ACTIVITIES:  Completed BUE AROM exercises with deep breathing for shoulder flexion, horizontal add/abduction,

## 2020-01-08 NOTE — PROGRESS NOTES
deconditioning: PT/OT. Plan to discharge to [facility]    PLAN     1. R thoracentesis planned for 1/10. May need repeat CXR in am  2. F/U CT chest in 1 month recommended  3. Hold Lasix today, give albumin and reassess BP later today  4. Continue to monitor    Anticipated Discharge in: 3 days. Plan to discharge to ECF/inpatient rehab    Code Status: Prior    Electronically signed by Samira Nicole MD on 1/8/2020 at 2:05 PM      Chief Complaint     Generalized weakness    SUBJECTIVE     The patient is a [de-identified] y.o. Terald New yo female w/ a PMH of chronic atrial fibrillation on anticoagulation with Eliquis, essential HTN, CKD 3 who presented on 1/2/2020 with symptoms of nausea, generalized weakness, decreased appetitie, poor oral intake and weight loss. Weakness. One day prior to admission she fell and was being treated for a UTI with ciprofloxacin. She did mention symptoms of lightheadedness. She was taking off Norvasc for a month but her BP was up so she was restarted on it again prior to admission.        OBJECTIVE     Medications:  Reviewed    Infusion Medications    heparin (porcine) 15 Units/kg/hr (01/08/20 0950)     Scheduled Medications    albumin human  12.5 g Intravenous Q2H    ipratropium-albuterol  1 ampule Inhalation Q4H WA    pantoprazole  40 mg Oral BID    nystatin  5 mL Oral 4x Daily    lisinopril  5 mg Oral Daily    [Held by provider] furosemide  40 mg Intravenous Daily    potassium chloride  20 mEq Oral Daily with breakfast    piperacillin-tazobactam  3.375 g Intravenous Q8H    [Held by provider] apixaban  2.5 mg Oral BID    levothyroxine  50 mcg Oral Daily    propafenone  150 mg Oral Q8H    phosphorus replacement protocol   Other RX Placeholder    rosuvastatin  5 mg Oral QPM    Vitamin D  1,000 Units Oral Daily    senna  1 tablet Oral BID     PRN Meds: phenol, heparin (porcine), heparin (porcine), promethazine, acetaminophen, potassium chloride, ondansetron    Ins and outs:      Intake/Output Summary (Last 24 hours) at 1/8/2020 1405  Last data filed at 1/8/2020 0503  Gross per 24 hour   Intake 556.32 ml   Output 3450 ml   Net -2893.68 ml       Physical Examination     BP (!) 86/59   Pulse 111   Temp 98.2 °F (36.8 °C) (Oral)   Resp 18   Ht 5' (1.524 m)   Wt 104 lb 8 oz (47.4 kg)   SpO2 94%   BMI 20.41 kg/m²     General appearance: No apparent distress. HEENT: Extraocular motion intact. Neck: Supple  Respiratory:  Decreased breath sounds at bilateral lung bases. Ronchorous cough  Cardiovascular: Irregular rate and rhythm. 3/6 VALERIE , rubs or gallops. Abdomen: Soft, non-tender, non-distended with normal bowel sounds. Musculoskeletal: Patient is moving extremities x 4 spontaneously  Neurologic: Grossly non focal. CN: II-XII intact  Psychiatric: Alert and oriented  Vascular: Dorsalis pedis palpable bilaterally. Radial pulses palpable bilaterally. Skin:  No visible rashes or lesions. LINES: R subclavian CVC  Granger catheter in place on clear urine    Labs     Recent Labs     01/07/20  0040 01/07/20  1525 01/08/20  0625   WBC 7.4 6.9 6.0   HGB 10.5* 11.1* 10.2*   HCT 32.2* 33.3* 31.7*    279 250     Recent Labs     01/07/20  0040 01/08/20  0625   * 136   K 3.8 3.2*   CL 93* 98   CO2 24 26   BUN 14 13   CREATININE 1.1 1.5*   CALCIUM 8.6 8.4*   PHOS  --  2.5     No results for input(s): AST, ALT, BILIDIR, BILITOT, ALKPHOS in the last 72 hours. No results for input(s): INR in the last 72 hours. No results for input(s): Yun Dapper in the last 72 hours.     Urinalysis:      Lab Results   Component Value Date    NITRU NEGATIVE 01/03/2020    WBCUA 25-50W/CLUMPS 12/23/2019    BACTERIA MODERATE 12/23/2019    RBCUA NONE 12/23/2019    BLOODU NEGATIVE 01/03/2020    SPECGRAV 1.020 12/23/2019    GLUCOSEU NEGATIVE 01/03/2020       Diagnostic imaging/procedures     Ct Abdomen Pelvis Wo Contrast Additional Contrast? Oral    Result Date: 1/3/2020  PROCEDURE: CT ABDOMEN PELVIS WO CONTRAST CLINICAL INFORMATION: Weight loss, nausea TECHNIQUE: CT of the abdomen and pelvis was performed following administration of oral contrast only. Intravenous contrast was not administered at the request of the ordering physician. Axial images as well as coronal and sagittal reconstructions were obtained. All CT scans at this facility use dose modulation, iterative reconstruction, and/or weight-based dosing when appropriate to reduce radiation dose to as low as reasonably achievable. COMPARISON: None FINDINGS: Lower thorax: There is pleural thickening at the bilateral lung bases. Minimal alveolar and reticular opacities are seen. The heart is enlarged. Abdomen: Evaluation is limited due to absence of intravenous contrast. There is no free intraperitoneal air or fluid. Bowel is normal in course and caliber without evidence of obstruction. A 1.4 cm hypoattenuating lesion at the lower right hepatic lobe may be a cyst but is incompletely characterized on the current noncontrast study (image 23). The gallbladder, pancreas, spleen, adrenal glands and right kidney are normal within limits of a noncontrast examination. A hypoattenuating lesion at the upper left kidney may be a cyst but is incompletely characterized (image 16). Atherosclerotic calcifications are seen in a tortuous abdominal aorta without evidence of aneurysm. There is no mesenteric or retroperitoneal lymphadenopathy. Severe degenerative and  scoliotic changes are noted in the lumbar spine without evidence of aggressive osseous lesions. Pelvis: The urinary bladder is markedly distended. There are phleboliths in the pelvis. There is no pelvic or inguinal lymphadenopathy. Degenerative changes are present in the pelvis without evidence of osseous lesions. 1. Bibasilar atelectasis/infiltrate. 2. No acute intra-abdominal or intrapelvic findings.  Final report electronically signed by Dr. Eileen Wooten on 1/3/2020 10:13 AM    Xr Chest Standard (2 Vw)    Result Date: 1/7/2020  PROCEDURE: XR CHEST (2 VW) CLINICAL INFORMATION: right pleural effusion. COMPARISON: January 6, 2020 TECHNIQUE: PA and lateral views the chest. FINDINGS: Right-sided central line with tip in the distal SVC/right atrium. Bilateral pleural effusions with bibasilar atelectasis or infiltrate. Mild cardiomegaly. Atherosclerotic changes aortic arch. No acute osseous findings. S-shaped scoliosis in the thoracolumbar spine. Osteopenia. Bilateral pleural effusions with bibasilar atelectasis or infiltrate. Mild cardiomegaly. **This report has been created using voice recognition software. It may contain minor errors which are inherent in voice recognition technology. ** Final report electronically signed by Dr. Kailash Sanchez on 1/7/2020 1:01 PM    Xr Chest Standard (2 Vw)    Result Date: 1/5/2020  PROCEDURE: XR CHEST (2 VW) CLINICAL INFORMATION: 78-year-old female with shortness of breath. Nausea. COMPARISON: Chest x-ray 01 0-20. TECHNIQUE: AP and lateral views of the chest were obtained. FINDINGS: There are increasing opacities in the right hilar and suprahilar regions which may be on the basis of developing infiltrates. There is cardiomegaly. The pulmonary vasculature is within normal limits. There is no significant pleural effusion or pneumothorax. Some dense contrast material is seen in the region of the splenic flexure. The upper abdominal structures are otherwise within normal limits. The osseous structures are intact. The bones are osteopenic. No acute fractures or suspicious osseous lesions. Increasing opacities in the right hilar and suprahilar regions which may be due to developing pneumonic infiltrates. **This report has been created using voice recognition software. It may contain minor errors which are inherent in voice recognition technology. ** Final report electronically signed by Dr Bobby Murillo on 1/5/2020 7:11 AM    Xr Chest Standard (2 Vw)    Result Date: 1/2/2020  PROCEDURE: XR CHEST (2 VW) CLINICAL INFORMATION: weakness. COMPARISON: December 23, 2019. TECHNIQUE: PA and lateral views the chest. FINDINGS: Osteopenia. Cardiomegaly. Ectatic thoracic aorta. Atherosclerotic changes aortic arch. Costophrenic recesses are preserved. No lobar consolidation. No acute findings. **This report has been created using voice recognition software. It may contain minor errors which are inherent in voice recognition technology. ** Final report electronically signed by Dr. Raven Camacho on 1/2/2020 1:03 PM    Xr Chest Standard (2 Vw)    Result Date: 12/23/2019  PROCEDURE: XR CHEST (2 VW) CLINICAL INFORMATION: 18-year-old female with coarse lung sounds and cough for 2 weeks. COMPARISON: X-ray dated 03/04/2016. TECHNIQUE: PA and lateral views of the chest were obtained. FINDINGS: The lungs are hyperinflated and there is flattening of the hemidiaphragms. There is no infiltrate or consolidation. The cardiac silhouette and pulmonary vasculature are within normal limits. There is no significant pleural effusion or pneumothorax. Visualized portions of the upper abdomen are within normal limits. The osseous structures are intact. There is levoscoliosis of the thoracic spine. No acute fractures or suspicious osseous lesions. There is no acute intrathoracic process. **This report has been created using voice recognition software. It may contain minor errors which are inherent in voice recognition technology. ** Final report electronically signed by Dr Kristen Frye on 12/23/2019 4:13 PM    Cta Chest W Wo Contrast    Result Date: 1/7/2020  PROCEDURE: CTA CHEST W WO CONTRAST CLINICAL INFORMATION: Worsening shortness of breath and cough. COMPARISON: Chest radiograph dated 1/6/2020 and CT abdomen/pelvis examination dated 1/3/2020. TECHNIQUE: 1.5 mm axial images were obtained through the chest after the administration of IV contrast. A non-contrast localizer was obtained.  2mm MIP reconstructions of the chest performed in was also present on the previous CT abdomen/pelvis examination dated 1/3/2020. OTHER: None. 1. There is no pulmonary embolus. 2. There are bilateral pleural effusions, moderate on the right and small on the left. There is consolidation adjacent to both pleural effusions. There are also mild scattered groundglass infiltrates within the lung fields bilaterally. In addition, there  is pleural-based consolidation along the anterior aspect of the left mid chest. 3. Numerous additional findings as described in the body the report. 4. Appropriate clinical management recommended. A follow-up CT examination of the chest with intravenous contrast in one month is also recommended to confirm resolution. **This report has been created using voice recognition software. It may contain minor errors which are inherent in voice recognition technology. ** Final report electronically signed by Dr. Lacie Rose on 1/7/2020 7:53 AM    Xr Chest Portable    Result Date: 1/8/2020  PROCEDURE: XR CHEST PORTABLE CLINICAL INFORMATION: bilateral pleural effusions. COMPARISON: Chest x-ray dated 1/7/2020 TECHNIQUE: AP Portable chest xray FINDINGS: Lines/tubes/devices: Tip of the right subclavian central venous catheter remains in the right atrium. Lungs/pleura: There are stable small bilateral pleural effusions, larger on the right. There is improvement in the right basilar pneumonia and left lower lobe atelectasis versus pneumonia. There is no pneumothorax. Heart: There is stable mild cardiomegaly. Mediastinum/rashad: No obvious mass or adenopathy. Skeleton: There is again an S-shaped scoliosis of the thoracolumbar spine. Stable small bilateral pleural effusions, larger on the right. Improved right basilar pneumonia and left lower lobe atelectasis versus pneumonia. **This report has been created using voice recognition software. It may contain minor errors which are inherent in voice recognition technology. ** Final report electronically signed by Dr. Niko Quiroz on 1/8/2020 3:47 AM    Xr Chest Portable    Result Date: 1/6/2020  PROCEDURE: XR CHEST PORTABLE CLINICAL INFORMATION: shortness of breath. COMPARISON: Chest x-ray dated 1/5/2020 TECHNIQUE: AP Portable chest xray FINDINGS: Lines/tubes/devices: There has been interval removal of the endotracheal and nasogastric tubes. Right subclavian central venous catheter tip remains in the region of the right atrium. Lungs/pleura: There is a small right pleural effusion. There are worsening bibasilar infiltrates consistent with pneumonia and/or atelectasis, more severe on the right. . Previously noted interstitial infiltrates have resolved consistent with resolved pulmonary edema. There is no pneumothorax. Heart: Heart size is normal. Mediastinum/rashad: No obvious mass or adenopathy. Skeleton: There is again rotary dextroscoliosis of the lower thoracic and lumbar spine. Upper abdomen: Residual contrast is noted in the colon in the left upper quadrant. Interval removal of the endotracheal and nasogastric tubes. Worsening bibasilar pneumonia and/or atelectasis. Resolved interstitial pulmonary edema. Small right pleural effusion. **This report has been created using voice recognition software. It may contain minor errors which are inherent in voice recognition technology. ** Final report electronically signed by Dr. Niko Quiroz on 1/6/2020 11:46 PM    Xr Chest Portable    Result Date: 1/5/2020  PROCEDURE: XR CHEST PORTABLE CLINICAL INFORMATION: 67-year-old female who had an endotracheal tube, orogastric tube and central line placed. COMPARISON: X-ray dated 1/5/2020 6:42 AM. TECHNIQUE: AP supine view of the chest was obtained. FINDINGS: There has been interval placement of a right-sided subclavian vein access central venous catheter which terminates at the right atrium. An endotracheal tube is in place approximately 2.5 cm from the ramirez.  A nasogastric tube is seen coursing along the route of the

## 2020-01-08 NOTE — PROGRESS NOTES
Consult received. Chart reviewed. Will follow patients progress with therapy. Await thoracentesis to be completed Friday.

## 2020-01-08 NOTE — PROGRESS NOTES
[facility]  INPATIENT PHYSICAL THERAPY  DAILY NOTE  [facility] MED SURG 8B - 8B-31/031-A  Time In: 0935  Time Out: 1005  Timed Code Treatment Minutes: 30 Minutes  Minutes: 30          Date: 2020  Patient Name: Mikael Noriega,  Gender:  female        MRN: [id number]  : 1940  ([de-identified] y.o.)     Referring Practitioner: Radha Amaya MD  Diagnosis: Elevated troponin  Additional Pertinent Hx: Patient is an 79-year-old white female lifetime non-smoker. Patient has a history of left breast cancer diagnosed in  and treated with lumpectomy. She receives chronic anticoagulation with Eliquis for atrial fibrillation. She has a history of hypothyroidism, osteopenia, hypertension, and chronic kidney disease. Patient presented to the emergency room and was admitted on 2020. She presented with complaints of poor oral intake with significant weight loss over the past 3 months. She has a very poor appetite and feels nauseated when eating. As her nutritional status depleted, she became more and more fatigued. Patient was seen by gastroenterology and underwent EGD on 1/3/2020. This demonstrated ulcerative erosive gastritis and duodenitis. Patient deteriorated on 2020 associated with respiratory failure. She developed hypoxemia and required intubation. She underwent bronchoscopy 2020. This demonstrated purulent pneumonia bilaterally. She was started on Zosyn 2020.      Prior Level of Function:  Lives With: Alone(Pt stating her son stays with her for 2 weeks at a time)  Type of Home: House  Home Layout: One level  Home Equipment: Rolling walker   Bathroom Shower/Tub: Walk-in shower  Bathroom Toilet: Standard  Bathroom Accessibility: Accessible    ADL Assistance: Independent  Homemaking Assistance: Independent  Ambulation Assistance: Independent  Transfer Assistance: Independent  Active : Yes  IADL Comments: Pt reporting she \"did it all\"  Additional Comments: Pt stating she was

## 2020-01-09 ENCOUNTER — APPOINTMENT (OUTPATIENT)
Dept: GENERAL RADIOLOGY | Age: 80
DRG: 377 | End: 2020-01-09
Payer: MEDICARE

## 2020-01-09 LAB
ABO: NORMAL
ANION GAP SERPL CALCULATED.3IONS-SCNC: 12 MEQ/L (ref 8–16)
ANTIBODY SCREEN: NORMAL
APTT: 61.8 SECONDS (ref 22–38)
APTT: 73.9 SECONDS (ref 22–38)
BUN BLDV-MCNC: 18 MG/DL (ref 7–22)
CALCIUM SERPL-MCNC: 8.6 MG/DL (ref 8.5–10.5)
CHLORIDE BLD-SCNC: 101 MEQ/L (ref 98–111)
CO2: 26 MEQ/L (ref 23–33)
CREAT SERPL-MCNC: 1.4 MG/DL (ref 0.4–1.2)
ERYTHROCYTE [DISTWIDTH] IN BLOOD BY AUTOMATED COUNT: 15.6 % (ref 11.5–14.5)
ERYTHROCYTE [DISTWIDTH] IN BLOOD BY AUTOMATED COUNT: 57.6 FL (ref 35–45)
GFR SERPL CREATININE-BSD FRML MDRD: 36 ML/MIN/1.73M2
GLUCOSE BLD-MCNC: 148 MG/DL (ref 70–108)
GRAM STAIN RESULT: ABNORMAL
HCT VFR BLD CALC: 23.4 % (ref 37–47)
HCT VFR BLD CALC: 27.6 % (ref 37–47)
HEMOCCULT STL QL: NEGATIVE
HEMOGLOBIN: 7.2 GM/DL (ref 12–16)
HEMOGLOBIN: 7.5 GM/DL (ref 12–16)
HEMOGLOBIN: 9 GM/DL (ref 12–16)
INR BLD: 1.31 (ref 0.85–1.13)
INR BLD: 1.41 (ref 0.85–1.13)
MAGNESIUM: 1.9 MG/DL (ref 1.6–2.4)
MCH RBC QN AUTO: 32.6 PG (ref 26–33)
MCHC RBC AUTO-ENTMCNC: 32.1 GM/DL (ref 32.2–35.5)
MCV RBC AUTO: 101.7 FL (ref 81–99)
ORGANISM: ABNORMAL
PLATELET # BLD: 222 THOU/MM3 (ref 130–400)
PMV BLD AUTO: 9.5 FL (ref 9.4–12.4)
POTASSIUM SERPL-SCNC: 3.6 MEQ/L (ref 3.5–5.2)
RBC # BLD: 2.3 MILL/MM3 (ref 4.2–5.4)
RESPIRATORY CULTURE: ABNORMAL
RESPIRATORY CULTURE: ABNORMAL
RH FACTOR: NORMAL
SODIUM BLD-SCNC: 139 MEQ/L (ref 135–145)
WBC # BLD: 6.2 THOU/MM3 (ref 4.8–10.8)

## 2020-01-09 PROCEDURE — 71045 X-RAY EXAM CHEST 1 VIEW: CPT

## 2020-01-09 PROCEDURE — 82272 OCCULT BLD FECES 1-3 TESTS: CPT

## 2020-01-09 PROCEDURE — 2580000003 HC RX 258: Performed by: PHYSICIAN ASSISTANT

## 2020-01-09 PROCEDURE — 36430 TRANSFUSION BLD/BLD COMPNT: CPT

## 2020-01-09 PROCEDURE — 2580000003 HC RX 258: Performed by: NURSE PRACTITIONER

## 2020-01-09 PROCEDURE — 36415 COLL VENOUS BLD VENIPUNCTURE: CPT

## 2020-01-09 PROCEDURE — 51701 INSERT BLADDER CATHETER: CPT

## 2020-01-09 PROCEDURE — 6360000002 HC RX W HCPCS: Performed by: OPHTHALMOLOGY

## 2020-01-09 PROCEDURE — P9016 RBC LEUKOCYTES REDUCED: HCPCS

## 2020-01-09 PROCEDURE — P9045 ALBUMIN (HUMAN), 5%, 250 ML: HCPCS | Performed by: INTERNAL MEDICINE

## 2020-01-09 PROCEDURE — 51798 US URINE CAPACITY MEASURE: CPT

## 2020-01-09 PROCEDURE — 85027 COMPLETE CBC AUTOMATED: CPT

## 2020-01-09 PROCEDURE — 85730 THROMBOPLASTIN TIME PARTIAL: CPT

## 2020-01-09 PROCEDURE — 94761 N-INVAS EAR/PLS OXIMETRY MLT: CPT

## 2020-01-09 PROCEDURE — 86850 RBC ANTIBODY SCREEN: CPT

## 2020-01-09 PROCEDURE — 85014 HEMATOCRIT: CPT

## 2020-01-09 PROCEDURE — 6360000002 HC RX W HCPCS: Performed by: NURSE PRACTITIONER

## 2020-01-09 PROCEDURE — 6370000000 HC RX 637 (ALT 250 FOR IP): Performed by: INTERNAL MEDICINE

## 2020-01-09 PROCEDURE — 86923 COMPATIBILITY TEST ELECTRIC: CPT

## 2020-01-09 PROCEDURE — 6360000002 HC RX W HCPCS: Performed by: INTERNAL MEDICINE

## 2020-01-09 PROCEDURE — 99232 SBSQ HOSP IP/OBS MODERATE 35: CPT | Performed by: INTERNAL MEDICINE

## 2020-01-09 PROCEDURE — 86900 BLOOD TYPING SEROLOGIC ABO: CPT

## 2020-01-09 PROCEDURE — 2140000000 HC CCU INTERMEDIATE R&B

## 2020-01-09 PROCEDURE — 2709999900 HC NON-CHARGEABLE SUPPLY

## 2020-01-09 PROCEDURE — 80048 BASIC METABOLIC PNL TOTAL CA: CPT

## 2020-01-09 PROCEDURE — 2580000003 HC RX 258: Performed by: INTERNAL MEDICINE

## 2020-01-09 PROCEDURE — 94640 AIRWAY INHALATION TREATMENT: CPT

## 2020-01-09 PROCEDURE — APPSS30 APP SPLIT SHARED TIME 16-30 MINUTES: Performed by: NURSE PRACTITIONER

## 2020-01-09 PROCEDURE — C9113 INJ PANTOPRAZOLE SODIUM, VIA: HCPCS | Performed by: INTERNAL MEDICINE

## 2020-01-09 PROCEDURE — 6370000000 HC RX 637 (ALT 250 FOR IP): Performed by: PHYSICIAN ASSISTANT

## 2020-01-09 PROCEDURE — 99291 CRITICAL CARE FIRST HOUR: CPT | Performed by: INTERNAL MEDICINE

## 2020-01-09 PROCEDURE — 86901 BLOOD TYPING SEROLOGIC RH(D): CPT

## 2020-01-09 PROCEDURE — 85018 HEMOGLOBIN: CPT

## 2020-01-09 PROCEDURE — 6370000000 HC RX 637 (ALT 250 FOR IP): Performed by: NURSE PRACTITIONER

## 2020-01-09 PROCEDURE — 85610 PROTHROMBIN TIME: CPT

## 2020-01-09 PROCEDURE — 83735 ASSAY OF MAGNESIUM: CPT

## 2020-01-09 PROCEDURE — 6370000000 HC RX 637 (ALT 250 FOR IP): Performed by: OPHTHALMOLOGY

## 2020-01-09 RX ORDER — SODIUM CHLORIDE 0.9 % (FLUSH) 0.9 %
10 SYRINGE (ML) INJECTION PRN
Status: DISCONTINUED | OUTPATIENT
Start: 2020-01-09 | End: 2020-01-13 | Stop reason: HOSPADM

## 2020-01-09 RX ORDER — SODIUM CHLORIDE 0.9 % (FLUSH) 0.9 %
10 SYRINGE (ML) INJECTION 2 TIMES DAILY
Status: DISCONTINUED | OUTPATIENT
Start: 2020-01-09 | End: 2020-01-13 | Stop reason: HOSPADM

## 2020-01-09 RX ORDER — PANTOPRAZOLE SODIUM 40 MG/1
40 TABLET, DELAYED RELEASE ORAL
Status: DISCONTINUED | OUTPATIENT
Start: 2020-01-13 | End: 2020-01-13 | Stop reason: HOSPADM

## 2020-01-09 RX ORDER — 0.9 % SODIUM CHLORIDE 0.9 %
250 INTRAVENOUS SOLUTION INTRAVENOUS ONCE
Status: COMPLETED | OUTPATIENT
Start: 2020-01-09 | End: 2020-01-09

## 2020-01-09 RX ORDER — ACETAMINOPHEN 325 MG/1
650 TABLET ORAL EVERY 4 HOURS PRN
Status: DISCONTINUED | OUTPATIENT
Start: 2020-01-09 | End: 2020-01-13 | Stop reason: HOSPADM

## 2020-01-09 RX ORDER — HYDROXYZINE PAMOATE 25 MG/1
25 CAPSULE ORAL NIGHTLY PRN
Status: DISCONTINUED | OUTPATIENT
Start: 2020-01-09 | End: 2020-01-13 | Stop reason: HOSPADM

## 2020-01-09 RX ORDER — PANTOPRAZOLE SODIUM 40 MG/1
40 TABLET, DELAYED RELEASE ORAL
Status: DISCONTINUED | OUTPATIENT
Start: 2020-01-10 | End: 2020-01-09

## 2020-01-09 RX ORDER — FUROSEMIDE 10 MG/ML
20 INJECTION INTRAMUSCULAR; INTRAVENOUS PRN
Status: DISCONTINUED | OUTPATIENT
Start: 2020-01-09 | End: 2020-01-13 | Stop reason: HOSPADM

## 2020-01-09 RX ADMIN — SODIUM CHLORIDE 250 ML: 9 INJECTION, SOLUTION INTRAVENOUS at 12:30

## 2020-01-09 RX ADMIN — LEVOTHYROXINE SODIUM 50 MCG: 50 TABLET ORAL at 05:49

## 2020-01-09 RX ADMIN — ALBUMIN (HUMAN) 12.5 G: 12.5 INJECTION, SOLUTION INTRAVENOUS at 01:36

## 2020-01-09 RX ADMIN — ONDANSETRON 4 MG: 2 INJECTION INTRAMUSCULAR; INTRAVENOUS at 15:29

## 2020-01-09 RX ADMIN — HYDROXYZINE PAMOATE 25 MG: 25 CAPSULE ORAL at 02:16

## 2020-01-09 RX ADMIN — PROPAFENONE HYDROCHLORIDE 150 MG: 150 TABLET, FILM COATED ORAL at 02:02

## 2020-01-09 RX ADMIN — Medication 500000 UNITS: at 17:39

## 2020-01-09 RX ADMIN — ONDANSETRON 4 MG: 2 INJECTION INTRAMUSCULAR; INTRAVENOUS at 02:04

## 2020-01-09 RX ADMIN — ACETAMINOPHEN 650 MG: 325 TABLET ORAL at 15:14

## 2020-01-09 RX ADMIN — IPRATROPIUM BROMIDE AND ALBUTEROL SULFATE 1 AMPULE: .5; 3 SOLUTION RESPIRATORY (INHALATION) at 18:16

## 2020-01-09 RX ADMIN — SODIUM CHLORIDE 250 ML: 9 INJECTION, SOLUTION INTRAVENOUS at 15:41

## 2020-01-09 RX ADMIN — PIPERACILLIN AND TAZOBACTAM 3.38 G: 3; .375 INJECTION, POWDER, FOR SOLUTION INTRAVENOUS at 16:00

## 2020-01-09 RX ADMIN — POTASSIUM CHLORIDE 20 MEQ: 1500 TABLET, EXTENDED RELEASE ORAL at 15:14

## 2020-01-09 RX ADMIN — PIPERACILLIN AND TAZOBACTAM 3.38 G: 3; .375 INJECTION, POWDER, FOR SOLUTION INTRAVENOUS at 02:20

## 2020-01-09 RX ADMIN — SENNOSIDES 8.6 MG: 8.6 TABLET ORAL at 21:54

## 2020-01-09 RX ADMIN — PROPAFENONE HYDROCHLORIDE 150 MG: 150 TABLET, FILM COATED ORAL at 17:40

## 2020-01-09 RX ADMIN — ACETAMINOPHEN 650 MG: 325 TABLET ORAL at 02:16

## 2020-01-09 RX ADMIN — ROSUVASTATIN CALCIUM 5 MG: 10 TABLET, FILM COATED ORAL at 21:54

## 2020-01-09 RX ADMIN — Medication 10 ML: at 22:30

## 2020-01-09 RX ADMIN — SODIUM CHLORIDE 80 MG: 9 INJECTION, SOLUTION INTRAVENOUS at 15:14

## 2020-01-09 RX ADMIN — ONDANSETRON 4 MG: 2 INJECTION INTRAMUSCULAR; INTRAVENOUS at 10:06

## 2020-01-09 RX ADMIN — IPRATROPIUM BROMIDE AND ALBUTEROL SULFATE 1 AMPULE: .5; 3 SOLUTION RESPIRATORY (INHALATION) at 09:19

## 2020-01-09 RX ADMIN — SODIUM CHLORIDE 8 MG/HR: 9 INJECTION, SOLUTION INTRAVENOUS at 15:53

## 2020-01-09 ASSESSMENT — PAIN DESCRIPTION - ONSET
ONSET: ON-GOING
ONSET: ON-GOING

## 2020-01-09 ASSESSMENT — PAIN SCALES - GENERAL
PAINLEVEL_OUTOF10: 9
PAINLEVEL_OUTOF10: 5
PAINLEVEL_OUTOF10: 6
PAINLEVEL_OUTOF10: 5

## 2020-01-09 ASSESSMENT — PAIN DESCRIPTION - FREQUENCY
FREQUENCY: CONTINUOUS

## 2020-01-09 ASSESSMENT — PAIN DESCRIPTION - PAIN TYPE
TYPE: ACUTE PAIN

## 2020-01-09 ASSESSMENT — PAIN DESCRIPTION - LOCATION
LOCATION: HIP
LOCATION: ABDOMEN
LOCATION: ABDOMEN;HIP
LOCATION: HIP

## 2020-01-09 ASSESSMENT — PAIN DESCRIPTION - PROGRESSION
CLINICAL_PROGRESSION: RAPIDLY WORSENING
CLINICAL_PROGRESSION: NOT CHANGED
CLINICAL_PROGRESSION: NOT CHANGED

## 2020-01-09 ASSESSMENT — PAIN DESCRIPTION - ORIENTATION
ORIENTATION: ANTERIOR
ORIENTATION: LEFT
ORIENTATION: LEFT

## 2020-01-09 ASSESSMENT — PAIN DESCRIPTION - DESCRIPTORS
DESCRIPTORS: ACHING;SHARP
DESCRIPTORS: CONSTANT;SHARP
DESCRIPTORS: ACHING

## 2020-01-09 ASSESSMENT — PAIN - FUNCTIONAL ASSESSMENT
PAIN_FUNCTIONAL_ASSESSMENT: PREVENTS OR INTERFERES SOME ACTIVE ACTIVITIES AND ADLS
PAIN_FUNCTIONAL_ASSESSMENT: ACTIVITIES ARE NOT PREVENTED

## 2020-01-09 NOTE — PLAN OF CARE
Problem: Falls - Risk of:  Goal: Will remain free from falls  Description  Will remain free from falls  Outcome: Ongoing  Note:   Patient free from falls this shift. Patient continues on falling star program. Encouraged patient to wear non-skid slippers when ambulating. Patient is 1-2 assist. Side rails up x2. Continuing hourly checks, 5 p's monitored. Uses call light appropriately. Call light within reach. Problem: Falls - Risk of:  Goal: Absence of physical injury  Description  Absence of physical injury  Outcome: Ongoing  Note:   Pt remains free from physical injury this shift. Problem: Infection:  Goal: Will remain free from infection  Description  Will remain free from infection  Outcome: Ongoing  Note:   Pt on IV antibiotics. No temperature or s/s of infection noted this shift. Labwork in AM.     Problem: Safety:  Goal: Free from accidental physical injury  Description  Free from accidental physical injury  Outcome: Ongoing  Note:   Pt remains free from physical injury this shift. Problem: Daily Care:  Goal: Daily care needs are met  Description  Daily care needs are met  Outcome: Ongoing  Note:   Pt needs met during purposefully hourly rounding and as needed by using call light appropriately. Problem: Pain:  Goal: Pain level will decrease  Description  Pain level will decrease  Outcome: Ongoing  Note:   Patient voices pain 9/10. Patients pain goal is 0/10. PRN pain medications given as ordered. Non-pharmacological interventions include: repositioning, heat/kpad applied, and ice pack applied. Problem: Skin Integrity:  Goal: Skin integrity will stabilize  Description  Skin integrity will stabilize  Outcome: Ongoing  Note:   Pt able to reposition self. Reminded to reposition self during purposefully hourly rounding and educated on importance of weight distribution to prevent breakdown in high risk/bony prominences.       Problem: Discharge Planning:  Goal: Patients continuum of care

## 2020-01-09 NOTE — PROGRESS NOTES
[facility]  OCCUPATIONAL THERAPY MISSED TREATMENT NOTE  [facility] MED SURG 8B  8B-31/031-A      Date: 2020  Patient Name: Veronica Allen        CSN: [id number]   : 1940  ([de-identified] y.o.)  Gender: female   Referring Practitioner: Dr. Jovan Phoenix  Diagnosis: elevated troponin         REASON FOR MISSED TREATMENT: Patient Refused  Pt politely declines pt feeling nauseous

## 2020-01-09 NOTE — PROGRESS NOTES
Gastroenterology  Progress Note    1/9/2020 5:48 PM  Subjective:   Admit Date: 1/2/2020    Interval History:   Labs reviewed, acute GI blood loss anemia noted. Pt reports no evidence of melena or hematochezia. Transfusion, continue PPI and hold anticoagulation/. EGD for confirm active GI bleeding    Pt denies abdominal pain, N/V. Denies GERD/heartburn. Pt reports fair appetite, tolerating diet. Diet: Dietary Nutrition Supplements: Standard High Calorie Oral Supplement  Dietary Nutrition Supplements: Frozen Oral Supplement  DIET GENERAL;    Medications:   Scheduled Meds:   alteplase  1 mg Intracatheter Once    [START ON 1/13/2020] pantoprazole  40 mg Oral BID AC    multivitamin  1 tablet Oral Daily    ipratropium-albuterol  1 ampule Inhalation Q4H WA    nystatin  5 mL Oral 4x Daily    lisinopril  5 mg Oral Daily    [Held by provider] furosemide  40 mg Intravenous Daily    potassium chloride  20 mEq Oral Daily with breakfast    piperacillin-tazobactam  3.375 g Intravenous Q8H    [Held by provider] apixaban  2.5 mg Oral BID    levothyroxine  50 mcg Oral Daily    propafenone  150 mg Oral Q8H    phosphorus replacement protocol   Other RX Placeholder    rosuvastatin  5 mg Oral QPM    Vitamin D  1,000 Units Oral Daily    senna  1 tablet Oral BID     Continuous Infusions:   pantoprozole (PROTONIX) infusion 8 mg/hr (01/09/20 1553)    [Held by provider] heparin (porcine) Stopped (01/09/20 1501)     CBC:   Recent Labs     01/07/20  1525 01/08/20  0625 01/09/20  0601 01/09/20  0949   WBC 6.9 6.0 6.2  --    HGB 11.1* 10.2* 7.5* 7.2*    250 222  --      BMP:    Recent Labs     01/07/20  0040 01/08/20  0625 01/09/20  0601   * 136 139   K 3.8 3.2* 3.6   CL 93* 98 101   CO2 24 26 26   BUN 14 13 18   CREATININE 1.1 1.5* 1.4*   GLUCOSE 112* 75 148*     Hepatic: No results for input(s): AST, ALT, ALB, BILITOT, ALKPHOS in the last 72 hours.   INR:   Recent Labs     01/09/20  0601 01/09/20  1455   INR 1.41* 1.31*     Xray:   [place] [address]                                 OPERATIVE REPORT     PATIENT NAME: Alberto Subramanian                     :        1940  MED REC NO:   [id number]                           ROOM:       0010  ACCOUNT NO:   [de-identified]                           ADMIT DATE: 2020  PROVIDER:     Sue Ahuja M.D.     DATE OF PROCEDURE:  2020     INDICATIONS:  The patient with history of weight loss, abdominal  discomfort, inability to eat a lot, feels, nausea, vomit lately. Slight  difficulty to swallow. Plan today for EGD to evaluate.     SURGEON:  Sue Ahuja MD     ASA CLASSIFICATION:  III.     Estimated blood loss in none      DESCRIPTION OF PROCEDURE:  The patient was brought to GI lab. Consent  was obtained. Risks involved with the procedure were explained to the  patient. Informed consent was obtained. The patient was monitored  during the procedure with pulse oximetry, blood pressure monitoring, and  oxygen by nasal cannula. Sedation done by incremental doses of IV  Versed, total 1 mg of Versed and 50 mcg of fentanyl given in incremental  dosage during the procedure to achieve continuous conscious sedation.     PROCEDURE PERFORMED:  EGD.     Standard video 190 Olympus upper scope was advanced under direct vision  from the oral cavity up to the duodenum. Esophagus appeared normal.  No  erosions or ulcerations seen. The gastroesophageal junction was at 38  cm from the incisors. Scope was advanced to the stomach. Retroflex  examination of the cardia revealed mild gastritis. Ulcerative erosive  gastritis seen, distal part of the body of the antrum. No bleeding  seen. The ulcers are superficial.  Scope was advanced to the duodenum,  showed erosive duodenitis. The findings really cannot explain the  patient's discomfort.   I elected not to take a biopsy as the patient  likewise at this time has high risk to bleed. The scope was withdrawn  with no immediate complications.     IMPRESSION:  1. Ulcerative, erosive gastritis in the antrum. 2.  Erosive duodenitis. 3.  Gastritis.     PLAN:  1. Start PPI, should be twice a day for now. Dose can be reduced to  once a day later once the patient clinically improves. 2.  Advance the diet as tolerated. 3.  Elective colonoscopy to be done as an outpatient.      Kash Garcia M.D. Objective:   Vitals: BP (!) 103/51   Pulse 127   Temp 98.1 °F (36.7 °C) (Oral)   Resp 16   Ht 5' (1.524 m)   Wt 104 lb 8 oz (47.4 kg)   SpO2 95%   BMI 20.41 kg/m²     Intake/Output Summary (Last 24 hours) at 1/9/2020 1748  Last data filed at 1/9/2020 1630  Gross per 24 hour   Intake 978.11 ml   Output --   Net 978.11 ml     General appearance: alert and cooperative with exam.  Well groomed, well nourished  female, appears ill, not toxic. Lungs: clear to auscultation bilaterally  Heart: regular rate and rhythm, S1, S2 normal, no murmur, click, rub or gallop  Abdomen: soft, non-tender; bowel sounds normal; no masses,  no organomegaly  Extremities: extremities normal, atraumatic, no cyanosis or edema    Assessment and Plan:   1. Acute GI blood loss anemia due to possible GI bleeding. Patient had an erosive ulcerative gastris and erosive duodenitis, continue PPI, avoid NSAIDs. 2. Elective colonoscopy as out patient   3. Generalized weakness - continue PT/OT per Attending.         Follow up in GI Clinic after discharge in 2 week(s)    Patient Active Problem List:     AF (atrial fibrillation) (HCC)     Anticoagulated on Coumadin     HTN (hypertension)     Nausea     Physical deconditioning     Loss of appetite     Chronic renal failure, stage 3 (moderate) (HCC)     Elevated troponin    Christian Archer MD

## 2020-01-09 NOTE — PROGRESS NOTES
without rupture measuring 4 cm: f/u as outpatient  9. CKD 3: Stable, monitor  10. History of left breast CA s/p lumpectomy  11. Chronic atrial fibrillation on Eliquis; Eliquis currently held- on heparin drip, Hgb started to decrease as of 1/9.  2 un  12. Severe protein calorie malnutrition: Dietitian following; daily multivitamin recommended; appetite stimulant also recommended but will hold off for now  13. Essential HTN: stable  14. Acquired hypothyroidism: levothyroxine  15. Recent UTI: currently on Zosyn. Was on ciprofloxacin as an outpatient. Repeat urine culture not resulted as yet   16. Fall prior to admission without head trauma: CT head deferred  17. Physical deconditioning: PT/OT. Plan to discharge to [facility]    PLAN     1. Patent hemodynamically unstable at the moment and will be transferred to step down unit. 2. 2 units of PRBCs ordered. GI re-consulted. Started on Protonix drip. FOBT ordered and heparin drip held. Check INR  3. R thoracentesis planned for 1/10, but may need to postpone until patient more stable. Latest CXR obtained on 1/9 shows that patient may not have significant bilateral pleural effusions any longer and may have been diuresed sufficiently and may not need thoracentesis  4. F/U CT chest in 1 month recommended after discharge  5. Continue to hold Lasix and Xarelto  6. Continue to monitor    Anticipated Discharge in: TBD. Plan to discharge to ECF/inpatient rehab    Code Status: Prior    Electronically signed by Funmilayo Macias MD on 1/9/2020 at 2:10 PM   Critical care time: 40 minutes      Chief Complaint     Generalized weakness    SUBJECTIVE     The patient is a [de-identified] y.o. Aneita Kathie yo female w/ a PMH of chronic atrial fibrillation on anticoagulation with Eliquis, essential HTN, CKD 3 who presented on 1/2/2020 with symptoms of nausea, generalized weakness, decreased appetitie, poor oral intake and weight loss. Weakness.  One day prior to admission she fell and was being treated for a UTI with ciprofloxacin. She did mention symptoms of lightheadedness. She was taking off Norvasc for a month but her BP was up so she was restarted on it again prior to admission.     - Hypotensive this am with SBP to 80s again. Hgb to 7.2 No overt evidence of internal bleeding but patient did have duodenitis and gastritis on recent EGD. She is currently on a heparin drip. Patient does have symptoms of lightheadedness and generalized weakness.  Her appeitie is improving      OBJECTIVE     Medications:  Reviewed    Infusion Medications    pantoprozole (PROTONIX) infusion      [Held by provider] heparin (porcine) 18 Units/kg/hr (01/09/20 0716)     Scheduled Medications    alteplase  1 mg Intracatheter Once    0.9 % sodium chloride  250 mL Intravenous Once    [START ON 1/10/2020] pantoprazole  40 mg Oral QAM AC    pantoprazole (PROTONIX) bolus  80 mg Intravenous Once    multivitamin  1 tablet Oral Daily    ipratropium-albuterol  1 ampule Inhalation Q4H WA    [Held by provider] pantoprazole  40 mg Oral BID    nystatin  5 mL Oral 4x Daily    lisinopril  5 mg Oral Daily    [Held by provider] furosemide  40 mg Intravenous Daily    potassium chloride  20 mEq Oral Daily with breakfast    piperacillin-tazobactam  3.375 g Intravenous Q8H    [Held by provider] apixaban  2.5 mg Oral BID    levothyroxine  50 mcg Oral Daily    propafenone  150 mg Oral Q8H    phosphorus replacement protocol   Other RX Placeholder    rosuvastatin  5 mg Oral QPM    Vitamin D  1,000 Units Oral Daily    senna  1 tablet Oral BID     PRN Meds: acetaminophen, hydrOXYzine, menthol, phenol, [Held by provider] heparin (porcine), [Held by provider] heparin (porcine), promethazine, potassium chloride, ondansetron    Ins and outs:      Intake/Output Summary (Last 24 hours) at 1/9/2020 1410  Last data filed at 1/8/2020 2106  Gross per 24 hour   Intake 878.11 ml   Output 900 ml   Net -21.89 ml       Physical Examination     /71   Pulse 108 Temp 97.6 °F (36.4 °C) (Oral)   Resp 16   Ht 5' (1.524 m)   Wt 104 lb 8 oz (47.4 kg)   SpO2 95%   BMI 20.41 kg/m²     General appearance: No apparent distress. Pale appearing. Appears ill  HEENT: Extraocular motion intact. Trachea midline, Oral mucosa dry  Neck: Supple  Respiratory:  Decreased breath sounds at bilateral lung bases. Ronchorous cough  Cardiovascular: Irregular rate and rhythm. 3/6 VALERIE , rubs or gallops. Abdomen: Soft, non-tender, non-distended with normal bowel sounds. Musculoskeletal: Patient is moving extremities x 4 spontaneously  Neurologic: Grossly non focal. CN: II-XII intact  Psychiatric: Alert and oriented  Vascular: Dorsalis pedis palpable bilaterally. Radial pulses palpable bilaterally. Mild bilateral LE edema. Skin:  No visible rashes or lesions. LINES: R subclavian CVC  Interval removal of [place] catheter    Labs     Recent Labs     01/07/20  1525 01/08/20  0625 01/09/20  0601 01/09/20  0949   WBC 6.9 6.0 6.2  --    HGB 11.1* 10.2* 7.5* 7.2*   HCT 33.3* 31.7* 23.4*  --     250 222  --      Recent Labs     01/07/20  0040 01/08/20  0625 01/09/20  0601   * 136 139   K 3.8 3.2* 3.6   CL 93* 98 101   CO2 24 26 26   BUN 14 13 18   CREATININE 1.1 1.5* 1.4*   CALCIUM 8.6 8.4* 8.6   PHOS  --  2.5  --      No results for input(s): AST, ALT, BILIDIR, BILITOT, ALKPHOS in the last 72 hours. Recent Labs     01/09/20  0601   INR 1.41*     No results for input(s): Antonio Shall in the last 72 hours.     Urinalysis:      Lab Results   Component Value Date    NITRU NEGATIVE 01/03/2020    WBCUA 25-50W/CLUMPS 12/23/2019    BACTERIA MODERATE 12/23/2019    RBCUA NONE 12/23/2019    BLOODU NEGATIVE 01/03/2020    SPECGRAV 1.020 12/23/2019    GLUCOSEU NEGATIVE 01/03/2020       Diagnostic imaging/procedures     Ct Abdomen Pelvis Wo Contrast Additional Contrast? Oral    Result Date: 1/3/2020  PROCEDURE: CT ABDOMEN PELVIS WO CONTRAST CLINICAL INFORMATION: Weight loss, nausea TECHNIQUE: CT of the abdomen and pelvis was performed following administration of oral contrast only. Intravenous contrast was not administered at the request of the ordering physician. Axial images as well as coronal and sagittal reconstructions were obtained. All CT scans at this facility use dose modulation, iterative reconstruction, and/or weight-based dosing when appropriate to reduce radiation dose to as low as reasonably achievable. COMPARISON: None FINDINGS: Lower thorax: There is pleural thickening at the bilateral lung bases. Minimal alveolar and reticular opacities are seen. The heart is enlarged. Abdomen: Evaluation is limited due to absence of intravenous contrast. There is no free intraperitoneal air or fluid. Bowel is normal in course and caliber without evidence of obstruction. A 1.4 cm hypoattenuating lesion at the lower right hepatic lobe may be a cyst but is incompletely characterized on the current noncontrast study (image 23). The gallbladder, pancreas, spleen, adrenal glands and right kidney are normal within limits of a noncontrast examination. A hypoattenuating lesion at the upper left kidney may be a cyst but is incompletely characterized (image 16). Atherosclerotic calcifications are seen in a tortuous abdominal aorta without evidence of aneurysm. There is no mesenteric or retroperitoneal lymphadenopathy. Severe degenerative and  scoliotic changes are noted in the lumbar spine without evidence of aggressive osseous lesions. Pelvis: The urinary bladder is markedly distended. There are phleboliths in the pelvis. There is no pelvic or inguinal lymphadenopathy. Degenerative changes are present in the pelvis without evidence of osseous lesions. 1. Bibasilar atelectasis/infiltrate. 2. No acute intra-abdominal or intrapelvic findings.  Final report electronically signed by Dr. Vipul Baez on 1/3/2020 10:13 AM    Xr Chest Standard (2 Vw)    Result Date: 1/7/2020  PROCEDURE: XR CHEST (2 VW) CLINICAL scans at this facility use dose modulation, iterative reconstruction, and/or weight based dosing when appropriate to reduce the radiation dose to as low as reasonably achievable. CONTRAST: 80 mL Isovue-370 intravenously. FINDINGS: POSTOPERATIVE CHANGES: None. LINES/TUBES/MECHANICAL DEVICES: 1. There is a right subclavian central venous catheter with the distal tip extending to the junction of the superior vena cava and right atrium. HEART/MEDIASTINUM: 1. There is mild cardiomegaly. 2. There is no pericardial fluid or thickening. PULMONARY ARTERIES: 1. There is no pulmonary embolus. THORACIC AORTA: 1. 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 2. There is atheromatous plaque along the thoracic and proximal aorta. LUNG FIELDS - INFILTRATE/PLEURAL EFFUSION: 1. There are bilateral pleural effusions, moderate on the right and small on the left. There is consolidation adjacent to both pleural effusions. There are also mild scattered groundglass infiltrates within the lung fields bilaterally. In addition, there  is pleural-based consolidation along the anterior aspect of the left mid chest. LUNG FIELDS - MASS/NODULE: 1. There are no other suspicious masses or nodules within the lung fields. LYMPHADENOPATHY: 1. There are a few middle mediastinal lymph nodes, largest measuring 1.4 x 0.9 cm. PNEUMOTHORAX: None. THYROID GLAND: Unremarkable. TRACHEA/ESOPHAGUS: 1. There is a distended gas-filled esophagus. 2. The trachea is unremarkable. MUSCULOSKELETAL: 1. There is no acute musculoskeletal abnormality. 2. There are degenerative changes at numerous levels along the spine. 3. There is levoscoliosis of the thoracic spine. UPPER ABDOMEN: 1. Atheromatous calcification abdominal aorta.  2. There is a partially imaged 1.8 cm hypodense nodule projecting from the posterolateral margin of the left kidney measuring 14 Hounsfield units suggesting a cyst. This was also present on the previous CT abdomen/pelvis examination dated 1/3/2020. OTHER: None. 1. There is no pulmonary embolus. 2. There are bilateral pleural effusions, moderate on the right and small on the left. There is consolidation adjacent to both pleural effusions. There are also mild scattered groundglass infiltrates within the lung fields bilaterally. In addition, there  is pleural-based consolidation along the anterior aspect of the left mid chest. 3. Numerous additional findings as described in the body the report. 4. Appropriate clinical management recommended. A follow-up CT examination of the chest with intravenous contrast in one month is also recommended to confirm resolution. **This report has been created using voice recognition software. It may contain minor errors which are inherent in voice recognition technology. ** Final report electronically signed by Dr. Rosalie Hair on 1/7/2020 7:53 AM    Xr Chest Portable    Result Date: 1/8/2020  PROCEDURE: XR CHEST PORTABLE CLINICAL INFORMATION: bilateral pleural effusions. COMPARISON: Chest x-ray dated 1/7/2020 TECHNIQUE: AP Portable chest xray FINDINGS: Lines/tubes/devices: Tip of the right subclavian central venous catheter remains in the right atrium. Lungs/pleura: There are stable small bilateral pleural effusions, larger on the right. There is improvement in the right basilar pneumonia and left lower lobe atelectasis versus pneumonia. There is no pneumothorax. Heart: There is stable mild cardiomegaly. Mediastinum/rashad: No obvious mass or adenopathy. Skeleton: There is again an S-shaped scoliosis of the thoracolumbar spine. Stable small bilateral pleural effusions, larger on the right. Improved right basilar pneumonia and left lower lobe atelectasis versus pneumonia. **This report has been created using voice recognition software. It may contain minor errors which are inherent in voice recognition technology. ** Final report electronically signed by Dr. Niko Quiroz on 1/8/2020 3:47 AM    Xr Chest Portable    Result Date: 1/6/2020  PROCEDURE: XR CHEST PORTABLE CLINICAL INFORMATION: shortness of breath. COMPARISON: Chest x-ray dated 1/5/2020 TECHNIQUE: AP Portable chest xray FINDINGS: Lines/tubes/devices: There has been interval removal of the endotracheal and nasogastric tubes. Right subclavian central venous catheter tip remains in the region of the right atrium. Lungs/pleura: There is a small right pleural effusion. There are worsening bibasilar infiltrates consistent with pneumonia and/or atelectasis, more severe on the right. . Previously noted interstitial infiltrates have resolved consistent with resolved pulmonary edema. There is no pneumothorax. Heart: Heart size is normal. Mediastinum/rashad: No obvious mass or adenopathy. Skeleton: There is again rotary dextroscoliosis of the lower thoracic and lumbar spine. Upper abdomen: Residual contrast is noted in the colon in the left upper quadrant. Interval removal of the endotracheal and nasogastric tubes. Worsening bibasilar pneumonia and/or atelectasis. Resolved interstitial pulmonary edema. Small right pleural effusion. **This report has been created using voice recognition software. It may contain minor errors which are inherent in voice recognition technology. ** Final report electronically signed by Dr. Triston Dubon on 1/6/2020 11:46 PM    Xr Chest Portable    Result Date: 1/5/2020  PROCEDURE: XR CHEST PORTABLE CLINICAL INFORMATION: 44-year-old female who had an endotracheal tube, orogastric tube and central line placed. COMPARISON: X-ray dated 1/5/2020 6:42 AM. TECHNIQUE: AP supine view of the chest was obtained. FINDINGS: There has been interval placement of a right-sided subclavian vein access central venous catheter which terminates at the right atrium. An endotracheal tube is in place approximately 2.5 cm from the ramirez.  A nasogastric tube is seen coursing along the route of the esophagus and coiled within the lumen of the stomach. There is cardiomegaly. There is some pulmonary vascular congestion. There are increasing opacities throughout the right lung which could be on the basis of a developing infiltrate. Atherosclerotic changes are seen in the thoracic aorta. There is no significant pleural effusion or pneumothorax. Visualized portions of the upper abdomen are within normal limits. The osseous structures are intact. No acute fractures or suspicious osseous lesions. 1. Medical devices as described above. 2. Scattered opacities are seen in the right lung which are new since the previous exam which was performed less than 4 hours prior. This could be related to developing infiltrates. Aspiration could have a similar appearance. 3. Cardiomegaly with mild pulmonary vascular congestion. **This report has been created using voice recognition software. It may contain minor errors which are inherent in voice recognition technology. ** Final report electronically signed by Dr Kandy aHrvey on 1/5/2020 10:51 AM    Us Chest Including Mediastinum    Result Date: 1/7/2020  PROCEDURE: US CHEST INCLUDING MEDIASTINUM CLINICAL INFORMATION: right pleural effusion . COMPARISON: CT chest January 7, 2020 at 7:31 AM TECHNIQUE: Grayscale imaging of the mediastinum and lower thorax was performed. FINDINGS: Mild to moderate sized pleural effusions bilaterally. 1.  Mild to moderate sized pleural effusions bilaterally Correlation advised **This report has been created using voice recognition software. It may contain minor errors which are inherent in voice recognition technology. ** Final report electronically signed by Dr. Indio Newsome on 1/7/2020 12:50 PM      DVT prophylaxis: [x] Heparin drip ( to be held today)                     Disposition:    [x] Rehab facility

## 2020-01-09 NOTE — PROGRESS NOTES
 Nausea & vomiting     Osteopenia     Pneumonia of both lungs due to infectious organism     Rheumatic fever     as a child    Sinus infection     Thyroid disease       Past Surgical History        Procedure Laterality Date    BREAST LUMPECTOMY Left     BRONCHOSCOPY N/A 2020    BRONCHOSCOPY performed by Roxy Weeks MD at [address] [place] [address] Street Left    [address]      DILATION AND CURETTAGE OF UTERUS      X2; SEVERAL YEARS AGO    JOINT REPLACEMENT Right 2010    KNEE    TONSILLECTOMY      UPPER GASTROINTESTINAL ENDOSCOPY N/A 1/3/2020    EGD ESOPHAGOGASTRODUODENOSCOPY performed by Sue Ahuja MD at [facility]     Meds    Current Medications    alteplase  1 mg Intracatheter Once    multivitamin  1 tablet Oral Daily    ipratropium-albuterol  1 ampule Inhalation Q4H WA    pantoprazole  40 mg Oral BID    nystatin  5 mL Oral 4x Daily    lisinopril  5 mg Oral Daily    [Held by provider] furosemide  40 mg Intravenous Daily    potassium chloride  20 mEq Oral Daily with breakfast    piperacillin-tazobactam  3.375 g Intravenous Q8H    [Held by provider] apixaban  2.5 mg Oral BID    levothyroxine  50 mcg Oral Daily    propafenone  150 mg Oral Q8H    phosphorus replacement protocol   Other RX Placeholder    rosuvastatin  5 mg Oral QPM    Vitamin D  1,000 Units Oral Daily    senna  1 tablet Oral BID     acetaminophen, hydrOXYzine, menthol, phenol, heparin (porcine), heparin (porcine), promethazine, potassium chloride, ondansetron  IV Drips/Infusions   heparin (porcine) 18 Units/kg/hr (20 0716)     Home Medications  Medications Prior to Admission: ondansetron (ZOFRAN) 4 MG tablet, Take 4 mg by mouth every 8 hours as needed for Nausea or Vomiting  [] promethazine (PHENERGAN) 25 MG tablet, Take 1 tablet by mouth every 6 hours as needed for Nausea WARNING:  May cause drowsiness. May impair ability to operate vehicles or machinery.   Do not use in combination with alcohol. [] ciprofloxacin (CIPRO) 500 MG tablet, Take 1 tablet by mouth 2 times daily for 7 days  atenolol (TENORMIN) 25 MG tablet, Take 12.5 mg by mouth 2 times daily   apixaban (ELIQUIS) 2.5 MG TABS tablet, Take by mouth 2 times daily  Calcium Carbonate (CALCIUM 600 PO), Take by mouth 2 times daily  Probiotic Product (PROBIOTIC-10 PO), Take by mouth daily  vitamin D (CHOLECALCIFEROL) 1000 UNIT TABS tablet, Take 1,000 Units by mouth daily  losartan (COZAAR) 50 MG tablet, 50 mg 2 times daily  potassium chloride (MICRO-K) 10 MEQ extended release capsule, Take 10 mEq by mouth daily  propafenone (RYTHMOL) 150 MG tablet, Take 150 mg by mouth every 8 hours  amLODIPine (NORVASC) 5 MG tablet, Take 5 mg by mouth daily Indications: High Blood Pressure  acetaminophen (TYLENOL ARTHRITIS PAIN) 650 MG extended release tablet, Take 650 mg by mouth as needed   bumetanide (BUMEX) 2 MG tablet, Take 2 mg by mouth daily Indications: Treatment with Diuretic Therapy   rosuvastatin (CRESTOR) 5 MG tablet, Take 5 mg by mouth every evening Indications: Blood Cholesterol Abnormal   levothyroxine (SYNTHROID) 50 MCG tablet, Take 50 mcg by mouth daily Indications: Impaired Thyroid Function   Biotin 1000 MCG TABS, Take 1,000 mcg by mouth nightly   aspirin 81 MG chewable tablet, Take 81 mg by mouth daily Indications: Treatment to Prevent Blood Clotting Take with food.   Diet    Dietary Nutrition Supplements: Standard High Calorie Oral Supplement  Dietary Nutrition Supplements: Frozen Oral Supplement  DIET GENERAL;  Allergies    Tramadol  Family History          Problem Relation Age of Onset    Arthritis Mother     High Blood Pressure Mother     Cancer Sister         LEUKEMIA    Arthritis Brother     Asthma Brother     Colon Polyps Brother 58    Heart Disease Brother      Sleep History    Never diagnosed with sleep apnea in the past    Social History     Social History     Socioeconomic History    Marital status: [[facility]]   Patient Vitals for the past 96 hrs (Last 3 readings):   Weight   20 0400 104 lb 8 oz (47.4 kg)       Exam   Physical Exam   Constitutional: No distress on room air. Patient appears elderly and grail. OOB to chair  Head: Normocephalic and atraumatic. Mouth/Throat: Oropharynx is clear and moist.  No oral thrush. Eyes: Conjunctivae are normal. Pupils are equal, round. No scleral icterus. Neck: Neck supple. No tracheal deviation present. No JVD  Cardiovascular: IRR. No peripheral edema  Pulmonary/Chest: Normal effort with bilateral air entry, clear breath sounds. No stridor. No respiratory distress. Patient exhibits no tenderness. Abdominal: Soft. Bowel sounds audible. No distension or tenderness to palp. Musculoskeletal: Moves all extremities; no clubbing/cyanosis  Neurological: Patient is alert and oriented to person, place, and time. Skin: Warm and dry. No bruising/bleeding   Labs  - Old records and notes have been reviewed in [facility]   ABG  No results found for: PH, PO2, PCO2, HCO3, O2SAT  No results found for: IFIO2, MODE, SETTIDVOL, SETPEEP  CBC  Recent Labs     20  1525 20  0625 20  0601 20  0949   WBC 6.9 6.0 6.2  --    RBC 3.39* 3.15* 2.30*  --    HGB 11.1* 10.2* 7.5* 7.2*   HCT 33.3* 31.7* 23.4*  --    MCV 98.2 100.6* 101.7*  --    MCH 32.7 32.4 32.6  --    MCHC 33.3 32.2 32.1*  --     250 222  --    MPV 9.3* 9.6 9.5  --       BMP  Recent Labs     20  0040 20  0625 20  0601   * 136 139   K 3.8 3.2* 3.6   CL 93* 98 101   CO2 24 26 26   BUN 14 13 18   CREATININE 1.1 1.5* 1.4*   GLUCOSE 112* 75 148*   MG  --  2.1 1.9   PHOS  --  2.5  --    CALCIUM 8.6 8.4* 8.6     LFT  No results for input(s): AST, ALT, ALB, BILITOT, ALKPHOS, LIPASE in the last 72 hours. Invalid input(s):   AMYLASE  TROP  Lab Results   Component Value Date    TROPONINT 0.015 2020    TROPONINT 0.013 2020    TROPONINT < 0.010 2020     BNP  No results for input(s): BNP in the last 72 hours. Lactic Acid  No results for input(s): LACTA in the last 72 hours. INR  Recent Labs     01/09/20  0601   INR 1.41*     PTT  Recent Labs     01/08/20  1847 01/09/20  0124 01/09/20  0601   APTT 81.1* 61.8* 73.9*     Glucose  No results for input(s): POCGLU in the last 72 hours. UA No results for input(s): SPECGRAV, PHUR, COLORU, CLARITYU, MUCUS, PROTEINU, BLOODU, RBCUA, WBCUA, BACTERIA, NITRU, GLUCOSEU, BILIRUBINUR, UROBILINOGEN, KETUA, LABCAST, LABCASTTY, AMORPHOS in the last 72 hours. Invalid input(s): CRYSTALS. PFTs               Sleep studies   N/A    Cultures    VRE (-)  MRSA (-)  Urine Culture: no growth prelim  Rapid Influenza A/B (-)    1/5/20  Respiratory Culture Normal gabbie- preliminary      Gram Stain Result Moderate segmented neutrophils observed. Rare epithelial cells observed. Many small gram positive bacilli. EKG     Echocardiogram   01/03/2020   ECHOCARDIOGRAM COMPLETE 2D W DOPPLER W COLOR. Conclusions      Summary   Normal left ventricle size and systolic function. Ejection fraction was   estimated at 60 %. There were no regional left ventricular wall motion   abnormalities and wall thickness was within normal limits. The left atrium is Severely dilated. Moderately enlarged right atrium size. Moderate tricuspid regurgitation visualized. Right ventricular systolic pressure measures 40mmhg. Moderate-to-severe mitral regurgitation with centrally directed jet. Mild to moderate mitral stenosis. Mitral valve area by doppler pressure half-time 1.6 cm2 . The peak mitral valve velocity was 2.5 m/s, peak gradient was 20 mmHg, and   the mean gradient was 9 mmHg. Mild-to-moderate aortic regurgitation is noted.       Signature      ----------------------------------------------------------------   Electronically signed by Dank Rivera MD (Interpreting   physician) on 01/03/2020 at 09:04 PM      Radiology    CXR  1/9/2020   XR CHEST mmHg. Secondary to valvular heart disease   -Nonsmoker   -Full Code    Plan   CXR reviewed today showing much smaller right pleural effusion  Patient stable on room air with no SOB/CP  Hgb drop today- ? PRBC today defer to primary and spoke with primary RN regarding this  CXR 2-view in AM to re-evaluate need for thoracentesis  -Plan is for right thoracentesis for 1/10/19 after Eliquis is held for 3 days  -Will schedule patient for ultrasound guided thoracentesis on right side of chest with interventional radiology service.  -Send pleural fluid to routine analysis including PH, total protein, LDH, triglycerides, Amylase, cell count, Cytology with cell block, Gram stain and cultures and  Fungal cultures. Please send simultaneous serum total protein and LDH for comparison.  -Dr. Lamont Coleman pts cardiologist discussed holding Eliquis if needed for tap (pt on for chronic Afib) - ok with this  -Ani Plummer was educated and informed about planned thoracentesis procedure and complications associated with it including but not limited to bleeding, infection, development of pneumothorax with requirement of chest tube placement and prolonged hospital stay. She agreed to take risk. She verbalizes understading of complications.  -Titrate Oxygen to keep Spo2 >90%. -Will hold anticoagulation for planned procedure.  -Heparin gtt started and Eliquis on HOLD- Message sent to Dr. Lamont Coleman about RX hold   -Will send necessary pre thoracentesis labs per interventional radiology protocol.  -Duonebs every 4 hours w/a for SOB/wheezng    -Encouraged activity OOB to chair for meals  -PT/OT  -ATB per primary   -Duonebs every 4 hours  -GI prophylaxis: Protonix  -DVT prophylaxis: Eliquis  -Stable currently from a pulmonary standpoint        Case discussed with nurse and patient. Pb BARRIOS Ruheeducated about my impression and plan. She verbalizes understanding. Questions and concerns addressed .      Electronically signed by   Checo Sal

## 2020-01-09 NOTE — PROGRESS NOTES
Patient stated this morning she was not feeling well, she said her hip hurt and she felt very week. Patient has a heating pad on left hip and patient stated she felt a little better. She requested to take her medication later in the afternoon after she got zofran. Thi nurse has asked her every hour during rounds if she would like her medication and she refused. Patient also stated she does not want her zosyn at this time because of having diarrhea and request immodium. Hospitalist stated she can have immodium if patient does not have cdiff, waiting of stool sample. Patient hemoglobin was 7.5 this morning, redrew her hemoglobin which then showed 7. 2. hospitalist notified and wanted a bolus of .9 started and to stop her heparin at 1300 and to give 2 units of red blood cells. hospitalist stated she wanted patient transferred to step down floor. Report was called to RAUL razo, patient will be transferred as soon as the room is clean.

## 2020-01-09 NOTE — PROGRESS NOTES
Patient had 2 soft formed stools and only two episodes of loose stool the night before which does not meet the protocol to send a specimen for C.diff testing.

## 2020-01-09 NOTE — PROGRESS NOTES
[facility]  PHYSICAL THERAPY MISSED TREATMENT NOTE  ACUTE CARE  STRZ MED SURG 8B       Pt declines all activity this date.  Pt appears very ill feeling this date and nurse in room states pt feeling nauseous , tired, etc. Pt with emesis bucket near bed and heat pad over abdomen       Missed Treatment  Catrachita Bowman PTA [id number]

## 2020-01-09 NOTE — PROGRESS NOTES
Hospitalist Progress Note    Patient:  Guanakito Neves  YOB: 1940  MRN: [id number]   PCP: VANDANA Pitt CNP         Acct: [de-identified]  Unit/Bed: [address] [place] [address]-    Date of Admission: 1/2/2020      ASSESSMENT   1. Acute respiratory failure with hypoxia with findings of bilateral pleural effusions and evidence for Corynebacterium pneumonia. Rapid response call s/p ICU stay. CTA chest negative for PE, but showed bilateral pleural effusions moderate on R and small on L. On Zosyn. Respiratory culture shows Corynebacterium so far. Seen by Pulmonlogy and R thoracentesis planned for 1/10. Currently weaned to RA  2. Bilateral pleural effusions in the setting of mild to moderate mitral stenosis, moderate to severe MR, pulmonary HTN, moderate TR and persistent atrial fibrillation. As seen on Echo and US. Cardiology following. Needs MV repair? Lasix currently held as patient hypotensive. Planning for R thoracentesis on 1/10 if patient stable- may need to be delayed secondary to hypotension today  3. Hypotension with evidence for acute blood loss anemia:  Hypotension initially noted on 1/8. Given Albumin and improved. Decreased again on 1/9 with evidence for acute blood loss anemia with Hgb to 7.2 from 10.2 while on heparin drip. Heparin drip held. 2 units of PRBCs ordered. GI to be reconsulted and patient to be placed on protonix drip. Also given 250 mL bolus of NS.  4. Erosive ulcerative gastritis and erosive duodenitis s/p EGD on 1/3: Seen by GI Dr. Ed Gregory. Was tolerating heparin drip up until 1/9 when Hgb decreased. Patient then transitioned from PPI to Protonix drip  5. Esophageal candidiasis: on nystatin  6. Elevated troponin thought to be related to demand ischemia related to #1 and #2, seen by Cardiology. Echo showed EF of 60% with no wall motion abnormalities. 7. Hyponatremia: hypervolemic hyponatremia. Likely to improve with diuretics.   8. Incidental thoracic ascending aorta aneurysm ciprofloxacin. She did mention symptoms of lightheadedness. She was taking off Norvasc for a month but her BP was up so she was restarted on it again prior to admission.     - Hypotensive this am with SBP to 80s again. Hgb to 7.2 No overt evidence of internal bleeding but patient did have duodenitis and gastritis on recent EGD. She is currently on a heparin drip. Patient does have symptoms of lightheadedness and generalized weakness.  Her appeitie is improving      OBJECTIVE     Medications:  Reviewed    Infusion Medications    pantoprozole (PROTONIX) infusion 8 mg/hr (01/09/20 8423)    [Held by provider] heparin (porcine) Stopped (01/09/20 1501)     Scheduled Medications    alteplase  1 mg Intracatheter Once    [START ON 1/13/2020] pantoprazole  40 mg Oral BID AC    multivitamin  1 tablet Oral Daily    ipratropium-albuterol  1 ampule Inhalation Q4H WA    nystatin  5 mL Oral 4x Daily    lisinopril  5 mg Oral Daily    [Held by provider] furosemide  40 mg Intravenous Daily    potassium chloride  20 mEq Oral Daily with breakfast    piperacillin-tazobactam  3.375 g Intravenous Q8H    [Held by provider] apixaban  2.5 mg Oral BID    levothyroxine  50 mcg Oral Daily    propafenone  150 mg Oral Q8H    phosphorus replacement protocol   Other RX Placeholder    rosuvastatin  5 mg Oral QPM    Vitamin D  1,000 Units Oral Daily    senna  1 tablet Oral BID     PRN Meds: acetaminophen, hydrOXYzine, furosemide, menthol, phenol, [Held by provider] heparin (porcine), [Held by provider] heparin (porcine), promethazine, potassium chloride, ondansetron    Ins and outs:      Intake/Output Summary (Last 24 hours) at 1/9/2020 1757  Last data filed at 1/9/2020 1630  Gross per 24 hour   Intake 978.11 ml   Output --   Net 978.11 ml       Physical Examination     /65   Pulse 115   Temp 98.1 °F (36.7 °C) (Oral)   Resp 16   Ht 5' (1.524 m)   Wt 104 lb 8 oz (47.4 kg)   SpO2 93%   BMI 20.41 kg/m²     General appearance: No apparent distress. Pale appearing. Appears ill  HEENT: Extraocular motion intact. Trachea midline, Oral mucosa dry  Neck: Supple  Respiratory:  Decreased breath sounds at bilateral lung bases. Ronchorous cough  Cardiovascular: Irregular rate and rhythm. 3/6 VALERIE , rubs or gallops. Abdomen: Soft, non-tender, non-distended with normal bowel sounds. Musculoskeletal: Patient is moving extremities x 4 spontaneously  Neurologic: Grossly non focal. CN: II-XII intact  Psychiatric: Alert and oriented  Vascular: Dorsalis pedis palpable bilaterally. Radial pulses palpable bilaterally. Mild bilateral LE edema. Skin:  No visible rashes or lesions. LINES: R subclavian CVC  Interval removal of [place] catheter    Labs     Recent Labs     01/07/20  1525 01/08/20  0625 01/09/20  0601 01/09/20  0949   WBC 6.9 6.0 6.2  --    HGB 11.1* 10.2* 7.5* 7.2*   HCT 33.3* 31.7* 23.4*  --     250 222  --      Recent Labs     01/07/20  0040 01/08/20  0625 01/09/20  0601   * 136 139   K 3.8 3.2* 3.6   CL 93* 98 101   CO2 24 26 26   BUN 14 13 18   CREATININE 1.1 1.5* 1.4*   CALCIUM 8.6 8.4* 8.6   PHOS  --  2.5  --      No results for input(s): AST, ALT, BILIDIR, BILITOT, ALKPHOS in the last 72 hours. Recent Labs     01/09/20  0601 01/09/20  1455   INR 1.41* 1.31*     No results for input(s): Elise Flatter in the last 72 hours. Urinalysis:      Lab Results   Component Value Date    NITRU NEGATIVE 01/03/2020    WBCUA 25-50W/CLUMPS 12/23/2019    BACTERIA MODERATE 12/23/2019    RBCUA NONE 12/23/2019    BLOODU NEGATIVE 01/03/2020    SPECGRAV 1.020 12/23/2019    GLUCOSEU NEGATIVE 01/03/2020       Diagnostic imaging/procedures     Ct Abdomen Pelvis Wo Contrast Additional Contrast? Oral    Result Date: 1/3/2020  PROCEDURE: CT ABDOMEN PELVIS WO CONTRAST CLINICAL INFORMATION: Weight loss, nausea TECHNIQUE: CT of the abdomen and pelvis was performed following administration of oral contrast only.  Intravenous contrast reduce the radiation dose to as low as reasonably achievable. CONTRAST: 80 mL Isovue-370 intravenously. FINDINGS: POSTOPERATIVE CHANGES: None. LINES/TUBES/MECHANICAL DEVICES: 1. There is a right subclavian central venous catheter with the distal tip extending to the junction of the superior vena cava and right atrium. HEART/MEDIASTINUM: 1. There is mild cardiomegaly. 2. There is no pericardial fluid or thickening. PULMONARY ARTERIES: 1. There is no pulmonary embolus. THORACIC AORTA: 1. 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 2. There is atheromatous plaque along the thoracic and proximal aorta. LUNG FIELDS - INFILTRATE/PLEURAL EFFUSION: 1. There are bilateral pleural effusions, moderate on the right and small on the left. There is consolidation adjacent to both pleural effusions. There are also mild scattered groundglass infiltrates within the lung fields bilaterally. In addition, there  is pleural-based consolidation along the anterior aspect of the left mid chest. LUNG FIELDS - MASS/NODULE: 1. There are no other suspicious masses or nodules within the lung fields. LYMPHADENOPATHY: 1. There are a few middle mediastinal lymph nodes, largest measuring 1.4 x 0.9 cm. PNEUMOTHORAX: None. THYROID GLAND: Unremarkable. TRACHEA/ESOPHAGUS: 1. There is a distended gas-filled esophagus. 2. The trachea is unremarkable. MUSCULOSKELETAL: 1. There is no acute musculoskeletal abnormality. 2. There are degenerative changes at numerous levels along the spine. 3. There is levoscoliosis of the thoracic spine. UPPER ABDOMEN: 1. Atheromatous calcification abdominal aorta. 2. There is a partially imaged 1.8 cm hypodense nodule projecting from the posterolateral margin of the left kidney measuring 14 Hounsfield units suggesting a cyst. This was also present on the previous CT abdomen/pelvis examination dated 1/3/2020. OTHER: None. 1. There is no pulmonary embolus.  2. There are bilateral pleural effusions, moderate on the right and small on the left. There is consolidation adjacent to both pleural effusions. There are also mild scattered groundglass infiltrates within the lung fields bilaterally. In addition, there  is pleural-based consolidation along the anterior aspect of the left mid chest. 3. Numerous additional findings as described in the body the report. 4. Appropriate clinical management recommended. A follow-up CT examination of the chest with intravenous contrast in one month is also recommended to confirm resolution. **This report has been created using voice recognition software. It may contain minor errors which are inherent in voice recognition technology. ** Final report electronically signed by Dr. Jason Devine on 1/7/2020 7:53 AM    Xr Chest Portable    Result Date: 1/8/2020  PROCEDURE: XR CHEST PORTABLE CLINICAL INFORMATION: bilateral pleural effusions. COMPARISON: Chest x-ray dated 1/7/2020 TECHNIQUE: AP Portable chest xray FINDINGS: Lines/tubes/devices: Tip of the right subclavian central venous catheter remains in the right atrium. Lungs/pleura: There are stable small bilateral pleural effusions, larger on the right. There is improvement in the right basilar pneumonia and left lower lobe atelectasis versus pneumonia. There is no pneumothorax. Heart: There is stable mild cardiomegaly. Mediastinum/rashad: No obvious mass or adenopathy. Skeleton: There is again an S-shaped scoliosis of the thoracolumbar spine. Stable small bilateral pleural effusions, larger on the right. Improved right basilar pneumonia and left lower lobe atelectasis versus pneumonia. **This report has been created using voice recognition software. It may contain minor errors which are inherent in voice recognition technology. ** Final report electronically signed by Dr. Harrison Davis on 1/8/2020 3:47 AM    Xr Chest Portable    Result Date: 1/6/2020  PROCEDURE: XR CHEST PORTABLE CLINICAL INFORMATION: shortness of breath. COMPARISON: Chest x-ray dated 1/5/2020 TECHNIQUE: AP Portable chest xray FINDINGS: Lines/tubes/devices: There has been interval removal of the endotracheal and nasogastric tubes. Right subclavian central venous catheter tip remains in the region of the right atrium. Lungs/pleura: There is a small right pleural effusion. There are worsening bibasilar infiltrates consistent with pneumonia and/or atelectasis, more severe on the right. . Previously noted interstitial infiltrates have resolved consistent with resolved pulmonary edema. There is no pneumothorax. Heart: Heart size is normal. Mediastinum/rashad: No obvious mass or adenopathy. Skeleton: There is again rotary dextroscoliosis of the lower thoracic and lumbar spine. Upper abdomen: Residual contrast is noted in the colon in the left upper quadrant. Interval removal of the endotracheal and nasogastric tubes. Worsening bibasilar pneumonia and/or atelectasis. Resolved interstitial pulmonary edema. Small right pleural effusion. **This report has been created using voice recognition software. It may contain minor errors which are inherent in voice recognition technology. ** Final report electronically signed by Dr. Willow Donnelly on 1/6/2020 11:46 PM    Xr Chest Portable    Result Date: 1/5/2020  PROCEDURE: XR CHEST PORTABLE CLINICAL INFORMATION: 79-year-old female who had an endotracheal tube, orogastric tube and central line placed. COMPARISON: X-ray dated 1/5/2020 6:42 AM. TECHNIQUE: AP supine view of the chest was obtained. FINDINGS: There has been interval placement of a right-sided subclavian vein access central venous catheter which terminates at the right atrium. An endotracheal tube is in place approximately 2.5 cm from the ramirez. A nasogastric tube is seen coursing along the route of the esophagus and coiled within the lumen of the stomach. There is cardiomegaly. There is some pulmonary vascular congestion.  There are increasing opacities

## 2020-01-10 ENCOUNTER — APPOINTMENT (OUTPATIENT)
Dept: GENERAL RADIOLOGY | Age: 80
DRG: 377 | End: 2020-01-10
Payer: MEDICARE

## 2020-01-10 ENCOUNTER — APPOINTMENT (OUTPATIENT)
Dept: ULTRASOUND IMAGING | Age: 80
DRG: 377 | End: 2020-01-10
Payer: MEDICARE

## 2020-01-10 LAB
ERYTHROCYTE [DISTWIDTH] IN BLOOD BY AUTOMATED COUNT: 20 % (ref 11.5–14.5)
ERYTHROCYTE [DISTWIDTH] IN BLOOD BY AUTOMATED COUNT: 65.6 FL (ref 35–45)
HCT VFR BLD CALC: 22 % (ref 37–47)
HCT VFR BLD CALC: 24.6 % (ref 37–47)
HCT VFR BLD CALC: 25.3 % (ref 37–47)
HEMOGLOBIN: 7.1 GM/DL (ref 12–16)
HEMOGLOBIN: 8 GM/DL (ref 12–16)
HEMOGLOBIN: 8.4 GM/DL (ref 12–16)
LD: 289 U/L (ref 100–190)
MCH RBC QN AUTO: 30.1 PG (ref 26–33)
MCHC RBC AUTO-ENTMCNC: 32.5 GM/DL (ref 32.2–35.5)
MCV RBC AUTO: 92.5 FL (ref 81–99)
PHOSPHORUS: 3.1 MG/DL (ref 2.4–4.7)
PLATELET # BLD: 174 THOU/MM3 (ref 130–400)
PMV BLD AUTO: 9.5 FL (ref 9.4–12.4)
RBC # BLD: 2.66 MILL/MM3 (ref 4.2–5.4)
TOTAL PROTEIN: 4.9 G/DL (ref 6.1–8)
WBC # BLD: 8.8 THOU/MM3 (ref 4.8–10.8)

## 2020-01-10 PROCEDURE — 99233 SBSQ HOSP IP/OBS HIGH 50: CPT | Performed by: INTERNAL MEDICINE

## 2020-01-10 PROCEDURE — 97116 GAIT TRAINING THERAPY: CPT

## 2020-01-10 PROCEDURE — 2580000003 HC RX 258: Performed by: NURSE PRACTITIONER

## 2020-01-10 PROCEDURE — 2580000003 HC RX 258: Performed by: INTERNAL MEDICINE

## 2020-01-10 PROCEDURE — 6370000000 HC RX 637 (ALT 250 FOR IP): Performed by: NURSE PRACTITIONER

## 2020-01-10 PROCEDURE — 6360000002 HC RX W HCPCS: Performed by: INTERNAL MEDICINE

## 2020-01-10 PROCEDURE — 2580000003 HC RX 258: Performed by: PHYSICIAN ASSISTANT

## 2020-01-10 PROCEDURE — 6360000002 HC RX W HCPCS: Performed by: NURSE PRACTITIONER

## 2020-01-10 PROCEDURE — 85018 HEMOGLOBIN: CPT

## 2020-01-10 PROCEDURE — APPSS30 APP SPLIT SHARED TIME 16-30 MINUTES: Performed by: NURSE PRACTITIONER

## 2020-01-10 PROCEDURE — 94640 AIRWAY INHALATION TREATMENT: CPT

## 2020-01-10 PROCEDURE — 84155 ASSAY OF PROTEIN SERUM: CPT

## 2020-01-10 PROCEDURE — 94760 N-INVAS EAR/PLS OXIMETRY 1: CPT

## 2020-01-10 PROCEDURE — 6370000000 HC RX 637 (ALT 250 FOR IP): Performed by: INTERNAL MEDICINE

## 2020-01-10 PROCEDURE — 36415 COLL VENOUS BLD VENIPUNCTURE: CPT

## 2020-01-10 PROCEDURE — 85014 HEMATOCRIT: CPT

## 2020-01-10 PROCEDURE — 2140000000 HC CCU INTERMEDIATE R&B

## 2020-01-10 PROCEDURE — 6370000000 HC RX 637 (ALT 250 FOR IP): Performed by: OPHTHALMOLOGY

## 2020-01-10 PROCEDURE — 76604 US EXAM CHEST: CPT

## 2020-01-10 PROCEDURE — 71046 X-RAY EXAM CHEST 2 VIEWS: CPT

## 2020-01-10 PROCEDURE — 36430 TRANSFUSION BLD/BLD COMPNT: CPT

## 2020-01-10 PROCEDURE — 99232 SBSQ HOSP IP/OBS MODERATE 35: CPT | Performed by: INTERNAL MEDICINE

## 2020-01-10 PROCEDURE — 2709999900 HC NON-CHARGEABLE SUPPLY

## 2020-01-10 PROCEDURE — 84100 ASSAY OF PHOSPHORUS: CPT

## 2020-01-10 PROCEDURE — 51798 US URINE CAPACITY MEASURE: CPT

## 2020-01-10 PROCEDURE — 97110 THERAPEUTIC EXERCISES: CPT

## 2020-01-10 PROCEDURE — C9113 INJ PANTOPRAZOLE SODIUM, VIA: HCPCS | Performed by: INTERNAL MEDICINE

## 2020-01-10 PROCEDURE — 85027 COMPLETE CBC AUTOMATED: CPT

## 2020-01-10 PROCEDURE — 83615 LACTATE (LD) (LDH) ENZYME: CPT

## 2020-01-10 RX ORDER — FUROSEMIDE 10 MG/ML
20 INJECTION INTRAMUSCULAR; INTRAVENOUS ONCE
Status: COMPLETED | OUTPATIENT
Start: 2020-01-10 | End: 2020-01-10

## 2020-01-10 RX ORDER — SODIUM CHLORIDE 0.9 % (FLUSH) 0.9 %
10 SYRINGE (ML) INJECTION EVERY 12 HOURS SCHEDULED
Status: DISCONTINUED | OUTPATIENT
Start: 2020-01-10 | End: 2020-01-13 | Stop reason: HOSPADM

## 2020-01-10 RX ORDER — SODIUM CHLORIDE 0.9 % (FLUSH) 0.9 %
10 SYRINGE (ML) INJECTION PRN
Status: DISCONTINUED | OUTPATIENT
Start: 2020-01-10 | End: 2020-01-13 | Stop reason: HOSPADM

## 2020-01-10 RX ORDER — IPRATROPIUM BROMIDE AND ALBUTEROL SULFATE 2.5; .5 MG/3ML; MG/3ML
1 SOLUTION RESPIRATORY (INHALATION) EVERY 4 HOURS PRN
Status: DISCONTINUED | OUTPATIENT
Start: 2020-01-10 | End: 2020-01-13 | Stop reason: HOSPADM

## 2020-01-10 RX ORDER — DIGOXIN 125 MCG
125 TABLET ORAL EVERY 4 HOURS
Status: COMPLETED | OUTPATIENT
Start: 2020-01-10 | End: 2020-01-10

## 2020-01-10 RX ORDER — 0.9 % SODIUM CHLORIDE 0.9 %
250 INTRAVENOUS SOLUTION INTRAVENOUS ONCE
Status: COMPLETED | OUTPATIENT
Start: 2020-01-10 | End: 2020-01-10

## 2020-01-10 RX ADMIN — PROPAFENONE HYDROCHLORIDE 150 MG: 150 TABLET, FILM COATED ORAL at 00:46

## 2020-01-10 RX ADMIN — SODIUM CHLORIDE 8 MG/HR: 9 INJECTION, SOLUTION INTRAVENOUS at 23:34

## 2020-01-10 RX ADMIN — PIPERACILLIN AND TAZOBACTAM 3.38 G: 3; .375 INJECTION, POWDER, FOR SOLUTION INTRAVENOUS at 00:35

## 2020-01-10 RX ADMIN — ROSUVASTATIN CALCIUM 5 MG: 10 TABLET, FILM COATED ORAL at 20:02

## 2020-01-10 RX ADMIN — PROPAFENONE HYDROCHLORIDE 150 MG: 150 TABLET, FILM COATED ORAL at 08:40

## 2020-01-10 RX ADMIN — POTASSIUM CHLORIDE 20 MEQ: 1500 TABLET, EXTENDED RELEASE ORAL at 08:40

## 2020-01-10 RX ADMIN — Medication 500000 UNITS: at 08:40

## 2020-01-10 RX ADMIN — DILTIAZEM HYDROCHLORIDE 30 MG: 30 TABLET, FILM COATED ORAL at 18:24

## 2020-01-10 RX ADMIN — Medication 10 ML: at 19:50

## 2020-01-10 RX ADMIN — VITAMIN D, TAB 1000IU (100/BT) 1000 UNITS: 25 TAB at 08:40

## 2020-01-10 RX ADMIN — SODIUM CHLORIDE 8 MG/HR: 9 INJECTION, SOLUTION INTRAVENOUS at 01:31

## 2020-01-10 RX ADMIN — Medication 10 ML: at 21:55

## 2020-01-10 RX ADMIN — SENNOSIDES 8.6 MG: 8.6 TABLET ORAL at 20:03

## 2020-01-10 RX ADMIN — Medication 3.3 MG: at 08:49

## 2020-01-10 RX ADMIN — Medication 10 ML: at 04:16

## 2020-01-10 RX ADMIN — Medication 10 ML: at 08:41

## 2020-01-10 RX ADMIN — SODIUM CHLORIDE 8 MG/HR: 9 INJECTION, SOLUTION INTRAVENOUS at 13:00

## 2020-01-10 RX ADMIN — IPRATROPIUM BROMIDE AND ALBUTEROL SULFATE 1 AMPULE: .5; 3 SOLUTION RESPIRATORY (INHALATION) at 07:42

## 2020-01-10 RX ADMIN — SODIUM CHLORIDE 250 ML: 9 INJECTION, SOLUTION INTRAVENOUS at 16:03

## 2020-01-10 RX ADMIN — LISINOPRIL 5 MG: 5 TABLET ORAL at 08:40

## 2020-01-10 RX ADMIN — DIGOXIN 125 MCG: 125 TABLET ORAL at 12:02

## 2020-01-10 RX ADMIN — LEVOTHYROXINE SODIUM 50 MCG: 50 TABLET ORAL at 06:50

## 2020-01-10 RX ADMIN — Medication 500000 UNITS: at 12:03

## 2020-01-10 RX ADMIN — SENNOSIDES 8.6 MG: 8.6 TABLET ORAL at 08:40

## 2020-01-10 RX ADMIN — FUROSEMIDE 20 MG: 40 INJECTION, SOLUTION INTRAMUSCULAR; INTRAVENOUS at 16:35

## 2020-01-10 RX ADMIN — PIPERACILLIN AND TAZOBACTAM 3.38 G: 3; .375 INJECTION, POWDER, FOR SOLUTION INTRAVENOUS at 08:39

## 2020-01-10 RX ADMIN — DIGOXIN 125 MCG: 125 TABLET ORAL at 16:35

## 2020-01-10 RX ADMIN — DILTIAZEM HYDROCHLORIDE 30 MG: 30 TABLET, FILM COATED ORAL at 23:35

## 2020-01-10 RX ADMIN — THERA TABS 1 TABLET: TAB at 08:40

## 2020-01-10 RX ADMIN — Medication 500000 UNITS: at 16:35

## 2020-01-10 RX ADMIN — Medication 500000 UNITS: at 00:37

## 2020-01-10 ASSESSMENT — PAIN SCALES - GENERAL
PAINLEVEL_OUTOF10: 0

## 2020-01-10 NOTE — PROGRESS NOTES
to person, place, and time  Neck - supple, no significant adenopathy, no JVD, or carotid bruits  Chest - clear to auscultation, no wheezes, rales or rhonchi, symmetric air entry  Heart - irregularly irregular rhythm with rate 100  Abdomen - soft, nontender, nondistended, no masses or organomegaly  Neurological - alert, oriented, normal speech, no focal findings or movement disorder noted  Musculoskeletal - no muscular tenderness noted  Extremities - no pedal edema noted  Skin - normal coloration and turgor, no rashes, no suspicious skin lesions noted    Data: (All radiographs, tracings, PFTs, and imaging are personally viewed and interpreted unless otherwise noted).     Reviewed labs, xr         Electronically signed by Ama Salmon MD on 1/10/2020 at 11:10 AM

## 2020-01-10 NOTE — PROGRESS NOTES
anticoagulation with Eliquis for atrial fibrillation. She has a history of hypothyroidism, osteopenia, hypertension, and chronic kidney disease. Patient presented to the emergency room and was admitted on 1/4/2020. She presented with complaints of poor oral intake with significant weight loss over the past 3 months. She has a very poor appetite and feels nauseated when eating. As her nutritional status depleted, she became more and more fatigued. Patient was seen by gastroenterology and underwent EGD on 1/3/2020. This demonstrated ulcerative erosive gastritis and duodenitis. Patient deteriorated on 1/5/2020 associated with respiratory failure. She developed hypoxemia and required intubation. She underwent bronchoscopy 1/5/2020. This demonstrated purulent pneumonia bilaterally. She was started on Zosyn 1/5/2020. Pulmonary medicine was consulted for further management of pleural effusion. 1/3 CT Abd/pelvis: Bibasilar atelectasis/infiltrate; No acute intra-abdominal or intrapelvic findings  1/3 Echo with EF 60%; mod to severe mitral regurg; mild to mod mitral stenosis; mild to mod aortic regurg  1/3 EGD: GERD, erosive ulcerative gastritis and erosive duodenitis   1/5 Intubated  1/5 Bronch w/washings sent  1/5 CVC Right subclavian  1/5 CXR: Scattered opacities are seen in the right lung which are new since the previous exam which was performed less than 4 hours prior. This could be related to developing infiltrates. Aspiration could have a similar appearance; Cardiomegaly with mild pulmonary vascular congestion  1/6 Extubated moved to  31      Past 24 Hours   -Stable on room air  -transferred to  yest- Hypotension, anemia, NV- better today  -2 units PRBC yest. hgb today 8.0  -No thoracentesis today insufficient amount of fluid  -Corynebacterium species in respiratory culture no c/s yet    -All systems reviewed.    PMHx   Past Medical History      Diagnosis Date    Arthritis     [facility]     CAD (coronary artery disease)     Cancer ([facility] [address] [facility]     BREAST    Chronic kidney disease     HTN (hypertension) 4/13/2016    Hypertension     Nausea & vomiting     Osteopenia     Pneumonia of both lungs due to infectious organism     Rheumatic fever     as a child    Sinus infection     Thyroid disease       Past Surgical History        Procedure Laterality Date    BREAST LUMPECTOMY Left 2011    BRONCHOSCOPY N/A 1/5/2020    BRONCHOSCOPY performed by Macario López MD at [address] Left 2009   [address]  2010    DILATION AND CURETTAGE OF UTERUS      X2; SEVERAL YEARS AGO    JOINT REPLACEMENT Right 2010    KNEE    TONSILLECTOMY      UPPER GASTROINTESTINAL ENDOSCOPY N/A 1/3/2020    EGD ESOPHAGOGASTRODUODENOSCOPY performed by Katherine Gutiérrez MD at [facility]     Meds    Current Medications    alteplase  1 mg Intracatheter Once    [START ON 1/13/2020] pantoprazole  40 mg Oral BID AC    sodium chloride flush  10 mL Intravenous BID    multivitamin  1 tablet Oral Daily    ipratropium-albuterol  1 ampule Inhalation Q4H WA    nystatin  5 mL Oral 4x Daily    lisinopril  5 mg Oral Daily    [Held by provider] furosemide  40 mg Intravenous Daily    potassium chloride  20 mEq Oral Daily with breakfast    piperacillin-tazobactam  3.375 g Intravenous Q8H    [Held by provider] apixaban  2.5 mg Oral BID    levothyroxine  50 mcg Oral Daily    propafenone  150 mg Oral Q8H    phosphorus replacement protocol   Other RX Placeholder    rosuvastatin  5 mg Oral QPM    Vitamin D  1,000 Units Oral Daily    senna  1 tablet Oral BID     acetaminophen, hydrOXYzine, furosemide, sodium chloride flush, menthol, phenol, [Held by provider] heparin (porcine), [Held by provider] heparin (porcine), promethazine, potassium chloride, ondansetron  IV Drips/Infusions   pantoprozole (PROTONIX) infusion 8 mg/hr (01/10/20 0131)    [Held by provider] heparin (porcine) Stopped (01/09/20 5814)     Home Medications  Medications Prior to Admission: ondansetron (ZOFRAN) 4 MG tablet, Take 4 mg by mouth every 8 hours as needed for Nausea or Vomiting  [] promethazine (PHENERGAN) 25 MG tablet, Take 1 tablet by mouth every 6 hours as needed for Nausea WARNING:  May cause drowsiness. May impair ability to operate vehicles or machinery. Do not use in combination with alcohol. [] ciprofloxacin (CIPRO) 500 MG tablet, Take 1 tablet by mouth 2 times daily for 7 days  atenolol (TENORMIN) 25 MG tablet, Take 12.5 mg by mouth 2 times daily   apixaban (ELIQUIS) 2.5 MG TABS tablet, Take by mouth 2 times daily  Calcium Carbonate (CALCIUM 600 PO), Take by mouth 2 times daily  Probiotic Product (PROBIOTIC-10 PO), Take by mouth daily  vitamin D (CHOLECALCIFEROL) 1000 UNIT TABS tablet, Take 1,000 Units by mouth daily  losartan (COZAAR) 50 MG tablet, 50 mg 2 times daily  potassium chloride (MICRO-K) 10 MEQ extended release capsule, Take 10 mEq by mouth daily  propafenone (RYTHMOL) 150 MG tablet, Take 150 mg by mouth every 8 hours  amLODIPine (NORVASC) 5 MG tablet, Take 5 mg by mouth daily Indications: High Blood Pressure  acetaminophen (TYLENOL ARTHRITIS PAIN) 650 MG extended release tablet, Take 650 mg by mouth as needed   bumetanide (BUMEX) 2 MG tablet, Take 2 mg by mouth daily Indications: Treatment with Diuretic Therapy   rosuvastatin (CRESTOR) 5 MG tablet, Take 5 mg by mouth every evening Indications: Blood Cholesterol Abnormal   levothyroxine (SYNTHROID) 50 MCG tablet, Take 50 mcg by mouth daily Indications: Impaired Thyroid Function   Biotin 1000 MCG TABS, Take 1,000 mcg by mouth nightly   aspirin 81 MG chewable tablet, Take 81 mg by mouth daily Indications: Treatment to Prevent Blood Clotting Take with food.   Diet    Dietary Nutrition Supplements: Standard High Calorie Oral Supplement  Dietary Nutrition Supplements: Frozen Oral Supplement  DIET GENERAL;  Allergies    Tramadol  Family History Problem Relation Age of Onset    Arthritis Mother     High Blood Pressure Mother     Cancer Sister         LEUKEMIA    Arthritis Brother     Asthma Brother     Colon Polyps Brother 58    Heart Disease Brother      Sleep History    Never diagnosed with sleep apnea in the past    Social History     Social History     Socioeconomic History    Marital status:      Spouse name: Not on file    Number of children: Not on file    Years of education: Not on file    Highest education level: Not on file   Occupational History    Not on file   Social Needs    Financial resource strain: Not on file    Food insecurity:     Worry: Not on file     Inability: Not on file    Transportation needs:     Medical: Not on file     Non-medical: Not on file   Tobacco Use    Smoking status: Never Smoker    Smokeless tobacco: Never Used   Substance and Sexual Activity    Alcohol use: Not Currently     Alcohol/week: 1.0 standard drinks     Types: 1 Glasses of wine per week     Comment: A  MONTH    Drug use: No    Sexual activity: Not on file   Lifestyle    Physical activity:     Days per week: Not on file     Minutes per session: Not on file    Stress: Not on file   Relationships    Social connections:     Talks on phone: Not on file     Gets together: Not on file     Attends Jewish service: Not on file     Active member of club or organization: Not on file     Attends meetings of clubs or organizations: Not on file     Relationship status: Not on file    Intimate partner violence:     Fear of current or ex partner: Not on file     Emotionally abused: Not on file     Physically abused: Not on file     Forced sexual activity: Not on file   Other Topics Concern    Not on file   Social History Narrative    Not on file       Vitals     height is 5' (1.524 m) and weight is 104 lb 8 oz (47.4 kg). Her oral temperature is 98.1 °F (36.7 °C). Her blood pressure is 107/58 (abnormal) and her pulse is 103.  Her respiration is 16 and oxygen saturation is 94%. Body mass index is 20.41 kg/m². SUPPLEMENTAL O2: O2 Flow Rate (L/min): 2 L/min       I/O        Intake/Output Summary (Last 24 hours) at 1/10/2020 0808  Last data filed at 1/10/2020 0416  Gross per 24 hour   Intake 2817.42 ml   Output 600 ml   Net 2217.42 ml     I/O last 3 completed shifts: In: 2817.4 [P.O.:100; I.V.:2097.4; Blood:620]  Out: 600 [Urine:600]   Patient Vitals for the past 96 hrs (Last 3 readings):   Weight   01/10/20 0346 104 lb 8 oz (47.4 kg)       Exam   Physical Exam   Constitutional: No distress on room air. Patient appears elderly and frail. Neck: Neck supple. No tracheal deviation present. No JVd  Cardiovascular: IRR. No peripheral edema  Pulmonary/Chest: Normal effort with bilateral air entry, clear breath sounds with slightly diminished bases bilaterally. No stridor. No respiratory distress. Patient exhibits no tenderness. Abdominal: Soft. Bowel sounds audible. No distension or tenderness to palp. Musculoskeletal: Moves all extremities; no clubbing/cyanois  Neurological: Patient is alert and oriented to person, place, and time. Skin: Warm and dry. Pale. No bruising or bleeding.    Labs  - Old records and notes have been reviewed in [facility]   ABG  No results found for: PH, PO2, PCO2, HCO3, O2SAT  No results found for: KRISS Torre  CBC  Recent Labs     01/08/20  0625 01/09/20  0601 01/09/20  0949 01/09/20  1901 01/10/20  0425   WBC 6.0 6.2  --   --  8.8   RBC 3.15* 2.30*  --   --  2.66*   HGB 10.2* 7.5* 7.2* 9.0* 8.0*   HCT 31.7* 23.4*  --  27.6* 24.6*   .6* 101.7*  --   --  92.5   MCH 32.4 32.6  --   --  30.1   MCHC 32.2 32.1*  --   --  32.5    222  --   --  174   MPV 9.6 9.5  --   --  9.5      BMP  Recent Labs     01/08/20  0625 01/09/20  0601 01/10/20  0425    139  --    K 3.2* 3.6  --    CL 98 101  --    CO2 26 26  --    BUN 13 18  --    CREATININE 1.5* 1.4*  --    GLUCOSE 75 148*  --    MG 2.1 1.9  --    PHOS 2.5  --  3.1   CALCIUM 8.4* 8.6  --      LFT  No results for input(s): AST, ALT, ALB, BILITOT, ALKPHOS, LIPASE in the last 72 hours. Invalid input(s): AMYLASE  TROP  Lab Results   Component Value Date    TROPONINT 0.015 01/05/2020    TROPONINT 0.013 01/03/2020    TROPONINT < 0.010 01/02/2020     BNP  No results for input(s): BNP in the last 72 hours. Lactic Acid  No results for input(s): LACTA in the last 72 hours. INR  Recent Labs     01/09/20  0601 01/09/20  1455   INR 1.41* 1.31*     PTT  Recent Labs     01/08/20  1847 01/09/20  0124 01/09/20  0601   APTT 81.1* 61.8* 73.9*     Glucose  No results for input(s): POCGLU in the last 72 hours. UA No results for input(s): SPECGRAV, PHUR, COLORU, CLARITYU, MUCUS, PROTEINU, BLOODU, RBCUA, WBCUA, BACTERIA, NITRU, GLUCOSEU, BILIRUBINUR, UROBILINOGEN, KETUA, LABCAST, LABCASTTY, AMORPHOS in the last 72 hours. Invalid input(s): CRYSTALS. PFTs               Sleep studies   N/A    Cultures    VRE (-)  MRSA (-)  Urine Culture: no growth prelim  Rapid Influenza A/B (-)    1/5/20  Respiratory Culture:   Organism Corynebacterium species     EKG     Echocardiogram   01/03/2020   ECHOCARDIOGRAM COMPLETE 2D W DOPPLER W COLOR. Conclusions      Summary   Normal left ventricle size and systolic function. Ejection fraction was   estimated at 60 %. There were no regional left ventricular wall motion   abnormalities and wall thickness was within normal limits. The left atrium is Severely dilated. Moderately enlarged right atrium size. Moderate tricuspid regurgitation visualized. Right ventricular systolic pressure measures 40mmhg. Moderate-to-severe mitral regurgitation with centrally directed jet. Mild to moderate mitral stenosis. Mitral valve area by doppler pressure half-time 1.6 cm2 . The peak mitral valve velocity was 2.5 m/s, peak gradient was 20 mmHg, and   the mean gradient was 9 mmHg.    Mild-to-moderate aortic regurgitation is uncertain etiology. Differential includes benign Vs neoplastic process. 1/10/20 unable to tap  -Dyspnea due to pleural effusion Vs other etiologies. -Acute respiratory failure with hypoxia- resolved  -Multi lobar Pneumonia-   -Pulmonary Edema- resolved  -Valvular heart disease  -Afib  -Hypotension  -CRF III  -Failure to thrive  -Remote hx of breast CA  -Secondary pulmonary HTN- moderate in severity with pulmonary arterial pressure 40 mmHg. Secondary to valvular heart disease   -Nonsmoker - last PFT minimal obstruction, severe diffusion defect- mild restriction DLCO 45  -Full Code    Plan   -Right thoracentesis unable to be done insufficient amount of fluid  -CXR reviewed from today   -Titrate Oxygen to keep Spo2 >90%. -Duonebs every 4 hours w/a for SOB/wheezng    -Encouraged activity OOB to chair for meals  -PT/OT  -ATB per primary   -Duonebs every 4 hours  -GI prophylaxis: Protonix  -DVT prophylaxis: Eliquis  -Would continue to HOLD Eliquis till GI is done assessing patient ? Endo procedure  -Continue Heparin gtt  -Stable from pulmonary standpoint        Case discussed with nurse and patient. Vijay BARRIOS Ruheeducated about my impression and plan. She verbalizes understanding. Questions and concerns addressed . Meds and orders reviewed. [facility] 2 months with Beth Joyner CNP with HRCT/PFT prior to office appt- testing ordered in [facility]    Electronically signed by   VANDANA Romero CNP on 1/10/2020 at 8:08 AM     Addendum by Dr. Claudia Jacob MD:  I have seen and examined the patient independently. Face to face evaluation and examination was performed. The above evaluation and note has been reviewed. Labs and radiographs were reviewed. I Have discussed with NETTA Crane CNP about this patient in detail. The above assessment and plan has been reviewed. Please see my modifications mentioned below. My modifications: On 2LPM via nasal cannula.   Feels

## 2020-01-10 NOTE — PROCEDURES
A Bladder scan was performed at 2055  . The patient's last void was at 0400 per nursing documentation . The residual amount was measured to be >870 ML. Report of results was given to Hall.

## 2020-01-10 NOTE — PROGRESS NOTES
she was very indep and did everything herself. Restrictions/Precautions:  Restrictions/Precautions: Fall Risk, General Precautions    SUBJECTIVE: RN approved session. Pt resting in bed at arrival, pleasant and agreeable to session. PAIN: 0/10:       OBJECTIVE:  Bed Mobility:  Rolling to Right: Contact Guard Assistance   Supine to Sit: Contact Guard Assistance  Scooting: Contact Guard Assistance    Transfers:  Sit to Stand: Contact Guard Assistance, with increased time for completion, with verbal cues  Stand to [place] [id number], with increased time for completion, with verbal cues    Ambulation:  Contact Guard Assistance  Distance: 6ftx1  Surface: Level Tile  Device:Rolling Walker  Gait Deviations: Forward Flexed Posture, Slow Rebecca, Decreased Step Length Bilaterally, Decreased Trunk Rotation, Decreased Gait Speed, Decreased Heel Strike Bilaterally and Narrow Base of Support    Balance:  Static Sitting Balance:  Stand By Assistance    Exercise:  Patient was guided in seated set(s) 10 reps of exercise to R pt does not perform some therex with L d/t pain in hip lower extremities. Ankle pumps, Glut sets, Long arc quads, Hip abduction/adduction and Seated hip flexion. Exercises were completed for increased independence with functional mobility. Functional Outcome Measures: Completed  -PAC Inpatient Mobility without Stair Climbing Raw Score : 15  AM-PAC Inpatient without Stair Climbing T-Scale Score : 43.03    ASSESSMENT:  Assessment: Patient progressing toward established goals. Activity Tolerance:  Patient tolerance of  treatment: good. Equipment Recommendations: Other: has RW  Discharge Recommendations:    Subacute/Skilled Nursing Facility(TCU)    Plan: Times per week: 5 X GM  Current Treatment Recommendations: Strengthening, Functional Mobility Training, Transfer Training, Balance Training, Endurance Training, Gait Training, Stair training, Home Exercise Program, Equipment Evaluation, Education, & procurement, Safety Education & Training, Patient/Caregiver Education & Training    Patient Education  Patient Education: Plan of Care, Bed Mobility, Transfers    Goals:  Patient goals : to get better  Short term goals  Time Frame for Short term goals: by discharge  Short term goal 1: Pt to transfer supine <--> sit S to enable pt to get in/out of bed. Short term goal 2: Pt to transfer sit <--> stand S for increased functional mobility. Short term goal 3: Pt to ambulate >50 feet with LRAD SBA for household ambulation. Long term goals  Time Frame for Long term goals : NA due to short length of stay. Following session, patient left in safe position with all fall risk precautions in place.

## 2020-01-10 NOTE — PROGRESS NOTES
Gastroenterology  Progress Note    1/10/2020 2:19 PM  Subjective:   Admit Date: 2020    Interval History:   Labs reviewed, H&H decreasing. Received 2 units PRBC yesterday. FOB  (-). No evidence of melena or hematochezia per pt and RN. Pt denies abdominal pain, N/V. Pt denies chest pain, SOB. C/o fatigue. Discussed repeating EGD and pt agreeable to proceed. Diet: Dietary Nutrition Supplements: Standard High Calorie Oral Supplement  DIET GENERAL;  Dietary Nutrition Supplements: Other Oral Supplement (see comment)    Medications:   Scheduled Meds:   digoxin  125 mcg Oral Q4H    alteplase  1 mg Intracatheter Once    [START ON 2020] pantoprazole  40 mg Oral BID AC    sodium chloride flush  10 mL Intravenous BID    multivitamin  1 tablet Oral Daily    nystatin  5 mL Oral 4x Daily    [Held by provider] furosemide  40 mg Intravenous Daily    potassium chloride  20 mEq Oral Daily with breakfast    [Held by provider] apixaban  2.5 mg Oral BID    levothyroxine  50 mcg Oral Daily    phosphorus replacement protocol   Other RX Placeholder    rosuvastatin  5 mg Oral QPM    Vitamin D  1,000 Units Oral Daily    senna  1 tablet Oral BID     Continuous Infusions:   pantoprozole (PROTONIX) infusion 8 mg/hr (01/10/20 0131)    [Held by provider] heparin (porcine) Stopped (20 1501)     CBC:   Recent Labs     20  0625 20  0601 20  0949 20  1901 01/10/20  0425   WBC 6.0 6.2  --   --  8.8   HGB 10.2* 7.5* 7.2* 9.0* 8.0*    222  --   --  174     BMP:    Recent Labs     20  0625 20  0601    139   K 3.2* 3.6   CL 98 101   CO2 26 26   BUN 13 18   CREATININE 1.5* 1.4*   GLUCOSE 75 148*     Hepatic: No results for input(s): AST, ALT, ALB, BILITOT, ALKPHOS in the last 72 hours. INR:   Recent Labs     20  0601 20  1455   INR 1.41* 1.31*     Xray:   [facility] [place] [address]                                 OPERATIVE REPORT     PATIENT NAME: Abdi Valdivia                     :        1940  MED REC NO:   [id number]                           ROOM:       0010  ACCOUNT NO:   [de-identified]                           ADMIT DATE: 2020  PROVIDER:     Luis Romero M.D.     DATE OF PROCEDURE:  2020     INDICATIONS:  The patient with history of weight loss, abdominal  discomfort, inability to eat a lot, feels, nausea, vomit lately. Slight  difficulty to swallow. Plan today for EGD to evaluate.     SURGEON:  Luis Romero MD     ASA CLASSIFICATION:  III.     Estimated blood loss in none      DESCRIPTION OF PROCEDURE:  The patient was brought to GI lab. Consent  was obtained. Risks involved with the procedure were explained to the  patient. Informed consent was obtained. The patient was monitored  during the procedure with pulse oximetry, blood pressure monitoring, and  oxygen by nasal cannula. Sedation done by incremental doses of IV  Versed, total 1 mg of Versed and 50 mcg of fentanyl given in incremental  dosage during the procedure to achieve continuous conscious sedation.     PROCEDURE PERFORMED:  EGD.     Standard video 190 Olympus upper scope was advanced under direct vision  from the oral cavity up to the duodenum. Esophagus appeared normal.  No  erosions or ulcerations seen. The gastroesophageal junction was at 38  cm from the incisors. Scope was advanced to the stomach. Retroflex  examination of the cardia revealed mild gastritis. Ulcerative erosive  gastritis seen, distal part of the body of the antrum. No bleeding  seen. The ulcers are superficial.  Scope was advanced to the duodenum,  showed erosive duodenitis. The findings really cannot explain the  patient's discomfort. I elected not to take a biopsy as the patient  likewise at this time has high risk to bleed.   The scope was withdrawn  with no immediate complications.     IMPRESSION:  1. Ulcerative, erosive gastritis in the antrum. 2.  Erosive duodenitis. 3.  Gastritis.     PLAN:  1. Start PPI, should be twice a day for now. Dose can be reduced to  once a day later once the patient clinically improves. 2.  Advance the diet as tolerated. 3.  Elective colonoscopy to be done as an outpatient.      Adriane Fitzgerald M.D. Objective:   Vitals: /60 Comment: manual  Pulse 120   Temp 99 °F (37.2 °C) (Oral)   Resp 18   Ht 5' (1.524 m)   Wt 104 lb 8 oz (47.4 kg)   SpO2 94%   BMI 20.41 kg/m²     Intake/Output Summary (Last 24 hours) at 1/10/2020 1419  Last data filed at 1/10/2020 1403  Gross per 24 hour   Intake 3029.42 ml   Output 910 ml   Net 2119.42 ml     General appearance: alert and cooperative with exam.  Well groomed, well nourished  female  Lungs: clear to auscultation bilaterally  Heart: regular rate and rhythm, S1, S2 normal, no murmur, click, rub or gallop  Abdomen: soft, non-tender; bowel sounds normal; no masses,  no organomegaly  Extremities: extremities normal, atraumatic, no cyanosis or edema    Assessment and Plan:   1. Acute GI blood loss anemia due to possible GI bleeding. Patient had an erosive ulcerative gastris and erosive duodenitis, continue PPI, avoid NSAIDs. With recent drop in H&H, will repeat EGD in am - see orders. Continue to monitor H&H and transfuse prn. Repeat CBC and iron studies in the am.  2. Elective colonoscopy as out patient   3. Generalized weakness - continue PT/OT per Attending.         Follow up in GI Clinic after discharge in 2 week(s)    Patient Active Problem List:     AF (atrial fibrillation) (HCC)     Anticoagulated on Coumadin     HTN (hypertension)     Nausea     Physical deconditioning     Loss of appetite     Chronic renal failure, stage 3 (moderate) (HCC)     Elevated troponin    Gissel Luna, APRN - CNP      No gross GI bleeding, no melena, eat small amount of food today, plan for

## 2020-01-10 NOTE — PROGRESS NOTES
Pharmacy Medication History Note      List of current medications patient is taking is complete. Source of information: patient, medication dispense history    Changes made to medication list:  Medications removed (include reason, ex. therapy complete or physician discontinued):  Amlodipine 5 mg - patient is no longer taking  Ciprofloxacin 500 mg - patient is no longer taking  Ondansetron 4 mg - patient is no longer taking  Promethazine 25 mg - patient is no longer taking    Medications added/doses adjusted:  Adjusted acetaminophen to 650 mg - Take 1 tablet PO BID PRN   Adjusted calcium carbonate to 600 mg - Take 1 tablet PO daily    Other notes (ex. Recent course of antibiotics, Coumadin dosing):  Patient states that she is no longer taking her amlodipine because she had swelling around her ankles and her doctor told her to stop it about a month ago. Patient took ciprofloxacin 500 mg for 7 days starting on 12/26/19. Denies use of other OTC or herbal medications.       Allergies reviewed      Electronically signed by Arlester Heart on 1/10/2020 at 10:42 AM

## 2020-01-10 NOTE — PLAN OF CARE
Problem: Nutrition  Goal: Optimal nutrition therapy  1/10/2020 1415 by Abdiel Townsend RD, LD  Outcome: Ongoing   Nutrition Problem: Severe malnutrition, In context of acute illness or injury  Intervention: Food and/or Nutrient Delivery: Continue current diet, Modify current ONS  Nutritional Goals: Pt will consume 75% or more of meals during LOS

## 2020-01-10 NOTE — PROGRESS NOTES
Spoke with Ishmael Galolway regarding patient being in chronic fib and still taking rhythmol.   Stated we can discontinue medication

## 2020-01-10 NOTE — PROGRESS NOTES
[facility] [address] 59  OCCUPATIONAL THERAPY MISSED TREATMENT NOTE  [facility]-STEPDOWN 3B  3B-23/023-A      Date: 1/10/2020  Patient Name: González Calloway        CSN: [id number]   : 1940  ([de-identified] y.o.)  Gender: female   Referring Practitioner: Dr. Jose Juan Pena  Diagnosis: elevated troponin         REASON FOR MISSED TREATMENT: Patient Unavailable Patient being taken off floor for thoracentesis.  Will check back as time allows

## 2020-01-10 NOTE — PROCEDURES
A Bladder scan was performed at 1125 . The patients cath was removed on 01/10/2020 . The residual amount was measured to be 374 ML. Report of results was given to [facility].

## 2020-01-10 NOTE — CARE COORDINATION
1/10/20, 12:37 PM    DISCHARGE ONGOING EVALUATION:     Kadiebrandynkellie 90 day: 6  Location: [facility]23/023-A Reason for admit: Elevated troponin [R79.89]  Weakness [R53.1]   Treatment Plan of Care: Hospitalist, GI, pulm following. Not enough fluid to complete thoracentesis. PT/OT. SLP eval-patient declined session. ? Repeat EGD. Hgb 8 (down from 9 yesterday). Protonix gtt. PRN zofran. Room air. Barriers to Discharge: Await medical clearance, TCU is following and will re-evaluate on Monday.

## 2020-01-10 NOTE — FLOWSHEET NOTE
Pt was alone at the time of the visit. She said that she started to feel better but wanted prayer to cope with medical issues. Prayer was appreciated. 01/10/20 3620   Encounter Summary   Services provided to: Patient   Referral/Consult From: Rounding   Support System Children   Continue Visiting Yes  (1/10)   Complexity of Encounter Low   Length of Encounter 15 minutes   Routine   Type Follow up   Assessment Approachable;Calm   Intervention [place];Prayer;Nurtured hope;Empowerment;Sustaining presence/ Ministry of presence; Active listening   Outcome Acceptance;Expressed gratitude;Encouraged; Hopeful;Receptive

## 2020-01-10 NOTE — PLAN OF CARE
Problem: Falls - Risk of:  Goal: Will remain free from falls  Description  Will remain free from falls  Outcome: Met This Shift  Note:   Assessment & interventions provided throughout shift. Bed locked & in low position, call light in reach, side-rails up x2, non-slip socks on when ambulating, reminded patient to use call light to call for assistance. Hourly rounding continued and bed alarm utilized. Goal: Absence of physical injury  Description  Absence of physical injury  Outcome: Met This Shift  Note:   No apparent injury tonight     Problem: Infection:  Goal: Will remain free from infection  Description  Will remain free from infection  Outcome: Ongoing  Note:   No elevated temp tonight and WBC continues to decrease. Problem: Safety:  Goal: Free from accidental physical injury  Description  Free from accidental physical injury  Outcome: Met This Shift  Note:   No injury tonight     Problem: Daily Care:  Goal: Daily care needs are met  Description  Daily care needs are met  Outcome: Ongoing  Note:   Currently daily needs are being met; patient at least is drinking her Ensure, still not eating much solid food though. Bath was completed at [facility] per patient preference. Problem: Pain:  Goal: Pain level will decrease  Description  Pain level will decrease  Outcome: Ongoing  Note:   Patient has chronic pain left leg and needs help to pick left leg up to get in bed. Goal: Control of acute pain  Description  Control of acute pain  Outcome: Met This Shift  Goal: Control of chronic pain  Description  Control of chronic pain  Outcome: Ongoing  Note:   As above     Problem: Skin Integrity:  Goal: Skin integrity will stabilize  Description  Skin integrity will stabilize  Outcome: Ongoing  Note:   Bruising remains as before. Patient does turn/reposition herself frequently.      Problem: Discharge Planning:  Goal: Patients continuum of care needs are met  Description  Patients continuum of care needs are met  Outcome: Ongoing  Note:   Patient will need help to care for self before she is able to return home. She still has several unresolved problems: bleeding/drop in Hgb and urinary retention     Problem: Bowel Function - Altered:  Goal: Bowel elimination is within specified parameters  Description  Bowel elimination is within specified parameters  Outcome: Ongoing  Note:   No bm tonight, soft bm's yesterday. Problem: Nutrition  Goal: Optimal nutrition therapy  Outcome: Ongoing  Note:   Only intake is her Ensure     Problem: Cardiac:  Goal: Hemodynamic stability will improve  Description  Hemodynamic stability will improve  Outcome: Ongoing  Note:   Vital signs have improved since 2 units PRC's yesterday     Problem: Respiratory:  Goal: Ability to maintain a clear airway will improve  Description  Ability to maintain a clear airway will improve  Outcome: Ongoing  Note:   Lung sounds have significantly improved since admission.

## 2020-01-11 LAB
ANION GAP SERPL CALCULATED.3IONS-SCNC: 12 MEQ/L (ref 8–16)
ANION GAP SERPL CALCULATED.3IONS-SCNC: 14 MEQ/L (ref 8–16)
BASOPHILS # BLD: 0.1 %
BASOPHILS ABSOLUTE: 0 THOU/MM3 (ref 0–0.1)
BUN BLDV-MCNC: 21 MG/DL (ref 7–22)
BUN BLDV-MCNC: 25 MG/DL (ref 7–22)
CALCIUM SERPL-MCNC: 8.4 MG/DL (ref 8.5–10.5)
CALCIUM SERPL-MCNC: 8.7 MG/DL (ref 8.5–10.5)
CHLORIDE BLD-SCNC: 102 MEQ/L (ref 98–111)
CHLORIDE BLD-SCNC: 104 MEQ/L (ref 98–111)
CO2: 23 MEQ/L (ref 23–33)
CO2: 24 MEQ/L (ref 23–33)
CREAT SERPL-MCNC: 1.1 MG/DL (ref 0.4–1.2)
CREAT SERPL-MCNC: 1.5 MG/DL (ref 0.4–1.2)
DIGOXIN LEVEL: 0.5 NG/ML (ref 0.5–2)
EOSINOPHIL # BLD: 0.3 %
EOSINOPHILS ABSOLUTE: 0 THOU/MM3 (ref 0–0.4)
ERYTHROCYTE [DISTWIDTH] IN BLOOD BY AUTOMATED COUNT: 18.2 % (ref 11.5–14.5)
ERYTHROCYTE [DISTWIDTH] IN BLOOD BY AUTOMATED COUNT: 61 FL (ref 35–45)
FERRITIN: 206 NG/ML (ref 10–291)
GFR SERPL CREATININE-BSD FRML MDRD: 33 ML/MIN/1.73M2
GFR SERPL CREATININE-BSD FRML MDRD: 48 ML/MIN/1.73M2
GLUCOSE BLD-MCNC: 72 MG/DL (ref 70–108)
GLUCOSE BLD-MCNC: 86 MG/DL (ref 70–108)
HCT VFR BLD CALC: 24 % (ref 37–47)
HCT VFR BLD CALC: 26.5 % (ref 37–47)
HEMOCCULT STL QL: NEGATIVE
HEMOGLOBIN: 7.9 GM/DL (ref 12–16)
HEMOGLOBIN: 8.6 GM/DL (ref 12–16)
IMMATURE GRANS (ABS): 0.15 THOU/MM3 (ref 0–0.07)
IMMATURE GRANULOCYTES: 1.9 %
IRON SATURATION: 55 % (ref 20–50)
IRON: 78 UG/DL (ref 50–170)
LYMPHOCYTES # BLD: 12.8 %
LYMPHOCYTES ABSOLUTE: 1 THOU/MM3 (ref 1–4.8)
MCH RBC QN AUTO: 30.9 PG (ref 26–33)
MCHC RBC AUTO-ENTMCNC: 32.9 GM/DL (ref 32.2–35.5)
MCV RBC AUTO: 93.8 FL (ref 81–99)
MONOCYTES # BLD: 12.5 %
MONOCYTES ABSOLUTE: 1 THOU/MM3 (ref 0.4–1.3)
NUCLEATED RED BLOOD CELLS: 0 /100 WBC
PLATELET # BLD: 131 THOU/MM3 (ref 130–400)
PMV BLD AUTO: 9.4 FL (ref 9.4–12.4)
POTASSIUM SERPL-SCNC: 3.8 MEQ/L (ref 3.5–5.2)
POTASSIUM SERPL-SCNC: 3.9 MEQ/L (ref 3.5–5.2)
RBC # BLD: 2.56 MILL/MM3 (ref 4.2–5.4)
SEG NEUTROPHILS: 72.4 %
SEGMENTED NEUTROPHILS ABSOLUTE COUNT: 5.7 THOU/MM3 (ref 1.8–7.7)
SODIUM BLD-SCNC: 139 MEQ/L (ref 135–145)
SODIUM BLD-SCNC: 140 MEQ/L (ref 135–145)
TOTAL IRON BINDING CAPACITY: 142 UG/DL (ref 171–450)
WBC # BLD: 7.9 THOU/MM3 (ref 4.8–10.8)

## 2020-01-11 PROCEDURE — 0DJ08ZZ INSPECTION OF UPPER INTESTINAL TRACT, VIA NATURAL OR ARTIFICIAL OPENING ENDOSCOPIC: ICD-10-PCS | Performed by: INTERNAL MEDICINE

## 2020-01-11 PROCEDURE — 85018 HEMOGLOBIN: CPT

## 2020-01-11 PROCEDURE — 97110 THERAPEUTIC EXERCISES: CPT

## 2020-01-11 PROCEDURE — 6360000002 HC RX W HCPCS: Performed by: INTERNAL MEDICINE

## 2020-01-11 PROCEDURE — 82272 OCCULT BLD FECES 1-3 TESTS: CPT

## 2020-01-11 PROCEDURE — 6370000000 HC RX 637 (ALT 250 FOR IP): Performed by: INTERNAL MEDICINE

## 2020-01-11 PROCEDURE — 2580000003 HC RX 258: Performed by: INTERNAL MEDICINE

## 2020-01-11 PROCEDURE — 97530 THERAPEUTIC ACTIVITIES: CPT

## 2020-01-11 PROCEDURE — 83550 IRON BINDING TEST: CPT

## 2020-01-11 PROCEDURE — 99232 SBSQ HOSP IP/OBS MODERATE 35: CPT | Performed by: INTERNAL MEDICINE

## 2020-01-11 PROCEDURE — 2580000003 HC RX 258: Performed by: NURSE PRACTITIONER

## 2020-01-11 PROCEDURE — 99152 MOD SED SAME PHYS/QHP 5/>YRS: CPT | Performed by: INTERNAL MEDICINE

## 2020-01-11 PROCEDURE — 80162 ASSAY OF DIGOXIN TOTAL: CPT

## 2020-01-11 PROCEDURE — 6370000000 HC RX 637 (ALT 250 FOR IP): Performed by: OPHTHALMOLOGY

## 2020-01-11 PROCEDURE — 2709999900 HC NON-CHARGEABLE SUPPLY

## 2020-01-11 PROCEDURE — 2709999900 HC NON-CHARGEABLE SUPPLY: Performed by: INTERNAL MEDICINE

## 2020-01-11 PROCEDURE — 83010 ASSAY OF HAPTOGLOBIN QUANT: CPT

## 2020-01-11 PROCEDURE — 85014 HEMATOCRIT: CPT

## 2020-01-11 PROCEDURE — 80048 BASIC METABOLIC PNL TOTAL CA: CPT

## 2020-01-11 PROCEDURE — C9113 INJ PANTOPRAZOLE SODIUM, VIA: HCPCS | Performed by: INTERNAL MEDICINE

## 2020-01-11 PROCEDURE — 85025 COMPLETE CBC W/AUTO DIFF WBC: CPT

## 2020-01-11 PROCEDURE — 3609017100 HC EGD: Performed by: INTERNAL MEDICINE

## 2020-01-11 PROCEDURE — 2140000000 HC CCU INTERMEDIATE R&B

## 2020-01-11 PROCEDURE — 2720000010 HC SURG SUPPLY STERILE: Performed by: INTERNAL MEDICINE

## 2020-01-11 PROCEDURE — 97116 GAIT TRAINING THERAPY: CPT

## 2020-01-11 PROCEDURE — 36415 COLL VENOUS BLD VENIPUNCTURE: CPT

## 2020-01-11 PROCEDURE — 82728 ASSAY OF FERRITIN: CPT

## 2020-01-11 PROCEDURE — 2580000003 HC RX 258: Performed by: PHYSICIAN ASSISTANT

## 2020-01-11 PROCEDURE — 83540 ASSAY OF IRON: CPT

## 2020-01-11 RX ORDER — WARFARIN SODIUM 2 MG/1
2 TABLET ORAL
Status: COMPLETED | OUTPATIENT
Start: 2020-01-11 | End: 2020-01-11

## 2020-01-11 RX ORDER — FENTANYL CITRATE 50 UG/ML
INJECTION, SOLUTION INTRAMUSCULAR; INTRAVENOUS PRN
Status: DISCONTINUED | OUTPATIENT
Start: 2020-01-11 | End: 2020-01-11 | Stop reason: ALTCHOICE

## 2020-01-11 RX ORDER — SODIUM CHLORIDE 9 MG/ML
INJECTION, SOLUTION INTRAVENOUS CONTINUOUS
Status: DISCONTINUED | OUTPATIENT
Start: 2020-01-11 | End: 2020-01-12

## 2020-01-11 RX ORDER — MIDAZOLAM HYDROCHLORIDE 5 MG/ML
INJECTION INTRAMUSCULAR; INTRAVENOUS PRN
Status: DISCONTINUED | OUTPATIENT
Start: 2020-01-11 | End: 2020-01-11 | Stop reason: ALTCHOICE

## 2020-01-11 RX ADMIN — Medication 500000 UNITS: at 18:31

## 2020-01-11 RX ADMIN — SODIUM CHLORIDE 8 MG/HR: 9 INJECTION, SOLUTION INTRAVENOUS at 23:28

## 2020-01-11 RX ADMIN — SENNOSIDES 8.6 MG: 8.6 TABLET ORAL at 20:25

## 2020-01-11 RX ADMIN — WARFARIN SODIUM 2 MG: 2 TABLET ORAL at 18:30

## 2020-01-11 RX ADMIN — Medication 500000 UNITS: at 20:24

## 2020-01-11 RX ADMIN — Medication 500000 UNITS: at 08:31

## 2020-01-11 RX ADMIN — SODIUM CHLORIDE: 9 INJECTION, SOLUTION INTRAVENOUS at 08:34

## 2020-01-11 RX ADMIN — LEVOTHYROXINE SODIUM 50 MCG: 50 TABLET ORAL at 08:31

## 2020-01-11 RX ADMIN — ROSUVASTATIN CALCIUM 5 MG: 10 TABLET, FILM COATED ORAL at 20:24

## 2020-01-11 RX ADMIN — ENOXAPARIN SODIUM 40 MG: 40 INJECTION SUBCUTANEOUS at 20:31

## 2020-01-11 RX ADMIN — Medication 10 ML: at 08:33

## 2020-01-11 RX ADMIN — Medication 10 ML: at 20:26

## 2020-01-11 RX ADMIN — SENNOSIDES 8.6 MG: 8.6 TABLET ORAL at 08:31

## 2020-01-11 RX ADMIN — SODIUM CHLORIDE 8 MG/HR: 9 INJECTION, SOLUTION INTRAVENOUS at 11:52

## 2020-01-11 RX ADMIN — DILTIAZEM HYDROCHLORIDE 30 MG: 30 TABLET, FILM COATED ORAL at 18:30

## 2020-01-11 RX ADMIN — POTASSIUM CHLORIDE 20 MEQ: 1500 TABLET, EXTENDED RELEASE ORAL at 18:30

## 2020-01-11 RX ADMIN — Medication 10 ML: at 08:32

## 2020-01-11 ASSESSMENT — PAIN - FUNCTIONAL ASSESSMENT
PAIN_FUNCTIONAL_ASSESSMENT: 0-10
PAIN_FUNCTIONAL_ASSESSMENT: ACTIVITIES ARE NOT PREVENTED

## 2020-01-11 ASSESSMENT — PAIN DESCRIPTION - PAIN TYPE: TYPE: CHRONIC PAIN

## 2020-01-11 ASSESSMENT — PAIN SCALES - GENERAL
PAINLEVEL_OUTOF10: 0
PAINLEVEL_OUTOF10: 2
PAINLEVEL_OUTOF10: 0

## 2020-01-11 ASSESSMENT — PAIN DESCRIPTION - ORIENTATION: ORIENTATION: LEFT

## 2020-01-11 ASSESSMENT — PAIN DESCRIPTION - LOCATION: LOCATION: HIP

## 2020-01-11 NOTE — PROGRESS NOTES
 Cancer ([facility] [address]     BREAST    Chronic kidney disease     HTN (hypertension) 4/13/2016    Hypertension     Nausea & vomiting     Osteopenia     Pneumonia of both lungs due to infectious organism     Rheumatic fever     as a child    Sinus infection     Thyroid disease       Past Surgical History        Procedure Laterality Date    BREAST LUMPECTOMY Left 2011    BRONCHOSCOPY N/A 1/5/2020    BRONCHOSCOPY performed by Lauryn Drummond MD at [address] Left 2009   [address]  2010    DILATION AND CURETTAGE OF UTERUS      X2; SEVERAL YEARS AGO    JOINT REPLACEMENT Right 2010    KNEE    TONSILLECTOMY      UPPER GASTROINTESTINAL ENDOSCOPY N/A 1/3/2020    EGD ESOPHAGOGASTRODUODENOSCOPY performed by Qasim Hannah MD at [facility]     Meds    Current Medications    sodium chloride flush  10 mL Intravenous 2 times per day    diltiazem  30 mg Oral 4 times per day    alteplase  1 mg Intracatheter Once    [START ON 1/13/2020] pantoprazole  40 mg Oral BID AC    sodium chloride flush  10 mL Intravenous BID    multivitamin  1 tablet Oral Daily    nystatin  5 mL Oral 4x Daily    [Held by provider] furosemide  40 mg Intravenous Daily    potassium chloride  20 mEq Oral Daily with breakfast    [Held by provider] apixaban  2.5 mg Oral BID    levothyroxine  50 mcg Oral Daily    phosphorus replacement protocol   Other RX Placeholder    rosuvastatin  5 mg Oral QPM    Vitamin D  1,000 Units Oral Daily    senna  1 tablet Oral BID     ipratropium-albuterol, sodium chloride flush, acetaminophen, hydrOXYzine, furosemide, sodium chloride flush, menthol, phenol, promethazine, potassium chloride, ondansetron  IV Drips/Infusions   sodium chloride 40 mL/hr at 01/11/20 0834    pantoprozole (PROTONIX) infusion 8 mg/hr (01/11/20 1152)     Home Medications  Medications Prior to Admission: atenolol (TENORMIN) 25 MG tablet, Take 12.5 mg by mouth 2 times daily   apixaban (ELIQUIS) 2.5 MG TABS tablet, Take by mouth 2 times daily  Calcium Carbonate (CALCIUM 600 PO), Take 1 tablet by mouth daily   Probiotic Product (PROBIOTIC-10 PO), Take 1 capsule by mouth daily   vitamin D (CHOLECALCIFEROL) 1000 UNIT TABS tablet, Take 1,000 Units by mouth daily  losartan (COZAAR) 50 MG tablet, Take 50 mg by mouth 2 times daily   potassium chloride (MICRO-K) 10 MEQ extended release capsule, Take 10 mEq by mouth daily  propafenone (RYTHMOL) 150 MG tablet, Take 150 mg by mouth every 8 hours  acetaminophen (TYLENOL ARTHRITIS PAIN) 650 MG extended release tablet, Take 650 mg by mouth 2 times daily as needed Indications: Arthritis   bumetanide (BUMEX) 2 MG tablet, Take 2 mg by mouth daily Indications: Treatment with Diuretic Therapy   rosuvastatin (CRESTOR) 5 MG tablet, Take 5 mg by mouth every evening Indications: Blood Cholesterol Abnormal   levothyroxine (SYNTHROID) 50 MCG tablet, Take 50 mcg by mouth daily Indications: Impaired Thyroid Function   Biotin 1000 MCG TABS, Take 1,000 mcg by mouth nightly   aspirin 81 MG chewable tablet, Take 81 mg by mouth daily Indications: Treatment to Prevent Blood Clotting Take with food. [DISCONTINUED] ondansetron (ZOFRAN) 4 MG tablet, Take 4 mg by mouth every 8 hours as needed for Nausea or Vomiting  [DISCONTINUED] promethazine (PHENERGAN) 25 MG tablet, Take 1 tablet by mouth every 6 hours as needed for Nausea WARNING:  May cause drowsiness. May impair ability to operate vehicles or machinery. Do not use in combination with alcohol.   [DISCONTINUED] ciprofloxacin (CIPRO) 500 MG tablet, Take 1 tablet by mouth 2 times daily for 7 days  [DISCONTINUED] amLODIPine (NORVASC) 5 MG tablet, Take 5 mg by mouth daily Indications: High Blood Pressure  Diet    Diet NPO Time Specified  Allergies    Tramadol  Family History          Problem Relation Age of Onset    Arthritis Mother     High Blood Pressure Mother     Cancer Sister         LEUKEMIA    Arthritis Brother     Asthma Brother     Colon Polyps Brother 58    Heart Disease Brother      Sleep History    Never diagnosed with sleep apnea in the past    Social History     Social History     Socioeconomic History    Marital status:      Spouse name: Not on file    Number of children: Not on file    Years of education: Not on file    Highest education level: Not on file   Occupational History    Not on file   Social Needs    Financial resource strain: Not on file    Food insecurity:     Worry: Not on file     Inability: Not on file    Transportation needs:     Medical: Not on file     Non-medical: Not on file   Tobacco Use    Smoking status: Never Smoker    Smokeless tobacco: Never Used   Substance and Sexual Activity    Alcohol use: Not Currently     Alcohol/week: 1.0 standard drinks     Types: 1 Glasses of wine per week     Comment: A  MONTH    Drug use: No    Sexual activity: Not on file   Lifestyle    Physical activity:     Days per week: Not on file     Minutes per session: Not on file    Stress: Not on file   Relationships    Social connections:     Talks on phone: Not on file     Gets together: Not on file     Attends Hoahaoism service: Not on file     Active member of club or organization: Not on file     Attends meetings of clubs or organizations: Not on file     Relationship status: Not on file    Intimate partner violence:     Fear of current or ex partner: Not on file     Emotionally abused: Not on file     Physically abused: Not on file     Forced sexual activity: Not on file   Other Topics Concern    Not on file   Social History Narrative    Not on file       Vitals     height is 5' (1.524 m) and weight is 97 lb (44 kg). Her temporal temperature is 98.3 °F (36.8 °C). Her blood pressure is 133/78 and her pulse is 109. Her respiration is 18 and oxygen saturation is 100%. Body mass index is 18.94 kg/m².     SUPPLEMENTAL O2: O2 Flow Rate (L/min): 2 L/min       I/O        Intake/Output Summary (Last 24 hours) at Component Value Date    TROPONINT 0.015 01/05/2020    TROPONINT 0.013 01/03/2020    TROPONINT < 0.010 01/02/2020     BNP  No results for input(s): BNP in the last 72 hours. Lactic Acid  No results for input(s): LACTA in the last 72 hours. INR  Recent Labs     01/09/20  0601 01/09/20  1455   INR 1.41* 1.31*     PTT  Recent Labs     01/08/20  1847 01/09/20  0124 01/09/20  0601   APTT 81.1* 61.8* 73.9*     Glucose  No results for input(s): POCGLU in the last 72 hours. UA No results for input(s): SPECGRAV, PHUR, COLORU, CLARITYU, MUCUS, PROTEINU, BLOODU, RBCUA, WBCUA, BACTERIA, NITRU, GLUCOSEU, BILIRUBINUR, UROBILINOGEN, KETUA, LABCAST, LABCASTTY, AMORPHOS in the last 72 hours. Invalid input(s): CRYSTALS. PFTs               Sleep studies   N/A    Cultures    VRE (-)  MRSA (-)  Urine Culture: no growth prelim  Rapid Influenza A/B (-)    1/5/20  Respiratory Culture:   Organism Corynebacterium species     EKG     Echocardiogram   01/03/2020   ECHOCARDIOGRAM COMPLETE 2D W DOPPLER W COLOR. Conclusions      Summary   Normal left ventricle size and systolic function. Ejection fraction was   estimated at 60 %. There were no regional left ventricular wall motion   abnormalities and wall thickness was within normal limits. The left atrium is Severely dilated. Moderately enlarged right atrium size. Moderate tricuspid regurgitation visualized. Right ventricular systolic pressure measures 40mmhg. Moderate-to-severe mitral regurgitation with centrally directed jet. Mild to moderate mitral stenosis. Mitral valve area by doppler pressure half-time 1.6 cm2 . The peak mitral valve velocity was 2.5 m/s, peak gradient was 20 mmHg, and   the mean gradient was 9 mmHg. Mild-to-moderate aortic regurgitation is noted.       Signature      ----------------------------------------------------------------   Electronically signed by Ivor Ahumada MD (Interpreting   physician) on 01/03/2020 at 09:04 PM      Radiology meals  -PT/OT  -ATB per primary   -Duonebs every 4 hours  -GI prophylaxis: Protonix  -DVT prophylaxis: Eliquis  -Would continue to HOLD Eliquis till GI is done assessing patient ? Endo procedure  -Continue Heparin gtt  -Stable from pulmonary standpoint     Case discussed with nurse Ms [facility] and patient. Jacquieannemarie Newell SOPHIE Ruheeducated about my impression and plan. She verbalizes understanding. Questions and concerns addressed . Meds and orders reviewed. [facility] 2 months with Dora Evans CNP with HRCT/PFT prior to office appt- testing ordered in [facility].  -Will sign off on the case from pulmonary point of view. -Will follow PRN. -Call [phone] with questions.       Electronically signed by   Meenakshi Crenshaw MD on 1/11/2020 at 1:39 PM     A

## 2020-01-11 NOTE — PRE SEDATION
VANDANA Beach CNP, 10 mL at 01/11/20 7997    sodium chloride flush 0.9 % injection 10 mL, 10 mL, Intravenous, PRN, VANDANA Beach CNP    diltiazem (CARDIZEM) tablet 30 mg, 30 mg, Oral, 4 times per day, Ama Salmon MD, 30 mg at 01/10/20 2335    acetaminophen (TYLENOL) tablet 650 mg, 650 mg, Oral, Q4H PRN, [facility], 650 mg at 01/09/20 1514    hydrOXYzine (VISTARIL) capsule 25 mg, 25 mg, Oral, Nightly PRN, [facility], 25 mg at 01/09/20 0216    alteplase (CATHFLO) injection 1 mg, 1 mg, Intracatheter, Once, Juni Dumont MD    pantoprazole (PROTONIX) 80 mg in sodium chloride 0.9 % 100 mL infusion, 8 mg/hr, Intravenous, Continuous, Juni Dumont MD, Last Rate: 10 mL/hr at 01/11/20 1152, 8 mg/hr at 01/11/20 1152    [START ON 1/13/2020] pantoprazole (PROTONIX) tablet 40 mg, 40 mg, Oral, BID AC, Juni Dumont MD    furosemide (LASIX) injection 20 mg, 20 mg, Intravenous, PRN, Juni Dumont MD    sodium chloride flush 0.9 % injection 10 mL, 10 mL, Intravenous, PRN, [facility], 10 mL at 01/10/20 0416    sodium chloride flush 0.9 % injection 10 mL, 10 mL, Intravenous, BID, [facility], 10 mL at 01/11/20 4843    multivitamin 1 tablet, 1 tablet, Oral, Daily, Juni Dumont MD, Stopped at 01/11/20 0829    menthol (VICKS) lozenge 3.3 mg, 1 lozenge, Mouth/Throat, Q2H PRN, Juni Dumont MD, 3.3 mg at 01/10/20 0849    phenol 1.4 % mouth spray 1 spray, 1 spray, Mouth/Throat, Q2H PRN, VANDANA Johnson CNP, 1 spray at 01/07/20 2152    nystatin (MYCOSTATIN) 201744 UNIT/ML suspension 500,000 Units, 5 mL, Oral, 4x Daily, Melissa Godoy MD, 500,000 Units at 01/11/20 0831    [Held by provider] furosemide (LASIX) injection 40 mg, 40 mg, Intravenous, Daily, Melissa Godoy MD, 40 mg at 01/08/20 8206    potassium chloride (KLOR-CON M) extended release tablet 20 mEq, 20 mEq, Oral, Daily with breakfast, Melissa Godoy MD, 20 mEq at 01/10/20 0840    [Held by tablet Take 650 mg by mouth 2 times daily as needed Indications: Arthritis    Yes Historical Provider, MD   bumetanide (BUMEX) 2 MG tablet Take 2 mg by mouth daily Indications: Treatment with Diuretic Therapy    Yes Historical Provider, MD   rosuvastatin (CRESTOR) 5 MG tablet Take 5 mg by mouth every evening Indications: Blood Cholesterol Abnormal    Yes Historical Provider, MD   levothyroxine (SYNTHROID) 50 MCG tablet Take 50 mcg by mouth daily Indications: Impaired Thyroid Function  1/11/16  Yes Historical Provider, MD   Biotin 1000 MCG TABS Take 1,000 mcg by mouth nightly    Yes Historical Provider, MD   aspirin 81 MG chewable tablet Take 81 mg by mouth daily Indications: Treatment to Prevent Blood Clotting Take with food.    Yes Historical Provider, MD     Additional information:       PHYSICAL:   Heart:  [x]Regular rate and rhythm  []Other:    Lungs:  [x]Clear    []Other:    Abdomen: [x]Soft    []Other:    Mental Status: [x]Alert & Oriented  []Other:      VITAL SIGNS   Patient Vitals for the past 24 hrs:   BP Temp Temp src Pulse Resp SpO2 Weight   01/11/20 1255 133/78 98.3 °F (36.8 °C) Temporal 109 18 100 % --   01/11/20 1158 (!) 145/65 98 °F (36.7 °C) Oral 103 18 97 % --   01/11/20 0815 (!) 140/81 98.7 °F (37.1 °C) Oral 103 16 93 % --   01/11/20 0530 (!) 99/48 97.5 °F (36.4 °C) Axillary 90 18 95 % 97 lb (44 kg)   01/10/20 2330 118/73 -- Oral 98 16 -- --   01/10/20 1950 119/64 98.4 °F (36.9 °C) Oral 90 16 95 % --   01/10/20 1820 123/84 -- -- 101 -- -- --   01/10/20 1730 120/74 98.3 °F (36.8 °C) Oral 105 18 93 % --   01/10/20 1709 117/82 99.3 °F (37.4 °C) Oral 112 18 95 % --   01/10/20 1634 -- -- -- 113 -- -- --   01/10/20 1536 (!) 130/56 99.3 °F (37.4 °C) Oral 99 16 93 % --       PLANNED PROCEDURE   [x]EGD  []Colonoscopy []Flex Sigmoid  []ERCP []EUS   []Cystoscopy  [] CATH [] BRONCH   Consent: I have discussed with the patient and/or the patient representative the indication, alternatives, and the possible risks and/or complications of the planned procedure and the anesthesia methods. The patient and/or patient representative appear to understand and agree to proceed. SEDATION  Planned agent:[x]Midazolam []Meperidine [x]Sublimaze []Morphine  []Diazepam  []Other:     ASA Classification: Class 3 - A patient with severe systemic disease that limits activity but is not incapacitating    Airway Assessment: Mallampati Class II - (soft palate, fauces & uvula are visible)    Monitoring and Safety: The patient will be placed on a cardiac monitor and vital signs, pulse oximetry and level of consciousness will be continuously evaluated throughout the procedure. The patient will be closely monitored until recovery from the medications is complete and the patient has returned to baseline status. Respiratory therapy will be on standby during the procedure. [x]Pre-procedure diagnostic studies complete and results available. Comment:    [x]Previous sedation/anesthesia experiences assessed. Comment:    [x]The patient is an appropriate candidate to undergo the planned procedure sedation and anesthesia. (Refer to nursing sedation/analgesia documentation record)  [x]Formulation and discussion of sedation/procedure plan, risks, and expectations with patient and/or responsible adult completed. [x]Patient examined immediately prior to the procedure.  (Refer to nursing sedation/analgesia documentation record)    Katherine Gutiérrez MD   Electronically signed 1/11/2020 at 1:51 PM

## 2020-01-11 NOTE — PLAN OF CARE
Problem: Falls - Risk of:  Goal: Will remain free from falls  Outcome: Met This Shift  Note:   Patient is without falls for night shift. Patient uses call light before attempting to ambulate at bedside. Goal: Absence of physical injury  Outcome: Met This Shift     Problem: Safety:  Goal: Free from accidental physical injury  Outcome: Met This Shift     Problem: Pain:  Goal: Pain level will decrease  Outcome: Met This Shift  Note:   Patient denied pain for night shift. Goal: Control of acute pain  Outcome: Met This Shift     Problem: Bleeding:  Goal: Will show no signs and symptoms of excessive bleeding  Outcome: Met This Shift     Problem: Infection:  Goal: Will remain free from infection  Outcome: Ongoing  Note:   Patient is without fever, or chills. Problem: Daily Care:  Goal: Daily care needs are met  Outcome: Ongoing  Note:   Patient was unable to urinate, after multiple bladder scans and straight caths for day shift, the day shift hospital ordered a alexandra cath. Patient has had appropriate output. Problem: Skin Integrity:  Goal: Skin integrity will stabilize  Outcome: Ongoing  Note:   Patient is without further breakdown. Patient durns and repositions self in bed frequently. Problem: Discharge Planning:  Goal: Patients continuum of care needs are met  Outcome: Ongoing  Note:   Patient's planning discharge to TCU, after a long stay in the hospital, patient is weak and will need rehab therapy. Problem: Bowel Function - Altered:  Goal: Bowel elimination is within specified parameters  Outcome: Ongoing  Note:   Patient stated she only had a small bowel movement. Patient was asked id sh would like something to assist, she stated she would like to wait until after her EGD. Problem: Nutrition  Goal: Optimal nutrition therapy  Outcome: Ongoing  Note:   Patient still has poor appetitive.       Problem: Cardiac:  Goal: Hemodynamic stability will improve  Outcome: Ongoing  Note:   Patient's

## 2020-01-11 NOTE — PROGRESS NOTES
Assessment and Plan:        1. GASTRITIS- ON PPI; to have repeat egd today  2. Anemia- trending  3. Afib/rvr; will add digoxin to try control rate. Propafenone stopped. Will  Need coumadin due to mitral stenosis  4. Deconditioning- to go to TCU when stable  5. She has elevated LDH; will check for hemolysis    CC:  weak  HPI: pt with MR/MS, presented with weakness. Became sob needing vent. Had bronch with removal of secretions. She has afib, rate uncontrolled. bp marginal; trying to add diltiazem. ROS (12 point review of systems completed. Pertinent positives noted.  Otherwise ROS is negative) :     PMH:  Per HPI  SHX:  , nonsmoker  FHX: nc  Allergies: See above    Medications:     pantoprozole (PROTONIX) infusion 8 mg/hr (01/10/20 4650)      sodium chloride flush  10 mL Intravenous 2 times per day    diltiazem  30 mg Oral 4 times per day    alteplase  1 mg Intracatheter Once    [START ON 1/13/2020] pantoprazole  40 mg Oral BID AC    sodium chloride flush  10 mL Intravenous BID    multivitamin  1 tablet Oral Daily    nystatin  5 mL Oral 4x Daily    [Held by provider] furosemide  40 mg Intravenous Daily    potassium chloride  20 mEq Oral Daily with breakfast    [Held by provider] apixaban  2.5 mg Oral BID    levothyroxine  50 mcg Oral Daily    phosphorus replacement protocol   Other RX Placeholder    rosuvastatin  5 mg Oral QPM    Vitamin D  1,000 Units Oral Daily    senna  1 tablet Oral BID       Vital Signs:   BP (!) 99/48   Pulse 90   Temp 97.5 °F (36.4 °C) (Axillary)   Resp 18   Ht 5' (1.524 m)   Wt 97 lb (44 kg)   SpO2 95%   BMI 18.94 kg/m²      Intake/Output Summary (Last 24 hours) at 1/11/2020 3466  Last data filed at 1/10/2020 2155  Gross per 24 hour   Intake 1267.57 ml   Output 1010 ml   Net 257.57 ml        Physical Examination: General appearance - chronically ill appearing  Mental status - alert, oriented to person, place, and time  Neck - supple, no significant adenopathy, no JVD, or carotid bruits  Chest - few crackles bases  Heart - irregularly irregular rhythm with rate 100; difficult to hear murmurs  Abdomen - soft, nontender, nondistended, no masses or organomegaly  Neurological - alert, oriented, normal speech, no focal findings or movement disorder noted  Musculoskeletal - no muscular tenderness noted  Extremities - no pedal edema noted  Skin - normal coloration and turgor, no rashes, no suspicious skin lesions noted    Data: (All radiographs, tracings, PFTs, and imaging are personally viewed and interpreted unless otherwise noted).     Reviewed labs, xr         Electronically signed by Don Polk MD on 1/11/2020 at 6:39 AM

## 2020-01-11 NOTE — BRIEF OP NOTE
Brief Postoperative Note  ______________________________________________________________    Patient: Edith Godwin  YOB: 1940  MRN: [id number]  Date of Procedure: 1/11/2020    Pre-Op Diagnosis: GI bleed, history of erosive gastritis and gastric ulcer    Post-Op Diagnosis: GERD and healed ulcer. Procedure(s):  EGD DIAGNOSTIC ONLY    Anesthesia: Anesthesia type not filed in the log. Surgeon(s):  Sea Orourke MD    Assistant: Yolanda Cruz RN     Estimated Blood Loss (mL): none    Complications: None    Specimens:   * No specimens in log *    Implants:  * No implants in log *      Drains:   Urethral Catheter Double-lumen 16 fr (Active)   Catheter Indications Acute urinary retention/obstruction 1/10/2020  7:39 PM   Site Assessment [place] 1/10/2020  7:39 PM   Urine Color Yellow 1/10/2020  7:39 PM   Urine Appearance Clear 1/10/2020  7:39 PM   Output (mL) 500 mL 1/11/2020  5:30 AM       [REMOVED] NG/OG/NJ/NE Tube Orogastric 18 fr (Removed)   Surrounding Skin Dry; Intact 1/5/2020  3:57 PM   Status Suction-low intermittent 1/5/2020  3:57 PM   Placement Verified by External Catheter Length 1/5/2020  3:57 PM   Tube Feeding Supplement Amount (mL) 0 1/6/2020  4:10 AM   Tube Feeding Intake (mL) 0 ml 1/6/2020  4:10 AM   Output (mL) 0 ml 1/6/2020  4:10 AM       [REMOVED] Urethral Catheter 16 fr (Removed)   Catheter Indications Need for fluid management in critically ill patients in a critical care setting not able to be managed by other means such as BSC with hat, bedpan, urinal, condom catheter, or short term intermittent urethral catherization 1/8/2020  4:00 PM   Site Assessment [place] 1/8/2020  4:00 PM   Urine Color Yellow 1/8/2020  4:00 PM   Urine Appearance Clear 1/8/2020  4:00 PM   Output (mL) 900 mL 1/8/2020  3:02 PM       Findings:  GERD and healed ulcer.      Sea Orourke MD  Date: 1/11/2020  Time: 1:59 PM

## 2020-01-11 NOTE — PROGRESS NOTES
[address] [address]  INPATIENT PHYSICAL THERAPY  DAILY NOTE  [facility] CCU-STEPDOWN 3B - 3B-23/023-A      Time In: 8601  Time Out: 0816  Timed Code Treatment Minutes: 44 Minutes  Minutes: 39          Date: 2020  Patient Name: Surya Arnold,  Gender:  female        MRN: [id number]  : 1940  ([de-identified] y.o.)     Referring Practitioner: Shahbaz Jackson MD  Diagnosis: Elevated troponin  Additional Pertinent Hx: Patient is an 25-year-old white female lifetime non-smoker. Patient has a history of left breast cancer diagnosed in  and treated with lumpectomy. She receives chronic anticoagulation with Eliquis for atrial fibrillation. She has a history of hypothyroidism, osteopenia, hypertension, and chronic kidney disease. Patient presented to the emergency room and was admitted on 2020. She presented with complaints of poor oral intake with significant weight loss over the past 3 months. She has a very poor appetite and feels nauseated when eating. As her nutritional status depleted, she became more and more fatigued. Patient was seen by gastroenterology and underwent EGD on 1/3/2020. This demonstrated ulcerative erosive gastritis and duodenitis. Patient deteriorated on 2020 associated with respiratory failure. She developed hypoxemia and required intubation. She underwent bronchoscopy 2020. This demonstrated purulent pneumonia bilaterally. She was started on Zosyn 2020.      Prior Level of Function:  Lives With: Alone(Pt stating her son stays with her for 2 weeks at a time)  Type of Home: House  Home Layout: One level  Home Equipment: Rolling walker   Bathroom Shower/Tub: Walk-in shower  Bathroom Toilet: Standard  Bathroom Accessibility: Accessible    ADL Assistance: Independent  Homemaking Assistance: Independent  Ambulation Assistance: Independent  Transfer Assistance: Independent  Active : Yes  IADL Comments: Pt reporting she \"did it all\"  Additional Comments: Pt stating

## 2020-01-12 LAB
ERYTHROCYTE [DISTWIDTH] IN BLOOD BY AUTOMATED COUNT: 17.6 % (ref 11.5–14.5)
ERYTHROCYTE [DISTWIDTH] IN BLOOD BY AUTOMATED COUNT: 59.1 FL (ref 35–45)
HCT VFR BLD CALC: 27.7 % (ref 37–47)
HEMOGLOBIN: 8.9 GM/DL (ref 12–16)
INR BLD: 1.15 (ref 0.85–1.13)
MCH RBC QN AUTO: 30.3 PG (ref 26–33)
MCHC RBC AUTO-ENTMCNC: 32.1 GM/DL (ref 32.2–35.5)
MCV RBC AUTO: 94.2 FL (ref 81–99)
PLATELET # BLD: 155 THOU/MM3 (ref 130–400)
PMV BLD AUTO: 9.1 FL (ref 9.4–12.4)
RBC # BLD: 2.94 MILL/MM3 (ref 4.2–5.4)
WBC # BLD: 7.3 THOU/MM3 (ref 4.8–10.8)

## 2020-01-12 PROCEDURE — 36415 COLL VENOUS BLD VENIPUNCTURE: CPT

## 2020-01-12 PROCEDURE — 6370000000 HC RX 637 (ALT 250 FOR IP): Performed by: INTERNAL MEDICINE

## 2020-01-12 PROCEDURE — 99232 SBSQ HOSP IP/OBS MODERATE 35: CPT | Performed by: INTERNAL MEDICINE

## 2020-01-12 PROCEDURE — 85610 PROTHROMBIN TIME: CPT

## 2020-01-12 PROCEDURE — 85027 COMPLETE CBC AUTOMATED: CPT

## 2020-01-12 PROCEDURE — 2580000003 HC RX 258: Performed by: PHYSICIAN ASSISTANT

## 2020-01-12 PROCEDURE — 6370000000 HC RX 637 (ALT 250 FOR IP): Performed by: PHYSICIAN ASSISTANT

## 2020-01-12 PROCEDURE — 2709999900 HC NON-CHARGEABLE SUPPLY

## 2020-01-12 PROCEDURE — 2140000000 HC CCU INTERMEDIATE R&B

## 2020-01-12 PROCEDURE — 6360000002 HC RX W HCPCS: Performed by: INTERNAL MEDICINE

## 2020-01-12 PROCEDURE — 2580000003 HC RX 258: Performed by: NURSE PRACTITIONER

## 2020-01-12 PROCEDURE — 6370000000 HC RX 637 (ALT 250 FOR IP): Performed by: OPHTHALMOLOGY

## 2020-01-12 PROCEDURE — 6370000000 HC RX 637 (ALT 250 FOR IP): Performed by: PHARMACIST

## 2020-01-12 RX ORDER — DIGOXIN 0.25 MG/ML
125 INJECTION INTRAMUSCULAR; INTRAVENOUS ONCE
Status: COMPLETED | OUTPATIENT
Start: 2020-01-12 | End: 2020-01-12

## 2020-01-12 RX ORDER — DIGOXIN 125 MCG
125 TABLET ORAL DAILY
Status: DISCONTINUED | OUTPATIENT
Start: 2020-01-13 | End: 2020-01-13 | Stop reason: HOSPADM

## 2020-01-12 RX ORDER — DILTIAZEM HYDROCHLORIDE 180 MG/1
180 CAPSULE, COATED, EXTENDED RELEASE ORAL DAILY
Status: DISCONTINUED | OUTPATIENT
Start: 2020-01-13 | End: 2020-01-13

## 2020-01-12 RX ORDER — WARFARIN SODIUM 4 MG/1
4 TABLET ORAL ONCE
Status: COMPLETED | OUTPATIENT
Start: 2020-01-12 | End: 2020-01-12

## 2020-01-12 RX ORDER — DILTIAZEM HYDROCHLORIDE 60 MG/1
60 TABLET, FILM COATED ORAL EVERY 6 HOURS SCHEDULED
Status: DISCONTINUED | OUTPATIENT
Start: 2020-01-12 | End: 2020-01-12

## 2020-01-12 RX ADMIN — SENNOSIDES 8.6 MG: 8.6 TABLET ORAL at 20:32

## 2020-01-12 RX ADMIN — Medication 500000 UNITS: at 08:54

## 2020-01-12 RX ADMIN — POTASSIUM CHLORIDE 20 MEQ: 1500 TABLET, EXTENDED RELEASE ORAL at 08:55

## 2020-01-12 RX ADMIN — WARFARIN SODIUM 4 MG: 4 TABLET ORAL at 18:01

## 2020-01-12 RX ADMIN — ACETAMINOPHEN 650 MG: 325 TABLET ORAL at 13:22

## 2020-01-12 RX ADMIN — LEVOTHYROXINE SODIUM 50 MCG: 50 TABLET ORAL at 04:25

## 2020-01-12 RX ADMIN — DILTIAZEM HYDROCHLORIDE 45 MG: 30 TABLET, FILM COATED ORAL at 18:00

## 2020-01-12 RX ADMIN — DILTIAZEM HYDROCHLORIDE 30 MG: 30 TABLET, FILM COATED ORAL at 04:00

## 2020-01-12 RX ADMIN — ENOXAPARIN SODIUM 40 MG: 40 INJECTION SUBCUTANEOUS at 20:32

## 2020-01-12 RX ADMIN — Medication 500000 UNITS: at 11:35

## 2020-01-12 RX ADMIN — THERA TABS 1 TABLET: TAB at 08:55

## 2020-01-12 RX ADMIN — DIGOXIN 125 MCG: 0.25 INJECTION INTRAMUSCULAR; INTRAVENOUS at 08:55

## 2020-01-12 RX ADMIN — ACETAMINOPHEN 650 MG: 325 TABLET ORAL at 20:32

## 2020-01-12 RX ADMIN — ROSUVASTATIN CALCIUM 5 MG: 10 TABLET, FILM COATED ORAL at 20:32

## 2020-01-12 RX ADMIN — Medication 10 ML: at 20:32

## 2020-01-12 RX ADMIN — VITAMIN D, TAB 1000IU (100/BT) 1000 UNITS: 25 TAB at 08:55

## 2020-01-12 RX ADMIN — Medication 10 ML: at 08:57

## 2020-01-12 RX ADMIN — Medication 500000 UNITS: at 18:00

## 2020-01-12 RX ADMIN — DILTIAZEM HYDROCHLORIDE 45 MG: 30 TABLET, FILM COATED ORAL at 23:26

## 2020-01-12 RX ADMIN — Medication 500000 UNITS: at 20:33

## 2020-01-12 RX ADMIN — ACETAMINOPHEN 650 MG: 325 TABLET ORAL at 03:58

## 2020-01-12 RX ADMIN — ACETAMINOPHEN 650 MG: 325 TABLET ORAL at 08:55

## 2020-01-12 RX ADMIN — DILTIAZEM HYDROCHLORIDE 45 MG: 30 TABLET, FILM COATED ORAL at 11:34

## 2020-01-12 RX ADMIN — ENOXAPARIN SODIUM 40 MG: 40 INJECTION SUBCUTANEOUS at 08:56

## 2020-01-12 ASSESSMENT — PAIN DESCRIPTION - ONSET: ONSET: ON-GOING

## 2020-01-12 ASSESSMENT — PAIN SCALES - GENERAL
PAINLEVEL_OUTOF10: 3
PAINLEVEL_OUTOF10: 0
PAINLEVEL_OUTOF10: 2
PAINLEVEL_OUTOF10: 1
PAINLEVEL_OUTOF10: 5
PAINLEVEL_OUTOF10: 3
PAINLEVEL_OUTOF10: 3
PAINLEVEL_OUTOF10: 5

## 2020-01-12 ASSESSMENT — PAIN DESCRIPTION - PAIN TYPE: TYPE: CHRONIC PAIN

## 2020-01-12 ASSESSMENT — PAIN - FUNCTIONAL ASSESSMENT: PAIN_FUNCTIONAL_ASSESSMENT: ACTIVITIES ARE NOT PREVENTED

## 2020-01-12 ASSESSMENT — PAIN DESCRIPTION - PROGRESSION: CLINICAL_PROGRESSION: NOT CHANGED

## 2020-01-12 ASSESSMENT — PAIN DESCRIPTION - ORIENTATION: ORIENTATION: LEFT

## 2020-01-12 ASSESSMENT — PAIN DESCRIPTION - LOCATION: LOCATION: HIP

## 2020-01-12 ASSESSMENT — PAIN DESCRIPTION - DESCRIPTORS: DESCRIPTORS: ACHING;NUMBNESS

## 2020-01-12 ASSESSMENT — PAIN DESCRIPTION - FREQUENCY: FREQUENCY: INTERMITTENT

## 2020-01-12 NOTE — PROGRESS NOTES
Examination: General appearance - chronically ill appearing  Mental status - alert, oriented to person, place, and time  Neck - supple, no significant adenopathy, no JVD, or carotid bruits  Chest - clear  Heart - irregularly irregular rhythm with rate 100; questionable apical diastolic rumble  Abdomen - soft, nontender, nondistended, no masses or organomegaly  Neurological - alert, oriented, normal speech, no focal findings or movement disorder noted  Musculoskeletal - no muscular tenderness noted  Extremities - no pedal edema noted  Skin - normal coloration and turgor, no rashes, no suspicious skin lesions noted    Data: (All radiographs, tracings, PFTs, and imaging are personally viewed and interpreted unless otherwise noted).     Reviewed labs, xr         Electronically signed by David Delatorre MD on 1/12/2020 at 6:05 AM

## 2020-01-12 NOTE — OP NOTE
seen, not actively bleeding. 2.  Mild acid reflux. PLAN:  1. Resume diet. 2.  Monitor H and H closely. 3.  Elective colonoscopy when the patient is more clinically stable.         Alicia Mancuso M.D.    D: 01/11/2020 14:14:37       T: 01/11/2020 20:02:35     [id number]  Job#: [id number]     Doc#: [id number]    CC:  Silke Grande C.N.P.

## 2020-01-12 NOTE — PROGRESS NOTES
Gastroenterology  Progress Note    2020 9:11 AM  Subjective:   Admit Date: 2020    Interval History:   Labs reviewed, H&H is up and stable , EGD showed healed ulcer, . No evidence of melena or hematochezia per pt and RN. Pt denies abdominal pain, N/V. Pt denies chest pain, SOB. C/o fatigue. Discussed repeating EGD and pt agreeable to proceed. Diet: DIET CARDIAC;    Medications:   Scheduled Meds:   [START ON 2020] digoxin  125 mcg Oral Daily    diltiazem  45 mg Oral 4 times per day    enoxaparin  1 mg/kg Subcutaneous BID    warfarin (COUMADIN) daily dosing (placeholder)   Other RX Placeholder    sodium chloride flush  10 mL Intravenous 2 times per day    alteplase  1 mg Intracatheter Once    [START ON 2020] pantoprazole  40 mg Oral BID AC    sodium chloride flush  10 mL Intravenous BID    multivitamin  1 tablet Oral Daily    nystatin  5 mL Oral 4x Daily    [Held by provider] furosemide  40 mg Intravenous Daily    potassium chloride  20 mEq Oral Daily with breakfast    levothyroxine  50 mcg Oral Daily    phosphorus replacement protocol   Other RX Placeholder    rosuvastatin  5 mg Oral QPM    Vitamin D  1,000 Units Oral Daily    senna  1 tablet Oral BID     Continuous Infusions:    CBC:   Recent Labs     01/10/20  0425  20  0519 20  1244 20  0415   WBC 8.8  --  7.9  --  7.3   HGB 8.0*   < > 7.9* 8.6* 8.9*     --  131  --  155    < > = values in this interval not displayed. BMP:    Recent Labs     20  0519 20  1244    139   K 3.9 3.8    102   CO2 24 23   BUN 25* 21   CREATININE 1.5* 1.1   GLUCOSE 86 72     Hepatic: No results for input(s): AST, ALT, ALB, BILITOT, ALKPHOS in the last 72 hours. INR:   Recent Labs     20  1455 20  0415   INR 1.31* 1.15*     Xray:   [facility] [id number]                                 OPERATIVE time, no  upper GI bleed seen. Scope was advanced to the duodenum. No active GI  bleed seen. No biopsy obtained. Scope was withdrawn with no immediate  complications.     IMPRESSION:  1. Erosive ulcerative gastritis has healed, but a superficial site of  the ulcer was seen, not actively bleeding. 2.  Mild acid reflux.     PLAN:  1. Resume diet. 2.  Monitor H and H closely. 3.  Elective colonoscopy when the patient is more clinically stable.           Adriane Fitzgerald M.D.     D: 01/11/2020 14:14:37       T: 01/11/2020 20:02:35     AT/[id number]  Job#: [id number]     Doc#: [id number]     CC:  PAULINE SanchezNSOLANGE           Last signed by: Aida Madrid MD at 1/12/2020  5:08 AM        Objective:   Vitals: BP (!) 144/74   Pulse 98   Temp 97.4 °F (36.3 °C) (Oral)   Resp 16   Ht 5' (1.524 m)   Wt 97 lb (44 kg)   SpO2 93%   BMI 18.94 kg/m²     Intake/Output Summary (Last 24 hours) at 1/12/2020 0911  Last data filed at 1/12/2020 0426  Gross per 24 hour   Intake 1022.7 ml   Output 800 ml   Net 222.7 ml     General appearance: alert and cooperative with exam.  Well groomed, well nourished  female  Lungs: clear to auscultation bilaterally  Heart: regular rate and rhythm, S1, S2 normal, no murmur, click, rub or gallop  Abdomen: soft, non-tender; bowel sounds normal; no masses,  no organomegaly  Extremities: extremities normal, atraumatic, no cyanosis or edema    Assessment and Plan:   1. History of acute GI blood loss anemia due to possible GI bleeding. EGD showed healed ulcers , continue PPI, avoid NSAIDs. 2. Elective colonoscopy as out patient   3. Generalized weakness - continue PT/OT per Attending.         Follow up in GI Clinic after discharge in 2 week(s)    Patient Active Problem List:     AF (atrial fibrillation) (HCC)     Anticoagulated on Coumadin     HTN (hypertension)     Nausea     Physical deconditioning     Loss of appetite     Chronic renal failure, stage 3 (moderate) (HCC)     Elevated troponin    Apolonia Moore, MD

## 2020-01-13 ENCOUNTER — HOSPITAL ENCOUNTER (INPATIENT)
Age: 80
LOS: 5 days | Discharge: HOME | DRG: 555 | End: 2020-01-18
Attending: FAMILY MEDICINE | Admitting: FAMILY MEDICINE
Payer: MEDICARE

## 2020-01-13 ENCOUNTER — APPOINTMENT (OUTPATIENT)
Dept: GENERAL RADIOLOGY | Age: 80
DRG: 377 | End: 2020-01-13
Payer: MEDICARE

## 2020-01-13 VITALS
HEART RATE: 70 BPM | BODY MASS INDEX: 19.52 KG/M2 | WEIGHT: 99.4 LBS | OXYGEN SATURATION: 96 % | TEMPERATURE: 98.6 F | HEIGHT: 60 IN | SYSTOLIC BLOOD PRESSURE: 140 MMHG | DIASTOLIC BLOOD PRESSURE: 65 MMHG | RESPIRATION RATE: 18 BRPM

## 2020-01-13 PROBLEM — R29.898 MUSCULAR DECONDITIONING: Status: ACTIVE | Noted: 2020-01-13

## 2020-01-13 LAB
BILIRUBIN URINE: NEGATIVE
BLOOD, URINE: NEGATIVE
CHARACTER, URINE: CLEAR
COLOR: YELLOW
GLUCOSE URINE: NEGATIVE MG/DL
HCT VFR BLD CALC: 29.4 % (ref 37–47)
HEMOGLOBIN: 9.5 GM/DL (ref 12–16)
INR BLD: 1.34 (ref 0.85–1.13)
KETONES, URINE: NEGATIVE
LEUKOCYTE ESTERASE, URINE: NEGATIVE
NITRITE, URINE: NEGATIVE
PH UA: 8.5 (ref 5–9)
PHOSPHORUS: 2.2 MG/DL (ref 2.4–4.7)
POTASSIUM SERPL-SCNC: 4 MEQ/L (ref 3.5–5.2)
PROTEIN UA: NEGATIVE
SPECIFIC GRAVITY, URINE: 1.01 (ref 1–1.03)
UROBILINOGEN, URINE: 0.2 EU/DL (ref 0–1)

## 2020-01-13 PROCEDURE — 97110 THERAPEUTIC EXERCISES: CPT

## 2020-01-13 PROCEDURE — 97535 SELF CARE MNGMENT TRAINING: CPT

## 2020-01-13 PROCEDURE — 36415 COLL VENOUS BLD VENIPUNCTURE: CPT

## 2020-01-13 PROCEDURE — 84132 ASSAY OF SERUM POTASSIUM: CPT

## 2020-01-13 PROCEDURE — 2709999900 HC NON-CHARGEABLE SUPPLY

## 2020-01-13 PROCEDURE — 84100 ASSAY OF PHOSPHORUS: CPT

## 2020-01-13 PROCEDURE — 6370000000 HC RX 637 (ALT 250 FOR IP): Performed by: INTERNAL MEDICINE

## 2020-01-13 PROCEDURE — 6360000002 HC RX W HCPCS: Performed by: INTERNAL MEDICINE

## 2020-01-13 PROCEDURE — 1290000000 HC SEMI PRIVATE OTHER R&B

## 2020-01-13 PROCEDURE — 85610 PROTHROMBIN TIME: CPT

## 2020-01-13 PROCEDURE — 71045 X-RAY EXAM CHEST 1 VIEW: CPT

## 2020-01-13 PROCEDURE — 85018 HEMOGLOBIN: CPT

## 2020-01-13 PROCEDURE — 81003 URINALYSIS AUTO W/O SCOPE: CPT

## 2020-01-13 PROCEDURE — 99239 HOSP IP/OBS DSCHRG MGMT >30: CPT | Performed by: INTERNAL MEDICINE

## 2020-01-13 PROCEDURE — 0220000000 HC SKILLED NURSING FACILITY

## 2020-01-13 PROCEDURE — 85014 HEMATOCRIT: CPT

## 2020-01-13 PROCEDURE — 6370000000 HC RX 637 (ALT 250 FOR IP): Performed by: OPHTHALMOLOGY

## 2020-01-13 PROCEDURE — 6370000000 HC RX 637 (ALT 250 FOR IP): Performed by: PHYSICIAN ASSISTANT

## 2020-01-13 PROCEDURE — 97116 GAIT TRAINING THERAPY: CPT

## 2020-01-13 RX ORDER — SENNA PLUS 8.6 MG/1
1 TABLET ORAL 2 TIMES DAILY
Status: DISCONTINUED | OUTPATIENT
Start: 2020-01-13 | End: 2020-01-18 | Stop reason: HOSPADM

## 2020-01-13 RX ORDER — IPRATROPIUM BROMIDE AND ALBUTEROL SULFATE 2.5; .5 MG/3ML; MG/3ML
1 SOLUTION RESPIRATORY (INHALATION) EVERY 4 HOURS PRN
Status: DISCONTINUED | OUTPATIENT
Start: 2020-01-13 | End: 2020-01-18 | Stop reason: HOSPADM

## 2020-01-13 RX ORDER — DILTIAZEM HYDROCHLORIDE 180 MG/1
180 CAPSULE, COATED, EXTENDED RELEASE ORAL DAILY
Status: CANCELLED | OUTPATIENT
Start: 2020-01-14

## 2020-01-13 RX ORDER — ACETAMINOPHEN 325 MG/1
650 TABLET ORAL EVERY 4 HOURS PRN
Status: DISCONTINUED | OUTPATIENT
Start: 2020-01-13 | End: 2020-01-18 | Stop reason: HOSPADM

## 2020-01-13 RX ORDER — POTASSIUM CHLORIDE 20 MEQ/1
10 TABLET, EXTENDED RELEASE ORAL
Status: CANCELLED | OUTPATIENT
Start: 2020-01-13

## 2020-01-13 RX ORDER — MULTIVITAMIN WITH FOLIC ACID 400 MCG
1 TABLET ORAL DAILY
Status: CANCELLED | OUTPATIENT
Start: 2020-01-13

## 2020-01-13 RX ORDER — PROMETHAZINE HYDROCHLORIDE 25 MG/1
25 TABLET ORAL EVERY 6 HOURS PRN
Status: CANCELLED | OUTPATIENT
Start: 2020-01-13

## 2020-01-13 RX ORDER — LEVOTHYROXINE SODIUM 0.05 MG/1
50 TABLET ORAL DAILY
Status: CANCELLED | OUTPATIENT
Start: 2020-01-14

## 2020-01-13 RX ORDER — PROMETHAZINE HYDROCHLORIDE 25 MG/1
25 TABLET ORAL EVERY 6 HOURS PRN
Status: DISCONTINUED | OUTPATIENT
Start: 2020-01-13 | End: 2020-01-18 | Stop reason: HOSPADM

## 2020-01-13 RX ORDER — DILTIAZEM HYDROCHLORIDE 180 MG/1
180 CAPSULE, COATED, EXTENDED RELEASE ORAL DAILY
Status: DISCONTINUED | OUTPATIENT
Start: 2020-01-13 | End: 2020-01-13 | Stop reason: HOSPADM

## 2020-01-13 RX ORDER — DIGOXIN 125 MCG
125 TABLET ORAL DAILY
Status: DISCONTINUED | OUTPATIENT
Start: 2020-01-14 | End: 2020-01-18 | Stop reason: HOSPADM

## 2020-01-13 RX ORDER — MULTIVITAMIN WITH FOLIC ACID 400 MCG
1 TABLET ORAL DAILY
Status: DISCONTINUED | OUTPATIENT
Start: 2020-01-14 | End: 2020-01-18 | Stop reason: HOSPADM

## 2020-01-13 RX ORDER — BUMETANIDE 1 MG/1
0.5 TABLET ORAL
Status: DISCONTINUED | OUTPATIENT
Start: 2020-01-13 | End: 2020-01-18 | Stop reason: HOSPADM

## 2020-01-13 RX ORDER — SENNA PLUS 8.6 MG/1
1 TABLET ORAL 2 TIMES DAILY
Status: CANCELLED | OUTPATIENT
Start: 2020-01-13

## 2020-01-13 RX ORDER — PANTOPRAZOLE SODIUM 40 MG/1
40 TABLET, DELAYED RELEASE ORAL
Status: CANCELLED | OUTPATIENT
Start: 2020-01-13

## 2020-01-13 RX ORDER — WARFARIN SODIUM 4 MG/1
4 TABLET ORAL ONCE
Status: DISCONTINUED | OUTPATIENT
Start: 2020-01-13 | End: 2020-01-13 | Stop reason: HOSPADM

## 2020-01-13 RX ORDER — IPRATROPIUM BROMIDE AND ALBUTEROL SULFATE 2.5; .5 MG/3ML; MG/3ML
1 SOLUTION RESPIRATORY (INHALATION) EVERY 4 HOURS PRN
Status: CANCELLED | OUTPATIENT
Start: 2020-01-13

## 2020-01-13 RX ORDER — ACETAMINOPHEN 325 MG/1
650 TABLET ORAL EVERY 4 HOURS PRN
Status: CANCELLED | OUTPATIENT
Start: 2020-01-13

## 2020-01-13 RX ORDER — DILTIAZEM HYDROCHLORIDE 180 MG/1
180 CAPSULE, COATED, EXTENDED RELEASE ORAL DAILY
Status: DISCONTINUED | OUTPATIENT
Start: 2020-01-14 | End: 2020-01-18 | Stop reason: HOSPADM

## 2020-01-13 RX ORDER — ROSUVASTATIN CALCIUM 10 MG/1
5 TABLET, COATED ORAL EVERY EVENING
Status: DISCONTINUED | OUTPATIENT
Start: 2020-01-13 | End: 2020-01-18 | Stop reason: HOSPADM

## 2020-01-13 RX ORDER — VITAMIN B COMPLEX
1000 TABLET ORAL DAILY
Status: CANCELLED | OUTPATIENT
Start: 2020-01-13

## 2020-01-13 RX ORDER — DIGOXIN 125 MCG
125 TABLET ORAL DAILY
Status: CANCELLED | OUTPATIENT
Start: 2020-01-13

## 2020-01-13 RX ORDER — ROSUVASTATIN CALCIUM 10 MG/1
5 TABLET, COATED ORAL EVERY EVENING
Status: CANCELLED | OUTPATIENT
Start: 2020-01-13

## 2020-01-13 RX ORDER — WARFARIN SODIUM 4 MG/1
4 TABLET ORAL ONCE
Status: COMPLETED | OUTPATIENT
Start: 2020-01-13 | End: 2020-01-13

## 2020-01-13 RX ORDER — PANTOPRAZOLE SODIUM 40 MG/1
40 TABLET, DELAYED RELEASE ORAL
Status: DISCONTINUED | OUTPATIENT
Start: 2020-01-13 | End: 2020-01-18 | Stop reason: HOSPADM

## 2020-01-13 RX ORDER — BUMETANIDE 1 MG/1
0.5 TABLET ORAL
Status: CANCELLED | OUTPATIENT
Start: 2020-01-13

## 2020-01-13 RX ORDER — VITAMIN B COMPLEX
1000 TABLET ORAL DAILY
Status: DISCONTINUED | OUTPATIENT
Start: 2020-01-14 | End: 2020-01-18 | Stop reason: HOSPADM

## 2020-01-13 RX ORDER — HYDROXYZINE PAMOATE 25 MG/1
25 CAPSULE ORAL NIGHTLY PRN
Status: CANCELLED | OUTPATIENT
Start: 2020-01-13

## 2020-01-13 RX ORDER — POTASSIUM CHLORIDE 20 MEQ/1
10 TABLET, EXTENDED RELEASE ORAL
Status: DISCONTINUED | OUTPATIENT
Start: 2020-01-13 | End: 2020-01-18 | Stop reason: HOSPADM

## 2020-01-13 RX ORDER — LEVOTHYROXINE SODIUM 0.05 MG/1
50 TABLET ORAL DAILY
Status: DISCONTINUED | OUTPATIENT
Start: 2020-01-14 | End: 2020-01-18 | Stop reason: HOSPADM

## 2020-01-13 RX ORDER — HYDROXYZINE PAMOATE 25 MG/1
25 CAPSULE ORAL NIGHTLY PRN
Status: DISCONTINUED | OUTPATIENT
Start: 2020-01-13 | End: 2020-01-18 | Stop reason: HOSPADM

## 2020-01-13 RX ADMIN — PANTOPRAZOLE SODIUM 40 MG: 40 TABLET, DELAYED RELEASE ORAL at 05:56

## 2020-01-13 RX ADMIN — ACETAMINOPHEN 650 MG: 325 TABLET ORAL at 08:39

## 2020-01-13 RX ADMIN — NYSTATIN 500000 UNITS: 100000 SUSPENSION ORAL at 15:49

## 2020-01-13 RX ADMIN — PANTOPRAZOLE SODIUM 40 MG: 40 TABLET, DELAYED RELEASE ORAL at 15:43

## 2020-01-13 RX ADMIN — Medication 500000 UNITS: at 08:39

## 2020-01-13 RX ADMIN — ROSUVASTATIN CALCIUM 5 MG: 10 TABLET, FILM COATED ORAL at 21:05

## 2020-01-13 RX ADMIN — POTASSIUM CHLORIDE 10 MEQ: 1500 TABLET, EXTENDED RELEASE ORAL at 15:42

## 2020-01-13 RX ADMIN — ENOXAPARIN SODIUM 40 MG: 40 INJECTION SUBCUTANEOUS at 21:06

## 2020-01-13 RX ADMIN — BUMETANIDE 0.5 MG: 1 TABLET ORAL at 15:40

## 2020-01-13 RX ADMIN — ACETAMINOPHEN 650 MG: 325 TABLET ORAL at 21:05

## 2020-01-13 RX ADMIN — THERA TABS 1 TABLET: TAB at 08:38

## 2020-01-13 RX ADMIN — LEVOTHYROXINE SODIUM 50 MCG: 50 TABLET ORAL at 05:54

## 2020-01-13 RX ADMIN — NYSTATIN 500000 UNITS: 100000 SUSPENSION ORAL at 21:06

## 2020-01-13 RX ADMIN — DIGOXIN 125 MCG: 125 TABLET ORAL at 08:38

## 2020-01-13 RX ADMIN — DILTIAZEM HYDROCHLORIDE 180 MG: 180 CAPSULE, COATED, EXTENDED RELEASE ORAL at 05:55

## 2020-01-13 RX ADMIN — SENNA PLUS 8.6 MG: 8.6 TABLET ORAL at 21:05

## 2020-01-13 RX ADMIN — VITAMIN D, TAB 1000IU (100/BT) 1000 UNITS: 25 TAB at 08:38

## 2020-01-13 RX ADMIN — WARFARIN SODIUM 4 MG: 4 TABLET ORAL at 18:50

## 2020-01-13 ASSESSMENT — PAIN DESCRIPTION - ONSET
ONSET: ON-GOING
ONSET: GRADUAL

## 2020-01-13 ASSESSMENT — PAIN DESCRIPTION - LOCATION
LOCATION: HIP
LOCATION: HIP

## 2020-01-13 ASSESSMENT — PAIN DESCRIPTION - PROGRESSION
CLINICAL_PROGRESSION: NOT CHANGED
CLINICAL_PROGRESSION: NOT CHANGED

## 2020-01-13 ASSESSMENT — PAIN DESCRIPTION - ORIENTATION
ORIENTATION: LEFT
ORIENTATION: LEFT

## 2020-01-13 ASSESSMENT — PAIN SCALES - GENERAL
PAINLEVEL_OUTOF10: 2
PAINLEVEL_OUTOF10: 4
PAINLEVEL_OUTOF10: 2
PAINLEVEL_OUTOF10: 2
PAINLEVEL_OUTOF10: 0

## 2020-01-13 ASSESSMENT — PAIN DESCRIPTION - DIRECTION: RADIATING_TOWARDS: NO

## 2020-01-13 ASSESSMENT — PAIN DESCRIPTION - DESCRIPTORS: DESCRIPTORS: ACHING

## 2020-01-13 ASSESSMENT — PAIN DESCRIPTION - PAIN TYPE
TYPE: CHRONIC PAIN
TYPE: CHRONIC PAIN

## 2020-01-13 ASSESSMENT — PAIN - FUNCTIONAL ASSESSMENT
PAIN_FUNCTIONAL_ASSESSMENT: PREVENTS OR INTERFERES SOME ACTIVE ACTIVITIES AND ADLS
PAIN_FUNCTIONAL_ASSESSMENT: PREVENTS OR INTERFERES SOME ACTIVE ACTIVITIES AND ADLS

## 2020-01-13 ASSESSMENT — PAIN DESCRIPTION - FREQUENCY
FREQUENCY: INTERMITTENT
FREQUENCY: INTERMITTENT

## 2020-01-13 NOTE — PROGRESS NOTES
Spoke with patient this am about going to TCU for continued therapy prior to returning home. Patient is agreeable. Plan TCU if patient is medically cleared. Patient will go to [phone]. Denita JARRELL and Primitivo MODI made aware.

## 2020-01-13 NOTE — DISCHARGE SUMMARY
Hospital Medicine Discharge Summary      Patient Identification:   Marcial Granado   : 1940  MRN: [id number]   Account: [de-identified]      Patient's PCP: VANDANA Henson CNP    Admit Date: 2020     Discharge Date: 2020      Admitting Physician: Michelle Esparza MD     Discharge Physician: Flory Bowers MD     Discharge Diagnoses: Active Hospital Problems    Diagnosis Date Noted    Other persistent atrial fibrillation [I48.19] 2013     Priority: High    Acute blood loss anemia [D62]     Bilateral pleural effusion [J90]     Mitral valve stenosis [I05.0]     Mitral stenosis with insufficiency [I05.2]     Pulmonary HTN (HCC) [I27.20]     Moderate tricuspid regurgitation [I07.1]     Gastritis and duodenitis [K29.90]     Esophageal candidiasis (HCC) [B37.81]     Hypotension [I95.9]     Demand ischemia (HCC) [I24.8]     Thoracic aortic aneurysm without rupture (HCC) [I71.2]     CKD (chronic kidney disease) stage 3, GFR 30-59 ml/min ([facility]) [N18.3]     History of breast cancer [Z85.3]     Long term current use of anticoagulant [Z79.01]     Acquired hypothyroidism [E03.9]     Recent urinary tract infection [Z87.440]     Fall [W19. XXXA]     Acute respiratory failure with hypoxia (HCC) [J96.01]     Bacterial pneumonia [J15.9]     Hyponatremia [B16.8]     Metabolic acidosis [T96.9]     Weakness [R53.1] 2020    Severe protein-calorie malnutrition ([address] [E43] 2020     Class: Acute    Nausea [R11.0] 2020    Physical deconditioning [R53.81] 2020    Loss of appetite [R63.0] 2020    Chronic renal failure, stage 3 (moderate) ([address] [N18.3] 2020    Elevated troponin [R79.89] 2020       The patient was seen and examined on day of discharge and this discharge summary is in conjunction with any daily progress note from day of discharge.     Hospital Course:   Marcial Granado is a [de-identified] y.o. female admitted to [address] [place] [address] [place] on 1/2/2020 for weakness, nausea, sob. She had several ER evaluations prior. She came to ER and was admitted. Three days into her stay she apparently decompensated from the respiratory standpoint and was transferred to ICU and intubated. She underwent bronchoscopy with removal of secretions. She was felt to have pneumonia and chf and treated for such. She was ultimately extubated and transferred to . She was in atrial fibrillation and rate was difficult to control. I made multiple adjustments till rate was adequate. She was seen by Dr Herb Hendrix and had EGD due to the nausea and gastritis and duodenitis wa noted. She was treated for such. Her hgb seemed to waver and she needed transfusions. She underwent repeat egd which showed no sign of bleeding and her stool was hemoccult negative. A haptoglobin was pending at discharge. She was given physical therapy and due to continued deconditioning she was transferred to [facility]. The patient will follow up with pcp and consultants as appropriate. DUE TO HER MITRAL STENOSIS AND ATRIAL FIBRILLATION SHE WAS TRANSITIONED TO [facility] WHICH IS THE PREFERRED [address] [place] [address] Road. Exam:     Vitals:  Vitals:    01/13/20 0448 01/13/20 0800 01/13/20 0838 01/13/20 1048   BP: (!) 131/56 133/68  (!) 140/65   Pulse: 74 80  70   Resp: 18 16  18   Temp: 98 °F (36.7 °C) 98.1 °F (36.7 °C)  98.6 °F (37 °C)   TempSrc: Oral Oral  Oral   SpO2: 94%  95% 96%   Weight: 99 lb 6.4 oz (45.1 kg)      Height:         Weight: Weight: 99 lb 6.4 oz (45.1 kg)     24 hour intake/output:    Intake/Output Summary (Last 24 hours) at 1/13/2020 3673  Last data filed at 1/13/2020 0447  Gross per 24 hour   Intake 230 ml   Output 525 ml   Net -295 ml         General appearance:  No apparent distress, appears stated age and cooperative. Chronically ill appearing    HEENT:  Normal cephalic, atraumatic without obvious deformity. Pupils equal, round, and reactive to light.   Extra ocular **This report has been created using voice recognition software. It may contain minor errors which are inherent in voice recognition technology. **      Final report electronically signed by Dr. Eden Bettencourt on 1/7/2020 12:50 PM      [address]   Final Result   1. There is no pulmonary embolus. 2. There are bilateral pleural effusions, moderate on the right and small on the left. There is consolidation adjacent to both pleural effusions. There are also mild scattered groundglass infiltrates within the lung fields bilaterally. In addition, there    is pleural-based consolidation along the anterior aspect of the left mid chest.      3. Numerous additional findings as described in the body the report. 4. Appropriate clinical management recommended. A follow-up CT examination of the chest with intravenous contrast in one month is also recommended to confirm resolution. **This report has been created using voice recognition software. It may contain minor errors which are inherent in voice recognition technology. **      Final report electronically signed by Dr. Darby Hanson on 1/7/2020 7:53 AM      XR CHEST PORTABLE   Final Result      Interval removal of the endotracheal and nasogastric tubes. Worsening bibasilar pneumonia and/or atelectasis. Resolved interstitial pulmonary edema. Small right pleural effusion. **This report has been created using voice recognition software. It may contain minor errors which are inherent in voice recognition technology. **      Final report electronically signed by Dr. Mikel Garcia on 1/6/2020 11:46 PM      XR CHEST PORTABLE   Final Result      1. Medical devices as described above. 2. Scattered opacities are seen in the right lung which are new since the previous exam which was performed less than 4 hours prior. This could be related to developing infiltrates. Aspiration could have a similar appearance.    3. Cardiomegaly with in 1 week  1 week after discharge    Clau Fred, APRN - CNP  [address]    Schedule an appointment as soon as possible for a visit in 2 months  HRCT/PFT prior to office appt     Meño Hall MD  [address] [facility]. [address]  [phone]    In 2 weeks  call for F/U 2 weeks after discharge         Discharge Medications:      [address] 2 Medication Instructions [id number]    Printed on:01/13/20 1476   Medication Information                      acetaminophen (TYLENOL ARTHRITIS PAIN) 650 MG extended release tablet  Take 650 mg by mouth 2 times daily as needed Indications: Arthritis              apixaban (ELIQUIS) 2.5 MG TABS tablet  Take by mouth 2 times daily             aspirin 81 MG chewable tablet  Take 81 mg by mouth daily Indications: Treatment to Prevent Blood Clotting Take with food.              atenolol (TENORMIN) 25 MG tablet  Take 12.5 mg by mouth 2 times daily              Biotin 1000 MCG TABS  Take 1,000 mcg by mouth nightly              bumetanide (BUMEX) 2 MG tablet  Take 2 mg by mouth daily Indications: Treatment with Diuretic Therapy              Calcium Carbonate (CALCIUM 600 PO)  Take 1 tablet by mouth daily              levothyroxine (SYNTHROID) 50 MCG tablet  Take 50 mcg by mouth daily Indications: Impaired Thyroid Function              losartan (COZAAR) 50 MG tablet  Take 50 mg by mouth 2 times daily              potassium chloride (MICRO-K) 10 MEQ extended release capsule  Take 10 mEq by mouth daily             Probiotic Product (PROBIOTIC-10 PO)  Take 1 capsule by mouth daily              propafenone (RYTHMOL) 150 MG tablet  Take 150 mg by mouth every 8 hours             rosuvastatin (CRESTOR) 5 MG tablet  Take 5 mg by mouth every evening Indications: Blood Cholesterol Abnormal              vitamin D (CHOLECALCIFEROL) 1000 UNIT TABS tablet  Take 1,000 Units by mouth daily                 Time Spent on discharge is more than

## 2020-01-13 NOTE — PROGRESS NOTES
[address] [place] [address]  Occupational Therapy  Daily Note  Time:   Time In: 3788  Time Out: 8748  Timed Code Treatment Minutes: 25 Minutes  Minutes: 25          Date: 2020  Patient Name: Sandra Prince,   Gender: female      Room: -/023-A  MRN: [id number]  : 1940  ([de-identified] y.o.)  Referring Practitioner: Dr. Sid Dinero  Diagnosis: elevated troponin  Additional Pertinent Hx: [de-identified] y.o. female who presents to the emergency department on 20 for evaluation of weakness, 2 falls, and decreased appetite. Patient's family reports she has been sick since  with flu-like symptoms which has gradually worsened since onset. She reports experiencing nausea, diarrhea for 1-2 days (resolved 5 days ago), abdominal pain with ingestion of food, increased fatigue, and weight loss. She denies vomiting and fever since onset. Patient reports being evaluated by her PCP 3 days ago due to not being able to eat food, and she was prescribed medication to help increased her appetite. Patient states this has not help, and has since noted her vision becoming blurry. Patient reports extending her arms to place items in the top of the closet when she \"blacked out\" and fell to the ground. She denies hitting her head. Patient's family reports she was unable to ambulate following the fall    Restrictions/Precautions:  Restrictions/Precautions: Fall Risk, General Precautions    SUBJECTIVE: Pt seated in bedside chair upon agreeable to OT session. RN okayed therapy session. PAIN: Denies. COGNITION: Decreased Problem Solving and Decreased Safety Awareness    ADL:   Grooming: Contact Guard Assistance. Standing sink side for oral care. BALANCE:  Sitting Balance:  Stand By Assistance  Standing Balance: Contact Guard Assistance  x3 minutes standing sink side for grooming tasks. TRANSFERS:  Sit to Stand:  Contact Guard Assistance. Stand to Sit: Contact Guard Assistance.      FUNCTIONAL MOBILITY:  Assistive with use of handout requiring min cues for ease of ADL asks   Long term goals  Time Frame for Long term goals : nto est d/t ELOS     Following session, patient left in safe position with all fall risk precautions in place.

## 2020-01-13 NOTE — PLAN OF CARE
Problem: Falls - Risk of:  Goal: Will remain free from falls  Description  Will remain free from falls  Outcome: Met This Shift  Note:   Assessment & interventions provided throughout shift. Bed locked & in low position, call light in reach, side-rails up x2, non-slip socks on when ambulating, reminded patient to use call light to call for assistance. Hourly rounding continued as well as bed alarm utilized  Goal: Absence of physical injury  Description  Absence of physical injury  Outcome: Met This Shift  Note:   No apparent physical injury tonight     Problem: Infection:  Goal: Will remain free from infection  Description  Will remain free from infection  Outcome: Met This Shift  Note:   Temp WNL tonight. Problem: Safety:  Goal: Free from accidental physical injury  Description  Free from accidental physical injury  Outcome: Met This Shift  Note:   No apparent injury tonight     Problem: Daily Care:  Goal: Daily care needs are met  Description  Daily care needs are met  Outcome: Met This Shift  Note:   ADL's complete; patient continues to get stronger every day. She's eating better although not great, but at least isn't nauseated nor vomiting anymore. She helps with her bath and with all personal care. Problem: Pain:  Goal: Pain level will decrease  Description  Pain level will decrease  Outcome: Ongoing  Note:   Pain goal is #2 and with Tylenol patient's left hip pain/numbness is that and patient has been able to sleep without problem. Sore throat is almost gone especially with use of Nystatin swish & swallow. Goal: Control of acute pain  Description  Control of acute pain  Outcome: Met This Shift  Note:   Patient only c/o chronic pain, except for her sore throat which is almost gone when asked tonight. Goal: Control of chronic pain  Description  Control of chronic pain  Outcome: Ongoing  Note:   Chronic left hip numbness is controlled by Tylenol and allows patient to be able to sleep.      Problem: Skin Integrity:  Goal: Skin integrity will stabilize  Description  Skin integrity will stabilize  Outcome: Ongoing  Note:   Ongoing assessment & interventions provided throughout shift. Skin assessments provided. Encouraging/assisting patient to turn as needed. No longer is sacral prophylaxis in place. Patient does turn/reposition self frequently. Problem: Discharge Planning:  Goal: Patients continuum of care needs are met  Description  Patients continuum of care needs are met  Outcome: Ongoing  Note: Both Dr. Johnathon Amato have signed off case, so now just Hospitalist to medically clear for TCU whom is following and will re-eval today. Problem: Bowel Function - Altered:  Goal: Bowel elimination is within specified parameters  Description  Bowel elimination is within specified parameters  Outcome: Ongoing  Note:   Senna continued; patient stated bm's still loose, but it doesn't take much for patient to be constipated. Problem: Nutrition  Goal: Optimal nutrition therapy  Outcome: Ongoing  Note:   Patient was \"failure to thrive\" upon admission, had lost 20# since December. She is better and at least has some appetite, but still has a ways to go. Problem: Cardiac:  Goal: Hemodynamic stability will improve  Description  Hemodynamic stability will improve  Outcome: Ongoing  Note:   Vital signs WNL; last dose of Cardizem 45mg was given at midnight and now today Cardizem CD 180mg daily with start today. Patient did have 5 beats VT with rate of 115 at 2147, but none after Cardizem given.      Problem: Respiratory:  Goal: Ability to maintain a clear airway will improve  Description  Ability to maintain a clear airway will improve  Outcome: Ongoing  Note:   Lung sounds are clear/diminished; she uses incentive spirometer, but again only able to achieve 800ml     Problem: Bleeding:  Goal: Will show no signs and symptoms of excessive bleeding  Description  Will show no signs and symptoms of excessive bleeding  Outcome: Met This Shift     Care plan reviewed with patient. Patient verbalizes understanding of the care plan and contributed to goal setting.

## 2020-01-13 NOTE — CARE COORDINATION
1/13/20, 10:51 AM    DISCHARGE ONGOING EVALUATION:     Srinivasan 90 day: 9  Location: [address] [place] [address] Reason for admit: Elevated troponin [R79.89]  Weakness [R53.1]   Treatment Plan of Care: Discussed with Dr. Amelia Bahena this am, planning TCU. Call placed to [facility], they are able to accept pt on TCU today. Discussed IMM with pt and sister, both verbalized understanding. Pt ready to go to TCU. Barriers to Discharge: None, going to TCU.     1/13/20, 10:52 AM    Patient goals/plan/ treatment preferences discussed by  and . Patient goals/plan/ treatment preferences reviewed with patient/ family. Patient/ family verbalize understanding of discharge plan and are in agreement with goal/plan/treatment preferences. Understanding was demonstrated using the teach back method. AVS provided by RN at time of discharge, which includes all necessary medical information pertaining to the patients current course of illness, treatment, post-discharge goals of care, and treatment preferences.         IMM Letter  IMM Letter given to Patient/Family/Significant other/Guardian/POA/by[de-identified]   IMM Letter date given[de-identified] 01/13/20  IMM Letter time given[de-identified] 1042       Electronically signed by Charlotte Pettit RN on 1/13/2020 at 10:52 AM

## 2020-01-13 NOTE — PROGRESS NOTES
Clinical Pharmacy Note    Warfarin consult follow-up    Recent Labs     01/13/20  0500   INR 1.34*     Recent Labs     01/11/20  0519 01/11/20  1244 01/12/20  0415 01/13/20  0500   HGB 7.9* 8.6* 8.9* 9.5*   HCT 24.0* 26.5* 27.7* 29.4*     --  155  --        Significant Drug-Drug Interactions:  New warfarin drug-drug interactions: none  Discontinued drug-drug interactions: none  Current warfarin drug-drug interactions: enoxaparin, levothyroxine, rosuvastatin     Date INR Warfarin Dose   1/11/2020 1.31 (1/9/2020) 2 mg   1/12/2020  1.15   4 mg   1/13/2020  1.34  4 mg                                        Notes:                   Probable TCU transfer today  Daily PT/INR until stable within therapeutic range.

## 2020-01-13 NOTE — PROGRESS NOTES
IV therapy called to remove central line; order was verified. The dressing was removed; insertion site is intact, free of redness and non-tender per patient. Both sutures were removed. The insertion site and surrounding areas were cleaned with Chloraprep. The central line was removed in one motion, and the green tip was intact. Pressure was held with sterile gauze until bleeding subsided. New sterile gauze was placed over the site, along with a clear occlusive dressing. The patient was educated on dressing care, such as keeping the dressing dry and intact for 24-48 hours. The patient was instructed to hold pressure and call for help if the insertion site would continuously bleed. This RN also informed the patient that they must remain in bed for one hour post removal, and slight drainage is expected.

## 2020-01-13 NOTE — PROGRESS NOTES
1940  MED REC NO:   [id number]                           ROOM:       0010  ACCOUNT NO:   [de-identified]                           ADMIT DATE: 01/02/2020  PROVIDER:     Eldred Goodell, M.D.     DATE OF PROCEDURE:  01/03/2020     INDICATIONS:  The patient with history of weight loss, abdominal  discomfort, inability to eat a lot, feels, nausea, vomit lately. Slight  difficulty to swallow. Plan today for EGD to evaluate.     SURGEON:  Eldred Goodell, MD     ASA CLASSIFICATION:  III.     Estimated blood loss in none      DESCRIPTION OF PROCEDURE:  The patient was brought to GI lab. Consent  was obtained. Risks involved with the procedure were explained to the  patient. Informed consent was obtained. The patient was monitored  during the procedure with pulse oximetry, blood pressure monitoring, and  oxygen by nasal cannula. Sedation done by incremental doses of IV  Versed, total 1 mg of Versed and 50 mcg of fentanyl given in incremental  dosage during the procedure to achieve continuous conscious sedation.     PROCEDURE PERFORMED:  EGD.     Standard video 190 Olympus upper scope was advanced under direct vision  from the oral cavity up to the duodenum. Esophagus appeared normal.  No  erosions or ulcerations seen. The gastroesophageal junction was at 38  cm from the incisors. Scope was advanced to the stomach. Retroflex  examination of the cardia revealed mild gastritis. Ulcerative erosive  gastritis seen, distal part of the body of the antrum. No bleeding  seen. The ulcers are superficial.  Scope was advanced to the duodenum,  showed erosive duodenitis. The findings really cannot explain the  patient's discomfort. I elected not to take a biopsy as the patient  likewise at this time has high risk to bleed. The scope was withdrawn  with no immediate complications.     IMPRESSION:  1. Ulcerative, erosive gastritis in the antrum. 2.  Erosive duodenitis. 3.  Gastritis.     PLAN:  1.   Start PPI, should be twice MD

## 2020-01-13 NOTE — PROGRESS NOTES
[address] [address]  Transitional Care Unit Preadmission Assessment    Patient Name: Mateus Canales        MRN: [id number]    [place]: [de-identified]    : 1940  ([de-identified] y.o.)  Gender: female     Admitted From:  [x][facility] []Outside Admission - Location:  Date of Admission to the hospital:2020  Date patient eligible for admission: 19  Primary Diagnosis Gastritis    Did patient have surgery?   [] Yes- Explain:   [x] No    Physicians: Dr. Cortney Sweet, ,   Risk for clinical complications/co-morbidities:   Past Medical History:   Diagnosis Date    Arthritis     [facility]     CAD (coronary artery disease)     Cancer ([facility]     BREAST    Chronic kidney disease     HTN (hypertension) 2016    Hypertension     Nausea & vomiting     Neuromuscular disorder ([facility]     Osteopenia     Pneumonia of both lungs due to infectious organism     Rheumatic fever     as a child    Sinus infection     Thyroid disease      Financial Information  Primary insurance:  [x]Medicare [] Medicare HMO  []Commercial insurance    []Medicaid   []Workers Compensation      Secondary Insurance:  []Medicare [] Medicare HMO  [x]Commercial insurance    []Medicaid   []Workers Compensation []None    Precautions:   []Cardiac Precautions  []Total hip precautions    []Weight Bearing status:  [x]Safety Precautions/Concerns fall precautions  []Visually impaired   []Hard of Hearing     Isolation Precautions:         []Yes  [x]No  If Yes:  [] Droplet  []Contact []Airborne                   []VRE          []MRSA               []C-diff         [] TB  [] Other:       Skills:   [x]Physical therapy     [x]Occupational Therapy   []IV Antibiotics   [] IV meds   [] TPN     []PEG tube Feedings      []New Colostomy   [] Ileostomy care/teaching    [] Speech Therapy   []Wound Vac   []Complex Dressing Changes    []Terminal care    []Other       Has patient had Prior Skilled care in the Last 60 days:  []Yes  []NO   If Yes: Skilled Facility:    How many skilled days were used?

## 2020-01-14 LAB — INR BLD: 1.79 (ref 0.85–1.13)

## 2020-01-14 PROCEDURE — 97116 GAIT TRAINING THERAPY: CPT

## 2020-01-14 PROCEDURE — 97110 THERAPEUTIC EXERCISES: CPT

## 2020-01-14 PROCEDURE — 85610 PROTHROMBIN TIME: CPT

## 2020-01-14 PROCEDURE — 6370000000 HC RX 637 (ALT 250 FOR IP)

## 2020-01-14 PROCEDURE — 97162 PT EVAL MOD COMPLEX 30 MIN: CPT

## 2020-01-14 PROCEDURE — 1290000000 HC SEMI PRIVATE OTHER R&B

## 2020-01-14 PROCEDURE — 6370000000 HC RX 637 (ALT 250 FOR IP): Performed by: INTERNAL MEDICINE

## 2020-01-14 PROCEDURE — 97535 SELF CARE MNGMENT TRAINING: CPT

## 2020-01-14 PROCEDURE — 6360000002 HC RX W HCPCS: Performed by: INTERNAL MEDICINE

## 2020-01-14 PROCEDURE — 36415 COLL VENOUS BLD VENIPUNCTURE: CPT

## 2020-01-14 PROCEDURE — 97166 OT EVAL MOD COMPLEX 45 MIN: CPT

## 2020-01-14 RX ORDER — WARFARIN SODIUM 2 MG/1
2 TABLET ORAL
Status: COMPLETED | OUTPATIENT
Start: 2020-01-14 | End: 2020-01-14

## 2020-01-14 RX ADMIN — LEVOTHYROXINE SODIUM 50 MCG: 50 TABLET ORAL at 05:51

## 2020-01-14 RX ADMIN — VITAMIN D, TAB 1000IU (100/BT) 1000 UNITS: 25 TAB at 08:59

## 2020-01-14 RX ADMIN — ENOXAPARIN SODIUM 40 MG: 40 INJECTION SUBCUTANEOUS at 08:58

## 2020-01-14 RX ADMIN — NYSTATIN 500000 UNITS: 100000 SUSPENSION ORAL at 08:59

## 2020-01-14 RX ADMIN — PANTOPRAZOLE SODIUM 40 MG: 40 TABLET, DELAYED RELEASE ORAL at 17:35

## 2020-01-14 RX ADMIN — PANTOPRAZOLE SODIUM 40 MG: 40 TABLET, DELAYED RELEASE ORAL at 05:51

## 2020-01-14 RX ADMIN — DIGOXIN 125 MCG: 125 TABLET ORAL at 08:59

## 2020-01-14 RX ADMIN — ACETAMINOPHEN 650 MG: 325 TABLET ORAL at 08:59

## 2020-01-14 RX ADMIN — WARFARIN SODIUM 2 MG: 2 TABLET ORAL at 17:35

## 2020-01-14 RX ADMIN — DILTIAZEM HYDROCHLORIDE 180 MG: 180 CAPSULE, COATED, EXTENDED RELEASE ORAL at 08:59

## 2020-01-14 RX ADMIN — ENOXAPARIN SODIUM 40 MG: 40 INJECTION SUBCUTANEOUS at 21:02

## 2020-01-14 RX ADMIN — ROSUVASTATIN CALCIUM 5 MG: 10 TABLET, FILM COATED ORAL at 21:03

## 2020-01-14 RX ADMIN — THERA TABS 1 TABLET: TAB at 08:59

## 2020-01-14 RX ADMIN — NYSTATIN 500000 UNITS: 100000 SUSPENSION ORAL at 21:02

## 2020-01-14 RX ADMIN — NYSTATIN 500000 UNITS: 100000 SUSPENSION ORAL at 17:35

## 2020-01-14 RX ADMIN — ACETAMINOPHEN 650 MG: 325 TABLET ORAL at 21:02

## 2020-01-14 ASSESSMENT — PAIN SCALES - GENERAL
PAINLEVEL_OUTOF10: 6
PAINLEVEL_OUTOF10: 3
PAINLEVEL_OUTOF10: 0

## 2020-01-14 ASSESSMENT — PAIN DESCRIPTION - FREQUENCY: FREQUENCY: INTERMITTENT

## 2020-01-14 ASSESSMENT — PAIN DESCRIPTION - PAIN TYPE
TYPE: ACUTE PAIN
TYPE: ACUTE PAIN

## 2020-01-14 ASSESSMENT — PAIN DESCRIPTION - DESCRIPTORS: DESCRIPTORS: ACHING

## 2020-01-14 ASSESSMENT — PAIN DESCRIPTION - LOCATION
LOCATION: BUTTOCKS
LOCATION: BACK

## 2020-01-14 NOTE — PROGRESS NOTES
[facility]  Recreational Therapy  Evaluation  Transitional Care Unit      Time Spent with Patient: 23 minutes    Date:  1/14/2020       Patient Name: Viviana Collet      MRN: [id number]       YOB: 1940 [de-identified] y.o.)       Gender: female  Diagnosis: Gastritis  Referring Practitioner: Ordering: Imani López MD Attending: Dr. Tiajuana Mcburney    RESTRICTIONS/PRECAUTIONS:  Restrictions/Precautions: Fall Risk, General Precautions  Vision: Impaired  Hearing: Within functional limits    PAIN: 0-no pain sitting     SUBJECTIVE:  Pt lives alone-son stays with her for 2 weeks at a time    VISION:  Glasses    HEARING: Within Normal Limit    LEISURE INTERESTS:   Pt states she is independent at home with her cooking, cleaning and driving-she enjoys reading and has materials in room, she enjoys being outside when weather is nice, attends [employer] and enjoys going out to eat-she would like to get her hair done by our beautician tomorrow -pleasant -requested to eat her lunch in her room today and enjoyed a [place] paid for by [facility]. Ritas -appreciative -states she doesn't think she will be here much longer    BARRIERS TO LEISURE INTERESTS:    Decreased endurance           Patient Education  New Education Provided: Importance of Leisure, RT Plan of Care    Plan:  Continue to follow patient through this admission  Include patient in appropriate groups  See patient individually    Electronically signed by: CLARICE Quinonez  Date: 1/14/2020

## 2020-01-14 NOTE — PROGRESS NOTES
[facility]  INPATIENT PHYSICAL THERAPY  EVALUATION  [facility] SKILLED NURSING UNIT - [id number]    Time In: 9800  Time Out: 1229  Timed Code Treatment Minutes: 36 Minutes  Minutes: 56          Date: 2020  Patient Name: Amy Dimas,  Gender:  female        MRN: [id number]  : 1940  ([de-identified] y.o.)  Referral Date : 20   Referring Practitioner: Marylene Scotland, MD (referring), Ananya Sanchez MD (attending)  Diagnosis: Gastritis  Treatment Diagnosis: muscle weakness  Additional Pertinent Hx: Patient is an 59-year-old white female lifetime non-smoker. Patient has a history of left breast cancer diagnosed in  and treated with lumpectomy. She receives chronic anticoagulation with Eliquis for atrial fibrillation. She has a history of hypothyroidism, osteopenia, hypertension, and chronic kidney disease. Patient presented to the emergency room and was admitted on 2020. She presented with complaints of poor oral intake with significant weight loss over the past 3 months. She has a very poor appetite and feels nauseated when eating. As her nutritional status depleted, she became more and more fatigued. Patient was seen by gastroenterology and underwent EGD on 1/3/2020. This demonstrated ulcerative erosive gastritis and duodenitis. Patient deteriorated on 2020 associated with respiratory failure. She developed hypoxemia and required intubation. She underwent bronchoscopy 2020. This demonstrated purulent pneumonia bilaterally. She was started on Zosyn 2020. Restrictions/Precautions:  Restrictions/Precautions: Fall Risk, General Precautions    Subjective:  Chart Reviewed: Yes  Patient assessed for rehabilitation services?: Yes  Family / Caregiver Present: No  Subjective: Pt in room, agreeable to PT.      General:  Overall Orientation Status: Within Functional Limits    Vision: Impaired  Vision Exceptions: Wears glasses at all times    Hearing: Within functional limits Janie 230, verbal cues for hand placement  Stand Pivot:Contact Guard Assistance  Car:Contact Guard Assistance    Ambulation:  Contact Guard Assistance  Distance: 140', 20' x 2, 100'  Surface: Level Tile  Device:Rolling Walker  Gait Deviations: Forward Flexed Posture, Slow Rebecca and Decreased Heel Strike Bilaterally    Contact Guard Assistance  Distance: 50', 10'  Surface: Level Tile  Device:No Device  Gait Deviations:  Slow Rebecca, Decreased Step Length Bilaterally, Decreased Arm Swing, Decreased Heel Strike Bilaterally and Mild Path Deviations    Stairs:  Contact Guard Assistance  Number of Steps: 4  Height: 6\" step with Bilateral Handrails   Verbal cues for lower extremity sequencing    Stairs:  Contact Guard Assistance  Number of Steps: 1  Height: 6\" step with Rolling Walker   Verbal cues for RW placement     Exercise:  Patient was guided in 1 set(s) 10 reps of exercise to both lower extremities. Seated: ankle pumps x 20, long arc quad 5\" x 10 right x 10 left, march right only, standing heel raises, right hip flexion x 10. Exercises were completed for increased independence with functional mobility. Functional Outcome Measures: Completed  Timed Up and Go: 36 seconds    ASSESSMENT:  Activity Tolerance:  Patient tolerance of  treatment: good. Treatment Initiated: Treatment and education initiated within context of evaluation. Evaluation time included review of current medical information, gathering information related to past medical, social and functional history, completion of standardized testing, formal and informal observation of tasks, assessment of data and development of plan of care and goals. Treatment time included skilled education and facilitation of tasks to increase safety and independence with functional mobility for improved independence and quality of life.    Gait training completed to improve patient's ability to navigate her living environment  Therapeutic exercise completed to improve patient's lower extremity strength. Ice applied to left hip at end of treatment to attempt to reduce pain/discomfort. Education on benefits of ice. Assessment: Body structures, Functions, Activity limitations: Decreased functional mobility , Decreased strength, Decreased endurance, Decreased balance  Assessment: The evaluation and examination of Ms. Titus indicates a decline in functional mobility compared to baseline. Pt independent with all mobility at PLOF with no AD and at this time requiring SBA to CGA to complete functional mobility with a RW. Pt presents with lower extremity weakness and requires frequent rest breaks due to lower extremity fatigue. Patient would benefit from skilled PT services to improve her ability to complete functional mobility, reduce her risk for falls and allow patient to return to home safely.   Prognosis: Good   Co-morbidities: CKD, arthritis, pneumonia, pt lives alone, pt anxious to return to home although therapy recommended at this time  Evolving status: increased pain left hip and buttocks, decreased strength, needing increased assist with mobility       Discharge Recommendations:  Discharge Recommendations: Continue to assess pending progress(home with home health versus outpatient)    Patient Education:  PT Education: Goals, PT Role, Plan of Care, Transfer Training, Gait Training    Equipment Recommendations:  Equipment Needed: No(has RW)    Plan:  Times per week: 6x/wk  Times per day: Daily  Plan weeks: 3  Current Treatment Recommendations: Strengthening, Functional Mobility Training, Neuromuscular Re-education, Home Exercise Program, Transfer Training, Gait Training, Safety Education & Training, Balance Training, Endurance Training, Stair training, Patient/Caregiver Education & Training    Goals:  Patient goals : go home and return to independent PLOF  Short term goals  Time Frame for Short term goals: 1 weeks  Short term goal 1: Patient will complete supine  <> sit with modified independence to transfer in/out of bed safely. Short term goal 2: Patient will complete sit  <> stand with supervision to stand to ambulate with decreased difficulty. Short term goal 3: Patient will ambulate Bernadette Kenzie 1560' with no AD and SBA to progress towards safe household ambulation. Short term goal 4: Patient will complete car transfer with supervision to transfer in/out of vehicle safely. Short term goal 5: Patient will complete TUG in lesss than or equal to 25 seconds to reduce risk for falls. Long term goals  Time Frame for Long term goals : 3 weeks  Long term goal 1: Patient will complete sit < > stand and stand pivot transfers with modified independence to transfer surface to surface safely. Long term goal 2: Patient will ambulate 80' with no AD and modified independence to navigate living environment safely. Long term goal 3: Patient will complete car transfer with modified independence to transfer to/from home and appointments safely. Long term goal 4: Patient will ascend/descend 1 step with no AD and SBA for community entry/appointments. Long term goal 5: Patient will complete TUG in less than or equal to 14 seconds to reduce risk for falls. Following session, patient left in safe position with all fall risk precautions in place.

## 2020-01-14 NOTE — PROGRESS NOTES
Certification for TCU Admission    Name:  Mateus Canales    MR#:    [id number]  Denis: [de-identified]      :   1940    Long Term Care Facility Type: Transitional Care Unit     Dx:   Gastritis      Patient Active Problem List   Diagnosis    Other persistent atrial fibrillation    Anticoagulated on Coumadin    HTN (hypertension)    Nausea    Physical deconditioning    Loss of appetite    Chronic renal failure, stage 3 (moderate) ([facility])    Elevated troponin    Weakness    Severe protein-calorie malnutrition ([facility])    Acute respiratory failure with hypoxia ([facility])    Bacterial pneumonia    Hyponatremia    Metabolic acidosis    Bilateral pleural effusion    Mitral valve stenosis    Mitral stenosis with insufficiency    Pulmonary HTN ([address]    Moderate tricuspid regurgitation    Gastritis and duodenitis    Esophageal candidiasis ([address]    Hypotension    Demand ischemia ([facility]    Thoracic aortic aneurysm without rupture ([facility])    CKD (chronic kidney disease) stage 3, GFR 30-59 ml/min ([facility])    History of breast cancer    Long term current use of anticoagulant    Acquired hypothyroidism    Recent urinary tract infection    Fall    Acute blood loss anemia    Muscular deconditioning       Code Status: Full Code      Rehabilitation Potential: Good     Prognosis: Good     Stability: Stable     History & Physical current: Yes   If No, note changes in H&P update     Level of Care Recommendation/Request: Skilled     Physician Certification: I certify that I have reviewed the information contained in the discharge summary and that the information is a true and accurate reflection of the individual's condition. I certify this resident is appropriate for skilled services provided in the TCU/ECF for a condition which the individual received inpatient care.      Certification: I certify the orders, information, and transfer of said patient is necessary for the continuing treatment of the diagnosis listed in a skilled care setting providing skilled nursing and/or skilled rehabilitative services. The patient will require on a daily basis SNF covered care.   Electronically signed by Alicia Chester MD on 1/14/2020 at 8:00 AM

## 2020-01-14 NOTE — PLAN OF CARE
Care plan reviewed with patient. Patient verbalize understanding of the plan of care and contribute to goal setting. Problem: Pain:  Goal: Pain level will decrease  Description  Pain level will decrease  Outcome: Ongoing  Note:   Patient informs staff of onset of pain      Problem: Bleeding:  Goal: Will show no signs and symptoms of excessive bleeding  Description  Will show no signs and symptoms of excessive bleeding  Outcome: Ongoing  Note:   No sign or symptom of excessive bleeding noted      Problem: Mobility - Impaired:  Goal: Mobility will improve  Description  Mobility will improve  Outcome: Ongoing  Note:   Patient working with therapy to improve mobility      Problem: Skin Integrity:  Goal: Will show no infection signs and symptoms  Description  Will show no infection signs and symptoms  Outcome: Ongoing  Note:   No sign or symptom of infection noted      Problem: Falls - Risk of:  Goal: Will remain free from falls  Description  Will remain free from falls  Outcome: Ongoing  Note:   Fall protocol initiated. Patient uses call light appropriately      Problem: Risk for Impaired Skin Integrity  Goal: Tissue integrity - skin and mucous membranes  Description  Structural intactness and normal physiological function of skin and  mucous membranes.   Outcome: Ongoing  Note:   Patient repositioned throughout shift

## 2020-01-14 NOTE — PROGRESS NOTES
[facility]  INPATIENT OCCUPATIONAL THERAPY  [facility] SKILLED NURSING UNIT  EVALUATION    Time:   Time In: 3979  Time Out: 2462  Timed Code Treatment Minutes: 39 Minutes  Minutes: 58          Date: 2020  Patient Name: Amy Dimas,   Gender: female      MRN: [id number]  : 1940  ([de-identified] y.o.)  Referring Practitioner: Ordering: Marylene Scotland, MD Attending: Dr. Jesse Og  Diagnosis: Gastritis  Additional Pertinent Hx: [de-identified] y.o. female who presents to the emergency department on 20 for evaluation of weakness, 2 falls, and decreased appetite. Patient's family reports she has been sick since  with flu-like symptoms which has gradually worsened since onset. She reports experiencing nausea, diarrhea for 1-2 days (resolved 5 days ago), abdominal pain with ingestion of food, increased fatigue, and weight loss. She denies vomiting and fever since onset. Patient reports being evaluated by her PCP 3 days ago due to not being able to eat food, and she was prescribed medication to help increased her appetite. Patient states this has not help, and has since noted her vision becoming blurry. Patient reports extending her arms to place items in the top of the closet when she \"blacked out\" and fell to the ground. She denies hitting her head. Patient's family reports she was unable to ambulate following the fall. To TCU 19    Restrictions/Precautions:  Restrictions/Precautions: Fall Risk, General Precautions    Subjective  Chart Reviewed: Yes, History and Physical, Orders, Progress Notes, Previous Admission  Patient assessed for rehabilitation services?: Yes  Family / Caregiver Present: No    Subjective:  On initial attempt pt was still eating breakfast d/t late trays however on 2nd attempt was agreeable and eager for ADLs  Comments: RN ok'd OT and shower    Pain:  Pain Assessment  Patient Currently in Pain: Denies    Social/Functional History:  Lives With: Alone(son there half of the time, son is a Conservation  Patient Education: TCU, impotance of activit    Equipment Recommendations: Other: follow for LHAE needs    Plan:  Times per week: 6x  Current Treatment Recommendations: Strengthening, Functional Mobility Training, Patient/Caregiver Education & Training, Endurance Training, Balance Training, Safety Education & Training, Self-Care / ADL, Equipment Evaluation, Education, & procurement, Home Management Training    Goals:  Patient goals : go home Saturday  Short term goals  Time Frame for Short term goals: 1 week  Short term goal 1: Pt to demonstrate LB ADLs using LHAE prn and SBA for increased independence with self cares  Short term goal 2: Pt to demonstrate DYN standing with 1-2 UE release at SUP >5 mins for increased independence with SMP  Short term goal 3: Pt to demonstate improved activity tolerance as evidenced by completing functional mobility with LRAD at household distances with no cues for safety for increased independence with item retrieval  Short term goal 4: Pt to tolerate 15 reps of BUE strengthening for increasing endurance required for bathing tasks  Long term goals  Time Frame for Long term goals : 2 weeks  Long term goal 1: Pt to complete BADL routine with Mod I and 0 cues for safety or ECT for increased safety for return to home enviornment  Long term goal 2: Pt to demonstrate basic IADL tasks with Mod I and no cues for safety for increased independnece with laundry and homemakign tasks         Following session, patient left in safe position with all fall risk precautions in place.

## 2020-01-14 NOTE — H&P
TCU History and Physical      Chief Complaint and Reason for TCU Admission:   The need to continue time with therapies following her acute hospital stay. History of Present Illness:  Delgado Kirby  is a [de-identified] y.o. female admitted to the transitional care unit on 1/13/2020. She was admitted with weakness and pneumonia. She improved to the point that she could be discharged but was too deconditioned to return home. She now comes to the TCU in order to improve her strength and independence prior to discharge.     Past Medical History:      Diagnosis Date    Arthritis     Atrial fibrillation ([address]     CAD (coronary artery disease)     Cancer (HCC)     BREAST    Chronic kidney disease     HTN (hypertension) 4/13/2016    Hypertension     Nausea & vomiting     Neuromuscular disorder (HCC)     Osteopenia     Pneumonia of both lungs due to infectious organism     Rheumatic fever     as a child    Sinus infection     Thyroid disease        Primary care provider: VANDANA Kyle - CNP     Past Surgical History:      Procedure Laterality Date    BREAST LUMPECTOMY Left 2011    BRONCHOSCOPY N/A 1/5/2020    BRONCHOSCOPY performed by Zayda Archibald MD at [address] [place] [address] Left 2009    CARDIAC CATHETERIZATION  2010    DILATION AND CURETTAGE OF UTERUS      X2; SEVERAL YEARS AGO    JOINT REPLACEMENT Right 2010    KNEE    TONSILLECTOMY      UPPER GASTROINTESTINAL ENDOSCOPY N/A 1/3/2020    EGD ESOPHAGOGASTRODUODENOSCOPY performed by Gina Lomeli MD at [address] [place] [address] N/A 1/11/2020    EGD DIAGNOSTIC ONLY performed by Gina Lomeli MD at [facility]       Allergies:    Tramadol    Current Medications:    Current Facility-Administered Medications: warfarin (COUMADIN) tablet 2 mg, 2 mg, Oral, Once  acetaminophen (TYLENOL) tablet 650 mg, 650 mg, Oral, Q4H PRN  bumetanide (BUMEX) tablet 0.5 mg, 0.5 mg, Oral, Q48H  digoxin (LANOXIN) tablet 125 mcg, 125 mcg, Oral, Daily  diltiazem (CARDIZEM CD) extended release capsule 180 mg, 180 mg, Oral, Daily  enoxaparin (LOVENOX) injection 40 mg, 1 mg/kg, Subcutaneous, BID  hydrOXYzine (VISTARIL) capsule 25 mg, 25 mg, Oral, Nightly PRN  ipratropium-albuterol (DUONEB) nebulizer solution 1 ampule, 1 ampule, Inhalation, Q4H PRN  levothyroxine (SYNTHROID) tablet 50 mcg, 50 mcg, Oral, Daily  multivitamin 1 tablet, 1 tablet, Oral, Daily  nystatin (MYCOSTATIN) 785738 UNIT/ML suspension 500,000 Units, 5 mL, Oral, 4x Daily  pantoprazole (PROTONIX) tablet 40 mg, 40 mg, Oral, BID AC  phenol 1.4 % mouth spray 1 spray, 1 spray, Mouth/Throat, Q2H PRN  [START ON 1/2/2021] phosphorus replacement protocol, , Other, RX Placeholder  potassium chloride (KLOR-CON M) extended release tablet 10 mEq, 10 mEq, Oral, Q48H  [START ON 1/16/2020] ppd (tuberculin skin test) read, , Does not apply, Weekly  promethazine (PHENERGAN) tablet 25 mg, 25 mg, Oral, Q6H PRN  rosuvastatin (CRESTOR) tablet 5 mg, 5 mg, Oral, QPM  senna (SENOKOT) tablet 8.6 mg, 1 tablet, Oral, BID  tuberculin injection 5 Units, 5 Units, Intradermal, Weekly  Vitamin D (CHOLECALCIFEROL) tablet 1,000 Units, 1,000 Units, Oral, Daily  [START ON 1/10/2021] warfarin (COUMADIN) daily dosing (placeholder), , Other, RX Placeholder     Resident is taking an antipsychotic medication? No     If yes, was Gradual Dose Reduction (GDR) attempted? NA     No- GDR is clinically contraindicated due to the fact that tapering the medication  would not achieve the desired therapeutic effects and the current dose is  necessary to maintain or improve the resident's function, well-being, safety, and  quality of life. Social History:  Social History     Socioeconomic History    Marital status:       Spouse name: Not on file    Number of children: Not on file    Years of education: Not on file    Highest education level: Not on file   Occupational History    Not on file   Social Needs    Financial resource strain: Not on file    Food insecurity:     Worry: Not on file     Inability: Not on file    Transportation needs:     Medical: Not on file     Non-medical: Not on file   Tobacco Use    Smoking status: Never Smoker    Smokeless tobacco: Never Used   Substance and Sexual Activity    Alcohol use: Not Currently     Alcohol/week: 1.0 standard drinks     Types: 1 Glasses of wine per week     Comment: A  MONTH    Drug use: No    Sexual activity: Not on file   Lifestyle    Physical activity:     Days per week: Not on file     Minutes per session: Not on file    Stress: Not on file   Relationships    Social connections:     Talks on phone: Not on file     Gets together: Not on file     Attends Christianity service: Not on file     Active member of club or organization: Not on file     Attends meetings of clubs or organizations: Not on file     Relationship status: Not on file    Intimate partner violence:     Fear of current or ex partner: Not on file     Emotionally abused: Not on file     Physically abused: Not on file     Forced sexual activity: Not on file   Other Topics Concern    Not on file   Social History Narrative    Not on file           Family History:       Problem Relation Age of Onset    Arthritis Mother     High Blood Pressure Mother     Cancer Sister         LEUKEMIA    Arthritis Brother     Asthma Brother     Colon Polyps Brother 58    Heart Disease Brother        Review of Systems:  CONSTITUTIONAL:  positive for  fatigue  EYES:  positive for  glasses  HEENT:  negative for  epistaxis  RESPIRATORY:  positive for  dyspnea  CARDIOVASCULAR:  negative for  chest pain  GASTROINTESTINAL:  negative for constipation  GENITOURINARY:  negative for dysuria  SKIN:  negative  HEMATOLOGIC/LYMPHATIC:  negative for swelling/edema  MUSCULOSKELETAL:  positive for  myalgias and arthralgias  NEUROLOGICAL:  negative for syncope  BEHAVIOR/PSYCH:  negative for increased agitation  10 point system review GI: protonix. · Pain: tylenol  · Nutrition:  Consultation to dietician for nutritional counseling and recommendations.    · Bladder: continent  · Bowel: senokot, mom, glycolax  ·  and case management consultations for coordination of care and discharge planning    Satish Lovell MD

## 2020-01-15 LAB
ANION GAP SERPL CALCULATED.3IONS-SCNC: 9 MEQ/L (ref 8–16)
BUN BLDV-MCNC: 15 MG/DL (ref 7–22)
CALCIUM SERPL-MCNC: 9.2 MG/DL (ref 8.5–10.5)
CHLORIDE BLD-SCNC: 108 MEQ/L (ref 98–111)
CO2: 23 MEQ/L (ref 23–33)
CREAT SERPL-MCNC: 1 MG/DL (ref 0.4–1.2)
GFR SERPL CREATININE-BSD FRML MDRD: 53 ML/MIN/1.73M2
GLUCOSE BLD-MCNC: 90 MG/DL (ref 70–108)
HAPTOGLOBIN: 252 MG/DL (ref 30–200)
INR BLD: 3.02 (ref 0.85–1.13)
POTASSIUM SERPL-SCNC: 4.3 MEQ/L (ref 3.5–5.2)
SODIUM BLD-SCNC: 140 MEQ/L (ref 135–145)

## 2020-01-15 PROCEDURE — 97116 GAIT TRAINING THERAPY: CPT

## 2020-01-15 PROCEDURE — 6370000000 HC RX 637 (ALT 250 FOR IP): Performed by: INTERNAL MEDICINE

## 2020-01-15 PROCEDURE — 1290000000 HC SEMI PRIVATE OTHER R&B

## 2020-01-15 PROCEDURE — 85610 PROTHROMBIN TIME: CPT

## 2020-01-15 PROCEDURE — 80048 BASIC METABOLIC PNL TOTAL CA: CPT

## 2020-01-15 PROCEDURE — 36415 COLL VENOUS BLD VENIPUNCTURE: CPT

## 2020-01-15 PROCEDURE — 97530 THERAPEUTIC ACTIVITIES: CPT

## 2020-01-15 PROCEDURE — 97110 THERAPEUTIC EXERCISES: CPT

## 2020-01-15 PROCEDURE — 97535 SELF CARE MNGMENT TRAINING: CPT

## 2020-01-15 RX ADMIN — NYSTATIN 500000 UNITS: 100000 SUSPENSION ORAL at 20:30

## 2020-01-15 RX ADMIN — NYSTATIN 500000 UNITS: 100000 SUSPENSION ORAL at 13:49

## 2020-01-15 RX ADMIN — DILTIAZEM HYDROCHLORIDE 180 MG: 180 CAPSULE, COATED, EXTENDED RELEASE ORAL at 09:44

## 2020-01-15 RX ADMIN — PANTOPRAZOLE SODIUM 40 MG: 40 TABLET, DELAYED RELEASE ORAL at 17:17

## 2020-01-15 RX ADMIN — LEVOTHYROXINE SODIUM 50 MCG: 50 TABLET ORAL at 06:20

## 2020-01-15 RX ADMIN — NYSTATIN 500000 UNITS: 100000 SUSPENSION ORAL at 17:17

## 2020-01-15 RX ADMIN — VITAMIN D, TAB 1000IU (100/BT) 1000 UNITS: 25 TAB at 09:45

## 2020-01-15 RX ADMIN — NYSTATIN 500000 UNITS: 100000 SUSPENSION ORAL at 09:45

## 2020-01-15 RX ADMIN — PANTOPRAZOLE SODIUM 40 MG: 40 TABLET, DELAYED RELEASE ORAL at 06:20

## 2020-01-15 RX ADMIN — BUMETANIDE 0.5 MG: 1 TABLET ORAL at 17:17

## 2020-01-15 RX ADMIN — SENNA PLUS 8.6 MG: 8.6 TABLET ORAL at 20:30

## 2020-01-15 RX ADMIN — THERA TABS 1 TABLET: TAB at 09:45

## 2020-01-15 RX ADMIN — POTASSIUM CHLORIDE 10 MEQ: 1500 TABLET, EXTENDED RELEASE ORAL at 17:17

## 2020-01-15 RX ADMIN — ROSUVASTATIN CALCIUM 5 MG: 10 TABLET, FILM COATED ORAL at 20:30

## 2020-01-15 RX ADMIN — DIGOXIN 125 MCG: 125 TABLET ORAL at 09:44

## 2020-01-15 ASSESSMENT — PAIN SCALES - GENERAL
PAINLEVEL_OUTOF10: 0
PAINLEVEL_OUTOF10: 0

## 2020-01-15 NOTE — PLAN OF CARE
Problem: Pain:  Goal: Pain level will decrease  Description  Pain level will decrease  Outcome: Ongoing  Reposition frequently to alleviate pain. Pt denies pain at this time. Problem: Mobility - Impaired:  Goal: Mobility will improve  Description  Mobility will improve  Outcome: Ongoing   Pt will participate in PT daily as directed to increase mobility. Problem: Sensory:  Goal: Ability to demonstrate ways to enhance comfort will improve  Description  Ability to demonstrate ways to enhance comfort will improve  Outcome: Ongoing     Problem: Skin Integrity:  Goal: Will show no infection signs and symptoms  Description  Will show no infection signs and symptoms  Outcome: Ongoing  Skin assessment every shift. Red, blanching redness on coccyx, epc applied, encouraged pt to reposition self and ambulate through the halls to increase strength and stamina. Problem: Falls - Risk of:  Goal: Will remain free from falls  Description  Will remain free from falls  Outcome: Ongoing  Pt will remain free of falls. Pt is standby assist.     Problem: Risk for Impaired Skin Integrity  Goal: Tissue integrity - skin and mucous membranes  Description  Structural intactness and normal physiological function of skin and  mucous membranes. Outcome: Ongoing  Skin assessment every shift. Blanchable redness to coccyx, EPC,and pillow support, off loading encouraged, pt does follow suggestions.

## 2020-01-15 NOTE — PLAN OF CARE
Problem: Nutrition  Goal: Optimal nutrition therapy  Outcome: Ongoing   Nutrition Problem: Severe malnutrition, In context of acute illness or injury  Intervention: Food and/or Nutrient Delivery: Start ONS, Vitamin Supplement(Consider liberalizing diet to GINNA. )  Nutritional Goals: Pt. will consume 75% or more at meals during LOS.

## 2020-01-15 NOTE — PROGRESS NOTES
Nutrition Assessment    Type and Reason for Visit: Initial, Consult(New TCU admit)    Nutrition Recommendations:   Consider liberalizing diet to GINNA d/t moderate malnutrition. Send ensure enlive TID. Encouraged between meal snacks ( May order with meals )  Continue MVI, Vitamin D. Nutrition Assessment:    Pt. severely malnourished AEB criteria listed below. At risk for further nutritional compromise r/t poor intake and underlying medical condition (Gastritis, Severe malnutrition,A Fib with RVR, Anemia CAD, CKD, HTN). Nutrition recommendations/interventions as per above. Malnutrition Assessment:  · Malnutrition Status: Meets the criteria for severe malnutrition  · Context: Acute illness or injury  · Findings of the 6 clinical characteristics of malnutrition (Minimum of 2 out of 6 clinical characteristics is required to make the diagnosis of moderate or severe Protein Calorie Malnutrition based on AND/ASPEN Guidelines):  1. Energy Intake-(1-100% varied ),      2. Weight Loss-Unable to assess,    3. Fat Loss-Moderate subcutaneous fat loss, Orbital, Fat overlying the ribs  4. Muscle Loss-Moderate muscle mass loss, Temples (temporalis muscle), Clavicles (pectoralis and deltoids), Scapula (trapezius)    Nutrition Risk Level: High    Nutrient Needs:  · Estimated Daily Total Kcal: ~1323-1544kcals (30-35kcals/kgm wt. of 44.1kgm 1/15)  · Estimated Daily Protein (g): 44 grams as renal function tolerates (1 gram protein/kgm wt. off 44.1kgm 1/15)  Nutrition Diagnosis:   · Problem: Severe malnutrition, In context of acute illness or injury  · Etiology: related to Insufficient energy/nutrient consumption     Signs and symptoms:  as evidenced by Diet history of poor intake, Moderate muscle loss    Objective Information:  · Nutrition-Focused Physical Findings: Pt. admitted Gastritis. Mentions fair appetite, denies c/s difficulty.  Labs: (1/13) Phos 2.2, Hemoglobin 9.5, Meds: Bumex, MVI, Phos replacement, Vitamin D,

## 2020-01-15 NOTE — PROGRESS NOTES
Clinical Pharmacy Note    Warfarin consult follow-up    Recent Labs     01/15/20  0528   INR 3.02*     Recent Labs     01/13/20  0500   HGB 9.5*   HCT 29.4*       Significant Drug-Drug Interactions:  New warfarin drug-drug interactions: none  Discontinued drug-drug interactions: none   Current warfarin drug-drug interactions: enoxaparin 1mg/kg BID, levothyroxine, rosuvastatin     Date INR Warfarin Dose   1/11/2020 1.31 (1/9/2020) 2 mg   1/12/2020  1.15   4 mg   1/13/2020  1.34  4 mg     1/14/2020  1.79 2 mg   1/15/2020    3.02  no warfarin                       Notes:                   Daily PT/INR until stable within therapeutic range. Recommend d/c enoxaparin bridge. Denia LANDEROS  [facility], PharmD  PGY-1 Pharmacy Resident  1/15/2020, 8:45 AM

## 2020-01-15 NOTE — PROGRESS NOTES
[facility]  INPATIENT PHYSICAL THERAPY  DAILY NOTE  [facility] SKILLED NURSING UNIT - [id number]    Time In: 4130  Time Out: 1201  Timed Code Treatment Minutes: 64 Minutes  Minutes: 64          Date: 1/15/2020  Patient Name: Surya Arnold,  Gender:  female        MRN: [id number]  : 1940  ([de-identified] y.o.)  Referral Date : 20  Referring Practitioner: Preethi Sandoval MD (referring), Paz Obregon MD (attending)  Diagnosis: Gastritis  Treatment Diagnosis: muscle weakness  Additional Pertinent Hx: Patient is an 50-year-old white female lifetime non-smoker. Patient has a history of left breast cancer diagnosed in  and treated with lumpectomy. She receives chronic anticoagulation with Eliquis for atrial fibrillation. She has a history of hypothyroidism, osteopenia, hypertension, and chronic kidney disease. Patient presented to the emergency room and was admitted on 2020. She presented with complaints of poor oral intake with significant weight loss over the past 3 months. She has a very poor appetite and feels nauseated when eating. As her nutritional status depleted, she became more and more fatigued. Patient was seen by gastroenterology and underwent EGD on 1/3/2020. This demonstrated ulcerative erosive gastritis and duodenitis. Patient deteriorated on 2020 associated with respiratory failure. She developed hypoxemia and required intubation. She underwent bronchoscopy 2020. This demonstrated purulent pneumonia bilaterally. She was started on Zosyn 2020.      Prior Level of Function:  Lives With: Alone(son there half of the time, son is a )  Type of Home: House  Home Layout: One level(has basement but does not use alone)  Home Access: Level entry  Home Equipment: Cane, Rolling walker   Bathroom Shower/Tub: Tub/Shower unit, Walk-in shower(uses both)  Bathroom Toilet: Standard  Bathroom Equipment: Grab bars in shower, Shower chair, Built-in shower seat(has access to abduction/adduction, Mini squats and hip flexion kicks. Exercises were completed for increased independence with functional mobility. Functional Outcome Measures: Not completed       ASSESSMENT:  Assessment: Patient progressing toward established goals. Activity Tolerance:  Patient tolerance of  treatment: good. Equipment Recommendations:Equipment Needed: No(has RW)  Discharge Recommendations:  Continue to assess pending progress(home with home health versus outpatient)    Plan: Times per week: 6x/wk  Times per day: Daily  Plan weeks: 3  Current Treatment Recommendations: Strengthening, Functional Mobility Training, Neuromuscular Re-education, Home Exercise Program, Transfer Training, Gait Training, Safety Education & Training, Balance Training, Endurance Training, Stair training, Patient/Caregiver Education & Training    Patient Education  Patient Education: Plan of Care, Transfers, Gait, Stairs, Verbal Exercise Instruction, educated on [facility] vs Outpatient therapy    Goals:  Patient goals : go home and return to independent PLOF  Short term goals  Time Frame for Short term goals: 1 weeks  Short term goal 1: Patient will complete supine  <> sit with modified independence to transfer in/out of bed safely. Short term goal 2: Patient will complete sit  <> stand with supervision to stand to ambulate with decreased difficulty. Short term goal 3: Patient will ambulate [address] 1560' with no AD and SBA to progress towards safe household ambulation. Short term goal 4: Patient will complete car transfer with supervision to transfer in/out of vehicle safely. Short term goal 5: Patient will complete TUG in lesss than or equal to 25 seconds to reduce risk for falls. Long term goals  Time Frame for Long term goals : 3 weeks  Long term goal 1: Patient will complete sit < > stand and stand pivot transfers with modified independence to transfer surface to surface safely.   Long term goal 2: Patient will ambulate 80' with no AD

## 2020-01-15 NOTE — PROGRESS NOTES
including B feet while seated. Upper Extremity Dressing: Stand By Assistance and with set-up. To [facility] gown and don bra/shirt while seated EOB. Lower Extremity Dressing: Minimal Assistance. Nico to thread RLE into pants. SBA to pull undergarment/pants over hips with 2 UE release. S to thread BLE into undergarment and LLE into pants seated EOB. Toileting: Stand By Assistance. For hygeine and clothing management after void/BM. Toilet Transfer: Stand By Assistance. RTS. BALANCE:  Sitting Balance:  Stand By Assistance. Standing Balance: Stand By Assistance, [address] x1 minute within ADLs; x1 minute x2 trials for dynamic standing task. BED MOBILITY:  Supine to Sit: Stand By Assistance   Sit to Supine: Stand By Assistance   Scooting: Stand By Assistance EOB. TRANSFERS:  Sit to Stand:  Stand By Assistance. Stand to Sit: Stand By Assistance. FUNCTIONAL MOBILITY:  Assistive Device: Rolling Walker and None  Assist Level:  Stand By Assistance and Contact Guard Assistance. Distance: To and from bathroom and To and from therapy gym  Completed functional mobility to/from BR without use of AD as pt states, \"I don't need that. \" no LOB noted with min unsteadiness as pt reaching for furniture to stabilize self. Additional trial of mobility to/from therapy gym with use of RW at slow pace, no LOB noted. Pt requires seated rest break after trial of mobility, min fatigue noted. ADDITIONAL ACTIVITIES:  Completed BUE exercises x10 reps x1 sets using min resistance band in all joints/planes to increase strength and endurance required for ADLs. Pt required rest break between each exercise and min v/c for proper technique following. Pt participated in dynamic standing activity to challenge balance and facilitate functional reach into various planes for rings and toss onto target with BUE and 1 UE release from RW.  Pt stood for x1 minute with SBA before requiring seated rest break, min fatigue noted. Completed to increase safety with dynamic standing required for showering tasks. ASSESSMENT:     Activity Tolerance:  Patient tolerance of  treatment: good. Discharge Recommendations: Continue to assess pending progress  Equipment Recommendations: Other: follow for LHAE needs  Plan: Times per week: 6x  Current Treatment Recommendations: Strengthening, Functional Mobility Training, Patient/Caregiver Education & Training, Endurance Training, Balance Training, Safety Education & Training, Self-Care / ADL, Equipment Evaluation, Education, & procurement, Home Management Training    Patient Education  Patient Education: ADL's, Home Exercise Program, Importance of Increasing Activity, Assistive Device Safety and dynamic standing balance, Safety with transfers and mobility. Goals  Short term goals  Time Frame for Short term goals: 1 week  Short term goal 1: Pt to demonstrate LB ADLs using LHAE prn and SBA for increased independence with self cares  Short term goal 2: Pt to demonstrate DYN standing with 1-2 UE release at SUP >5 mins for increased independence with SMP  Short term goal 3: Pt to demonstate improved activity tolerance as evidenced by completing functional mobility with LRAD at household distances with no cues for safety for increased independence with item retrieval  Short term goal 4: Pt to tolerate 15 reps of BUE strengthening for increasing endurance required for bathing tasks  Long term goals  Time Frame for Long term goals : 2 weeks  Long term goal 1: Pt to complete BADL routine with Mod I and 0 cues for safety or ECT for increased safety for return to home enviornment  Long term goal 2: Pt to demonstrate basic IADL tasks with Mod I and no cues for safety for increased independnece with laundry and homemakign tasks    Following session, patient left in safe position with all fall risk precautions in place.

## 2020-01-16 LAB — INR BLD: 2.49 (ref 0.85–1.13)

## 2020-01-16 PROCEDURE — 85610 PROTHROMBIN TIME: CPT

## 2020-01-16 PROCEDURE — 97530 THERAPEUTIC ACTIVITIES: CPT

## 2020-01-16 PROCEDURE — 6370000000 HC RX 637 (ALT 250 FOR IP): Performed by: INTERNAL MEDICINE

## 2020-01-16 PROCEDURE — 1290000000 HC SEMI PRIVATE OTHER R&B

## 2020-01-16 PROCEDURE — 6370000000 HC RX 637 (ALT 250 FOR IP)

## 2020-01-16 PROCEDURE — 97110 THERAPEUTIC EXERCISES: CPT

## 2020-01-16 PROCEDURE — 36415 COLL VENOUS BLD VENIPUNCTURE: CPT

## 2020-01-16 PROCEDURE — 97535 SELF CARE MNGMENT TRAINING: CPT

## 2020-01-16 PROCEDURE — 97116 GAIT TRAINING THERAPY: CPT

## 2020-01-16 RX ORDER — WARFARIN SODIUM 2 MG/1
2 TABLET ORAL
Status: COMPLETED | OUTPATIENT
Start: 2020-01-16 | End: 2020-01-16

## 2020-01-16 RX ADMIN — ROSUVASTATIN CALCIUM 5 MG: 10 TABLET, FILM COATED ORAL at 20:51

## 2020-01-16 RX ADMIN — ACETAMINOPHEN 650 MG: 325 TABLET ORAL at 13:48

## 2020-01-16 RX ADMIN — SENNA PLUS 8.6 MG: 8.6 TABLET ORAL at 09:47

## 2020-01-16 RX ADMIN — PANTOPRAZOLE SODIUM 40 MG: 40 TABLET, DELAYED RELEASE ORAL at 17:50

## 2020-01-16 RX ADMIN — PANTOPRAZOLE SODIUM 40 MG: 40 TABLET, DELAYED RELEASE ORAL at 05:41

## 2020-01-16 RX ADMIN — NYSTATIN 500000 UNITS: 100000 SUSPENSION ORAL at 09:47

## 2020-01-16 RX ADMIN — LEVOTHYROXINE SODIUM 50 MCG: 50 TABLET ORAL at 05:41

## 2020-01-16 RX ADMIN — DILTIAZEM HYDROCHLORIDE 180 MG: 180 CAPSULE, COATED, EXTENDED RELEASE ORAL at 09:47

## 2020-01-16 RX ADMIN — WARFARIN SODIUM 2 MG: 2 TABLET ORAL at 17:50

## 2020-01-16 RX ADMIN — DIGOXIN 125 MCG: 125 TABLET ORAL at 09:47

## 2020-01-16 RX ADMIN — THERA TABS 1 TABLET: TAB at 09:47

## 2020-01-16 RX ADMIN — SENNA PLUS 8.6 MG: 8.6 TABLET ORAL at 20:51

## 2020-01-16 RX ADMIN — VITAMIN D, TAB 1000IU (100/BT) 1000 UNITS: 25 TAB at 09:47

## 2020-01-16 ASSESSMENT — PAIN SCALES - GENERAL
PAINLEVEL_OUTOF10: 0
PAINLEVEL_OUTOF10: 1
PAINLEVEL_OUTOF10: 0
PAINLEVEL_OUTOF10: 2
PAINLEVEL_OUTOF10: 0
PAINLEVEL_OUTOF10: 0

## 2020-01-16 NOTE — PROGRESS NOTES
also stood at [facility] and used reacher to  object from floor level    Exercise:  Patient was guided in 1 set(s) 15 reps of exercise to both lower extremities. Long arc quads, Seated hip flexion, Seated hamstring curls, Seated isometric hip adduction and NuStep level 1 for 4min. Exercises were completed for increased independence with functional mobility. Educated on HEP and provided handouts. Functional Outcome Measures: Not completed       ASSESSMENT:  Assessment: Patient progressing toward established goals. Activity Tolerance:  Patient tolerance of  treatment: good. Equipment Recommendations:Equipment Needed: No(has RW)  Discharge Recommendations:  Continue to assess pending progress(home with home health versus outpatient)    Plan: Times per week: 6x/wk  Times per day: Daily  Plan weeks: 3  Current Treatment Recommendations: Strengthening, Functional Mobility Training, Neuromuscular Re-education, Home Exercise Program, Transfer Training, Gait Training, Safety Education & Training, Balance Training, Endurance Training, Stair training, Patient/Caregiver Education & Training    Patient Education  Patient Education: Plan of Care, Home Exercise Program, [employer] Mobility, Transfers, Gait, Stairs, Up in Chair for All Meals, Verbal Exercise Instruction    Goals:  Patient goals : go home and return to independent PLOF  Short term goals  Time Frame for Short term goals: 1 weeks  Short term goal 1: Patient will complete supine  <> sit with modified independence to transfer in/out of bed safely. Short term goal 2: Patient will complete sit  <> stand with supervision to stand to ambulate with decreased difficulty. Short term goal 3: Patient will ambulate [address] 1560' with no AD and SBA to progress towards safe household ambulation. Short term goal 4: Patient will complete car transfer with supervision to transfer in/out of vehicle safely.   Short term goal 5: Patient will complete TUG in lesss than or equal to 25 seconds

## 2020-01-16 NOTE — PLAN OF CARE
Problem: Nutrition  Goal: Optimal nutrition therapy  Outcome: Ongoing   Nutrition Problem: Severe malnutrition, In context of acute illness or injury  Intervention: Food and/or Nutrient Delivery: Continue current diet, Continue current ONS  Nutritional Goals: Pt. will consume 75% or more at meals during LOS.

## 2020-01-16 NOTE — PROGRESS NOTES
Clinical Pharmacy Note    Warfarin consult follow-up    Recent Labs     01/16/20  0541   INR 2.49*     No results for input(s): HGB, HCT, PLT in the last 72 hours. Significant Drug-Drug Interactions:  New warfarin drug-drug interactions: None  Discontinued drug-drug interactions: Lovenox  Current warfarin drug-drug interactions: levothyroxine, rosuvastatin     Date INR Warfarin Dose   1/11/2020 1.31 (1/9/2020) 2 mg   1/12/2020  1.15   4 mg   1/13/2020  1.34  4 mg     1/14/2020  1.79 2 mg   1/15/2020    3.02  no warfarin   1/16/2020   2.49  2 mg                 Notes:                     Daily PT/INR until stable within therapeutic range.      Birdie Lundborg, PharmD, BCPS  1/16/2020  10:28 AM

## 2020-01-16 NOTE — PLAN OF CARE
[facility]  Physical Medicine Case Management Assessment    [] Inpatient Rehabilitation Unit  [x] Transitional Care Unit    Patient Name: Nova Daley        MRN: [id number]    : 1940  ([de-identified] y.o.)  Gender: female   Date of Admission: 2020 12:33 PM    Family/Social/Home Environment: Social/Functional History: Patient is am [de-identified]year old admitted to [address] [place] [address] following a stay on acute for Gastritis. She had a fall at home prior to her hospital stay. Patient has 2 daughters who are very much involved with her care and visit regularly. Patient states she has a strong support system in place with immediate and extended family members. Lives With: Alone  Type of Home: House  Home Layout: One level  Home Access: Level entry  Bathroom Shower/Tub: Tub/Shower unit, Walk-in shower(uses both)  Bathroom Toilet: Standard  Bathroom Equipment: Grab bars in shower, Shower chair, Built-in shower seat(has access to shower chair from , walk in has built in seat)  Bathroom Accessibility: [place]: Ryan Smith  Receives Help From: Family  ADL Assistance: Independent  Homemaking Assistance: [address] [facility] [address]: Independent  Transfer Assistance: Independent  Active : No  Patient's  Info: Daughter  Mode of Transportation: Car  Education: [employer]  Occupation: Retired  Leisure & Hobbies: Spend time with family, watch movies  Additional Comments: Pt reports complete independence PTA      [employer] Utilized: None      Anticipated Needs/Discharge Plans: Patient plans to return home once she is discharged from [address] [place] [address]. Patient states her sister plans to stay with her  for a few days initially. Patient states she was managing her own care prior to hospitalization. IMM Letter  Notice of Medicare Non-Coverage Letter Not Given?  (Post Acute): Patient Initiated Discharge     Discharge Planning:SW met with patient gave an overview of TCU,Team Conference/Team Conference schedule and this SW's role in discharge planning. SW will monitor progress and maintain contact with patient and patient's family regarding length of stay and recommendations. SW to follow and maintain involvement in discharge planning. Care plan reviewed with patient. Patient verbalized understanding of the plan of care and contributed to goal setting.     Living Arrangements: Alone  Support Systems: Children  Potential Assistance Needed: Home Care  Potential Assistance Purchasing Medications: No  Meds-to-Beds: Does the patient want to have any new prescriptions delivered to bedside prior to discharge?: No  Type of Home Care Services: Nursing Services, Skilled Therapy  Patient expects to be discharged to[de-identified] Back Home  Expected Discharge Date: (undetermined)  Follow Up Appointment: Best Day/Time : Monday AM      Rick Boo 1/16/2020 10:41 AM

## 2020-01-16 NOTE — PROGRESS NOTES
[facility]  [address] SKILLED NURSING UNIT  Occupational Therapy  Daily Note  Time:    Time In: 1030  Time Out: 1130  Timed Code Treatment Minutes: 60 Minutes  Minutes: 60        Date: 2020  Patient Name: Aric Blake,   Gender: female      Room: [facility]62/062-A  MRN: [id number]  : 1940  ([de-identified] y.o.)  Referring Practitioner: Ordering: Sung Silverio MD Attending: Dr. Yulia Thomas  Diagnosis: Gastritis  Additional Pertinent Hx: [de-identified] y.o. female who presents to the emergency department on 20 for evaluation of weakness, 2 falls, and decreased appetite. Patient's family reports she has been sick since  with flu-like symptoms which has gradually worsened since onset. She reports experiencing nausea, diarrhea for 1-2 days (resolved 5 days ago), abdominal pain with ingestion of food, increased fatigue, and weight loss. She denies vomiting and fever since onset. Patient reports being evaluated by her PCP 3 days ago due to not being able to eat food, and she was prescribed medication to help increased her appetite. Patient states this has not help, and has since noted her vision becoming blurry. Patient reports extending her arms to place items in the top of the closet when she \"blacked out\" and fell to the ground. She denies hitting her head. Patient's family reports she was unable to ambulate following the fall. To TCU 19    Restrictions/Precautions:  Restrictions/Precautions: Fall Risk, General Precautions    SUBJECTIVE: Pt pleasant and cooperative. Pt seated in chair and requesting to use bathroom upon arrival. Pt motivated to achieve goals and return home. PAIN: 4/10: tailbone    COGNITION: decreased safety and insight, memory deficits    ADL:   Grooming: Stand By Assistance. standing at sink to wash hands  Toileting: Stand By Assistance. Toilet Transfer: Stand By Assistance. with use of grab bar. BALANCE:  Sitting Balance:  Supervision.     Standing Balance: Contact Guard Assistance. without walker with 2 hand release   Pt unsteady with balance with lack of knowledge at times    TRANSFERS:  Sit to Stand:  Stand By Assistance. from chair  Stand to Sit: Stand By Assistance. to chair    FUNCTIONAL MOBILITY:  Assistive Device: None  Assist Level:  Contact Guard Assistance. Distance: To and from bathroom  Pt ambulated at slow pace. Pt also ambulated in hallway a short distance without RW with hesitancy noted and frequent steadying on hand rail and wall. Pt resumed task with RW with improved posture, pace and tolerance noted. ADDITIONAL ACTIVITIES:  Pt completed BUE AROM HEP with medium resistant exercise band in all planes x 10 reps with vcs and rest breaks needed. Exs completed to increase strength and activity tolerance for ADLs. Discussed home environment and safety as pt presents with some memory deficits and is largely dismissive of safety concerns by CHAVEZ. Pt states that \"she will be fine\". Provided pt with specific examples and situations with increased risk of falling such as transporting items without walker and certain homemaking tasks. Pt did admit to safety concerns after being given examples, however, had difficult time problem solving solutions. Despite conversation, pt inquired if she would be able to resume driving \"short distances just to get out\". CHAVEZ strongly discouraged driving due to delayed processing and reaction times as well as pt fatiguing easily with activities. Pt again reports understanding after education provided. Son arrived at end of session and educated on conversation and safety concerns. Pt also educated on ECTs as pt fatigues easily and acknowledges that it is tiring to visit with family. Pt would continue to benefit from education on safety. ASSESSMENT:     Activity Tolerance:  Patient tolerance of  treatment: fair. Discharge Recommendations: Continue to assess pending progress  Equipment Recommendations:  Other: follow for [facility] needs  Plan: Times per week: 6x  Current Treatment Recommendations: Strengthening, Functional Mobility Training, Patient/Caregiver Education & Training, Endurance Training, Balance Training, Safety Education & Training, Self-Care / ADL, Equipment Evaluation, Education, & procurement, Home Management Training    Patient Education  Patient Education: role of OT, ADLs, safety with transfers and ambulation, HEP    Goals  Short term goals  Time Frame for Short term goals: 1 week  Short term goal 1: Pt to demonstrate LB ADLs using LHAE prn and SBA for increased independence with self cares  Short term goal 2: Pt to demonstrate DYN standing with 1-2 UE release at SUP >5 mins for increased independence with SMP  Short term goal 3: Pt to demonstate improved activity tolerance as evidenced by completing functional mobility with LRAD at household distances with no cues for safety for increased independence with item retrieval  Short term goal 4: Pt to tolerate 15 reps of BUE strengthening for increasing endurance required for bathing tasks  Long term goals  Time Frame for Long term goals : 2 weeks  Long term goal 1: Pt to complete BADL routine with Mod I and 0 cues for safety or ECT for increased safety for return to home enviornment  Long term goal 2: Pt to demonstrate basic IADL tasks with Mod I and no cues for safety for increased independnece with laundry and homemakign tasks    Following session, patient left in safe position with all fall risk precautions in place.

## 2020-01-16 NOTE — PROGRESS NOTES
Nutrition Assessment    Type and Reason for Visit: Reassess(follow-up)    Nutrition Recommendations:   Continue diet  Continue ONS TID  Encourage between meal snacks  Continue multivitamin    Nutrition Assessment: Pt improving from a nutritional standpoint AEB improved intake noted (% yesterday), patient reports acceptance and use of ONS. Remains at risk for further nutritional compromise r/t low weight and underlying medical condition (gastritis, malnutrition, CAD, breast cancer). Nutrition recommendations/interventions as per above. Malnutrition Assessment:  · Malnutrition Status: Meets the criteria for severe malnutrition  · Context: Acute illness or injury  · Findings of the 6 clinical characteristics of malnutrition (Minimum of 2 out of 6 clinical characteristics is required to make the diagnosis of moderate or severe Protein Calorie Malnutrition based on AND/ASPEN Guidelines):  1. Energy Intake-(1-100% varied ),      2. Weight Loss-Unable to assess,    3. Fat Loss-Moderate subcutaneous fat loss, Orbital, Fat overlying the ribs  4. Muscle Loss-Moderate muscle mass loss, Temples (temporalis muscle), Clavicles (pectoralis and deltoids), Scapula (trapezius)  5. Fluid Accumulation-mild, extremeties  6.  Strength-not measured    Nutrition Risk Level: High    Nutrient Needs:  · Estimated Daily Total Kcal: ~1323-1544kcals (30-35kcals/kgm wt. of 44.1kgm 1/15)  · Estimated Daily Protein (g): 44 grams as renal function tolerates (1 gram protein/kgm wt. off 44.1kgm 1/15)  · Estimated Daily Total Fluid (ml/day): per MD    Nutrition Diagnosis:   · Problem: Severe malnutrition, In context of acute illness or injury  · Etiology: related to Insufficient energy/nutrient consumption     Signs and symptoms:  as evidenced by Diet history of poor intake, Moderate muscle loss    Objective Information:  · Nutrition-Focused Physical Findings: Patient admitted with gastritis.   Patient seen and reports that she is getting her ensure and is drinking them. Patient reports that breakfast was good, she had almost all of an omelette. Yesterday intake is noted to be breakfast: 100%, lunch: 75%, dinner: 100% of meal and 100% of ONS. Patient states that she is counting down the time that she gets to go home, patient reports plan is for home on Sat. Patient denies any nutritional needs. · Wound Type: None  · Current Nutrition Therapies:  · Oral Diet Orders: Cardiac   · Oral Diet intake: 51-75%, %  · Oral Nutrition Supplement (ONS) Orders: (ensure enlive TID)  · ONS intake: %  · Anthropometric Measures:  · Ht: 5' (152.4 cm)   · Current Body Wt: 92 lb (41.7 kg)(1/16/20, no weight source, +1 BLE edema)  · Admission Body Wt: 97 lb 3.6 oz (44.1 kg)((1/15) + 1 RLE & LLE )  · Usual Body Wt: (per EMR : (1/10/20): 104# 8oz no edema, , (1/3/20): 95# 14.4oz no edema, 105-107# per pt. )  · % Weight Change:  ,  difficult to assess wts # 1/3-1/10  · Ideal Body Wt: 100 lb (45.4 kg)  · BMI Classification: BMI <18.5 Underweight(18)    Nutrition Interventions:   Continue current diet, Continue current ONS  Continued Inpatient Monitoring, Education Initiated, Coordination of Care(Encouraged bewteen meal snacks, may order with meals. )    Nutrition Evaluation:   · Evaluation: Progressing toward goals   · Goals: Pt. will consume 75% or more at meals during LOS.     · Monitoring: Nutrition Progression, Meal Intake, Supplement Intake, Diet Tolerance, Skin Integrity, I&O, Weight, Pertinent Labs, Monitor Bowel Function      Electronically signed by Daniel Peacock RD, LD on 1/16/20 at 10:32 AM    Contact Number: [phone]

## 2020-01-17 LAB — INR BLD: 2.12 (ref 0.85–1.13)

## 2020-01-17 PROCEDURE — 36415 COLL VENOUS BLD VENIPUNCTURE: CPT

## 2020-01-17 PROCEDURE — 85610 PROTHROMBIN TIME: CPT

## 2020-01-17 PROCEDURE — 1290000000 HC SEMI PRIVATE OTHER R&B

## 2020-01-17 PROCEDURE — 97535 SELF CARE MNGMENT TRAINING: CPT

## 2020-01-17 PROCEDURE — 97116 GAIT TRAINING THERAPY: CPT

## 2020-01-17 PROCEDURE — 97110 THERAPEUTIC EXERCISES: CPT

## 2020-01-17 PROCEDURE — 6370000000 HC RX 637 (ALT 250 FOR IP)

## 2020-01-17 PROCEDURE — 6370000000 HC RX 637 (ALT 250 FOR IP): Performed by: INTERNAL MEDICINE

## 2020-01-17 RX ORDER — WARFARIN SODIUM 2 MG/1
2 TABLET ORAL
Status: COMPLETED | OUTPATIENT
Start: 2020-01-17 | End: 2020-01-17

## 2020-01-17 RX ADMIN — SENNA PLUS 8.6 MG: 8.6 TABLET ORAL at 08:32

## 2020-01-17 RX ADMIN — WARFARIN SODIUM 2 MG: 2 TABLET ORAL at 18:05

## 2020-01-17 RX ADMIN — POTASSIUM CHLORIDE 10 MEQ: 1500 TABLET, EXTENDED RELEASE ORAL at 18:05

## 2020-01-17 RX ADMIN — VITAMIN D, TAB 1000IU (100/BT) 1000 UNITS: 25 TAB at 08:32

## 2020-01-17 RX ADMIN — ROSUVASTATIN CALCIUM 5 MG: 10 TABLET, FILM COATED ORAL at 23:03

## 2020-01-17 RX ADMIN — BUMETANIDE 0.5 MG: 1 TABLET ORAL at 18:04

## 2020-01-17 RX ADMIN — THERA TABS 1 TABLET: TAB at 08:32

## 2020-01-17 RX ADMIN — ACETAMINOPHEN 650 MG: 325 TABLET ORAL at 11:01

## 2020-01-17 RX ADMIN — LEVOTHYROXINE SODIUM 50 MCG: 50 TABLET ORAL at 05:56

## 2020-01-17 RX ADMIN — PANTOPRAZOLE SODIUM 40 MG: 40 TABLET, DELAYED RELEASE ORAL at 18:04

## 2020-01-17 RX ADMIN — PANTOPRAZOLE SODIUM 40 MG: 40 TABLET, DELAYED RELEASE ORAL at 05:56

## 2020-01-17 RX ADMIN — DILTIAZEM HYDROCHLORIDE 180 MG: 180 CAPSULE, COATED, EXTENDED RELEASE ORAL at 08:33

## 2020-01-17 RX ADMIN — DIGOXIN 125 MCG: 125 TABLET ORAL at 08:33

## 2020-01-17 ASSESSMENT — PAIN SCALES - GENERAL
PAINLEVEL_OUTOF10: 2
PAINLEVEL_OUTOF10: 0
PAINLEVEL_OUTOF10: 0
PAINLEVEL_OUTOF10: 3
PAINLEVEL_OUTOF10: 0
PAINLEVEL_OUTOF10: 0
PAINLEVEL_OUTOF10: 3
PAINLEVEL_OUTOF10: 0
PAINLEVEL_OUTOF10: 5
PAINLEVEL_OUTOF10: 3

## 2020-01-17 ASSESSMENT — PAIN DESCRIPTION - FREQUENCY
FREQUENCY: INTERMITTENT
FREQUENCY: INTERMITTENT

## 2020-01-17 ASSESSMENT — PAIN DESCRIPTION - PAIN TYPE
TYPE: CHRONIC PAIN
TYPE: ACUTE PAIN
TYPE: ACUTE PAIN

## 2020-01-17 ASSESSMENT — PAIN DESCRIPTION - LOCATION
LOCATION: COCCYX;HIP;LEG
LOCATION: COCCYX
LOCATION: GENERALIZED

## 2020-01-17 ASSESSMENT — PAIN DESCRIPTION - DESCRIPTORS
DESCRIPTORS: NAGGING
DESCRIPTORS: NAGGING
DESCRIPTORS: ACHING

## 2020-01-17 ASSESSMENT — PAIN DESCRIPTION - PROGRESSION
CLINICAL_PROGRESSION: NOT CHANGED
CLINICAL_PROGRESSION: GRADUALLY IMPROVING

## 2020-01-17 ASSESSMENT — PAIN DESCRIPTION - ONSET
ONSET: GRADUAL
ONSET: GRADUAL

## 2020-01-17 ASSESSMENT — PAIN DESCRIPTION - ORIENTATION
ORIENTATION: LOWER
ORIENTATION: LEFT;UPPER

## 2020-01-17 NOTE — PROGRESS NOTES
[address] [address]  INPATIENT PHYSICAL THERAPY  Discharge Note  [facility] SKILLED NURSING UNIT - [id number]    Time In: 1033  Time Out: 1052  Timed Code Treatment Minutes: 19 Minutes  Minutes: 19          Date: 2020  Patient Name: Rajan Tirado,  Gender:  female        MRN: [id number]  : 1940  ([de-identified] y.o.)  Referral Date : 20  Referring Practitioner: Jesus Childers MD (referring), Chet Lagunas MD (attending)  Diagnosis: Gastritis  Treatment Diagnosis: muscle weakness  Additional Pertinent Hx: Patient is an 71-year-old white female lifetime non-smoker. Patient has a history of left breast cancer diagnosed in  and treated with lumpectomy. She receives chronic anticoagulation with Eliquis for atrial fibrillation. She has a history of hypothyroidism, osteopenia, hypertension, and chronic kidney disease. Patient presented to the emergency room and was admitted on 2020. She presented with complaints of poor oral intake with significant weight loss over the past 3 months. She has a very poor appetite and feels nauseated when eating. As her nutritional status depleted, she became more and more fatigued. Patient was seen by gastroenterology and underwent EGD on 1/3/2020. This demonstrated ulcerative erosive gastritis and duodenitis. Patient deteriorated on 2020 associated with respiratory failure. She developed hypoxemia and required intubation. She underwent bronchoscopy 2020. This demonstrated purulent pneumonia bilaterally. She was started on Zosyn 2020. Prior Level of Function:  Lives With: Alone  Type of Home: House  Home Layout: One level  Home Access: Level entry  Home Equipment: Cane, Rolling walker    Restrictions/Precautions:  Restrictions/Precautions: Fall Risk, General Precautions    SUBJECTIVE: Pt is seated in recliner, agreeable to PT, eager to return home tomorrow.     PAIN: denies    OBJECTIVE:    Transfers:  Sit to Stand: Modified Independent  Stand to Sit:Modified Independent    Ambulation:  Modified Independent  Distance: 100 feet, 200 feet  Surface: Level Tile  Device:Rolling Walker  Gait Deviations:  Slow Rebecca, Decreased Step Length Bilaterally and Decreased Gait Speed      Functional Outcome Measures: Completed     Timed Up and Go: 17.28(with RW)    ASSESSMENT:  Assessment:   Pt with good progress towards goals since evaluation on . Pt is mod I for bed mobility, transfers and gait with RW, required SBA-CGA at evaluation. Pt performs stair negotiation with Supervision, required CGA at evaluation. Pt improved TUG test from 36 seconds to 17.28 seconds. Pt to DC home tomorrow  with OP PT. Activity Tolerance:  Patient tolerance of  treatment: good. Equipment Recommendations:Equipment Needed: No(has RW)  Discharge Recommendations:  Discharge Recommendations: Home with assist PRN, Outpatient PT    Plan:  DC home alone     Patient Education  Patient Education: Plan of Care, Gait    Goals:  Patient goals : go home and return to independent PLOF  Short term goals  Time Frame for Short term goals: 1 weeks  Short term goal 1: Patient will complete supine  <> sit with modified independence to transfer in/out of bed safely. GOAL MET  Short term goal 2: Patient will complete sit  <> stand with supervision to stand to ambulate with decreased difficulty. GOAL MET  Short term goal 3: Patient will ambulate Bernadette  1560' with no AD and SBA to progress towards safe household ambulation. GOAL MET  Short term goal 4: Patient will complete car transfer with supervision to transfer in/out of vehicle safely. GOAL MET  Short term goal 5: Patient will complete TUG in lesss than or equal to 25 seconds to reduce risk for falls. GOAL MET  Long term goals  Time Frame for Long term goals : 3 weeks  Long term goal 1: Patient will complete sit < > stand and stand pivot transfers with modified independence to transfer surface to surface safely.  GOAL MET  Long term goal 2: Patient will ambulate 150' with no AD and modified independence to navigate living environment safely. GOAL MET  Long term goal 3: Patient will complete car transfer with modified independence to transfer to/from home and appointments safely. GOAL MET  Long term goal 4: Patient will ascend/descend 1 step with no AD and SBA for community entry/appointments. GOAL MET  Long term goal 5: Patient will complete TUG in less than or equal to 14 seconds to reduce risk for falls.  GOAL NOT MET

## 2020-01-17 NOTE — DISCHARGE INSTR - ACTIVITY
Learning About Activities of Daily Living  What are activities of daily living? Activities of daily living (ADLs) are the basic self-care tasks you do every day. As you age, and if you have health problems, you may find that it's harder to do these things for yourself. That's when you may need some help. Your doctor uses ADLs to measure how much help you need. Knowing what you can and can't do for yourself is an important first step to getting help. And when you have the help you need, you can stay as independent as possible. Your doctor will want to know if you are able to do tasks such as:  · Take a bath or shower without help. · Go to the bathroom by yourself. · Dress and undress without help. · Shave, comb your hair, and brush teeth on your own. · Get in and out of bed or a chair without help. · Feed yourself without help. If you are having trouble doing basic self-care tasks, talk with your doctor. You may want to bring a caregiver or family member who can help the doctor understand your needs and abilities. How will a doctor assess your ADLs? Asking about ADLs is part of a routine health checkup your doctor will likely do as you age. Your health check might be done in a doctor's office, in your home, or at a hospital. The goal is to find out if you are having any problems that could make your health problems worse or that make it unsafe for you to be on your own. To measure your ADLs, your doctor will ask how hard it is for you to do routine tasks. He or she may also want to know if you have changed the way you do a task because of a health problem. He or she may watch how you:  · Walk back and forth. · Keep your balance while you stand or walk. · Move from sitting to standing or from a bed to a chair. · Button or unbutton a shirt or sweater. · Remove and put on your shoes. It's normal to feel a little worried or anxious if you find you can't do all the things you used to be able to do. Talking with your doctor about ADLs isn't a test that you either pass or fail. It's just a way to get more information about your health and safety. Follow-up care is a key part of your treatment and safety. Be sure to make and go to all appointments, and call your doctor if you are having problems. It's also a good idea to know your test results and keep a list of the medicines you take. · Rest when you feel tired. · Get some physical activity every day, but do not get too tired.

## 2020-01-17 NOTE — PROGRESS NOTES
Alert and oriented to person, place, year. AMBER 5mm to 4mm. Mucous membranes pink and moist. Sclera white. Hair dry and shiny. Skin warm and dry. Speech clear. Denies difficulty swallowing states she gets short winded while drinking water while taking medication. Lung sounds clear anterior, posterior, lateral. Heart sounds strong and irregular. No cough noted. Respirations easy and regular. Bowel sounds hyperactive all 4 quadrants. Denies tenderness to palpation. Abdomen flat and soft. Denies pain while urinating. States last bowel movement this morning. States passing gas. Radial pulse strong bilaterally. Hand grasp strong bilaterally. Arm drift negative bilaterally. Capillary refill less than 3 seconds. Skin turgor sluggish. Pedal pulse strong bilaterally. Pedal push and pull strong bilaterally. Negative Luis Felipe's sign bilaterally. Leg lift strong bilaterally. Slight non-pitting edema to ankles. States numbness and tingling to left thigh from hip to knee while sitting and lying down. Up in chair with wheels locked. Call light and bed side table within reach. Denies needs at this time.

## 2020-01-17 NOTE — PROGRESS NOTES
[address] [address]  Recreational Therapy  Discharge Note  Transitional Care Unit           Date:  1/17/2020       Patient Name: Amy Dimas      MRN: [id number]       YOB: 1940 ([de-identified] y.o.)       Gender: female  Diagnosis: Gastritis  Referring Practitioner: Ordering: Marylene Scotland, MD Attending: Dr. Jesse Og    Patient discharged from Recreational Therapy at this time. See recreational therapy notes for details.     Electronically signed by: CLARICE Banuelos  Date: 1/17/2020

## 2020-01-17 NOTE — PROGRESS NOTES
Alert and oriented to person, place, year. Mucous membranes pink and moist. Hair dry and shiny. Sclera white. AMBER 5mm to 4mm. Speech clear. Denies difficulty swallowing. Skin warm and dry. Lung sounds clear anterior, posterior, lateral. Heart sounds strong and regular. Respirations easy and regular. No cough noted. Bowel sounds active all 4 quadrants. Abdomen flat and soft. Denies tenderness to palpation. States passing gas. States last bowel movement yesterday. Denies pain while urinating. Radial pulse strong bilaterally. Hand grasp strong bilaterally. Arm drift negative bilaterally. Capillary refill less than 3 seconds. Skin turgor sluggish. Pedal pulse strong bilaterally. Pedal push and pull strong bilaterally. Leg lift strong bilaterally. Negative Luis Felipe's sign bilaterally. States tingling to right thigh from hip to knee when touching. Up to chair with one assist, gait belt and walker. Wheels locked. Call light and bed side table within reach. Denies needs at this time.  [employer]

## 2020-01-17 NOTE — PROGRESS NOTES
Clinical Pharmacy Note    Warfarin consult follow-up    Recent Labs     01/17/20  0448   INR 2.12*     No results for input(s): HGB, HCT, PLT in the last 72 hours. Significant Drug-Drug Interactions:  New warfarin drug-drug interactions: None  Discontinued drug-drug interactions: None  Current warfarin drug-drug interactions: levothyroxine, rosuvastatin     Date INR Warfarin Dose   1/11/2020 1.31 (1/9/2020) 2 mg   1/12/2020  1.15   4 mg   1/13/2020  1.34  4 mg     1/14/2020  1.79 2 mg   1/15/2020    3.02  No warfarin   1/16/2020   2.49  2 mg    1/17/2020  2.12                      2 mg                      Notes:                     Daily PT/INR until stable within therapeutic range.      Jessi ParksD, BCPS  1/17/2020  9:22 AM

## 2020-01-17 NOTE — PLAN OF CARE
Problem: Bleeding:  Goal: Will show no signs and symptoms of excessive bleeding  Description  Will show no signs and symptoms of excessive bleeding  Outcome: Ongoing  Pt will have no active bleeding. Problem: Mobility - Impaired:  Goal: Mobility will improve  Description  Mobility will improve  Outcome: Ongoing  Pt will participate in PT daily as directed to increase mobility. \\     Problem: Discharge Planning:  Goal: Patients continuum of care needs are met  Description  Patients continuum of care needs are met  Outcome: Ongoing  Discharage appts made     Problem: Skin Integrity:  Goal: Will show no infection signs and symptoms  Description  Will show no infection signs and symptoms  Outcome: Ongoing   Skin assessment every shift. Problem: Falls - Risk of:  Goal: Will remain free from falls  Description  Will remain free from falls  Outcome: Ongoing  Pt will remain free of falls.   Pt is mod I in room

## 2020-01-18 VITALS
BODY MASS INDEX: 19.26 KG/M2 | DIASTOLIC BLOOD PRESSURE: 84 MMHG | HEART RATE: 70 BPM | OXYGEN SATURATION: 95 % | RESPIRATION RATE: 16 BRPM | SYSTOLIC BLOOD PRESSURE: 128 MMHG | TEMPERATURE: 98.3 F | HEIGHT: 60 IN | WEIGHT: 98.1 LBS

## 2020-01-18 LAB — INR BLD: 2.41 (ref 0.85–1.13)

## 2020-01-18 PROCEDURE — 0220000000 HC SKILLED NURSING FACILITY

## 2020-01-18 PROCEDURE — 6370000000 HC RX 637 (ALT 250 FOR IP): Performed by: INTERNAL MEDICINE

## 2020-01-18 PROCEDURE — 85610 PROTHROMBIN TIME: CPT

## 2020-01-18 PROCEDURE — 36415 COLL VENOUS BLD VENIPUNCTURE: CPT

## 2020-01-18 RX ORDER — WARFARIN SODIUM 2 MG/1
TABLET ORAL
Qty: 30 TABLET | Refills: 0 | Status: SHIPPED | OUTPATIENT
Start: 2020-01-18

## 2020-01-18 RX ORDER — POTASSIUM CHLORIDE 750 MG/1
10 TABLET, EXTENDED RELEASE ORAL DAILY
COMMUNITY
Start: 2020-01-19 | End: 2020-07-27 | Stop reason: SDUPTHER

## 2020-01-18 RX ORDER — PANTOPRAZOLE SODIUM 40 MG/1
40 TABLET, DELAYED RELEASE ORAL
Qty: 60 TABLET | Refills: 0 | Status: SHIPPED | OUTPATIENT
Start: 2020-01-18 | End: 2020-07-27 | Stop reason: SDUPTHER

## 2020-01-18 RX ORDER — DIGOXIN 125 MCG
125 TABLET ORAL DAILY
Qty: 30 TABLET | Refills: 0 | Status: SHIPPED | OUTPATIENT
Start: 2020-01-19 | End: 2020-07-27 | Stop reason: SDUPTHER

## 2020-01-18 RX ORDER — BUMETANIDE 0.5 MG/1
0.5 TABLET ORAL
Qty: 15 TABLET | Refills: 0 | Status: SHIPPED
Start: 2020-01-19 | End: 2020-07-27 | Stop reason: ALTCHOICE

## 2020-01-18 RX ORDER — PROMETHAZINE HYDROCHLORIDE 25 MG/1
25 TABLET ORAL EVERY 6 HOURS PRN
COMMUNITY
Start: 2020-01-18 | End: 2020-02-11

## 2020-01-18 RX ORDER — DILTIAZEM HYDROCHLORIDE 180 MG/1
180 CAPSULE, COATED, EXTENDED RELEASE ORAL DAILY
Qty: 30 CAPSULE | Refills: 0 | Status: SHIPPED | OUTPATIENT
Start: 2020-01-19 | End: 2020-09-09 | Stop reason: SDUPTHER

## 2020-01-18 RX ORDER — MULTIVITAMIN WITH FOLIC ACID 400 MCG
1 TABLET ORAL DAILY
COMMUNITY
Start: 2020-01-19 | End: 2022-08-08

## 2020-01-18 RX ADMIN — DIGOXIN 125 MCG: 125 TABLET ORAL at 08:13

## 2020-01-18 RX ADMIN — DILTIAZEM HYDROCHLORIDE 180 MG: 180 CAPSULE, COATED, EXTENDED RELEASE ORAL at 08:13

## 2020-01-18 RX ADMIN — LEVOTHYROXINE SODIUM 50 MCG: 50 TABLET ORAL at 05:42

## 2020-01-18 RX ADMIN — VITAMIN D, TAB 1000IU (100/BT) 1000 UNITS: 25 TAB at 08:13

## 2020-01-18 RX ADMIN — THERA TABS 1 TABLET: TAB at 08:13

## 2020-01-18 RX ADMIN — PANTOPRAZOLE SODIUM 40 MG: 40 TABLET, DELAYED RELEASE ORAL at 05:42

## 2020-01-18 ASSESSMENT — PAIN SCALES - GENERAL
PAINLEVEL_OUTOF10: 0

## 2020-01-18 NOTE — DISCHARGE INSTR - OTHER ORDERS
Follow up CT chest with contrast for follow up pleural effusion prior to appointment with Shane Prince on 03/18/20. Can be scheduled in OP cental scheduling at [phone].

## 2020-01-18 NOTE — DISCHARGE SUMMARY
TCU  Discharge Summary     Patient Identification:  Osmin Cordero  : 1940  Admit date: 2020  Discharge date: 20   Attending provider: Angel Hudson MD        Primary care provider: VANDANA Dow CNP     Discharge Diagnoses: Active Hospital Problems    Diagnosis Date Noted    Other persistent atrial fibrillation [I48.19] 2013     Priority: High    Muscular deconditioning [R29.898] 2020    Gastritis and duodenitis [K29.90]     Bilateral pleural effusion [J90]     Mitral valve stenosis [I05.0]     Pulmonary HTN (HCC) [I27.20]     Esophageal candidiasis (HCC) [B37.81]     Thoracic aortic aneurysm without rupture (HCC) [I71.2]     CKD (chronic kidney disease) stage 3, GFR 30-59 ml/min (HCC) [N18.3]     Bacterial pneumonia [J15.9]     Severe malnutrition ([address] [E43] 2020     Class: Acute    Anticoagulated on Coumadin [Z79.01] 2016       TCU Course:   Osmin Cordero is a [de-identified] y.o. female admitted to the transitional care unit on 2020 for the continued time with therapies following the acute hospital stay for afib and respiratory failure. She participated well with therapies and made quick progress. She was offered additional time on the unit but insisted on going home against the wishes of her family. She will have outpatient physical therapy arraged. Pharmacy followed the protimes and will determine the home going dose and time for the next INR. She completed the nystatin liquid for the yeast seen on the EGD done during the acute stay. She was medically stable during the TCU stay and will be discharged in stable condition. Consults:   none    Significant Diagnostics:   Results for Nikita Maher (MRN [id number]) as of 2020 10:31   Ref.  Range 1/15/2020 05:28   Sodium Latest Ref Range: 135 - 145 meq/L 140   Potassium Latest Ref Range: 3.5 - 5.2 meq/L 4.3   Chloride Latest Ref Range: 98 - 111 meq/L 108   CO2 Latest Ref Range: 23 - 33 meq/L 23   BUN Latest Ref Range: 7 - 22 mg/dL 15   Creatinine Latest Ref Range: 0.4 - 1.2 mg/dL 1.0   Anion Gap Latest Ref Range: 8.0 - 16.0 meq/L 9.0   Est, Glom Filt Rate Latest Units: ml/min/1.73m2 53 (A)   Glucose Latest Ref Range: 70 - 108 mg/dL 90   Calcium Latest Ref Range: 8.5 - 10.5 mg/dL 9.2   INR Latest Ref Range: 0.85 - 1.13  3.02 (H)     Results for Teressa Shaw (MRN [id number]) as of 1/18/2020 10:31   Ref. Range 1/12/2020 04:15 1/13/2020 05:00   WBC Latest Ref Range: 4.8 - 10.8 thou/mm3 7.3    RBC Latest Ref Range: 4.20 - 5.40 mill/mm3 2.94 (L)    Hemoglobin Quant Latest Ref Range: 12.0 - 16.0 gm/dl 8.9 (L) 9.5 (L)   Hematocrit Latest Ref Range: 37.0 - 47.0 % 27.7 (L) 29.4 (L)   MCV Latest Ref Range: 81.0 - 99.0 fL 94.2    MCH Latest Ref Range: 26.0 - 33.0 pg 30.3    MCHC Latest Ref Range: 32.2 - 35.5 gm/dl 32.1 (L)    MPV Latest Ref Range: 9.4 - 12.4 fL 9.1 (L)    RDW-CV Latest Ref Range: 11.5 - 14.5 % 17.6 (H)    RDW-SD Latest Ref Range: 35.0 - 45.0 fL 59.1 (H)    Platelet Count Latest Ref Range: 130 - 400 thou/mm3 155      No results for input(s): POCGLU in the last 72 hours. Patient Instructions:      The [address] will determine the home going dose of coumadin and also when the next INR would be needed.     Follow-up visits: See after visit summary from hospitalization    Discharge Medications:   Whit Titus Constance Medication Instructions VIB:[id number]    Printed on:01/18/20 [id number]   Medication Information                      acetaminophen (TYLENOL ARTHRITIS PAIN) 650 MG extended release tablet  Take 650 mg by mouth 2 times daily as needed Indications: Arthritis              Biotin 1000 MCG TABS  Take 1,000 mcg by mouth nightly              bumetanide (BUMEX) 0.5 MG tablet  Take 1 tablet by mouth every 48 hours Additional RF per her PCP             Calcium Carbonate (CALCIUM 600 PO)  Take 1 tablet by mouth daily              digoxin (LANOXIN) 125 MCG tablet  Take 1 tablet by mouth daily Additional RF per her PCP             diltiazem (CARDIZEM CD) 180 MG extended release capsule  Take 1 capsule by mouth daily Additional RF per her PCP             levothyroxine (SYNTHROID) 50 MCG tablet  Take 50 mcg by mouth daily Indications: Impaired Thyroid Function              Multiple Vitamin (MULTIVITAMIN) tablet  Take 1 tablet by mouth daily             pantoprazole (PROTONIX) 40 MG tablet  Take 1 tablet by mouth 2 times daily (before meals) Additional RF per her PCP             potassium chloride (KLOR-CON M) 10 MEQ extended release tablet  Take 1 tablet by mouth every 48 hours             promethazine (PHENERGAN) 25 MG tablet  Take 1 tablet by mouth every 6 hours as needed for Nausea             rosuvastatin (CRESTOR) 5 MG tablet  Take 5 mg by mouth every evening Indications: Blood Cholesterol Abnormal              vitamin D (CHOLECALCIFEROL) 1000 UNIT TABS tablet  Take 1,000 Units by mouth daily             warfarin (COUMADIN) 2 MG tablet  Take as instructed by the physician following the INR results                 35 minutes spent preparing the patient for discharge    Satish Lovell MD

## 2020-01-20 NOTE — PROGRESS NOTES
Patient Name: Delgado Kirby        MRN: [id number]    : 1940  ([de-identified] y.o.)  Gender: female   Principal Problem: <principal problem not specified>    Section D - Mood     Should Resident Mood Interview be Conducted? - Attempt to conduct interview with all residents   0. No (resident is rarely/never understood) à Skip to and complete -, Staff Assessment of Mood (PHQ 9-OV)  1. Yes à Continue to , Resident Mood Interview (PHQ-9) Enter Code    1   . Resident Mood Interview (PHQ-9)   Say to resident: Rafiq Riley the last 2 weeks, have you been bothered by any of the following problems?    If symptom is present, enter 1(yes) in column 1, Symptom Presence. Then move to column 2, Symptom Frequency, and indicate symptom frequency. 1. Symptom Presence                   2. Symptom                                                                  Frequency  0. No (enter 0 in column 2)          0. Never or 1 day          1. Yes (enter 0-3 in column 2)      1. 2-6 days           9. No Response                               2.  7-11 days                                                 3.  12-14 days   1. Symptom Presence 2. Symptom  Frequency        Enter Scores in boxes below   A. Little interest or pleasure in doing things 0 0   B. Feeling down, depressed, or hopeless 0 0   C. Trouble falling or staying asleep, or sleeping too much 1 2   D. Feeling tired or having little energy 1 2   E. Poor appetite or overeating 1 1   F. Feeling bad about yourself - or that you are a failure, or have let your family down 0 0   G. Trouble concentrating on things, such as reading the newspaper or watching television 0 0   H. Moving or speaking so slowly that other people have noticed. Or the opposite-being so fidgety or restless that s/he has been moving around a lot more than usual   0   0   I. Thoughts that you would be better off dead, or of hurting yourself in some way.  0 0              Patient Name: Pierre Weinstein Raz Denson        MRN: [id number]    : 1940  [de-identified] y.o.)  Gender: female   Principal Problem: <principal problem not specified>    Section J    Health Conditions    Should Pain Assessment Interview be Conducted? Attempt to conduct interview with all residents. If resident is comatose, skip to , Shortness of Breath (dyspnea)   Enter Code  1 0 - No à (resident is rarely/never understood) à Skip to [address] of Breath  1 - Yes à Continue to , Pain Presence   Pain Assessment Interview   Pain Presence   Enter Code  1 Ask resident: Noelle Swan you had pain or hurting at any time in the last 5 days?   0 - No à Skip to , Shortness of Breath  1 - Yes  à Continue to , Pain Frequency  9 - Unable to answer à Skip to , Shortness of Breath    Pain Frequency   Enter Code          3 Ask resident: Edy Shave much of the time have you experienced pain or hurting over the last 5 days?   1 - Almost constantly  2 - Frequently  3 - Occasionally  4 - Rarely  9 - Unable to answer    Pain Effect on Function   Enter Code      0 Ask resident: Marcos Nissen the last 5 days, has pain made it hard for you to sleep at night?   0 - No   1 - Yes   9 - Unable to answer    Enter Code      0 Ask resident: Marcos Nissen the last 5 days, have you limited your day-to-day activities because of pain?   0 - No   1 - Yes   9 - Unable to answer     Pain Intensity   Enter Code      05 A. Numeric Rating Scale (00-10)  Ask resident: Azael Elijah rate your worst pain over the last 5 days on a zero to ten scale, with zero being no pain and ten as the worst pain you can imagine.  (Show resident 00-10 pain scale)  Enter two-digit response. Enter 99 if unable to answer.

## 2020-02-01 PROBLEM — R77.8 ELEVATED TROPONIN: Status: RESOLVED | Noted: 2020-01-02 | Resolved: 2020-02-01

## 2020-02-01 PROBLEM — R79.89 ELEVATED TROPONIN: Status: RESOLVED | Noted: 2020-01-02 | Resolved: 2020-02-01

## 2020-02-12 ENCOUNTER — HOSPITAL ENCOUNTER (OUTPATIENT)
Age: 80
Discharge: HOME | End: 2020-02-12
Payer: MEDICARE

## 2020-02-12 LAB
ANION GAP SERPL CALCULATED.3IONS-SCNC: 14 MEQ/L (ref 8–16)
BUN BLDV-MCNC: 16 MG/DL (ref 7–22)
CALCIUM SERPL-MCNC: 10 MG/DL (ref 8.5–10.5)
CHLORIDE BLD-SCNC: 107 MEQ/L (ref 98–111)
CO2: 23 MEQ/L (ref 23–33)
CREAT SERPL-MCNC: 1 MG/DL (ref 0.4–1.2)
ERYTHROCYTE [DISTWIDTH] IN BLOOD BY AUTOMATED COUNT: 18.8 % (ref 11.5–14.5)
ERYTHROCYTE [DISTWIDTH] IN BLOOD BY AUTOMATED COUNT: 74.5 FL (ref 35–45)
FOLATE: > 20 NG/ML (ref 4.8–24.2)
GFR SERPL CREATININE-BSD FRML MDRD: 53 ML/MIN/1.73M2
GLUCOSE BLD-MCNC: 87 MG/DL (ref 70–108)
HCT VFR BLD CALC: 42 % (ref 37–47)
HEMOGLOBIN: 13 GM/DL (ref 12–16)
MCH RBC QN AUTO: 33.2 PG (ref 26–33)
MCHC RBC AUTO-ENTMCNC: 31 GM/DL (ref 32.2–35.5)
MCV RBC AUTO: 107.1 FL (ref 81–99)
PLATELET # BLD: 197 THOU/MM3 (ref 130–400)
PMV BLD AUTO: 10.1 FL (ref 9.4–12.4)
POTASSIUM SERPL-SCNC: 4.2 MEQ/L (ref 3.5–5.2)
RBC # BLD: 3.92 MILL/MM3 (ref 4.2–5.4)
SODIUM BLD-SCNC: 144 MEQ/L (ref 135–145)
T4 FREE: 2.98 NG/DL (ref 0.93–1.76)
TSH SERPL DL<=0.05 MIU/L-ACNC: 1.16 UIU/ML (ref 0.4–4.2)
VITAMIN B-12: 1092 PG/ML (ref 211–911)
WBC # BLD: 5.5 THOU/MM3 (ref 4.8–10.8)

## 2020-02-12 PROCEDURE — 85027 COMPLETE CBC AUTOMATED: CPT

## 2020-02-12 PROCEDURE — 82746 ASSAY OF FOLIC ACID SERUM: CPT

## 2020-02-12 PROCEDURE — 80048 BASIC METABOLIC PNL TOTAL CA: CPT

## 2020-02-12 PROCEDURE — 36415 COLL VENOUS BLD VENIPUNCTURE: CPT

## 2020-02-12 PROCEDURE — 84439 ASSAY OF FREE THYROXINE: CPT

## 2020-02-12 PROCEDURE — 84443 ASSAY THYROID STIM HORMONE: CPT

## 2020-02-12 PROCEDURE — 82607 VITAMIN B-12: CPT

## 2020-02-26 ENCOUNTER — HOSPITAL ENCOUNTER (OUTPATIENT)
Age: 80
Discharge: HOME | End: 2020-02-26
Payer: MEDICARE

## 2020-02-26 DIAGNOSIS — N18.30 CKD (CHRONIC KIDNEY DISEASE), STAGE III (HCC): ICD-10-CM

## 2020-02-26 LAB
ANION GAP SERPL CALCULATED.3IONS-SCNC: 14 MEQ/L (ref 8–16)
ANISOCYTOSIS: PRESENT
BASOPHILS # BLD: 0.6 %
BASOPHILS ABSOLUTE: 0 THOU/MM3 (ref 0–0.1)
BUN BLDV-MCNC: 20 MG/DL (ref 7–22)
CALCIUM SERPL-MCNC: 10.4 MG/DL (ref 8.5–10.5)
CHLORIDE BLD-SCNC: 104 MEQ/L (ref 98–111)
CO2: 25 MEQ/L (ref 23–33)
CREAT SERPL-MCNC: 1.2 MG/DL (ref 0.4–1.2)
EOSINOPHIL # BLD: 5.8 %
EOSINOPHILS ABSOLUTE: 0.4 THOU/MM3 (ref 0–0.4)
ERYTHROCYTE [DISTWIDTH] IN BLOOD BY AUTOMATED COUNT: 16.8 % (ref 11.5–14.5)
ERYTHROCYTE [DISTWIDTH] IN BLOOD BY AUTOMATED COUNT: 67.6 FL (ref 35–45)
GFR SERPL CREATININE-BSD FRML MDRD: 43 ML/MIN/1.73M2
GLUCOSE BLD-MCNC: 100 MG/DL (ref 70–108)
HCT VFR BLD CALC: 42 % (ref 37–47)
HEMOGLOBIN: 12.7 GM/DL (ref 12–16)
IMMATURE GRANS (ABS): 0.02 THOU/MM3 (ref 0–0.07)
IMMATURE GRANULOCYTES: 0.3 %
LYMPHOCYTES # BLD: 40.4 %
LYMPHOCYTES ABSOLUTE: 2.5 THOU/MM3 (ref 1–4.8)
MCH RBC QN AUTO: 33 PG (ref 26–33)
MCHC RBC AUTO-ENTMCNC: 30.2 GM/DL (ref 32.2–35.5)
MCV RBC AUTO: 109.1 FL (ref 81–99)
MONOCYTES # BLD: 9.2 %
MONOCYTES ABSOLUTE: 0.6 THOU/MM3 (ref 0.4–1.3)
NUCLEATED RED BLOOD CELLS: 0 /100 WBC
PLATELET # BLD: 243 THOU/MM3 (ref 130–400)
PMV BLD AUTO: 10.3 FL (ref 9.4–12.4)
POTASSIUM SERPL-SCNC: 3.8 MEQ/L (ref 3.5–5.2)
RBC # BLD: 3.85 MILL/MM3 (ref 4.2–5.4)
SCAN OF BLOOD SMEAR: NORMAL
SEG NEUTROPHILS: 43.7 %
SEGMENTED NEUTROPHILS ABSOLUTE COUNT: 2.7 THOU/MM3 (ref 1.8–7.7)
SODIUM BLD-SCNC: 143 MEQ/L (ref 135–145)
WBC # BLD: 6.2 THOU/MM3 (ref 4.8–10.8)

## 2020-02-26 PROCEDURE — 85025 COMPLETE CBC W/AUTO DIFF WBC: CPT

## 2020-02-26 PROCEDURE — 80048 BASIC METABOLIC PNL TOTAL CA: CPT

## 2020-02-26 PROCEDURE — 36415 COLL VENOUS BLD VENIPUNCTURE: CPT

## 2020-03-02 ENCOUNTER — OFFICE VISIT (OUTPATIENT)
Dept: NEPHROLOGY | Age: 80
End: 2020-03-02
Payer: MEDICARE

## 2020-03-02 VITALS
BODY MASS INDEX: 19.35 KG/M2 | OXYGEN SATURATION: 99 % | HEART RATE: 64 BPM | DIASTOLIC BLOOD PRESSURE: 82 MMHG | WEIGHT: 96 LBS | HEIGHT: 59 IN | SYSTOLIC BLOOD PRESSURE: 160 MMHG

## 2020-03-02 PROCEDURE — 4040F PNEUMOC VAC/ADMIN/RCVD: CPT | Performed by: INTERNAL MEDICINE

## 2020-03-02 PROCEDURE — 1123F ACP DISCUSS/DSCN MKR DOCD: CPT | Performed by: INTERNAL MEDICINE

## 2020-03-02 PROCEDURE — G8427 DOCREV CUR MEDS BY ELIG CLIN: HCPCS | Performed by: INTERNAL MEDICINE

## 2020-03-02 PROCEDURE — 1090F PRES/ABSN URINE INCON ASSESS: CPT | Performed by: INTERNAL MEDICINE

## 2020-03-02 PROCEDURE — G8400 PT W/DXA NO RESULTS DOC: HCPCS | Performed by: INTERNAL MEDICINE

## 2020-03-02 PROCEDURE — G8484 FLU IMMUNIZE NO ADMIN: HCPCS | Performed by: INTERNAL MEDICINE

## 2020-03-02 PROCEDURE — 99213 OFFICE O/P EST LOW 20 MIN: CPT | Performed by: INTERNAL MEDICINE

## 2020-03-02 PROCEDURE — G8420 CALC BMI NORM PARAMETERS: HCPCS | Performed by: INTERNAL MEDICINE

## 2020-03-02 PROCEDURE — 1036F TOBACCO NON-USER: CPT | Performed by: INTERNAL MEDICINE

## 2020-03-02 RX ORDER — LOSARTAN POTASSIUM 100 MG/1
100 TABLET ORAL DAILY
COMMUNITY
Start: 2020-02-25 | End: 2020-07-27 | Stop reason: SDUPTHER

## 2020-03-02 RX ORDER — DILTIAZEM HYDROCHLORIDE 180 MG/1
180 CAPSULE, EXTENDED RELEASE ORAL DAILY
COMMUNITY
Start: 2020-02-13 | End: 2020-07-06 | Stop reason: SDUPTHER

## 2020-03-02 NOTE — PROGRESS NOTES
[facility]    [address] [facility] [address]  [address], [address]  [phone]       Progress Note    3/2/2020 9:09 AM    Pt Name:    Delgado Kirby  YOB: 1940  Primary Care Physician:  VANDANA Kyle CNP       Chief Complaint:   Chief Complaint   Patient presents with    Chronic Kidney Disease    Hypertension        History of Chief Complaint: CKD stage IIIA from HTN and valvular heart disease. Subjective:  I last saw the patient in clinic 09/11/19. I follow the patient for Chronic Kidney disease stage IIIA. Since our last visit the patient has been hospitalized at [facility] for dehydration, gastritis and pneumonia. The patient is sleeping well at night with 1-2 times per night nocturia. The patient has a good appetite and is remaining active. The patient denied N/V/C/D/SOB/CP. Her ankles swell during the day. Last six eGFR readings:  Lab Results   Component Value Date    LABGLOM 43 02/26/2020    LABGLOM 53 02/12/2020    LABGLOM 53 01/15/2020    LABGLOM 48 01/11/2020    LABGLOM 33 01/11/2020    LABGLOM 36 01/09/2020          Objective:  VITALS:  BP (!) 160/82 (Site: Right Upper Arm, Position: Sitting, Cuff Size: Small Adult)   Pulse 64   Ht 4' 11\" (1.499 m)   Wt 96 lb (43.5 kg)   SpO2 99%   BMI 19.39 kg/m²   Weight:   Wt Readings from Last 3 Encounters:   03/02/20 96 lb (43.5 kg)   02/11/20 96 lb (43.5 kg)   01/18/20 98 lb 1.6 oz (44.5 kg)     Body mass index is 19.39 kg/m². Physical examination    General:  Alert and cooperative with exam  HEENT:  Normocephalic. No lesions nor rashes. PURNIMA. EOMI  Neck:   No JVD and no bruits. Thyroid gland is normal  Lungs:  Breathing easily. No rales nor rhonchi. No cough nor sputum production. Heart[de-identified]            RRR. No murmurs nor rubs. PMI is not enlarged nor displaced. Abdomen:  Soft and non tender. Bowel sounds are active in all four quadrants. Extremities:  No edema  Neurologic:  CN II-XII are intact.  No deficits noted. Muscle strength and tone are equal throughout. Skin:                Warm and dry with no rashes. Muscles:         Hand  and leg strength are equal and strong bilaterally.      Lab Data      CBC:   Lab Results   Component Value Date    WBC 6.2 02/26/2020    HGB 12.7 02/26/2020    HCT 42.0 02/26/2020    .1 (H) 02/26/2020     02/26/2020     BMP:    Lab Results   Component Value Date     02/26/2020     02/12/2020     01/15/2020    K 3.8 02/26/2020    K 4.2 02/12/2020    K 4.3 01/15/2020     02/26/2020     02/12/2020     01/15/2020    CO2 25 02/26/2020    CO2 23 02/12/2020    CO2 23 01/15/2020    BUN 20 02/26/2020    BUN 16 02/12/2020    BUN 15 01/15/2020    CREATININE 1.2 02/26/2020    CREATININE 1.0 02/12/2020    CREATININE 1.0 01/15/2020    GLUCOSE 100 02/26/2020    GLUCOSE 87 02/12/2020    GLUCOSE 90 01/15/2020      Hepatic:   Lab Results   Component Value Date    AST 21 12/23/2019    AST 15 09/05/2019    AST 19 07/31/2019    ALT 17 12/23/2019    ALT 9 (L) 09/05/2019    ALT 10 (L) 07/31/2019    BILITOT 0.9 12/23/2019    BILITOT 0.4 09/05/2019    BILITOT 0.5 07/31/2019    ALKPHOS 65 12/23/2019    ALKPHOS 65 09/05/2019    ALKPHOS 58 07/31/2019     BNP: No results found for: BNP  Lipids:   Lab Results   Component Value Date    CHOL 134 09/05/2019    HDL 60 09/05/2019     INR:   Lab Results   Component Value Date    INR 2.41 (H) 01/18/2020    INR 2.12 (H) 01/17/2020    INR 2.49 (H) 01/16/2020     URINE: No results found for: NAUR, PROTUR  Lab Results   Component Value Date    NITRU NEGATIVE 01/13/2020    COLORU YELLOW 01/13/2020    PHUR 8.5 01/13/2020    WBCUA 25-50W/CLUMPS 12/23/2019    RBCUA NONE 12/23/2019    MUCUS NONE SEEN 12/23/2019    BACTERIA MODERATE 12/23/2019    SPECGRAV 1.020 12/23/2019    LEUKOCYTESUR NEGATIVE 01/13/2020    UROBILINOGEN 0.2 01/13/2020    BILIRUBINUR NEGATIVE 01/13/2020    BLOODU NEGATIVE 01/13/2020    GLUCOSEU NEGATIVE metabolism: stable       PLAN:  1. We discussed the eGFR today. 2. We will continue all current medications without changes. 3. Checking iron stores. 4. We will see the patient back in 4 months.               _________________________________  Otis Chan.  Leigha Golden DO  [facility]      CC  Kaitlin Gee, APRN - CNP

## 2020-03-04 ENCOUNTER — HOSPITAL ENCOUNTER (OUTPATIENT)
Dept: MAMMOGRAPHY | Age: 80
Discharge: HOME | End: 2020-03-04
Payer: MEDICARE

## 2020-03-04 PROCEDURE — 77063 BREAST TOMOSYNTHESIS BI: CPT

## 2020-03-05 ENCOUNTER — TELEPHONE (OUTPATIENT)
Dept: CARDIOLOGY CLINIC | Age: 80
End: 2020-03-05

## 2020-03-12 ENCOUNTER — HOSPITAL ENCOUNTER (OUTPATIENT)
Dept: WOMENS IMAGING | Age: 80
Discharge: HOME | End: 2020-03-12
Payer: MEDICARE

## 2020-03-12 PROCEDURE — 76642 ULTRASOUND BREAST LIMITED: CPT

## 2020-03-12 PROCEDURE — G0279 TOMOSYNTHESIS, MAMMO: HCPCS

## 2020-03-26 ENCOUNTER — HOSPITAL ENCOUNTER (OUTPATIENT)
Dept: WOMENS IMAGING | Age: 80
Discharge: HOME | End: 2020-03-26
Payer: MEDICARE

## 2020-03-26 PROCEDURE — 88305 TISSUE EXAM BY PATHOLOGIST: CPT

## 2020-03-26 PROCEDURE — C1894 INTRO/SHEATH, NON-LASER: HCPCS

## 2020-03-26 PROCEDURE — 19084 BX BREAST ADD LESION US IMAG: CPT

## 2020-03-26 PROCEDURE — 19083 BX BREAST 1ST LESION US IMAG: CPT

## 2020-03-26 PROCEDURE — A4648 IMPLANTABLE TISSUE MARKER: HCPCS

## 2020-03-26 PROCEDURE — G0279 TOMOSYNTHESIS, MAMMO: HCPCS

## 2020-03-26 PROCEDURE — 2709999900 HC NON-CHARGEABLE SUPPLY

## 2020-03-26 NOTE — PROGRESS NOTES
[facility] [address]  Pre-Biopsy Assessment      Patient Education    Written information about procedure Yes  left   Procedural steps explained Yes Ultrasound Biopsy   Post-op potential: bruising, hematoma, pain Yes    Self-care: activity, care of dressing Yes    Patient verbalized understanding Yes    Consent signed and witnessed Yes      Hormone Therapy Status: not now    Recent Medication: N/A Last Dose: no                                     Hormone Replacement Therapy: no    Previous Breast Biopsy: yes - left    Previous Diagnosis Cancer: yes - left breast cancer age 66, lumpectomy RT, no chemo    Hysterectomy:no    Emotional Status: Nervous    Language or Physical Barriers: none    Comments: none      Electronically signed by Casa Adams RN on 3/26/2020 at 8:51 AM

## 2020-03-26 NOTE — PROGRESS NOTES
Breast Biopsy Flowsheet/Post-Operative Care    Date of Procedure: 3/26/2020  Physician: Dr. Praneeth Dover  Technologist: Agata JOHNSON(R)(M)    Ruben Dus guided breast biopsy  Lesion type: Non-palpable  Breast: left    Clock face position: Site #1: Lower outer aspect middle depth     Site #2: Lower outer aspect middle depth     Site #3 Lower outer aspect middle depth    Primary Method of Detection: Mammogram      Microcalcification's: no   Distribution: N/A    Asymmetry: symmetric    Biopsy Method:   Sertera:    Site # 1    Gauge: 14    # of Passes: 4     Clip: [place]:    Site # 2    Gauge: 14    # of Passes: 5     Clip:  U      Sertera:    Site # 3    Gauge: 14    # of Passes: 4     Clip: Maryuri    Pre-Op Assessment: (BI-RADS)   4. Suspicious Abnormality    Patient Tolerated Procedure: good  Complications: none  Comments: none    Post Operative Care  Steri strips: Yes  Dressing: Gauze, Tape   Ice Applied to Site:  Yes  Evidence of Bleeding:  No    Pain Verbalized: No      Written Discharge Instructions: Yes  Condition at Discharge: good  Time of Discharge: 1000    Electronically signed by Pamela Vela RN on 3/26/2020 at 10:24 AM

## 2020-04-01 ENCOUNTER — OFFICE VISIT (OUTPATIENT)
Dept: CARDIOLOGY CLINIC | Age: 80
End: 2020-04-01
Payer: MEDICARE

## 2020-04-01 VITALS
BODY MASS INDEX: 18.95 KG/M2 | HEIGHT: 59 IN | DIASTOLIC BLOOD PRESSURE: 66 MMHG | WEIGHT: 94 LBS | SYSTOLIC BLOOD PRESSURE: 160 MMHG | HEART RATE: 80 BPM

## 2020-04-01 PROCEDURE — 99214 OFFICE O/P EST MOD 30 MIN: CPT | Performed by: NUCLEAR MEDICINE

## 2020-04-01 PROCEDURE — 1123F ACP DISCUSS/DSCN MKR DOCD: CPT | Performed by: NUCLEAR MEDICINE

## 2020-04-01 PROCEDURE — 1090F PRES/ABSN URINE INCON ASSESS: CPT | Performed by: NUCLEAR MEDICINE

## 2020-04-01 PROCEDURE — G8427 DOCREV CUR MEDS BY ELIG CLIN: HCPCS | Performed by: NUCLEAR MEDICINE

## 2020-04-01 PROCEDURE — G8420 CALC BMI NORM PARAMETERS: HCPCS | Performed by: NUCLEAR MEDICINE

## 2020-04-01 PROCEDURE — 4040F PNEUMOC VAC/ADMIN/RCVD: CPT | Performed by: NUCLEAR MEDICINE

## 2020-04-01 PROCEDURE — 1036F TOBACCO NON-USER: CPT | Performed by: NUCLEAR MEDICINE

## 2020-04-01 PROCEDURE — G8400 PT W/DXA NO RESULTS DOC: HCPCS | Performed by: NUCLEAR MEDICINE

## 2020-04-01 ASSESSMENT — ENCOUNTER SYMPTOMS
BACK PAIN: 0
VOMITING: 0
ANAL BLEEDING: 0
PHOTOPHOBIA: 0
SHORTNESS OF BREATH: 1
CHEST TIGHTNESS: 0
CONSTIPATION: 0
DIARRHEA: 0
BLOOD IN STOOL: 0
NAUSEA: 0
ABDOMINAL PAIN: 0
ABDOMINAL DISTENTION: 0
RECTAL PAIN: 0

## 2020-04-01 NOTE — PROGRESS NOTES
Pt Denies CP,  HA, or fatigue.     C/o SOB with exertion, occasional dizziness, AFIB, swelling in lower legs and feet

## 2020-04-01 NOTE — PROGRESS NOTES
[address] [facility],[address]  [address] [place] [address]  Dept: [phone]  Dept Fax: [phone]  Loc: [phone]    Visit Date: 4/1/2020    Keven Huff is a 79 y.o. female who presents todayfor:  Chief Complaint   Patient presents with    New Patient    Atrial Fibrillation    Cardiac Valve Problem    Hypertension    Coronary Artery Disease   here for the first time  Used to see marcelo   Known A fib   Failed cardioversion before  Had it for a while  On coumadin   Basically on rate control   Does have moderate to severe MR  Does have baseline dyspnea  Dyspnea on exertion   No active chest pain   Cath 2019   Mild CAD  Normal EF   No stents needed  No obvious CHF  Does have HTN   On medical RX  Some dizziness  Family history of CAD      HPI:  HPI  Past Medical History:   Diagnosis Date    Arthritis     Atrial fibrillation ([address]     CAD (coronary artery disease)     Cancer ([address]     BREAST    Chronic kidney disease     HTN (hypertension) 4/13/2016    Hypertension     Nausea & vomiting     Neuromuscular disorder ([address]     Osteopenia     Pneumonia of both lungs due to infectious organism     Rheumatic fever     as a child    Sinus infection     Thyroid disease       Past Surgical History:   Procedure Laterality Date    BREAST LUMPECTOMY Left 2011    BRONCHOSCOPY N/A 1/5/2020    BRONCHOSCOPY performed by Bonifacio Beal MD at [address] [place] [address] Left 2009   [address]  2010    DILATION AND CURETTAGE OF UTERUS      X2; SEVERAL YEARS AGO    JOINT REPLACEMENT Right 2010    KNEE    TONSILLECTOMY      UPPER GASTROINTESTINAL ENDOSCOPY N/A 1/3/2020    EGD ESOPHAGOGASTRODUODENOSCOPY performed by Brynn Cheatham MD at [facility]    UPPER GASTROINTESTINAL ENDOSCOPY N/A 1/11/2020    EGD DIAGNOSTIC ONLY performed by Brynn Cheatham MD at [facility]     Family History   Problem Relation Age of Onset    Arthritis

## 2020-04-02 ENCOUNTER — HOSPITAL ENCOUNTER (OUTPATIENT)
Dept: CT IMAGING | Age: 80
Discharge: HOME | End: 2020-04-02
Payer: MEDICARE

## 2020-04-02 PROCEDURE — 71250 CT THORAX DX C-: CPT

## 2020-04-14 ENCOUNTER — OFFICE VISIT (OUTPATIENT)
Dept: PULMONOLOGY | Age: 80
End: 2020-04-14
Payer: MEDICARE

## 2020-04-14 VITALS
TEMPERATURE: 97.4 F | HEART RATE: 72 BPM | BODY MASS INDEX: 18.71 KG/M2 | DIASTOLIC BLOOD PRESSURE: 68 MMHG | OXYGEN SATURATION: 99 % | WEIGHT: 92.8 LBS | HEIGHT: 59 IN | SYSTOLIC BLOOD PRESSURE: 138 MMHG

## 2020-04-14 PROCEDURE — 99214 OFFICE O/P EST MOD 30 MIN: CPT | Performed by: NURSE PRACTITIONER

## 2020-04-14 PROCEDURE — G8420 CALC BMI NORM PARAMETERS: HCPCS | Performed by: NURSE PRACTITIONER

## 2020-04-14 PROCEDURE — 1036F TOBACCO NON-USER: CPT | Performed by: NURSE PRACTITIONER

## 2020-04-14 PROCEDURE — 4040F PNEUMOC VAC/ADMIN/RCVD: CPT | Performed by: NURSE PRACTITIONER

## 2020-04-14 PROCEDURE — 1090F PRES/ABSN URINE INCON ASSESS: CPT | Performed by: NURSE PRACTITIONER

## 2020-04-14 PROCEDURE — 1123F ACP DISCUSS/DSCN MKR DOCD: CPT | Performed by: NURSE PRACTITIONER

## 2020-04-14 PROCEDURE — G8400 PT W/DXA NO RESULTS DOC: HCPCS | Performed by: NURSE PRACTITIONER

## 2020-04-14 PROCEDURE — G8427 DOCREV CUR MEDS BY ELIG CLIN: HCPCS | Performed by: NURSE PRACTITIONER

## 2020-04-14 ASSESSMENT — ENCOUNTER SYMPTOMS
NAUSEA: 0
VOMITING: 0
DIARRHEA: 0
ABDOMINAL PAIN: 0
WHEEZING: 0
EYES NEGATIVE: 1
APNEA: 0
SHORTNESS OF BREATH: 1
COUGH: 0
CHEST TIGHTNESS: 0

## 2020-04-14 NOTE — PROGRESS NOTES
[facility]     Patient: Betsy Conner, 79 y.o.   : 1940  2020    Pt of Dr. Rajeev Mansfield   Patient presents with    Follow-up     hospital follow up dc'd 2020 for pleural effusion. bronch 2020 CBC 3/2/2020 CT 2020    Other     DNQ         HPI  Rupert Goldberg is here for follow up for hospital follow up for pleural effusion, new to office. Seen in hospital as new consult per Dr Melanie Zavala MD and Alfa Seo, CNP  Was sent for US thora, but there was not enough fluid to drain. She currently states has some SOB with exertional activity and activity is limited due to arthritis pains. No cough, fever, chills, or wheezing. Overall is feeling much better than when she was in hospital.   Had biopsy of mass from left mass last month- negative for malignancy     Copied from hospital HPI:  Tripp Tena is an 48-year-old white female lifetime non-smoker. Neeru Ambriz has a history of left breast cancer diagnosed in  and treated with lumpectomy.  She receives chronic anticoagulation with Eliquis for atrial fibrillation.  She has a history of hypothyroidism, osteopenia, hypertension, and chronic kidney disease. Patient presented to the emergency room and was admitted on 2020, with complaints of poor oral intake, significant weight loss over the past 3 months. Raya Burgos has a very poor appetite and feels nauseated when eating.  As her nutritional status depleted, she became more and more fatigued. Patient was seen by gastroenterology and underwent EGD on 1/3/2020.  This demonstrated ulcerative erosive gastritis and duodenitis. Patient deteriorated on 2020 associated with respiratory failure.  She developed hypoxemia and required intubation.  She underwent bronchoscopy 2020.  This demonstrated purulent pneumonia bilaterally.  She was started on Zosyn 2020. Pulmonary medicine was consulted for further management of (SYNTHROID) 50 MCG tablet Take 50 mcg by mouth daily Indications: Impaired Thyroid Function       Biotin 1000 MCG TABS Take 1,000 mcg by mouth nightly        No current facility-administered medications for this visit. Geoff SHUKLA   Review of Systems   Constitutional: Negative for activity change, appetite change, chills, fatigue, fever and unexpected weight change. HENT: Negative. Eyes: Negative. Respiratory: Positive for shortness of breath. Negative for apnea, cough, chest tightness and wheezing. Cardiovascular: Negative for chest pain, palpitations and leg swelling. Gastrointestinal: Negative for abdominal pain, diarrhea, nausea and vomiting. Genitourinary: Negative. Musculoskeletal: Positive for arthralgias and gait problem. Skin: Negative. Hematological: Does not bruise/bleed easily. Psychiatric/Behavioral: Negative for sleep disturbance and suicidal ideas. Physical exam   /68 (Site: Right Upper Arm, Position: Sitting, Cuff Size: Medium Adult)   Pulse 72   Temp 97.4 °F (36.3 °C)   Ht 4' 11\" (1.499 m)   Wt 92 lb 12.8 oz (42.1 kg)   SpO2 99% Comment: on room air at rest  BMI 18.74 kg/m²        Physical Exam  Vitals signs and nursing note reviewed. Constitutional:       General: She is not in acute distress. Appearance: She is underweight. Comments: Malnourished appearance    HENT:      Mouth/Throat:      Lips: Pink. Mouth: Mucous membranes are moist.      Pharynx: Oropharynx is clear. No oropharyngeal exudate or posterior oropharyngeal erythema. Eyes:      Conjunctiva/sclera: Conjunctivae normal.   Neck:      Vascular: No JVD. Cardiovascular:      Rate and Rhythm: Normal rate. Rhythm regularly irregular. Heart sounds: Murmur present. Systolic murmur present. No friction rub. Pulmonary:      Effort: Pulmonary effort is normal. No accessory muscle usage or respiratory distress. Breath sounds: Normal breath sounds.  No wheezing, rhonchi or rales. Chest:      Chest wall: No tenderness. Musculoskeletal:      Right lower leg: No edema. Left lower leg: No edema. Skin:     General: Skin is warm and dry. Capillary Refill: Capillary refill takes less than 2 seconds. Nails: There is no clubbing. Neurological:      Mental Status: She is alert. Psychiatric:         Mood and Affect: Mood normal.         Behavior: Behavior normal.         Thought Content: Thought content normal.         Judgment: Judgment normal.          Test results   Lung Nodule Screening     [] Qualifies    [x]Does not qualify   [] Declined    [] Completed     US chest 1/7/2020     Impression   1.  Mild to moderate sized pleural effusions bilaterally Correlation advised     US chest 1/10/2020     Impression   Small right pleural effusion. No thoracentesis could be performed.           **This report has been created using voice recognition software. It may contain minor errors which are inherent in voice recognition technology. **       Final report electronically signed by Dr. Joe Saunders on 1/10/2020 10:42 AM     Left breast biopsy 3/20/20  FINAL DIAGNOSIS:  A through C: Left breast mass, left outer quadrant, tribell clip #1,  U-Clip #2, barbell clip #3, core biopsies:     Benign mammary fibrous tissue.               Negative for malignancy. CT chest wo contrast 4/2/20     Impression       1. Markedly improved appearance of the chest when compared to previous CT dated 01/07/2020. No pulmonary infiltrates or consolidation. The pleural effusions have also resolved. 2. No evidence of honeycombing on high-resolution images. Questionable minimal bronchiectasis extending to the lower lobes.                   **This report has been created using voice recognition software. It may contain minor errors which are inherent in voice recognition technology. **       Final report electronically signed by Dr Luis Spears on 4/2/2020 1:33 PM

## 2020-04-15 ENCOUNTER — HOSPITAL ENCOUNTER (OUTPATIENT)
Age: 80
Discharge: HOME | End: 2020-04-15
Payer: MEDICARE

## 2020-04-15 LAB
ALBUMIN SERPL-MCNC: 4.8 G/DL (ref 3.5–5.1)
ALP BLD-CCNC: 93 U/L (ref 38–126)
ALT SERPL-CCNC: 14 U/L (ref 11–66)
ANION GAP SERPL CALCULATED.3IONS-SCNC: 17 MEQ/L (ref 8–16)
AST SERPL-CCNC: 21 U/L (ref 5–40)
BASOPHILS # BLD: 0.5 %
BASOPHILS ABSOLUTE: 0 THOU/MM3 (ref 0–0.1)
BILIRUB SERPL-MCNC: 0.4 MG/DL (ref 0.3–1.2)
BUN BLDV-MCNC: 23 MG/DL (ref 7–22)
CALCIUM SERPL-MCNC: 10.6 MG/DL (ref 8.5–10.5)
CHLORIDE BLD-SCNC: 98 MEQ/L (ref 98–111)
CO2: 25 MEQ/L (ref 23–33)
CREAT SERPL-MCNC: 1.3 MG/DL (ref 0.4–1.2)
EOSINOPHIL # BLD: 2.9 %
EOSINOPHILS ABSOLUTE: 0.2 THOU/MM3 (ref 0–0.4)
ERYTHROCYTE [DISTWIDTH] IN BLOOD BY AUTOMATED COUNT: 12.9 % (ref 11.5–14.5)
ERYTHROCYTE [DISTWIDTH] IN BLOOD BY AUTOMATED COUNT: 51.8 FL (ref 35–45)
GFR SERPL CREATININE-BSD FRML MDRD: 39 ML/MIN/1.73M2
GLUCOSE BLD-MCNC: 87 MG/DL (ref 70–108)
HCT VFR BLD CALC: 45.9 % (ref 37–47)
HEMOGLOBIN: 14.2 GM/DL (ref 12–16)
IMMATURE GRANS (ABS): 0.02 THOU/MM3 (ref 0–0.07)
IMMATURE GRANULOCYTES: 0.3 %
LYMPHOCYTES # BLD: 29.5 %
LYMPHOCYTES ABSOLUTE: 1.7 THOU/MM3 (ref 1–4.8)
MCH RBC QN AUTO: 33.3 PG (ref 26–33)
MCHC RBC AUTO-ENTMCNC: 30.9 GM/DL (ref 32.2–35.5)
MCV RBC AUTO: 107.7 FL (ref 81–99)
MONOCYTES # BLD: 8.6 %
MONOCYTES ABSOLUTE: 0.5 THOU/MM3 (ref 0.4–1.3)
NUCLEATED RED BLOOD CELLS: 0 /100 WBC
PLATELET # BLD: 200 THOU/MM3 (ref 130–400)
PMV BLD AUTO: 10.3 FL (ref 9.4–12.4)
POTASSIUM SERPL-SCNC: 3.9 MEQ/L (ref 3.5–5.2)
RBC # BLD: 4.26 MILL/MM3 (ref 4.2–5.4)
SEG NEUTROPHILS: 58.2 %
SEGMENTED NEUTROPHILS ABSOLUTE COUNT: 3.4 THOU/MM3 (ref 1.8–7.7)
SODIUM BLD-SCNC: 140 MEQ/L (ref 135–145)
TOTAL PROTEIN: 7.1 G/DL (ref 6.1–8)
WBC # BLD: 5.8 THOU/MM3 (ref 4.8–10.8)

## 2020-04-15 PROCEDURE — 80053 COMPREHEN METABOLIC PANEL: CPT

## 2020-04-15 PROCEDURE — 85025 COMPLETE CBC W/AUTO DIFF WBC: CPT

## 2020-04-15 PROCEDURE — 86300 IMMUNOASSAY TUMOR CA 15-3: CPT

## 2020-04-15 PROCEDURE — 36415 COLL VENOUS BLD VENIPUNCTURE: CPT

## 2020-04-17 LAB — CA 27-29: 26 U/ML (ref 0–38)

## 2020-05-08 ENCOUNTER — HOSPITAL ENCOUNTER (OUTPATIENT)
Age: 80
Discharge: HOME | End: 2020-05-08
Payer: MEDICARE

## 2020-05-08 DIAGNOSIS — K29.31 CHRONIC SUPERFICIAL GASTRITIS WITH BLEEDING: ICD-10-CM

## 2020-05-08 DIAGNOSIS — D50.0 IRON DEFICIENCY ANEMIA DUE TO CHRONIC BLOOD LOSS: ICD-10-CM

## 2020-05-08 DIAGNOSIS — K21.00 GASTROESOPHAGEAL REFLUX DISEASE WITH ESOPHAGITIS: ICD-10-CM

## 2020-05-08 LAB
ERYTHROCYTE [DISTWIDTH] IN BLOOD BY AUTOMATED COUNT: 12.7 % (ref 11.5–14.5)
ERYTHROCYTE [DISTWIDTH] IN BLOOD BY AUTOMATED COUNT: 49.4 FL (ref 35–45)
FERRITIN: 89 NG/ML (ref 10–291)
HCT VFR BLD CALC: 45.5 % (ref 37–47)
HEMOGLOBIN: 14.2 GM/DL (ref 12–16)
IRON: 78 UG/DL (ref 50–170)
MCH RBC QN AUTO: 33.1 PG (ref 26–33)
MCHC RBC AUTO-ENTMCNC: 31.2 GM/DL (ref 32.2–35.5)
MCV RBC AUTO: 106.1 FL (ref 81–99)
PLATELET # BLD: 184 THOU/MM3 (ref 130–400)
PMV BLD AUTO: 10.7 FL (ref 9.4–12.4)
RBC # BLD: 4.29 MILL/MM3 (ref 4.2–5.4)
WBC # BLD: 5.4 THOU/MM3 (ref 4.8–10.8)

## 2020-05-08 PROCEDURE — 85027 COMPLETE CBC AUTOMATED: CPT

## 2020-05-08 PROCEDURE — 83540 ASSAY OF IRON: CPT

## 2020-05-08 PROCEDURE — 36415 COLL VENOUS BLD VENIPUNCTURE: CPT

## 2020-05-08 PROCEDURE — 82728 ASSAY OF FERRITIN: CPT

## 2020-07-01 ENCOUNTER — HOSPITAL ENCOUNTER (OUTPATIENT)
Age: 80
Discharge: HOME | End: 2020-07-01
Payer: MEDICARE

## 2020-07-01 DIAGNOSIS — D50.0 IRON DEFICIENCY ANEMIA DUE TO CHRONIC BLOOD LOSS: ICD-10-CM

## 2020-07-01 DIAGNOSIS — N18.30 CKD (CHRONIC KIDNEY DISEASE), STAGE III (HCC): ICD-10-CM

## 2020-07-01 LAB
ANION GAP SERPL CALCULATED.3IONS-SCNC: 11 MEQ/L (ref 8–16)
BASOPHILS # BLD: 1 %
BASOPHILS ABSOLUTE: 0.1 THOU/MM3 (ref 0–0.1)
BUN BLDV-MCNC: 27 MG/DL (ref 7–22)
CALCIUM SERPL-MCNC: 10.6 MG/DL (ref 8.5–10.5)
CHLORIDE BLD-SCNC: 102 MEQ/L (ref 98–111)
CO2: 27 MEQ/L (ref 23–33)
CREAT SERPL-MCNC: 1.7 MG/DL (ref 0.4–1.2)
EOSINOPHIL # BLD: 5.4 %
EOSINOPHILS ABSOLUTE: 0.3 THOU/MM3 (ref 0–0.4)
ERYTHROCYTE [DISTWIDTH] IN BLOOD BY AUTOMATED COUNT: 13 % (ref 11.5–14.5)
ERYTHROCYTE [DISTWIDTH] IN BLOOD BY AUTOMATED COUNT: 50 FL (ref 35–45)
GFR SERPL CREATININE-BSD FRML MDRD: 29 ML/MIN/1.73M2
GLUCOSE BLD-MCNC: 94 MG/DL (ref 70–108)
HCT VFR BLD CALC: 40.7 % (ref 37–47)
HEMOGLOBIN: 12.7 GM/DL (ref 12–16)
IMMATURE GRANS (ABS): 0.01 THOU/MM3 (ref 0–0.07)
IMMATURE GRANULOCYTES: 0.2 %
IRON SATURATION: 30 % (ref 20–50)
IRON: 57 UG/DL (ref 50–170)
LYMPHOCYTES # BLD: 30.5 %
LYMPHOCYTES ABSOLUTE: 1.5 THOU/MM3 (ref 1–4.8)
MCH RBC QN AUTO: 32.9 PG (ref 26–33)
MCHC RBC AUTO-ENTMCNC: 31.2 GM/DL (ref 32.2–35.5)
MCV RBC AUTO: 105.4 FL (ref 81–99)
MONOCYTES # BLD: 10.8 %
MONOCYTES ABSOLUTE: 0.5 THOU/MM3 (ref 0.4–1.3)
NUCLEATED RED BLOOD CELLS: 0 /100 WBC
PLATELET # BLD: 182 THOU/MM3 (ref 130–400)
PMV BLD AUTO: 10.4 FL (ref 9.4–12.4)
POTASSIUM SERPL-SCNC: 4 MEQ/L (ref 3.5–5.2)
RBC # BLD: 3.86 MILL/MM3 (ref 4.2–5.4)
SEG NEUTROPHILS: 52.1 %
SEGMENTED NEUTROPHILS ABSOLUTE COUNT: 2.6 THOU/MM3 (ref 1.8–7.7)
SODIUM BLD-SCNC: 140 MEQ/L (ref 135–145)
TOTAL IRON BINDING CAPACITY: 191 UG/DL (ref 171–450)
WBC # BLD: 5 THOU/MM3 (ref 4.8–10.8)

## 2020-07-01 PROCEDURE — 83540 ASSAY OF IRON: CPT

## 2020-07-01 PROCEDURE — 36415 COLL VENOUS BLD VENIPUNCTURE: CPT

## 2020-07-01 PROCEDURE — 83550 IRON BINDING TEST: CPT

## 2020-07-01 PROCEDURE — 80048 BASIC METABOLIC PNL TOTAL CA: CPT

## 2020-07-01 PROCEDURE — 85025 COMPLETE CBC W/AUTO DIFF WBC: CPT

## 2020-07-06 ENCOUNTER — TELEPHONE (OUTPATIENT)
Dept: CARDIOLOGY CLINIC | Age: 80
End: 2020-07-06

## 2020-07-06 ENCOUNTER — OFFICE VISIT (OUTPATIENT)
Dept: NEPHROLOGY | Age: 80
End: 2020-07-06
Payer: MEDICARE

## 2020-07-06 VITALS
OXYGEN SATURATION: 99 % | SYSTOLIC BLOOD PRESSURE: 148 MMHG | HEIGHT: 60 IN | BODY MASS INDEX: 18.65 KG/M2 | WEIGHT: 95 LBS | DIASTOLIC BLOOD PRESSURE: 78 MMHG | HEART RATE: 68 BPM | TEMPERATURE: 98 F

## 2020-07-06 PROBLEM — M47.816 SPONDYLOSIS OF LUMBAR SPINE: Status: ACTIVE | Noted: 2020-07-06

## 2020-07-06 PROBLEM — M17.0 PRIMARY OSTEOARTHRITIS OF BOTH KNEES: Status: ACTIVE | Noted: 2020-07-06

## 2020-07-06 PROBLEM — K21.00 GASTRO-ESOPHAGEAL REFLUX DISEASE WITH ESOPHAGITIS: Status: ACTIVE | Noted: 2020-07-06

## 2020-07-06 PROBLEM — C50.919 MALIGNANT NEOPLASM OF BREAST (HCC): Status: ACTIVE | Noted: 2020-07-06

## 2020-07-06 PROBLEM — M19.072 OSTEOARTHRITIS OF LEFT ANKLE: Status: ACTIVE | Noted: 2020-07-06

## 2020-07-06 PROBLEM — R92.8 ABNORMAL MAMMOGRAM: Status: ACTIVE | Noted: 2020-07-06

## 2020-07-06 PROBLEM — E78.2 MIXED HYPERLIPIDEMIA: Status: ACTIVE | Noted: 2020-07-06

## 2020-07-06 PROBLEM — M81.0 OSTEOPOROSIS: Status: ACTIVE | Noted: 2020-07-06

## 2020-07-06 PROBLEM — N63.20 MASS OF LEFT BREAST: Status: ACTIVE | Noted: 2020-07-06

## 2020-07-06 PROBLEM — M51.26 HERNIATED LUMBAR INTERVERTEBRAL DISC: Status: ACTIVE | Noted: 2020-07-06

## 2020-07-06 PROCEDURE — 1036F TOBACCO NON-USER: CPT | Performed by: INTERNAL MEDICINE

## 2020-07-06 PROCEDURE — 1090F PRES/ABSN URINE INCON ASSESS: CPT | Performed by: INTERNAL MEDICINE

## 2020-07-06 PROCEDURE — G8420 CALC BMI NORM PARAMETERS: HCPCS | Performed by: INTERNAL MEDICINE

## 2020-07-06 PROCEDURE — G8400 PT W/DXA NO RESULTS DOC: HCPCS | Performed by: INTERNAL MEDICINE

## 2020-07-06 PROCEDURE — 4040F PNEUMOC VAC/ADMIN/RCVD: CPT | Performed by: INTERNAL MEDICINE

## 2020-07-06 PROCEDURE — G8427 DOCREV CUR MEDS BY ELIG CLIN: HCPCS | Performed by: INTERNAL MEDICINE

## 2020-07-06 PROCEDURE — 99213 OFFICE O/P EST LOW 20 MIN: CPT | Performed by: INTERNAL MEDICINE

## 2020-07-06 PROCEDURE — 1123F ACP DISCUSS/DSCN MKR DOCD: CPT | Performed by: INTERNAL MEDICINE

## 2020-07-06 NOTE — TELEPHONE ENCOUNTER
Dr. Lawson Thomas called nurse line and LM stating he saw patient in clinic today and her kidney function is worsening, she has a lot of fatigue and ankle swelling and wants her evaluated in clinic by Dr. Sidra Jones ASAP. Dr. Sidra Jones please advise as your schedule and Cynthia's schedule is full.

## 2020-07-06 NOTE — PROGRESS NOTES
[facility]    [address] [facility]  [address], [facility]  [phone]       Progress Note    7/6/2020 11:04 AM    Pt Name:    Hilary Fleischer  YOB: 1940  Primary Care Physician:  Renea Kim, VANDANA - CNP       Chief Complaint:   Chief Complaint   Patient presents with    Anemia    Hypertension    Acute Renal Failure        History of Chief Complaint: CKD stage G-IV-A1 from HTN and valvular heart disease. Subjective:  I last saw the patient in clinic 03/02/20. I follow the patient for Chronic Kidney disease stage G-IV-A1. Since our last visit the patient has not been hospitalized. The patient is sleeping well at night with 1-2 times per night nocturia. The patient has a good appetite and is remaining active. The patient denied N/V/C/D/SOB/CP. She feels very weak and has noticed increase ankle swelling over the last 2 months. She is to have cataract surgery 06/07/20      Protein/creatinine ratio:       Last six eGFR readings:  Lab Results   Component Value Date    LABGLOM 29 07/01/2020    LABGLOM 39 04/15/2020    LABGLOM 43 02/26/2020    LABGLOM 53 02/12/2020    LABGLOM 53 01/15/2020    LABGLOM 48 01/11/2020          Objective:  VITALS:  BP (!) 148/78 (Site: Right Upper Arm, Position: Sitting, Cuff Size: Small Adult)   Pulse 68   Temp 98 °F (36.7 °C)   Ht 5' (1.524 m)   Wt 95 lb (43.1 kg)   SpO2 99%   BMI 18.55 kg/m²   Weight:   Wt Readings from Last 3 Encounters:   07/06/20 95 lb (43.1 kg)   05/14/20 94 lb (42.6 kg)   04/14/20 92 lb 12.8 oz (42.1 kg)     Body mass index is 18.55 kg/m². Physical examination    General:  Alert and cooperative with exam  HEENT:  Normocephalic. No lesions nor rashes. PURNIMA. EOMI  Neck:   No JVD and no bruits. Thyroid gland is normal  Lungs:  Breathing easily. No rales nor rhonchi. No cough nor sputum production. Heart[de-identified]            RRR. No murmurs nor rubs. PMI is not enlarged nor displaced.   Abdomen:  Soft and non tender. Bowel sounds are active in all four quadrants. Extremities:  No edema  Neurologic:  CN II-XII are intact. No deficits noted. Muscle strength and tone are equal throughout. Skin:                Warm and dry with no rashes. Muscles:         Hand  and leg strength are equal and strong bilaterally.      Lab Data      CBC:   Lab Results   Component Value Date    WBC 5.0 07/01/2020    HGB 12.7 07/01/2020    HCT 40.7 07/01/2020    .4 (H) 07/01/2020     07/01/2020     BMP:    Lab Results   Component Value Date     07/01/2020     04/15/2020     02/26/2020    K 4.0 07/01/2020    K 3.9 04/15/2020    K 3.8 02/26/2020     07/01/2020    CL 98 04/15/2020     02/26/2020    CO2 27 07/01/2020    CO2 25 04/15/2020    CO2 25 02/26/2020    BUN 27 (H) 07/01/2020    BUN 23 (H) 04/15/2020    BUN 20 02/26/2020    CREATININE 1.7 (H) 07/01/2020    CREATININE 1.3 (H) 04/15/2020    CREATININE 1.2 02/26/2020    GLUCOSE 94 07/01/2020    GLUCOSE 87 04/15/2020    GLUCOSE 100 02/26/2020      Hepatic:   Lab Results   Component Value Date    AST 21 04/15/2020    AST 21 12/23/2019    AST 15 09/05/2019    ALT 14 04/15/2020    ALT 17 12/23/2019    ALT 9 (L) 09/05/2019    BILITOT 0.4 04/15/2020    BILITOT 0.9 12/23/2019    BILITOT 0.4 09/05/2019    ALKPHOS 93 04/15/2020    ALKPHOS 65 12/23/2019    ALKPHOS 65 09/05/2019     BNP: No results found for: BNP  Lipids:   Lab Results   Component Value Date    CHOL 134 09/05/2019    HDL 60 09/05/2019     INR:   Lab Results   Component Value Date    INR 2.41 (H) 01/18/2020    INR 2.12 (H) 01/17/2020    INR 2.49 (H) 01/16/2020     URINE: No results found for: NAUR, PROTUR  Lab Results   Component Value Date    NITRU NEGATIVE 01/13/2020    COLORU YELLOW 01/13/2020    PHUR 8.5 01/13/2020    WBCUA 25-50W/CLUMPS 12/23/2019    RBCUA NONE 12/23/2019    MUCUS NONE SEEN 12/23/2019    BACTERIA MODERATE 12/23/2019    SPECGRAV 1.020 12/23/2019    LEUKOCYTESUR NEGATIVE 01/13/2020    UROBILINOGEN 0.2 01/13/2020    BILIRUBINUR NEGATIVE 01/13/2020    BLOODU NEGATIVE 01/13/2020    GLUCOSEU NEGATIVE 01/13/2020    KETUA NEGATIVE 01/13/2020    AMORPHOUS NONE SEEN 12/23/2019      Microalbumen/Creatinine ratio:  No components found for: RUCREAT        Medications:    Current Outpatient Medications   Medication Sig Dispense Refill    losartan (COZAAR) 100 MG tablet 100 mg daily       digoxin (LANOXIN) 125 MCG tablet Take 1 tablet by mouth daily Additional RF per her PCP 30 tablet 0    diltiazem (CARDIZEM CD) 180 MG extended release capsule Take 1 capsule by mouth daily Additional RF per her PCP 30 capsule 0    bumetanide (BUMEX) 0.5 MG tablet Take 1 tablet by mouth every 48 hours Additional RF per her PCP (Patient taking differently: Take 0.5 mg by mouth daily Additional RF per her PCP) 15 tablet 0    potassium chloride (KLOR-CON M) 10 MEQ extended release tablet Take 10 mEq by mouth daily       Multiple Vitamin (MULTIVITAMIN) tablet Take 1 tablet by mouth daily      pantoprazole (PROTONIX) 40 MG tablet Take 1 tablet by mouth 2 times daily (before meals) Additional RF per her PCP 60 tablet 0    warfarin (COUMADIN) 2 MG tablet Take as instructed by the physician following the INR results 30 tablet 0    Calcium Carbonate (CALCIUM 600 PO) Take 1 tablet by mouth daily       vitamin D (CHOLECALCIFEROL) 1000 UNIT TABS tablet Take 1,000 Units by mouth daily      acetaminophen (TYLENOL ARTHRITIS PAIN) 650 MG extended release tablet Take 650 mg by mouth 2 times daily as needed Indications: Arthritis       rosuvastatin (CRESTOR) 5 MG tablet Take 5 mg by mouth every evening Indications: Blood Cholesterol Abnormal       levothyroxine (SYNTHROID) 50 MCG tablet Take 50 mcg by mouth daily Indications: Impaired Thyroid Function       Biotin 1000 MCG TABS Take 1,000 mcg by mouth nightly        No current facility-administered medications for this visit.             IMPRESSIONS:        Kidney

## 2020-07-06 NOTE — PATIENT INSTRUCTIONS
KNOW YOUR KIDNEY NUMBERS    Your kidney speed (eGFR) was 29 ml/min this visit (normal is  ml/min)(Ml/min=milliliters of blood filtered per minute). The higher this number is, the better your kidney function is. Your serum creatinine was 1.7 (normal 0.8-1.2 mg/dl at most labs). The higher this number is, the worse your kidney function is. You are in stage G-IV-A1 of chronic kidney disease. Your kidney function has declined as compared to your last visit. Your last eGFR was  43 Ml/Min. Stages of kidney disease    EGFR (estimated glomerular filtration rate)    G-I > 90 ml/min Kidney damage with normal kidney function (blood or protein in the urine)  G-II 60-89 ml/min Normal kidney function with mild damage with or without blood or protein in the urine  G-IIIA 45-59 ml/min Mild to moderate loss of kidney function. G-IIIB 30-44 ml/min Moderate to severe loss of kidney function  G-IV 15-29 ml/min Severe loss of kidney function  G-V < 15 mlmin     May need dialysis or kidney transplant    ACR (urine albumin/creatinine ratio) (Mg/Gm)    A-1      ACR<30 Normal to mildly increased protein in the urine. A-2 ACR  Moderate increase in urine protein loss. A-3 ACR >300 Severe increase in urine protein loss    Our goal is to keep your eGFR going as fast as possible ( ml/min is normal). If your eGFR declines to 15-24 ml/min and stays there without recovery,  we will begin to educate you about dialysis or kidney transplant. We also want to keep the protein in your urine as low as possible. The leading cause of kidney disease in the world is diabetes mellitus. Keep your sugar  as much as possible. The second leading cause is hypertension. Keep Your blood pressure 120-140/70-80 as much as possible. If you need refills, call the office or your drug store. You may call the office any time with any questions or concerns. DUE TO THE CORONAVIRUS CONCERN, PLEASE LIMIT YOUR TIME IN PUBLIC. [address] YOUR HANDS COMPLETELY AND FREQUENTLY. Roselia Wagoner

## 2020-07-13 ENCOUNTER — HOSPITAL ENCOUNTER (OUTPATIENT)
Dept: NON INVASIVE DIAGNOSTICS | Age: 80
Discharge: HOME | End: 2020-07-13
Payer: MEDICARE

## 2020-07-13 ENCOUNTER — HOSPITAL ENCOUNTER (OUTPATIENT)
Dept: ULTRASOUND IMAGING | Age: 80
Discharge: HOME | End: 2020-07-13
Payer: MEDICARE

## 2020-07-13 LAB
LV EF: 60 %
LVEF MODALITY: NORMAL

## 2020-07-13 PROCEDURE — 93225 XTRNL ECG REC<48 HRS REC: CPT

## 2020-07-13 PROCEDURE — 93226 XTRNL ECG REC<48 HR SCAN A/R: CPT

## 2020-07-13 PROCEDURE — 76770 US EXAM ABDO BACK WALL COMP: CPT

## 2020-07-13 PROCEDURE — 93306 TTE W/DOPPLER COMPLETE: CPT

## 2020-07-17 LAB
ACQUISITION DURATION: NORMAL S
AVERAGE HEART RATE: 54 BPM
FASTEST VENTRICULAR RATE: 223 BPM
HOOKUP DATE: NORMAL
HOOKUP TIME: NORMAL
LONGEST VE RUN RATE: 70 BPM
MAX HEART RATE TIME/DATE: NORMAL
MAX HEART RATE: 135 BPM
MIN HEART RATE TIME/DATE: NORMAL
MIN HEART RATE: 30 BPM
NUMBER OF FASTEST VENTRICULAR BEATS: 3
NUMBER OF LONGEST VENTRICULAR BEATS: 21
NUMBER OF QRS COMPLEXES: NORMAL
NUMBER OF SUPRAVENTRICULAR BEATS IN RUNS: 0
NUMBER OF SUPRAVENTRICULAR COUPLETS: 0
NUMBER OF SUPRAVENTRICULAR ECTOPICS: 0
NUMBER OF SUPRAVENTRICULAR ISOLATED BEATS: 0
NUMBER OF SUPRAVENTRICULAR RUNS: 0
NUMBER OF VENTRICULAR BEATS IN RUNS: 139
NUMBER OF VENTRICULAR BIGEMINAL CYCLES: 27
NUMBER OF VENTRICULAR COUPLETS: 54
NUMBER OF VENTRICULAR ECTOPICS: 935
NUMBER OF VENTRICULAR ISOLATED BEATS: 688
NUMBER OF VENTRICULAR RUNS: 27

## 2020-07-20 ENCOUNTER — TELEPHONE (OUTPATIENT)
Dept: CARDIOLOGY CLINIC | Age: 80
End: 2020-07-20

## 2020-07-23 ENCOUNTER — HOSPITAL ENCOUNTER (OUTPATIENT)
Age: 80
Discharge: HOME | End: 2020-07-23
Payer: MEDICARE

## 2020-07-23 DIAGNOSIS — N17.9 AKI (ACUTE KIDNEY INJURY) (HCC): ICD-10-CM

## 2020-07-23 DIAGNOSIS — N18.4 CKD (CHRONIC KIDNEY DISEASE), STAGE IV (HCC): ICD-10-CM

## 2020-07-23 DIAGNOSIS — D50.0 IRON DEFICIENCY ANEMIA DUE TO CHRONIC BLOOD LOSS: ICD-10-CM

## 2020-07-23 LAB
AMORPHOUS: ABNORMAL
BACTERIA: ABNORMAL
BILIRUBIN URINE: NEGATIVE
BLOOD, URINE: NEGATIVE
CASTS: ABNORMAL /LPF
CASTS: ABNORMAL /LPF
CHARACTER, URINE: CLEAR
COLOR: YELLOW
CRYSTALS: ABNORMAL
CRYSTALS: ABNORMAL
DIFFERENTIAL, MANUAL: NORMAL
EOSINOPHILS RELATIVE PERCENT: 1 % (ref 0–4)
EPITHELIAL CELLS, UA: ABNORMAL /HPF
GLUCOSE, URINE: NEGATIVE MG/DL
HCT VFR BLD CALC: 45.3 % (ref 37–47)
HEMOGLOBIN: 15.4 GM/DL (ref 12–16)
KETONES, URINE: NEGATIVE
LEUKOCYTE EST, POC: ABNORMAL
LYMPHOCYTES # BLD: 35 % (ref 15–47)
MCH RBC QN AUTO: 33.8 PG (ref 27–31)
MCHC RBC AUTO-ENTMCNC: 34.1 GM/DL (ref 33–37)
MCV RBC AUTO: 99.1 FL (ref 81–99)
MISCELLANEOUS LAB TEST RESULT: ABNORMAL
MISCELLANEOUS LAB TEST RESULT: ABNORMAL
MONOCYTES: 8 % (ref 0–12)
MUCUS: ABNORMAL
NITRITE, URINE: NEGATIVE
PDW BLD-RTO: 12.9 % (ref 11.5–14.5)
PH UA: 7 (ref 5–9)
PLATELET # BLD: 136 THOU/MM3 (ref 130–400)
PLATELET ESTIMATE: ADEQUATE
PMV BLD AUTO: 9.3 FL (ref 7.4–10.4)
PROTEIN UA: NEGATIVE MG/DL
RBC # BLD: 4.57 MILL/MM3 (ref 4.2–5.4)
RBC URINE: ABNORMAL /HPF
REASON FOR REJECTION: NORMAL
REJECTED TEST: NORMAL
RENAL EPITHELIAL, UA: ABNORMAL
SEDIMENTATION RATE, ERYTHROCYTE: 5 MM/HR (ref 0–20)
SEGS: 56 % (ref 43–75)
SPECIFIC GRAVITY UA: 1.01 (ref 1–1.03)
UROBILINOGEN, URINE: 0.2 EU/DL (ref 0–1)
WBC # BLD: 6.9 THOU/MM3 (ref 4.8–10.8)
WBC UA: ABNORMAL /HPF
YEAST: ABNORMAL

## 2020-07-23 PROCEDURE — 36415 COLL VENOUS BLD VENIPUNCTURE: CPT

## 2020-07-23 PROCEDURE — 85025 COMPLETE CBC W/AUTO DIFF WBC: CPT

## 2020-07-23 PROCEDURE — 85651 RBC SED RATE NONAUTOMATED: CPT

## 2020-07-23 PROCEDURE — 81001 URINALYSIS AUTO W/SCOPE: CPT

## 2020-07-24 ENCOUNTER — HOSPITAL ENCOUNTER (OUTPATIENT)
Age: 80
Discharge: HOME | End: 2020-07-24
Payer: MEDICARE

## 2020-07-24 LAB
ANION GAP SERPL CALCULATED.3IONS-SCNC: 15 MEQ/L (ref 8–16)
BUN BLDV-MCNC: 30 MG/DL (ref 7–22)
CALCIUM SERPL-MCNC: 10.4 MG/DL (ref 8.5–10.5)
CHLORIDE BLD-SCNC: 101 MEQ/L (ref 98–111)
CO2: 26 MEQ/L (ref 23–33)
CREAT SERPL-MCNC: 1.5 MG/DL (ref 0.4–1.2)
GFR SERPL CREATININE-BSD FRML MDRD: 33 ML/MIN/1.73M2
GLUCOSE FASTING: 104 MG/DL (ref 70–108)
IRON SATURATION: 46 % (ref 20–50)
IRON: 103 UG/DL (ref 50–170)
POTASSIUM SERPL-SCNC: 3.8 MEQ/L (ref 3.5–5.2)
SODIUM BLD-SCNC: 142 MEQ/L (ref 135–145)
TOTAL IRON BINDING CAPACITY: 226 UG/DL (ref 171–450)

## 2020-07-24 PROCEDURE — 36415 COLL VENOUS BLD VENIPUNCTURE: CPT

## 2020-07-24 PROCEDURE — 83540 ASSAY OF IRON: CPT

## 2020-07-24 PROCEDURE — 83550 IRON BINDING TEST: CPT

## 2020-07-24 PROCEDURE — 80048 BASIC METABOLIC PNL TOTAL CA: CPT

## 2020-07-27 ENCOUNTER — OFFICE VISIT (OUTPATIENT)
Dept: CARDIOLOGY CLINIC | Age: 80
End: 2020-07-27
Payer: MEDICARE

## 2020-07-27 ENCOUNTER — OFFICE VISIT (OUTPATIENT)
Dept: NEPHROLOGY | Age: 80
End: 2020-07-27
Payer: MEDICARE

## 2020-07-27 VITALS
DIASTOLIC BLOOD PRESSURE: 60 MMHG | HEART RATE: 68 BPM | SYSTOLIC BLOOD PRESSURE: 122 MMHG | WEIGHT: 90.8 LBS | BODY MASS INDEX: 17.83 KG/M2 | HEIGHT: 60 IN

## 2020-07-27 VITALS
HEIGHT: 60 IN | DIASTOLIC BLOOD PRESSURE: 73 MMHG | TEMPERATURE: 98.2 F | WEIGHT: 90 LBS | BODY MASS INDEX: 17.67 KG/M2 | RESPIRATION RATE: 18 BRPM | OXYGEN SATURATION: 99 % | SYSTOLIC BLOOD PRESSURE: 164 MMHG | HEART RATE: 62 BPM

## 2020-07-27 PROCEDURE — 1090F PRES/ABSN URINE INCON ASSESS: CPT | Performed by: NUCLEAR MEDICINE

## 2020-07-27 PROCEDURE — 4040F PNEUMOC VAC/ADMIN/RCVD: CPT | Performed by: INTERNAL MEDICINE

## 2020-07-27 PROCEDURE — 99214 OFFICE O/P EST MOD 30 MIN: CPT | Performed by: NUCLEAR MEDICINE

## 2020-07-27 PROCEDURE — 1036F TOBACCO NON-USER: CPT | Performed by: INTERNAL MEDICINE

## 2020-07-27 PROCEDURE — 99213 OFFICE O/P EST LOW 20 MIN: CPT | Performed by: INTERNAL MEDICINE

## 2020-07-27 PROCEDURE — G8427 DOCREV CUR MEDS BY ELIG CLIN: HCPCS | Performed by: INTERNAL MEDICINE

## 2020-07-27 PROCEDURE — 1090F PRES/ABSN URINE INCON ASSESS: CPT | Performed by: INTERNAL MEDICINE

## 2020-07-27 PROCEDURE — G8400 PT W/DXA NO RESULTS DOC: HCPCS | Performed by: NUCLEAR MEDICINE

## 2020-07-27 PROCEDURE — 1123F ACP DISCUSS/DSCN MKR DOCD: CPT | Performed by: INTERNAL MEDICINE

## 2020-07-27 PROCEDURE — 4040F PNEUMOC VAC/ADMIN/RCVD: CPT | Performed by: NUCLEAR MEDICINE

## 2020-07-27 PROCEDURE — G8400 PT W/DXA NO RESULTS DOC: HCPCS | Performed by: INTERNAL MEDICINE

## 2020-07-27 PROCEDURE — 1036F TOBACCO NON-USER: CPT | Performed by: NUCLEAR MEDICINE

## 2020-07-27 PROCEDURE — G8419 CALC BMI OUT NRM PARAM NOF/U: HCPCS | Performed by: NUCLEAR MEDICINE

## 2020-07-27 PROCEDURE — G8427 DOCREV CUR MEDS BY ELIG CLIN: HCPCS | Performed by: NUCLEAR MEDICINE

## 2020-07-27 PROCEDURE — 1123F ACP DISCUSS/DSCN MKR DOCD: CPT | Performed by: NUCLEAR MEDICINE

## 2020-07-27 PROCEDURE — G8419 CALC BMI OUT NRM PARAM NOF/U: HCPCS | Performed by: INTERNAL MEDICINE

## 2020-07-27 RX ORDER — PANTOPRAZOLE SODIUM 40 MG/1
40 TABLET, DELAYED RELEASE ORAL
Qty: 180 TABLET | Refills: 3 | Status: SHIPPED | OUTPATIENT
Start: 2020-07-27 | End: 2021-01-27 | Stop reason: SDUPTHER

## 2020-07-27 RX ORDER — BUMETANIDE 1 MG/1
1 TABLET ORAL DAILY
COMMUNITY
End: 2020-07-27 | Stop reason: SDUPTHER

## 2020-07-27 RX ORDER — POTASSIUM CHLORIDE 750 MG/1
10 TABLET, EXTENDED RELEASE ORAL DAILY
Qty: 90 TABLET | Refills: 3 | Status: SHIPPED | OUTPATIENT
Start: 2020-07-27 | End: 2021-07-20 | Stop reason: SDUPTHER

## 2020-07-27 RX ORDER — LOSARTAN POTASSIUM 100 MG/1
100 TABLET ORAL DAILY
Qty: 90 TABLET | Refills: 3 | Status: SHIPPED | OUTPATIENT
Start: 2020-07-27 | End: 2021-01-27 | Stop reason: SDUPTHER

## 2020-07-27 RX ORDER — DIGOXIN 125 MCG
125 TABLET ORAL DAILY
Qty: 90 TABLET | Refills: 3 | Status: SHIPPED | OUTPATIENT
Start: 2020-07-27 | End: 2021-01-27 | Stop reason: SDUPTHER

## 2020-07-27 RX ORDER — ROSUVASTATIN CALCIUM 5 MG/1
5 TABLET, COATED ORAL EVERY EVENING
Qty: 90 TABLET | Refills: 3 | Status: SHIPPED | OUTPATIENT
Start: 2020-07-27

## 2020-07-27 RX ORDER — BUMETANIDE 1 MG/1
1 TABLET ORAL DAILY
Qty: 90 TABLET | Refills: 3 | Status: SHIPPED | OUTPATIENT
Start: 2020-07-27 | End: 2022-08-08

## 2020-07-27 NOTE — PROGRESS NOTES
[facility],[address]  [address] [place] [id number]  Dept: [phone]  Dept Fax: [phone]  Loc: [phone]    Visit Date: 7/27/2020    Benton Greenfield is a 79 y.o. female who presents todayfor:  Chief Complaint   Patient presents with    Check-Up    Coronary Artery Disease    Shortness of Breath    Atrial Fibrillation   known chronic A fib   Rate control  Cath 2015   Mild CAD  Does have MR followed  Recent echo with moderate MR  Does have dyspnea  Dyspnea on exertion  No chest pain   BP seems stable  No bleeding with coumadin \  Recent echo       HPI:  HPI  Past Medical History:   Diagnosis Date    Arthritis     Atrial fibrillation ([address]     CAD (coronary artery disease)     Cancer ([address]     BREAST    Chronic kidney disease     HTN (hypertension) 4/13/2016    Hypertension     Mixed hyperlipidemia 7/6/2020    Nausea & vomiting     Neuromuscular disorder ([address]     Osteopenia     Pneumonia of both lungs due to infectious organism     Rheumatic fever     as a child    Sinus infection     Thyroid disease       Past Surgical History:   Procedure Laterality Date    BREAST LUMPECTOMY Left 2011    BRONCHOSCOPY N/A 1/5/2020    BRONCHOSCOPY performed by Lesvia Mccarthy MD at [address] [place] [address] Left 2009   [address]  2010    DILATION AND CURETTAGE OF UTERUS      X2; SEVERAL YEARS AGO    JOINT REPLACEMENT Right 2010    KNEE    TONSILLECTOMY      UPPER GASTROINTESTINAL ENDOSCOPY N/A 1/3/2020    EGD ESOPHAGOGASTRODUODENOSCOPY performed by Rio Lester MD at [facility]    UPPER GASTROINTESTINAL ENDOSCOPY N/A 1/11/2020    EGD DIAGNOSTIC ONLY performed by Rio Lester MD at [facility]     Family History   Problem Relation Age of Onset    Arthritis Mother     High Blood Pressure Mother     Cancer Sister         LEUKEMIA    Arthritis Brother     Asthma Brother     Colon Polyps Brother 58    Heart Disease Brother     Breast Cancer Maternal Aunt     Breast Cancer Daughter 46     Social History     Tobacco Use    Smoking status: Never Smoker    Smokeless tobacco: Never Used   Substance Use Topics    Alcohol use: Not Currently     Alcohol/week: 1.0 standard drinks     Types: 1 Glasses of wine per week     Comment: A  MONTH      Current Outpatient Medications   Medication Sig Dispense Refill    bumetanide (BUMEX) 1 MG tablet Take 1 mg by mouth daily      losartan (COZAAR) 100 MG tablet 100 mg daily       digoxin (LANOXIN) 125 MCG tablet Take 1 tablet by mouth daily Additional RF per her PCP 30 tablet 0    diltiazem (CARDIZEM CD) 180 MG extended release capsule Take 1 capsule by mouth daily Additional RF per her PCP 30 capsule 0    potassium chloride (KLOR-CON M) 10 MEQ extended release tablet Take 10 mEq by mouth daily       Multiple Vitamin (MULTIVITAMIN) tablet Take 1 tablet by mouth daily      warfarin (COUMADIN) 2 MG tablet Take as instructed by the physician following the INR results 30 tablet 0    Calcium Carbonate (CALCIUM 600 PO) Take 1 tablet by mouth daily       vitamin D (CHOLECALCIFEROL) 1000 UNIT TABS tablet Take 1,000 Units by mouth daily      acetaminophen (TYLENOL ARTHRITIS PAIN) 650 MG extended release tablet Take 650 mg by mouth 2 times daily as needed Indications: Arthritis       rosuvastatin (CRESTOR) 5 MG tablet Take 5 mg by mouth every evening Indications: Blood Cholesterol Abnormal       levothyroxine (SYNTHROID) 50 MCG tablet Take 50 mcg by mouth daily Indications: Impaired Thyroid Function       Biotin 1000 MCG TABS Take 1,000 mcg by mouth nightly       pantoprazole (PROTONIX) 40 MG tablet Take 1 tablet by mouth 2 times daily (before meals) Additional RF per her PCP 60 tablet 0     No current facility-administered medications for this visit.       Allergies   Allergen Reactions    Tramadol Nausea And Vomiting     Health Maintenance   Topic Date Due    DTaP/Tdap/Td vaccine (1 - Tdap) 03/25/1958    Shingles Vaccine (1 of 2) 03/25/1989    DEXA (modify frequency per FRAX score)  03/25/1994    Pneumococcal 65+ yrs at Risk Vaccine (2 of 2 - PPSV23) 04/19/2017    Annual Wellness Visit (AWV)  02/12/2020    Flu vaccine (1) 09/01/2020    Lipid screen  09/05/2020    TSH testing  02/12/2021    Potassium monitoring  07/24/2021    Creatinine monitoring  07/24/2021    Hepatitis A vaccine  Aged Out    Hepatitis B vaccine  Aged Out    Hib vaccine  Aged Out    Meningococcal (ACWY) vaccine  Aged Out       Subjective:  Review of Systems  General:   No fever, no chills, some fatigue or weight loss  Pulmonary:    some dyspnea, no wheezing  Cardiac:    Denies recent chest pain,   GI:     No nausea or vomiting, no abdominal pain  Neuro:    No dizziness or light headedness,   Musculoskeletal:  No recent active issues  Extremities:   No edema, no obvious claudication       Objective:  Physical Exam  /60   Pulse 68   Ht 5' (1.524 m)   Wt 90 lb 12.8 oz (41.2 kg)   BMI 17.73 kg/m²   General:   Well developed, well nourished  Lungs:   Clear to auscultation  Heart:    Normal S1 S2, Slight murmur. no rubs, no gallops  Abdomen:   Soft, non tender, no organomegalies, positive bowel sounds  Extremities:   No edema, no cyanosis, good peripheral pulses  Neurological:   Awake, alert, oriented. No obvious focal deficits  Musculoskelatal:  No obvious deformities    Assessment:      Diagnosis Orders   1. Permanent atrial fibrillation     2. Coronary artery disease involving native coronary artery of native heart without angina pectoris     3. Nonrheumatic mitral valve regurgitation     4. Shortness of breath     higher risk patient   Multiple issues     Plan:  No follow-ups on file. I have spent 25 minutes face to face with the patient. More than 50% of this time was spent counseling and coordinating care.     As above  Discussed options   Mitral clip  Monitor closely   Continue risk factor

## 2020-07-27 NOTE — PATIENT INSTRUCTIONS
Intensive Care Daily Note      Angélica KNOW YOUR KIDNEY NUMBERS    Your kidney speed (eGFR) was 33 ml/min this visit (normal is  ml/min)(Ml/min=milliliters of blood filtered per minute). The higher this number is, the better your kidney function is. Your serum creatinine was 1.5 (normal 0.8-1.2 mg/dl at most labs). The higher this number is, the worse your kidney function is. You are in stage G-IIIB-A1 of chronic kidney disease. Your kidney function has improved as compared to your last visit. Your last eGFR was  29 Ml/Min. Stages of kidney disease    EGFR (estimated glomerular filtration rate)    G-I > 90 ml/min Kidney damage with normal kidney function (blood or protein in the urine)  G-II 60-89 ml/min Normal kidney function with mild damage with or without blood or protein in the urine  G-IIIA 45-59 ml/min Mild to moderate loss of kidney function. G-IIIB 30-44 ml/min Moderate to severe loss of kidney function  G-IV 15-29 ml/min Severe loss of kidney function  G-V < 15 mlmin     May need dialysis or kidney transplant    ACR (urine albumin/creatinine ratio) (Mg/Gm)    A-1      ACR<30 Normal to mildly increased protein in the urine. A-2 ACR  Moderate increase in urine protein loss. A-3 ACR >300 Severe increase in urine protein loss    Our goal is to keep your eGFR going as fast as possible ( ml/min is normal). If your eGFR declines to 15-24 ml/min and stays there without recovery,  we will begin to educate you about dialysis or kidney transplant. We also want to keep the protein in your urine as low as possible. The leading cause of kidney disease in the world is diabetes mellitus. Keep your sugar  as much as possible. The second leading cause is hypertension. Keep Your blood pressure 120-140/70-80 as much as possible. If you need refills, call the office or your drug store. You may call the office any time with any questions or concerns.     DUE TO THE CORONAVIRUS CONCERN, PLEASE LIMIT YOUR TIME IN "PUBLIC. [address] YOUR HANDS COMPLETELY AND FREQUENTLY. Issa Cevallos pale pink jaundiced pink infant; Nora OLVERA, APRN- CNP, NNP 9/8/19

## 2020-07-27 NOTE — PROGRESS NOTES
[facility]    [address]  [address], [address]  [phone]       Progress Note    7/27/2020 11:27 AM    Pt Name:    Sang Corley  YOB: 1940  Primary Care Physician:  VANDANA Husain - CNP       Chief Complaint:   Chief Complaint   Patient presents with   Maria Ines Purdue        History of Chief Complaint: CKD stage G-IV-A1 from HTN and valvular heart disease. Subjective:  I last saw the patient in clinic 07/06/20. I follow the patient for Chronic Kidney disease stage G-IIV-A1. Since our last visit the patient has not been hospitalized. The patient is sleeping well at night with 1-2 times per night nocturia. The patient has a good appetite and is remaining active. The patient denied N/V/C/D/SOB/CP. She has no trouble at bladder emptying. Protein/creatinine ratio:       Last six eGFR readings:  Lab Results   Component Value Date    LABGLOM 33 07/24/2020    LABGLOM 29 07/01/2020    LABGLOM 39 04/15/2020    LABGLOM 43 02/26/2020    LABGLOM 53 02/12/2020    LABGLOM 53 01/15/2020          Objective:  VITALS:  BP (!) 164/73 (Site: Right Upper Arm, Position: Sitting, Cuff Size: Small Adult)   Pulse 62   Temp 98.2 °F (36.8 °C)   Resp 18   Ht 5' (1.524 m)   Wt 90 lb (40.8 kg)   SpO2 99%   BMI 17.58 kg/m²   Weight:   Wt Readings from Last 3 Encounters:   07/27/20 90 lb (40.8 kg)   07/06/20 95 lb (43.1 kg)   05/14/20 94 lb (42.6 kg)     Body mass index is 17.58 kg/m². Physical examination    General:  Alert and cooperative with exam  HEENT:  Normocephalic. No lesions nor rashes. PURNIMA. EOMI  Neck:   No JVD and no bruits. Thyroid gland is normal  Lungs:  Breathing easily. No rales nor rhonchi. No cough nor sputum production. Heart[de-identified]            RRR. III/VI murmur  Abdomen:  Soft and non tender. Bowel sounds are active in all four quadrants. Extremities:  1+ edema  Neurologic:  CN II-XII are intact. No deficits noted.  Muscle strength and tone are equal throughout. Skin:                Warm and dry with no rashes. Muscles:         Hand  and leg strength are equal and strong bilaterally.      Lab Data      CBC:   Lab Results   Component Value Date    WBC 6.9 07/23/2020    HGB 15.4 07/23/2020    HCT 45.3 07/23/2020    MCV 99.1 (H) 07/23/2020     07/23/2020     BMP:    Lab Results   Component Value Date     07/24/2020     07/01/2020     04/15/2020    K 3.8 07/24/2020    K 4.0 07/01/2020    K 3.9 04/15/2020     07/24/2020     07/01/2020    CL 98 04/15/2020    CO2 26 07/24/2020    CO2 27 07/01/2020    CO2 25 04/15/2020    BUN 30 (H) 07/24/2020    BUN 27 (H) 07/01/2020    BUN 23 (H) 04/15/2020    CREATININE 1.5 (H) 07/24/2020    CREATININE 1.7 (H) 07/01/2020    CREATININE 1.3 (H) 04/15/2020    GLUCOSE 94 07/01/2020    GLUCOSE 87 04/15/2020    GLUCOSE 100 02/26/2020      Hepatic:   Lab Results   Component Value Date    AST 21 04/15/2020    AST 21 12/23/2019    AST 15 09/05/2019    ALT 14 04/15/2020    ALT 17 12/23/2019    ALT 9 (L) 09/05/2019    BILITOT 0.4 04/15/2020    BILITOT 0.9 12/23/2019    BILITOT 0.4 09/05/2019    ALKPHOS 93 04/15/2020    ALKPHOS 65 12/23/2019    ALKPHOS 65 09/05/2019     BNP: No results found for: BNP  Lipids:   Lab Results   Component Value Date    CHOL 134 09/05/2019    HDL 60 09/05/2019     INR:   Lab Results   Component Value Date    INR 2.41 (H) 01/18/2020    INR 2.12 (H) 01/17/2020    INR 2.49 (H) 01/16/2020     URINE: No results found for: NAUR, PROTUR  Lab Results   Component Value Date    NITRU NEGATIVE 07/23/2020    COLORU YELLOW 07/23/2020    PHUR 7.0 07/23/2020    LABCAST NONE SEEN 07/23/2020    LABCAST NONE SEEN 07/23/2020    WBCUA 0-2 07/23/2020    RBCUA 0-2 07/23/2020    MUCUS NONE SEEN 07/23/2020    YEAST NONE SEEN 07/23/2020    BACTERIA MANY 07/23/2020    SPECGRAV 1.010 07/23/2020    LEUKOCYTESUR TRACE 07/23/2020    LEUKOCYTESUR NEGATIVE 01/13/2020    UROBILINOGEN 0.2 07/23/2020    BILIRUBINUR NEGATIVE 07/23/2020    BLOODU NEGATIVE 07/23/2020    GLUCOSEU NEGATIVE 01/13/2020    KETUA NEGATIVE 07/23/2020    AMORPHOUS NONE SEEN 07/23/2020      Microalbumen/Creatinine ratio:  No components found for: RUCREAT        Medications:    Current Outpatient Medications   Medication Sig Dispense Refill    losartan (COZAAR) 100 MG tablet 100 mg daily       digoxin (LANOXIN) 125 MCG tablet Take 1 tablet by mouth daily Additional RF per her PCP 30 tablet 0    diltiazem (CARDIZEM CD) 180 MG extended release capsule Take 1 capsule by mouth daily Additional RF per her PCP 30 capsule 0    bumetanide (BUMEX) 0.5 MG tablet Take 1 tablet by mouth every 48 hours Additional RF per her PCP (Patient taking differently: Take 0.5 mg by mouth daily Additional RF per her PCP) 15 tablet 0    potassium chloride (KLOR-CON M) 10 MEQ extended release tablet Take 10 mEq by mouth daily       Multiple Vitamin (MULTIVITAMIN) tablet Take 1 tablet by mouth daily      pantoprazole (PROTONIX) 40 MG tablet Take 1 tablet by mouth 2 times daily (before meals) Additional RF per her PCP 60 tablet 0    warfarin (COUMADIN) 2 MG tablet Take as instructed by the physician following the INR results 30 tablet 0    Calcium Carbonate (CALCIUM 600 PO) Take 1 tablet by mouth daily       vitamin D (CHOLECALCIFEROL) 1000 UNIT TABS tablet Take 1,000 Units by mouth daily      acetaminophen (TYLENOL ARTHRITIS PAIN) 650 MG extended release tablet Take 650 mg by mouth 2 times daily as needed Indications: Arthritis       rosuvastatin (CRESTOR) 5 MG tablet Take 5 mg by mouth every evening Indications: Blood Cholesterol Abnormal       levothyroxine (SYNTHROID) 50 MCG tablet Take 50 mcg by mouth daily Indications: Impaired Thyroid Function       Biotin 1000 MCG TABS Take 1,000 mcg by mouth nightly        No current facility-administered medications for this visit.             IMPRESSIONS:        Kidney disease: CKD stage G-IV-A1  Anemia: Anemia remains stable. No need for an erythrocyte stimulating agent (BRI). Bone and mineral metabolism: He has no bone pain. PLAN:  1. We discussed the eGFR today. 2. We will continue all current medications without changes. 3. We will see the patient back in 4 months.               _________________________________  Dc Tran.  Ganga Herron DO  [facility]      CC  Elizabeth Rodriguez, APRN - CNP

## 2020-08-12 RX ORDER — WARFARIN SODIUM 2 MG/1
TABLET ORAL
Qty: 30 TABLET | Refills: 0 | OUTPATIENT
Start: 2020-08-12

## 2020-08-12 NOTE — TELEPHONE ENCOUNTER
Sergio Cano called requesting a refill on the following medications:  Requested Prescriptions     Pending Prescriptions Disp Refills    warfarin (COUMADIN) 2 MG tablet 30 tablet 0     Sig: Take as instructed by the physician following the INR results     Pharmacy verified:  [email or web address]    Date of last visit: 7/27/20  Date of next visit (if applicable): 6/07/1694    Patient said she goes to the [facility] in [place] but they do not dose her Coumadin.

## 2020-08-12 NOTE — TELEPHONE ENCOUNTER
Spoke to patient. Patient will contact PCP. If PCP won't fill coumadin.  Advised patient to call our office back to be referred to coumadin clinic

## 2020-09-09 RX ORDER — DILTIAZEM HYDROCHLORIDE 180 MG/1
180 CAPSULE, COATED, EXTENDED RELEASE ORAL DAILY
Qty: 30 CAPSULE | Refills: 2 | Status: SHIPPED | OUTPATIENT
Start: 2020-09-09 | End: 2020-12-07 | Stop reason: SDUPTHER

## 2020-11-25 ENCOUNTER — HOSPITAL ENCOUNTER (OUTPATIENT)
Age: 80
Discharge: HOME | End: 2020-11-25
Payer: MEDICARE

## 2020-11-25 DIAGNOSIS — N18.30 CKD (CHRONIC KIDNEY DISEASE), STAGE III (HCC): ICD-10-CM

## 2020-11-25 LAB
ANION GAP SERPL CALCULATED.3IONS-SCNC: 12 MEQ/L (ref 8–16)
BASOPHILS # BLD: 0.3 %
BASOPHILS ABSOLUTE: 0 THOU/MM3 (ref 0–0.1)
BUN BLDV-MCNC: 26 MG/DL (ref 7–22)
CALCIUM SERPL-MCNC: 10.4 MG/DL (ref 8.5–10.5)
CHLORIDE BLD-SCNC: 103 MEQ/L (ref 98–111)
CO2: 27 MEQ/L (ref 23–33)
CREAT SERPL-MCNC: 1.3 MG/DL (ref 0.4–1.2)
EOSINOPHIL # BLD: 3.3 %
EOSINOPHILS ABSOLUTE: 0.3 THOU/MM3 (ref 0–0.4)
ERYTHROCYTE [DISTWIDTH] IN BLOOD BY AUTOMATED COUNT: 13 % (ref 11.5–14.5)
ERYTHROCYTE [DISTWIDTH] IN BLOOD BY AUTOMATED COUNT: 50.3 FL (ref 35–45)
GFR SERPL CREATININE-BSD FRML MDRD: 39 ML/MIN/1.73M2
GLUCOSE BLD-MCNC: 88 MG/DL (ref 70–108)
HCT VFR BLD CALC: 44 % (ref 37–47)
HEMOGLOBIN: 14.1 GM/DL (ref 12–16)
IMMATURE GRANS (ABS): 0.06 THOU/MM3 (ref 0–0.07)
IMMATURE GRANULOCYTES: 0.6 %
LYMPHOCYTES # BLD: 21.2 %
LYMPHOCYTES ABSOLUTE: 2.1 THOU/MM3 (ref 1–4.8)
MCH RBC QN AUTO: 33.7 PG (ref 26–33)
MCHC RBC AUTO-ENTMCNC: 32 GM/DL (ref 32.2–35.5)
MCV RBC AUTO: 105 FL (ref 81–99)
MONOCYTES # BLD: 7.8 %
MONOCYTES ABSOLUTE: 0.8 THOU/MM3 (ref 0.4–1.3)
NUCLEATED RED BLOOD CELLS: 0 /100 WBC
PLATELET # BLD: 206 THOU/MM3 (ref 130–400)
PMV BLD AUTO: 10.7 FL (ref 9.4–12.4)
POTASSIUM SERPL-SCNC: 4.2 MEQ/L (ref 3.5–5.2)
RBC # BLD: 4.19 MILL/MM3 (ref 4.2–5.4)
SEG NEUTROPHILS: 66.8 %
SEGMENTED NEUTROPHILS ABSOLUTE COUNT: 6.5 THOU/MM3 (ref 1.8–7.7)
SODIUM BLD-SCNC: 142 MEQ/L (ref 135–145)
WBC # BLD: 9.7 THOU/MM3 (ref 4.8–10.8)

## 2020-11-25 PROCEDURE — 36415 COLL VENOUS BLD VENIPUNCTURE: CPT

## 2020-11-25 PROCEDURE — 85025 COMPLETE CBC W/AUTO DIFF WBC: CPT

## 2020-11-25 PROCEDURE — 80048 BASIC METABOLIC PNL TOTAL CA: CPT

## 2020-12-02 ENCOUNTER — OFFICE VISIT (OUTPATIENT)
Dept: NEPHROLOGY | Age: 80
End: 2020-12-02
Payer: MEDICARE

## 2020-12-02 VITALS
TEMPERATURE: 98.2 F | OXYGEN SATURATION: 98 % | HEART RATE: 61 BPM | BODY MASS INDEX: 18.26 KG/M2 | WEIGHT: 93 LBS | HEIGHT: 60 IN | RESPIRATION RATE: 18 BRPM | DIASTOLIC BLOOD PRESSURE: 80 MMHG | SYSTOLIC BLOOD PRESSURE: 160 MMHG

## 2020-12-02 PROCEDURE — G8427 DOCREV CUR MEDS BY ELIG CLIN: HCPCS | Performed by: INTERNAL MEDICINE

## 2020-12-02 PROCEDURE — G8400 PT W/DXA NO RESULTS DOC: HCPCS | Performed by: INTERNAL MEDICINE

## 2020-12-02 PROCEDURE — 1123F ACP DISCUSS/DSCN MKR DOCD: CPT | Performed by: INTERNAL MEDICINE

## 2020-12-02 PROCEDURE — 4040F PNEUMOC VAC/ADMIN/RCVD: CPT | Performed by: INTERNAL MEDICINE

## 2020-12-02 PROCEDURE — 1090F PRES/ABSN URINE INCON ASSESS: CPT | Performed by: INTERNAL MEDICINE

## 2020-12-02 PROCEDURE — G8419 CALC BMI OUT NRM PARAM NOF/U: HCPCS | Performed by: INTERNAL MEDICINE

## 2020-12-02 PROCEDURE — 99214 OFFICE O/P EST MOD 30 MIN: CPT | Performed by: INTERNAL MEDICINE

## 2020-12-02 PROCEDURE — G8484 FLU IMMUNIZE NO ADMIN: HCPCS | Performed by: INTERNAL MEDICINE

## 2020-12-02 PROCEDURE — 1036F TOBACCO NON-USER: CPT | Performed by: INTERNAL MEDICINE

## 2020-12-02 NOTE — PATIENT INSTRUCTIONS

## 2020-12-02 NOTE — PROGRESS NOTES
[facility]    [address]  [address], [address]  [phone]       Progress Note    12/2/2020 3:34 PM    Pt Name:    Gabby Lombardi  YOB: 1940  Primary Care Physician:  VANDANA Ruano CNP       Chief Complaint:   Chief Complaint   Patient presents with    Chronic Kidney Disease    Hypertension    Other     blsdder outlet obstruction        History of Chief Complaint: CKD stage P-PYEQ-V1-A1 from HTN and valvular heart disease. Subjective:  I last saw the patient in clinic 07/27/2020. I follow the patient for Chronic Kidney disease stage G-IIIB-A1. Since our last visit the patient has not been hospitalized. The patient is sleeping well at night with 1-2 times per night nocturia. The patient has a good appetite and is remaining active. The patient denied N/V/C/D/SOB/CP. She has no trouble at bladder emptying. COVID-19 screening  Fever: none  Cough: none  Exposure: none  Shortness of breath: none    Protein/creatinine ratio:   eGFR: 39 Ml/min      Last six eGFR readings:  Lab Results   Component Value Date    LABGLOM 39 11/25/2020    LABGLOM 33 07/24/2020    LABGLOM 29 07/01/2020    LABGLOM 39 04/15/2020    LABGLOM 43 02/26/2020    LABGLOM 53 02/12/2020          Objective:  VITALS:  BP (!) 160/80 (Site: Right Upper Arm, Position: Sitting, Cuff Size: Small Adult)   Pulse 61   Temp 98.2 °F (36.8 °C)   Resp 18   Ht 5' (1.524 m)   Wt 93 lb (42.2 kg)   SpO2 98%   BMI 18.16 kg/m²   Weight:   Wt Readings from Last 3 Encounters:   12/02/20 93 lb (42.2 kg)   07/27/20 90 lb 12.8 oz (41.2 kg)   07/27/20 90 lb (40.8 kg)     Body mass index is 18.16 kg/m². Physical examination    General:  Alert and cooperative with exam  HEENT:  Normocephalic. No lesions nor rashes. PURNIMA. EOMI  Neck:   No JVD and no bruits. Thyroid gland is normal  Lungs:  Breathing easily. No rales nor rhonchi. No cough nor sputum production. Heart[de-identified]            RRR.  No murmurs nor rubs. PMI is not enlarged nor displaced. Abdomen:  Soft and non tender. Bowel sounds are active in all four quadrants. Extremities:  no edema  Neurologic:  CN II-XII are intact. No deficits noted. Muscle strength and tone are equal throughout. Skin:                Warm and dry with no rashes. Muscles:         Hand  and leg strength are equal and strong bilaterally.      Lab Data      CBC:   Lab Results   Component Value Date    WBC 9.7 11/25/2020    HGB 14.1 11/25/2020    HCT 44.0 11/25/2020    .0 (H) 11/25/2020     11/25/2020     BMP:    Lab Results   Component Value Date     11/25/2020     07/24/2020     07/01/2020    K 4.2 11/25/2020    K 3.8 07/24/2020    K 4.0 07/01/2020     11/25/2020     07/24/2020     07/01/2020    CO2 27 11/25/2020    CO2 26 07/24/2020    CO2 27 07/01/2020    BUN 26 (H) 11/25/2020    BUN 30 (H) 07/24/2020    BUN 27 (H) 07/01/2020    CREATININE 1.3 (H) 11/25/2020    CREATININE 1.5 (H) 07/24/2020    CREATININE 1.7 (H) 07/01/2020    GLUCOSE 88 11/25/2020    GLUCOSE 94 07/01/2020    GLUCOSE 87 04/15/2020      Hepatic:   Lab Results   Component Value Date    AST 21 04/15/2020    AST 21 12/23/2019    AST 15 09/05/2019    ALT 14 04/15/2020    ALT 17 12/23/2019    ALT 9 (L) 09/05/2019    BILITOT 0.4 04/15/2020    BILITOT 0.9 12/23/2019    BILITOT 0.4 09/05/2019    ALKPHOS 93 04/15/2020    ALKPHOS 65 12/23/2019    ALKPHOS 65 09/05/2019     BNP: No results found for: BNP  Lipids:   Lab Results   Component Value Date    CHOL 134 09/05/2019    HDL 60 09/05/2019     INR:   Lab Results   Component Value Date    INR 2.41 (H) 01/18/2020    INR 2.12 (H) 01/17/2020    INR 2.49 (H) 01/16/2020     URINE: No results found for: Cookie Morning  Lab Results   Component Value Date    NITRU NEGATIVE 07/23/2020    COLORU YELLOW 07/23/2020    PHUR 7.0 07/23/2020    LABCAST NONE SEEN 07/23/2020    LABCAST NONE SEEN 07/23/2020    WBCUA 0-2 07/23/2020 RBCUA 0-2 07/23/2020    MUCUS NONE SEEN 07/23/2020    YEAST NONE SEEN 07/23/2020    BACTERIA MANY 07/23/2020    SPECGRAV 1.010 07/23/2020    LEUKOCYTESUR TRACE 07/23/2020    LEUKOCYTESUR NEGATIVE 01/13/2020    UROBILINOGEN 0.2 07/23/2020    BILIRUBINUR NEGATIVE 07/23/2020    BLOODU NEGATIVE 07/23/2020    GLUCOSEU NEGATIVE 01/13/2020    KETUA NEGATIVE 07/23/2020    AMORPHOUS NONE SEEN 07/23/2020      Microalbumen/Creatinine ratio:  No components found for: RUCREAT        Medications:    Current Outpatient Medications   Medication Sig Dispense Refill    dilTIAZem (CARDIZEM CD) 180 MG extended release capsule Take 1 capsule by mouth daily Additional RF per her PCP 30 capsule 2    bumetanide (BUMEX) 1 MG tablet Take 1 tablet by mouth daily 90 tablet 3    digoxin (LANOXIN) 125 MCG tablet Take 1 tablet by mouth daily Additional RF per her PCP 90 tablet 3    losartan (COZAAR) 100 MG tablet Take 1 tablet by mouth daily 90 tablet 3    pantoprazole (PROTONIX) 40 MG tablet Take 1 tablet by mouth 2 times daily (before meals) Additional RF per her  tablet 3    potassium chloride (KLOR-CON M) 10 MEQ extended release tablet Take 1 tablet by mouth daily 90 tablet 3    rosuvastatin (CRESTOR) 5 MG tablet Take 1 tablet by mouth every evening Indications: Blood Cholesterol Abnormal 90 tablet 3    Multiple Vitamin (MULTIVITAMIN) tablet Take 1 tablet by mouth daily      warfarin (COUMADIN) 2 MG tablet Take as instructed by the physician following the INR results 30 tablet 0    Calcium Carbonate (CALCIUM 600 PO) Take 1 tablet by mouth daily       vitamin D (CHOLECALCIFEROL) 1000 UNIT TABS tablet Take 1,000 Units by mouth daily      acetaminophen (TYLENOL ARTHRITIS PAIN) 650 MG extended release tablet Take 650 mg by mouth 2 times daily as needed Indications: Arthritis       levothyroxine (SYNTHROID) 50 MCG tablet Take 50 mcg by mouth daily Indications: Impaired Thyroid Function       Biotin 1000 MCG TABS Take 1,000 mcg by mouth nightly        No current facility-administered medications for this visit. IMPRESSIONS:      Kidney disease: CKD stage G-IIIB-A1  Anemia: Anemia remains stable. No need for an erythrocyte stimulating agent (BRI). Bone and mineral metabolism: There is no complaint of bone pain. PLAN:  1. We discussed the eGFR today. 2. We will continue all current medications without changes. 3. We will see the patient back in 6 months.       _________________________________  Richard Yousif.  Antoinette Yates, DO  [facility]      CC  Blake Bearden, APRN - CNP

## 2020-12-07 RX ORDER — DILTIAZEM HYDROCHLORIDE 180 MG/1
180 CAPSULE, COATED, EXTENDED RELEASE ORAL DAILY
Qty: 30 CAPSULE | Refills: 2 | Status: SHIPPED | OUTPATIENT
Start: 2020-12-07 | End: 2021-01-27 | Stop reason: SDUPTHER

## 2020-12-07 NOTE — TELEPHONE ENCOUNTER
Radu Greer called requesting a refill on the following medications:  Requested Prescriptions     Pending Prescriptions Disp Refills    dilTIAZem (CARDIZEM CD) 180 MG extended release capsule 30 capsule 2     Sig: Take 1 capsule by mouth daily Additional RF per her PCP     Pharmacy verified:  [email or web address]    90 DAY SUPPLY    Date of last visit: 07/27/20  Date of next visit (if applicable): 4/02/3520

## 2021-01-27 ENCOUNTER — OFFICE VISIT (OUTPATIENT)
Dept: CARDIOLOGY CLINIC | Age: 81
End: 2021-01-27
Payer: MEDICARE

## 2021-01-27 VITALS
SYSTOLIC BLOOD PRESSURE: 122 MMHG | WEIGHT: 91.8 LBS | HEIGHT: 56 IN | DIASTOLIC BLOOD PRESSURE: 62 MMHG | HEART RATE: 88 BPM | BODY MASS INDEX: 20.65 KG/M2

## 2021-01-27 DIAGNOSIS — R06.02 SHORTNESS OF BREATH: ICD-10-CM

## 2021-01-27 DIAGNOSIS — I25.10 CORONARY ARTERY DISEASE INVOLVING NATIVE CORONARY ARTERY OF NATIVE HEART WITHOUT ANGINA PECTORIS: ICD-10-CM

## 2021-01-27 DIAGNOSIS — I48.21 PERMANENT ATRIAL FIBRILLATION (HCC): Primary | ICD-10-CM

## 2021-01-27 PROCEDURE — 1123F ACP DISCUSS/DSCN MKR DOCD: CPT | Performed by: NUCLEAR MEDICINE

## 2021-01-27 PROCEDURE — G8400 PT W/DXA NO RESULTS DOC: HCPCS | Performed by: NUCLEAR MEDICINE

## 2021-01-27 PROCEDURE — 93000 ELECTROCARDIOGRAM COMPLETE: CPT | Performed by: NUCLEAR MEDICINE

## 2021-01-27 PROCEDURE — 1090F PRES/ABSN URINE INCON ASSESS: CPT | Performed by: NUCLEAR MEDICINE

## 2021-01-27 PROCEDURE — G8420 CALC BMI NORM PARAMETERS: HCPCS | Performed by: NUCLEAR MEDICINE

## 2021-01-27 PROCEDURE — G8427 DOCREV CUR MEDS BY ELIG CLIN: HCPCS | Performed by: NUCLEAR MEDICINE

## 2021-01-27 PROCEDURE — 99213 OFFICE O/P EST LOW 20 MIN: CPT | Performed by: NUCLEAR MEDICINE

## 2021-01-27 PROCEDURE — 4040F PNEUMOC VAC/ADMIN/RCVD: CPT | Performed by: NUCLEAR MEDICINE

## 2021-01-27 PROCEDURE — 1036F TOBACCO NON-USER: CPT | Performed by: NUCLEAR MEDICINE

## 2021-01-27 PROCEDURE — G8484 FLU IMMUNIZE NO ADMIN: HCPCS | Performed by: NUCLEAR MEDICINE

## 2021-01-27 RX ORDER — PANTOPRAZOLE SODIUM 40 MG/1
40 TABLET, DELAYED RELEASE ORAL
Qty: 180 TABLET | Refills: 3 | Status: SHIPPED | OUTPATIENT
Start: 2021-01-27 | End: 2022-03-04

## 2021-01-27 RX ORDER — DILTIAZEM HYDROCHLORIDE 180 MG/1
180 CAPSULE, COATED, EXTENDED RELEASE ORAL DAILY
Qty: 90 CAPSULE | Refills: 3 | Status: SHIPPED | OUTPATIENT
Start: 2021-01-27 | End: 2022-02-02 | Stop reason: SDUPTHER

## 2021-01-27 RX ORDER — LOSARTAN POTASSIUM 100 MG/1
100 TABLET ORAL DAILY
Qty: 90 TABLET | Refills: 3 | Status: SHIPPED | OUTPATIENT
Start: 2021-01-27 | End: 2022-02-02 | Stop reason: SDUPTHER

## 2021-01-27 RX ORDER — DIGOXIN 125 MCG
125 TABLET ORAL DAILY
Qty: 90 TABLET | Refills: 3 | Status: SHIPPED | OUTPATIENT
Start: 2021-01-27 | End: 2022-02-02 | Stop reason: SDUPTHER

## 2021-01-27 NOTE — PROGRESS NOTES
[address]  [facility] [address]  [address]  [facility] [address]  Dept: [phone]  Dept Fax: [phone]  Loc: [phone]    Visit Date: 1/27/2021    Kaci Enrique is a 79 y.o. female who presents todayfor:  Chief Complaint   Patient presents with    6 Month Follow-Up    Atrial Fibrillation    Check-Up    Coronary Artery Disease   know A fib   No chest pain   No changes in breathing  Active for age  Cath before in 2019   Mild CAD  Does have Mr and AI  Normal EF   Bp is stable   No dizziness  No syncope      HPI:  HPI  Past Medical History:   Diagnosis Date    Arthritis     Atrial fibrillation ([address]     CAD (coronary artery disease)     Cancer ([facility] [address]     BREAST    Chronic kidney disease     HTN (hypertension) 4/13/2016    Hypertension     Mixed hyperlipidemia 7/6/2020    Nausea & vomiting     Neuromuscular disorder ([address]     Osteopenia     Pneumonia of both lungs due to infectious organism     Rheumatic fever     as a child    Sinus infection     Thyroid disease       Past Surgical History:   Procedure Laterality Date    BREAST LUMPECTOMY Left 2011    BRONCHOSCOPY N/A 1/5/2020    BRONCHOSCOPY performed by Paz Lozoya MD at [address] [place] [address] Left 2009    CARDIAC CATHETERIZATION  2010    DILATION AND CURETTAGE OF UTERUS      X2; SEVERAL YEARS AGO    JOINT REPLACEMENT Right 2010    KNEE    TONSILLECTOMY      UPPER GASTROINTESTINAL ENDOSCOPY N/A 1/3/2020    EGD ESOPHAGOGASTRODUODENOSCOPY performed by Luz Thompson MD at [facility]    UPPER GASTROINTESTINAL ENDOSCOPY N/A 1/11/2020    EGD DIAGNOSTIC ONLY performed by Luz Thompson MD at [facility]     Family History   Problem Relation Age of Onset    Arthritis Mother     High Blood Pressure Mother     Cancer Sister         LEUKEMIA    Arthritis Brother     Asthma Brother     Colon Polyps Brother 58    Heart Disease Brother     Breast Cancer Maternal Aunt COVID-19 Vaccine (1 of 2) 03/25/1955    DTaP/Tdap/Td vaccine (1 - Tdap) 03/25/1958    Shingles Vaccine (1 of 2) 03/25/1989    DEXA (modify frequency per FRAX score)  03/25/1994    Pneumococcal 65+ yrs at Risk Vaccine (2 of 2 - PPSV23) 04/19/2017    Annual Wellness Visit (AWV)  02/12/2020    Lipid screen  09/05/2020    TSH testing  02/12/2021    Potassium monitoring  11/25/2021    Creatinine monitoring  11/25/2021    Flu vaccine  Completed    Hepatitis A vaccine  Aged Out    Hepatitis B vaccine  Aged Out    Hib vaccine  Aged Out    Meningococcal (ACWY) vaccine  Aged Out       Subjective:  Review of Systems  General:   No fever, no chills, No fatigue or weight loss  Pulmonary:    No dyspnea, no wheezing  Cardiac:    Denies recent chest pain,   GI:     No nausea or vomiting, no abdominal pain  Neuro:    No dizziness or light headedness,   Musculoskeletal:  No recent active issues  Extremities:   No edema, no obvious claudication       Objective:  Physical Exam  /62   Pulse 88   Ht 4' 8\" (1.422 m)   Wt 91 lb 12.8 oz (41.6 kg)   BMI 20.58 kg/m²   General:   Well developed, well nourished  Lungs:   Clear to auscultation  Heart:    Normal S1 S2, Slight murmur. no rubs, no gallops  Abdomen:   Soft, non tender, no organomegalies, positive bowel sounds  Extremities:   No edema, no cyanosis, good peripheral pulses  Neurological:   Awake, alert, oriented. No obvious focal deficits  Musculoskelatal:  No obvious deformities    Assessment:      Diagnosis Orders   1. Permanent atrial fibrillation (HCC)  EKG 12 Lead   2. Coronary artery disease involving native coronary artery of native heart without angina pectoris  EKG 12 Lead   3. Shortness of breath  EKG 12 Lead   as above  Cardiac fair for now   ECG in office was done today. I reviewed the ECG. No acute findings      Plan:  No follow-ups on file.   As above  Continue risk factor modification and medical management  Thank you for allowing me to participate in the care of your patient. Please don't hesitate to contact me regarding any further issues related to the patient care    Orders Placed:  Orders Placed This Encounter   Procedures    EKG 12 Lead     Order Specific Question:   Reason for Exam?     Answer: Other       Medications Prescribed:  No orders of the defined types were placed in this encounter. Discussed use, benefit, and side effects of prescribed medications. All patient questions answered. Pt voicedunderstanding. Instructed to continue current medications, diet and exercise. Continue risk factor modification and medical management. Patient agreed with treatment plan. Follow up as directed.     Electronically signedby Fred Eaton MD on 1/27/2021 at 12:25 PM

## 2021-04-14 ENCOUNTER — HOSPITAL ENCOUNTER (OUTPATIENT)
Dept: WOMENS IMAGING | Age: 81
Discharge: HOME | End: 2021-04-14
Payer: MEDICARE

## 2021-04-14 ENCOUNTER — HOSPITAL ENCOUNTER (OUTPATIENT)
Age: 81
Discharge: HOME | End: 2021-04-14
Payer: MEDICARE

## 2021-04-14 DIAGNOSIS — Z12.31 ENCOUNTER FOR SCREENING MAMMOGRAM FOR BREAST CANCER: ICD-10-CM

## 2021-04-14 LAB
ALBUMIN SERPL-MCNC: 4.1 G/DL (ref 3.5–5.1)
ALP BLD-CCNC: 86 U/L (ref 38–126)
ALT SERPL-CCNC: 14 U/L (ref 11–66)
ANION GAP SERPL CALCULATED.3IONS-SCNC: 12 MEQ/L (ref 8–16)
AST SERPL-CCNC: 17 U/L (ref 5–40)
BASOPHILS # BLD: 0.7 %
BASOPHILS ABSOLUTE: 0 THOU/MM3 (ref 0–0.1)
BILIRUB SERPL-MCNC: 0.6 MG/DL (ref 0.3–1.2)
BUN BLDV-MCNC: 24 MG/DL (ref 7–22)
CALCIUM SERPL-MCNC: 10.4 MG/DL (ref 8.5–10.5)
CHLORIDE BLD-SCNC: 105 MEQ/L (ref 98–111)
CO2: 26 MEQ/L (ref 23–33)
CREAT SERPL-MCNC: 1.6 MG/DL (ref 0.4–1.2)
EOSINOPHIL # BLD: 6.9 %
EOSINOPHILS ABSOLUTE: 0.4 THOU/MM3 (ref 0–0.4)
ERYTHROCYTE [DISTWIDTH] IN BLOOD BY AUTOMATED COUNT: 14.3 % (ref 11.5–14.5)
ERYTHROCYTE [DISTWIDTH] IN BLOOD BY AUTOMATED COUNT: 55.4 FL (ref 35–45)
GFR SERPL CREATININE-BSD FRML MDRD: 31 ML/MIN/1.73M2
GLUCOSE BLD-MCNC: 82 MG/DL (ref 70–108)
HCT VFR BLD CALC: 41.6 % (ref 37–47)
HEMOGLOBIN: 13.1 GM/DL (ref 12–16)
IMMATURE GRANS (ABS): 0.05 THOU/MM3 (ref 0–0.07)
IMMATURE GRANULOCYTES: 0.9 %
LYMPHOCYTES # BLD: 30.3 %
LYMPHOCYTES ABSOLUTE: 1.7 THOU/MM3 (ref 1–4.8)
MCH RBC QN AUTO: 33.1 PG (ref 26–33)
MCHC RBC AUTO-ENTMCNC: 31.5 GM/DL (ref 32.2–35.5)
MCV RBC AUTO: 105.1 FL (ref 81–99)
MONOCYTES # BLD: 11.4 %
MONOCYTES ABSOLUTE: 0.6 THOU/MM3 (ref 0.4–1.3)
NUCLEATED RED BLOOD CELLS: 0 /100 WBC
PLATELET # BLD: 237 THOU/MM3 (ref 130–400)
PMV BLD AUTO: 10 FL (ref 9.4–12.4)
POTASSIUM SERPL-SCNC: 4.1 MEQ/L (ref 3.5–5.2)
RBC # BLD: 3.96 MILL/MM3 (ref 4.2–5.4)
SEG NEUTROPHILS: 49.8 %
SEGMENTED NEUTROPHILS ABSOLUTE COUNT: 2.7 THOU/MM3 (ref 1.8–7.7)
SODIUM BLD-SCNC: 143 MEQ/L (ref 135–145)
TOTAL PROTEIN: 6.5 G/DL (ref 6.1–8)
WBC # BLD: 5.5 THOU/MM3 (ref 4.8–10.8)

## 2021-04-14 PROCEDURE — 36415 COLL VENOUS BLD VENIPUNCTURE: CPT

## 2021-04-14 PROCEDURE — 77063 BREAST TOMOSYNTHESIS BI: CPT

## 2021-04-14 PROCEDURE — 85025 COMPLETE CBC W/AUTO DIFF WBC: CPT

## 2021-04-14 PROCEDURE — 86300 IMMUNOASSAY TUMOR CA 15-3: CPT

## 2021-04-14 PROCEDURE — 80053 COMPREHEN METABOLIC PANEL: CPT

## 2021-04-16 LAB — CA 27-29: 25 U/ML (ref 0–38)

## 2021-05-28 ENCOUNTER — HOSPITAL ENCOUNTER (OUTPATIENT)
Age: 81
Discharge: HOME | End: 2021-05-28
Payer: MEDICARE

## 2021-05-28 DIAGNOSIS — N18.32 STAGE 3B CHRONIC KIDNEY DISEASE (HCC): ICD-10-CM

## 2021-05-28 LAB
ANION GAP SERPL CALCULATED.3IONS-SCNC: 13 MEQ/L (ref 8–16)
BASOPHILS # BLD: 0.3 %
BASOPHILS ABSOLUTE: 0 THOU/MM3 (ref 0–0.1)
BUN BLDV-MCNC: 32 MG/DL (ref 7–22)
CALCIUM SERPL-MCNC: 10.6 MG/DL (ref 8.5–10.5)
CHLORIDE BLD-SCNC: 106 MEQ/L (ref 98–111)
CO2: 24 MEQ/L (ref 23–33)
CREAT SERPL-MCNC: 1.6 MG/DL (ref 0.4–1.2)
EOSINOPHIL # BLD: 1.9 %
EOSINOPHILS ABSOLUTE: 0.1 THOU/MM3 (ref 0–0.4)
ERYTHROCYTE [DISTWIDTH] IN BLOOD BY AUTOMATED COUNT: 14.2 % (ref 11.5–14.5)
ERYTHROCYTE [DISTWIDTH] IN BLOOD BY AUTOMATED COUNT: 56.5 FL (ref 35–45)
GFR SERPL CREATININE-BSD FRML MDRD: 31 ML/MIN/1.73M2
GLUCOSE BLD-MCNC: 82 MG/DL (ref 70–108)
HCT VFR BLD CALC: 42.1 % (ref 37–47)
HEMOGLOBIN: 13.4 GM/DL (ref 12–16)
IMMATURE GRANS (ABS): 0.08 THOU/MM3 (ref 0–0.07)
IMMATURE GRANULOCYTES: 1.2 %
LYMPHOCYTES # BLD: 21.3 %
LYMPHOCYTES ABSOLUTE: 1.4 THOU/MM3 (ref 1–4.8)
MCH RBC QN AUTO: 34.1 PG (ref 26–33)
MCHC RBC AUTO-ENTMCNC: 31.8 GM/DL (ref 32.2–35.5)
MCV RBC AUTO: 107.1 FL (ref 81–99)
MONOCYTES # BLD: 9 %
MONOCYTES ABSOLUTE: 0.6 THOU/MM3 (ref 0.4–1.3)
NUCLEATED RED BLOOD CELLS: 0 /100 WBC
PLATELET # BLD: 220 THOU/MM3 (ref 130–400)
PMV BLD AUTO: 10.3 FL (ref 9.4–12.4)
POTASSIUM SERPL-SCNC: 4.1 MEQ/L (ref 3.5–5.2)
RBC # BLD: 3.93 MILL/MM3 (ref 4.2–5.4)
SEG NEUTROPHILS: 66.3 %
SEGMENTED NEUTROPHILS ABSOLUTE COUNT: 4.3 THOU/MM3 (ref 1.8–7.7)
SODIUM BLD-SCNC: 143 MEQ/L (ref 135–145)
WBC # BLD: 6.5 THOU/MM3 (ref 4.8–10.8)

## 2021-05-28 PROCEDURE — 36415 COLL VENOUS BLD VENIPUNCTURE: CPT

## 2021-05-28 PROCEDURE — 80048 BASIC METABOLIC PNL TOTAL CA: CPT

## 2021-05-28 PROCEDURE — 85025 COMPLETE CBC W/AUTO DIFF WBC: CPT

## 2021-06-02 ENCOUNTER — OFFICE VISIT (OUTPATIENT)
Dept: NEPHROLOGY | Age: 81
End: 2021-06-02
Payer: MEDICARE

## 2021-06-02 VITALS
RESPIRATION RATE: 18 BRPM | TEMPERATURE: 97.9 F | HEART RATE: 63 BPM | OXYGEN SATURATION: 98 % | WEIGHT: 95 LBS | HEIGHT: 56 IN | BODY MASS INDEX: 21.37 KG/M2 | DIASTOLIC BLOOD PRESSURE: 58 MMHG | SYSTOLIC BLOOD PRESSURE: 135 MMHG

## 2021-06-02 DIAGNOSIS — N18.32 STAGE 3B CHRONIC KIDNEY DISEASE (HCC): Primary | ICD-10-CM

## 2021-06-02 PROCEDURE — 4040F PNEUMOC VAC/ADMIN/RCVD: CPT | Performed by: INTERNAL MEDICINE

## 2021-06-02 PROCEDURE — 1090F PRES/ABSN URINE INCON ASSESS: CPT | Performed by: INTERNAL MEDICINE

## 2021-06-02 PROCEDURE — G8420 CALC BMI NORM PARAMETERS: HCPCS | Performed by: INTERNAL MEDICINE

## 2021-06-02 PROCEDURE — G8427 DOCREV CUR MEDS BY ELIG CLIN: HCPCS | Performed by: INTERNAL MEDICINE

## 2021-06-02 PROCEDURE — 1123F ACP DISCUSS/DSCN MKR DOCD: CPT | Performed by: INTERNAL MEDICINE

## 2021-06-02 PROCEDURE — 1036F TOBACCO NON-USER: CPT | Performed by: INTERNAL MEDICINE

## 2021-06-02 PROCEDURE — G8400 PT W/DXA NO RESULTS DOC: HCPCS | Performed by: INTERNAL MEDICINE

## 2021-06-02 PROCEDURE — 99214 OFFICE O/P EST MOD 30 MIN: CPT | Performed by: INTERNAL MEDICINE

## 2021-06-02 NOTE — PATIENT INSTRUCTIONS
KNOW YOUR KIDNEY NUMBERS    Your kidney speed (eGFR) was 31 ml/min this visit (normal is  ml/min)(Ml/min=milliliters of blood filtered per minute). The higher this number is, the better your kidney function is. Your serum creatinine was 1.6 (normal 0.8-1.2 mg/dl at most labs). The higher this number is, the worse your kidney function is. You are in stage G-IIIB-A1 of chronic kidney disease. Your kidney function has declined as compared to your last visit. Your last eGFR was  39 Ml/Min. Stages of kidney disease    EGFR (estimated glomerular filtration rate)    G-I > 90 ml/min Kidney damage with normal kidney function (blood or protein in the urine)  G-II 60-89 ml/min Normal kidney function with mild damage with or without blood or protein in the urine  G-IIIA 45-59 ml/min Mild to moderate loss of kidney function. G-IIIB 30-44 ml/min Moderate to severe loss of kidney function  G-IV 15-29 ml/min Severe loss of kidney function  G-V < 15 mlmin     May need dialysis or kidney transplant    ACR (urine albumin/creatinine ratio) (Mg/Gm)    A-1      ACR<30 Normal to mildly increased protein in the urine. A-2 ACR  Moderate increase in urine protein loss. A-3 ACR >300 Severe increase in urine protein loss    Our goal is to keep your eGFR going as fast as possible ( ml/min is normal). If your eGFR declines to 15-24 ml/min and stays there without recovery,  we will begin to educate you about dialysis or kidney transplant. We also want to keep the protein in your urine as low as possible. The leading cause of kidney disease in the world is diabetes mellitus. Keep your sugar  as much as possible. The second leading cause is hypertension. Keep Your blood pressure 120-140/70-80 as much as possible. If you need refills, call the office or your drug store. You may call the office any time with any questions or concerns.     DUE TO THE CORONAVIRUS CONCERN, PLEASE LIMIT YOUR TIME IN

## 2021-06-02 NOTE — PROGRESS NOTES
[facility]    [address]  [address], [address]  [phone]       Progress Note    6/2/2021 3:22 PM    Pt Name:    Karina Fernandez  YOB: 1940  Primary Care Physician:  VANDANA Fernandez CNP       Chief Complaint:   Chief Complaint   Patient presents with    Chronic Kidney Disease    Hypertension    Other     valvular heart disease    Atrial Fibrillation        History of Chief Complaint: CKD stage G-CMKZ-R8-A1 from HTN and valvular heart disease. Subjective:  I last saw the patient in clinic 12/02/2020. I follow the patient for Chronic Kidney disease stage G-IIIB-A1. Since our last visit the patient has not been hospitalized. The patient is sleeping fairly well at night with 1-2 times per night nocturia. The patient has a good appetite and is remaining active. The patient denied N/V/C/D/SOB/CP. She has no trouble at bladder emptying. She has been getting injections in her back for arthritis at [facility] by Dr. Gurjit Sarmiento    COVID-19 screening  Fever: none  Cough: none  Exposure: none  Shortness of breath: none    Protein/creatinine ratio:  eGFR: 31 Ml/min (it was 39 Ml/Min last visit)  SCr: 1.6 Mg/Dl (It was 1.4 Mg/Dl last visit)      Last six eGFR readings:  Lab Results   Component Value Date    LABGLOM 31 05/28/2021    LABGLOM 31 04/14/2021    LABGLOM 39 11/25/2020    LABGLOM 33 07/24/2020    LABGLOM 29 07/01/2020    LABGLOM 39 04/15/2020          Objective:  VITALS:  BP (!) 135/58 (Site: Right Upper Arm, Position: Sitting, Cuff Size: Small Adult)   Pulse 63   Temp 97.9 °F (36.6 °C)   Resp 18   Ht 4' 8\" (1.422 m)   Wt 95 lb (43.1 kg)   SpO2 98%   BMI 21.30 kg/m²   Weight:   Wt Readings from Last 3 Encounters:   06/02/21 95 lb (43.1 kg)   01/27/21 91 lb 12.8 oz (41.6 kg)   12/02/20 93 lb (42.2 kg)     Body mass index is 21.3 kg/m². Physical examination     General:  Alert and cooperative with exam  HEENT:  Normocephalic.  No Date    NITRU NEGATIVE 07/23/2020    COLORU YELLOW 07/23/2020    PHUR 7.0 07/23/2020    LABCAST NONE SEEN 07/23/2020    LABCAST NONE SEEN 07/23/2020    WBCUA 0-2 07/23/2020    RBCUA 0-2 07/23/2020    MUCUS NONE SEEN 07/23/2020    YEAST NONE SEEN 07/23/2020    BACTERIA MANY 07/23/2020    SPECGRAV 1.010 07/23/2020    LEUKOCYTESUR TRACE 07/23/2020    LEUKOCYTESUR NEGATIVE 01/13/2020    UROBILINOGEN 0.2 07/23/2020    BILIRUBINUR NEGATIVE 07/23/2020    BLOODU NEGATIVE 07/23/2020    GLUCOSEU NEGATIVE 01/13/2020    KETUA NEGATIVE 07/23/2020    AMORPHOUS NONE SEEN 07/23/2020      Microalbumen/Creatinine ratio:  No components found for: RUCREAT        Medications:    Current Outpatient Medications   Medication Sig Dispense Refill    losartan (COZAAR) 100 MG tablet Take 1 tablet by mouth daily 90 tablet 3    dilTIAZem (CARDIZEM CD) 180 MG extended release capsule Take 1 capsule by mouth daily Additional RF per her PCP 90 capsule 3    digoxin (LANOXIN) 125 MCG tablet Take 1 tablet by mouth daily Additional RF per her PCP 90 tablet 3    pantoprazole (PROTONIX) 40 MG tablet Take 1 tablet by mouth 2 times daily (before meals) Additional RF per her  tablet 3    bumetanide (BUMEX) 1 MG tablet Take 1 tablet by mouth daily 90 tablet 3    potassium chloride (KLOR-CON M) 10 MEQ extended release tablet Take 1 tablet by mouth daily 90 tablet 3    rosuvastatin (CRESTOR) 5 MG tablet Take 1 tablet by mouth every evening Indications: Blood Cholesterol Abnormal 90 tablet 3    Multiple Vitamin (MULTIVITAMIN) tablet Take 1 tablet by mouth daily      warfarin (COUMADIN) 2 MG tablet Take as instructed by the physician following the INR results 30 tablet 0    Calcium Carbonate (CALCIUM 600 PO) Take 1 tablet by mouth daily       vitamin D (CHOLECALCIFEROL) 1000 UNIT TABS tablet Take 1,000 Units by mouth daily      acetaminophen (TYLENOL ARTHRITIS PAIN) 650 MG extended release tablet Take 650 mg by mouth 2 times daily as needed Indications: Arthritis       levothyroxine (SYNTHROID) 50 MCG tablet Take 50 mcg by mouth daily Indications: Impaired Thyroid Function       Biotin 1000 MCG TABS Take 1,000 mcg by mouth nightly        No current facility-administered medications for this visit. IMPRESSIONS:        Kidney disease: CKD stage G-IIIB-A1  Anemia: Anemia remains stable. No need for an erythrocyte stimulating agent (BRI). Bone and mineral metabolism: There is no complaint of bone pain. PLAN:  1. We discussed the eGFR today. 2. We will continue all current medications without changes. 3. checking serum albumin level  4. Checking urine microalbumin/creatinine ratio. 5. We will see the patient back in 1 months.       _________________________________  Angel Hernadez.  Junior Oumar DO  [facility]      CC  Berenice Garnett, APRN - CNP

## 2021-06-03 ENCOUNTER — TELEPHONE (OUTPATIENT)
Dept: NEPHROLOGY | Age: 81
End: 2021-06-03

## 2021-06-03 NOTE — TELEPHONE ENCOUNTER
Soledad from Dr. Mccloud McCormick office called today from [facility]. She said they did do a procedure today and she will send the note. Dr. Noel Whitaker is aware of the kidneys declining.

## 2021-06-23 ENCOUNTER — HOSPITAL ENCOUNTER (OUTPATIENT)
Age: 81
Discharge: HOME | End: 2021-06-23
Payer: MEDICARE

## 2021-06-23 DIAGNOSIS — N18.32 STAGE 3B CHRONIC KIDNEY DISEASE (HCC): ICD-10-CM

## 2021-06-23 LAB
ALBUMIN SERPL-MCNC: 4.1 G/DL (ref 3.5–5.1)
ALP BLD-CCNC: 72 U/L (ref 38–126)
ALT SERPL-CCNC: 17 U/L (ref 11–66)
ANION GAP SERPL CALCULATED.3IONS-SCNC: 15 MEQ/L (ref 8–16)
AST SERPL-CCNC: 27 U/L (ref 5–40)
BASOPHILS # BLD: 0.7 %
BASOPHILS ABSOLUTE: 0 THOU/MM3 (ref 0–0.1)
BILIRUB SERPL-MCNC: 0.7 MG/DL (ref 0.3–1.2)
BUN BLDV-MCNC: 32 MG/DL (ref 7–22)
CALCIUM SERPL-MCNC: 10.5 MG/DL (ref 8.5–10.5)
CHLORIDE BLD-SCNC: 99 MEQ/L (ref 98–111)
CO2: 24 MEQ/L (ref 23–33)
CREAT SERPL-MCNC: 1.8 MG/DL (ref 0.4–1.2)
EOSINOPHIL # BLD: 1.7 %
EOSINOPHILS ABSOLUTE: 0.1 THOU/MM3 (ref 0–0.4)
ERYTHROCYTE [DISTWIDTH] IN BLOOD BY AUTOMATED COUNT: 14 % (ref 11.5–14.5)
ERYTHROCYTE [DISTWIDTH] IN BLOOD BY AUTOMATED COUNT: 56.8 FL (ref 35–45)
GFR SERPL CREATININE-BSD FRML MDRD: 27 ML/MIN/1.73M2
GLUCOSE BLD-MCNC: 78 MG/DL (ref 70–108)
HCT VFR BLD CALC: 43.9 % (ref 37–47)
HEMOGLOBIN: 13.7 GM/DL (ref 12–16)
IMMATURE GRANS (ABS): 0.03 THOU/MM3 (ref 0–0.07)
IMMATURE GRANULOCYTES: 0.4 %
LYMPHOCYTES # BLD: 29.8 %
LYMPHOCYTES ABSOLUTE: 2.1 THOU/MM3 (ref 1–4.8)
MCH RBC QN AUTO: 34 PG (ref 26–33)
MCHC RBC AUTO-ENTMCNC: 31.2 GM/DL (ref 32.2–35.5)
MCV RBC AUTO: 108.9 FL (ref 81–99)
MONOCYTES # BLD: 9.6 %
MONOCYTES ABSOLUTE: 0.7 THOU/MM3 (ref 0.4–1.3)
NUCLEATED RED BLOOD CELLS: 0 /100 WBC
PLATELET # BLD: 201 THOU/MM3 (ref 130–400)
PMV BLD AUTO: 10.6 FL (ref 9.4–12.4)
POTASSIUM SERPL-SCNC: 4.2 MEQ/L (ref 3.5–5.2)
RBC # BLD: 4.03 MILL/MM3 (ref 4.2–5.4)
SEG NEUTROPHILS: 57.8 %
SEGMENTED NEUTROPHILS ABSOLUTE COUNT: 4 THOU/MM3 (ref 1.8–7.7)
SODIUM BLD-SCNC: 138 MEQ/L (ref 135–145)
TOTAL PROTEIN: 6.6 G/DL (ref 6.1–8)
WBC # BLD: 7 THOU/MM3 (ref 4.8–10.8)

## 2021-06-23 PROCEDURE — 85025 COMPLETE CBC W/AUTO DIFF WBC: CPT

## 2021-06-23 PROCEDURE — 80053 COMPREHEN METABOLIC PANEL: CPT

## 2021-06-23 PROCEDURE — 82043 UR ALBUMIN QUANTITATIVE: CPT

## 2021-06-23 PROCEDURE — 36415 COLL VENOUS BLD VENIPUNCTURE: CPT

## 2021-06-24 LAB
CREATININE, URINE: 25.9 MG/DL
MICROALBUMIN UR-MCNC: 1.49 MG/DL
MICROALBUMIN/CREAT UR-RTO: 58 MG/G (ref 0–30)

## 2021-06-30 ENCOUNTER — OFFICE VISIT (OUTPATIENT)
Dept: NEPHROLOGY | Age: 81
End: 2021-06-30
Payer: MEDICARE

## 2021-06-30 VITALS
SYSTOLIC BLOOD PRESSURE: 140 MMHG | TEMPERATURE: 97.8 F | HEART RATE: 65 BPM | OXYGEN SATURATION: 100 % | DIASTOLIC BLOOD PRESSURE: 66 MMHG | WEIGHT: 92 LBS | BODY MASS INDEX: 20.63 KG/M2 | RESPIRATION RATE: 18 BRPM

## 2021-06-30 DIAGNOSIS — N18.4 CKD (CHRONIC KIDNEY DISEASE), STAGE IV (HCC): Primary | ICD-10-CM

## 2021-06-30 PROCEDURE — 1123F ACP DISCUSS/DSCN MKR DOCD: CPT | Performed by: INTERNAL MEDICINE

## 2021-06-30 PROCEDURE — G8400 PT W/DXA NO RESULTS DOC: HCPCS | Performed by: INTERNAL MEDICINE

## 2021-06-30 PROCEDURE — 1036F TOBACCO NON-USER: CPT | Performed by: INTERNAL MEDICINE

## 2021-06-30 PROCEDURE — G8427 DOCREV CUR MEDS BY ELIG CLIN: HCPCS | Performed by: INTERNAL MEDICINE

## 2021-06-30 PROCEDURE — 1090F PRES/ABSN URINE INCON ASSESS: CPT | Performed by: INTERNAL MEDICINE

## 2021-06-30 PROCEDURE — G8420 CALC BMI NORM PARAMETERS: HCPCS | Performed by: INTERNAL MEDICINE

## 2021-06-30 PROCEDURE — 99214 OFFICE O/P EST MOD 30 MIN: CPT | Performed by: INTERNAL MEDICINE

## 2021-06-30 PROCEDURE — 4040F PNEUMOC VAC/ADMIN/RCVD: CPT | Performed by: INTERNAL MEDICINE

## 2021-06-30 NOTE — PROGRESS NOTES
Thyroid gland is normal  Lungs:  Breathing easily. No rales nor rhonchi. No cough nor sputum production. Heart[de-identified]            RRR. No murmurs nor rubs. PMI is not enlarged nor displaced. Abdomen:  Soft and non tender. Bowel sounds are active in all four quadrants. Extremities:  no edema  Neurologic:  CN II-XII are intact. No deficits noted. Muscle strength and tone are equal throughout. Skin:                Warm and dry with no rashes. Muscles:         Hand  and leg strength are equal and strong bilaterally.      Lab Data      CBC:   Lab Results   Component Value Date    WBC 7.0 06/23/2021    HGB 13.7 06/23/2021    HCT 43.9 06/23/2021    .9 (H) 06/23/2021     06/23/2021     BMP:    Lab Results   Component Value Date     06/23/2021     05/28/2021     04/14/2021    K 4.2 06/23/2021    K 4.1 05/28/2021    K 4.1 04/14/2021    CL 99 06/23/2021     05/28/2021     04/14/2021    CO2 24 06/23/2021    CO2 24 05/28/2021    CO2 26 04/14/2021    BUN 32 (H) 06/23/2021    BUN 32 (H) 05/28/2021    BUN 24 (H) 04/14/2021    CREATININE 1.8 (H) 06/23/2021    CREATININE 1.6 (H) 05/28/2021    CREATININE 1.6 (H) 04/14/2021    GLUCOSE 78 06/23/2021    GLUCOSE 82 05/28/2021    GLUCOSE 82 04/14/2021      Hepatic:   Lab Results   Component Value Date    AST 27 06/23/2021    AST 17 04/14/2021    AST 21 04/15/2020    ALT 17 06/23/2021    ALT 14 04/14/2021    ALT 14 04/15/2020    BILITOT 0.7 06/23/2021    BILITOT 0.6 04/14/2021    BILITOT 0.4 04/15/2020    ALKPHOS 72 06/23/2021    ALKPHOS 86 04/14/2021    ALKPHOS 93 04/15/2020     BNP: No results found for: BNP  Lipids:   Lab Results   Component Value Date    CHOL 134 09/05/2019    HDL 60 09/05/2019     INR:   Lab Results   Component Value Date    INR 2.41 (H) 01/18/2020    INR 2.12 (H) 01/17/2020    INR 2.49 (H) 01/16/2020     URINE: No results found for: Patricia Simms  Lab Results   Component Value Date    NITRU NEGATIVE 07/23/2020    COLORU YELLOW 07/23/2020    PHUR 7.0 07/23/2020    LABCAST NONE SEEN 07/23/2020    LABCAST NONE SEEN 07/23/2020    WBCUA 0-2 07/23/2020    RBCUA 0-2 07/23/2020    MUCUS NONE SEEN 07/23/2020    YEAST NONE SEEN 07/23/2020    BACTERIA MANY 07/23/2020    SPECGRAV 1.010 07/23/2020    LEUKOCYTESUR TRACE 07/23/2020    LEUKOCYTESUR NEGATIVE 01/13/2020    UROBILINOGEN 0.2 07/23/2020    BILIRUBINUR NEGATIVE 07/23/2020    BLOODU NEGATIVE 07/23/2020    GLUCOSEU NEGATIVE 01/13/2020    KETUA NEGATIVE 07/23/2020    AMORPHOUS NONE SEEN 07/23/2020      Microalbumen/Creatinine ratio:  No components found for: RUCREAT        Medications:    Current Outpatient Medications   Medication Sig Dispense Refill    losartan (COZAAR) 100 MG tablet Take 1 tablet by mouth daily 90 tablet 3    dilTIAZem (CARDIZEM CD) 180 MG extended release capsule Take 1 capsule by mouth daily Additional RF per her PCP 90 capsule 3    digoxin (LANOXIN) 125 MCG tablet Take 1 tablet by mouth daily Additional RF per her PCP 90 tablet 3    pantoprazole (PROTONIX) 40 MG tablet Take 1 tablet by mouth 2 times daily (before meals) Additional RF per her  tablet 3    bumetanide (BUMEX) 1 MG tablet Take 1 tablet by mouth daily 90 tablet 3    potassium chloride (KLOR-CON M) 10 MEQ extended release tablet Take 1 tablet by mouth daily 90 tablet 3    rosuvastatin (CRESTOR) 5 MG tablet Take 1 tablet by mouth every evening Indications: Blood Cholesterol Abnormal 90 tablet 3    Multiple Vitamin (MULTIVITAMIN) tablet Take 1 tablet by mouth daily      warfarin (COUMADIN) 2 MG tablet Take as instructed by the physician following the INR results 30 tablet 0    Calcium Carbonate (CALCIUM 600 PO) Take 1 tablet by mouth daily       vitamin D (CHOLECALCIFEROL) 1000 UNIT TABS tablet Take 1,000 Units by mouth daily      acetaminophen (TYLENOL ARTHRITIS PAIN) 650 MG extended release tablet Take 650 mg by mouth 2 times daily as needed Indications: Arthritis       levothyroxine

## 2021-06-30 NOTE — PATIENT INSTRUCTIONS
KNOW YOUR KIDNEY NUMBERS    Your kidney speed (eGFR) was 27 ml/min this visit (normal is  ml/min)(Ml/min=milliliters of blood filtered per minute). The higher this number is, the better your kidney function is. Your serum creatinine was 1.8 (normal 0.8-1.2 mg/dl at most labs). The higher this number is, the worse your kidney function is. You are in stage G-IV-A1 of chronic kidney disease. Your kidney function has declined as compared to your last visit. Your last eGFR was  31 Ml/Min. Stages of kidney disease    EGFR (estimated glomerular filtration rate)    G-I > 90 ml/min Kidney damage with normal kidney function (blood or protein in the urine)  G-II 60-89 ml/min Normal kidney function with mild damage with or without blood or protein in the urine  G-IIIA 45-59 ml/min Mild to moderate loss of kidney function. G-IIIB 30-44 ml/min Moderate to severe loss of kidney function  G-IV 15-29 ml/min Severe loss of kidney function  G-V < 15 mlmin     May need dialysis or kidney transplant    ACR (urine albumin/creatinine ratio) (Mg/Gm)    A-1      ACR<30 Normal to mildly increased protein in the urine. A-2 ACR  Moderate increase in urine protein loss. A-3 ACR >300 Severe increase in urine protein loss    Our goal is to keep your eGFR going as fast as possible ( ml/min is normal). If your eGFR declines to 15-24 ml/min and stays there without recovery,  we will begin to educate you about dialysis or kidney transplant. We also want to keep the protein in your urine as low as possible. The leading cause of kidney disease in the world is diabetes mellitus. Keep your sugar  as much as possible. The second leading cause is hypertension. Keep Your blood pressure 120-140/70-80 as much as possible. If you need refills, call the office or your drug store. You may call the office any time with any questions or concerns. DUE TO THE CORONAVIRUS CONCERN, PLEASE LIMIT YOUR TIME IN PUBLIC. [address] Latoya Maya Labidi YOUR HANDS COMPLETELY AND FREQUENTLY. Betsy Chavira Orf

## 2021-07-20 RX ORDER — POTASSIUM CHLORIDE 750 MG/1
10 TABLET, EXTENDED RELEASE ORAL DAILY
Qty: 90 TABLET | Refills: 0 | Status: SHIPPED | OUTPATIENT
Start: 2021-07-20 | End: 2021-11-01 | Stop reason: SDUPTHER

## 2021-07-20 NOTE — TELEPHONE ENCOUNTER
Verna Hutton called requesting a refill on the following medications:  Requested Prescriptions     Pending Prescriptions Disp Refills    potassium chloride (KLOR-CON M) 10 MEQ extended release tablet 90 tablet 3     Sig: Take 1 tablet by mouth daily     Pharmacy verified: [facility]      Date of last visit: 1/27/2021  Date of next visit (if applicable): 7/6/9391

## 2021-08-04 ENCOUNTER — HOSPITAL ENCOUNTER (OUTPATIENT)
Age: 81
Discharge: HOME | End: 2021-08-04
Payer: MEDICARE

## 2021-08-04 DIAGNOSIS — N18.4 CKD (CHRONIC KIDNEY DISEASE), STAGE IV (HCC): ICD-10-CM

## 2021-08-04 LAB
ANION GAP SERPL CALCULATED.3IONS-SCNC: 14 MEQ/L (ref 8–16)
BASOPHILS # BLD: 0.4 %
BASOPHILS ABSOLUTE: 0 THOU/MM3 (ref 0–0.1)
BUN BLDV-MCNC: 23 MG/DL (ref 7–22)
CALCIUM SERPL-MCNC: 10.7 MG/DL (ref 8.5–10.5)
CHLORIDE BLD-SCNC: 104 MEQ/L (ref 98–111)
CO2: 25 MEQ/L (ref 23–33)
CREAT SERPL-MCNC: 1.5 MG/DL (ref 0.4–1.2)
EOSINOPHIL # BLD: 1.9 %
EOSINOPHILS ABSOLUTE: 0.2 THOU/MM3 (ref 0–0.4)
ERYTHROCYTE [DISTWIDTH] IN BLOOD BY AUTOMATED COUNT: 13.6 % (ref 11.5–14.5)
ERYTHROCYTE [DISTWIDTH] IN BLOOD BY AUTOMATED COUNT: 54.7 FL (ref 35–45)
GFR SERPL CREATININE-BSD FRML MDRD: 33 ML/MIN/1.73M2
GLUCOSE BLD-MCNC: 91 MG/DL (ref 70–108)
HCT VFR BLD CALC: 42.4 % (ref 37–47)
HEMOGLOBIN: 13 GM/DL (ref 12–16)
IMMATURE GRANS (ABS): 0.03 THOU/MM3 (ref 0–0.07)
IMMATURE GRANULOCYTES: 0.4 %
LYMPHOCYTES # BLD: 21.4 %
LYMPHOCYTES ABSOLUTE: 1.8 THOU/MM3 (ref 1–4.8)
MCH RBC QN AUTO: 33.3 PG (ref 26–33)
MCHC RBC AUTO-ENTMCNC: 30.7 GM/DL (ref 32.2–35.5)
MCV RBC AUTO: 108.7 FL (ref 81–99)
MONOCYTES # BLD: 8.5 %
MONOCYTES ABSOLUTE: 0.7 THOU/MM3 (ref 0.4–1.3)
NUCLEATED RED BLOOD CELLS: 0 /100 WBC
PLATELET # BLD: 230 THOU/MM3 (ref 130–400)
PMV BLD AUTO: 9.9 FL (ref 9.4–12.4)
POTASSIUM SERPL-SCNC: 4.1 MEQ/L (ref 3.5–5.2)
RBC # BLD: 3.9 MILL/MM3 (ref 4.2–5.4)
SEG NEUTROPHILS: 67.4 %
SEGMENTED NEUTROPHILS ABSOLUTE COUNT: 5.6 THOU/MM3 (ref 1.8–7.7)
SODIUM BLD-SCNC: 143 MEQ/L (ref 135–145)
WBC # BLD: 8.3 THOU/MM3 (ref 4.8–10.8)

## 2021-08-04 PROCEDURE — 85025 COMPLETE CBC W/AUTO DIFF WBC: CPT

## 2021-08-04 PROCEDURE — 80048 BASIC METABOLIC PNL TOTAL CA: CPT

## 2021-08-04 PROCEDURE — 36415 COLL VENOUS BLD VENIPUNCTURE: CPT

## 2021-08-11 ENCOUNTER — OFFICE VISIT (OUTPATIENT)
Dept: NEPHROLOGY | Age: 81
End: 2021-08-11
Payer: MEDICARE

## 2021-08-11 VITALS
BODY MASS INDEX: 20.47 KG/M2 | SYSTOLIC BLOOD PRESSURE: 146 MMHG | TEMPERATURE: 98.2 F | HEART RATE: 58 BPM | RESPIRATION RATE: 18 BRPM | DIASTOLIC BLOOD PRESSURE: 70 MMHG | WEIGHT: 91 LBS | HEIGHT: 56 IN | OXYGEN SATURATION: 98 %

## 2021-08-11 DIAGNOSIS — N18.32 STAGE 3B CHRONIC KIDNEY DISEASE (HCC): Primary | ICD-10-CM

## 2021-08-11 PROCEDURE — 1123F ACP DISCUSS/DSCN MKR DOCD: CPT | Performed by: INTERNAL MEDICINE

## 2021-08-11 PROCEDURE — 1090F PRES/ABSN URINE INCON ASSESS: CPT | Performed by: INTERNAL MEDICINE

## 2021-08-11 PROCEDURE — 4040F PNEUMOC VAC/ADMIN/RCVD: CPT | Performed by: INTERNAL MEDICINE

## 2021-08-11 PROCEDURE — G8427 DOCREV CUR MEDS BY ELIG CLIN: HCPCS | Performed by: INTERNAL MEDICINE

## 2021-08-11 PROCEDURE — 99214 OFFICE O/P EST MOD 30 MIN: CPT | Performed by: INTERNAL MEDICINE

## 2021-08-11 PROCEDURE — G8420 CALC BMI NORM PARAMETERS: HCPCS | Performed by: INTERNAL MEDICINE

## 2021-08-11 PROCEDURE — 1036F TOBACCO NON-USER: CPT | Performed by: INTERNAL MEDICINE

## 2021-08-11 PROCEDURE — G8400 PT W/DXA NO RESULTS DOC: HCPCS | Performed by: INTERNAL MEDICINE

## 2021-08-11 NOTE — PATIENT INSTRUCTIONS

## 2021-08-11 NOTE — PROGRESS NOTES
[facility]    [address]  [address], [address]  [phone]       Progress Note    8/11/2021 2:59 PM    Pt Name:    Milla Temple  YOB: 1940  Primary Care Physician:  VANDANA Garcia CNP       Chief Complaint:   Chief Complaint   Patient presents with    Chronic Kidney Disease    Hypertension    Other     valvular heart disease        History of Chief Complaint: CKD stage G-IV-A1-A1 from HTN and valvular heart disease. Subjective:  I last saw the patient in clinic 06/30/2021. I follow the patient for Chronic Kidney disease stage G-IV-A1. Since our last visit the patient has not been hospitalized. The patient is sleeping well at night with 1-2 times per night nocturia. The patient has a good appetite and is remaining active. The patient denied N/V/C/D/SOB/CP. He has no trouble at bladder emptying. COVID-19 screening  Fever: none  Cough: none  Exposure: none  Shortness of breath: none    Micro albumin/creatinine ratio: 58  eGFR: 33 Ml/min (it was 27 Ml/Min last visit)  SCr: 1.5 Mg/Dl (It was 1.8 Mg/Dl last visit)      Last six eGFR readings:  Lab Results   Component Value Date    LABGLOM 33 08/04/2021    LABGLOM 27 06/23/2021    LABGLOM 31 05/28/2021    LABGLOM 31 04/14/2021    LABGLOM 39 11/25/2020    LABGLOM 33 07/24/2020          Objective:  VITALS:  BP (!) 146/70 (Site: Right Upper Arm, Position: Sitting, Cuff Size: Small Adult)   Pulse 58   Temp 98.2 °F (36.8 °C)   Resp 18   Ht 4' 8\" (1.422 m)   Wt 91 lb (41.3 kg)   SpO2 98%   BMI 20.40 kg/m²   Weight:   Wt Readings from Last 3 Encounters:   08/11/21 91 lb (41.3 kg)   06/30/21 92 lb (41.7 kg)   06/02/21 95 lb (43.1 kg)     Body mass index is 20.4 kg/m². Physical examination    General:  Alert and cooperative with exam  HEENT:  Normocephalic. No lesions nor rashes. PURNIMA. EOMI  Neck:   No JVD and no bruits. Thyroid gland is normal  Lungs:  Breathing easily.  No rales nor 07/23/2020    LABCAST NONE SEEN 07/23/2020    WBCUA 0-2 07/23/2020    RBCUA 0-2 07/23/2020    MUCUS NONE SEEN 07/23/2020    YEAST NONE SEEN 07/23/2020    BACTERIA MANY 07/23/2020    SPECGRAV 1.010 07/23/2020    LEUKOCYTESUR TRACE 07/23/2020    LEUKOCYTESUR NEGATIVE 01/13/2020    UROBILINOGEN 0.2 07/23/2020    BILIRUBINUR NEGATIVE 07/23/2020    BLOODU NEGATIVE 07/23/2020    GLUCOSEU NEGATIVE 01/13/2020    KETUA NEGATIVE 07/23/2020    AMORPHOUS NONE SEEN 07/23/2020      Microalbumen/Creatinine ratio:  No components found for: RUCREAT        Medications:    Current Outpatient Medications   Medication Sig Dispense Refill    potassium chloride (KLOR-CON M) 10 MEQ extended release tablet Take 1 tablet by mouth daily 90 tablet 0    losartan (COZAAR) 100 MG tablet Take 1 tablet by mouth daily 90 tablet 3    dilTIAZem (CARDIZEM CD) 180 MG extended release capsule Take 1 capsule by mouth daily Additional RF per her PCP 90 capsule 3    digoxin (LANOXIN) 125 MCG tablet Take 1 tablet by mouth daily Additional RF per her PCP 90 tablet 3    pantoprazole (PROTONIX) 40 MG tablet Take 1 tablet by mouth 2 times daily (before meals) Additional RF per her  tablet 3    bumetanide (BUMEX) 1 MG tablet Take 1 tablet by mouth daily 90 tablet 3    rosuvastatin (CRESTOR) 5 MG tablet Take 1 tablet by mouth every evening Indications: Blood Cholesterol Abnormal 90 tablet 3    Multiple Vitamin (MULTIVITAMIN) tablet Take 1 tablet by mouth daily      warfarin (COUMADIN) 2 MG tablet Take as instructed by the physician following the INR results 30 tablet 0    Calcium Carbonate (CALCIUM 600 PO) Take 1 tablet by mouth daily       vitamin D (CHOLECALCIFEROL) 1000 UNIT TABS tablet Take 1,000 Units by mouth daily      acetaminophen (TYLENOL ARTHRITIS PAIN) 650 MG extended release tablet Take 650 mg by mouth 2 times daily as needed Indications: Arthritis       levothyroxine (SYNTHROID) 50 MCG tablet Take 50 mcg by mouth daily Indications: Impaired Thyroid Function       Biotin 1000 MCG TABS Take 1,000 mcg by mouth nightly        No current facility-administered medications for this visit. IMPRESSIONS:        Kidney disease: CKD stage G-IIIB-A1  Anemia: Anemia remains stable. No need for an erythrocyte stimulating agent (BRI). Bone and mineral metabolism: Anemia remains stable. No need for an erythrocyte stimulating agent (BRI). PLAN:  1. We discussed the eGFR today. 2. We will continue all current medications without changes. 3. We will see the patient back in 3 months.       _________________________________  Olive Bui.  Indigo Benitez DO  [facility]      CC  Leopold Cordon, APRN - CNP

## 2021-09-08 NOTE — PROGRESS NOTES
hard chewables NT 2' inadequate dentition. nurse present as well. Reports appetite is getting better. She has been drinking 50-75% of Ensure, not real fond of magic cup so will stop and agreeable to yogurt instead. Reports early satiety. On MVI, lasix.     · Wound Type: Stage I(coccyx)  · Current Nutrition Therapies:  · Oral Diet Orders: General   · Oral Diet intake: 0%, 1-25%  · Oral Nutrition Supplement (ONS) Orders: (Ensure Enlive and Yogurt TID)  · ONS intake: 26-50%, 51-75%  · Anthropometric Measures:  · Ht: 5' (152.4 cm)   · Current Body Wt: 104 lb 8 oz (47.4 kg)(1/10/20 no edema)  · Admission Body Wt: 95 lb 14.4 oz (43.5 kg)(1/3; no edema noted)  · Usual Body Wt: 108 lb (49 kg)(per pt report.)  · % Weight Change:  ,  no actual weights per EMR to assess weight changes  · Ideal Body Wt: 100 lb (45.4 kg),   · BMI Classification: BMI 18.5 - 24.9 Normal Weight    Nutrition Interventions:   Continue current diet, Modify current ONS  Continued Inpatient Monitoring, Education Initiated, Coordination of Care    Nutrition Evaluation:   · Evaluation: Progressing toward goals   · Goals: Pt will consume 75% or more of meals during LOS    · Monitoring: Nutrition Progression, Meal Intake, Supplement Intake, Diet Tolerance, Weight, Pertinent Labs, Nausea or Vomiting, Constipation, Monitor Bowel Function      Electronically signed by Behzad Harrison RD, LD on 1/10/20 at 2:16 PM    Contact Number: [phone] +toleration tolerated PO trials, recommend dysphagia 3 soft cut up 2' limited dentition, pt in agreement

## 2021-10-27 ENCOUNTER — HOSPITAL ENCOUNTER (OUTPATIENT)
Age: 81
Discharge: HOME | End: 2021-10-27
Payer: MEDICARE

## 2021-10-27 DIAGNOSIS — N18.32 STAGE 3B CHRONIC KIDNEY DISEASE (HCC): ICD-10-CM

## 2021-10-27 LAB
ANION GAP SERPL CALCULATED.3IONS-SCNC: 11 MEQ/L (ref 8–16)
BASOPHILS # BLD: 0.6 %
BASOPHILS ABSOLUTE: 0 THOU/MM3 (ref 0–0.1)
BUN BLDV-MCNC: 24 MG/DL (ref 7–22)
CALCIUM SERPL-MCNC: 11.1 MG/DL (ref 8.5–10.5)
CHLORIDE BLD-SCNC: 103 MEQ/L (ref 98–111)
CO2: 29 MEQ/L (ref 23–33)
CREAT SERPL-MCNC: 1.3 MG/DL (ref 0.4–1.2)
EOSINOPHIL # BLD: 5.4 %
EOSINOPHILS ABSOLUTE: 0.4 THOU/MM3 (ref 0–0.4)
ERYTHROCYTE [DISTWIDTH] IN BLOOD BY AUTOMATED COUNT: 13.1 % (ref 11.5–14.5)
ERYTHROCYTE [DISTWIDTH] IN BLOOD BY AUTOMATED COUNT: 51.5 FL (ref 35–45)
GFR SERPL CREATININE-BSD FRML MDRD: 39 ML/MIN/1.73M2
GLUCOSE BLD-MCNC: 95 MG/DL (ref 70–108)
HCT VFR BLD CALC: 40.2 % (ref 37–47)
HEMOGLOBIN: 12.5 GM/DL (ref 12–16)
IMMATURE GRANS (ABS): 0.02 THOU/MM3 (ref 0–0.07)
IMMATURE GRANULOCYTES: 0.3 %
LYMPHOCYTES # BLD: 27.4 %
LYMPHOCYTES ABSOLUTE: 1.8 THOU/MM3 (ref 1–4.8)
MCH RBC QN AUTO: 32.9 PG (ref 26–33)
MCHC RBC AUTO-ENTMCNC: 31.1 GM/DL (ref 32.2–35.5)
MCV RBC AUTO: 105.8 FL (ref 81–99)
MONOCYTES # BLD: 10.6 %
MONOCYTES ABSOLUTE: 0.7 THOU/MM3 (ref 0.4–1.3)
NUCLEATED RED BLOOD CELLS: 0 /100 WBC
PLATELET # BLD: 211 THOU/MM3 (ref 130–400)
PMV BLD AUTO: 10.8 FL (ref 9.4–12.4)
POTASSIUM SERPL-SCNC: 4.2 MEQ/L (ref 3.5–5.2)
RBC # BLD: 3.8 MILL/MM3 (ref 4.2–5.4)
SEG NEUTROPHILS: 55.7 %
SEGMENTED NEUTROPHILS ABSOLUTE COUNT: 3.7 THOU/MM3 (ref 1.8–7.7)
SODIUM BLD-SCNC: 143 MEQ/L (ref 135–145)
WBC # BLD: 6.7 THOU/MM3 (ref 4.8–10.8)

## 2021-10-27 PROCEDURE — 80048 BASIC METABOLIC PNL TOTAL CA: CPT

## 2021-10-27 PROCEDURE — 85025 COMPLETE CBC W/AUTO DIFF WBC: CPT

## 2021-10-27 PROCEDURE — 36415 COLL VENOUS BLD VENIPUNCTURE: CPT

## 2021-11-01 RX ORDER — POTASSIUM CHLORIDE 750 MG/1
10 TABLET, EXTENDED RELEASE ORAL DAILY
Qty: 90 TABLET | Refills: 1 | Status: SHIPPED | OUTPATIENT
Start: 2021-11-01 | End: 2022-01-20 | Stop reason: ALTCHOICE

## 2021-11-01 NOTE — TELEPHONE ENCOUNTER
Luli Erwin called requesting a refill on the following medications:  Requested Prescriptions     Pending Prescriptions Disp Refills    potassium chloride (KLOR-CON M) 10 MEQ extended release tablet 90 tablet 0     Sig: Take 1 tablet by mouth daily     Pharmacy verified: [facility]      Date of last visit: 1/21/2021  Date of next visit (if applicable): 2/5/2174

## 2021-11-03 ENCOUNTER — OFFICE VISIT (OUTPATIENT)
Dept: NEPHROLOGY | Age: 81
End: 2021-11-03
Payer: MEDICARE

## 2021-11-03 VITALS
HEIGHT: 56 IN | HEART RATE: 84 BPM | BODY MASS INDEX: 21.59 KG/M2 | OXYGEN SATURATION: 98 % | TEMPERATURE: 97.8 F | SYSTOLIC BLOOD PRESSURE: 152 MMHG | RESPIRATION RATE: 18 BRPM | DIASTOLIC BLOOD PRESSURE: 74 MMHG | WEIGHT: 96 LBS

## 2021-11-03 DIAGNOSIS — N18.32 STAGE 3B CHRONIC KIDNEY DISEASE (HCC): Primary | ICD-10-CM

## 2021-11-03 PROCEDURE — 1036F TOBACCO NON-USER: CPT | Performed by: INTERNAL MEDICINE

## 2021-11-03 PROCEDURE — 1090F PRES/ABSN URINE INCON ASSESS: CPT | Performed by: INTERNAL MEDICINE

## 2021-11-03 PROCEDURE — 1123F ACP DISCUSS/DSCN MKR DOCD: CPT | Performed by: INTERNAL MEDICINE

## 2021-11-03 PROCEDURE — G8420 CALC BMI NORM PARAMETERS: HCPCS | Performed by: INTERNAL MEDICINE

## 2021-11-03 PROCEDURE — 4040F PNEUMOC VAC/ADMIN/RCVD: CPT | Performed by: INTERNAL MEDICINE

## 2021-11-03 PROCEDURE — 99214 OFFICE O/P EST MOD 30 MIN: CPT | Performed by: INTERNAL MEDICINE

## 2021-11-03 PROCEDURE — G8427 DOCREV CUR MEDS BY ELIG CLIN: HCPCS | Performed by: INTERNAL MEDICINE

## 2021-11-03 PROCEDURE — G8484 FLU IMMUNIZE NO ADMIN: HCPCS | Performed by: INTERNAL MEDICINE

## 2021-11-03 PROCEDURE — G8400 PT W/DXA NO RESULTS DOC: HCPCS | Performed by: INTERNAL MEDICINE

## 2021-11-03 NOTE — PROGRESS NOTES
[facility]    [address]  [address], [address]  [phone]       Progress Note    11/3/2021 3:08 PM    Pt Name:    Justin Walton  YOB: 1940  Primary Care Physician:  VANDANA Dumont - CNP       Chief Complaint:   Chief Complaint   Patient presents with    Chronic Kidney Disease    Hypertension    Other     valvular heart disease-declining treatment. History of Chief Complaint: CKD stage G-IV-A1-A1 from HTN and valvular heart disease. Subjective:  I last saw the patient in clinic 08/21/2021. I follow the patient for Chronic Kidney disease stage G-IIIB-A. Since our last visit the patient has not been hospitalized. The patient is sleeping well at night with 1-2 times per night nocturia. The patient has a good appetite and is remaining active. The patient denied N/V/C/D/SOB/CP. She has no trouble at bladder emptying. She has dysuria and gets UTI every 2 months or so. COVID-19 screening  Fever: none  Cough: none  Exposure: none  Shortness of breath: none    Albumin/creatinine ratio:   eGFR: 39 Ml/min (it was 33 Ml/Min last visit)  SCr: 1.3 Mg/Dl (It was 1.5 Mg/Dl last visit)      Last six eGFR readings:  Lab Results   Component Value Date    LABGLOM 39 10/27/2021    LABGLOM 33 08/04/2021    LABGLOM 27 06/23/2021    LABGLOM 31 05/28/2021    LABGLOM 31 04/14/2021    LABGLOM 39 11/25/2020          Objective:  VITALS:  BP (!) 152/74 (Site: Right Upper Arm, Position: Sitting, Cuff Size: Small Adult)   Pulse 84   Temp 97.8 °F (36.6 °C)   Resp 18   Ht 4' 8\" (1.422 m)   Wt 96 lb (43.5 kg)   SpO2 98%   BMI 21.52 kg/m²   Weight:   Wt Readings from Last 3 Encounters:   11/03/21 96 lb (43.5 kg)   08/11/21 91 lb (41.3 kg)   06/30/21 92 lb (41.7 kg)     Body mass index is 21.52 kg/m². Physical examination    General:  Alert and cooperative with exam  HEENT:  Normocephalic. No lesions nor rashes. PURNIMA. EOMI  Neck:   No JVD and no bruits. YELLOW 07/23/2020    PHUR 7.0 07/23/2020    LABCAST NONE SEEN 07/23/2020    LABCAST NONE SEEN 07/23/2020    WBCUA 0-2 07/23/2020    RBCUA 0-2 07/23/2020    MUCUS NONE SEEN 07/23/2020    YEAST NONE SEEN 07/23/2020    BACTERIA MANY 07/23/2020    SPECGRAV 1.010 07/23/2020    LEUKOCYTESUR TRACE 07/23/2020    LEUKOCYTESUR NEGATIVE 01/13/2020    UROBILINOGEN 0.2 07/23/2020    BILIRUBINUR NEGATIVE 07/23/2020    BLOODU NEGATIVE 07/23/2020    GLUCOSEU NEGATIVE 01/13/2020    KETUA NEGATIVE 07/23/2020    AMORPHOUS NONE SEEN 07/23/2020      Microalbumen/Creatinine ratio:  No components found for: RUCREAT        Medications:    Current Outpatient Medications   Medication Sig Dispense Refill    potassium chloride (KLOR-CON M) 10 MEQ extended release tablet Take 1 tablet by mouth daily 90 tablet 1    losartan (COZAAR) 100 MG tablet Take 1 tablet by mouth daily 90 tablet 3    dilTIAZem (CARDIZEM CD) 180 MG extended release capsule Take 1 capsule by mouth daily Additional RF per her PCP 90 capsule 3    digoxin (LANOXIN) 125 MCG tablet Take 1 tablet by mouth daily Additional RF per her PCP 90 tablet 3    pantoprazole (PROTONIX) 40 MG tablet Take 1 tablet by mouth 2 times daily (before meals) Additional RF per her  tablet 3    bumetanide (BUMEX) 1 MG tablet Take 1 tablet by mouth daily 90 tablet 3    rosuvastatin (CRESTOR) 5 MG tablet Take 1 tablet by mouth every evening Indications: Blood Cholesterol Abnormal 90 tablet 3    Multiple Vitamin (MULTIVITAMIN) tablet Take 1 tablet by mouth daily      warfarin (COUMADIN) 2 MG tablet Take as instructed by the physician following the INR results 30 tablet 0    Calcium Carbonate (CALCIUM 600 PO) Take 1 tablet by mouth daily       vitamin D (CHOLECALCIFEROL) 1000 UNIT TABS tablet Take 1,000 Units by mouth daily      acetaminophen (TYLENOL ARTHRITIS PAIN) 650 MG extended release tablet Take 650 mg by mouth 2 times daily as needed Indications: Arthritis       levothyroxine (SYNTHROID) 50 MCG tablet Take 50 mcg by mouth daily Indications: Impaired Thyroid Function       Biotin 1000 MCG TABS Take 1,000 mcg by mouth nightly        No current facility-administered medications for this visit. IMPRESSIONS:        Kidney disease: CKD stage G-IIIB-A1  Possible kidney stones  Frequent UTI  Anemia: Anemia remains stable. No need for an erythrocyte stimulating agent (BRI). Bone and mineral metabolism: There is no complaint of bone pain. PLAN:  1. We discussed the eGFR today. 2. We will continue all current medications without changes. 3. Lith-O-Link for possible kidney stones (frequent UTI)  4. Cranberry juice for frequent UTI. 5. We will see the patient back in 2 months.       _________________________________  Lavonne Hem.  Bernice Pardo, DO  [facility]      CC  Bre Duarte, APRN - CNP

## 2021-11-03 NOTE — PATIENT INSTRUCTIONS
KNOW YOUR KIDNEY NUMBERS    Your kidney speed (eGFR) was 39 ml/min this visit (normal is  ml/min)(Ml/min=milliliters of blood filtered per minute). The higher this number is, the better your kidney function is. Your serum creatinine was 1.3 (normal 0.8-1.2 mg/dl at most labs). The higher this number is, the worse your kidney function is. You are in stage G-IIIB-A1 of chronic kidney disease. Your kidney function has improved as compared to your last visit. Your last eGFR was  33 Ml/Min. Stages of kidney disease  EGFR (estimated glomerular filtration rate)  G-I > 90 ml/min Kidney damage with normal kidney function (blood or protein in the urine)  G-II 60-89 ml/min Normal kidney function with mild damage with or without blood or protein in the urine  G-IIIA 45-59 ml/min Mild to moderate loss of kidney function. G-IIIB 30-44 ml/min Moderate to severe loss of kidney function  G-IV 15-29 ml/min Severe loss of kidney function  G-V < 15 mlmin     May need dialysis or kidney transplant    ACR (urine albumin/creatinine ratio) (Mg/Gm)  A-1      ACR<30 Normal to mildly increased protein in the urine. A-2 ACR  Moderate increase in urine protein loss. A-3 ACR >300 Severe increase in urine protein loss    Our goal is to keep your eGFR going as fast as possible ( ml/min is normal). If your eGFR declines to 15-24 ml/min and stays there without recovery,  we will begin to educate you about dialysis or kidney transplant. We also want to keep the protein in your urine as low as possible. The leading cause of kidney disease in the world is diabetes mellitus. Keep your sugar  as much as possible. The second leading cause is hypertension. Keep Your blood pressure 120-140/70-80 as much as possible. If you need refills, call the office or your drug store. You may call the office any time with any questions or concerns. We use Epic software to manage your private patient data securely.  Any health care provider or hospital in the world that uses Epic software can see your data if they have the appropriate credentials. DUE TO THE CORONAVIRUS CONCERN, PLEASE LIMIT YOUR TIME IN PUBLIC. [address] Lat[address] Rojas[address] YOUR HANDS COMPLETELY AND FREQUENTLY. Nino Caldwell

## 2021-11-12 ENCOUNTER — HOSPITAL ENCOUNTER (OUTPATIENT)
Age: 81
Discharge: HOME | End: 2021-11-12
Payer: MEDICARE

## 2021-11-12 PROCEDURE — 86769 SARS-COV-2 COVID-19 ANTIBODY: CPT

## 2021-11-12 PROCEDURE — 36415 COLL VENOUS BLD VENIPUNCTURE: CPT

## 2021-11-13 LAB — SARS-COV-2 ANTIBODY, TOTAL: NEGATIVE

## 2021-12-31 ENCOUNTER — HOSPITAL ENCOUNTER (OUTPATIENT)
Age: 81
Discharge: HOME | End: 2021-12-31
Payer: MEDICARE

## 2021-12-31 DIAGNOSIS — N18.32 STAGE 3B CHRONIC KIDNEY DISEASE (HCC): ICD-10-CM

## 2021-12-31 LAB
ANION GAP SERPL CALCULATED.3IONS-SCNC: 12 MEQ/L (ref 8–16)
BASOPHILS # BLD: 1 %
BASOPHILS ABSOLUTE: 0.1 THOU/MM3 (ref 0–0.1)
BUN BLDV-MCNC: 44 MG/DL (ref 7–22)
CALCIUM SERPL-MCNC: 10.4 MG/DL (ref 8.5–10.5)
CHLORIDE BLD-SCNC: 104 MEQ/L (ref 98–111)
CO2: 25 MEQ/L (ref 23–33)
CREAT SERPL-MCNC: 1.5 MG/DL (ref 0.4–1.2)
EOSINOPHIL # BLD: 6.8 %
EOSINOPHILS ABSOLUTE: 0.7 THOU/MM3 (ref 0–0.4)
ERYTHROCYTE [DISTWIDTH] IN BLOOD BY AUTOMATED COUNT: 16.3 % (ref 11.5–14.5)
ERYTHROCYTE [DISTWIDTH] IN BLOOD BY AUTOMATED COUNT: 63.7 FL (ref 35–45)
GFR SERPL CREATININE-BSD FRML MDRD: 33 ML/MIN/1.73M2
GLUCOSE BLD-MCNC: 94 MG/DL (ref 70–108)
HCT VFR BLD CALC: 36.3 % (ref 37–47)
HEMOGLOBIN: 11.2 GM/DL (ref 12–16)
IMMATURE GRANS (ABS): 0.14 THOU/MM3 (ref 0–0.07)
IMMATURE GRANULOCYTES: 1.4 %
LYMPHOCYTES # BLD: 21.9 %
LYMPHOCYTES ABSOLUTE: 2.1 THOU/MM3 (ref 1–4.8)
MCH RBC QN AUTO: 33 PG (ref 26–33)
MCHC RBC AUTO-ENTMCNC: 30.9 GM/DL (ref 32.2–35.5)
MCV RBC AUTO: 107.1 FL (ref 81–99)
MONOCYTES # BLD: 10.4 %
MONOCYTES ABSOLUTE: 1 THOU/MM3 (ref 0.4–1.3)
NUCLEATED RED BLOOD CELLS: 0 /100 WBC
PLATELET # BLD: 304 THOU/MM3 (ref 130–400)
PMV BLD AUTO: 9.9 FL (ref 9.4–12.4)
POTASSIUM SERPL-SCNC: 4.5 MEQ/L (ref 3.5–5.2)
RBC # BLD: 3.39 MILL/MM3 (ref 4.2–5.4)
SEG NEUTROPHILS: 58.5 %
SEGMENTED NEUTROPHILS ABSOLUTE COUNT: 5.7 THOU/MM3 (ref 1.8–7.7)
SODIUM BLD-SCNC: 141 MEQ/L (ref 135–145)
WBC # BLD: 9.7 THOU/MM3 (ref 4.8–10.8)

## 2021-12-31 PROCEDURE — 36415 COLL VENOUS BLD VENIPUNCTURE: CPT

## 2021-12-31 PROCEDURE — 80048 BASIC METABOLIC PNL TOTAL CA: CPT

## 2021-12-31 PROCEDURE — 85025 COMPLETE CBC W/AUTO DIFF WBC: CPT

## 2022-01-06 ENCOUNTER — OFFICE VISIT (OUTPATIENT)
Dept: NEPHROLOGY | Age: 82
End: 2022-01-06
Payer: MEDICARE

## 2022-01-06 VITALS
WEIGHT: 98 LBS | BODY MASS INDEX: 22.04 KG/M2 | SYSTOLIC BLOOD PRESSURE: 180 MMHG | DIASTOLIC BLOOD PRESSURE: 88 MMHG | HEART RATE: 90 BPM | HEIGHT: 56 IN | OXYGEN SATURATION: 95 % | TEMPERATURE: 97.8 F

## 2022-01-06 DIAGNOSIS — I12.9 HYPERTENSIVE RENAL DISEASE, STAGE 1 THROUGH STAGE 4 OR UNSPECIFIED CHRONIC KIDNEY DISEASE: ICD-10-CM

## 2022-01-06 DIAGNOSIS — N18.31 STAGE 3A CHRONIC KIDNEY DISEASE (HCC): Primary | ICD-10-CM

## 2022-01-06 DIAGNOSIS — E83.52 HYPERCALCEMIA: ICD-10-CM

## 2022-01-06 PROCEDURE — 1123F ACP DISCUSS/DSCN MKR DOCD: CPT | Performed by: INTERNAL MEDICINE

## 2022-01-06 PROCEDURE — 99213 OFFICE O/P EST LOW 20 MIN: CPT | Performed by: INTERNAL MEDICINE

## 2022-01-06 PROCEDURE — 1036F TOBACCO NON-USER: CPT | Performed by: INTERNAL MEDICINE

## 2022-01-06 PROCEDURE — G8420 CALC BMI NORM PARAMETERS: HCPCS | Performed by: INTERNAL MEDICINE

## 2022-01-06 PROCEDURE — 4040F PNEUMOC VAC/ADMIN/RCVD: CPT | Performed by: INTERNAL MEDICINE

## 2022-01-06 PROCEDURE — G8427 DOCREV CUR MEDS BY ELIG CLIN: HCPCS | Performed by: INTERNAL MEDICINE

## 2022-01-06 PROCEDURE — 1090F PRES/ABSN URINE INCON ASSESS: CPT | Performed by: INTERNAL MEDICINE

## 2022-01-06 PROCEDURE — G8400 PT W/DXA NO RESULTS DOC: HCPCS | Performed by: INTERNAL MEDICINE

## 2022-01-06 PROCEDURE — G8484 FLU IMMUNIZE NO ADMIN: HCPCS | Performed by: INTERNAL MEDICINE

## 2022-01-06 NOTE — PROGRESS NOTES
[address] [facility]  [address]  Dept: [phone]  Loc: [phone]  Office Progress Note  1/6/2022 1:59 PM      Pt Name:    Diomedes Hamilton  YOB: 1940  Primary Care Physician:  Miguel Joaquin, VANDANA - CNP     Chief Complaint:   Chief Complaint   Patient presents with    Chronic Kidney Disease     was Dr. Della Grace pt. Background Information/Interval History:   78 yo white female with hx CKD III, HTN, breast cancer s/p lumpectomy and radiation about 10 years ago (Dr. Lenka Vang) who I am seeing for follow-up. She was previously a patient of Dr. Della Grace. She reports lot of heart issues. She reports hx atrial fibrillation and moderate MR and AR. She denies any hx kidney stones. She had US in July 2020 which showed about 8 cm kidneys with simple renal cysts. She does report hx UTIs in the past. She reports BP typically at home is in 150/80. BP is higher here. She says it is always little higher in the office. She feels well. No chest pain. She has some chronic shortness of breath. She is following with Dr. Mich Fraser. No coronary stents. No PPM or AICD. No hx CVA. No hx gross hematuria. Denies any hx NSAID use. Does take some tylenol.       Past History:  Past Medical History:   Diagnosis Date    Arthritis     Atrial fibrillation ([address]     CAD (coronary artery disease)     [facility]     BREAST    Chronic kidney disease     History of therapeutic radiation 2011    left IDC    HTN (hypertension) 4/13/2016    Hypertension     Mixed hyperlipidemia 7/6/2020    Nausea & vomiting     Neuromuscular disorder (HCC)     Osteopenia     Pneumonia of both lungs due to infectious organism     Rheumatic fever     as a child    Sinus infection     Thyroid disease      Past Surgical History:   Procedure Laterality Date    BREAST LUMPECTOMY Left 2011    BRONCHOSCOPY N/A 1/5/2020    BRONCHOSCOPY performed Vit D, PTH, SPEP/ADAMS. She also did 24 hr urine collection- litholink for frequent UTIs per Dr. Rosendo Miramontes. Results pending. I discussed in detail with patient. She is taking calcium and Vit D. Hold calcium supplements. Further recommendations depending on above test results. She voiced understanding. Orders Placed This Encounter   Procedures    Basic Metabolic Panel    CBC    Urinalysis    Protein / creatinine ratio, urine    Vitamin D 25 Hydroxy    PTH, Intact    Phosphorus     Return in about 3 months (around 4/6/2022).     Dede Joseph MD  [facility]

## 2022-01-19 DIAGNOSIS — N18.32 STAGE 3B CHRONIC KIDNEY DISEASE (HCC): ICD-10-CM

## 2022-01-19 NOTE — RESULT ENCOUNTER NOTE
Increase water intake  Stop potassium chloride  Start potassium citrate 10 meq daily  Check BMP in 2 weeks.

## 2022-01-20 DIAGNOSIS — N18.32 STAGE 3B CHRONIC KIDNEY DISEASE (HCC): Primary | ICD-10-CM

## 2022-01-20 RX ORDER — POTASSIUM CITRATE 10 MEQ/1
10 TABLET, EXTENDED RELEASE ORAL DAILY
Qty: 90 TABLET | Refills: 3 | Status: SHIPPED | OUTPATIENT
Start: 2022-01-20

## 2022-01-20 RX ORDER — POTASSIUM CITRATE 10 MEQ/1
10 TABLET, EXTENDED RELEASE ORAL DAILY
COMMUNITY
End: 2022-01-20 | Stop reason: SDUPTHER

## 2022-01-20 NOTE — TELEPHONE ENCOUNTER
Spoke to pt and she understood to stop taking her potassium chloride and start taking potassium citrate, increase fluid intake and repeat labs.     Lab order pending    Script pending    MAR udated

## 2022-01-20 NOTE — TELEPHONE ENCOUNTER
----- Message from Cristina Sommer MD sent at 1/19/2022  5:46 PM EST -----  Increase water intake  Stop potassium chloride  Start potassium citrate 10 meq daily  Check BMP in 2 weeks.

## 2022-02-02 ENCOUNTER — HOSPITAL ENCOUNTER (OUTPATIENT)
Age: 82
Discharge: HOME | End: 2022-02-02
Payer: MEDICARE

## 2022-02-02 ENCOUNTER — OFFICE VISIT (OUTPATIENT)
Dept: CARDIOLOGY CLINIC | Age: 82
End: 2022-02-02
Payer: MEDICARE

## 2022-02-02 VITALS
WEIGHT: 102 LBS | SYSTOLIC BLOOD PRESSURE: 130 MMHG | HEART RATE: 71 BPM | DIASTOLIC BLOOD PRESSURE: 82 MMHG | BODY MASS INDEX: 20.56 KG/M2 | HEIGHT: 59 IN

## 2022-02-02 DIAGNOSIS — I25.10 CORONARY ARTERY DISEASE INVOLVING NATIVE CORONARY ARTERY OF NATIVE HEART WITHOUT ANGINA PECTORIS: ICD-10-CM

## 2022-02-02 DIAGNOSIS — N18.32 STAGE 3B CHRONIC KIDNEY DISEASE (HCC): ICD-10-CM

## 2022-02-02 DIAGNOSIS — R06.02 SHORTNESS OF BREATH: ICD-10-CM

## 2022-02-02 DIAGNOSIS — I48.21 PERMANENT ATRIAL FIBRILLATION (HCC): Primary | ICD-10-CM

## 2022-02-02 LAB
ANION GAP SERPL CALCULATED.3IONS-SCNC: 11 MEQ/L (ref 8–16)
BUN BLDV-MCNC: 23 MG/DL (ref 7–22)
CALCIUM SERPL-MCNC: 10.6 MG/DL (ref 8.5–10.5)
CHLORIDE BLD-SCNC: 103 MEQ/L (ref 98–111)
CO2: 27 MEQ/L (ref 23–33)
CREAT SERPL-MCNC: 1.3 MG/DL (ref 0.4–1.2)
GFR SERPL CREATININE-BSD FRML MDRD: 39 ML/MIN/1.73M2
GLUCOSE BLD-MCNC: 101 MG/DL (ref 70–108)
POTASSIUM SERPL-SCNC: 3.7 MEQ/L (ref 3.5–5.2)
SODIUM BLD-SCNC: 141 MEQ/L (ref 135–145)
TSH SERPL DL<=0.05 MIU/L-ACNC: 3.68 UIU/ML (ref 0.4–4.2)

## 2022-02-02 PROCEDURE — 84443 ASSAY THYROID STIM HORMONE: CPT

## 2022-02-02 PROCEDURE — 4040F PNEUMOC VAC/ADMIN/RCVD: CPT | Performed by: NUCLEAR MEDICINE

## 2022-02-02 PROCEDURE — G8420 CALC BMI NORM PARAMETERS: HCPCS | Performed by: NUCLEAR MEDICINE

## 2022-02-02 PROCEDURE — G8427 DOCREV CUR MEDS BY ELIG CLIN: HCPCS | Performed by: NUCLEAR MEDICINE

## 2022-02-02 PROCEDURE — 99213 OFFICE O/P EST LOW 20 MIN: CPT | Performed by: NUCLEAR MEDICINE

## 2022-02-02 PROCEDURE — G8484 FLU IMMUNIZE NO ADMIN: HCPCS | Performed by: NUCLEAR MEDICINE

## 2022-02-02 PROCEDURE — 1090F PRES/ABSN URINE INCON ASSESS: CPT | Performed by: NUCLEAR MEDICINE

## 2022-02-02 PROCEDURE — 36415 COLL VENOUS BLD VENIPUNCTURE: CPT

## 2022-02-02 PROCEDURE — G8400 PT W/DXA NO RESULTS DOC: HCPCS | Performed by: NUCLEAR MEDICINE

## 2022-02-02 PROCEDURE — 1036F TOBACCO NON-USER: CPT | Performed by: NUCLEAR MEDICINE

## 2022-02-02 PROCEDURE — 93000 ELECTROCARDIOGRAM COMPLETE: CPT | Performed by: NUCLEAR MEDICINE

## 2022-02-02 PROCEDURE — 80048 BASIC METABOLIC PNL TOTAL CA: CPT

## 2022-02-02 PROCEDURE — 1123F ACP DISCUSS/DSCN MKR DOCD: CPT | Performed by: NUCLEAR MEDICINE

## 2022-02-02 RX ORDER — LOSARTAN POTASSIUM 100 MG/1
100 TABLET ORAL DAILY
Qty: 90 TABLET | Refills: 3 | Status: SHIPPED | OUTPATIENT
Start: 2022-02-02

## 2022-02-02 RX ORDER — DILTIAZEM HYDROCHLORIDE 180 MG/1
180 CAPSULE, COATED, EXTENDED RELEASE ORAL DAILY
Qty: 90 CAPSULE | Refills: 3 | Status: SHIPPED | OUTPATIENT
Start: 2022-02-02

## 2022-02-02 RX ORDER — DIGOXIN 125 MCG
125 TABLET ORAL DAILY
Qty: 90 TABLET | Refills: 3 | Status: SHIPPED | OUTPATIENT
Start: 2022-02-02

## 2022-02-02 NOTE — RESULT ENCOUNTER NOTE
Pls add SPEP (serum protein electrophoresis) and ADAMS (immunofixation electrophoresis) for 3 months.

## 2022-02-02 NOTE — PROGRESS NOTES
[address] [facility] [place]  [employer] .  [address]  [facility] [address]  Dept: [phone]  Dept Fax: [phone]  Loc: [phone]    Visit Date: 2/2/2022    Lafe Dubin is a 79 y.o. female who presents todayfor:  Chief Complaint   Patient presents with    Check-Up    Cardiac Valve Problem    Hypertension   had COVID   Did well   Know a fib   Mild CAD   Cath 2019 with mild   No chest pain   No changes in breathing  Known valvular disease  No dizziness  No syncope        HPI:  HPI  Past Medical History:   Diagnosis Date    Arthritis     Atrial fibrillation ([address]     CAD (coronary artery disease)     Cancer ([address]     BREAST    Chronic kidney disease     History of therapeutic radiation 2011    left IDC    HTN (hypertension) 4/13/2016    Hypertension     Mixed hyperlipidemia 7/6/2020    Nausea & vomiting     Neuromuscular disorder ([address]     Osteopenia     Pneumonia of both lungs due to infectious organism     Rheumatic fever     as a child    Sinus infection     Thyroid disease       Past Surgical History:   Procedure Laterality Date    BREAST LUMPECTOMY Left 2011    BRONCHOSCOPY N/A 1/5/2020    BRONCHOSCOPY performed by Lavonne Castillo MD at [address] [place] [address] Left 2009   [address] [facility] [address]  2010    DILATION AND CURETTAGE OF UTERUS      X2; SEVERAL YEARS AGO    JOINT REPLACEMENT Right 2010    KNEE    GA BX OF BREAST, NEEDLE CORE, IMAGE GUIDE Left 03/26/2020    benign    GA BX OF BREAST, NEEDLE CORE, IMAGE GUIDE Left 03/26/2020    benign    GA BX OF BREAST, NEEDLE CORE, IMAGE GUIDE Left 03/26/2020    benign    GA BX OF BREAST, NEEDLE CORE, IMAGE GUIDE Left 12/09/2010    IDC     TONSILLECTOMY      UPPER GASTROINTESTINAL ENDOSCOPY N/A 1/3/2020    EGD ESOPHAGOGASTRODUODENOSCOPY performed by Lanre Javier MD at [address] [place] [address] N/A 1/11/2020    EGD DIAGNOSTIC ONLY performed by Rolf Newman Demetrio Duron MD at [facility]     Family History   Problem Relation Age of Onset    Arthritis Mother     High Blood Pressure Mother     Cancer Sister         LEUKEMIA    Arthritis Brother     Asthma Brother     Colon Polyps Brother 58    Heart Disease Brother     Breast Cancer Maternal Aunt     Breast Cancer Daughter 46    Breast Cancer Maternal Cousin 61    Breast Cancer Maternal Cousin 61    Breast Cancer Maternal Cousin     Breast Cancer Maternal Cousin      Social History     Tobacco Use    Smoking status: Never Smoker    Smokeless tobacco: Never Used   Substance Use Topics    Alcohol use: Not Currently     Alcohol/week: 1.0 standard drink     Types: 1 Glasses of wine per week     Comment: A  MONTH      Current Outpatient Medications   Medication Sig Dispense Refill    potassium citrate (UROCIT-K) 10 MEQ (1080 MG) extended release tablet Take 1 tablet by mouth daily 90 tablet 3    losartan (COZAAR) 100 MG tablet Take 1 tablet by mouth daily 90 tablet 3    dilTIAZem (CARDIZEM CD) 180 MG extended release capsule Take 1 capsule by mouth daily Additional RF per her PCP 90 capsule 3    digoxin (LANOXIN) 125 MCG tablet Take 1 tablet by mouth daily Additional RF per her PCP 90 tablet 3    pantoprazole (PROTONIX) 40 MG tablet Take 1 tablet by mouth 2 times daily (before meals) Additional RF per her  tablet 3    bumetanide (BUMEX) 1 MG tablet Take 1 tablet by mouth daily 90 tablet 3    rosuvastatin (CRESTOR) 5 MG tablet Take 1 tablet by mouth every evening Indications: Blood Cholesterol Abnormal 90 tablet 3    Multiple Vitamin (MULTIVITAMIN) tablet Take 1 tablet by mouth daily      warfarin (COUMADIN) 2 MG tablet Take as instructed by the physician following the INR results 30 tablet 0    vitamin D (CHOLECALCIFEROL) 1000 UNIT TABS tablet Take 1,000 Units by mouth daily      acetaminophen (TYLENOL ARTHRITIS PAIN) 650 MG extended release tablet Take 650 mg by mouth 2 times daily as needed Indications: Arthritis       levothyroxine (SYNTHROID) 50 MCG tablet Take 50 mcg by mouth daily Indications: Impaired Thyroid Function       Biotin 1000 MCG TABS Take 1,000 mcg by mouth nightly        No current facility-administered medications for this visit. Allergies   Allergen Reactions    Tramadol Nausea And Vomiting     Health Maintenance   Topic Date Due    Depression Screen  Never done    DTaP/Tdap/Td vaccine (1 - Tdap) Never done    Shingles Vaccine (1 of 2) Never done    DEXA (modify frequency per FRAX score)  Never done    Pneumococcal 65+ yrs at Risk Vaccine (2 of 2 - PPSV23) 04/19/2017    Annual Wellness Visit (AWV)  Never done    Lipid screen  09/05/2020    TSH testing  02/12/2021    Potassium monitoring  12/31/2022    Creatinine monitoring  12/31/2022    Flu vaccine  Completed    COVID-19 Vaccine  Completed    Hepatitis A vaccine  Aged Out    Hepatitis B vaccine  Aged Out    Hib vaccine  Aged Out    Meningococcal (ACWY) vaccine  Aged Out       Subjective:  Review of Systems  General:   No fever, no chills, No fatigue or weight loss  Pulmonary:    No dyspnea, no wheezing  Cardiac:    Denies recent chest pain,   GI:     No nausea or vomiting, no abdominal pain  Neuro:    No dizziness or light headedness,   Musculoskeletal:  No recent active issues  Extremities:   No edema, no obvious claudication       Objective:  Physical Exam  /82   Pulse 71   Ht 4' 11\" (1.499 m)   Wt 102 lb (46.3 kg)   BMI 20.60 kg/m²   General:   Well developed, well nourished  Lungs:   Clear to auscultation  Heart:    Normal S1 S2, Slight murmur. no rubs, no gallops  Abdomen:   Soft, non tender, no organomegalies, positive bowel sounds  Extremities:   No edema, no cyanosis, good peripheral pulses  Neurological:   Awake, alert, oriented. No obvious focal deficits  Musculoskelatal:  No obvious deformities    Assessment:      Diagnosis Orders   1. Permanent atrial fibrillation (HCC)  EKG 12 lead   2. Coronary artery disease involving native coronary artery of native heart without angina pectoris  EKG 12 lead   3. Shortness of breath  EKG 12 lead   as above  Cardiac fair for now  No bleeding   ECG in office was done today. I reviewed the ECG. No acute findings    Plan:  No follow-ups on file. As above  Continue risk factor modification and medical management  Thank you for allowing me to participate in the care of your patient. Please don't hesitate to contact me regarding any further issues related to the patient care    Orders Placed:  Orders Placed This Encounter   Procedures    EKG 12 lead     Order Specific Question:   Reason for Exam?     Answer: Other       Medications Prescribed:  No orders of the defined types were placed in this encounter. Discussed use, benefit, and side effects of prescribed medications. All patient questions answered. Pt voicedunderstanding. Instructed to continue current medications, diet and exercise. Continue risk factor modification and medical management. Patient agreed with treatment plan. Follow up as directed.     Electronically signedby Ori Arambula MD on 2/2/2022 at 12:29 PM

## 2022-02-03 ENCOUNTER — TELEPHONE (OUTPATIENT)
Dept: NEPHROLOGY | Age: 82
End: 2022-02-03

## 2022-02-03 DIAGNOSIS — E83.52 HYPERCALCEMIA: Primary | ICD-10-CM

## 2022-03-04 RX ORDER — PANTOPRAZOLE SODIUM 40 MG/1
TABLET, DELAYED RELEASE ORAL
Qty: 180 TABLET | Refills: 3 | Status: SHIPPED | OUTPATIENT
Start: 2022-03-04

## 2022-04-01 ENCOUNTER — HOSPITAL ENCOUNTER (OUTPATIENT)
Age: 82
Discharge: HOME | End: 2022-04-01
Payer: MEDICARE

## 2022-04-01 LAB
ANION GAP SERPL CALCULATED.3IONS-SCNC: 13 MEQ/L (ref 8–16)
BILIRUBIN URINE: NEGATIVE
BLOOD, URINE: NEGATIVE
BUN BLDV-MCNC: 32 MG/DL (ref 7–22)
CALCIUM SERPL-MCNC: 10.5 MG/DL (ref 8.5–10.5)
CHARACTER, URINE: CLEAR
CHLORIDE BLD-SCNC: 105 MEQ/L (ref 98–111)
CO2: 25 MEQ/L (ref 23–33)
COLOR: YELLOW
CREAT SERPL-MCNC: 1.5 MG/DL (ref 0.4–1.2)
CREATININE URINE: 88.3 MG/DL
ERYTHROCYTE [DISTWIDTH] IN BLOOD BY AUTOMATED COUNT: 12.5 % (ref 11.5–14.5)
ERYTHROCYTE [DISTWIDTH] IN BLOOD BY AUTOMATED COUNT: 47.1 FL (ref 35–45)
GFR SERPL CREATININE-BSD FRML MDRD: 33 ML/MIN/1.73M2
GLUCOSE BLD-MCNC: 96 MG/DL (ref 70–108)
GLUCOSE, URINE: NEGATIVE MG/DL
HCT VFR BLD CALC: 38 % (ref 37–47)
HEMOGLOBIN: 11.7 GM/DL (ref 12–16)
KETONES, URINE: NEGATIVE
LEUKOCYTE EST, POC: NEGATIVE
MCH RBC QN AUTO: 31.8 PG (ref 26–33)
MCHC RBC AUTO-ENTMCNC: 30.8 GM/DL (ref 32.2–35.5)
MCV RBC AUTO: 103.3 FL (ref 81–99)
NITRITE, URINE: NEGATIVE
PH UA: 7 (ref 5–9)
PHOSPHORUS: 3.3 MG/DL (ref 2.4–4.7)
PLATELET # BLD: 217 THOU/MM3 (ref 130–400)
PMV BLD AUTO: 10.4 FL (ref 9.4–12.4)
POTASSIUM SERPL-SCNC: 4 MEQ/L (ref 3.5–5.2)
PROT/CREAT RATIO, UR: 0.11
PROTEIN UA: NEGATIVE MG/DL
PROTEIN, URINE: 9.6 MG/DL
PTH INTACT: 26.3 PG/ML (ref 15–65)
RBC # BLD: 3.68 MILL/MM3 (ref 4.2–5.4)
SODIUM BLD-SCNC: 143 MEQ/L (ref 135–145)
SPECIFIC GRAVITY UA: 1.01 (ref 1–1.03)
UROBILINOGEN, URINE: 0.2 EU/DL (ref 0–1)
VITAMIN D 25-HYDROXY: > 120 NG/ML (ref 30–100)
WBC # BLD: 5.4 THOU/MM3 (ref 4.8–10.8)

## 2022-04-01 PROCEDURE — 84156 ASSAY OF PROTEIN URINE: CPT

## 2022-04-01 PROCEDURE — 81003 URINALYSIS AUTO W/O SCOPE: CPT

## 2022-04-01 PROCEDURE — 82306 VITAMIN D 25 HYDROXY: CPT

## 2022-04-01 PROCEDURE — 85027 COMPLETE CBC AUTOMATED: CPT

## 2022-04-01 PROCEDURE — 80048 BASIC METABOLIC PNL TOTAL CA: CPT

## 2022-04-01 PROCEDURE — 83970 ASSAY OF PARATHORMONE: CPT

## 2022-04-01 PROCEDURE — 36415 COLL VENOUS BLD VENIPUNCTURE: CPT

## 2022-04-01 PROCEDURE — 82570 ASSAY OF URINE CREATININE: CPT

## 2022-04-01 PROCEDURE — 84100 ASSAY OF PHOSPHORUS: CPT

## 2022-04-07 ENCOUNTER — OFFICE VISIT (OUTPATIENT)
Dept: NEPHROLOGY | Age: 82
End: 2022-04-07
Payer: MEDICARE

## 2022-04-07 VITALS
SYSTOLIC BLOOD PRESSURE: 155 MMHG | OXYGEN SATURATION: 97 % | BODY MASS INDEX: 20.6 KG/M2 | DIASTOLIC BLOOD PRESSURE: 72 MMHG | HEART RATE: 75 BPM | TEMPERATURE: 99.1 F | WEIGHT: 102 LBS

## 2022-04-07 DIAGNOSIS — I12.9 HYPERTENSIVE RENAL DISEASE, STAGE 1 THROUGH STAGE 4 OR UNSPECIFIED CHRONIC KIDNEY DISEASE: ICD-10-CM

## 2022-04-07 DIAGNOSIS — N18.32 CHRONIC KIDNEY DISEASE, STAGE 3B (HCC): Primary | ICD-10-CM

## 2022-04-07 DIAGNOSIS — E83.52 HYPERCALCEMIA: ICD-10-CM

## 2022-04-07 DIAGNOSIS — E67.3 HIGH VITAMIN D LEVEL: ICD-10-CM

## 2022-04-07 PROCEDURE — 4040F PNEUMOC VAC/ADMIN/RCVD: CPT | Performed by: INTERNAL MEDICINE

## 2022-04-07 PROCEDURE — 1036F TOBACCO NON-USER: CPT | Performed by: INTERNAL MEDICINE

## 2022-04-07 PROCEDURE — 1123F ACP DISCUSS/DSCN MKR DOCD: CPT | Performed by: INTERNAL MEDICINE

## 2022-04-07 PROCEDURE — 1090F PRES/ABSN URINE INCON ASSESS: CPT | Performed by: INTERNAL MEDICINE

## 2022-04-07 PROCEDURE — G8400 PT W/DXA NO RESULTS DOC: HCPCS | Performed by: INTERNAL MEDICINE

## 2022-04-07 PROCEDURE — G8420 CALC BMI NORM PARAMETERS: HCPCS | Performed by: INTERNAL MEDICINE

## 2022-04-07 PROCEDURE — 99213 OFFICE O/P EST LOW 20 MIN: CPT | Performed by: INTERNAL MEDICINE

## 2022-04-07 PROCEDURE — G8427 DOCREV CUR MEDS BY ELIG CLIN: HCPCS | Performed by: INTERNAL MEDICINE

## 2022-04-07 NOTE — PROGRESS NOTES
[facility]  [address]  [facility] [address]  Dept: [phone]  Loc: [phone]  Office Progress Note  4/7/2022 1:41 PM      Pt Name:    Radu Greer  YOB: 1940  Primary Care Physician:  VANDANA Bassett - CNP     Chief Complaint:   Chief Complaint   Patient presents with    Chronic Kidney Disease     Stage 3        Background Information/Interval History:   80 yo white female with hx CKD III, HTN, breast cancer s/p lumpectomy and radiation about 10 years ago (Dr. Rashad Ríos) who I am seeing for follow-up. She was previously a patient of Dr. Hayde Chavarria. She reports lot of heart issues. She reports hx atrial fibrillation and moderate MR and AR. She had US in July 2020 which showed about 8 cm kidneys with simple renal cysts. She does report hx UTIs in the past.     Here for 3 months follow-up. Feels okay. Just feels tired at times. No sig leg swelling but has some. BP has been in 140-160 range but there were couple of readings in 110 range. She is on cardizem, cozaar and bumex. She is on potassium citrate. Pt says she is taking a lot of vit D \"because it is good. \"      Past History:  Past Medical History:   Diagnosis Date    Arthritis     Atrial fibrillation ([address]     CAD (coronary artery disease)     Cancer ([facility]     BREAST    Chronic kidney disease     History of therapeutic radiation 2011    left IDC    HTN (hypertension) 4/13/2016    Hypertension     Mixed hyperlipidemia 7/6/2020    Nausea & vomiting     Neuromuscular disorder (HCC)     Osteopenia     Pneumonia of both lungs due to infectious organism     Rheumatic fever     as a child    Sinus infection     Thyroid disease      Past Surgical History:   Procedure Laterality Date    BREAST LUMPECTOMY Left 2011    BRONCHOSCOPY N/A 1/5/2020    BRONCHOSCOPY performed by Hayde Stout MD at [address] [place] [address] Left 2009    CARDIAC CATHETERIZATION  2010    DILATION AND CURETTAGE OF UTERUS      X2; SEVERAL YEARS AGO    JOINT REPLACEMENT Right 2010    KNEE    GA BX OF BREAST, NEEDLE CORE, IMAGE GUIDE Left 03/26/2020    benign    GA BX OF BREAST, NEEDLE CORE, IMAGE GUIDE Left 03/26/2020    benign    GA BX OF BREAST, NEEDLE CORE, IMAGE GUIDE Left 03/26/2020    benign    GA BX OF BREAST, NEEDLE CORE, IMAGE GUIDE Left 12/09/2010    IDC     TONSILLECTOMY      UPPER GASTROINTESTINAL ENDOSCOPY N/A 1/3/2020    EGD ESOPHAGOGASTRODUODENOSCOPY performed by Ruben Marshall MD at [address] [facility] [address] 1/11/2020    EGD DIAGNOSTIC ONLY performed by Ruben Marshall MD at [address] [facility]:  BP (!) 155/72 (Site: Right Upper Arm, Position: Sitting, Cuff Size: Medium Adult)   Pulse 75   Temp 99.1 °F (37.3 °C)   Wt 102 lb (46.3 kg)   SpO2 97%   BMI 20.60 kg/m²   Wt Readings from Last 3 Encounters:   04/07/22 102 lb (46.3 kg)   02/02/22 102 lb (46.3 kg)   01/06/22 98 lb (44.5 kg)     Body mass index is 20.6 kg/m².      General Appearance: alert and cooperative with exam, appears comfortable, no distress  Oral: moist oral mucus membranes  Neck: No jugular venous distention  Lungs: Air entry B/L, no crackles or rales, no use of accessory muscles  Heart: S1, S2 heard  GI: soft, non-tender, no guarding  Extremities: trace leg edema     Medications:  Current Outpatient Medications   Medication Sig Dispense Refill    pantoprazole (PROTONIX) 40 MG tablet TAKE ONE TABLET, TWO TIMES A DAY, BY MOUTH. 180 tablet 3    digoxin (LANOXIN) 125 MCG tablet Take 1 tablet by mouth daily Additional RF per her PCP 90 tablet 3    dilTIAZem (CARDIZEM CD) 180 MG extended release capsule Take 1 capsule by mouth daily Additional RF per her PCP 90 capsule 3    losartan (COZAAR) 100 MG tablet Take 1 tablet by mouth daily 90 tablet 3    potassium citrate (UROCIT-K) 10 MEQ (1080 MG) extended release tablet Take 1 tablet by mouth daily 90 tablet 3    bumetanide (BUMEX) 1 MG tablet Take 1 tablet by mouth daily 90 tablet 3    rosuvastatin (CRESTOR) 5 MG tablet Take 1 tablet by mouth every evening Indications: Blood Cholesterol Abnormal 90 tablet 3    Multiple Vitamin (MULTIVITAMIN) tablet Take 1 tablet by mouth daily      warfarin (COUMADIN) 2 MG tablet Take as instructed by the physician following the INR results 30 tablet 0    vitamin D (CHOLECALCIFEROL) 1000 UNIT TABS tablet Take 1,000 Units by mouth daily      acetaminophen (TYLENOL ARTHRITIS PAIN) 650 MG extended release tablet Take 650 mg by mouth 2 times daily as needed Indications: Arthritis       levothyroxine (SYNTHROID) 50 MCG tablet Take 50 mcg by mouth daily Indications: Impaired Thyroid Function       Biotin 1000 MCG TABS Take 1,000 mcg by mouth nightly        No current facility-administered medications for this visit. Laboratory & Diagnostics:  Old labs  US: R 8.5, L 8.0, Simple renal cysts  Creat 1.3 to 1.5  Calcium 10.5 to 11  Echo: EF 60%, moderate AR and MR  UA: July 2020: no blood, no protein, UACR: 58 mg/g    April 2022: K 4.0, Creat 1.5, PTH 26, Vit D >120, Hb 11.7  UA: no blood, no protein, UPCR 110 mg/g     Impression/Plan:   1. Hx CKD III:  UA was mostly benign. US was okay last year. Dr. Hayde Chavarria did kidney biopsy in 2017 which showed mild to moderate arterio- nephrosclerosis. Stable kidney function at this time. Creat is 1.5  2. HTN: on Cozaar and Cardizem and bumex. Continue to monitor at home. May need to add another medications. She will bring more readings. She only brought few readings. However there were few readings in 033 systolic range. Hence we will hold off on increasing blood pressure medications further at this time. 3. Renal cysts, simple  4. Mild hypercalcemia: likely due to supra-therapeutic Vit D. Her Vit D level is >120. Discussed with patient. She will stop Vit D. Hold calcium for now. Check Vit D in 3 months.  SPEP and ADAMS were ordered last time- not done yet. 5. Hypocitraturia (litholink done by Dr. Segun Wright due to ?frequent UTI): no hx kidney stones. Now on potassium citrate. K is okay. Orders Placed This Encounter   Procedures    Basic Metabolic Panel    Vitamin D 25 Hydroxy     Return in about 4 months (around 8/7/2022).     Aruna James MD  [facility]

## 2022-04-11 ENCOUNTER — HOSPITAL ENCOUNTER (OUTPATIENT)
Age: 82
Discharge: HOME | End: 2022-04-11
Payer: MEDICARE

## 2022-04-11 LAB
ALBUMIN SERPL-MCNC: 4.5 G/DL (ref 3.5–5.1)
ALP BLD-CCNC: 118 U/L (ref 38–126)
ALT SERPL-CCNC: 13 U/L (ref 11–66)
ANION GAP SERPL CALCULATED.3IONS-SCNC: 14 MEQ/L (ref 8–16)
AST SERPL-CCNC: 25 U/L (ref 5–40)
BASOPHILS # BLD: 0.7 %
BASOPHILS ABSOLUTE: 0 THOU/MM3 (ref 0–0.1)
BILIRUB SERPL-MCNC: 0.3 MG/DL (ref 0.3–1.2)
BUN BLDV-MCNC: 26 MG/DL (ref 7–22)
CALCIUM SERPL-MCNC: 10.9 MG/DL (ref 8.5–10.5)
CHLORIDE BLD-SCNC: 100 MEQ/L (ref 98–111)
CO2: 25 MEQ/L (ref 23–33)
CREAT SERPL-MCNC: 1.5 MG/DL (ref 0.4–1.2)
EOSINOPHIL # BLD: 3.8 %
EOSINOPHILS ABSOLUTE: 0.2 THOU/MM3 (ref 0–0.4)
ERYTHROCYTE [DISTWIDTH] IN BLOOD BY AUTOMATED COUNT: 12.4 % (ref 11.5–14.5)
ERYTHROCYTE [DISTWIDTH] IN BLOOD BY AUTOMATED COUNT: 46.3 FL (ref 35–45)
GFR SERPL CREATININE-BSD FRML MDRD: 33 ML/MIN/1.73M2
GLUCOSE BLD-MCNC: 103 MG/DL (ref 70–108)
HCT VFR BLD CALC: 39.9 % (ref 37–47)
HEMOGLOBIN: 12.2 GM/DL (ref 12–16)
IMMATURE GRANS (ABS): 0.01 THOU/MM3 (ref 0–0.07)
IMMATURE GRANULOCYTES: 0.2 %
LYMPHOCYTES # BLD: 32 %
LYMPHOCYTES ABSOLUTE: 1.8 THOU/MM3 (ref 1–4.8)
MCH RBC QN AUTO: 31.5 PG (ref 26–33)
MCHC RBC AUTO-ENTMCNC: 30.6 GM/DL (ref 32.2–35.5)
MCV RBC AUTO: 103.1 FL (ref 81–99)
MONOCYTES # BLD: 11.1 %
MONOCYTES ABSOLUTE: 0.6 THOU/MM3 (ref 0.4–1.3)
NUCLEATED RED BLOOD CELLS: 0 /100 WBC
PLATELET # BLD: 206 THOU/MM3 (ref 130–400)
PMV BLD AUTO: 10.3 FL (ref 9.4–12.4)
POTASSIUM SERPL-SCNC: 4.5 MEQ/L (ref 3.5–5.2)
RBC # BLD: 3.87 MILL/MM3 (ref 4.2–5.4)
SEG NEUTROPHILS: 52.2 %
SEGMENTED NEUTROPHILS ABSOLUTE COUNT: 2.9 THOU/MM3 (ref 1.8–7.7)
SODIUM BLD-SCNC: 139 MEQ/L (ref 135–145)
TOTAL PROTEIN: 6.8 G/DL (ref 6.1–8)
WBC # BLD: 5.6 THOU/MM3 (ref 4.8–10.8)

## 2022-04-11 PROCEDURE — 80053 COMPREHEN METABOLIC PANEL: CPT

## 2022-04-11 PROCEDURE — 36415 COLL VENOUS BLD VENIPUNCTURE: CPT

## 2022-04-11 PROCEDURE — 86300 IMMUNOASSAY TUMOR CA 15-3: CPT

## 2022-04-11 PROCEDURE — 85025 COMPLETE CBC W/AUTO DIFF WBC: CPT

## 2022-04-13 LAB — CA 27-29: 24 U/ML (ref 0–38)

## 2022-04-18 ENCOUNTER — HOSPITAL ENCOUNTER (OUTPATIENT)
Dept: MAMMOGRAPHY | Age: 82
Discharge: HOME | End: 2022-04-18
Payer: MEDICARE

## 2022-04-18 DIAGNOSIS — Z12.39 SCREENING BREAST EXAMINATION: ICD-10-CM

## 2022-04-18 PROCEDURE — 77063 BREAST TOMOSYNTHESIS BI: CPT

## 2022-04-19 ENCOUNTER — TELEPHONE (OUTPATIENT)
Dept: NEPHROLOGY | Age: 82
End: 2022-04-19

## 2022-04-19 NOTE — TELEPHONE ENCOUNTER
Patient called asking for medication adjustments. Her BP this am 196/89 and just about an hour ago it was 194/92.          Please advise

## 2022-04-20 RX ORDER — HYDRALAZINE HYDROCHLORIDE 25 MG/1
25 TABLET, FILM COATED ORAL 2 TIMES DAILY
COMMUNITY
End: 2022-08-08

## 2022-04-20 RX ORDER — HYDRALAZINE HYDROCHLORIDE 25 MG/1
25 TABLET, FILM COATED ORAL 2 TIMES DAILY
Qty: 60 TABLET | Refills: 3 | Status: SHIPPED | OUTPATIENT
Start: 2022-04-20 | End: 2022-08-08 | Stop reason: SDUPTHER

## 2022-06-08 ENCOUNTER — OFFICE VISIT (OUTPATIENT)
Dept: ENT CLINIC | Age: 82
End: 2022-06-08
Payer: MEDICARE

## 2022-06-08 VITALS
SYSTOLIC BLOOD PRESSURE: 130 MMHG | RESPIRATION RATE: 16 BRPM | DIASTOLIC BLOOD PRESSURE: 60 MMHG | WEIGHT: 99 LBS | BODY MASS INDEX: 19.44 KG/M2 | HEIGHT: 60 IN | TEMPERATURE: 98.1 F | OXYGEN SATURATION: 96 % | HEART RATE: 82 BPM

## 2022-06-08 DIAGNOSIS — R42 VERTIGO: Primary | ICD-10-CM

## 2022-06-08 PROCEDURE — G8420 CALC BMI NORM PARAMETERS: HCPCS | Performed by: OTOLARYNGOLOGY

## 2022-06-08 PROCEDURE — 1123F ACP DISCUSS/DSCN MKR DOCD: CPT | Performed by: OTOLARYNGOLOGY

## 2022-06-08 PROCEDURE — G8427 DOCREV CUR MEDS BY ELIG CLIN: HCPCS | Performed by: OTOLARYNGOLOGY

## 2022-06-08 PROCEDURE — 99203 OFFICE O/P NEW LOW 30 MIN: CPT | Performed by: OTOLARYNGOLOGY

## 2022-06-08 PROCEDURE — 1036F TOBACCO NON-USER: CPT | Performed by: OTOLARYNGOLOGY

## 2022-06-08 PROCEDURE — 1090F PRES/ABSN URINE INCON ASSESS: CPT | Performed by: OTOLARYNGOLOGY

## 2022-06-08 PROCEDURE — G8400 PT W/DXA NO RESULTS DOC: HCPCS | Performed by: OTOLARYNGOLOGY

## 2022-06-08 NOTE — PROGRESS NOTES
[address], NOSE AND THROAT  Bernadette Leung [address] [facility]  Dept: [phone]  Dept Fax: [phone]: [phone]       Dear Silva Peñaloza*, thank you for referring Berkley Stone in consultation for a chief complaint of: vertigo . My full evaluation is as follows:     HPI:     As you recall, Berkley Stone is a 79 y.o. female who presents today for evaluation of her vertigo. She has difficulty with her balance on most days. She does not have any days where her balance is not a problem. It is usually an issue while she is walking. She walks with a cane most of the time. She does have double vision when she looks in the distance. She also states that the world is spinning around the edges of her vision. This does not happen if she is looking down while sitting however. She does have prism glasses to help correct her double vision, but she says they do not really help. She does not get nauseous with her vertigo. She says the attacks will come and last until she closes her eyes and takes a rest.  She occasionally has cracking and popping in her left ear, but does not notice any hearing loss or any tinnitus. She denies any ear pressure or fullness, and specifically stated that none of these type of symptoms got worse when she had her vertigo attacks. History:      Allergies   Allergen Reactions    Tramadol Nausea And Vomiting     Current Outpatient Medications   Medication Sig Dispense Refill    hydrALAZINE (APRESOLINE) 25 MG tablet Take 1 tablet by mouth 2 times daily 60 tablet 3    hydrALAZINE (APRESOLINE) 25 MG tablet Take 25 mg by mouth 2 times daily      pantoprazole (PROTONIX) 40 MG tablet TAKE ONE TABLET, TWO TIMES A DAY, BY MOUTH. 180 tablet 3    digoxin (LANOXIN) 125 MCG tablet Take 1 tablet by mouth daily Additional RF per her PCP 90 tablet 3    dilTIAZem (CARDIZEM CD) 180 MG extended release capsule Take 1 capsule by mouth daily Additional RF per her PCP 90 capsule 3    losartan (COZAAR) 100 MG tablet Take 1 tablet by mouth daily 90 tablet 3    potassium citrate (UROCIT-K) 10 MEQ (1080 MG) extended release tablet Take 1 tablet by mouth daily 90 tablet 3    bumetanide (BUMEX) 1 MG tablet Take 1 tablet by mouth daily 90 tablet 3    rosuvastatin (CRESTOR) 5 MG tablet Take 1 tablet by mouth every evening Indications: Blood Cholesterol Abnormal 90 tablet 3    Multiple Vitamin (MULTIVITAMIN) tablet Take 1 tablet by mouth daily      warfarin (COUMADIN) 2 MG tablet Take as instructed by the physician following the INR results 30 tablet 0    acetaminophen (TYLENOL ARTHRITIS PAIN) 650 MG extended release tablet Take 650 mg by mouth 2 times daily as needed Indications: Arthritis       levothyroxine (SYNTHROID) 50 MCG tablet Take 50 mcg by mouth daily Indications: Impaired Thyroid Function       Biotin 1000 MCG TABS Take 1,000 mcg by mouth nightly        No current facility-administered medications for this visit.      Past Medical History:   Diagnosis Date    Arthritis     [facility]     Breast cancer ([facility] 2010    Lumpectomy    CAD (coronary artery disease)     Cancer ([facility]     BREAST    Chronic kidney disease     History of therapeutic radiation 2011    left IDC    HTN (hypertension) 4/13/2016    Hypertension     Mixed hyperlipidemia 7/6/2020    Nausea & vomiting     Neuromuscular disorder (HCC)     Osteopenia     Pneumonia of both lungs due to infectious organism     Rheumatic fever     as a child    Sinus infection     Thyroid disease       Past Surgical History:   Procedure Laterality Date    BREAST LUMPECTOMY Left 2011    BRONCHOSCOPY N/A 1/5/2020    BRONCHOSCOPY performed by Sabrina Fernandez MD at [address] [place] [address] Left 2009   [facility]  2010    DILATION AND CURETTAGE OF UTERUS      X2; SEVERAL YEARS AGO    JOINT REPLACEMENT Right 2010    KNEE  ID BX OF BREAST, NEEDLE CORE, IMAGE GUIDE Left 03/26/2020    benign    ID BX OF BREAST, NEEDLE CORE, IMAGE GUIDE Left 03/26/2020    benign    ID BX OF BREAST, NEEDLE CORE, IMAGE GUIDE Left 03/26/2020    benign    ID BX OF BREAST, NEEDLE CORE, IMAGE GUIDE Left 12/09/2010    IDC     TONSILLECTOMY      UPPER GASTROINTESTINAL ENDOSCOPY N/A 1/3/2020    [facility]GD [facility]    EGD DIAGNOSTIC ONLY performed by Zach Rodriguez MD at [facility]     Family History   Problem Relation Age of Onset    Arthritis Mother     High Blood Pressure Mother     Cancer Sister         LEUKEMIA    Arthritis Brother     Asthma Brother     Colon Polyps Brother 58    Heart Disease Brother     Breast Cancer Maternal Aunt     Breast Cancer Daughter 46    Breast Cancer Maternal Cousin 61    Breast Cancer Maternal Cousin 61    Breast Cancer Maternal Cousin     Breast Cancer Maternal Cousin      Social History     Tobacco Use    Smoking status: Never Smoker    Smokeless tobacco: Never Used   Substance Use Topics    Alcohol use: Not Currently     Alcohol/week: 1.0 standard drink     Types: 1 Glasses of wine per week     Comment: A  MONTH       All of the past medical history, past surgical history, family history,social history, allergies and current medications were reviewed with the patient. Review of Systems      A complete review of systems was obtained by the patient intake form, which is attached to this visit. Objective:   /60 (Site: Right Upper Arm, Position: Sitting)   Pulse 82   Temp 98.1 °F (36.7 °C)   Resp 16   Ht 5' (1.524 m)   Wt 99 lb (44.9 kg)   SpO2 96%   BMI 19.33 kg/m²     PHYSICAL EXAM  ABNORMAL OTOLARYNGOLOGIC EXAM FINDINGS: None     OTHER PERTINENT EXAM FINDINGS:  GENERAL: Awake, NAD, Non-toxic appearing.  Normal voice quality  NEUROLOGICAL: GCS 15, Cranial nerves II-VI, VIII-XII grossly intact,  Facial nerve (VII) w/ House-Brackman Grade 1 of 6 bilaterally, No evidence of nystagmus or gross asymmetry    EARS: Pinna well-formed,  EAC non-stenotic w/o cerumen impaction AU,  TM's intact AU w/o effusion or active infection appreciated  EYES: EOMI, No ptosis appreciated   NOSE: Dorsum w/o scar or deformity,  No mucopurulence appreciated, Patent nasal airways bilaterally. No inferior turbinate hypertrophy. No septal deviation. ORAL CAVITY: No mucosal masses/lesions appreciated, Tongue with FROM. Appropriate RENE w/o trismus. OROPHARYNX: No mucosal masses or lesions appreciated. Symmetric palatal elevation w/o draping. NECK: Soft, supple. No crepitus or masses appreciated,  Trachea midline. No thyromegaly or thyroid tenderness. Data: The relevant prior clinic notes were reviewed today, including the referring physicians notes. Imaging: none       Assessment & Plan   Gabby Lombardi is a 79 y.o. female with:   1. Vertigo       To further evaluate her vertigo, I would like a hearing test and VNG. Her clinical picture is pointing towards a multifactorial etiology with a vision component. No follow-ups on file. Thank you for involving me in this patient's care. Please do not hesitate to call with any questions or concerns. Sincerely,    Sanaz Molina M.D. Otolaryngology-Head and Neck Surgery    **This report has been created using voice recognition software. It may contain minor errors which are inherent in voice recognition technology. **

## 2022-06-30 ENCOUNTER — HOSPITAL ENCOUNTER (OUTPATIENT)
Dept: AUDIOLOGY | Age: 82
Discharge: HOME | End: 2022-06-30
Payer: MEDICARE

## 2022-06-30 PROCEDURE — 92557 COMPREHENSIVE HEARING TEST: CPT | Performed by: AUDIOLOGIST

## 2022-06-30 PROCEDURE — 92537 CALORIC VSTBLR TEST W/REC: CPT | Performed by: AUDIOLOGIST

## 2022-06-30 PROCEDURE — 92540 BASIC VESTIBULAR EVALUATION: CPT | Performed by: AUDIOLOGIST

## 2022-06-30 PROCEDURE — 92567 TYMPANOMETRY: CPT | Performed by: AUDIOLOGIST

## 2022-06-30 NOTE — PROGRESS NOTES
ACCOUNT #: [de-identified]                                    AUDIOLOGICAL EVALUATION WITH VNG      MEDICATIONS REVIEWED:  Patient has held appropriate medications for VNG testing. REASON FOR TESTING: Audiometric evaluation and VNG testing per the request of Dr. Mariam Painter, due to the diagnosis of Vertigo. The patient has been experiencing episodic vertigo for several  Months. The episodes seem to occur with standing or walking. She feels like the room is spinning. She uses a cane to help her feel steady. Hearing loss is denied. She does occasionally hearing a thumping or rattling sound in her left ear and experiences intermittent otalgia also in her left ear. She is experiencing double vision for which she consulted her optometrist. Rashad Ann has weakness and numbness in her arms and hands which she attributes to likely carpal tunnel syndrome. She also has significant arthritis throughout her body. OTOSCOPY: Clear canal, bilaterally     AUDIOGRAM            Reliability: Good  Audiometer Used:  GSI-61      VNG RESULTS:    GAZE: Several beats of right beating nystagmus (6-7 deg) was noted at the beginning of right gaze only. The nystagmus did not persist. The nystagmus was present with repeated testing but did not persist.  SMOOTH PURSUIT: WNL  RANDOM SACCADE: WNL  OPTOKINETIC: WNL  SPONTANEOUS NYSTAGMUS:  NONE   ODALYS-HALLPIKE: Upbeat and right torsion nystagmus upon sitting up from both head right and head left. The response was not delayed but was repeatable and did fatigue. POSITIONAL: WNL  CALORIC TESTING: WNL  MARY' SOT: WNL    COMMENTS:  Normal hearing at 250-4000 Hz sloping to a mild to moderate high frequency sensorineural hearing loss in both ears. Speech discrimination ability is excellent at 100% for both ears. Tympanometry revealed normal peak pressure and normal middle ear compliance for both ears.  Results of VNG testing suggest a possible peripheral site of lesion given the positioning nystagmus present with the Hallpike maneuver. RECOMMENDATION(S):   1- Follow up with Dr. Allyn Sainz as scheduled. 2- Consider referral to physical therapy for possible postioning maneuver and vestibular rehabilitation. 3- Annual audiometric testing to monitor hearing loss, sooner with any suspected change or new concerns.

## 2022-07-06 ENCOUNTER — OFFICE VISIT (OUTPATIENT)
Dept: ENT CLINIC | Age: 82
End: 2022-07-06
Payer: MEDICARE

## 2022-07-06 VITALS
DIASTOLIC BLOOD PRESSURE: 62 MMHG | OXYGEN SATURATION: 99 % | TEMPERATURE: 96.4 F | RESPIRATION RATE: 14 BRPM | HEIGHT: 59 IN | HEART RATE: 83 BPM | BODY MASS INDEX: 19.76 KG/M2 | SYSTOLIC BLOOD PRESSURE: 110 MMHG | WEIGHT: 98 LBS

## 2022-07-06 DIAGNOSIS — H81.10 BENIGN PAROXYSMAL POSITIONAL VERTIGO, UNSPECIFIED LATERALITY: Primary | ICD-10-CM

## 2022-07-06 PROCEDURE — 1036F TOBACCO NON-USER: CPT | Performed by: OTOLARYNGOLOGY

## 2022-07-06 PROCEDURE — 99213 OFFICE O/P EST LOW 20 MIN: CPT | Performed by: OTOLARYNGOLOGY

## 2022-07-06 PROCEDURE — G8400 PT W/DXA NO RESULTS DOC: HCPCS | Performed by: OTOLARYNGOLOGY

## 2022-07-06 PROCEDURE — 1090F PRES/ABSN URINE INCON ASSESS: CPT | Performed by: OTOLARYNGOLOGY

## 2022-07-06 PROCEDURE — G8427 DOCREV CUR MEDS BY ELIG CLIN: HCPCS | Performed by: OTOLARYNGOLOGY

## 2022-07-06 PROCEDURE — 1123F ACP DISCUSS/DSCN MKR DOCD: CPT | Performed by: OTOLARYNGOLOGY

## 2022-07-06 PROCEDURE — G8420 CALC BMI NORM PARAMETERS: HCPCS | Performed by: OTOLARYNGOLOGY

## 2022-07-06 NOTE — PROGRESS NOTES
PT.  I provided her handout of additional exercises to complete at home. BPPV is usually self-limiting, and this was encouraging to her. I briefly discussed surgical options for treatment, but she was very uninterested in pursuing surgery. We will see her back on an as-needed basis. Thank you for involving me in this patient's care. Please do not hesitate to call with any questions or concerns. Sincerely,    Shantel Thompson M.D. Otolaryngology-Head and Neck Surgery    **This report has been created using voice recognition software. It may contain minor errors which are inherent in voice recognition technology. **

## 2022-07-06 NOTE — PATIENT INSTRUCTIONS
Semont  maneuver, unless specifically instructed otherwise by your health care provider. 4. At one week after treatment, put yourself in the position that usually makes you dizzy. Position yourself cautiously and under conditions in which you can't fall or hurt yourself. Let  your doctor know how you did.

## 2022-08-01 ENCOUNTER — HOSPITAL ENCOUNTER (OUTPATIENT)
Age: 82
Discharge: HOME | End: 2022-08-01
Payer: MEDICARE

## 2022-08-01 DIAGNOSIS — E83.52 HYPERCALCEMIA: ICD-10-CM

## 2022-08-01 DIAGNOSIS — N18.32 CHRONIC KIDNEY DISEASE, STAGE 3B (HCC): ICD-10-CM

## 2022-08-01 DIAGNOSIS — E67.3 HIGH VITAMIN D LEVEL: ICD-10-CM

## 2022-08-01 LAB
ANION GAP SERPL CALCULATED.3IONS-SCNC: 15 MEQ/L (ref 8–16)
BUN BLDV-MCNC: 33 MG/DL (ref 7–22)
CALCIUM SERPL-MCNC: 10.5 MG/DL (ref 8.5–10.5)
CHLORIDE BLD-SCNC: 107 MEQ/L (ref 98–111)
CO2: 22 MEQ/L (ref 23–33)
CREAT SERPL-MCNC: 1.7 MG/DL (ref 0.4–1.2)
GFR SERPL CREATININE-BSD FRML MDRD: 29 ML/MIN/1.73M2
GLUCOSE BLD-MCNC: 102 MG/DL (ref 70–108)
POTASSIUM SERPL-SCNC: 4.4 MEQ/L (ref 3.5–5.2)
SODIUM BLD-SCNC: 144 MEQ/L (ref 135–145)
VITAMIN D 25-HYDROXY: > 120 NG/ML (ref 30–100)

## 2022-08-01 PROCEDURE — 80048 BASIC METABOLIC PNL TOTAL CA: CPT

## 2022-08-01 PROCEDURE — 82306 VITAMIN D 25 HYDROXY: CPT

## 2022-08-01 PROCEDURE — 36415 COLL VENOUS BLD VENIPUNCTURE: CPT

## 2022-08-02 ENCOUNTER — TELEPHONE (OUTPATIENT)
Dept: NEPHROLOGY | Age: 82
End: 2022-08-02

## 2022-08-02 NOTE — RESULT ENCOUNTER NOTE
Did she stop Vit D?   Her levels are high  Need to stop VIT d or any Multivitamins containing Vit D  Pls inform patient

## 2022-08-02 NOTE — TELEPHONE ENCOUNTER
----- Message from Bernadette Gottlieb MD sent at 8/1/2022  8:38 PM EDT -----  Did she stop Vit D?   Her levels are high  Need to stop VIT d or any Multivitamins containing Vit D  Pls inform patient

## 2022-08-02 NOTE — TELEPHONE ENCOUNTER
Spoke to pt, she stopped taking Vit D when you told her to stop the last time. She also stated that she does take a multivitamin. I advised her to check the label and if it has Vit D in it to please stop taking it. She stated that she understood.

## 2022-08-08 ENCOUNTER — OFFICE VISIT (OUTPATIENT)
Dept: NEPHROLOGY | Age: 82
End: 2022-08-08
Payer: MEDICARE

## 2022-08-08 VITALS
SYSTOLIC BLOOD PRESSURE: 140 MMHG | OXYGEN SATURATION: 98 % | BODY MASS INDEX: 19.59 KG/M2 | WEIGHT: 97 LBS | DIASTOLIC BLOOD PRESSURE: 74 MMHG | HEART RATE: 72 BPM

## 2022-08-08 DIAGNOSIS — E67.3 HIGH VITAMIN D LEVEL: ICD-10-CM

## 2022-08-08 DIAGNOSIS — N18.32 CHRONIC KIDNEY DISEASE, STAGE 3B (HCC): Primary | ICD-10-CM

## 2022-08-08 DIAGNOSIS — I12.9 HYPERTENSIVE RENAL DISEASE, STAGE 1 THROUGH STAGE 4 OR UNSPECIFIED CHRONIC KIDNEY DISEASE: ICD-10-CM

## 2022-08-08 PROCEDURE — 1090F PRES/ABSN URINE INCON ASSESS: CPT | Performed by: INTERNAL MEDICINE

## 2022-08-08 PROCEDURE — G8400 PT W/DXA NO RESULTS DOC: HCPCS | Performed by: INTERNAL MEDICINE

## 2022-08-08 PROCEDURE — 99213 OFFICE O/P EST LOW 20 MIN: CPT | Performed by: INTERNAL MEDICINE

## 2022-08-08 PROCEDURE — G8427 DOCREV CUR MEDS BY ELIG CLIN: HCPCS | Performed by: INTERNAL MEDICINE

## 2022-08-08 PROCEDURE — 1123F ACP DISCUSS/DSCN MKR DOCD: CPT | Performed by: INTERNAL MEDICINE

## 2022-08-08 PROCEDURE — G8420 CALC BMI NORM PARAMETERS: HCPCS | Performed by: INTERNAL MEDICINE

## 2022-08-08 PROCEDURE — 1036F TOBACCO NON-USER: CPT | Performed by: INTERNAL MEDICINE

## 2022-08-08 RX ORDER — HYDRALAZINE HYDROCHLORIDE 25 MG/1
25 TABLET, FILM COATED ORAL 2 TIMES DAILY
Qty: 180 TABLET | Refills: 3 | Status: SHIPPED | OUTPATIENT
Start: 2022-08-08

## 2022-08-08 RX ORDER — TIZANIDINE 2 MG/1
TABLET ORAL
COMMUNITY
Start: 2022-07-18

## 2022-08-08 RX ORDER — TRIAMTERENE AND HYDROCHLOROTHIAZIDE 37.5; 25 MG/1; MG/1
TABLET ORAL
COMMUNITY
Start: 2022-07-21 | End: 2022-08-08

## 2022-08-08 NOTE — PROGRESS NOTES
[address] KIDNEY AND HYPERTENSION  [address]  [facility] [address]  Dept: [phone]  Loc: [phone]  Office Progress Note  8/8/2022 3:20 PM      Pt Name:    Carina Yancey  YOB: 1940  Primary Care Physician:  Slava Kang, APRN - CNP     Chief Complaint:   Chief Complaint   Patient presents with    Chronic Kidney Disease     Stage 4        Background Information/Interval History:   78 yo white female with hx CKD III, HTN, breast cancer s/p lumpectomy and radiation about 10 years ago (Dr. Jeffrey Love) who I am seeing for follow-up. She was previously a patient of Dr. Nahid Cotton. She reports lot of heart issues. She reports hx atrial fibrillation and moderate MR and AR. She had US in July 2020 which showed about 8 cm kidneys with simple renal cysts. She does report hx UTIs in the past.     Here for 4 months follow-up. She says she was taking MVI which contains Vit D despite knowing from previous visit that Vit D was running high. Her Vit D level is still >120. She is taking hydralazine, cardizem, cozaar. Pt is not really sure about her other medications. She says PCP stopped her bumex. She thinks she was taking triamterene but she is not sure, she will call my office to confirm the medication list when she gets home.       Past History:  Past Medical History:   Diagnosis Date    Arthritis     Atrial fibrillation ([address]     Breast cancer ([address] 2010    Lumpectomy    CAD (coronary artery disease)     Cancer ([facility]     BREAST    Chronic kidney disease     History of therapeutic radiation 2011    left IDC    HTN (hypertension) 4/13/2016    Hypertension     Mixed hyperlipidemia 7/6/2020    Nausea & vomiting     Neuromuscular disorder (HCC)     Osteopenia     Pneumonia of both lungs due to infectious organism     Rheumatic fever     as a child    Sinus infection     Thyroid disease      Past Surgical History:   Procedure Laterality Date tablet 3    dilTIAZem (CARDIZEM CD) 180 MG extended release capsule Take 1 capsule by mouth daily Additional RF per her PCP 90 capsule 3    losartan (COZAAR) 100 MG tablet Take 1 tablet by mouth daily 90 tablet 3    potassium citrate (UROCIT-K) 10 MEQ (1080 MG) extended release tablet Take 1 tablet by mouth daily 90 tablet 3    rosuvastatin (CRESTOR) 5 MG tablet Take 1 tablet by mouth every evening Indications: Blood Cholesterol Abnormal 90 tablet 3    warfarin (COUMADIN) 2 MG tablet Take as instructed by the physician following the INR results 30 tablet 0    acetaminophen (TYLENOL) 650 MG extended release tablet Take 650 mg by mouth 2 times daily as needed Indications: Arthritis       levothyroxine (SYNTHROID) 50 MCG tablet Take 50 mcg by mouth daily Indications: Impaired Thyroid Function       Biotin 1000 MCG TABS Take 1,000 mcg by mouth nightly        No current facility-administered medications for this visit. Laboratory & Diagnostics:  Old labs  US: R 8.5, L 8.0, Simple renal cysts  Creat 1.3 to 1.5  Calcium 10.5 to 11  Echo: EF 60%, moderate AR and MR  UA: July 2020: no blood, no protein, UACR: 58 mg/g    April 2022: K 4.0, Creat 1.5, PTH 26, Vit D >120, Hb 11.7  UA: no blood, no protein, UPCR 110 mg/g    Aug 2022: k 4.4, creat 1.7, Ca 10.5     Impression/Plan:   1. Hx CKD III: Dr. Germania Whiting did kidney biopsy in 2017 which showed mild to moderate arterio- nephrosclerosis. Stable kidney function at this time. 2. HTN: on oral HTN medications. BP high in the morning before medications but comes down to 120-130s after medications. Bp is 140/74 at this time. 3. Renal cysts, simple  4. Mild hypercalcemia: likely due to supra-therapeutic Vit D. Her Vit D level is >120. Discussed with patient. I ordered SPEP and ADAMS but she did not get these done. Her Vit D level is still high. Apparently she is still taking MVI containing vitamin D.  I discussed with her and advised her to stop Vit D supplements for now or any supplement containing Vit D. She agreed. Will check again in 3-4 months. 5. Hypocitraturia (litholink done by Dr. Jessika Lopez due to ?frequent UTI): no hx kidney stones. Now on potassium citrate. K is okay. Orders Placed This Encounter   Procedures    Basic Metabolic Panel    Vitamin D 25 Hydroxy     Return in about 6 months (around 2/8/2023).       Maame Turcios MD  [facility]

## 2023-01-13 RX ORDER — POTASSIUM CITRATE 10 MEQ/1
10 TABLET, EXTENDED RELEASE ORAL DAILY
Qty: 90 TABLET | Refills: 3 | Status: SHIPPED | OUTPATIENT
Start: 2023-01-13

## 2023-02-06 ENCOUNTER — HOSPITAL ENCOUNTER (OUTPATIENT)
Age: 83
Discharge: HOME | End: 2023-02-06
Payer: MEDICARE

## 2023-02-06 ENCOUNTER — TELEPHONE (OUTPATIENT)
Dept: NEPHROLOGY | Age: 83
End: 2023-02-06

## 2023-02-06 DIAGNOSIS — I12.9 HYPERTENSIVE RENAL DISEASE, STAGE 1 THROUGH STAGE 4 OR UNSPECIFIED CHRONIC KIDNEY DISEASE: ICD-10-CM

## 2023-02-06 DIAGNOSIS — E67.3 HIGH VITAMIN D LEVEL: ICD-10-CM

## 2023-02-06 DIAGNOSIS — N18.5 CKD (CHRONIC KIDNEY DISEASE) STAGE 5, GFR LESS THAN 15 ML/MIN (HCC): Primary | ICD-10-CM

## 2023-02-06 DIAGNOSIS — N18.32 CHRONIC KIDNEY DISEASE, STAGE 3B (HCC): ICD-10-CM

## 2023-02-06 LAB
25(OH)D3 SERPL-MCNC: 101 NG/ML (ref 30–100)
ANION GAP SERPL CALC-SCNC: 14 MEQ/L (ref 8–16)
BUN SERPL-MCNC: 66 MG/DL (ref 7–22)
CALCIUM SERPL-MCNC: 10.2 MG/DL (ref 8.5–10.5)
CHLORIDE SERPL-SCNC: 103 MEQ/L (ref 98–111)
CO2 SERPL-SCNC: 23 MEQ/L (ref 23–33)
CREAT SERPL-MCNC: 3.3 MG/DL (ref 0.4–1.2)
GFR SERPL CREATININE-BSD FRML MDRD: 13 ML/MIN/1.73M2
GLUCOSE SERPL-MCNC: 92 MG/DL (ref 70–108)
POTASSIUM SERPL-SCNC: 5.4 MEQ/L (ref 3.5–5.2)
SODIUM SERPL-SCNC: 140 MEQ/L (ref 135–145)

## 2023-02-06 PROCEDURE — 36415 COLL VENOUS BLD VENIPUNCTURE: CPT

## 2023-02-06 PROCEDURE — 80048 BASIC METABOLIC PNL TOTAL CA: CPT

## 2023-02-06 PROCEDURE — 82306 VITAMIN D 25 HYDROXY: CPT

## 2023-02-06 NOTE — TELEPHONE ENCOUNTER
----- Message from Inocente Gonzalez MD sent at 2/6/2023  3:19 PM EST -----  Stop Vit D, losartan and Potassium citrate  Repeat BMP 1 week

## 2023-02-06 NOTE — TELEPHONE ENCOUNTER
Spoke to pt, she will stop the medications and repeat labs next Monday. She understood that this is because her creat is 3.3.  She is not taking any Vit D.    Lab order pending

## 2023-02-08 ENCOUNTER — OFFICE VISIT (OUTPATIENT)
Dept: CARDIOLOGY CLINIC | Age: 83
End: 2023-02-08
Payer: MEDICARE

## 2023-02-08 VITALS
DIASTOLIC BLOOD PRESSURE: 60 MMHG | SYSTOLIC BLOOD PRESSURE: 120 MMHG | HEART RATE: 83 BPM | WEIGHT: 114 LBS | BODY MASS INDEX: 22.98 KG/M2 | HEIGHT: 59 IN

## 2023-02-08 DIAGNOSIS — I25.10 CORONARY ARTERY DISEASE INVOLVING NATIVE CORONARY ARTERY OF NATIVE HEART WITHOUT ANGINA PECTORIS: ICD-10-CM

## 2023-02-08 DIAGNOSIS — I48.21 PERMANENT ATRIAL FIBRILLATION (HCC): Primary | ICD-10-CM

## 2023-02-08 DIAGNOSIS — R26.89 BALANCE PROBLEM: ICD-10-CM

## 2023-02-08 DIAGNOSIS — R42 DIZZINESS: ICD-10-CM

## 2023-02-08 DIAGNOSIS — R06.02 SHORTNESS OF BREATH: ICD-10-CM

## 2023-02-08 PROCEDURE — 1123F ACP DISCUSS/DSCN MKR DOCD: CPT | Performed by: NUCLEAR MEDICINE

## 2023-02-08 PROCEDURE — G8420 CALC BMI NORM PARAMETERS: HCPCS | Performed by: NUCLEAR MEDICINE

## 2023-02-08 PROCEDURE — 3074F SYST BP LT 130 MM HG: CPT | Performed by: NUCLEAR MEDICINE

## 2023-02-08 PROCEDURE — G8427 DOCREV CUR MEDS BY ELIG CLIN: HCPCS | Performed by: NUCLEAR MEDICINE

## 2023-02-08 PROCEDURE — 93000 ELECTROCARDIOGRAM COMPLETE: CPT | Performed by: NUCLEAR MEDICINE

## 2023-02-08 PROCEDURE — 1036F TOBACCO NON-USER: CPT | Performed by: NUCLEAR MEDICINE

## 2023-02-08 PROCEDURE — 3078F DIAST BP <80 MM HG: CPT | Performed by: NUCLEAR MEDICINE

## 2023-02-08 PROCEDURE — G8400 PT W/DXA NO RESULTS DOC: HCPCS | Performed by: NUCLEAR MEDICINE

## 2023-02-08 PROCEDURE — 1090F PRES/ABSN URINE INCON ASSESS: CPT | Performed by: NUCLEAR MEDICINE

## 2023-02-08 PROCEDURE — 99214 OFFICE O/P EST MOD 30 MIN: CPT | Performed by: NUCLEAR MEDICINE

## 2023-02-08 PROCEDURE — G8484 FLU IMMUNIZE NO ADMIN: HCPCS | Performed by: NUCLEAR MEDICINE

## 2023-02-08 RX ORDER — GABAPENTIN 100 MG/1
100 CAPSULE ORAL 2 TIMES DAILY
COMMUNITY

## 2023-02-08 RX ORDER — BUMETANIDE 1 MG/1
1 TABLET ORAL DAILY
COMMUNITY

## 2023-02-08 NOTE — PROGRESS NOTES
[address] [facility]  [email or web address]  [address]  [facility] [address]  Dept: [phone]  Dept Fax: [phone]  Loc: [phone]    Visit Date: 2/8/2023    Sejal Stacy is a 79 y.o. female who presents todayfor:  Chief Complaint   Patient presents with    1 Year Follow Up    Atrial Fibrillation    Check-Up    Palpitations    Dizziness    Hypertension   Some balance issues  No syncope  Some visual issues   Double vision   Known A fib   Intermittent A fib   Intermittent palpitation   Does have stable BP  On medical Rx  Coumadin is followed by clinic  Does have renal disease  Stage 4       HPI:  HPI  Past Medical History:   Diagnosis Date    Arthritis     Atrial fibrillation ([address]     Breast cancer ([address] 2010    Lumpectomy    CAD (coronary artery disease)     Cancer ([address]     BREAST    Chronic kidney disease     History of therapeutic radiation 2011    left IDC    HTN (hypertension) 4/13/2016    Hypertension     Mixed hyperlipidemia 7/6/2020    Nausea & vomiting     Neuromuscular disorder ([address]     Osteopenia     Pneumonia of both lungs due to infectious organism     Rheumatic fever     as a child    Sinus infection     Thyroid disease       Past Surgical History:   Procedure Laterality Date    BREAST LUMPECTOMY Left 2011    BRONCHOSCOPY N/A 1/5/2020    BRONCHOSCOPY performed by Carri Diaz MD at [address] [facility]    BUNIONECTOMY Left 2009    CARDIAC CATHETERIZATION  2010    DILATION AND CURETTAGE OF UTERUS      X2; SEVERAL YEARS AGO    JOINT REPLACEMENT Right 2010    KNEE    OH BX OF BREAST, NEEDLE CORE, IMAGE GUIDE Left 03/26/2020    benign    OH BX OF BREAST, NEEDLE CORE, IMAGE GUIDE Left 03/26/2020    benign    OH BX OF BREAST, NEEDLE CORE, IMAGE GUIDE Left 03/26/2020    benign    OH BX OF BREAST, NEEDLE CORE, IMAGE GUIDE Left 12/09/2010    IDC     TONSILLECTOMY      UPPER GASTROINTESTINAL ENDOSCOPY N/A 1/3/2020    EGD ESOPHAGOGASTRODUODENOSCOPY performed by Sue Polanco MD at [address] [facility]    UPPER GASTROINTESTINAL ENDOSCOPY N/A 1/11/2020    EGD DIAGNOSTIC ONLY performed by Sue Polanco MD at [address] [facility]     Family History   Problem Relation Age of Onset    Arthritis Mother     High Blood Pressure Mother     Cancer Sister         LEUKEMIA    Arthritis Brother     Asthma Brother     Colon Polyps Brother 58    Heart Disease Brother     Breast Cancer Maternal Aunt     Breast Cancer Daughter 46    Breast Cancer Maternal Cousin 61    Breast Cancer Maternal Cousin 61    Breast Cancer Maternal Cousin     Breast Cancer Maternal Cousin      Social History     Tobacco Use    Smoking status: Never    Smokeless tobacco: Never   Substance Use Topics    Alcohol use: Not Currently     Alcohol/week: 1.0 standard drink     Types: 1 Glasses of wine per week     Comment: A  MONTH      Current Outpatient Medications   Medication Sig Dispense Refill    gabapentin (NEURONTIN) 100 MG capsule Take 100 mg by mouth in the morning and at bedtime. bumetanide (BUMEX) 1 MG tablet Take 1 mg by mouth daily      Coenzyme Q10 (CO Q 10 PO) Take by mouth      Probiotic Product (PROBIOTIC PO) Take by mouth      hydrALAZINE (APRESOLINE) 25 MG tablet Take 1 tablet by mouth in the morning and 1 tablet before bedtime.  180 tablet 3    pantoprazole (PROTONIX) 40 MG tablet TAKE ONE TABLET, TWO TIMES A DAY, BY MOUTH. 180 tablet 3    dilTIAZem (CARDIZEM CD) 180 MG extended release capsule Take 1 capsule by mouth daily Additional RF per her PCP 90 capsule 3    rosuvastatin (CRESTOR) 5 MG tablet Take 1 tablet by mouth every evening Indications: Blood Cholesterol Abnormal 90 tablet 3    warfarin (COUMADIN) 2 MG tablet Take as instructed by the physician following the INR results 30 tablet 0    acetaminophen (TYLENOL) 650 MG extended release tablet Take 650 mg by mouth 2 times daily as needed Indications: Arthritis       levothyroxine (SYNTHROID) 50 MCG tablet Take 50 mcg by mouth daily Indications: Impaired Thyroid Function       Biotin 1000 MCG TABS Take 1,000 mcg by mouth nightly        No current facility-administered medications for this visit. Allergies   Allergen Reactions    Tramadol Nausea And Vomiting     Health Maintenance   Topic Date Due    Depression Screen  Never done    DTaP/Tdap/Td vaccine (1 - Tdap) Never done    Shingles vaccine (1 of 2) Never done    DEXA (modify frequency per FRAX score)  Never done    Pneumococcal 65+ years Vaccine (2 - PPSV23 if available, else PCV20) 04/19/2017    Annual Wellness Visit (AWV)  Never done    Lipids  09/05/2020    COVID-19 Vaccine (4 - Booster for Pfizer series) 01/11/2022    Flu vaccine  Completed    Hepatitis A vaccine  Aged Out    Hib vaccine  Aged Out    Meningococcal (ACWY) vaccine  Aged Out       Subjective:  Review of Systems  General:   No fever, no chills, No fatigue or weight loss  Pulmonary:    some dyspnea, no wheezing  Cardiac:    Denies recent chest pain,   GI:     No nausea or vomiting, no abdominal pain  Neuro:    No dizziness or light headedness,   Musculoskeletal:  No recent active issues  Extremities:   No edema, no obvious claudication     Objective:  Physical Exam  /60   Pulse 83   Ht 4' 11\" (1.499 m)   Wt 114 lb (51.7 kg)   BMI 23.03 kg/m²   General:   Well developed, well nourished  Lungs:   Clear to auscultation  Heart:    Normal S1 S2, Slight murmur. no rubs, no gallops  Abdomen:   Soft, non tender, no organomegalies, positive bowel sounds  Extremities:   No edema, no cyanosis, good peripheral pulses  Neurological:   Awake, alert, oriented. No obvious focal deficits  Musculoskelatal:  No obvious deformities    Assessment:      Diagnosis Orders   1. Permanent atrial fibrillation (HCC)  EKG 12 Lead      2. Coronary artery disease involving native coronary artery of native heart without angina pectoris  EKG 12 Lead      3.  Shortness of breath  EKG 12 Lead      As above  Cardiac seems stable but symptoms as above  ?/ TIA ECG in office was done today. I reviewed the ECG. No acute findings      Plan:  No follow-ups on file. Discussed  ? ? Holter  ? ? Tilt   ?/neurology   Continue risk factor modification and medical management  Thank you for allowing me to participate in the care of your patient. Please don't hesitate to contact me regarding any further issues related to the patient care    Orders Placed:  Orders Placed This Encounter   Procedures    EKG 12 Lead     Order Specific Question:   Reason for Exam?     Answer: Other       Medications Prescribed:  No orders of the defined types were placed in this encounter. Discussed use, benefit, and side effects of prescribed medications. All patient questions answered. Pt voicedunderstanding. Instructed to continue current medications, diet and exercise. Continue risk factor modification and medical management. Patient agreed with treatment plan. Follow up as directed.     Electronically signedby Elisha Rubinstein, MD on 2/8/2023 at 12:42 PM

## 2023-02-13 ENCOUNTER — HOSPITAL ENCOUNTER (OUTPATIENT)
Age: 83
Discharge: HOME | End: 2023-02-13
Payer: MEDICARE

## 2023-02-13 ENCOUNTER — OFFICE VISIT (OUTPATIENT)
Dept: NEPHROLOGY | Age: 83
End: 2023-02-13
Payer: MEDICARE

## 2023-02-13 VITALS
DIASTOLIC BLOOD PRESSURE: 63 MMHG | HEART RATE: 76 BPM | BODY MASS INDEX: 22.82 KG/M2 | WEIGHT: 113 LBS | OXYGEN SATURATION: 98 % | SYSTOLIC BLOOD PRESSURE: 127 MMHG

## 2023-02-13 DIAGNOSIS — N28.1 RENAL CYST: ICD-10-CM

## 2023-02-13 DIAGNOSIS — N18.31 CHRONIC KIDNEY DISEASE, STAGE 3A (HCC): ICD-10-CM

## 2023-02-13 DIAGNOSIS — E83.52 HYPERCALCEMIA: ICD-10-CM

## 2023-02-13 DIAGNOSIS — N18.5 CKD (CHRONIC KIDNEY DISEASE) STAGE 5, GFR LESS THAN 15 ML/MIN (HCC): ICD-10-CM

## 2023-02-13 DIAGNOSIS — N17.9 AKI (ACUTE KIDNEY INJURY) (HCC): Primary | ICD-10-CM

## 2023-02-13 LAB
ANION GAP SERPL CALC-SCNC: 16 MEQ/L (ref 8–16)
BUN SERPL-MCNC: 58 MG/DL (ref 7–22)
CALCIUM SERPL-MCNC: 10.5 MG/DL (ref 8.5–10.5)
CHLORIDE SERPL-SCNC: 102 MEQ/L (ref 98–111)
CO2 SERPL-SCNC: 24 MEQ/L (ref 23–33)
CREAT SERPL-MCNC: 2.1 MG/DL (ref 0.4–1.2)
GFR SERPL CREATININE-BSD FRML MDRD: 23 ML/MIN/1.73M2
GLUCOSE SERPL-MCNC: 144 MG/DL (ref 70–108)
POTASSIUM SERPL-SCNC: 3.8 MEQ/L (ref 3.5–5.2)
SODIUM SERPL-SCNC: 142 MEQ/L (ref 135–145)

## 2023-02-13 PROCEDURE — 36415 COLL VENOUS BLD VENIPUNCTURE: CPT

## 2023-02-13 PROCEDURE — G8420 CALC BMI NORM PARAMETERS: HCPCS | Performed by: INTERNAL MEDICINE

## 2023-02-13 PROCEDURE — G8400 PT W/DXA NO RESULTS DOC: HCPCS | Performed by: INTERNAL MEDICINE

## 2023-02-13 PROCEDURE — 80048 BASIC METABOLIC PNL TOTAL CA: CPT

## 2023-02-13 PROCEDURE — 99213 OFFICE O/P EST LOW 20 MIN: CPT | Performed by: INTERNAL MEDICINE

## 2023-02-13 PROCEDURE — G8427 DOCREV CUR MEDS BY ELIG CLIN: HCPCS | Performed by: INTERNAL MEDICINE

## 2023-02-13 PROCEDURE — 1123F ACP DISCUSS/DSCN MKR DOCD: CPT | Performed by: INTERNAL MEDICINE

## 2023-02-13 PROCEDURE — 3078F DIAST BP <80 MM HG: CPT | Performed by: INTERNAL MEDICINE

## 2023-02-13 PROCEDURE — 1036F TOBACCO NON-USER: CPT | Performed by: INTERNAL MEDICINE

## 2023-02-13 PROCEDURE — G8484 FLU IMMUNIZE NO ADMIN: HCPCS | Performed by: INTERNAL MEDICINE

## 2023-02-13 PROCEDURE — 1090F PRES/ABSN URINE INCON ASSESS: CPT | Performed by: INTERNAL MEDICINE

## 2023-02-13 PROCEDURE — 3074F SYST BP LT 130 MM HG: CPT | Performed by: INTERNAL MEDICINE

## 2023-02-13 NOTE — PROGRESS NOTES
[address] [facility]ID[facility]  [address]  [facility] [address]  Dept: [phone]  Loc: [phone]  Office Progress Note  2/13/2023 1:19 PM      Pt Name:    Mark Bruce  YOB: 1940  Primary Care Physician:  VANDANA Wheeler - CNP     Chief Complaint:   Chief Complaint   Patient presents with    Follow-up     CKD III        Background Information/Interval History:   80 yo white female with hx CKD III, HTN, breast cancer s/p lumpectomy and radiation about 10 years ago (Dr. Sheryle Kelly) who I am seeing for follow-up. She was previously a patient of Dr. Bari Galvin. She reports lot of heart issues in the past. She reports hx atrial fibrillation and moderate MR and AR. She had US in July 2020 which showed about 8 cm kidneys with simple renal cysts. She does report hx UTIs in the past.     She is here for 6 months follow-up. She states she is not taking vitamin D. Overall doing well. BP is stable even without losartan. Denies any shortness of breath. She does report some lower extremity edema. She is here with her family members.      Past History:  Past Medical History:   Diagnosis Date    Arthritis     Atrial fibrillation ([address]     Breast cancer ([address] 2010    Lumpectomy    CAD (coronary artery disease)     Cancer ([address]     BREAST    Chronic kidney disease     History of therapeutic radiation 2011    left IDC    HTN (hypertension) 4/13/2016    Hypertension     Mixed hyperlipidemia 7/6/2020    Nausea & vomiting     Neuromuscular disorder (HCC)     Osteopenia     Pneumonia of both lungs due to infectious organism     Rheumatic fever     as a child    Sinus infection     Thyroid disease      Past Surgical History:   Procedure Laterality Date    BREAST LUMPECTOMY Left 2011    BRONCHOSCOPY N/A 1/5/2020    BRONCHOSCOPY performed by Marco A Karimi MD at [facility]    BUNIONECTOMY Left 2009    CARDIAC CATHETERIZATION  2010    DILATION AND CURETTAGE OF UTERUS      X2; SEVERAL YEARS AGO    JOINT REPLACEMENT Right 2010    KNEE    IA BX OF BREAST, NEEDLE CORE, IMAGE GUIDE Left 03/26/2020    benign    IA BX OF BREAST, NEEDLE CORE, IMAGE GUIDE Left 03/26/2020    benign    IA BX OF BREAST, NEEDLE CORE, IMAGE GUIDE Left 03/26/2020    benign    IA BX OF BREAST, NEEDLE CORE, IMAGE GUIDE Left 12/09/2010    IDC     TONSILLECTOMY      UPPER GASTROINTESTINAL ENDOSCOPY N/A 1/3/2020    EGD ESOPHAGOGASTRODUODENOSCOPY performed by Cici Sanchez MD at [facility] 1/11/2020    EGD DIAGNOSTIC ONLY performed by Cici Sanchez MD at [facility]        VITALS:  /63 (Site: Right Upper Arm, Position: Sitting, Cuff Size: Small Adult)   Pulse 76   Wt 113 lb (51.3 kg)   SpO2 98%   BMI 22.82 kg/m²   Wt Readings from Last 3 Encounters:   02/13/23 113 lb (51.3 kg)   02/08/23 114 lb (51.7 kg)   08/08/22 97 lb (44 kg)     Body mass index is 22.82 kg/m². General Appearance: alert and cooperative with exam, appears comfortable, no distress  Oral: moist oral mucus membranes  Neck: No jugular venous distention  Lungs: Air entry B/L, no crackles or rales, no use of accessory muscles  Heart: S1, S2 heard  GI: soft, non-tender, no guarding  Extremities: + edema, bilateral legs, mostly below the knees     Medications:  Current Outpatient Medications   Medication Sig Dispense Refill    gabapentin (NEURONTIN) 100 MG capsule Take 100 mg by mouth in the morning and at bedtime. bumetanide (BUMEX) 1 MG tablet Take 1 mg by mouth daily      Coenzyme Q10 (CO Q 10 PO) Take by mouth      Probiotic Product (PROBIOTIC PO) Take by mouth      hydrALAZINE (APRESOLINE) 25 MG tablet Take 1 tablet by mouth in the morning and 1 tablet before bedtime.  180 tablet 3    pantoprazole (PROTONIX) 40 MG tablet TAKE ONE TABLET, TWO TIMES A DAY, BY MOUTH. 180 tablet 3    dilTIAZem (CARDIZEM CD) 180 MG extended release capsule Take 1 capsule by mouth daily Additional RF per her PCP 90 capsule 3    rosuvastatin (CRESTOR) 5 MG tablet Take 1 tablet by mouth every evening Indications: Blood Cholesterol Abnormal 90 tablet 3    warfarin (COUMADIN) 2 MG tablet Take as instructed by the physician following the INR results 30 tablet 0    acetaminophen (TYLENOL) 650 MG extended release tablet Take 650 mg by mouth 2 times daily as needed Indications: Arthritis       levothyroxine (SYNTHROID) 50 MCG tablet Take 50 mcg by mouth daily Indications: Impaired Thyroid Function       Biotin 1000 MCG TABS Take 1,000 mcg by mouth nightly        No current facility-administered medications for this visit. Laboratory & Diagnostics:  Old labs  US: R 8.5, L 8.0, Simple renal cysts  Creat 1.3 to 1.5  Calcium 10.5 to 11  Echo: EF 60%, moderate AR and MR  UA: July 2020: no blood, no protein, UACR: 58 mg/g    April 2022: K 4.0, Creat 1.5, PTH 26, Vit D >120, Hb 11.7  UA: no blood, no protein, UPCR 110 mg/g    Aug 2022: k 4.4, creat 1.7, Ca 10.5  Feb 2023: Vit D 101, K 5.4, creat 3.3, BUN 66     Impression/Plan:   1. JONATHAN on CKD III: Dr. Ilene Bianchi did kidney biopsy in 2017 which showed mild to moderate arterio- nephrosclerosis. Creatinine was previously staying around 1.7 but now creat went upto 3.3. Unclear etiology. Losartan is on hold. She reports she is no longer taking any Vit D. Awaiting repeat labs. She did labs this morning but results are pending  2. HTN: Stable  3. Renal cysts, simple  4. Mild hypercalcemia: likely due to supra-therapeutic Vit D. Improving. Discussed with patient. I ordered SPEP and ADAMS but she did not get these done. Her Vit D level is still high but improving. Apparently she was taking MVI containing vitamin D. I discussed with her and advised her to stop Vit D supplements for now or any supplement containing Vit D. Spoke with family  5. Hypocitraturia (litholink done by Dr. Ilene Bianchi due to ?frequent UTI): no hx kidney stones.  K citrate is on hold at this time due to hyperkalemia. 6. Hx Breast cancer; sees Dr. Ildefonso Weir  7. Atrial Fibrillation: on coumadin, cardizem. Seeing Dr. Monroe Cox    Follow labs. If creatinine is improving then will attempt to increase Bumex per patient's concerns about lower extremity edema. Her lungs however are clear. Orders Placed This Encounter   Procedures    Basic Metabolic Panel    PTH, Intact    Phosphorus    Vitamin D 25 Hydroxy    Protein / creatinine ratio, urine     Return in about 6 months (around 8/13/2023).       Pau Watt MD  [facility]

## 2023-02-14 ENCOUNTER — TELEPHONE (OUTPATIENT)
Dept: NEPHROLOGY | Age: 83
End: 2023-02-14

## 2023-02-14 DIAGNOSIS — N17.9 AKI (ACUTE KIDNEY INJURY) (HCC): Primary | ICD-10-CM

## 2023-02-14 DIAGNOSIS — N18.31 CHRONIC KIDNEY DISEASE, STAGE 3A (HCC): ICD-10-CM

## 2023-02-14 NOTE — TELEPHONE ENCOUNTER
----- Message from Morgan Rendon MD sent at 2/13/2023  9:29 PM EST -----  Increase bumex 1 mg BID  Repeat BMP in 1 week.    Continue to hold losartan

## 2023-02-20 ENCOUNTER — HOSPITAL ENCOUNTER (OUTPATIENT)
Dept: NON INVASIVE DIAGNOSTICS | Age: 83
Discharge: HOME | End: 2023-02-20
Payer: MEDICARE

## 2023-02-20 DIAGNOSIS — I25.10 CORONARY ARTERY DISEASE INVOLVING NATIVE CORONARY ARTERY OF NATIVE HEART WITHOUT ANGINA PECTORIS: ICD-10-CM

## 2023-02-20 DIAGNOSIS — R06.02 SHORTNESS OF BREATH: ICD-10-CM

## 2023-02-20 DIAGNOSIS — I48.21 PERMANENT ATRIAL FIBRILLATION (HCC): ICD-10-CM

## 2023-02-20 PROCEDURE — 93226 XTRNL ECG REC<48 HR SCAN A/R: CPT

## 2023-02-20 PROCEDURE — 93225 XTRNL ECG REC<48 HRS REC: CPT

## 2023-02-20 NOTE — PROCEDURES
The skin was prepped and a  24 hour holter monitor was applied. The patient was instructed on the documentation of symptoms and the purpose of the holter as well as the things to avoid while wearing the holter.    The patient was instructed to remove and return the holter on 02/21/2023  The serial number of the holter that was applied is [id number]

## 2023-02-21 ENCOUNTER — HOSPITAL ENCOUNTER (OUTPATIENT)
Age: 83
Discharge: HOME | End: 2023-02-21
Payer: MEDICARE

## 2023-02-21 DIAGNOSIS — N17.9 AKI (ACUTE KIDNEY INJURY) (HCC): ICD-10-CM

## 2023-02-21 DIAGNOSIS — N28.1 RENAL CYST: ICD-10-CM

## 2023-02-21 DIAGNOSIS — N18.31 CHRONIC KIDNEY DISEASE, STAGE 3A (HCC): ICD-10-CM

## 2023-02-21 DIAGNOSIS — E83.52 HYPERCALCEMIA: ICD-10-CM

## 2023-02-21 LAB
25(OH)D3 SERPL-MCNC: 104 NG/ML (ref 30–100)
ANION GAP SERPL CALC-SCNC: 17 MEQ/L (ref 8–16)
BUN SERPL-MCNC: 35 MG/DL (ref 7–22)
CALCIUM SERPL-MCNC: 11.3 MG/DL (ref 8.5–10.5)
CHLORIDE SERPL-SCNC: 105 MEQ/L (ref 98–111)
CO2 SERPL-SCNC: 27 MEQ/L (ref 23–33)
CREAT SERPL-MCNC: 2 MG/DL (ref 0.4–1.2)
GFR SERPL CREATININE-BSD FRML MDRD: 24 ML/MIN/1.73M2
GLUCOSE SERPL-MCNC: 146 MG/DL (ref 70–108)
INR PPP: 1.98 (ref 0.85–1.13)
PHOSPHATE SERPL-MCNC: 2.6 MG/DL (ref 2.4–4.7)
POTASSIUM SERPL-SCNC: 3.5 MEQ/L (ref 3.5–5.2)
PTH-INTACT SERPL-MCNC: 59.9 PG/ML (ref 15–65)
SODIUM SERPL-SCNC: 149 MEQ/L (ref 135–145)

## 2023-02-21 PROCEDURE — 85610 PROTHROMBIN TIME: CPT

## 2023-02-21 PROCEDURE — 84100 ASSAY OF PHOSPHORUS: CPT

## 2023-02-21 PROCEDURE — 36415 COLL VENOUS BLD VENIPUNCTURE: CPT

## 2023-02-21 PROCEDURE — 80048 BASIC METABOLIC PNL TOTAL CA: CPT

## 2023-02-21 PROCEDURE — 83970 ASSAY OF PARATHORMONE: CPT

## 2023-02-21 PROCEDURE — 82306 VITAMIN D 25 HYDROXY: CPT

## 2023-02-22 ENCOUNTER — TELEPHONE (OUTPATIENT)
Dept: NEPHROLOGY | Age: 83
End: 2023-02-22

## 2023-02-22 ENCOUNTER — HOSPITAL ENCOUNTER (OUTPATIENT)
Age: 83
Setting detail: SPECIMEN
Discharge: HOME | End: 2023-02-22
Payer: MEDICARE

## 2023-02-22 DIAGNOSIS — N17.9 AKI (ACUTE KIDNEY INJURY) (HCC): Primary | ICD-10-CM

## 2023-02-22 LAB
CREAT UR-MCNC: 29.3 MG/DL
PROT UR-MCNC: 18.2 MG/DL
PROT/CREAT 24H UR: 0.62 MG/G{CREAT}

## 2023-02-22 PROCEDURE — 82570 ASSAY OF URINE CREATININE: CPT

## 2023-02-22 PROCEDURE — 84156 ASSAY OF PROTEIN URINE: CPT

## 2023-02-22 NOTE — RESULT ENCOUNTER NOTE
Is she taking any Multi vitamins containing Vit D? Her level is still high  Please inform her that calcium is high as well, likely due to high Vit D level  Check SPEP (serum protein electrophoresis), immunofixation electrophoresis  Also pls arrange for IV fluids at outpatient nursing: Normal saline  ml/hr x 4 hours.  Increase oral hydration    Check BMP again 1 week

## 2023-02-22 NOTE — TELEPHONE ENCOUNTER
----- Message from Brea Sanderson MD sent at 2/21/2023  8:34 PM EST -----  Is she taking any Multi vitamins containing Vit D? Her level is still high  Please inform her that calcium is high as well, likely due to high Vit D level  Check SPEP (serum protein electrophoresis), immunofixation electrophoresis  Also pls arrange for IV fluids at outpatient nursing: Normal saline  ml/hr x 4 hours.  Increase oral hydration    Check BMP again 1 week

## 2023-02-22 NOTE — TELEPHONE ENCOUNTER
Left message for pt, to call back to confirm that she understood that her Vit D and Calcium level is high, to confirm that she is mot taking any multivitamin, repeat labs and Hydration.     Lab orders pending.    Will have doctor sign hydration orders in Lauren.

## 2023-02-22 NOTE — TELEPHONE ENCOUNTER
Pt states she is not taking any multi vitamins - she wants the hydration scheduled at ambulatory care center in [facility] when you get the order - informed her there will be bloodwork to be done also when she goes

## 2023-02-23 ENCOUNTER — HOSPITAL ENCOUNTER (OUTPATIENT)
Dept: NURSING | Age: 83
Discharge: HOME | End: 2023-02-23
Payer: MEDICARE

## 2023-02-23 VITALS
RESPIRATION RATE: 16 BRPM | DIASTOLIC BLOOD PRESSURE: 46 MMHG | TEMPERATURE: 97.2 F | SYSTOLIC BLOOD PRESSURE: 104 MMHG | OXYGEN SATURATION: 98 % | HEART RATE: 72 BPM

## 2023-02-23 PROCEDURE — 2580000003 HC RX 258: Performed by: INTERNAL MEDICINE

## 2023-02-23 PROCEDURE — 96360 HYDRATION IV INFUSION INIT: CPT

## 2023-02-23 PROCEDURE — 96361 HYDRATE IV INFUSION ADD-ON: CPT

## 2023-02-23 RX ORDER — SODIUM CHLORIDE 9 MG/ML
INJECTION, SOLUTION INTRAVENOUS CONTINUOUS
Status: DISCONTINUED | OUTPATIENT
Start: 2023-02-23 | End: 2023-02-24 | Stop reason: HOSPADM

## 2023-02-23 RX ORDER — DILTIAZEM HYDROCHLORIDE 180 MG/1
180 CAPSULE, COATED, EXTENDED RELEASE ORAL DAILY
Qty: 90 CAPSULE | Refills: 2 | Status: SHIPPED | OUTPATIENT
Start: 2023-02-23

## 2023-02-23 RX ADMIN — SODIUM CHLORIDE: 9 INJECTION, SOLUTION INTRAVENOUS at 09:15

## 2023-02-23 ASSESSMENT — PAIN - FUNCTIONAL ASSESSMENT: PAIN_FUNCTIONAL_ASSESSMENT: NONE - DENIES PAIN

## 2023-02-23 NOTE — TELEPHONE ENCOUNTER
Ronaldo Reyes called requesting a refill on the following medications:  Requested Prescriptions     Pending Prescriptions Disp Refills    dilTIAZem (CARDIZEM CD) 180 MG extended release capsule 90 capsule 3     Sig: Take 1 capsule by mouth daily Additional RF per her PCP     Pharmacy verified:  .[email or web address]    Date of last visit: 02/08/2023  Date of next visit (if applicable): 6/30/8884

## 2023-02-23 NOTE — PROGRESS NOTES
0126 Patient arrived to [facility] ambulatory for hydration infusion. Oriented to room and call light  PT RIGHTS AND RESPONSIBILITIES OFFERED TO PT.     0915 hydration started and she denies complaints    1000 patient resting in bed. Respirations even and unlabored    1100 patient resting in bed. Respirations are even and unlabored    1200 patient resting in bed. Respirations are even and unlabored    1316 Hydration completed and she denies complaints. Iv removed. Discharge instructions given and explained and she denies questions.   Discharged ambulatory    _M___ Safety:       (Environmental)  [place] to environment  Ensure ID band is correct and in place/ allergy band as needed  Assess for fall risk  Initiate fall precautions as applicable (fall band, side rails, etc.)  Call light within reach  Bed in low position/ wheels locked    _M___ Pain:       Assess pain level and characteristics  Administer analgesics as ordered  Assess effectiveness of pain management and report to MD as needed    _M___ Knowledge Deficit:  Assess baseline knowledge  Provide teaching at level of understanding  Provide teaching via preferred learning method  Evaluate teaching effectiveness    _M___ Hemodynamic/Respiratory Status:       (Pre and Post Procedure Monitoring)  Assess/Monitor vital signs and LOC  Assess Baseline SpO2 prior to any sedation  Obtain weight/height  Assess vital signs/ LOC until patient meets discharge criteria  Monitor procedure site and notify MD of any issues

## 2023-02-26 LAB
ACQUISITION DURATION: NORMAL S
AVERAGE HEART RATE: 64 BPM
HOOKUP DATE: NORMAL
HOOKUP TIME: NORMAL
MAX HEART RATE TIME/DATE: NORMAL
MAX HEART RATE: 127 BPM
MIN HEART RATE TIME/DATE: NORMAL
MIN HEART RATE: 36 BPM
NUMBER OF QRS COMPLEXES: NORMAL
NUMBER OF SUPRAVENTRICULAR COUPLETS: 0
NUMBER OF SUPRAVENTRICULAR ECTOPICS: 0
NUMBER OF SUPRAVENTRICULAR ISOLATED BEATS: 0
NUMBER OF VENTRICULAR BIGEMINAL CYCLES: 2
NUMBER OF VENTRICULAR COUPLETS: 11
NUMBER OF VENTRICULAR ECTOPICS: 756

## 2023-02-27 ENCOUNTER — TELEPHONE (OUTPATIENT)
Dept: CARDIOLOGY CLINIC | Age: 83
End: 2023-02-27

## 2023-02-27 DIAGNOSIS — R94.31 ABNORMAL HOLTER MONITOR FINDING: Primary | ICD-10-CM

## 2023-02-27 DIAGNOSIS — R00.1 BRADYCARDIA: ICD-10-CM

## 2023-02-27 DIAGNOSIS — I45.5 SINUS PAUSE: ICD-10-CM

## 2023-02-27 NOTE — TELEPHONE ENCOUNTER
Spoke with patient to schedule event monitor placement for 2/28/23 at 1:00 pm, arrival time of 12:45 pm. Pt verbalized understanding.

## 2023-02-28 ENCOUNTER — HOSPITAL ENCOUNTER (OUTPATIENT)
Dept: NON INVASIVE DIAGNOSTICS | Age: 83
Discharge: HOME | End: 2023-02-28
Payer: MEDICARE

## 2023-02-28 DIAGNOSIS — R94.31 ABNORMAL HOLTER MONITOR FINDING: ICD-10-CM

## 2023-02-28 DIAGNOSIS — I45.5 SINUS PAUSE: ICD-10-CM

## 2023-02-28 DIAGNOSIS — R00.1 BRADYCARDIA: ICD-10-CM

## 2023-02-28 PROCEDURE — 93270 REMOTE 30 DAY ECG REV/REPORT: CPT

## 2023-02-28 NOTE — PROCEDURES
30 Day Cardiac Event Monitor was applied to patient. Instructions were given and skin/monitor prep and application was demonstrated. Patient was instructed to remove monitor on 3/30 and mail back to [employer].

## 2023-03-01 ENCOUNTER — HOSPITAL ENCOUNTER (OUTPATIENT)
Age: 83
Discharge: HOME | End: 2023-03-01
Payer: MEDICARE

## 2023-03-01 DIAGNOSIS — N17.9 AKI (ACUTE KIDNEY INJURY) (HCC): ICD-10-CM

## 2023-03-01 LAB
ANION GAP SERPL CALC-SCNC: 15 MEQ/L (ref 8–16)
BUN SERPL-MCNC: 52 MG/DL (ref 7–22)
CALCIUM SERPL-MCNC: 10.3 MG/DL (ref 8.5–10.5)
CHLORIDE SERPL-SCNC: 103 MEQ/L (ref 98–111)
CO2 SERPL-SCNC: 25 MEQ/L (ref 23–33)
CREAT SERPL-MCNC: 2.4 MG/DL (ref 0.4–1.2)
GFR SERPL CREATININE-BSD FRML MDRD: 19 ML/MIN/1.73M2
GLUCOSE SERPL-MCNC: 96 MG/DL (ref 70–108)
POTASSIUM SERPL-SCNC: 4.4 MEQ/L (ref 3.5–5.2)
SODIUM SERPL-SCNC: 143 MEQ/L (ref 135–145)

## 2023-03-01 PROCEDURE — 86334 IMMUNOFIX E-PHORESIS SERUM: CPT

## 2023-03-01 PROCEDURE — 80048 BASIC METABOLIC PNL TOTAL CA: CPT

## 2023-03-01 PROCEDURE — 84165 PROTEIN E-PHORESIS SERUM: CPT

## 2023-03-01 PROCEDURE — 82784 ASSAY IGA/IGD/IGG/IGM EACH: CPT

## 2023-03-01 PROCEDURE — 36415 COLL VENOUS BLD VENIPUNCTURE: CPT

## 2023-03-01 PROCEDURE — 84155 ASSAY OF PROTEIN SERUM: CPT

## 2023-03-01 RX ORDER — DILTIAZEM HYDROCHLORIDE 120 MG/1
120 CAPSULE, COATED, EXTENDED RELEASE ORAL DAILY
Qty: 30 CAPSULE | Refills: 3 | Status: SHIPPED | OUTPATIENT
Start: 2023-03-01

## 2023-03-01 NOTE — TELEPHONE ENCOUNTER
[employer] faxed strips- Heartrate 30.1, report Atrial fib sustained, DAY 1 OF 30. See attached. Dr. Kenneth Banks advise?

## 2023-03-02 ENCOUNTER — TELEPHONE (OUTPATIENT)
Dept: NEPHROLOGY | Age: 83
End: 2023-03-02

## 2023-03-02 DIAGNOSIS — N17.9 AKI (ACUTE KIDNEY INJURY) (HCC): Primary | ICD-10-CM

## 2023-03-02 NOTE — TELEPHONE ENCOUNTER
----- Message from Elan Quiroz MD sent at 3/1/2023  7:00 PM EST -----  Calcium improving  Creat slightly high  Reduce bumex 1 mg daily  BMP in 1-2 weeks.

## 2023-03-02 NOTE — TELEPHONE ENCOUNTER
Left message with son to have pt call me back regarding her labs and instructions from the doctor.     Lab order pending    MAR updated

## 2023-03-04 LAB — IMMUNOFIXATION ELECTROPHORESIS: NORMAL

## 2023-03-15 ENCOUNTER — HOSPITAL ENCOUNTER (OUTPATIENT)
Age: 83
Discharge: HOME | End: 2023-03-15
Payer: MEDICARE

## 2023-03-15 DIAGNOSIS — N17.9 AKI (ACUTE KIDNEY INJURY) (HCC): ICD-10-CM

## 2023-03-15 LAB
ANION GAP SERPL CALC-SCNC: 12 MEQ/L (ref 8–16)
BUN SERPL-MCNC: 41 MG/DL (ref 7–22)
CALCIUM SERPL-MCNC: 10.3 MG/DL (ref 8.5–10.5)
CHLORIDE SERPL-SCNC: 104 MEQ/L (ref 98–111)
CO2 SERPL-SCNC: 24 MEQ/L (ref 23–33)
CREAT SERPL-MCNC: 1.9 MG/DL (ref 0.4–1.2)
GFR SERPL CREATININE-BSD FRML MDRD: 26 ML/MIN/1.73M2
GLUCOSE SERPL-MCNC: 152 MG/DL (ref 70–108)
POTASSIUM SERPL-SCNC: 4.1 MEQ/L (ref 3.5–5.2)
SODIUM SERPL-SCNC: 140 MEQ/L (ref 135–145)

## 2023-03-15 PROCEDURE — 36415 COLL VENOUS BLD VENIPUNCTURE: CPT

## 2023-03-15 PROCEDURE — 80048 BASIC METABOLIC PNL TOTAL CA: CPT

## 2023-03-21 ENCOUNTER — OFFICE VISIT (OUTPATIENT)
Dept: NEUROLOGY | Age: 83
End: 2023-03-21
Payer: MEDICARE

## 2023-03-21 ENCOUNTER — HOSPITAL ENCOUNTER (OUTPATIENT)
Age: 83
Discharge: HOME | End: 2023-03-21
Payer: MEDICARE

## 2023-03-21 VITALS
HEART RATE: 52 BPM | DIASTOLIC BLOOD PRESSURE: 74 MMHG | WEIGHT: 103 LBS | BODY MASS INDEX: 20.76 KG/M2 | SYSTOLIC BLOOD PRESSURE: 138 MMHG | OXYGEN SATURATION: 98 % | HEIGHT: 59 IN

## 2023-03-21 DIAGNOSIS — R26.89 BALANCE PROBLEM: ICD-10-CM

## 2023-03-21 DIAGNOSIS — H53.2 DOUBLE VISION: ICD-10-CM

## 2023-03-21 DIAGNOSIS — H53.2 DOUBLE VISION: Primary | ICD-10-CM

## 2023-03-21 PROCEDURE — 85670 THROMBIN TIME PLASMA: CPT

## 2023-03-21 PROCEDURE — 83516 IMMUNOASSAY NONANTIBODY: CPT

## 2023-03-21 PROCEDURE — 83519 RIA NONANTIBODY: CPT

## 2023-03-21 PROCEDURE — 1036F TOBACCO NON-USER: CPT | Performed by: PSYCHIATRY & NEUROLOGY

## 2023-03-21 PROCEDURE — 3074F SYST BP LT 130 MM HG: CPT | Performed by: PSYCHIATRY & NEUROLOGY

## 2023-03-21 PROCEDURE — 99205 OFFICE O/P NEW HI 60 MIN: CPT | Performed by: PSYCHIATRY & NEUROLOGY

## 2023-03-21 PROCEDURE — G8427 DOCREV CUR MEDS BY ELIG CLIN: HCPCS | Performed by: PSYCHIATRY & NEUROLOGY

## 2023-03-21 PROCEDURE — G8484 FLU IMMUNIZE NO ADMIN: HCPCS | Performed by: PSYCHIATRY & NEUROLOGY

## 2023-03-21 PROCEDURE — 85610 PROTHROMBIN TIME: CPT

## 2023-03-21 PROCEDURE — 1090F PRES/ABSN URINE INCON ASSESS: CPT | Performed by: PSYCHIATRY & NEUROLOGY

## 2023-03-21 PROCEDURE — G8420 CALC BMI NORM PARAMETERS: HCPCS | Performed by: PSYCHIATRY & NEUROLOGY

## 2023-03-21 PROCEDURE — 85730 THROMBOPLASTIN TIME PARTIAL: CPT

## 2023-03-21 PROCEDURE — 36415 COLL VENOUS BLD VENIPUNCTURE: CPT

## 2023-03-21 PROCEDURE — 85613 RUSSELL VIPER VENOM DILUTED: CPT

## 2023-03-21 PROCEDURE — G8400 PT W/DXA NO RESULTS DOC: HCPCS | Performed by: PSYCHIATRY & NEUROLOGY

## 2023-03-21 PROCEDURE — 1123F ACP DISCUSS/DSCN MKR DOCD: CPT | Performed by: PSYCHIATRY & NEUROLOGY

## 2023-03-21 PROCEDURE — 84238 ASSAY NONENDOCRINE RECEPTOR: CPT

## 2023-03-21 PROCEDURE — 3078F DIAST BP <80 MM HG: CPT | Performed by: PSYCHIATRY & NEUROLOGY

## 2023-03-21 NOTE — PATIENT INSTRUCTIONS
MRI brain WO contrast  Sed rate  Acetylcholine receptor Ab  MuSK AB  LRP4 Ab  Call with any new symptoms or concerns. Follow up in 1 month.

## 2023-03-21 NOTE — TELEPHONE ENCOUNTER
Brenda faxed report hear rate 120. Analysis report arial fibrillation RVR sustained. Day 22 of 30. Dr. Evans Minus advise?

## 2023-03-21 NOTE — LETTER
March 26, 2023      Mau Frank, [address] [place] [address]  DARINEL LOONEY AM OFFENEGG II.[facility],  [address]      Patient: Meche Pierce   MR Number: [id number]   YOB: 1940   Date of Visit: 3/21/2023       Dear Dr. Norbert Boswell:    Thank you for referring Kiesha Gan to me for evaluation/treatment. Below are the relevant portions of my assessment and plan of care. If you have questions, please do not hesitate to call me. I look forward to following Milan Broderick along with you. Sincerely,        Ayush Hair MD    CC providers:  Mau Frank [address] [place] [address]  DARINEL LOONEY AM OFFDAE IBANEZ.[facility] [address]  Via In VANDANA Collins - [facility]  [address]  [facility] [phone]  Via Fax: [phone]

## 2023-03-22 RX ORDER — DILTIAZEM HYDROCHLORIDE 240 MG/1
240 CAPSULE, COATED, EXTENDED RELEASE ORAL DAILY
COMMUNITY
End: 2023-03-22 | Stop reason: SDUPTHER

## 2023-03-22 RX ORDER — DILTIAZEM HYDROCHLORIDE 240 MG/1
240 CAPSULE, COATED, EXTENDED RELEASE ORAL DAILY
Qty: 90 CAPSULE | Refills: 1 | Status: SHIPPED | OUTPATIENT
Start: 2023-03-22

## 2023-03-23 LAB
ACHR BIND AB SER-SCNC: NORMAL NMOL/L
ACHR BLOCK AB/ACHR TOTAL SFR SER: 3 % (ref 0–26)

## 2023-03-24 LAB — ACHR MOD AB/ACHR TOTAL SFR SER: NORMAL %

## 2023-03-25 LAB
APTT IMM NP PPP: 43 SEC (ref 32–48)
APTT P HEP NEUT PPP: ABNORMAL SEC (ref 32–48)
CONFIRM APTT STACLOT: ABNORMAL
DRVVT SCREEN TO CONFIRM RATIO: ABNORMAL RATIO
LA 3 SCREEN W REFLEX-IMP: ABNORMAL
LA NT DPL PPP QL: ABNORMAL
MIXING DRVVT: ABNORMAL SEC (ref 33–44)
MUSK AB SER-SCNC: 0 NMOL/L (ref 0–0.03)
PROTHROMBIN TIME: 27.6 SEC (ref 12–15.5)
REPTILASE TIME: ABNORMAL SEC
SCREEN APTT: 61 SEC (ref 32–48)
SCREEN DRVVT: 38 SEC (ref 33–44)
THROMBIN TIME: 16.3 SEC (ref 14.7–19.5)

## 2023-03-26 NOTE — PROGRESS NOTES
pleural effusion    Mitral valve stenosis    Mitral stenosis with insufficiency    Pulmonary HTN (HCC)    Moderate tricuspid regurgitation    Gastritis and duodenitis    Esophageal candidiasis (HCC)    Hypotension    Demand ischemia (HCC)    Thoracic aortic aneurysm without rupture    CKD (chronic kidney disease) stage 3, GFR 30-59 ml/min (HCC)    History of breast cancer    Long term current use of anticoagulant    Acquired hypothyroidism    Recent urinary tract infection    Iron deficiency anemia due to chronic blood loss    Muscular deconditioning    Abnormal mammogram    Gastro-esophageal reflux disease with esophagitis    Herniated lumbar intervertebral disc    Malignant neoplasm of breast (HCC)    Mass of left breast    Mixed hyperlipidemia    Osteoarthritis of left ankle    Osteoporosis    Primary osteoarthritis of both knees    Spondylosis of lumbar spine       Allergies   Allergen Reactions    Tramadol Nausea And Vomiting       Current Outpatient Medications   Medication Sig Dispense Refill    dilTIAZem (CARDIZEM CD) 120 MG extended release capsule Take 1 capsule by mouth daily Additional RF per her PCP 30 capsule 3    gabapentin (NEURONTIN) 100 MG capsule Take 100 mg by mouth in the morning and at bedtime. bumetanide (BUMEX) 1 MG tablet Take 1 mg by mouth daily      Coenzyme Q10 (CO Q 10 PO) Take by mouth      Probiotic Product (PROBIOTIC PO) Take by mouth      hydrALAZINE (APRESOLINE) 25 MG tablet Take 1 tablet by mouth in the morning and 1 tablet before bedtime.  180 tablet 3    pantoprazole (PROTONIX) 40 MG tablet TAKE ONE TABLET, TWO TIMES A DAY, BY MOUTH. 180 tablet 3    rosuvastatin (CRESTOR) 5 MG tablet Take 1 tablet by mouth every evening Indications: Blood Cholesterol Abnormal 90 tablet 3    warfarin (COUMADIN) 2 MG tablet Take as instructed by the physician following the INR results 30 tablet 0    acetaminophen (TYLENOL) 650 MG extended release tablet Take 650 mg by mouth 2 times daily as

## 2023-03-27 ENCOUNTER — TELEPHONE (OUTPATIENT)
Dept: CARDIOLOGY CLINIC | Age: 83
End: 2023-03-27

## 2023-03-27 ENCOUNTER — HOSPITAL ENCOUNTER (OUTPATIENT)
Age: 83
Discharge: HOME | End: 2023-03-27
Payer: MEDICARE

## 2023-03-27 ENCOUNTER — TELEPHONE (OUTPATIENT)
Dept: NEUROLOGY | Age: 83
End: 2023-03-27

## 2023-03-27 DIAGNOSIS — R26.89 BALANCE PROBLEM: ICD-10-CM

## 2023-03-27 DIAGNOSIS — H53.2 DOUBLE VISION: Primary | ICD-10-CM

## 2023-03-27 DIAGNOSIS — H53.2 DOUBLE VISION: ICD-10-CM

## 2023-03-27 PROCEDURE — 86255 FLUORESCENT ANTIBODY SCREEN: CPT

## 2023-03-27 NOTE — TELEPHONE ENCOUNTER
Spoke with lab who stated LRP2 (Lupus panel) was performed instead of the LRP4 ab. Per Nicola Aguayo, will credit for above lab. Per Nicola Aguayo, please put \"LRP4 Auto antibody; please draw SST (red top) 2ml serum; send to reference testing;\" in lab order. Patient notified of needing to get lab redrawn. Patient stated she uses [facility].

## 2023-03-27 NOTE — TELEPHONE ENCOUNTER
----- Message from VANDANA Giordano CNP sent at 3/25/2023 10:17 AM EDT -----  This is NOT what I ordered. I ordered the misc. LRP4 Ab, my original order was cancelled and replaced by the lupus panel. .. Mani Marc please contact the lab to let them know of this error.   Britney Jeffery CNP

## 2023-03-27 NOTE — TELEPHONE ENCOUNTER
FYI: Pt will be taking event monitor off and mailing back 2 days early as she has an MRI scheduled and won't be able to be wearing the monitor at the time of MRI.

## 2023-03-29 ENCOUNTER — HOSPITAL ENCOUNTER (OUTPATIENT)
Dept: MRI IMAGING | Age: 83
Discharge: HOME | End: 2023-03-29
Payer: MEDICARE

## 2023-03-29 DIAGNOSIS — R26.89 BALANCE PROBLEM: ICD-10-CM

## 2023-03-29 DIAGNOSIS — H53.2 DOUBLE VISION: ICD-10-CM

## 2023-03-29 PROCEDURE — 70551 MRI BRAIN STEM W/O DYE: CPT

## 2023-03-30 ENCOUNTER — TELEPHONE (OUTPATIENT)
Dept: NEPHROLOGY | Age: 83
End: 2023-03-30

## 2023-03-30 DIAGNOSIS — N17.9 AKI (ACUTE KIDNEY INJURY) (HCC): Primary | ICD-10-CM

## 2023-03-30 NOTE — TELEPHONE ENCOUNTER
Spoke to patient she understood to increase her Bumex to bid and repeat labs next week. She does not need a script at this time.     Lab order pending    Script pending    Mar updated

## 2023-03-30 NOTE — TELEPHONE ENCOUNTER
Telephone call from the patient stating her feet and legs are swollen. The other day her weight was 102 and today it is 109. She wanted to know if you could adjust her medication please.

## 2023-04-05 ENCOUNTER — TELEPHONE (OUTPATIENT)
Dept: CARDIOLOGY CLINIC | Age: 83
End: 2023-04-05

## 2023-04-05 DIAGNOSIS — R94.31 ABNORMAL HOLTER MONITOR FINDING: Primary | ICD-10-CM

## 2023-04-06 ENCOUNTER — HOSPITAL ENCOUNTER (OUTPATIENT)
Age: 83
Discharge: HOME | End: 2023-04-06
Payer: MEDICARE

## 2023-04-06 DIAGNOSIS — N17.9 AKI (ACUTE KIDNEY INJURY) (HCC): ICD-10-CM

## 2023-04-06 LAB
ANION GAP SERPL CALC-SCNC: 14 MEQ/L (ref 8–16)
BUN SERPL-MCNC: 49 MG/DL (ref 7–22)
CALCIUM SERPL-MCNC: 10.3 MG/DL (ref 8.5–10.5)
CHLORIDE SERPL-SCNC: 102 MEQ/L (ref 98–111)
CO2 SERPL-SCNC: 26 MEQ/L (ref 23–33)
CREAT SERPL-MCNC: 2 MG/DL (ref 0.4–1.2)
GFR SERPL CREATININE-BSD FRML MDRD: 24 ML/MIN/1.73M2
GLUCOSE SERPL-MCNC: 92 MG/DL (ref 70–108)
POTASSIUM SERPL-SCNC: 4.2 MEQ/L (ref 3.5–5.2)
SODIUM SERPL-SCNC: 142 MEQ/L (ref 135–145)

## 2023-04-06 PROCEDURE — 36415 COLL VENOUS BLD VENIPUNCTURE: CPT

## 2023-04-06 PROCEDURE — 80048 BASIC METABOLIC PNL TOTAL CA: CPT

## 2023-04-13 ENCOUNTER — TELEPHONE (OUTPATIENT)
Dept: CARDIOLOGY CLINIC | Age: 83
End: 2023-04-13

## 2023-04-14 ENCOUNTER — HOSPITAL ENCOUNTER (OUTPATIENT)
Age: 83
Discharge: HOME | End: 2023-04-14
Payer: MEDICARE

## 2023-04-14 DIAGNOSIS — R94.31 ABNORMAL HOLTER MONITOR FINDING: ICD-10-CM

## 2023-04-14 DIAGNOSIS — R00.1 BRADYCARDIA: ICD-10-CM

## 2023-04-14 LAB
ALBUMIN SERPL BCG-MCNC: 4.8 G/DL (ref 3.5–5.1)
ALP SERPL-CCNC: 130 U/L (ref 38–126)
ALT SERPL W/O P-5'-P-CCNC: 20 U/L (ref 11–66)
AST SERPL-CCNC: 28 U/L (ref 5–40)
BILIRUB CONJ SERPL-MCNC: < 0.2 MG/DL (ref 0–0.3)
BILIRUB SERPL-MCNC: 0.4 MG/DL (ref 0.3–1.2)
PROT SERPL-MCNC: 6.9 G/DL (ref 6.1–8)
TSH SERPL DL<=0.005 MIU/L-ACNC: 3.01 UIU/ML (ref 0.4–4.2)

## 2023-04-14 PROCEDURE — 80076 HEPATIC FUNCTION PANEL: CPT

## 2023-04-14 PROCEDURE — 84443 ASSAY THYROID STIM HORMONE: CPT

## 2023-04-17 LAB — MISC REFERENCE: NORMAL

## 2023-04-18 ENCOUNTER — TELEPHONE (OUTPATIENT)
Dept: CARDIOLOGY CLINIC | Age: 83
End: 2023-04-18

## 2023-04-18 RX ORDER — AMIODARONE HYDROCHLORIDE 200 MG/1
TABLET ORAL
Qty: 98 TABLET | Refills: 0 | Status: SHIPPED | OUTPATIENT
Start: 2023-04-18

## 2023-04-18 RX ORDER — AMIODARONE HYDROCHLORIDE 200 MG/1
400 TABLET ORAL 2 TIMES DAILY
Qty: 84 TABLET | Refills: 0 | Status: CANCELLED | OUTPATIENT
Start: 2023-04-18

## 2023-04-18 RX ORDER — AMIODARONE HYDROCHLORIDE 200 MG/1
200 TABLET ORAL DAILY
Qty: 90 TABLET | Refills: 2 | Status: CANCELLED | OUTPATIENT
Start: 2023-04-18

## 2023-04-18 RX ORDER — AMIODARONE HYDROCHLORIDE 200 MG/1
200 TABLET ORAL DAILY
Qty: 90 TABLET | Refills: 2 | Status: SHIPPED | OUTPATIENT
Start: 2023-04-18

## 2023-04-18 RX ORDER — AMIODARONE HYDROCHLORIDE 200 MG/1
400 TABLET ORAL 2 TIMES DAILY
Qty: 84 TABLET | Refills: 0 | Status: SHIPPED | OUTPATIENT
Start: 2023-04-18

## 2023-04-18 NOTE — TELEPHONE ENCOUNTER
Pt notified  Rx pended to [facility] separate encounter   Will call [facility] coumadin clinic tomorrow [phone], needs to be seen in 4 days

## 2023-04-20 ENCOUNTER — HOSPITAL ENCOUNTER (OUTPATIENT)
Dept: MAMMOGRAPHY | Age: 83
Discharge: HOME | End: 2023-04-20
Payer: MEDICARE

## 2023-04-20 DIAGNOSIS — Z12.39 BREAST SCREENING: ICD-10-CM

## 2023-04-20 LAB
ALBUMIN SERPL BCG-MCNC: 4.7 G/DL (ref 3.5–5.1)
ALP SERPL-CCNC: 121 U/L (ref 38–126)
ALT SERPL W/O P-5'-P-CCNC: 12 U/L (ref 11–66)
ANION GAP SERPL CALC-SCNC: 13 MEQ/L (ref 8–16)
AST SERPL-CCNC: 23 U/L (ref 5–40)
BILIRUB SERPL-MCNC: 0.3 MG/DL (ref 0.3–1.2)
BUN SERPL-MCNC: 49 MG/DL (ref 7–22)
CALCIUM SERPL-MCNC: 10.4 MG/DL (ref 8.5–10.5)
CHLORIDE SERPL-SCNC: 104 MEQ/L (ref 98–111)
CO2 SERPL-SCNC: 28 MEQ/L (ref 23–33)
CREAT SERPL-MCNC: 1.9 MG/DL (ref 0.4–1.2)
GFR SERPL CREATININE-BSD FRML MDRD: 26 ML/MIN/1.73M2
GLUCOSE SERPL-MCNC: 157 MG/DL (ref 70–108)
POTASSIUM SERPL-SCNC: 4 MEQ/L (ref 3.5–5.2)
PROT SERPL-MCNC: 7 G/DL (ref 6.1–8)
SODIUM SERPL-SCNC: 145 MEQ/L (ref 135–145)

## 2023-04-20 PROCEDURE — 77063 BREAST TOMOSYNTHESIS BI: CPT

## 2023-04-20 PROCEDURE — 85025 COMPLETE CBC W/AUTO DIFF WBC: CPT

## 2023-04-20 PROCEDURE — 80053 COMPREHEN METABOLIC PANEL: CPT

## 2023-04-20 PROCEDURE — 36415 COLL VENOUS BLD VENIPUNCTURE: CPT

## 2023-04-20 PROCEDURE — 86300 IMMUNOASSAY TUMOR CA 15-3: CPT

## 2023-04-21 LAB
AUTO DIFF PNL BLD: ABNORMAL
BASOPHILS ABSOLUTE: 0 THOU/MM3 (ref 0–0.1)
BASOPHILS NFR BLD AUTO: 0.9 %
BURR CELLS BLD QL SMEAR: ABNORMAL
DACROCYTES: ABNORMAL
DEPRECATED RDW RBC AUTO: 57.7 FL (ref 35–45)
EOSINOPHIL NFR BLD AUTO: 2.7 %
EOSINOPHILS ABSOLUTE: 0.1 THOU/MM3 (ref 0–0.4)
ERYTHROCYTE [DISTWIDTH] IN BLOOD BY AUTOMATED COUNT: 16.9 % (ref 11.5–14.5)
HCT VFR BLD AUTO: 34.7 % (ref 37–47)
HGB BLD-MCNC: 9.8 GM/DL (ref 12–16)
HYPOCHROMIA BLD QL SMEAR: PRESENT
IMM GRANULOCYTES # BLD AUTO: 0.01 THOU/MM3 (ref 0–0.07)
IMM GRANULOCYTES NFR BLD AUTO: 0.2 %
LYMPHOCYTES ABSOLUTE: 1.5 THOU/MM3 (ref 1–4.8)
LYMPHOCYTES NFR BLD AUTO: 27.3 %
MCH RBC QN AUTO: 26.3 PG (ref 26–33)
MCHC RBC AUTO-ENTMCNC: 28.2 GM/DL (ref 32.2–35.5)
MCV RBC AUTO: 93.3 FL (ref 81–99)
MONOCYTES ABSOLUTE: 0.5 THOU/MM3 (ref 0.4–1.3)
MONOCYTES NFR BLD AUTO: 8.8 %
NEUTROPHILS NFR BLD AUTO: 60.1 %
NRBC BLD AUTO-RTO: 0 /100 WBC
PATHOLOGIST REVIEW: ABNORMAL
PLATELET # BLD AUTO: 241 THOU/MM3 (ref 130–400)
PLATELET BLD QL SMEAR: ADEQUATE
PMV BLD AUTO: 10.5 FL (ref 9.4–12.4)
POIKILOCYTES: ABNORMAL
RBC # BLD AUTO: 3.72 MILL/MM3 (ref 4.2–5.4)
SCAN OF BLOOD SMEAR: NORMAL
SCHISTOCYTES BLD QL SMEAR: ABNORMAL
SEGMENTED NEUTROPHILS ABSOLUTE COUNT: 3.3 THOU/MM3 (ref 1.8–7.7)
WBC # BLD AUTO: 5.5 THOU/MM3 (ref 4.8–10.8)

## 2023-04-23 LAB — CANCER AG27-29 SERPL-ACNC: 19 U/ML (ref 0–38)

## 2023-04-25 ENCOUNTER — OFFICE VISIT (OUTPATIENT)
Dept: NEUROLOGY | Age: 83
End: 2023-04-25
Payer: MEDICARE

## 2023-04-25 VITALS
OXYGEN SATURATION: 98 % | HEART RATE: 59 BPM | DIASTOLIC BLOOD PRESSURE: 78 MMHG | WEIGHT: 110 LBS | SYSTOLIC BLOOD PRESSURE: 142 MMHG | HEIGHT: 59 IN | BODY MASS INDEX: 22.18 KG/M2

## 2023-04-25 DIAGNOSIS — I63.89 OTHER CEREBRAL INFARCTION (HCC): ICD-10-CM

## 2023-04-25 DIAGNOSIS — Z86.73 HISTORY OF STROKE: ICD-10-CM

## 2023-04-25 DIAGNOSIS — H53.2 DOUBLE VISION: Primary | ICD-10-CM

## 2023-04-25 DIAGNOSIS — R26.89 BALANCE PROBLEM: ICD-10-CM

## 2023-04-25 PROCEDURE — 3074F SYST BP LT 130 MM HG: CPT | Performed by: NURSE PRACTITIONER

## 2023-04-25 PROCEDURE — 99214 OFFICE O/P EST MOD 30 MIN: CPT | Performed by: NURSE PRACTITIONER

## 2023-04-25 PROCEDURE — 1036F TOBACCO NON-USER: CPT | Performed by: NURSE PRACTITIONER

## 2023-04-25 PROCEDURE — 3078F DIAST BP <80 MM HG: CPT | Performed by: NURSE PRACTITIONER

## 2023-04-25 PROCEDURE — 1123F ACP DISCUSS/DSCN MKR DOCD: CPT | Performed by: NURSE PRACTITIONER

## 2023-04-25 PROCEDURE — G8400 PT W/DXA NO RESULTS DOC: HCPCS | Performed by: NURSE PRACTITIONER

## 2023-04-25 PROCEDURE — 1090F PRES/ABSN URINE INCON ASSESS: CPT | Performed by: NURSE PRACTITIONER

## 2023-04-25 PROCEDURE — G8427 DOCREV CUR MEDS BY ELIG CLIN: HCPCS | Performed by: NURSE PRACTITIONER

## 2023-04-25 PROCEDURE — G8420 CALC BMI NORM PARAMETERS: HCPCS | Performed by: NURSE PRACTITIONER

## 2023-04-25 NOTE — PROGRESS NOTES
NEUROLOGY OUT PATIENT FOLLOW UP NOTE:  4/25/20232:42 PM    Indu Stack is here for follow up for double vision. Allergies   Allergen Reactions    Tramadol Nausea And Vomiting       Current Outpatient Medications:     amiodarone (CORDARONE) 200 MG tablet, Take 2 tablets by mouth 2 times daily Loading dose 400mg bid x 3 weeks then 200mg daily (2 Rx's sent), Disp: 84 tablet, Rfl: 0    gabapentin (NEURONTIN) 100 MG capsule, Take 1 capsule by mouth in the morning and at bedtime. , Disp: , Rfl:     bumetanide (BUMEX) 1 MG tablet, Take 1 tablet by mouth 2 times daily Taking tid, Disp: , Rfl:     Probiotic Product (PROBIOTIC PO), Take by mouth, Disp: , Rfl:     pantoprazole (PROTONIX) 40 MG tablet, TAKE ONE TABLET, TWO TIMES A DAY, BY MOUTH., Disp: 180 tablet, Rfl: 3    rosuvastatin (CRESTOR) 5 MG tablet, Take 1 tablet by mouth every evening Indications: Blood Cholesterol Abnormal, Disp: 90 tablet, Rfl: 3    warfarin (COUMADIN) 2 MG tablet, Take as instructed by the physician following the INR results, Disp: 30 tablet, Rfl: 0    acetaminophen (TYLENOL) 650 MG extended release tablet, Take 1 tablet by mouth 2 times daily as needed Indications: Arthritis, Disp: , Rfl:     levothyroxine (SYNTHROID) 50 MCG tablet, Take 1 tablet by mouth daily Indications: Impaired Thyroid Function, Disp: , Rfl:     Biotin 1000 MCG TABS, Take 1,000 mcg by mouth nightly , Disp: , Rfl:     amiodarone (CORDARONE) 200 MG tablet, 400mg bid x 3 weeks then 200mg qd (Patient not taking: Reported on 4/25/2023), Disp: 98 tablet, Rfl: 0    amiodarone (CORDARONE) 200 MG tablet, Take 1 tablet by mouth daily (Patient not taking: Reported on 4/25/2023), Disp: 90 tablet, Rfl: 2    Coenzyme Q10 (CO Q 10 PO), Take by mouth (Patient not taking: Reported on 4/25/2023), Disp: , Rfl:     hydrALAZINE (APRESOLINE) 25 MG tablet, Take 1 tablet by mouth in the morning and 1 tablet before bedtime.  (Patient not taking: Reported on 4/25/2023), Disp: 180 tablet,

## 2023-04-25 NOTE — PATIENT INSTRUCTIONS
Continue with Coumadin  Continue with lipid lowering medication, target LDL below 70  MRA head and neck WO contrast  Lipid panel   Homocystine level  Cardiac echo  Modify risk factors for stroke (blood pressure, cholesterol, diabetes, smoking cessation etc.. Control)  Follow up in 1 month or sooner if needed. Call if any questions or concerns.

## 2023-04-26 ENCOUNTER — APPOINTMENT (OUTPATIENT)
Dept: GENERAL RADIOLOGY | Age: 83
DRG: 481 | End: 2023-04-26
Payer: MEDICARE

## 2023-04-26 ENCOUNTER — HOSPITAL ENCOUNTER (INPATIENT)
Age: 83
LOS: 6 days | Discharge: NURSING, REHAB OR LONG-TERM CARE | DRG: 481 | End: 2023-05-02
Attending: EMERGENCY MEDICINE | Admitting: ORTHOPAEDIC SURGERY
Payer: MEDICARE

## 2023-04-26 DIAGNOSIS — S72.002A CLOSED FRACTURE OF LEFT HIP, INITIAL ENCOUNTER (HCC): Primary | ICD-10-CM

## 2023-04-26 PROBLEM — S72.142A CLOSED DISPLACED INTERTROCHANTERIC FRACTURE OF LEFT FEMUR, INITIAL ENCOUNTER (HCC): Status: ACTIVE | Noted: 2023-04-26

## 2023-04-26 LAB
ABO: NORMAL
ALBUMIN SERPL BCP-MCNC: 4 GM/DL (ref 3.4–5)
ALP SERPL-CCNC: 134 U/L (ref 46–116)
ALT SERPL W P-5'-P-CCNC: 21 U/L (ref 14–63)
ANION GAP SERPL CALC-SCNC: 8 MEQ/L (ref 8–16)
ANTIBODY SCREEN: NORMAL
AST SERPL W P-5'-P-CCNC: 26 U/L (ref 15–37)
BASOPHILS # BLD: 0.8 % (ref 0–3)
BASOPHILS ABSOLUTE: 0.1 THOU/MM3 (ref 0–0.1)
BILIRUB SERPL-MCNC: 0.3 MG/DL (ref 0.2–1)
BUN SERPL-MCNC: 43 MG/DL (ref 7–18)
CALCIUM SERPL-MCNC: 9.6 MG/DL (ref 8.5–10.1)
CHLORIDE SERPL-SCNC: 103 MEQ/L (ref 98–107)
CO2 SERPL-SCNC: 28 MEQ/L (ref 21–32)
CREAT SERPL-MCNC: 2.2 MG/DL (ref 0.6–1.3)
EOSINOPHILS ABSOLUTE: 0.2 THOU/MM3 (ref 0–0.5)
EOSINOPHILS RELATIVE PERCENT: 2.3 % (ref 0–4)
GFR SERPL CREATININE-BSD FRML MDRD: 22 ML/MIN/1.73M2
GLUCOSE SERPL-MCNC: 123 MG/DL (ref 74–106)
HCT VFR BLD CALC: 31.5 % (ref 37–47)
HEMOGLOBIN: 9.5 GM/DL (ref 12–16)
IMMATURE GRANS (ABS): 0.02 THOU/MM3 (ref 0–0.07)
IMMATURE GRANULOCYTES: 0 %
INR BLD: 2.38 (ref 0.85–1.13)
LYMPHOCYTES # BLD AUTO: 15.4 % (ref 15–47)
LYMPHOCYTES ABSOLUTE: 1 THOU/MM3 (ref 1–4.8)
MCH RBC QN AUTO: 26.4 PG (ref 26–32)
MCHC RBC AUTO-ENTMCNC: 30.2 GM/DL (ref 31–35)
MCV RBC AUTO: 87.5 FL (ref 81–99)
MONOCYTES: 0.4 THOU/MM3 (ref 0.3–1.3)
MONOCYTES: 6.7 % (ref 0–12)
PDW BLD-RTO: 16.7 % (ref 11.5–14.9)
PLATELET # BLD AUTO: 243 THOU/MM3 (ref 130–400)
PMV BLD AUTO: 10.4 FL (ref 9.4–12.4)
POTASSIUM SERPL-SCNC: 4.3 MEQ/L (ref 3.5–5.1)
PROT SERPL-MCNC: 7.2 GM/DL (ref 6.4–8.2)
RBC # BLD: 3.6 MILL/MM3 (ref 4.1–5.3)
RH FACTOR: NORMAL
SEG NEUTROPHILS: 74.5 % (ref 43–75)
SEGMENTED NEUTROPHILS ABSOLUTE COUNT: 4.9 THOU/MM3 (ref 1.8–7.7)
SODIUM SERPL-SCNC: 139 MEQ/L (ref 136–145)
WBC # BLD: 6.6 THOU/MM3 (ref 4.8–10.8)

## 2023-04-26 PROCEDURE — 86923 COMPATIBILITY TEST ELECTRIC: CPT

## 2023-04-26 PROCEDURE — 2580000003 HC RX 258: Performed by: PHYSICIAN ASSISTANT

## 2023-04-26 PROCEDURE — 71045 X-RAY EXAM CHEST 1 VIEW: CPT

## 2023-04-26 PROCEDURE — 1200000003 HC TELEMETRY R&B

## 2023-04-26 PROCEDURE — 73552 X-RAY EXAM OF FEMUR 2/>: CPT

## 2023-04-26 PROCEDURE — 85610 PROTHROMBIN TIME: CPT

## 2023-04-26 PROCEDURE — 99222 1ST HOSP IP/OBS MODERATE 55: CPT | Performed by: NURSE PRACTITIONER

## 2023-04-26 PROCEDURE — 86900 BLOOD TYPING SEROLOGIC ABO: CPT

## 2023-04-26 PROCEDURE — 86850 RBC ANTIBODY SCREEN: CPT

## 2023-04-26 PROCEDURE — 86901 BLOOD TYPING SEROLOGIC RH(D): CPT

## 2023-04-26 PROCEDURE — 93005 ELECTROCARDIOGRAM TRACING: CPT | Performed by: EMERGENCY MEDICINE

## 2023-04-26 PROCEDURE — 80053 COMPREHEN METABOLIC PANEL: CPT

## 2023-04-26 PROCEDURE — 6370000000 HC RX 637 (ALT 250 FOR IP): Performed by: PHYSICIAN ASSISTANT

## 2023-04-26 PROCEDURE — 6820000001 HC L2 TRAUMA SURGERY EVALUATION: Performed by: ORTHOPAEDIC SURGERY

## 2023-04-26 PROCEDURE — 85025 COMPLETE CBC W/AUTO DIFF WBC: CPT

## 2023-04-26 PROCEDURE — 99285 EMERGENCY DEPT VISIT HI MDM: CPT

## 2023-04-26 PROCEDURE — 73502 X-RAY EXAM HIP UNI 2-3 VIEWS: CPT

## 2023-04-26 PROCEDURE — 6360000002 HC RX W HCPCS: Performed by: EMERGENCY MEDICINE

## 2023-04-26 RX ORDER — ONDANSETRON 2 MG/ML
4 INJECTION INTRAMUSCULAR; INTRAVENOUS EVERY 6 HOURS PRN
Status: DISCONTINUED | OUTPATIENT
Start: 2023-04-26 | End: 2023-05-02 | Stop reason: HOSPADM

## 2023-04-26 RX ORDER — SODIUM CHLORIDE 9 MG/ML
INJECTION, SOLUTION INTRAVENOUS PRN
Status: DISCONTINUED | OUTPATIENT
Start: 2023-04-26 | End: 2023-05-02 | Stop reason: HOSPADM

## 2023-04-26 RX ORDER — CYCLOBENZAPRINE HCL 10 MG
5 TABLET ORAL 3 TIMES DAILY PRN
Status: DISCONTINUED | OUTPATIENT
Start: 2023-04-26 | End: 2023-05-02 | Stop reason: HOSPADM

## 2023-04-26 RX ORDER — SODIUM CHLORIDE 9 MG/ML
INJECTION, SOLUTION INTRAVENOUS CONTINUOUS
Status: DISCONTINUED | OUTPATIENT
Start: 2023-04-26 | End: 2023-04-27

## 2023-04-26 RX ORDER — MORPHINE SULFATE 2 MG/ML
2 INJECTION, SOLUTION INTRAMUSCULAR; INTRAVENOUS ONCE
Status: COMPLETED | OUTPATIENT
Start: 2023-04-26 | End: 2023-04-26

## 2023-04-26 RX ORDER — BISACODYL 10 MG
10 SUPPOSITORY, RECTAL RECTAL DAILY PRN
Status: DISCONTINUED | OUTPATIENT
Start: 2023-04-26 | End: 2023-05-02 | Stop reason: HOSPADM

## 2023-04-26 RX ORDER — ACETAMINOPHEN 325 MG/1
650 TABLET ORAL EVERY 6 HOURS
Status: DISCONTINUED | OUTPATIENT
Start: 2023-04-26 | End: 2023-05-02 | Stop reason: HOSPADM

## 2023-04-26 RX ORDER — TRAMADOL HYDROCHLORIDE 50 MG/1
50 TABLET ORAL EVERY 6 HOURS PRN
Status: DISCONTINUED | OUTPATIENT
Start: 2023-04-26 | End: 2023-04-27

## 2023-04-26 RX ORDER — HYDROCODONE BITARTRATE AND ACETAMINOPHEN 5; 325 MG/1; MG/1
1 TABLET ORAL EVERY 6 HOURS PRN
Status: DISCONTINUED | OUTPATIENT
Start: 2023-04-26 | End: 2023-05-02 | Stop reason: HOSPADM

## 2023-04-26 RX ORDER — SODIUM CHLORIDE 0.9 % (FLUSH) 0.9 %
10 SYRINGE (ML) INJECTION EVERY 12 HOURS SCHEDULED
Status: DISCONTINUED | OUTPATIENT
Start: 2023-04-26 | End: 2023-05-02 | Stop reason: HOSPADM

## 2023-04-26 RX ORDER — SODIUM CHLORIDE 0.9 % (FLUSH) 0.9 %
10 SYRINGE (ML) INJECTION PRN
Status: DISCONTINUED | OUTPATIENT
Start: 2023-04-26 | End: 2023-05-02 | Stop reason: HOSPADM

## 2023-04-26 RX ORDER — HYDROCODONE BITARTRATE AND ACETAMINOPHEN 5; 325 MG/1; MG/1
2 TABLET ORAL EVERY 6 HOURS PRN
Status: DISCONTINUED | OUTPATIENT
Start: 2023-04-26 | End: 2023-05-02 | Stop reason: HOSPADM

## 2023-04-26 RX ORDER — ONDANSETRON 2 MG/ML
4 INJECTION INTRAMUSCULAR; INTRAVENOUS ONCE
Status: COMPLETED | OUTPATIENT
Start: 2023-04-26 | End: 2023-04-26

## 2023-04-26 RX ORDER — DOCUSATE SODIUM 100 MG/1
100 CAPSULE, LIQUID FILLED ORAL 2 TIMES DAILY
Status: DISCONTINUED | OUTPATIENT
Start: 2023-04-26 | End: 2023-05-02 | Stop reason: HOSPADM

## 2023-04-26 RX ORDER — PHYTONADIONE 5 MG/1
5 TABLET ORAL ONCE
Status: COMPLETED | OUTPATIENT
Start: 2023-04-26 | End: 2023-04-26

## 2023-04-26 RX ORDER — POLYETHYLENE GLYCOL 3350 17 G/17G
17 POWDER, FOR SOLUTION ORAL DAILY PRN
Status: DISCONTINUED | OUTPATIENT
Start: 2023-04-26 | End: 2023-05-02 | Stop reason: HOSPADM

## 2023-04-26 RX ADMIN — SODIUM CHLORIDE: 9 INJECTION, SOLUTION INTRAVENOUS at 22:26

## 2023-04-26 RX ADMIN — SODIUM CHLORIDE, PRESERVATIVE FREE 10 ML: 5 INJECTION INTRAVENOUS at 22:27

## 2023-04-26 RX ADMIN — PHYTONADIONE 5 MG: 5 TABLET ORAL at 22:26

## 2023-04-26 RX ADMIN — ONDANSETRON 4 MG: 2 INJECTION INTRAMUSCULAR; INTRAVENOUS at 17:55

## 2023-04-26 RX ADMIN — HYDROCODONE BITARTRATE AND ACETAMINOPHEN 2 TABLET: 5; 325 TABLET ORAL at 23:47

## 2023-04-26 RX ADMIN — ACETAMINOPHEN 650 MG: 325 TABLET ORAL at 22:26

## 2023-04-26 RX ADMIN — MORPHINE SULFATE 2 MG: 2 INJECTION, SOLUTION INTRAMUSCULAR; INTRAVENOUS at 17:56

## 2023-04-26 ASSESSMENT — PAIN SCALES - GENERAL
PAINLEVEL_OUTOF10: 10
PAINLEVEL_OUTOF10: 6
PAINLEVEL_OUTOF10: 7
PAINLEVEL_OUTOF10: 10
PAINLEVEL_OUTOF10: 8

## 2023-04-26 ASSESSMENT — PAIN DESCRIPTION - ORIENTATION
ORIENTATION: LEFT

## 2023-04-26 ASSESSMENT — PAIN - FUNCTIONAL ASSESSMENT
PAIN_FUNCTIONAL_ASSESSMENT: ACTIVITIES ARE NOT PREVENTED
PAIN_FUNCTIONAL_ASSESSMENT: ACTIVITIES ARE NOT PREVENTED
PAIN_FUNCTIONAL_ASSESSMENT: 0-10

## 2023-04-26 ASSESSMENT — PAIN DESCRIPTION - FREQUENCY
FREQUENCY: CONTINUOUS
FREQUENCY: CONTINUOUS

## 2023-04-26 ASSESSMENT — PAIN DESCRIPTION - PAIN TYPE
TYPE: ACUTE PAIN
TYPE: ACUTE PAIN

## 2023-04-26 ASSESSMENT — PAIN DESCRIPTION - DESCRIPTORS
DESCRIPTORS: ACHING
DESCRIPTORS: ACHING

## 2023-04-26 ASSESSMENT — PAIN DESCRIPTION - LOCATION
LOCATION: HIP

## 2023-04-26 ASSESSMENT — LIFESTYLE VARIABLES: HOW OFTEN DO YOU HAVE A DRINK CONTAINING ALCOHOL: NEVER

## 2023-04-26 ASSESSMENT — PAIN DESCRIPTION - ONSET
ONSET: ON-GOING
ONSET: ON-GOING

## 2023-04-27 ENCOUNTER — APPOINTMENT (OUTPATIENT)
Dept: GENERAL RADIOLOGY | Age: 83
DRG: 481 | End: 2023-04-27
Payer: MEDICARE

## 2023-04-27 PROBLEM — S72.002A CLOSED FRACTURE OF LEFT HIP (HCC): Status: ACTIVE | Noted: 2023-04-27

## 2023-04-27 LAB
ANION GAP SERPL CALC-SCNC: 12 MEQ/L (ref 8–16)
BACTERIA: ABNORMAL
BASOPHILS ABSOLUTE: 0 THOU/MM3 (ref 0–0.1)
BASOPHILS NFR BLD AUTO: 0.8 %
BILIRUB UR QL STRIP: NEGATIVE
BUN SERPL-MCNC: 40 MG/DL (ref 7–22)
CA-I BLD ISE-SCNC: 1.28 MMOL/L (ref 1.12–1.32)
CALCIUM SERPL-MCNC: 9.7 MG/DL (ref 8.5–10.5)
CASTS #/AREA URNS LPF: ABNORMAL /LPF
CASTS #/AREA URNS LPF: ABNORMAL /LPF
CHARACTER UR: ABNORMAL
CHARCOAL URNS QL MICRO: ABNORMAL
CHLORIDE SERPL-SCNC: 108 MEQ/L (ref 98–111)
CO2 SERPL-SCNC: 26 MEQ/L (ref 23–33)
COLOR UR: YELLOW
CREAT SERPL-MCNC: 2.1 MG/DL (ref 0.4–1.2)
CRYSTALS URNS QL MICRO: ABNORMAL
DEPRECATED MEAN GLUCOSE BLD GHB EST-ACNC: 99 MG/DL (ref 70–126)
DEPRECATED RDW RBC AUTO: 57 FL (ref 35–45)
EOSINOPHIL NFR BLD AUTO: 1.9 %
EOSINOPHILS ABSOLUTE: 0.1 THOU/MM3 (ref 0–0.4)
EPITHELIAL CELLS, UA: ABNORMAL /HPF
ERYTHROCYTE [DISTWIDTH] IN BLOOD BY AUTOMATED COUNT: 17 % (ref 11.5–14.5)
GFR SERPL CREATININE-BSD FRML MDRD: 23 ML/MIN/1.73M2
GLUCOSE SERPL-MCNC: 93 MG/DL (ref 70–108)
GLUCOSE UR QL STRIP.AUTO: NEGATIVE MG/DL
HBA1C MFR BLD HPLC: 5.3 % (ref 4.4–6.4)
HCT VFR BLD AUTO: 24.2 % (ref 37–47)
HCT VFR BLD AUTO: 27.3 % (ref 37–47)
HGB BLD-MCNC: 7.1 GM/DL (ref 12–16)
HGB BLD-MCNC: 7.7 GM/DL (ref 12–16)
HGB UR QL STRIP.AUTO: NEGATIVE
IMM GRANULOCYTES # BLD AUTO: 0.01 THOU/MM3 (ref 0–0.07)
IMM GRANULOCYTES NFR BLD AUTO: 0.3 %
INR PPP: 1.57 (ref 0.85–1.13)
INR PPP: 2.21 (ref 0.85–1.13)
KETONES UR QL STRIP.AUTO: NEGATIVE
LEUKOCYTE ESTERASE UR QL STRIP.AUTO: ABNORMAL
LYMPHOCYTES ABSOLUTE: 0.8 THOU/MM3 (ref 1–4.8)
LYMPHOCYTES NFR BLD AUTO: 22.2 %
MCH RBC QN AUTO: 26.8 PG (ref 26–33)
MCHC RBC AUTO-ENTMCNC: 29.3 GM/DL (ref 32.2–35.5)
MCV RBC AUTO: 91.3 FL (ref 81–99)
MONOCYTES ABSOLUTE: 0.4 THOU/MM3 (ref 0.4–1.3)
MONOCYTES NFR BLD AUTO: 11.6 %
NEUTROPHILS NFR BLD AUTO: 63.2 %
NITRITE UR QL STRIP.AUTO: NEGATIVE
NRBC BLD AUTO-RTO: 0 /100 WBC
NT-PROBNP SERPL IA-MCNC: 5754 PG/ML (ref 0–449)
PH BLDV: 7.37 [PH] (ref 7.31–7.41)
PH UR STRIP.AUTO: 6 [PH] (ref 5–9)
PHOSPHATE SERPL-MCNC: 4.9 MG/DL (ref 2.4–4.7)
PLATELET # BLD AUTO: 175 THOU/MM3 (ref 130–400)
PMV BLD AUTO: 10.3 FL (ref 9.4–12.4)
POTASSIUM SERPL-SCNC: 4.8 MEQ/L (ref 3.5–5.2)
PROT UR STRIP.AUTO-MCNC: ABNORMAL MG/DL
RBC # BLD AUTO: 2.65 MILL/MM3 (ref 4.2–5.4)
RBC #/AREA URNS HPF: ABNORMAL /HPF
RENAL EPI CELLS #/AREA URNS HPF: ABNORMAL /[HPF]
SEGMENTED NEUTROPHILS ABSOLUTE COUNT: 2.4 THOU/MM3 (ref 1.8–7.7)
SODIUM SERPL-SCNC: 146 MEQ/L (ref 135–145)
SP GR UR REFRACT.AUTO: 1.02 (ref 1–1.03)
T4 FREE SERPL-MCNC: 2.1 NG/DL (ref 0.93–1.76)
TSH SERPL DL<=0.005 MIU/L-ACNC: 5.52 UIU/ML (ref 0.4–4.2)
UROBILINOGEN UR QL STRIP.AUTO: 0.2 EU/DL (ref 0–1)
WBC # BLD AUTO: 3.8 THOU/MM3 (ref 4.8–10.8)
WBC #/AREA URNS HPF: ABNORMAL /HPF
YEAST LIKE FUNGI URNS QL MICRO: ABNORMAL

## 2023-04-27 PROCEDURE — 84100 ASSAY OF PHOSPHORUS: CPT

## 2023-04-27 PROCEDURE — 85014 HEMATOCRIT: CPT

## 2023-04-27 PROCEDURE — 81001 URINALYSIS AUTO W/SCOPE: CPT

## 2023-04-27 PROCEDURE — 80048 BASIC METABOLIC PNL TOTAL CA: CPT

## 2023-04-27 PROCEDURE — 6370000000 HC RX 637 (ALT 250 FOR IP): Performed by: PHYSICIAN ASSISTANT

## 2023-04-27 PROCEDURE — 6370000000 HC RX 637 (ALT 250 FOR IP): Performed by: NURSE PRACTITIONER

## 2023-04-27 PROCEDURE — 83036 HEMOGLOBIN GLYCOSYLATED A1C: CPT

## 2023-04-27 PROCEDURE — 82330 ASSAY OF CALCIUM: CPT

## 2023-04-27 PROCEDURE — 84443 ASSAY THYROID STIM HORMONE: CPT

## 2023-04-27 PROCEDURE — 99232 SBSQ HOSP IP/OBS MODERATE 35: CPT | Performed by: NURSE PRACTITIONER

## 2023-04-27 PROCEDURE — 85610 PROTHROMBIN TIME: CPT

## 2023-04-27 PROCEDURE — 87086 URINE CULTURE/COLONY COUNT: CPT

## 2023-04-27 PROCEDURE — 85025 COMPLETE CBC W/AUTO DIFF WBC: CPT

## 2023-04-27 PROCEDURE — 87077 CULTURE AEROBIC IDENTIFY: CPT

## 2023-04-27 PROCEDURE — 1200000003 HC TELEMETRY R&B

## 2023-04-27 PROCEDURE — 87186 SC STD MICRODIL/AGAR DIL: CPT

## 2023-04-27 PROCEDURE — 84439 ASSAY OF FREE THYROXINE: CPT

## 2023-04-27 PROCEDURE — 2580000003 HC RX 258: Performed by: PHYSICIAN ASSISTANT

## 2023-04-27 PROCEDURE — 82800 BLOOD PH: CPT

## 2023-04-27 PROCEDURE — 71045 X-RAY EXAM CHEST 1 VIEW: CPT

## 2023-04-27 PROCEDURE — 83880 ASSAY OF NATRIURETIC PEPTIDE: CPT

## 2023-04-27 PROCEDURE — 85018 HEMOGLOBIN: CPT

## 2023-04-27 PROCEDURE — 93010 ELECTROCARDIOGRAM REPORT: CPT | Performed by: INTERNAL MEDICINE

## 2023-04-27 PROCEDURE — 36415 COLL VENOUS BLD VENIPUNCTURE: CPT

## 2023-04-27 RX ORDER — HYDRALAZINE HYDROCHLORIDE 25 MG/1
25 TABLET, FILM COATED ORAL 2 TIMES DAILY
Status: DISCONTINUED | OUTPATIENT
Start: 2023-04-27 | End: 2023-05-02 | Stop reason: HOSPADM

## 2023-04-27 RX ORDER — SODIUM CHLORIDE 9 MG/ML
INJECTION, SOLUTION INTRAVENOUS CONTINUOUS
Status: DISCONTINUED | OUTPATIENT
Start: 2023-04-27 | End: 2023-04-30

## 2023-04-27 RX ORDER — TRAMADOL HYDROCHLORIDE 50 MG/1
50 TABLET ORAL EVERY 12 HOURS PRN
Status: DISCONTINUED | OUTPATIENT
Start: 2023-04-27 | End: 2023-05-02 | Stop reason: HOSPADM

## 2023-04-27 RX ORDER — TRANEXAMIC ACID 10 MG/ML
1000 INJECTION, SOLUTION INTRAVENOUS
Status: ACTIVE | OUTPATIENT
Start: 2023-04-27 | End: 2023-04-27

## 2023-04-27 RX ORDER — LEVOTHYROXINE SODIUM 0.05 MG/1
50 TABLET ORAL
Status: DISCONTINUED | OUTPATIENT
Start: 2023-04-27 | End: 2023-05-02 | Stop reason: HOSPADM

## 2023-04-27 RX ORDER — GABAPENTIN 100 MG/1
100 CAPSULE ORAL 2 TIMES DAILY
Status: DISCONTINUED | OUTPATIENT
Start: 2023-04-27 | End: 2023-05-02 | Stop reason: HOSPADM

## 2023-04-27 RX ORDER — PANTOPRAZOLE SODIUM 40 MG/1
40 TABLET, DELAYED RELEASE ORAL
Status: DISCONTINUED | OUTPATIENT
Start: 2023-04-27 | End: 2023-05-02 | Stop reason: HOSPADM

## 2023-04-27 RX ORDER — BUMETANIDE 1 MG/1
1 TABLET ORAL 2 TIMES DAILY
Status: DISCONTINUED | OUTPATIENT
Start: 2023-04-27 | End: 2023-05-02 | Stop reason: HOSPADM

## 2023-04-27 RX ORDER — ROSUVASTATIN CALCIUM 10 MG/1
5 TABLET, COATED ORAL EVERY EVENING
Status: DISCONTINUED | OUTPATIENT
Start: 2023-04-27 | End: 2023-05-02 | Stop reason: HOSPADM

## 2023-04-27 RX ORDER — AMIODARONE HYDROCHLORIDE 200 MG/1
400 TABLET ORAL 2 TIMES DAILY
Status: DISCONTINUED | OUTPATIENT
Start: 2023-04-27 | End: 2023-05-02 | Stop reason: HOSPADM

## 2023-04-27 RX ORDER — SODIUM CHLORIDE 9 MG/ML
INJECTION, SOLUTION INTRAVENOUS CONTINUOUS
Status: DISCONTINUED | OUTPATIENT
Start: 2023-04-27 | End: 2023-04-27

## 2023-04-27 RX ORDER — PHYTONADIONE 5 MG/1
5 TABLET ORAL ONCE
Status: COMPLETED | OUTPATIENT
Start: 2023-04-27 | End: 2023-04-27

## 2023-04-27 RX ORDER — BUMETANIDE 1 MG/1
1 TABLET ORAL 2 TIMES DAILY
Status: DISCONTINUED | OUTPATIENT
Start: 2023-04-27 | End: 2023-04-27

## 2023-04-27 RX ORDER — HYDRALAZINE HYDROCHLORIDE 25 MG/1
25 TABLET, FILM COATED ORAL 2 TIMES DAILY
Status: DISCONTINUED | OUTPATIENT
Start: 2023-04-27 | End: 2023-04-27

## 2023-04-27 RX ADMIN — AMIODARONE HYDROCHLORIDE 400 MG: 200 TABLET ORAL at 09:17

## 2023-04-27 RX ADMIN — ROSUVASTATIN 5 MG: 10 TABLET, FILM COATED ORAL at 21:44

## 2023-04-27 RX ADMIN — GABAPENTIN 100 MG: 100 CAPSULE ORAL at 21:44

## 2023-04-27 RX ADMIN — GABAPENTIN 100 MG: 100 CAPSULE ORAL at 09:17

## 2023-04-27 RX ADMIN — ACETAMINOPHEN 650 MG: 325 TABLET ORAL at 22:16

## 2023-04-27 RX ADMIN — ACETAMINOPHEN 650 MG: 325 TABLET ORAL at 15:50

## 2023-04-27 RX ADMIN — PANTOPRAZOLE SODIUM 40 MG: 40 TABLET, DELAYED RELEASE ORAL at 15:51

## 2023-04-27 RX ADMIN — SODIUM CHLORIDE, PRESERVATIVE FREE 10 ML: 5 INJECTION INTRAVENOUS at 09:17

## 2023-04-27 RX ADMIN — AMIODARONE HYDROCHLORIDE 400 MG: 200 TABLET ORAL at 21:44

## 2023-04-27 RX ADMIN — PHYTONADIONE 5 MG: 5 TABLET ORAL at 09:24

## 2023-04-27 RX ADMIN — SODIUM CHLORIDE: 9 INJECTION, SOLUTION INTRAVENOUS at 23:46

## 2023-04-27 RX ADMIN — SODIUM CHLORIDE, PRESERVATIVE FREE 10 ML: 5 INJECTION INTRAVENOUS at 21:44

## 2023-04-27 RX ADMIN — LEVOTHYROXINE SODIUM 50 MCG: 0.05 TABLET ORAL at 09:18

## 2023-04-27 RX ADMIN — ACETAMINOPHEN 650 MG: 325 TABLET ORAL at 09:18

## 2023-04-27 ASSESSMENT — PAIN DESCRIPTION - LOCATION
LOCATION: HIP

## 2023-04-27 ASSESSMENT — PAIN DESCRIPTION - PAIN TYPE
TYPE: ACUTE PAIN
TYPE: ACUTE PAIN

## 2023-04-27 ASSESSMENT — PAIN - FUNCTIONAL ASSESSMENT
PAIN_FUNCTIONAL_ASSESSMENT: ACTIVITIES ARE NOT PREVENTED
PAIN_FUNCTIONAL_ASSESSMENT: PREVENTS OR INTERFERES WITH MANY ACTIVE NOT PASSIVE ACTIVITIES
PAIN_FUNCTIONAL_ASSESSMENT: PREVENTS OR INTERFERES SOME ACTIVE ACTIVITIES AND ADLS

## 2023-04-27 ASSESSMENT — PAIN SCALES - GENERAL
PAINLEVEL_OUTOF10: 3
PAINLEVEL_OUTOF10: 5
PAINLEVEL_OUTOF10: 7

## 2023-04-27 ASSESSMENT — PAIN DESCRIPTION - ORIENTATION
ORIENTATION: LEFT

## 2023-04-27 ASSESSMENT — PAIN DESCRIPTION - ONSET
ONSET: ON-GOING
ONSET: ON-GOING

## 2023-04-27 ASSESSMENT — PAIN DESCRIPTION - DESCRIPTORS
DESCRIPTORS: ACHING

## 2023-04-27 ASSESSMENT — PAIN DESCRIPTION - FREQUENCY
FREQUENCY: CONTINUOUS
FREQUENCY: CONTINUOUS

## 2023-04-28 ENCOUNTER — APPOINTMENT (OUTPATIENT)
Dept: GENERAL RADIOLOGY | Age: 83
DRG: 481 | End: 2023-04-28
Payer: MEDICARE

## 2023-04-28 ENCOUNTER — ANESTHESIA (OUTPATIENT)
Dept: OPERATING ROOM | Age: 83
End: 2023-04-28
Payer: MEDICARE

## 2023-04-28 ENCOUNTER — ANESTHESIA EVENT (OUTPATIENT)
Dept: OPERATING ROOM | Age: 83
End: 2023-04-28
Payer: MEDICARE

## 2023-04-28 LAB
ANION GAP SERPL CALC-SCNC: 9 MEQ/L (ref 8–16)
BASOPHILS ABSOLUTE: 0 THOU/MM3 (ref 0–0.1)
BASOPHILS NFR BLD AUTO: 0.6 %
BUN SERPL-MCNC: 38 MG/DL (ref 7–22)
CALCIUM SERPL-MCNC: 9.3 MG/DL (ref 8.5–10.5)
CHLORIDE SERPL-SCNC: 107 MEQ/L (ref 98–111)
CO2 SERPL-SCNC: 25 MEQ/L (ref 23–33)
CREAT SERPL-MCNC: 1.7 MG/DL (ref 0.4–1.2)
DEPRECATED RDW RBC AUTO: 56.2 FL (ref 35–45)
EKG Q-T INTERVAL: 446 MS
EKG QRS DURATION: 86 MS
EKG QTC CALCULATION (BAZETT): 495 MS
EKG R AXIS: 9 DEGREES
EKG T AXIS: 77 DEGREES
EKG VENTRICULAR RATE: 74 BPM
EOSINOPHIL NFR BLD AUTO: 2.8 %
EOSINOPHILS ABSOLUTE: 0.1 THOU/MM3 (ref 0–0.4)
ERYTHROCYTE [DISTWIDTH] IN BLOOD BY AUTOMATED COUNT: 16.7 % (ref 11.5–14.5)
GFR SERPL CREATININE-BSD FRML MDRD: 29 ML/MIN/1.73M2
GLUCOSE SERPL-MCNC: 88 MG/DL (ref 70–108)
HCT VFR BLD AUTO: 26.4 % (ref 37–47)
HGB BLD-MCNC: 7.5 GM/DL (ref 12–16)
HYPOCHROMIA BLD QL SMEAR: PRESENT
IMM GRANULOCYTES # BLD AUTO: 0.02 THOU/MM3 (ref 0–0.07)
IMM GRANULOCYTES NFR BLD AUTO: 0.4 %
INR PPP: 1.14 (ref 0.85–1.13)
LYMPHOCYTES ABSOLUTE: 1.1 THOU/MM3 (ref 1–4.8)
LYMPHOCYTES NFR BLD AUTO: 22.5 %
MCH RBC QN AUTO: 26 PG (ref 26–33)
MCHC RBC AUTO-ENTMCNC: 28.4 GM/DL (ref 32.2–35.5)
MCV RBC AUTO: 91.7 FL (ref 81–99)
MONOCYTES ABSOLUTE: 0.7 THOU/MM3 (ref 0.4–1.3)
MONOCYTES NFR BLD AUTO: 14.7 %
NEUTROPHILS NFR BLD AUTO: 59 %
NRBC BLD AUTO-RTO: 0 /100 WBC
PLATELET # BLD AUTO: 177 THOU/MM3 (ref 130–400)
PMV BLD AUTO: 10.4 FL (ref 9.4–12.4)
POTASSIUM SERPL-SCNC: 4.5 MEQ/L (ref 3.5–5.2)
RBC # BLD AUTO: 2.88 MILL/MM3 (ref 4.2–5.4)
SEGMENTED NEUTROPHILS ABSOLUTE COUNT: 3 THOU/MM3 (ref 1.8–7.7)
SODIUM SERPL-SCNC: 141 MEQ/L (ref 135–145)
WBC # BLD AUTO: 5 THOU/MM3 (ref 4.8–10.8)

## 2023-04-28 PROCEDURE — 6360000002 HC RX W HCPCS: Performed by: PHYSICIAN ASSISTANT

## 2023-04-28 PROCEDURE — 7100000000 HC PACU RECOVERY - FIRST 15 MIN: Performed by: ORTHOPAEDIC SURGERY

## 2023-04-28 PROCEDURE — 3700000001 HC ADD 15 MINUTES (ANESTHESIA): Performed by: ORTHOPAEDIC SURGERY

## 2023-04-28 PROCEDURE — 6370000000 HC RX 637 (ALT 250 FOR IP): Performed by: NURSE PRACTITIONER

## 2023-04-28 PROCEDURE — 3700000000 HC ANESTHESIA ATTENDED CARE: Performed by: ORTHOPAEDIC SURGERY

## 2023-04-28 PROCEDURE — 6370000000 HC RX 637 (ALT 250 FOR IP): Performed by: PHYSICIAN ASSISTANT

## 2023-04-28 PROCEDURE — 7100000001 HC PACU RECOVERY - ADDTL 15 MIN: Performed by: ORTHOPAEDIC SURGERY

## 2023-04-28 PROCEDURE — 3209999900 FLUORO FOR SURGICAL PROCEDURES

## 2023-04-28 PROCEDURE — 6360000002 HC RX W HCPCS: Performed by: STUDENT IN AN ORGANIZED HEALTH CARE EDUCATION/TRAINING PROGRAM

## 2023-04-28 PROCEDURE — 1200000000 HC SEMI PRIVATE

## 2023-04-28 PROCEDURE — A4216 STERILE WATER/SALINE, 10 ML: HCPCS | Performed by: ORTHOPAEDIC SURGERY

## 2023-04-28 PROCEDURE — 6360000002 HC RX W HCPCS: Performed by: ORTHOPAEDIC SURGERY

## 2023-04-28 PROCEDURE — 0QS706Z REPOSITION LEFT UPPER FEMUR WITH INTRAMEDULLARY INTERNAL FIXATION DEVICE, OPEN APPROACH: ICD-10-PCS | Performed by: ORTHOPAEDIC SURGERY

## 2023-04-28 PROCEDURE — 85025 COMPLETE CBC W/AUTO DIFF WBC: CPT

## 2023-04-28 PROCEDURE — 2580000003 HC RX 258: Performed by: ORTHOPAEDIC SURGERY

## 2023-04-28 PROCEDURE — 2580000003 HC RX 258: Performed by: STUDENT IN AN ORGANIZED HEALTH CARE EDUCATION/TRAINING PROGRAM

## 2023-04-28 PROCEDURE — 2709999900 HC NON-CHARGEABLE SUPPLY: Performed by: ORTHOPAEDIC SURGERY

## 2023-04-28 PROCEDURE — 73552 X-RAY EXAM OF FEMUR 2/>: CPT

## 2023-04-28 PROCEDURE — 2500000003 HC RX 250 WO HCPCS: Performed by: STUDENT IN AN ORGANIZED HEALTH CARE EDUCATION/TRAINING PROGRAM

## 2023-04-28 PROCEDURE — 2580000003 HC RX 258: Performed by: NURSE PRACTITIONER

## 2023-04-28 PROCEDURE — 85610 PROTHROMBIN TIME: CPT

## 2023-04-28 PROCEDURE — 3600000004 HC SURGERY LEVEL 4 BASE: Performed by: ORTHOPAEDIC SURGERY

## 2023-04-28 PROCEDURE — 2500000003 HC RX 250 WO HCPCS: Performed by: ORTHOPAEDIC SURGERY

## 2023-04-28 PROCEDURE — 36415 COLL VENOUS BLD VENIPUNCTURE: CPT

## 2023-04-28 PROCEDURE — 2720000010 HC SURG SUPPLY STERILE: Performed by: ORTHOPAEDIC SURGERY

## 2023-04-28 PROCEDURE — 80048 BASIC METABOLIC PNL TOTAL CA: CPT

## 2023-04-28 PROCEDURE — 1200000003 HC TELEMETRY R&B

## 2023-04-28 PROCEDURE — 3600000014 HC SURGERY LEVEL 4 ADDTL 15MIN: Performed by: ORTHOPAEDIC SURGERY

## 2023-04-28 PROCEDURE — C1713 ANCHOR/SCREW BN/BN,TIS/BN: HCPCS | Performed by: ORTHOPAEDIC SURGERY

## 2023-04-28 PROCEDURE — 2580000003 HC RX 258: Performed by: PHYSICIAN ASSISTANT

## 2023-04-28 PROCEDURE — 6360000002 HC RX W HCPCS: Performed by: NURSE PRACTITIONER

## 2023-04-28 DEVICE — INTERTAN LAG/COMPRESSION SCREW KIT                                    80MM / 75MM
Type: IMPLANTABLE DEVICE | Status: FUNCTIONAL
Brand: TRIGEN

## 2023-04-28 DEVICE — TRIGEN INTERTAN 10S 10MM X 34CM 125DEGREE LEFT
Type: IMPLANTABLE DEVICE | Status: FUNCTIONAL
Brand: TRIGEN

## 2023-04-28 DEVICE — TRIGEN LOW PROFILE SCREW 5.0MM X 35MM
Type: IMPLANTABLE DEVICE | Status: FUNCTIONAL
Brand: TRIGEN

## 2023-04-28 RX ORDER — ROCURONIUM BROMIDE 10 MG/ML
INJECTION, SOLUTION INTRAVENOUS PRN
Status: DISCONTINUED | OUTPATIENT
Start: 2023-04-28 | End: 2023-04-28 | Stop reason: SDUPTHER

## 2023-04-28 RX ORDER — SODIUM CHLORIDE 9 MG/ML
INJECTION, SOLUTION INTRAVENOUS CONTINUOUS PRN
Status: DISCONTINUED | OUTPATIENT
Start: 2023-04-28 | End: 2023-04-28 | Stop reason: SDUPTHER

## 2023-04-28 RX ORDER — FENTANYL CITRATE 50 UG/ML
50 INJECTION, SOLUTION INTRAMUSCULAR; INTRAVENOUS EVERY 5 MIN PRN
Status: DISCONTINUED | OUTPATIENT
Start: 2023-04-28 | End: 2023-04-28 | Stop reason: HOSPADM

## 2023-04-28 RX ORDER — TRANEXAMIC ACID 100 MG/ML
INJECTION, SOLUTION INTRAVENOUS PRN
Status: DISCONTINUED | OUTPATIENT
Start: 2023-04-28 | End: 2023-04-28 | Stop reason: ALTCHOICE

## 2023-04-28 RX ORDER — GLYCOPYRROLATE 0.2 MG/ML
INJECTION INTRAMUSCULAR; INTRAVENOUS PRN
Status: DISCONTINUED | OUTPATIENT
Start: 2023-04-28 | End: 2023-04-28 | Stop reason: SDUPTHER

## 2023-04-28 RX ORDER — TRANEXAMIC ACID 100 MG/ML
INJECTION, SOLUTION INTRAVENOUS PRN
Status: DISCONTINUED | OUTPATIENT
Start: 2023-04-28 | End: 2023-04-28 | Stop reason: SDUPTHER

## 2023-04-28 RX ORDER — DEXAMETHASONE SODIUM PHOSPHATE 10 MG/ML
INJECTION, EMULSION INTRAMUSCULAR; INTRAVENOUS PRN
Status: DISCONTINUED | OUTPATIENT
Start: 2023-04-28 | End: 2023-04-28 | Stop reason: SDUPTHER

## 2023-04-28 RX ORDER — PROPOFOL 10 MG/ML
INJECTION, EMULSION INTRAVENOUS PRN
Status: DISCONTINUED | OUTPATIENT
Start: 2023-04-28 | End: 2023-04-28 | Stop reason: SDUPTHER

## 2023-04-28 RX ORDER — ONDANSETRON 2 MG/ML
INJECTION INTRAMUSCULAR; INTRAVENOUS PRN
Status: DISCONTINUED | OUTPATIENT
Start: 2023-04-28 | End: 2023-04-28 | Stop reason: SDUPTHER

## 2023-04-28 RX ORDER — FENTANYL CITRATE 50 UG/ML
INJECTION, SOLUTION INTRAMUSCULAR; INTRAVENOUS PRN
Status: DISCONTINUED | OUTPATIENT
Start: 2023-04-28 | End: 2023-04-28 | Stop reason: SDUPTHER

## 2023-04-28 RX ORDER — WARFARIN SODIUM 4 MG/1
4 TABLET ORAL ONCE
Status: COMPLETED | OUTPATIENT
Start: 2023-04-28 | End: 2023-04-28

## 2023-04-28 RX ADMIN — SUGAMMADEX 100 MG: 100 INJECTION, SOLUTION INTRAVENOUS at 10:04

## 2023-04-28 RX ADMIN — ACETAMINOPHEN 650 MG: 325 TABLET ORAL at 08:01

## 2023-04-28 RX ADMIN — WARFARIN SODIUM 4 MG: 4 TABLET ORAL at 18:46

## 2023-04-28 RX ADMIN — ROCURONIUM BROMIDE 40 MG: 10 INJECTION INTRAVENOUS at 09:27

## 2023-04-28 RX ADMIN — DEXAMETHASONE SODIUM PHOSPHATE 5 MG: 10 INJECTION, EMULSION INTRAMUSCULAR; INTRAVENOUS at 09:38

## 2023-04-28 RX ADMIN — ACETAMINOPHEN 650 MG: 325 TABLET ORAL at 15:42

## 2023-04-28 RX ADMIN — ONDANSETRON 4 MG: 2 INJECTION INTRAMUSCULAR; INTRAVENOUS at 09:44

## 2023-04-28 RX ADMIN — ROSUVASTATIN 5 MG: 10 TABLET, FILM COATED ORAL at 22:04

## 2023-04-28 RX ADMIN — DOCUSATE SODIUM 100 MG: 100 CAPSULE, LIQUID FILLED ORAL at 22:06

## 2023-04-28 RX ADMIN — CEFAZOLIN 2000 MG: 10 INJECTION, POWDER, FOR SOLUTION INTRAVENOUS at 23:12

## 2023-04-28 RX ADMIN — SODIUM CHLORIDE, PRESERVATIVE FREE 10 ML: 5 INJECTION INTRAVENOUS at 08:02

## 2023-04-28 RX ADMIN — CEFAZOLIN 2000 MG: 10 INJECTION, POWDER, FOR SOLUTION INTRAVENOUS at 09:35

## 2023-04-28 RX ADMIN — GLYCOPYRROLATE 0.1 MG: 0.2 INJECTION INTRAMUSCULAR; INTRAVENOUS at 10:05

## 2023-04-28 RX ADMIN — Medication 80 MG: at 09:26

## 2023-04-28 RX ADMIN — SODIUM CHLORIDE: 9 INJECTION, SOLUTION INTRAVENOUS at 22:06

## 2023-04-28 RX ADMIN — PANTOPRAZOLE SODIUM 40 MG: 40 TABLET, DELAYED RELEASE ORAL at 15:42

## 2023-04-28 RX ADMIN — HYDROMORPHONE HYDROCHLORIDE 0.25 MG: 1 INJECTION, SOLUTION INTRAMUSCULAR; INTRAVENOUS; SUBCUTANEOUS at 06:41

## 2023-04-28 RX ADMIN — TRANEXAMIC ACID 500 MG: 100 INJECTION, SOLUTION INTRAVENOUS at 09:40

## 2023-04-28 RX ADMIN — GLYCOPYRROLATE 0.1 MG: 0.2 INJECTION INTRAMUSCULAR; INTRAVENOUS at 09:45

## 2023-04-28 RX ADMIN — PROPOFOL 140 MG: 10 INJECTION, EMULSION INTRAVENOUS at 09:26

## 2023-04-28 RX ADMIN — CEFAZOLIN 2000 MG: 10 INJECTION, POWDER, FOR SOLUTION INTRAVENOUS at 15:41

## 2023-04-28 RX ADMIN — ACETAMINOPHEN 650 MG: 325 TABLET ORAL at 22:04

## 2023-04-28 RX ADMIN — SODIUM CHLORIDE: 9 INJECTION, SOLUTION INTRAVENOUS at 09:20

## 2023-04-28 RX ADMIN — SUGAMMADEX 100 MG: 100 INJECTION, SOLUTION INTRAVENOUS at 09:58

## 2023-04-28 RX ADMIN — GABAPENTIN 100 MG: 100 CAPSULE ORAL at 08:02

## 2023-04-28 RX ADMIN — AMIODARONE HYDROCHLORIDE 400 MG: 200 TABLET ORAL at 08:02

## 2023-04-28 RX ADMIN — AMIODARONE HYDROCHLORIDE 400 MG: 200 TABLET ORAL at 22:06

## 2023-04-28 RX ADMIN — GABAPENTIN 100 MG: 100 CAPSULE ORAL at 22:04

## 2023-04-28 RX ADMIN — PHENYLEPHRINE HYDROCHLORIDE 100 MCG: 10 INJECTION INTRAVENOUS at 10:05

## 2023-04-28 RX ADMIN — FENTANYL CITRATE 50 MCG: 50 INJECTION, SOLUTION INTRAMUSCULAR; INTRAVENOUS at 09:26

## 2023-04-28 RX ADMIN — FENTANYL CITRATE 50 MCG: 50 INJECTION, SOLUTION INTRAMUSCULAR; INTRAVENOUS at 10:21

## 2023-04-28 RX ADMIN — DOCUSATE SODIUM 100 MG: 100 CAPSULE, LIQUID FILLED ORAL at 08:01

## 2023-04-28 ASSESSMENT — PAIN DESCRIPTION - LOCATION
LOCATION: HIP
LOCATION: HIP

## 2023-04-28 ASSESSMENT — PAIN SCALES - GENERAL
PAINLEVEL_OUTOF10: 0
PAINLEVEL_OUTOF10: 1
PAINLEVEL_OUTOF10: 2

## 2023-04-28 ASSESSMENT — PAIN DESCRIPTION - ORIENTATION
ORIENTATION: LEFT
ORIENTATION: LEFT

## 2023-04-28 ASSESSMENT — LIFESTYLE VARIABLES
HOW OFTEN DO YOU HAVE A DRINK CONTAINING ALCOHOL: NEVER
HOW MANY STANDARD DRINKS CONTAINING ALCOHOL DO YOU HAVE ON A TYPICAL DAY: PATIENT DOES NOT DRINK

## 2023-04-28 ASSESSMENT — PAIN DESCRIPTION - DESCRIPTORS
DESCRIPTORS: DISCOMFORT
DESCRIPTORS: DISCOMFORT

## 2023-04-28 ASSESSMENT — PAIN DESCRIPTION - ONSET: ONSET: ON-GOING

## 2023-04-28 ASSESSMENT — PAIN DESCRIPTION - FREQUENCY: FREQUENCY: CONTINUOUS

## 2023-04-28 ASSESSMENT — PAIN DESCRIPTION - PAIN TYPE: TYPE: SURGICAL PAIN;ACUTE PAIN

## 2023-04-28 NOTE — ANESTHESIA PRE PROCEDURE
Department of Anesthesiology  Preprocedure Note       Name:  Zion Corrigan   Age:  79 y.o.  :  1940                                          MRN:  [id number]         Date:  2023      Surgeon: Ayana Aquino):  Ken Loyd MD    Procedure: Procedure(s):  LEFT INTERTAN    Medications prior to admission:   Prior to Admission medications    Medication Sig Start Date End Date Taking? Authorizing Provider   amiodarone (CORDARONE) 200 MG tablet 400mg bid x 3 weeks then 200mg qd 23   Francisca Galvan MD   amiodarone (CORDARONE) 200 MG tablet Take 2 tablets by mouth 2 times daily Loading dose 400mg bid x 3 weeks then 200mg daily (2 Rx's sent) 23   Francisca Galvan MD   amiodarone (CORDARONE) 200 MG tablet Take 1 tablet by mouth daily  Patient not taking: Reported on 2023   Francisca Galvan MD   gabapentin (NEURONTIN) 100 MG capsule Take 1 capsule by mouth in the morning and at bedtime. Historical Provider, MD   bumetanide (BUMEX) 1 MG tablet Take 1 tablet by mouth 2 times daily Taking tid    Historical Provider, MD   Coenzyme Q10 (CO Q 10 PO) Take by mouth  Patient not taking: Reported on 2023    Historical Provider, MD   Probiotic Product (PROBIOTIC PO) Take by mouth    Historical Provider, MD   hydrALAZINE (APRESOLINE) 25 MG tablet Take 1 tablet by mouth in the morning and 1 tablet before bedtime.   Patient not taking: Reported on 2023   Maryuri Ortiz MD   pantoprazole (PROTONIX) 40 MG tablet TAKE ONE TABLET, TWO TIMES A DAY, BY MOUTH. 3/4/22   Andrzej Prakash MD   rosuvastatin (CRESTOR) 5 MG tablet Take 1 tablet by mouth every evening Indications: Blood Cholesterol Abnormal 20   Andrzej Prakash MD   warfarin (COUMADIN) 2 MG tablet Take as instructed by the physician following the INR results 20   Bill Tilley MD   acetaminophen (TYLENOL) 650 MG extended release tablet Take 1 tablet by mouth 2 times daily as needed Indications: Arthritis

## 2023-04-28 NOTE — ANESTHESIA POSTPROCEDURE EVALUATION
Department of Anesthesiology  Postprocedure Note    Patient: Soila Gagnon  MRN: [id number]  YOB: 1940  Date of evaluation: 4/28/2023      Procedure Summary     Date: 04/28/23 Room / Location: [address] Brianflora Rangelo    Anesthesia Start: 0920 Anesthesia Stop: 0391    Procedure: LEFT INTERTAN (Left: Leg Upper) Diagnosis:       Closed fracture of left femur, unspecified fracture morphology, unspecified portion of femur, initial encounter ([facility]      (Closed fracture of left femur, unspecified fracture morphology, unspecified portion of femur, initial encounter ([facility] Janine Wiley)    Surgeons: Samantha Best MD Responsible Provider: Jae Lopez DO    Anesthesia Type: general ASA Status: 4          Anesthesia Type: No value filed.     Edu Phase I: Edu Score: 8    Edu Phase II:        Anesthesia Post Evaluation    Patient location during evaluation: PACU  Patient participation: complete - patient participated  Level of consciousness: awake and alert  Airway patency: patent  Nausea & Vomiting: no nausea  Complications: no  Cardiovascular status: blood pressure returned to baseline and hemodynamically stable  Respiratory status: acceptable and spontaneous ventilation  Hydration status: euvolemic

## 2023-04-29 LAB
ANION GAP SERPL CALC-SCNC: 11 MEQ/L (ref 8–16)
BACTERIA UR CULT: ABNORMAL
BASOPHILS ABSOLUTE: 0 THOU/MM3 (ref 0–0.1)
BASOPHILS NFR BLD AUTO: 0.2 %
BUN SERPL-MCNC: 42 MG/DL (ref 7–22)
CALCIUM SERPL-MCNC: 9 MG/DL (ref 8.5–10.5)
CHLORIDE SERPL-SCNC: 105 MEQ/L (ref 98–111)
CO2 SERPL-SCNC: 22 MEQ/L (ref 23–33)
CREAT SERPL-MCNC: 2.1 MG/DL (ref 0.4–1.2)
DEPRECATED RDW RBC AUTO: 56.5 FL (ref 35–45)
EOSINOPHIL NFR BLD AUTO: 0 %
EOSINOPHILS ABSOLUTE: 0 THOU/MM3 (ref 0–0.4)
ERYTHROCYTE [DISTWIDTH] IN BLOOD BY AUTOMATED COUNT: 16.5 % (ref 11.5–14.5)
GFR SERPL CREATININE-BSD FRML MDRD: 23 ML/MIN/1.73M2
GLUCOSE SERPL-MCNC: 98 MG/DL (ref 70–108)
HCT VFR BLD AUTO: 25.6 % (ref 37–47)
HGB BLD-MCNC: 7.3 GM/DL (ref 12–16)
HYPOCHROMIA BLD QL SMEAR: PRESENT
IMM GRANULOCYTES # BLD AUTO: 0.01 THOU/MM3 (ref 0–0.07)
IMM GRANULOCYTES NFR BLD AUTO: 0.2 %
INR PPP: 1.09 (ref 0.85–1.13)
LYMPHOCYTES ABSOLUTE: 0.6 THOU/MM3 (ref 1–4.8)
LYMPHOCYTES NFR BLD AUTO: 11.1 %
MCH RBC QN AUTO: 26.3 PG (ref 26–33)
MCHC RBC AUTO-ENTMCNC: 28.5 GM/DL (ref 32.2–35.5)
MCV RBC AUTO: 92.1 FL (ref 81–99)
MONOCYTES ABSOLUTE: 0.8 THOU/MM3 (ref 0.4–1.3)
MONOCYTES NFR BLD AUTO: 15.2 %
NEUTROPHILS NFR BLD AUTO: 73.3 %
NRBC BLD AUTO-RTO: 0 /100 WBC
ORGANISM: ABNORMAL
PLATELET # BLD AUTO: 161 THOU/MM3 (ref 130–400)
PMV BLD AUTO: 10.3 FL (ref 9.4–12.4)
POTASSIUM SERPL-SCNC: 5.2 MEQ/L (ref 3.5–5.2)
RBC # BLD AUTO: 2.78 MILL/MM3 (ref 4.2–5.4)
SEGMENTED NEUTROPHILS ABSOLUTE COUNT: 3.7 THOU/MM3 (ref 1.8–7.7)
SODIUM SERPL-SCNC: 138 MEQ/L (ref 135–145)
WBC # BLD AUTO: 5.1 THOU/MM3 (ref 4.8–10.8)

## 2023-04-29 PROCEDURE — 97530 THERAPEUTIC ACTIVITIES: CPT

## 2023-04-29 PROCEDURE — 2580000003 HC RX 258: Performed by: NURSE PRACTITIONER

## 2023-04-29 PROCEDURE — 80048 BASIC METABOLIC PNL TOTAL CA: CPT

## 2023-04-29 PROCEDURE — 6370000000 HC RX 637 (ALT 250 FOR IP): Performed by: NURSE PRACTITIONER

## 2023-04-29 PROCEDURE — 36415 COLL VENOUS BLD VENIPUNCTURE: CPT

## 2023-04-29 PROCEDURE — 85610 PROTHROMBIN TIME: CPT

## 2023-04-29 PROCEDURE — 85025 COMPLETE CBC W/AUTO DIFF WBC: CPT

## 2023-04-29 PROCEDURE — 1200000000 HC SEMI PRIVATE

## 2023-04-29 PROCEDURE — 97110 THERAPEUTIC EXERCISES: CPT

## 2023-04-29 PROCEDURE — 97535 SELF CARE MNGMENT TRAINING: CPT

## 2023-04-29 PROCEDURE — 6370000000 HC RX 637 (ALT 250 FOR IP)

## 2023-04-29 PROCEDURE — 99232 SBSQ HOSP IP/OBS MODERATE 35: CPT | Performed by: NURSE PRACTITIONER

## 2023-04-29 PROCEDURE — 97162 PT EVAL MOD COMPLEX 30 MIN: CPT

## 2023-04-29 PROCEDURE — 97166 OT EVAL MOD COMPLEX 45 MIN: CPT

## 2023-04-29 RX ORDER — WARFARIN SODIUM 4 MG/1
4 TABLET ORAL ONCE
Status: COMPLETED | OUTPATIENT
Start: 2023-04-29 | End: 2023-04-29

## 2023-04-29 RX ADMIN — ACETAMINOPHEN 650 MG: 325 TABLET ORAL at 03:27

## 2023-04-29 RX ADMIN — AMIODARONE HYDROCHLORIDE 400 MG: 200 TABLET ORAL at 21:05

## 2023-04-29 RX ADMIN — GABAPENTIN 100 MG: 100 CAPSULE ORAL at 08:42

## 2023-04-29 RX ADMIN — AMIODARONE HYDROCHLORIDE 400 MG: 200 TABLET ORAL at 08:42

## 2023-04-29 RX ADMIN — PANTOPRAZOLE SODIUM 40 MG: 40 TABLET, DELAYED RELEASE ORAL at 15:58

## 2023-04-29 RX ADMIN — DOCUSATE SODIUM 100 MG: 100 CAPSULE, LIQUID FILLED ORAL at 08:42

## 2023-04-29 RX ADMIN — ACETAMINOPHEN 650 MG: 325 TABLET ORAL at 10:27

## 2023-04-29 RX ADMIN — SODIUM CHLORIDE, PRESERVATIVE FREE 10 ML: 5 INJECTION INTRAVENOUS at 21:05

## 2023-04-29 RX ADMIN — ACETAMINOPHEN 650 MG: 325 TABLET ORAL at 23:45

## 2023-04-29 RX ADMIN — HYDRALAZINE HYDROCHLORIDE 25 MG: 25 TABLET, FILM COATED ORAL at 21:04

## 2023-04-29 RX ADMIN — LEVOTHYROXINE SODIUM 50 MCG: 0.05 TABLET ORAL at 05:55

## 2023-04-29 RX ADMIN — DOCUSATE SODIUM 100 MG: 100 CAPSULE, LIQUID FILLED ORAL at 21:05

## 2023-04-29 RX ADMIN — GABAPENTIN 100 MG: 100 CAPSULE ORAL at 21:04

## 2023-04-29 RX ADMIN — ROSUVASTATIN 5 MG: 10 TABLET, FILM COATED ORAL at 21:04

## 2023-04-29 RX ADMIN — WARFARIN SODIUM 4 MG: 4 TABLET ORAL at 18:31

## 2023-04-29 RX ADMIN — PANTOPRAZOLE SODIUM 40 MG: 40 TABLET, DELAYED RELEASE ORAL at 05:55

## 2023-04-29 RX ADMIN — ACETAMINOPHEN 650 MG: 325 TABLET ORAL at 15:58

## 2023-04-29 ASSESSMENT — PAIN DESCRIPTION - ORIENTATION
ORIENTATION: LEFT

## 2023-04-29 ASSESSMENT — PAIN DESCRIPTION - ONSET
ONSET: ON-GOING

## 2023-04-29 ASSESSMENT — PAIN DESCRIPTION - LOCATION
LOCATION: HIP

## 2023-04-29 ASSESSMENT — PAIN DESCRIPTION - DESCRIPTORS
DESCRIPTORS: DISCOMFORT
DESCRIPTORS: ACHING
DESCRIPTORS: ACHING;DISCOMFORT
DESCRIPTORS: DISCOMFORT
DESCRIPTORS: ACHING
DESCRIPTORS: ACHING

## 2023-04-29 ASSESSMENT — PAIN DESCRIPTION - PAIN TYPE
TYPE: SURGICAL PAIN;ACUTE PAIN

## 2023-04-29 ASSESSMENT — PAIN DESCRIPTION - FREQUENCY
FREQUENCY: CONTINUOUS
FREQUENCY: CONTINUOUS

## 2023-04-29 ASSESSMENT — PAIN SCALES - GENERAL
PAINLEVEL_OUTOF10: 3
PAINLEVEL_OUTOF10: 4
PAINLEVEL_OUTOF10: 1
PAINLEVEL_OUTOF10: 5
PAINLEVEL_OUTOF10: 3
PAINLEVEL_OUTOF10: 0
PAINLEVEL_OUTOF10: 0

## 2023-04-29 ASSESSMENT — PAIN - FUNCTIONAL ASSESSMENT
PAIN_FUNCTIONAL_ASSESSMENT: PREVENTS OR INTERFERES SOME ACTIVE ACTIVITIES AND ADLS

## 2023-04-30 LAB
FERRITIN SERPL IA-MCNC: 27 NG/ML (ref 10–291)
HCT VFR BLD AUTO: 26.1 % (ref 37–47)
HGB BLD-MCNC: 7.6 GM/DL (ref 12–16)
INR PPP: 1.9 (ref 0.85–1.13)
IRON SATN MFR SERPL: 3 % (ref 20–50)
IRON SERPL-MCNC: 8 UG/DL (ref 50–170)
TIBC SERPL-MCNC: 253 UG/DL (ref 171–450)

## 2023-04-30 PROCEDURE — 2580000003 HC RX 258: Performed by: NURSE PRACTITIONER

## 2023-04-30 PROCEDURE — 85018 HEMOGLOBIN: CPT

## 2023-04-30 PROCEDURE — 1200000000 HC SEMI PRIVATE

## 2023-04-30 PROCEDURE — 97116 GAIT TRAINING THERAPY: CPT

## 2023-04-30 PROCEDURE — 82728 ASSAY OF FERRITIN: CPT

## 2023-04-30 PROCEDURE — 6370000000 HC RX 637 (ALT 250 FOR IP)

## 2023-04-30 PROCEDURE — 51798 US URINE CAPACITY MEASURE: CPT

## 2023-04-30 PROCEDURE — 6370000000 HC RX 637 (ALT 250 FOR IP): Performed by: NURSE PRACTITIONER

## 2023-04-30 PROCEDURE — 97110 THERAPEUTIC EXERCISES: CPT

## 2023-04-30 PROCEDURE — 85014 HEMATOCRIT: CPT

## 2023-04-30 PROCEDURE — 85610 PROTHROMBIN TIME: CPT

## 2023-04-30 PROCEDURE — 83550 IRON BINDING TEST: CPT

## 2023-04-30 PROCEDURE — 6360000002 HC RX W HCPCS: Performed by: NURSE PRACTITIONER

## 2023-04-30 PROCEDURE — 97530 THERAPEUTIC ACTIVITIES: CPT

## 2023-04-30 PROCEDURE — 36415 COLL VENOUS BLD VENIPUNCTURE: CPT

## 2023-04-30 PROCEDURE — 94760 N-INVAS EAR/PLS OXIMETRY 1: CPT

## 2023-04-30 PROCEDURE — 83540 ASSAY OF IRON: CPT

## 2023-04-30 RX ORDER — HEPARIN SODIUM 5000 [USP'U]/ML
5000 INJECTION, SOLUTION INTRAVENOUS; SUBCUTANEOUS 2 TIMES DAILY
Status: DISCONTINUED | OUTPATIENT
Start: 2023-04-30 | End: 2023-05-01 | Stop reason: ALTCHOICE

## 2023-04-30 RX ORDER — ENOXAPARIN SODIUM 100 MG/ML
40 INJECTION SUBCUTANEOUS DAILY
Status: DISCONTINUED | OUTPATIENT
Start: 2023-04-30 | End: 2023-04-30

## 2023-04-30 RX ORDER — WARFARIN SODIUM 1 MG/1
0.5 TABLET ORAL ONCE
Status: COMPLETED | OUTPATIENT
Start: 2023-04-30 | End: 2023-04-30

## 2023-04-30 RX ADMIN — PANTOPRAZOLE SODIUM 40 MG: 40 TABLET, DELAYED RELEASE ORAL at 15:41

## 2023-04-30 RX ADMIN — AMIODARONE HYDROCHLORIDE 400 MG: 200 TABLET ORAL at 08:23

## 2023-04-30 RX ADMIN — GABAPENTIN 100 MG: 100 CAPSULE ORAL at 08:23

## 2023-04-30 RX ADMIN — PANTOPRAZOLE SODIUM 40 MG: 40 TABLET, DELAYED RELEASE ORAL at 06:03

## 2023-04-30 RX ADMIN — WARFARIN SODIUM 0.5 MG: 1 TABLET ORAL at 18:47

## 2023-04-30 RX ADMIN — DOCUSATE SODIUM 100 MG: 100 CAPSULE, LIQUID FILLED ORAL at 21:49

## 2023-04-30 RX ADMIN — HEPARIN SODIUM 5000 UNITS: 5000 INJECTION INTRAVENOUS; SUBCUTANEOUS at 21:48

## 2023-04-30 RX ADMIN — ACETAMINOPHEN 650 MG: 325 TABLET ORAL at 21:49

## 2023-04-30 RX ADMIN — ACETAMINOPHEN 650 MG: 325 TABLET ORAL at 15:41

## 2023-04-30 RX ADMIN — SODIUM CHLORIDE, PRESERVATIVE FREE 10 ML: 5 INJECTION INTRAVENOUS at 08:24

## 2023-04-30 RX ADMIN — ROSUVASTATIN 5 MG: 10 TABLET, FILM COATED ORAL at 21:48

## 2023-04-30 RX ADMIN — HEPARIN SODIUM 5000 UNITS: 5000 INJECTION INTRAVENOUS; SUBCUTANEOUS at 15:41

## 2023-04-30 RX ADMIN — DOCUSATE SODIUM 100 MG: 100 CAPSULE, LIQUID FILLED ORAL at 08:23

## 2023-04-30 RX ADMIN — LEVOTHYROXINE SODIUM 50 MCG: 0.05 TABLET ORAL at 06:03

## 2023-04-30 RX ADMIN — SODIUM CHLORIDE, PRESERVATIVE FREE 10 ML: 5 INJECTION INTRAVENOUS at 21:48

## 2023-04-30 RX ADMIN — IRON SUCROSE 200 MG: 20 INJECTION, SOLUTION INTRAVENOUS at 12:50

## 2023-04-30 RX ADMIN — GABAPENTIN 100 MG: 100 CAPSULE ORAL at 21:48

## 2023-04-30 ASSESSMENT — PAIN DESCRIPTION - PAIN TYPE
TYPE: SURGICAL PAIN;ACUTE PAIN
TYPE: SURGICAL PAIN;ACUTE PAIN

## 2023-04-30 ASSESSMENT — PAIN DESCRIPTION - ONSET
ONSET: ON-GOING
ONSET: ON-GOING

## 2023-04-30 ASSESSMENT — PAIN DESCRIPTION - ORIENTATION
ORIENTATION: LEFT
ORIENTATION: LEFT

## 2023-04-30 ASSESSMENT — PAIN DESCRIPTION - FREQUENCY
FREQUENCY: CONTINUOUS
FREQUENCY: CONTINUOUS

## 2023-04-30 ASSESSMENT — PAIN DESCRIPTION - LOCATION
LOCATION: HIP
LOCATION: HIP

## 2023-04-30 ASSESSMENT — PAIN SCALES - GENERAL
PAINLEVEL_OUTOF10: 0
PAINLEVEL_OUTOF10: 3

## 2023-04-30 ASSESSMENT — PAIN DESCRIPTION - DESCRIPTORS
DESCRIPTORS: ACHING
DESCRIPTORS: ACHING

## 2023-05-01 LAB
ANION GAP SERPL CALC-SCNC: 13 MEQ/L (ref 8–16)
BASOPHILS ABSOLUTE: 0 THOU/MM3 (ref 0–0.1)
BASOPHILS NFR BLD AUTO: 0.7 %
BUN SERPL-MCNC: 46 MG/DL (ref 7–22)
CALCIUM SERPL-MCNC: 8.7 MG/DL (ref 8.5–10.5)
CHLORIDE SERPL-SCNC: 111 MEQ/L (ref 98–111)
CO2 SERPL-SCNC: 22 MEQ/L (ref 23–33)
CREAT SERPL-MCNC: 1.9 MG/DL (ref 0.4–1.2)
DEPRECATED RDW RBC AUTO: 54.4 FL (ref 35–45)
EOSINOPHIL NFR BLD AUTO: 5 %
EOSINOPHILS ABSOLUTE: 0.2 THOU/MM3 (ref 0–0.4)
ERYTHROCYTE [DISTWIDTH] IN BLOOD BY AUTOMATED COUNT: 16.8 % (ref 11.5–14.5)
GFR SERPL CREATININE-BSD FRML MDRD: 26 ML/MIN/1.73M2
GLUCOSE SERPL-MCNC: 88 MG/DL (ref 70–108)
HCT VFR BLD AUTO: 24.2 % (ref 37–47)
HGB BLD-MCNC: 7.2 GM/DL (ref 12–16)
IMM GRANULOCYTES # BLD AUTO: 0.01 THOU/MM3 (ref 0–0.07)
IMM GRANULOCYTES NFR BLD AUTO: 0.2 %
INR PPP: 3.08 (ref 0.85–1.13)
LYMPHOCYTES ABSOLUTE: 0.9 THOU/MM3 (ref 1–4.8)
LYMPHOCYTES NFR BLD AUTO: 21.3 %
MCH RBC QN AUTO: 26.4 PG (ref 26–33)
MCHC RBC AUTO-ENTMCNC: 29.8 GM/DL (ref 32.2–35.5)
MCV RBC AUTO: 88.6 FL (ref 81–99)
MONOCYTES ABSOLUTE: 0.7 THOU/MM3 (ref 0.4–1.3)
MONOCYTES NFR BLD AUTO: 15.8 %
NEUTROPHILS NFR BLD AUTO: 57 %
NRBC BLD AUTO-RTO: 1 /100 WBC
PLATELET # BLD AUTO: 198 THOU/MM3 (ref 130–400)
PMV BLD AUTO: 10.2 FL (ref 9.4–12.4)
POTASSIUM SERPL-SCNC: 4.7 MEQ/L (ref 3.5–5.2)
RBC # BLD AUTO: 2.73 MILL/MM3 (ref 4.2–5.4)
SEGMENTED NEUTROPHILS ABSOLUTE COUNT: 2.4 THOU/MM3 (ref 1.8–7.7)
SODIUM SERPL-SCNC: 146 MEQ/L (ref 135–145)
WBC # BLD AUTO: 4.2 THOU/MM3 (ref 4.8–10.8)

## 2023-05-01 PROCEDURE — 6370000000 HC RX 637 (ALT 250 FOR IP): Performed by: NURSE PRACTITIONER

## 2023-05-01 PROCEDURE — 97530 THERAPEUTIC ACTIVITIES: CPT

## 2023-05-01 PROCEDURE — 2580000003 HC RX 258

## 2023-05-01 PROCEDURE — 85610 PROTHROMBIN TIME: CPT

## 2023-05-01 PROCEDURE — 6360000002 HC RX W HCPCS: Performed by: NURSE PRACTITIONER

## 2023-05-01 PROCEDURE — 97535 SELF CARE MNGMENT TRAINING: CPT

## 2023-05-01 PROCEDURE — 2580000003 HC RX 258: Performed by: NURSE PRACTITIONER

## 2023-05-01 PROCEDURE — 97116 GAIT TRAINING THERAPY: CPT

## 2023-05-01 PROCEDURE — 99232 SBSQ HOSP IP/OBS MODERATE 35: CPT

## 2023-05-01 PROCEDURE — 1200000000 HC SEMI PRIVATE

## 2023-05-01 PROCEDURE — 97110 THERAPEUTIC EXERCISES: CPT

## 2023-05-01 PROCEDURE — 80048 BASIC METABOLIC PNL TOTAL CA: CPT

## 2023-05-01 PROCEDURE — 6360000002 HC RX W HCPCS

## 2023-05-01 PROCEDURE — 36415 COLL VENOUS BLD VENIPUNCTURE: CPT

## 2023-05-01 PROCEDURE — 85025 COMPLETE CBC W/AUTO DIFF WBC: CPT

## 2023-05-01 PROCEDURE — 6370000000 HC RX 637 (ALT 250 FOR IP)

## 2023-05-01 RX ORDER — FERROUS SULFATE 325(65) MG
325 TABLET ORAL 2 TIMES DAILY WITH MEALS
Status: DISCONTINUED | OUTPATIENT
Start: 2023-05-01 | End: 2023-05-02 | Stop reason: HOSPADM

## 2023-05-01 RX ORDER — CYCLOBENZAPRINE HCL 5 MG
5 TABLET ORAL 3 TIMES DAILY PRN
Qty: 30 TABLET | Refills: 0 | Status: SHIPPED | OUTPATIENT
Start: 2023-05-01 | End: 2023-05-11

## 2023-05-01 RX ORDER — PSEUDOEPHEDRINE HCL 30 MG
100 TABLET ORAL 2 TIMES DAILY
Qty: 20 CAPSULE | Refills: 0 | Status: SHIPPED | OUTPATIENT
Start: 2023-05-01 | End: 2023-05-11

## 2023-05-01 RX ORDER — HYDROCODONE BITARTRATE AND ACETAMINOPHEN 5; 325 MG/1; MG/1
1 TABLET ORAL EVERY 6 HOURS PRN
Qty: 28 TABLET | Refills: 0 | Status: SHIPPED | OUTPATIENT
Start: 2023-05-01 | End: 2023-05-08

## 2023-05-01 RX ADMIN — TRAMADOL HYDROCHLORIDE 50 MG: 50 TABLET, COATED ORAL at 09:14

## 2023-05-01 RX ADMIN — HEPARIN SODIUM 5000 UNITS: 5000 INJECTION INTRAVENOUS; SUBCUTANEOUS at 09:14

## 2023-05-01 RX ADMIN — SODIUM CHLORIDE, PRESERVATIVE FREE 10 ML: 5 INJECTION INTRAVENOUS at 20:34

## 2023-05-01 RX ADMIN — ACETAMINOPHEN 650 MG: 325 TABLET ORAL at 16:34

## 2023-05-01 RX ADMIN — AMIODARONE HYDROCHLORIDE 400 MG: 200 TABLET ORAL at 20:33

## 2023-05-01 RX ADMIN — SODIUM CHLORIDE 20 ML/HR: 9 INJECTION, SOLUTION INTRAVENOUS at 16:27

## 2023-05-01 RX ADMIN — ACETAMINOPHEN 650 MG: 325 TABLET ORAL at 05:39

## 2023-05-01 RX ADMIN — AMIODARONE HYDROCHLORIDE 400 MG: 200 TABLET ORAL at 09:09

## 2023-05-01 RX ADMIN — PANTOPRAZOLE SODIUM 40 MG: 40 TABLET, DELAYED RELEASE ORAL at 05:39

## 2023-05-01 RX ADMIN — ACETAMINOPHEN 650 MG: 325 TABLET ORAL at 23:14

## 2023-05-01 RX ADMIN — ACETAMINOPHEN 650 MG: 325 TABLET ORAL at 11:22

## 2023-05-01 RX ADMIN — DOCUSATE SODIUM 100 MG: 100 CAPSULE, LIQUID FILLED ORAL at 09:14

## 2023-05-01 RX ADMIN — PANTOPRAZOLE SODIUM 40 MG: 40 TABLET, DELAYED RELEASE ORAL at 16:35

## 2023-05-01 RX ADMIN — GABAPENTIN 100 MG: 100 CAPSULE ORAL at 20:33

## 2023-05-01 RX ADMIN — HYDRALAZINE HYDROCHLORIDE 25 MG: 25 TABLET, FILM COATED ORAL at 09:10

## 2023-05-01 RX ADMIN — GABAPENTIN 100 MG: 100 CAPSULE ORAL at 09:09

## 2023-05-01 RX ADMIN — POLYETHYLENE GLYCOL 3350 17 G: 17 POWDER, FOR SOLUTION ORAL at 05:39

## 2023-05-01 RX ADMIN — SODIUM CHLORIDE, PRESERVATIVE FREE 10 ML: 5 INJECTION INTRAVENOUS at 09:09

## 2023-05-01 RX ADMIN — ROSUVASTATIN 5 MG: 10 TABLET, FILM COATED ORAL at 20:33

## 2023-05-01 RX ADMIN — LEVOTHYROXINE SODIUM 50 MCG: 0.05 TABLET ORAL at 05:39

## 2023-05-01 RX ADMIN — CEFOXITIN SODIUM 1000 MG: 1 POWDER, FOR SOLUTION INTRAVENOUS at 16:27

## 2023-05-01 RX ADMIN — HYDRALAZINE HYDROCHLORIDE 25 MG: 25 TABLET, FILM COATED ORAL at 20:33

## 2023-05-01 RX ADMIN — FERROUS SULFATE TAB 325 MG (65 MG ELEMENTAL FE) 325 MG: 325 (65 FE) TAB at 16:35

## 2023-05-01 ASSESSMENT — PAIN DESCRIPTION - ORIENTATION
ORIENTATION: LEFT

## 2023-05-01 ASSESSMENT — PAIN DESCRIPTION - DESCRIPTORS
DESCRIPTORS: ACHING
DESCRIPTORS: NUMBNESS
DESCRIPTORS: SORE

## 2023-05-01 ASSESSMENT — PAIN DESCRIPTION - LOCATION
LOCATION: HIP

## 2023-05-01 ASSESSMENT — PAIN - FUNCTIONAL ASSESSMENT
PAIN_FUNCTIONAL_ASSESSMENT: ACTIVITIES ARE NOT PREVENTED

## 2023-05-01 ASSESSMENT — PAIN DESCRIPTION - PAIN TYPE
TYPE: SURGICAL PAIN

## 2023-05-01 ASSESSMENT — PAIN SCALES - GENERAL
PAINLEVEL_OUTOF10: 4
PAINLEVEL_OUTOF10: 0
PAINLEVEL_OUTOF10: 1
PAINLEVEL_OUTOF10: 0
PAINLEVEL_OUTOF10: 2
PAINLEVEL_OUTOF10: 2
PAINLEVEL_OUTOF10: 1

## 2023-05-01 ASSESSMENT — PAIN DESCRIPTION - DIRECTION: RADIATING_TOWARDS: LEFT KNEE

## 2023-05-01 ASSESSMENT — PAIN DESCRIPTION - FREQUENCY: FREQUENCY: INTERMITTENT

## 2023-05-01 ASSESSMENT — PAIN DESCRIPTION - ONSET: ONSET: ON-GOING

## 2023-05-01 NOTE — CARE COORDINATION
5/1/23, 11:20 AM EDT    DISCHARGE PLANNING EVALUATION    Spoke with [place] Brunner about discharge plan. She is requesting ecf, does not feel that she can return home alone until she is more independent with mobility. She is requesting [address] [place] [address]. Referral made to [address] [place] [address], facility is reviewing.

## 2023-05-01 NOTE — CARE COORDINATION
5/1/23, 3:07 PM EDT    DISCHARGE ON GOING EVALUATION    [facility] day: 5  Location: -21/021-A Reason for admit: Closed fracture of left hip, initial encounter [facility]) [S72.002A]  Closed hip fracture, left, initial encounter ([facility] [S72.002A]  Closed displaced intertrochanteric fracture of left femur, initial encounter [facility]) Cookie Burgos   Procedure: 4/28: Open treatment left intertrochanteric femur fracture with a cephalomedullary nail   Barriers to Discharge: PT/OT following, hospitalist following, urine culture + klebsiella, on IV Cefoxitin q 12hr (x7 days),   PCP: VANDANA Benz - CNP  Readmission Risk Score: 19.1%  Patient Goals/Plan/Treatment Preferences: Plan discharge to SNF.  SW following

## 2023-05-01 NOTE — CARE COORDINATION
5/1/23, 11:43 AM EDT    DISCHARGE PLANNING EVALUATION    [facility] will accept at discharge, can accept 5/2.

## 2023-05-01 NOTE — DISCHARGE INSTR - COC
Continuity of Care Form    Patient Name: Corin Siddiqi   :  1940  MRN:  [id number]    [facility] date:  2023  Discharge date:  2023    Code Status Order: Full Code   Advance Directives:     Admitting Physician:  Allyn Greer MD  PCP: VANDANA Cota CNP    [facility] Unit/[facility]#: 7K-21/021-A  Discharging Unit Phone Number: [phone]    Emergency Contact:   Extended Emergency Contact Information  Primary Emergency Contact: [address] [place] [address] Phone: [phone]  Relation: Other  Preferred language: English  Secondary Emergency Contact: Darci Jorge[address] Phone: [phone]  Relation: Child    Past Surgical History:  Past Surgical History:   Procedure Laterality Date    BREAST LUMPECTOMY Left     BRONCHOSCOPY N/A 2020    BRONCHOSCOPY performed by Willie Lombardi MD at [facility]    BUNIONECTOMY Left 2009    CARDIAC CATHETERIZATION  2010    DILATION AND CURETTAGE OF UTERUS      X2; SEVERAL YEARS AGO    JOINT REPLACEMENT Right 2010    KNEE    LA BX OF BREAST, NEEDLE CORE, IMAGE GUIDE Left 2020    benign    LA BX OF BREAST, NEEDLE CORE, IMAGE GUIDE Left 2020    benign    LA BX OF BREAST, NEEDLE CORE, IMAGE GUIDE Left 2020    benign    LA BX OF BREAST, NEEDLE CORE, IMAGE GUIDE Left 2010    IDC     TONSILLECTOMY      UPPER GASTROINTESTINAL ENDOSCOPY N/A 1/3/2020    EGD ESOPHAGOGASTRODUODENOSCOPY performed by Dipesh Mckeon MD at [place] [address] N/A 2020    EGD DIAGNOSTIC ONLY performed by Dipesh Mckeon MD at [facility]       Immunization History:   Immunization History   Administered Date(s) Administered    COVID-19, PFIZER PURPLE top, DILUTE for use, (age 15 y+), 30mcg/0.3mL 2021, 2021, 2021    Influenza Vaccine, unspecified formulation 2018    Influenza Virus Vaccine 11/10/2015, 2018    Influenza, AFLURIA (age 1

## 2023-05-01 NOTE — PLAN OF CARE
Problem: Discharge Planning  Goal: Discharge to home or other facility with appropriate resources  Outcome: Progressing  Flowsheets (Taken 5/1/2023 0857)  Discharge to home or other facility with appropriate resources:   Identify barriers to discharge with patient and caregiver   Arrange for needed discharge resources and transportation as appropriate   Identify discharge learning needs (meds, wound care, etc)   Refer to discharge planning if patient needs post-hospital services based on physician order or complex needs related to functional status, cognitive ability or social support system     Problem: Pain  Goal: Verbalizes/displays adequate comfort level or baseline comfort level  Outcome: Progressing  Flowsheets (Taken 5/1/2023 0857)  Verbalizes/displays adequate comfort level or baseline comfort level:   Encourage patient to monitor pain and request assistance   Assess pain using appropriate pain scale   Administer analgesics based on type and severity of pain and evaluate response   Implement non-pharmacological measures as appropriate and evaluate response   Consider cultural and social influences on pain and pain management   Notify Licensed Independent Practitioner if interventions unsuccessful or patient reports new pain     Problem: ABCDS Injury Assessment  Goal: Absence of physical injury  Outcome: Progressing  Flowsheets (Taken 5/1/2023 0120 by Kristine Mane RN)  Absence of Physical Injury: Implement safety measures based on patient assessment     Problem: Safety - Adult  Goal: Free from fall injury  Outcome: Progressing  Flowsheets (Taken 5/1/2023 0120 by Kristine Mane RN)  Free From Fall Injury: Instruct family/caregiver on patient safety     Problem: Skin/Tissue Integrity  Goal: Absence of new skin breakdown  Description: 1. Monitor for areas of redness and/or skin breakdown  2. Assess vascular access sites hourly  3. Every 4-6 hours minimum:  Change oxygen saturation probe site  4.   Every

## 2023-05-02 VITALS
OXYGEN SATURATION: 99 % | BODY MASS INDEX: 21.91 KG/M2 | HEIGHT: 59 IN | SYSTOLIC BLOOD PRESSURE: 148 MMHG | RESPIRATION RATE: 20 BRPM | WEIGHT: 108.69 LBS | DIASTOLIC BLOOD PRESSURE: 68 MMHG | TEMPERATURE: 98.1 F | HEART RATE: 77 BPM

## 2023-05-02 LAB
HCT VFR BLD AUTO: 23.9 % (ref 37–47)
HGB BLD-MCNC: 7.2 GM/DL (ref 12–16)
INR PPP: 3.15 (ref 0.85–1.13)

## 2023-05-02 PROCEDURE — 85610 PROTHROMBIN TIME: CPT

## 2023-05-02 PROCEDURE — 6370000000 HC RX 637 (ALT 250 FOR IP): Performed by: NURSE PRACTITIONER

## 2023-05-02 PROCEDURE — 6370000000 HC RX 637 (ALT 250 FOR IP)

## 2023-05-02 PROCEDURE — 2580000003 HC RX 258: Performed by: NURSE PRACTITIONER

## 2023-05-02 PROCEDURE — 36415 COLL VENOUS BLD VENIPUNCTURE: CPT

## 2023-05-02 PROCEDURE — 97535 SELF CARE MNGMENT TRAINING: CPT

## 2023-05-02 PROCEDURE — 2580000003 HC RX 258

## 2023-05-02 PROCEDURE — 85018 HEMOGLOBIN: CPT

## 2023-05-02 PROCEDURE — 6360000002 HC RX W HCPCS

## 2023-05-02 PROCEDURE — 97530 THERAPEUTIC ACTIVITIES: CPT

## 2023-05-02 PROCEDURE — 85014 HEMATOCRIT: CPT

## 2023-05-02 RX ORDER — FERROUS SULFATE 325(65) MG
325 TABLET ORAL 2 TIMES DAILY WITH MEALS
Qty: 30 TABLET | Refills: 3 | Status: SHIPPED | OUTPATIENT
Start: 2023-05-02

## 2023-05-02 RX ORDER — CEFDINIR 300 MG/1
300 CAPSULE ORAL 2 TIMES DAILY
Qty: 10 CAPSULE | Refills: 0 | Status: SHIPPED | OUTPATIENT
Start: 2023-05-02 | End: 2023-05-07

## 2023-05-02 RX ADMIN — HYDRALAZINE HYDROCHLORIDE 25 MG: 25 TABLET, FILM COATED ORAL at 09:32

## 2023-05-02 RX ADMIN — FERROUS SULFATE TAB 325 MG (65 MG ELEMENTAL FE) 325 MG: 325 (65 FE) TAB at 09:32

## 2023-05-02 RX ADMIN — PANTOPRAZOLE SODIUM 40 MG: 40 TABLET, DELAYED RELEASE ORAL at 05:21

## 2023-05-02 RX ADMIN — CEFOXITIN SODIUM 1000 MG: 1 POWDER, FOR SOLUTION INTRAVENOUS at 04:43

## 2023-05-02 RX ADMIN — ACETAMINOPHEN 650 MG: 325 TABLET ORAL at 04:40

## 2023-05-02 RX ADMIN — LEVOTHYROXINE SODIUM 50 MCG: 0.05 TABLET ORAL at 05:21

## 2023-05-02 RX ADMIN — GABAPENTIN 100 MG: 100 CAPSULE ORAL at 09:32

## 2023-05-02 RX ADMIN — SODIUM CHLORIDE, PRESERVATIVE FREE 10 ML: 5 INJECTION INTRAVENOUS at 09:33

## 2023-05-02 RX ADMIN — DOCUSATE SODIUM 100 MG: 100 CAPSULE, LIQUID FILLED ORAL at 09:32

## 2023-05-02 RX ADMIN — AMIODARONE HYDROCHLORIDE 400 MG: 200 TABLET ORAL at 09:32

## 2023-05-02 ASSESSMENT — PAIN SCALES - GENERAL
PAINLEVEL_OUTOF10: 3
PAINLEVEL_OUTOF10: 3
PAINLEVEL_OUTOF10: 5
PAINLEVEL_OUTOF10: 0

## 2023-05-02 ASSESSMENT — PAIN DESCRIPTION - LOCATION
LOCATION: HIP
LOCATION: BACK

## 2023-05-02 ASSESSMENT — PAIN DESCRIPTION - ORIENTATION: ORIENTATION: LEFT

## 2023-05-02 NOTE — PROGRESS NOTES
Called report to [address] 
Clinical Pharmacy Note                                               Warfarin Discharge Recommendations      Patient discharged from [facility] today on warfarin.     Warfarin indication: A fib/flutter  INR goal during admission: 2.0-3.0  Interacting medications at discharge: Amiodarone, levothyroxine  Coumadin 2 mg tabs    Recent INRs:  Recent Labs     04/30/23  0700 05/01/23  0540 05/02/23  0558   INR 1.90* 3.08* 3.15*     Recommendations for discharge:   Date Warfarin Dose   5/2/2023 No warfarin   5/3/2023 Check INR at [facility]     Provider dosing warfarin: [facility] provider    Recheck INR:  5/3/23 with results to [facility] provider
Hospitalist Progress Note    Patient:  Sejal Stacy      Unit/Bed:7K-21/021-A    YOB: 1940    MRN: [id number]       Acct: [de-identified]     PCP: VANDANA Coleman CNP    Date of Admission: 4/26/2023    Assessment/Plan:    Unwitnessed fall/mechanical:  Patient identified missing door handle, denies syncope or near syncope complaints. Traumatic Left displaced, comminuted intertrochanteric fracture s/p ORIF with nailing 4/28 with Dr. Rocio Bob:  secondary to fall. 4/26/2023 Xray Left Femoral-Femoral head still articulates with the acetabulum. Fabiola Balls Pelvis bones intact. ICE/Elevate, NWB left LE. PT/OT. Pain improved. Urinary retention: Granger removed. Bladder scan on 4/30 show retention of 280 ml. Initially with troubles voiding, however patient has been voiding without difficulties today. Symptomatic UTI, POA: Urine culture on 4/27 + for Klebsiella pneumoniae. UA + for leukocyte esterase. Given history of urinary retention, will initiate Cefoxitin therapy for 7 days. ABL anemia from fracture, with chronic EDEN. Received IV Venofer. Will initiate ferrous sulfate 325 mg BID. Hbg stable at 7.2 this AM. Iron studies show iron deficiency. Transfuse if <7 or hemodynamically unstable. H&H in AM.  Atrial Fibrillation, permanent:  with intermittent slow ventricular rate. CHASDS2-VASc=5.  EPS evaluation 4/13/2023 Cardizem was stopped and Amiodarone was started for rate control. Coumadin Last dose per patient 4/25/2023. Vitamin K was given PO x 2 day prior to surgery. INR corrected. Coumadin restarted on 4/29. INR 5/1/2023-3.08. Pharmacy following. Lovenox discontinued today given therapeutic INR. HFpEF:  stable, ECHO 2020 LVEF 60% mod MR, mild-mod AR. Bumex placed on hold for OR intervention. Consider resuming. Daily weights. Strict intake and output. Essential HPTN:  history. Restart Hydralazine with holding parameters.    CKD stage 3:  (baseline Crea 1.3-2.1)  Dose medications to GFR, no NSAIDS
Jad Frankel was evaluated today and a DME order was entered for a wheeled walker because she requires this to successfully complete daily living tasks of personal cares, ambulating, and meal preparation. A wheeled walker is necessary due to the patient's unsteady gait, upper body weakness, and inability to  an ambulation device; and she can ambulate only by pushing a walker instead of a lesser assistive device such as a cane, crutch, or standard walker. The need for this equipment was discussed with the patient and she understands and is in agreement.
[place] [id number] PROGRESS NOTE      Patient: Betsy Ching  Room #: 0G-28/477-E            YOB: 1940  Age: 79 y.o. Gender: female            Admit Date & Time: 4/26/2023  5:01 PM    Assessment:  Zaida Lou is being cared for on [address] [place] [address] for a closed hip fracture. At the time of my visit, Zaida Lou was in good spirits and told me that she has appreciated being able to receive communion. Interventions:  I checked-in with patient to confirm that the spiritual care consult for communion was completed. Outcomes:  Zaida Lou expressed gratitude for the visit and knows that chaplains can be requested through unit staff. Plan:    Continue to support patient through [employer] minist[facility].     Electronically signed by Kenisha Suarez on 5/1/2023 at 3:50 PM.  [facility]  [phone]
Orthopaedic Progress Note      SUBJECTIVE   Ms. Sully Lawrence is post op day # 3 L femur IMN    Seen at bedside this morning  no adverse events overnight  Pain well controlled, tolerating oral diet  Denies any numbness/paresthesia in operative extremity  Has been up with PT OT  Granger removed, no need for straight cath for 24 hours  No chest pain, sob, dizziness, nausea    OBJECTIVE      Physical    VITALS:  BP (!) 140/75   Pulse 57   Temp 98.2 °F (36.8 °C) (Oral)   Resp 16   Ht 4' 11\" (1.499 m)   Wt 108 lb 7.5 oz (49.2 kg)   SpO2 97%   BMI 21.91 kg/m²   I/O last 3 completed shifts:   In: 985 [P.O.:975; I.V.:10]  Out: 202 [Urine:202]    4/10 pain  Gen: alert and oriented  Head: normorcephalic, atraumatic  Resp: unlabored, room air  Pelvis: stable  LLE: incision c/d/I, no drainage, no bandage saturation, prineo intact  Sensation to light touch intact  Gastroc TA EHL intact  Distal pulses palpable, warm well perfused  Calf supple nontender to palpation       Data  CBC:   Lab Results   Component Value Date/Time    WBC 4.2 05/01/2023 05:40 AM    HGB 7.2 05/01/2023 05:40 AM     05/01/2023 05:40 AM     BMP:    Lab Results   Component Value Date/Time     05/01/2023 05:40 AM    K 4.7 05/01/2023 05:40 AM    K 5.2 04/29/2023 05:27 AM     05/01/2023 05:40 AM    CO2 22 05/01/2023 05:40 AM    BUN 46 05/01/2023 05:40 AM    CREATININE 1.9 05/01/2023 05:40 AM    CALCIUM 8.7 05/01/2023 05:40 AM    GLUCOSE 88 05/01/2023 05:40 AM    GLUCOSE 153 03/15/2012 12:05 PM     Uric Acid:  No components found for: URIC  PT/INR:    Lab Results   Component Value Date/Time    PROTIME 27.6 03/21/2023 12:33 PM    INR 3.08 05/01/2023 05:40 AM     Troponin:  No results found for: TROPONINI  Urine Culture:  No components found for: CURINE      Current Inpatient Medications    Current Facility-Administered Medications: heparin (porcine) injection 5,000 Units, 5,000 Units, SubCUTAneous, BID  warfarin placeholder: dosing by pharmacy, ,
Patient discharged to [facility]. Patient left with all belongings,All questions answered at this time. [address] transport was transporting patient to facility. Transporter left without taking blue folder with information. Call made to [address] to make them aware of not taking folder. Facility aware as well. All information faxed to facility again.
Pharmacy Renal Adjustment    Pharmacy renally adjusted the following medication(s) per P&T approved policy: Cefoxitin    Recent Labs     04/29/23  0527 05/01/23  0540   BUN 42* 46*   CREATININE 2.1* 1.9*     Estimated Creatinine Clearance: 17 mL/min (A) (based on SCr of 1.9 mg/dL (H)). Assessment:  CKD Stage III    Plan:   Decrease Cefoxitin from q8h to q12h    Please call pharmacy with any questions.     Jannette Reddy [facility]  5/1/2023  2:37 PM
Physician Progress Note      PATIENTJudd Schilder  CSN #:                  [id number]  :                       1940  ADMIT DATE:       2023 5:01 PM  DISCH DATE:  RESPONDING  PROVIDER #:        Efra Yan CNP          QUERY TEXT:    Pt admitted with left femur fracture. Pt noted to have urine culture + for   Klebsiella pneumoniae >100, 000. If possible, please document in the progress   notes and discharge summary if you are evaluating and/or treating any of the   following: The medical record reflects the following:  Risk Factors: recent urinary tract infection  Clinical Indicators: Urine culture showed Klebsiella pneumoniae >100,000. UA   showed moderate leukocytes, many bacteria. Patient had recent urinary tract   infection. Treatment: IV ancef    Thank you,  Jhonatan Douglas MSN, RN, CCDS, CRCR  Options provided:  -- Bacteriuria  -- Urinary Tract Infection (UTI)  -- Other - I will add my own diagnosis  -- Disagree - Not applicable / Not valid  -- Disagree - Clinically unable to determine / Unknown  -- Refer to Clinical Documentation Reviewer    PROVIDER RESPONSE TEXT:    This patient has a UTI.     Query created by: Awais Peterson on 2023 9:26 AM      Electronically signed by:  Ashley Calixto CNP 2023 10:56 AM
Physician Progress Note      PATIENTLei Link  CSN #:                  [id number]  :                       1940  ADMIT DATE:       2023 5:01 PM  [address] [facility] [place] DATE:    PROVIDER #:        Deandra Moore MD          QUERY TEXT:    Pt admitted with left intertrochanteric femur fracture. Pt noted to have   osteopenia. If possible, please document in progress notes and discharge   summary if you are evaluating and/or treating any of the following: The medical record reflects the following:  Risk Factors: Osteopenia  Clinical Indicators: Xray shows bone demineralization and patient has history   of osteopenia. Per documentation the patient \"tried to open a door and missed   the handle essentially fell backwards hurting her hip. \"  Treatment: L femur IMN    Thank you,  Yasmin Cohen MSN, RN, CCDS, CRCR  Options provided:  -- Pathological left intertrochanteric femur fracture due to osteopenia   following fall which would not usually break a normal, healthy bone  -- Osteoporotic left intertrochanteric femur fracture following fall which   would not usually break a normal, healthy bone  -- Traumatic left intertrochanteric femur fracture  -- Other - I will add my own diagnosis  -- Disagree - Not applicable / Not valid  -- Disagree - Clinically unable to determine / Unknown  -- Refer to Clinical Documentation Reviewer    PROVIDER RESPONSE TEXT:    This patient has an osteoporotic left intertrochanteric femur fracture   following fall which would not break a normal, healthy bone.     Query created by: Lacy Watson on 2023 9:15 AM      Electronically signed by:  Deandra Moore MD 2023 2:48 PM
Warfarin Pharmacy Consult Note    Warfarin Indication: A fib/flutter  Target INR: 2.0-3.0  Dose prior to admission: 1mg STuTh, 2mg MWFSa    Recent Labs     04/29/23  0527 04/30/23  0700 05/01/23  0540   INR 1.09 1.90* 3.08*     Recent Labs     04/29/23  0527 04/30/23  0700 05/01/23  0540   HGB 7.3* 7.6* 7.2*     --  198     Concurrent anticoagulants/antiplatelets: none  Significant warfarin drug-drug interactions: Amiodarone, tramadol, levothyroxine (received vitamin K 5 mg on 4/26 & 5 mg on 4/27)     Date INR Warfarin Dose   4/28/23 1.14 4 mg    4/29/23  1.09  4 mg    4/30/23  1.90  0.5 mg    5/1/2023  3.08  No Coumadin                               Monitoring:                   INR will be monitored daily until therapeutic INR is achieved.     **Please contact pharmacy for discharge instructions when indicated**    Rachael Nguyen  5/1/2023  2:28 PM
IVPB (mini-bag), 1,000 mg, IntraVENous, Q12H  warfarin placeholder: dosing by pharmacy, , Other, RX Placeholder  amiodarone (CORDARONE) tablet 400 mg, 400 mg, Oral, BID  gabapentin (NEURONTIN) capsule 100 mg, 100 mg, Oral, BID  levothyroxine (SYNTHROID) tablet 50 mcg, 50 mcg, Oral, QAM AC  pantoprazole (PROTONIX) tablet 40 mg, 40 mg, Oral, BID AC  rosuvastatin (CRESTOR) tablet 5 mg, 5 mg, Oral, QPM  [Held by provider] bumetanide (BUMEX) tablet 1 mg, 1 mg, Oral, BID  hydrALAZINE (APRESOLINE) tablet 25 mg, 25 mg, Oral, BID  traMADol (ULTRAM) tablet 50 mg, 50 mg, Oral, Q12H PRN  sodium chloride flush 0.9 % injection 10 mL, 10 mL, IntraVENous, 2 times per day  sodium chloride flush 0.9 % injection 10 mL, 10 mL, IntraVENous, PRN  0.9 % sodium chloride infusion, , IntraVENous, PRN  acetaminophen (TYLENOL) tablet 650 mg, 650 mg, Oral, Q6H  HYDROmorphone (DILAUDID) injection 0.25 mg, 0.25 mg, IntraVENous, Q3H PRN **OR** HYDROmorphone (DILAUDID) injection 0.5 mg, 0.5 mg, IntraVENous, Q3H PRN  ondansetron (ZOFRAN) injection 4 mg, 4 mg, IntraVENous, Q6H PRN  polyethylene glycol (GLYCOLAX) packet 17 g, 17 g, Oral, Daily PRN  bisacodyl (DULCOLAX) suppository 10 mg, 10 mg, Rectal, Daily PRN  cyclobenzaprine (FLEXERIL) tablet 5 mg, 5 mg, Oral, TID PRN  docusate sodium (COLACE) capsule 100 mg, 100 mg, Oral, BID  HYDROcodone-acetaminophen (NORCO) 5-325 MG per tablet 1 tablet, 1 tablet, Oral, Q6H PRN **OR** HYDROcodone-acetaminophen (NORCO) 5-325 MG per tablet 2 tablet, 2 tablet, Oral, Q6H PRN  0.9 % sodium chloride infusion, , IntraVENous, PRN        PLAN    Ms. Wally Wilkes is post op day # 4 L femur IMN    Doing well  Continue to ADAT  Straight cath prn  Hgb hct, asymptomatic   WBAT RLE  Continue PT/OT  Dvt ppx- INR to 3.1, pharmacy to dose,   Dispo- SNF, stable from orthopaedic standpoint, will place discharge order however confirm with hospitalist stability for discharge prior to scheduling transport, also ask hospitalist assistance
CHAVEZ/L
established goals. Activity Tolerance:  Patient tolerance of  treatment: good. Equipment Recommendations:Equipment Needed: No (has RW)  Discharge Recommendations: Continue to assess pending progress Subacte/Skilled Nursing Facility    PLAN: Current Treatment Recommendations: Strengthening, Balance training, Functional mobility training, Transfer training, Stair training, Gait training, Home exercise program, Positioning, Equipment evaluation, education, & procurement, Patient/Caregiver education & training, Safety education & training, Therapeutic activities  General Plan:  (7xO)    Patient Education  Patient Education: Plan of Care, Functional Mobility, Reviewed Prior Education, Health Promotion and Wellness Education, Safety, Verbal Exercise Instruction    Goals:  Patient Goals : Pt goal to get stronger to return home  Short Term Goals  Time Frame for Short Term Goals: By discharge  Short Term Goal 1: Supine to/from sit at Mod I to get in/out of bed. Short Term Goal 2: Sit to/from stand at [facility] in order to stand up from various chairs. Short Term Goal 3: Ambulate 50 feet with RW at [facility] in order to walk in home safely  Short Term Goal 4: Ascend/Descend 1 step with RW at [facility] in order to enter/exit home safely. Long Term Goals  Time Frame for Long Term Goals : NA due to short ELOS    Following session, patient left in safe position with all fall risk precautions in place.
risk precautions in place.

## 2023-05-02 NOTE — CARE COORDINATION
5/2/23, 9:06 AM EDT    DISCHARGE PLANNING EVALUATION    Spoke with [facility] [place], facility can accept today. Confirmed plan with patient. Ambulance transport scheduled for 11:00 with [facility].     5/2/23, 9:07 AM EDT    Patient goals/plan/ treatment preferences discussed by  and . Patient goals/plan/ treatment preferences reviewed with patient/ family. Patient/ family verbalize understanding of discharge plan and are in agreement with goal/plan/treatment preferences. Understanding was demonstrated using the teach back method. AVS provided by RN at time of discharge, which includes all necessary medical information pertaining to the patients current course of illness, treatment, post-discharge goals of care, and treatment preferences.      Services At/After Discharge: [place] (SNF) and In ambulance       IMM Letter  IMM Letter given to Patient/Family/Significant other/Guardian/POA/by[de-identified] RIAN Phillips  IMM Letter date given[de-identified] 05/02/23  IMM Letter time given[de-identified] 5086

## 2023-05-02 NOTE — DISCHARGE INSTR - MEDS
Recommendations for discharge:   Date Warfarin Dose   5/2/2023 No warfarin   5/3/2023 Check INR at [facility]

## 2023-05-02 NOTE — PLAN OF CARE
Problem: Discharge Planning  Goal: Discharge to home or other facility with appropriate resources  5/1/2023 2246 by Eris Arredondo RN  Outcome: Progressing  Flowsheets (Taken 5/1/2023 2246)  Discharge to home or other facility with appropriate resources:   Identify barriers to discharge with patient and caregiver   Arrange for needed discharge resources and transportation as appropriate   Identify discharge learning needs (meds, wound care, etc)  Note: Pt plans [facility] at discharge. Care manager and social working helping with discharge needs. Problem: Pain  Goal: Verbalizes/displays adequate comfort level or baseline comfort level  5/1/2023 2246 by Eris Arredondo RN  Outcome: Progressing  Flowsheets (Taken 5/1/2023 2246)  Verbalizes/displays adequate comfort level or baseline comfort level:   Encourage patient to monitor pain and request assistance   Assess pain using appropriate pain scale   Administer analgesics based on type and severity of pain and evaluate response   Implement non-pharmacological measures as appropriate and evaluate response  Note: Patient taking pain medication on MAR as needed to control pain. Use of non-pharmacologic pain management including ice/repositioning. Patient pain goal is 2. Problem: ABCDS Injury Assessment  Goal: Absence of physical injury  5/1/2023 2246 by Eris Arredondo RN  Outcome: Progressing  Flowsheets (Taken 5/1/2023 2246)  Absence of Physical Injury: Implement safety measures based on patient assessment  Note: Patient up with staff assistance. Able to use call light. Patient has remained free of falls during this shift. Appropriate fall prevention measures in place. Problem: Safety - Adult  Goal: Free from fall injury  5/1/2023 2246 by Eris Arredondo RN  Outcome: Progressing  Flowsheets (Taken 5/1/2023 2246)  Free From Fall Injury: Instruct family/caregiver on patient safety  Note: Patient up with staff assistance. Able to use call light.

## 2023-05-02 NOTE — DISCHARGE SUMMARY
results:  Outstanding Order Results       No orders found from 3/28/2023 to 4/27/2023. Patient Instructions:   Discharge Medication List as of 5/2/2023 11:12 AM        START taking these medications    Details   ferrous sulfate (IRON 325) 325 (65 Fe) MG tablet Take 1 tablet by mouth 2 times daily (with meals), Disp-30 tablet, R-3Normal      cefdinir (OMNICEF) 300 MG capsule Take 1 capsule by mouth 2 times daily for 5 days, Disp-10 capsule, R-0Normal      cyclobenzaprine (FLEXERIL) 5 MG tablet Take 1 tablet by mouth 3 times daily as needed for Muscle spasms, Disp-30 tablet, R-0Print      docusate sodium (COLACE, DULCOLAX) 100 MG CAPS Take 100 mg by mouth 2 times daily for 10 days, Disp-20 capsule, R-0Print      HYDROcodone-acetaminophen (NORCO) 5-325 MG per tablet Take 1 tablet by mouth every 6 hours as needed for Pain for up to 7 days. Max Daily Amount: 4 tablets, Disp-28 tablet, R-0Print           CONTINUE these medications which have NOT CHANGED    Details   !! amiodarone (CORDARONE) 200 MG tablet 400mg bid x 3 weeks then 200mg qd, Disp-98 tablet, R-0Normal      !! amiodarone (CORDARONE) 200 MG tablet Take 2 tablets by mouth 2 times daily Loading dose 400mg bid x 3 weeks then 200mg daily (2 Rx's sent), Disp-84 tablet, R-0Normal      gabapentin (NEURONTIN) 100 MG capsule Take 1 capsule by mouth in the morning and at bedtime. Historical Med      bumetanide (BUMEX) 1 MG tablet Take 1 tablet by mouth 2 times daily Taking tidHistorical Med      Coenzyme Q10 (CO Q 10 PO) Take by mouthHistorical Med      Probiotic Product (PROBIOTIC PO) Take by mouthHistorical Med      pantoprazole (PROTONIX) 40 MG tablet TAKE ONE TABLET, TWO TIMES A DAY, BY MOUTH., Disp-180 tablet, R-3Normal      rosuvastatin (CRESTOR) 5 MG tablet Take 1 tablet by mouth every evening Indications: Blood Cholesterol Abnormal, Disp-90 tablet,R-3Normal      warfarin (COUMADIN) 2 MG tablet Take as instructed by the physician following the INR

## 2023-05-06 PROBLEM — R79.89 ELEVATED TSH: Status: ACTIVE | Noted: 2023-05-06

## 2023-05-06 PROBLEM — H53.2 DOUBLE VISION: Status: ACTIVE | Noted: 2023-05-06

## 2023-05-06 PROBLEM — Z87.11 HISTORY OF GASTRIC ULCER: Status: ACTIVE | Noted: 2023-05-06

## 2023-05-06 PROBLEM — R26.89 BALANCE PROBLEM: Status: ACTIVE | Noted: 2023-05-06

## 2023-05-09 ENCOUNTER — HOSPITAL ENCOUNTER (OUTPATIENT)
Dept: ULTRASOUND IMAGING | Age: 83
Discharge: HOME | End: 2023-05-09
Payer: MEDICARE

## 2023-05-09 DIAGNOSIS — M79.89 LEFT LEG SWELLING: ICD-10-CM

## 2023-05-09 DIAGNOSIS — S72.002D CLOSED FRACTURE OF LEFT HIP REQUIRING OPERATIVE REPAIR WITH ROUTINE HEALING, SUBSEQUENT ENCOUNTER: ICD-10-CM

## 2023-05-09 DIAGNOSIS — S72.142D CLOSED DISPLACED INTERTROCHANTERIC FRACTURE OF LEFT FEMUR WITH ROUTINE HEALING, SUBSEQUENT ENCOUNTER: ICD-10-CM

## 2023-05-09 PROCEDURE — 93971 EXTREMITY STUDY: CPT

## 2023-05-12 ENCOUNTER — HOSPITAL ENCOUNTER (OUTPATIENT)
Dept: NON INVASIVE DIAGNOSTICS | Age: 83
End: 2023-05-12
Payer: MEDICARE

## 2023-05-12 ENCOUNTER — HOSPITAL ENCOUNTER (OUTPATIENT)
Dept: MRI IMAGING | Age: 83
Discharge: HOME | End: 2023-05-12
Payer: MEDICARE

## 2023-05-12 DIAGNOSIS — H53.2 DOUBLE VISION: ICD-10-CM

## 2023-05-12 DIAGNOSIS — Z86.73 HISTORY OF STROKE: ICD-10-CM

## 2023-05-12 DIAGNOSIS — R26.89 BALANCE PROBLEM: ICD-10-CM

## 2023-05-12 PROCEDURE — 70547 MR ANGIOGRAPHY NECK W/O DYE: CPT

## 2023-05-12 PROCEDURE — 70544 MR ANGIOGRAPHY HEAD W/O DYE: CPT

## 2023-05-12 NOTE — PROGRESS NOTES
Patient had a previous appointment with MRI. Patient left after MRI.   Therefore echo was not completed

## 2023-05-15 ENCOUNTER — TELEPHONE (OUTPATIENT)
Dept: CARDIOLOGY CLINIC | Age: 83
End: 2023-05-15

## 2023-05-15 NOTE — TELEPHONE ENCOUNTER
Cardiac event monitor done. Anything needed? CONCLUSIONS:  Abnormal event monitor with baseline AFib with several  episodes of bradycardia and pauses that needs further attention for  possible need for pacemaker.            Rosalind Gonzales M.D.     D: 04/05/2023 7:29:49       T: 04/05/2023 7:32:17     [id number]  Job#: [id number]     Doc#: [id number]

## 2023-05-15 NOTE — TELEPHONE ENCOUNTER
I called this pt and she said she would rather come to the office first to see dr Carito Tabares to discuss this with him first. Kendrick Maurer made for first available on 5/32/23

## 2023-05-31 ENCOUNTER — OFFICE VISIT (OUTPATIENT)
Dept: CARDIOLOGY CLINIC | Age: 83
End: 2023-05-31
Payer: MEDICARE

## 2023-05-31 VITALS
BODY MASS INDEX: 20.56 KG/M2 | DIASTOLIC BLOOD PRESSURE: 70 MMHG | HEIGHT: 59 IN | SYSTOLIC BLOOD PRESSURE: 132 MMHG | OXYGEN SATURATION: 95 % | WEIGHT: 102 LBS | HEART RATE: 72 BPM

## 2023-05-31 DIAGNOSIS — R00.1 BRADYCARDIA: ICD-10-CM

## 2023-05-31 DIAGNOSIS — I45.5 SINUS PAUSE: ICD-10-CM

## 2023-05-31 DIAGNOSIS — R94.31 ABNORMAL HOLTER MONITOR FINDING: Primary | ICD-10-CM

## 2023-05-31 DIAGNOSIS — I48.21 PERMANENT ATRIAL FIBRILLATION (HCC): ICD-10-CM

## 2023-05-31 PROCEDURE — 1036F TOBACCO NON-USER: CPT | Performed by: INTERNAL MEDICINE

## 2023-05-31 PROCEDURE — G8420 CALC BMI NORM PARAMETERS: HCPCS | Performed by: INTERNAL MEDICINE

## 2023-05-31 PROCEDURE — G8400 PT W/DXA NO RESULTS DOC: HCPCS | Performed by: INTERNAL MEDICINE

## 2023-05-31 PROCEDURE — G8427 DOCREV CUR MEDS BY ELIG CLIN: HCPCS | Performed by: INTERNAL MEDICINE

## 2023-05-31 PROCEDURE — 93000 ELECTROCARDIOGRAM COMPLETE: CPT | Performed by: INTERNAL MEDICINE

## 2023-05-31 PROCEDURE — 99214 OFFICE O/P EST MOD 30 MIN: CPT | Performed by: INTERNAL MEDICINE

## 2023-05-31 PROCEDURE — 3078F DIAST BP <80 MM HG: CPT | Performed by: INTERNAL MEDICINE

## 2023-05-31 PROCEDURE — 1090F PRES/ABSN URINE INCON ASSESS: CPT | Performed by: INTERNAL MEDICINE

## 2023-05-31 PROCEDURE — 1123F ACP DISCUSS/DSCN MKR DOCD: CPT | Performed by: INTERNAL MEDICINE

## 2023-05-31 PROCEDURE — 3075F SYST BP GE 130 - 139MM HG: CPT | Performed by: INTERNAL MEDICINE

## 2023-05-31 PROCEDURE — 1111F DSCHRG MED/CURRENT MED MERGE: CPT | Performed by: INTERNAL MEDICINE

## 2023-05-31 RX ORDER — HYDRALAZINE HYDROCHLORIDE 25 MG/1
25 TABLET, FILM COATED ORAL 2 TIMES DAILY
COMMUNITY

## 2023-05-31 NOTE — PROGRESS NOTES
[address] (EP)  [address] [address]  Dept: [phone]  Cardiac Electrophysiology: Follow up Note  Patient's demographics:  Date:   5/31/2023  Patient name:              Bijan Ratliff  YOB: 1940  Sex:    female   MRN:   [id number]    Primary Care Physician:  Peter Bianchi APRN - CNP    Referring Physician:  Rebecca Serrano MD    Reason for Follow up: Tachycardia-bradycardia syndrome. Clinical Summary:  83/F with history of chronic atrial fibrillation and intermittent slow ventricular rate was seen in 4/2023. During further evaluation of pacemaker implantation. At the time the options including attempting rhythm control versus pacemaker were discussed. She wished to have cardioversion. Unfortunately, she tripped on a carpet and fractured her left hip requiring ORIF in 4/20/2020. She has made good recovery. She denies being dizzy, lightheaded or passing out prior to the fall. Still uses wheelchair for long distances. Occasional palpitations. Medcial hx: non-obstructive CAD, chronic AF dx 79 DCCV with early recurrence , HTN, HPL, HFpEF, hx of rheumatic fever, CKD-III, left breast CA/Lumpectomy and radiation and hypothyroidism. Review of systems:  Constitutional: Negative for chills and fever  HENT: Negative for congestion, sinus pressure, sneezing and sore throat. Eyes: Negative for pain, discharge, redness and itching. Respiratory: Negative for apnea, cough  Gastrointestinal: Negative for blood in stool, constipation, diarrhea   Endocrine: Negative for cold intolerance, heat intolerance, polydipsia. Genitourinary: Negative for dysuria, enuresis, flank pain and hematuria. Musculoskeletal: Negative for arthralgias, joint swelling and neck pain. Neurological: Negative for numbness and headaches. Psychiatric/Behavioral: Negative for agitation, confusion, decreased concentration and dysphoric mood.

## 2023-05-31 NOTE — PATIENT INSTRUCTIONS
survey regarding the care you received during your visit. Your input is valuable to us. We encourage you to complete and return your survey. We hope you will choose us in the future for your healthcare needs. Thank you for choosing [address]!     Your Medical Assistant Today:  Bibi Painting      Your RN Today:  Bibi Painting  Your Provider for Today: Dr. Magdaleno Unger  Ph. [phone] opt 1

## 2023-06-07 ENCOUNTER — PREP FOR PROCEDURE (OUTPATIENT)
Dept: CARDIOLOGY | Age: 83
End: 2023-06-07

## 2023-06-07 RX ORDER — SODIUM CHLORIDE 9 MG/ML
INJECTION, SOLUTION INTRAVENOUS PRN
Status: CANCELLED | OUTPATIENT
Start: 2023-06-07

## 2023-06-07 RX ORDER — SODIUM CHLORIDE 0.9 % (FLUSH) 0.9 %
5-40 SYRINGE (ML) INJECTION PRN
Status: CANCELLED | OUTPATIENT
Start: 2023-06-07

## 2023-06-07 RX ORDER — SODIUM CHLORIDE 0.9 % (FLUSH) 0.9 %
5-40 SYRINGE (ML) INJECTION EVERY 12 HOURS SCHEDULED
Status: CANCELLED | OUTPATIENT
Start: 2023-06-07

## 2023-06-07 RX ORDER — SODIUM CHLORIDE 9 MG/ML
INJECTION, SOLUTION INTRAVENOUS CONTINUOUS
Status: CANCELLED | OUTPATIENT
Start: 2023-06-07

## 2023-06-08 ENCOUNTER — HOSPITAL ENCOUNTER (OUTPATIENT)
Dept: INPATIENT UNIT | Age: 83
Discharge: HOME | End: 2023-06-08
Attending: INTERNAL MEDICINE | Admitting: INTERNAL MEDICINE
Payer: MEDICARE

## 2023-06-08 ENCOUNTER — ANESTHESIA EVENT (OUTPATIENT)
Dept: CARDIAC CATH/INVASIVE PROCEDURES | Age: 83
End: 2023-06-08
Payer: MEDICARE

## 2023-06-08 ENCOUNTER — ANESTHESIA (OUTPATIENT)
Dept: CARDIAC CATH/INVASIVE PROCEDURES | Age: 83
End: 2023-06-08
Payer: MEDICARE

## 2023-06-08 ENCOUNTER — APPOINTMENT (OUTPATIENT)
Dept: CARDIAC CATH/INVASIVE PROCEDURES | Age: 83
End: 2023-06-08
Payer: MEDICARE

## 2023-06-08 VITALS
OXYGEN SATURATION: 92 % | RESPIRATION RATE: 15 BRPM | DIASTOLIC BLOOD PRESSURE: 50 MMHG | HEART RATE: 63 BPM | WEIGHT: 106.2 LBS | TEMPERATURE: 97.6 F | HEIGHT: 59 IN | SYSTOLIC BLOOD PRESSURE: 130 MMHG | BODY MASS INDEX: 21.41 KG/M2

## 2023-06-08 LAB
ANION GAP SERPL CALC-SCNC: 15 MEQ/L (ref 8–16)
APTT PPP: 51.8 SECONDS (ref 22–38)
BUN SERPL-MCNC: 23 MG/DL (ref 7–22)
CALCIUM SERPL-MCNC: 10 MG/DL (ref 8.5–10.5)
CHLORIDE SERPL-SCNC: 103 MEQ/L (ref 98–111)
CO2 SERPL-SCNC: 22 MEQ/L (ref 23–33)
CREAT SERPL-MCNC: 1.6 MG/DL (ref 0.4–1.2)
DEPRECATED RDW RBC AUTO: 84 FL (ref 35–45)
EKG ATRIAL RATE: 312 BPM
EKG ATRIAL RATE: 60 BPM
EKG P AXIS: 52 DEGREES
EKG P-R INTERVAL: 300 MS
EKG Q-T INTERVAL: 422 MS
EKG Q-T INTERVAL: 446 MS
EKG QRS DURATION: 88 MS
EKG QRS DURATION: 90 MS
EKG QTC CALCULATION (BAZETT): 428 MS
EKG QTC CALCULATION (BAZETT): 446 MS
EKG R AXIS: -12 DEGREES
EKG R AXIS: 85 DEGREES
EKG T AXIS: 38 DEGREES
EKG T AXIS: 44 DEGREES
EKG VENTRICULAR RATE: 60 BPM
EKG VENTRICULAR RATE: 62 BPM
ERYTHROCYTE [DISTWIDTH] IN BLOOD BY AUTOMATED COUNT: 24.3 % (ref 11.5–14.5)
GFR SERPL CREATININE-BSD FRML MDRD: 32 ML/MIN/1.73M2
GLUCOSE SERPL-MCNC: 88 MG/DL (ref 70–108)
HCT VFR BLD AUTO: 34.6 % (ref 37–47)
HGB BLD-MCNC: 10 GM/DL (ref 12–16)
INR PPP: 3.48 (ref 0.85–1.13)
MCH RBC QN AUTO: 27.5 PG (ref 26–33)
MCHC RBC AUTO-ENTMCNC: 28.9 GM/DL (ref 32.2–35.5)
MCV RBC AUTO: 95.1 FL (ref 81–99)
PLATELET # BLD AUTO: 208 THOU/MM3 (ref 130–400)
PMV BLD AUTO: 9.1 FL (ref 9.4–12.4)
POTASSIUM SERPL-SCNC: 4.2 MEQ/L (ref 3.5–5.2)
RBC # BLD AUTO: 3.64 MILL/MM3 (ref 4.2–5.4)
SODIUM SERPL-SCNC: 140 MEQ/L (ref 135–145)
WBC # BLD AUTO: 5.4 THOU/MM3 (ref 4.8–10.8)

## 2023-06-08 PROCEDURE — 6360000002 HC RX W HCPCS

## 2023-06-08 PROCEDURE — 3700000000 HC ANESTHESIA ATTENDED CARE

## 2023-06-08 PROCEDURE — 93005 ELECTROCARDIOGRAM TRACING: CPT | Performed by: PHYSICIAN ASSISTANT

## 2023-06-08 PROCEDURE — 85027 COMPLETE CBC AUTOMATED: CPT

## 2023-06-08 PROCEDURE — 93005 ELECTROCARDIOGRAM TRACING: CPT | Performed by: INTERNAL MEDICINE

## 2023-06-08 PROCEDURE — 92960 CARDIOVERSION ELECTRIC EXT: CPT | Performed by: INTERNAL MEDICINE

## 2023-06-08 PROCEDURE — 92960 CARDIOVERSION ELECTRIC EXT: CPT

## 2023-06-08 PROCEDURE — 80048 BASIC METABOLIC PNL TOTAL CA: CPT

## 2023-06-08 PROCEDURE — 85730 THROMBOPLASTIN TIME PARTIAL: CPT

## 2023-06-08 PROCEDURE — 85610 PROTHROMBIN TIME: CPT

## 2023-06-08 PROCEDURE — 36415 COLL VENOUS BLD VENIPUNCTURE: CPT

## 2023-06-08 PROCEDURE — 2580000003 HC RX 258: Performed by: PHYSICIAN ASSISTANT

## 2023-06-08 RX ORDER — SODIUM CHLORIDE 0.9 % (FLUSH) 0.9 %
5-40 SYRINGE (ML) INJECTION PRN
Status: DISCONTINUED | OUTPATIENT
Start: 2023-06-08 | End: 2023-06-08 | Stop reason: HOSPADM

## 2023-06-08 RX ORDER — SODIUM CHLORIDE 9 MG/ML
INJECTION, SOLUTION INTRAVENOUS CONTINUOUS
Status: DISCONTINUED | OUTPATIENT
Start: 2023-06-08 | End: 2023-06-08 | Stop reason: HOSPADM

## 2023-06-08 RX ORDER — PROPOFOL 10 MG/ML
INJECTION, EMULSION INTRAVENOUS PRN
Status: DISCONTINUED | OUTPATIENT
Start: 2023-06-08 | End: 2023-06-08 | Stop reason: SDUPTHER

## 2023-06-08 RX ORDER — SODIUM CHLORIDE 9 MG/ML
INJECTION, SOLUTION INTRAVENOUS PRN
Status: DISCONTINUED | OUTPATIENT
Start: 2023-06-08 | End: 2023-06-08 | Stop reason: HOSPADM

## 2023-06-08 RX ORDER — SODIUM CHLORIDE 0.9 % (FLUSH) 0.9 %
5-40 SYRINGE (ML) INJECTION EVERY 12 HOURS SCHEDULED
Status: DISCONTINUED | OUTPATIENT
Start: 2023-06-08 | End: 2023-06-08 | Stop reason: HOSPADM

## 2023-06-08 RX ADMIN — SODIUM CHLORIDE: 9 INJECTION, SOLUTION INTRAVENOUS at 09:40

## 2023-06-08 RX ADMIN — PROPOFOL 50 MG: 10 INJECTION, EMULSION INTRAVENOUS at 11:26

## 2023-06-08 NOTE — FLOWSHEET NOTE
..  1104 Procedure is scheduled for 6/8/2023 admission time is 0900   Please arrive 15 minutes early  You [facility]  NPO after midnight  Bring drivers license and insurance information  Wear comfortable clean clothes  Shower morning of and night before with liquid antibacterial soap  Remove jewelry   Bring medications in original bottles - do not take diabetic meds days of procedure. It is OK to take BP and heart meds.   If you take ASA, plavix, effient or brilinta - please take AM of procedure  Made aware of visitors limit to 2 at a time  Follow all instructions given by your physician  Please notify doctor office if you need to cancel or reschedule your procedure   needed at discharge  Bring and wear mask
Discharged home in sinus rhythm, patient and son verbalize understanding of discharge instructions and follow-up.
DR Walker

## 2023-06-08 NOTE — PROCEDURES
[facility]   [address] [address] [address]  Dept: [phone]  Loc: [phone]   CARDIAC ELECTROPHYSIOLOGY: PROCEDURE NOTE  Patients Demographics:  Date:   6/8/2023  Patient name:              Clemente Colon  YOB: 1940  Sex: female   MRN:   [id number]    Primary Care Provider:   Karle Goltz Selhorst, APRN - CNP       Procedure Planned:  Electro-cardioversion . Indication of the Procedure:  Atrial fibrillation. Procedure/s Performed:   External Direct Current Electro-cardioversion . Description of the Procedure: The risks and benefits of DCCV were discussed with the patient. Informed consent was obtained. Uninterrupted anticoagulation with Warfarin (therapeutic INR) was confirmed. Time out performed and patient identified by 2 identifiers. After achieving deep sedation with intravenous Propofol, a 100 Joules synchronized electric shock was delivered through the hands free defibrillator pads (AP vector). The patient converted to sinus rhythm. No significant post- conversion pauses or bradycardia. At baseline heart rate during atrial 90 beats per minute and blood pressure 169/88 mmHg. Post cardioversion, in sinus rhythm, heart rate was 59  beats per minute and blood pressure 126/70 mmHg. The patient's hemodynamics including heart rate, blood pressure and oxygen saturation were monitored throughout the procedure. Arousal from sedation was spontaneous and uncomplicated. She tolerated the procedure well. Medications:  Propofol 50 mg ntravenously. Complications:  None. Summary:  Successful cardioversion of atrial fibrillation to sinus rhythm     Recommendations:   Continue antiarrhythmic medications and anticoagulation. Discharge and post procedure care per protocol. Follow up 6 weeks in EP clinic.         Electronically signed by Jacquelin Deleon MD, Nicolasa Sprinkles on 6/8/2023 at 12:23 PM

## 2023-06-08 NOTE — H&P
Provider, MD   Probiotic Product (PROBIOTIC PO) Take by mouth    Historical Provider, MD   pantoprazole (PROTONIX) 40 MG tablet TAKE ONE TABLET, TWO TIMES A DAY, BY MOUTH. 3/4/22   Nico Arreola MD   rosuvastatin (CRESTOR) 5 MG tablet Take 1 tablet by mouth every evening Indications: Blood Cholesterol Abnormal 7/27/20   Nico Arreola MD   warfarin (COUMADIN) 2 MG tablet Take as instructed by the physician following the INR results 1/18/20   Suman Oh MD   acetaminophen (TYLENOL) 650 MG extended release tablet Take 1 tablet by mouth 2 times daily as needed Indications: Arthritis    Historical Provider, MD   levothyroxine (SYNTHROID) 50 MCG tablet Take 1 tablet by mouth daily Indications: Impaired Thyroid Function 1/11/16   Historical Provider, MD   Biotin 1000 MCG TABS Take 1,000 mcg by mouth nightly     Historical Provider, MD     Aspirin  [] 81 mg  [] 325 mg  [] None  Antiplatelet drug therapy use last 5 days  []No []Yes  Coumadin Use Last 5 Days []No []Yes  Other anticoagulant use last 5 days  []No []Yes  Additional information: Per medical records       Vitals:   Vitals:    06/08/23 0936   BP:    Pulse: 66   Resp:    Temp:    SpO2:        Physical Examination:   GENERAL: Alert and oriented. No distress. EYES: No pallor or icterus. ENT: No central cyanosis. VESSELS: No jugular venous distension or carotid bruits. HEART: Normal S1/S2. No murmur, rub or gallop. LUNGS: Clear to auscultation. ABDOMEN: Soft and non-tender. EXTREMITIES: No lower extremity edema. Feet are warm. NEUROLOGICAL: Grossly intact. Procedure Plannd:   [] AF ablation [] Atrial Flutter ablation [] SVT ablation [] PVC/VT ablation [] EP study  [] Pacemaker implantation [] AICD implantation [] BiV PM/ICD implantation   [] Generator change [] Loop recorder implantation [] Loop recorder explantation. [] Micra leadless pacemaker implantation. [] His bundle/Left bundle pacemaker implantation. [] Lead revision.  []

## 2023-06-08 NOTE — ANESTHESIA POSTPROCEDURE EVALUATION
Department of Anesthesiology  Postprocedure Note    Patient: Ailyn Lacey  MRN: [id number]  YOB: 1940  Date of evaluation: 6/8/2023      Procedure Summary     Date: 06/08/23 Room / Location: [facility] CATH LAB    Anesthesia Start: 1126 Anesthesia Stop: 1138    Procedure: STR CARDIOVERSION W/ ANES Diagnosis:     Scheduled Providers:  Responsible Provider: Javed Giang MD    Anesthesia Type: MAC ASA Status: 4          Anesthesia Type: MAC    Edu Phase I: Edu Score: 10    Edu Phase II:        Anesthesia Post Evaluation    Patient location during evaluation: PACU  Patient participation: complete - patient participated  Level of consciousness: awake and alert  Airway patency: patent  Nausea & Vomiting: no nausea  Complications: no  Cardiovascular status: blood pressure returned to baseline and hemodynamically stable  Respiratory status: acceptable and spontaneous ventilation  Hydration status: euvolemic

## 2023-06-08 NOTE — PROGRESS NOTES
1125 Pt prepped for cardioversion in routine fashion. Pt placed on o2 at [address] per NC. Pt remains in a fib 61. Pt denies any questions or concerns. Dr Benna Skiff at bedside. CRNA providing sedation; see CRNA flow sheet. Pt shocked at 100j synchronized into NSR 64bpm. Repeat EKG completed post procedure. Pt tolerated procedure w out difficulty. 1142 Report given to 2e nrsg staff. AOx3; denies any pain or discomfort.  NSR 64bpm

## 2023-06-08 NOTE — PROGRESS NOTES
Patient admitted to 2E14  via wheelchair for cardioversion. Patient NPO. Patient accompanied by son. Vital signs obtained. Assessment and data collection intiated. Oriented to room. Policies and procedures for 2E explained. All questions answered with no further questions at this time. Fall prevention and safety precautions discussed with patient.

## 2023-06-08 NOTE — ANESTHESIA PRE PROCEDURE
tablet Take 1 tablet by mouth daily Indications: Impaired Thyroid Function 1/11/16   Historical Provider, MD   Biotin 1000 MCG TABS Take 1,000 mcg by mouth nightly     Historical Provider, MD       Current medications:    No current facility-administered medications for this visit. No current outpatient medications on file. Facility-Administered Medications Ordered in Other Visits   Medication Dose Route Frequency Provider Last Rate Last Admin    0.9 % sodium chloride infusion   IntraVENous Continuous Cherl Im, PA-C 75 mL/hr at 06/08/23 0940 New Bag at 06/08/23 0940    sodium chloride flush 0.9 % injection 5-40 mL  5-40 mL IntraVENous 2 times per day Cherl Im, PA-C        sodium chloride flush 0.9 % injection 5-40 mL  5-40 mL IntraVENous PRN Cherl Im, PA-C        0.9 % sodium chloride infusion   IntraVENous PRN Cherl Im, PA-C           Allergies:     Allergies   Allergen Reactions    Tramadol Nausea And Vomiting     Patient advises that she can take this  medication       Problem List:    Patient Active Problem List   Diagnosis Code    Other persistent atrial fibrillation ([facility]) I48.19    Anticoagulated on Coumadin Z79.01    HTN (hypertension) I10    Nausea R11.0    Physical deconditioning R53.81    Loss of appetite R63.0    Chronic renal failure, stage 3 (moderate) ([facility]) N18.30    Weakness R53.1    Severe malnutrition ([address] E43    Acute respiratory failure with hypoxia ([facility]) J96.01    Bacterial pneumonia J15.9    Hyponatremia P42.9    Metabolic acidosis N75.28    Bilateral pleural effusion J90    Mitral valve stenosis I05.0    Mitral stenosis with insufficiency I05.2    Pulmonary HTN ([facility]) I27.20    Moderate tricuspid regurgitation I07.1    Gastritis and duodenitis K29.90    Esophageal candidiasis ([facility]) B37.81    Hypotension I95.9    Demand ischemia ([facility]) I24.8    Thoracic aortic aneurysm without rupture ([facility]) I71.20    CKD (chronic kidney

## 2023-06-13 ENCOUNTER — TELEPHONE (OUTPATIENT)
Dept: CARDIOLOGY CLINIC | Age: 83
End: 2023-06-13

## 2023-06-30 ENCOUNTER — APPOINTMENT (OUTPATIENT)
Dept: GENERAL RADIOLOGY | Age: 83
End: 2023-06-30
Payer: MEDICARE

## 2023-06-30 ENCOUNTER — HOSPITAL ENCOUNTER (EMERGENCY)
Age: 83
Discharge: HOME | End: 2023-06-30
Attending: EMERGENCY MEDICINE
Payer: MEDICARE

## 2023-06-30 VITALS
DIASTOLIC BLOOD PRESSURE: 78 MMHG | HEIGHT: 60 IN | TEMPERATURE: 97.9 F | BODY MASS INDEX: 19.63 KG/M2 | SYSTOLIC BLOOD PRESSURE: 166 MMHG | WEIGHT: 100 LBS | OXYGEN SATURATION: 94 % | RESPIRATION RATE: 16 BRPM | HEART RATE: 64 BPM

## 2023-06-30 DIAGNOSIS — I10 UNCONTROLLED HYPERTENSION: Primary | ICD-10-CM

## 2023-06-30 LAB
ALBUMIN SERPL BCG-MCNC: 4.3 G/DL (ref 3.5–5.1)
ALP SERPL-CCNC: 148 U/L (ref 38–126)
ALT SERPL W/O P-5'-P-CCNC: 10 U/L (ref 11–66)
ANION GAP SERPL CALC-SCNC: 19 MEQ/L (ref 8–16)
ANISOCYTOSIS BLD QL SMEAR: PRESENT
AST SERPL-CCNC: 23 U/L (ref 5–40)
BASOPHILS ABSOLUTE: 0.1 THOU/MM3 (ref 0–0.1)
BASOPHILS NFR BLD AUTO: 1.2 %
BILIRUB CONJ SERPL-MCNC: < 0.2 MG/DL (ref 0–0.3)
BILIRUB SERPL-MCNC: 0.4 MG/DL (ref 0.3–1.2)
BILIRUB UR QL STRIP: NEGATIVE
BUN SERPL-MCNC: 26 MG/DL (ref 7–22)
CALCIUM SERPL-MCNC: 10 MG/DL (ref 8.5–10.5)
CHARACTER UR: CLEAR
CHLORIDE SERPL-SCNC: 100 MEQ/L (ref 98–111)
CO2 SERPL-SCNC: 23 MEQ/L (ref 23–33)
COLOR UR: YELLOW
CREAT SERPL-MCNC: 1.7 MG/DL (ref 0.4–1.2)
DEPRECATED RDW RBC AUTO: 82.9 FL (ref 35–45)
EOSINOPHIL NFR BLD AUTO: 4.7 %
EOSINOPHILS ABSOLUTE: 0.2 THOU/MM3 (ref 0–0.4)
ERYTHROCYTE [DISTWIDTH] IN BLOOD BY AUTOMATED COUNT: 23.6 % (ref 11.5–14.5)
GFR SERPL CREATININE-BSD FRML MDRD: 29 ML/MIN/1.73M2
GLUCOSE SERPL-MCNC: 85 MG/DL (ref 70–108)
GLUCOSE UR QL STRIP.AUTO: NEGATIVE MG/DL
HCT VFR BLD AUTO: 38.5 % (ref 37–47)
HGB BLD-MCNC: 11.2 GM/DL (ref 12–16)
HGB UR QL STRIP.AUTO: NEGATIVE
IMM GRANULOCYTES # BLD AUTO: 0.02 THOU/MM3 (ref 0–0.07)
IMM GRANULOCYTES NFR BLD AUTO: 0.5 %
KETONES UR QL STRIP.AUTO: NEGATIVE
LEUKOCYTE ESTERASE UR QL STRIP.AUTO: NEGATIVE
LYMPHOCYTES ABSOLUTE: 1 THOU/MM3 (ref 1–4.8)
LYMPHOCYTES NFR BLD AUTO: 23 %
MCH RBC QN AUTO: 28.1 PG (ref 26–33)
MCHC RBC AUTO-ENTMCNC: 29.1 GM/DL (ref 32.2–35.5)
MCV RBC AUTO: 96.7 FL (ref 81–99)
MONOCYTES ABSOLUTE: 0.6 THOU/MM3 (ref 0.4–1.3)
MONOCYTES NFR BLD AUTO: 15.1 %
NEUTROPHILS NFR BLD AUTO: 55.5 %
NITRITE UR QL STRIP.AUTO: NEGATIVE
NRBC BLD AUTO-RTO: 0 /100 WBC
OSMOLALITY SERPL CALC.SUM OF ELEC: 287.1 MOSMOL/KG (ref 275–300)
PH UR STRIP.AUTO: 7.5 [PH] (ref 5–9)
PLATELET # BLD AUTO: 214 THOU/MM3 (ref 130–400)
PLATELET BLD QL SMEAR: ADEQUATE
PMV BLD AUTO: 9.3 FL (ref 9.4–12.4)
POTASSIUM SERPL-SCNC: 4.1 MEQ/L (ref 3.5–5.2)
PROT SERPL-MCNC: 7.1 G/DL (ref 6.1–8)
PROT UR STRIP.AUTO-MCNC: NEGATIVE MG/DL
RBC # BLD AUTO: 3.98 MILL/MM3 (ref 4.2–5.4)
SCAN OF BLOOD SMEAR: NORMAL
SEGMENTED NEUTROPHILS ABSOLUTE COUNT: 2.4 THOU/MM3 (ref 1.8–7.7)
SODIUM SERPL-SCNC: 142 MEQ/L (ref 135–145)
SP GR UR REFRACT.AUTO: 1.01 (ref 1–1.03)
TROPONIN T: < 0.01 NG/ML
UROBILINOGEN UR QL STRIP.AUTO: 0.2 EU/DL (ref 0–1)
WBC # BLD AUTO: 4.3 THOU/MM3 (ref 4.8–10.8)

## 2023-06-30 PROCEDURE — 36415 COLL VENOUS BLD VENIPUNCTURE: CPT

## 2023-06-30 PROCEDURE — 71045 X-RAY EXAM CHEST 1 VIEW: CPT

## 2023-06-30 PROCEDURE — 93005 ELECTROCARDIOGRAM TRACING: CPT | Performed by: STUDENT IN AN ORGANIZED HEALTH CARE EDUCATION/TRAINING PROGRAM

## 2023-06-30 PROCEDURE — 85025 COMPLETE CBC W/AUTO DIFF WBC: CPT

## 2023-06-30 PROCEDURE — 84484 ASSAY OF TROPONIN QUANT: CPT

## 2023-06-30 PROCEDURE — 81003 URINALYSIS AUTO W/O SCOPE: CPT

## 2023-06-30 PROCEDURE — 99285 EMERGENCY DEPT VISIT HI MDM: CPT

## 2023-06-30 PROCEDURE — 6370000000 HC RX 637 (ALT 250 FOR IP): Performed by: STUDENT IN AN ORGANIZED HEALTH CARE EDUCATION/TRAINING PROGRAM

## 2023-06-30 PROCEDURE — 80053 COMPREHEN METABOLIC PANEL: CPT

## 2023-06-30 PROCEDURE — 82248 BILIRUBIN DIRECT: CPT

## 2023-06-30 RX ORDER — HYDRALAZINE HYDROCHLORIDE 25 MG/1
25 TABLET, FILM COATED ORAL ONCE
Status: COMPLETED | OUTPATIENT
Start: 2023-06-30 | End: 2023-06-30

## 2023-06-30 RX ORDER — HYDRALAZINE HYDROCHLORIDE 20 MG/ML
10 INJECTION INTRAMUSCULAR; INTRAVENOUS ONCE
Status: DISCONTINUED | OUTPATIENT
Start: 2023-06-30 | End: 2023-06-30

## 2023-06-30 RX ORDER — HYDRALAZINE HYDROCHLORIDE 25 MG/1
25 TABLET, FILM COATED ORAL EVERY 8 HOURS SCHEDULED
Status: DISCONTINUED | OUTPATIENT
Start: 2023-06-30 | End: 2023-06-30

## 2023-06-30 RX ADMIN — HYDRALAZINE HYDROCHLORIDE 25 MG: 25 TABLET, FILM COATED ORAL at 17:24

## 2023-06-30 ASSESSMENT — PAIN SCALES - GENERAL: PAINLEVEL_OUTOF10: 0

## 2023-07-01 LAB
EKG ATRIAL RATE: 60 BPM
EKG P AXIS: 64 DEGREES
EKG P-R INTERVAL: 314 MS
EKG Q-T INTERVAL: 482 MS
EKG QRS DURATION: 96 MS
EKG QTC CALCULATION (BAZETT): 482 MS
EKG R AXIS: -47 DEGREES
EKG T AXIS: 28 DEGREES
EKG VENTRICULAR RATE: 60 BPM

## 2023-07-01 PROCEDURE — 93010 ELECTROCARDIOGRAM REPORT: CPT | Performed by: INTERNAL MEDICINE

## 2023-07-05 RX ORDER — PANTOPRAZOLE SODIUM 40 MG/1
TABLET, DELAYED RELEASE ORAL
Qty: 180 TABLET | Refills: 0 | OUTPATIENT
Start: 2023-07-05

## 2023-07-05 RX ORDER — PANTOPRAZOLE SODIUM 40 MG/1
TABLET, DELAYED RELEASE ORAL
Qty: 180 TABLET | Refills: 0 | Status: SHIPPED | OUTPATIENT
Start: 2023-07-05

## 2023-07-18 ENCOUNTER — HOSPITAL ENCOUNTER (OUTPATIENT)
Age: 83
Discharge: HOME | End: 2023-07-18
Payer: MEDICARE

## 2023-07-18 DIAGNOSIS — I63.89 OTHER CEREBRAL INFARCTION (HCC): ICD-10-CM

## 2023-07-18 DIAGNOSIS — Z86.73 HISTORY OF STROKE: ICD-10-CM

## 2023-07-18 DIAGNOSIS — R26.89 BALANCE PROBLEM: ICD-10-CM

## 2023-07-18 DIAGNOSIS — H53.2 DOUBLE VISION: ICD-10-CM

## 2023-07-18 LAB
CHOLEST SERPL-MCNC: 148 MG/DL (ref 100–199)
HDLC SERPL-MCNC: 70 MG/DL
LDLC SERPL CALC-MCNC: 61 MG/DL
TRIGL SERPL-MCNC: 83 MG/DL (ref 0–199)

## 2023-07-18 PROCEDURE — 80061 LIPID PANEL: CPT

## 2023-07-18 PROCEDURE — 36415 COLL VENOUS BLD VENIPUNCTURE: CPT

## 2023-07-18 PROCEDURE — 83090 ASSAY OF HOMOCYSTEINE: CPT

## 2023-07-20 ENCOUNTER — TELEPHONE (OUTPATIENT)
Dept: NEUROLOGY | Age: 83
End: 2023-07-20

## 2023-07-20 LAB — HOMOCYSTEINE: 23 UMOL/L

## 2023-07-20 RX ORDER — FOLIC ACID 1 MG/1
1 TABLET ORAL DAILY
Qty: 90 TABLET | Refills: 1 | Status: SHIPPED | OUTPATIENT
Start: 2023-07-20

## 2023-07-20 NOTE — TELEPHONE ENCOUNTER
----- Message from VANDANA Macdonald CNP sent at 7/20/2023  7:52 AM EDT -----  Please let patient know her homocystine level is elevated=23.0. She needs to take folic acid 1 mg daily.  Script sent to pharmacy on file  Alma Elliott CNP

## 2023-07-24 ENCOUNTER — HOSPITAL ENCOUNTER (OUTPATIENT)
Dept: GENERAL RADIOLOGY | Age: 83
Discharge: HOME | End: 2023-07-24
Payer: MEDICARE

## 2023-07-24 ENCOUNTER — OFFICE VISIT (OUTPATIENT)
Dept: NEUROLOGY | Age: 83
End: 2023-07-24
Payer: MEDICARE

## 2023-07-24 ENCOUNTER — HOSPITAL ENCOUNTER (OUTPATIENT)
Age: 83
Discharge: HOME | End: 2023-07-24
Payer: MEDICARE

## 2023-07-24 VITALS
WEIGHT: 101 LBS | SYSTOLIC BLOOD PRESSURE: 155 MMHG | HEIGHT: 59 IN | HEART RATE: 67 BPM | OXYGEN SATURATION: 98 % | DIASTOLIC BLOOD PRESSURE: 70 MMHG | BODY MASS INDEX: 20.36 KG/M2

## 2023-07-24 DIAGNOSIS — H53.2 DOUBLE VISION: Primary | ICD-10-CM

## 2023-07-24 DIAGNOSIS — R26.89 BALANCE PROBLEM: ICD-10-CM

## 2023-07-24 DIAGNOSIS — M54.2 NECK PAIN: ICD-10-CM

## 2023-07-24 DIAGNOSIS — Z86.73 HISTORY OF STROKE: ICD-10-CM

## 2023-07-24 PROCEDURE — 1090F PRES/ABSN URINE INCON ASSESS: CPT | Performed by: NURSE PRACTITIONER

## 2023-07-24 PROCEDURE — 72040 X-RAY EXAM NECK SPINE 2-3 VW: CPT

## 2023-07-24 PROCEDURE — 1123F ACP DISCUSS/DSCN MKR DOCD: CPT | Performed by: NURSE PRACTITIONER

## 2023-07-24 PROCEDURE — 1036F TOBACCO NON-USER: CPT | Performed by: NURSE PRACTITIONER

## 2023-07-24 PROCEDURE — G8420 CALC BMI NORM PARAMETERS: HCPCS | Performed by: NURSE PRACTITIONER

## 2023-07-24 PROCEDURE — 3078F DIAST BP <80 MM HG: CPT | Performed by: NURSE PRACTITIONER

## 2023-07-24 PROCEDURE — 3074F SYST BP LT 130 MM HG: CPT | Performed by: NURSE PRACTITIONER

## 2023-07-24 PROCEDURE — G8400 PT W/DXA NO RESULTS DOC: HCPCS | Performed by: NURSE PRACTITIONER

## 2023-07-24 PROCEDURE — G8427 DOCREV CUR MEDS BY ELIG CLIN: HCPCS | Performed by: NURSE PRACTITIONER

## 2023-07-24 PROCEDURE — 99214 OFFICE O/P EST MOD 30 MIN: CPT | Performed by: NURSE PRACTITIONER

## 2023-07-24 NOTE — PATIENT INSTRUCTIONS
Continue with Coumadin  Continue with lipid lowering medication, target LDL below 70   Continue with folic acid 1 mg daily  Obtain cardiac echo report  Modify risk factors for stroke (blood pressure, cholesterol, diabetes, smoking cessation etc.. Control)  Follow up with your PCP re: better control of your blood pressure  Cervical spine X-ray  Follow up in 3 months or sooner if needed. Call if any questions or concerns.

## 2023-07-24 NOTE — PROGRESS NOTES
NEUROLOGY OUT PATIENT FOLLOW UP NOTE:  7/24/20239:05 AM    Peter Jones is here for follow up for double vision. Allergies   Allergen Reactions    Tramadol Nausea And Vomiting     Patient advises that she can take this  medication       Current Outpatient Medications:     folic acid (FOLVITE) 1 MG tablet, Take 1 tablet by mouth daily, Disp: 90 tablet, Rfl: 1    pantoprazole (PROTONIX) 40 MG tablet, TAKE ONE TABLET, TWO TIMES A DAY, BY MOUTH., Disp: 180 tablet, Rfl: 0    hydrALAZINE (APRESOLINE) 25 MG tablet, Take 1 tablet by mouth 2 times daily, Disp: , Rfl:     amiodarone (CORDARONE) 200 MG tablet, Take 2 tablets by mouth 2 times daily Loading dose 400mg bid x 3 weeks then 200mg daily (2 Rx's sent), Disp: 84 tablet, Rfl: 0    gabapentin (NEURONTIN) 100 MG capsule, Take 1 capsule by mouth in the morning and at bedtime. , Disp: , Rfl:     bumetanide (BUMEX) 1 MG tablet, Take 1 tablet by mouth daily, Disp: , Rfl:     Coenzyme Q10 (CO Q 10 PO), Take by mouth, Disp: , Rfl:     Probiotic Product (PROBIOTIC PO), Take by mouth, Disp: , Rfl:     rosuvastatin (CRESTOR) 5 MG tablet, Take 1 tablet by mouth every evening Indications: Blood Cholesterol Abnormal, Disp: 90 tablet, Rfl: 3    warfarin (COUMADIN) 2 MG tablet, Take as instructed by the physician following the INR results, Disp: 30 tablet, Rfl: 0    acetaminophen (TYLENOL) 650 MG extended release tablet, Take 1 tablet by mouth 2 times daily as needed Indications: Arthritis, Disp: , Rfl:     levothyroxine (SYNTHROID) 50 MCG tablet, Take 1 tablet by mouth daily Indications: Impaired Thyroid Function, Disp: , Rfl:     Biotin 1000 MCG TABS, Take 1,000 mcg by mouth nightly , Disp: , Rfl:     I reviewed the past medical history, allergies, medications, social history and family history.        PE:   Vitals:    07/24/23 0857   BP: (!) 180/80   Site: Right Upper Arm   Position: Sitting   Cuff Size: Medium Adult   Pulse: 67   SpO2: 98%   Weight: 101 lb (45.8 kg)   Height:

## 2023-08-09 ENCOUNTER — TELEPHONE (OUTPATIENT)
Dept: NEUROLOGY | Age: 83
End: 2023-08-09

## 2023-08-09 NOTE — TELEPHONE ENCOUNTER
----- Message from VANDANA Peterson CNP sent at 8/9/2023  9:45 AM EDT -----  Regarding: RE: result  Shows arthritic changes  ----- Message -----  From: Radha Sorto MA  Sent: 8/9/2023   9:44 AM EDT  To: VANDANA Peterson CNP  Subject: result                                           Patient called requesting xray cervical spine results from 7/24/2023.     Please advise Thanks

## 2023-08-10 ENCOUNTER — OFFICE VISIT (OUTPATIENT)
Dept: CARDIOLOGY CLINIC | Age: 83
End: 2023-08-10
Payer: MEDICARE

## 2023-08-10 ENCOUNTER — TELEPHONE (OUTPATIENT)
Dept: CARDIOLOGY CLINIC | Age: 83
End: 2023-08-10

## 2023-08-10 VITALS
HEART RATE: 74 BPM | HEIGHT: 59 IN | WEIGHT: 99.8 LBS | DIASTOLIC BLOOD PRESSURE: 76 MMHG | BODY MASS INDEX: 20.12 KG/M2 | SYSTOLIC BLOOD PRESSURE: 134 MMHG

## 2023-08-10 DIAGNOSIS — I10 PRIMARY HYPERTENSION: ICD-10-CM

## 2023-08-10 DIAGNOSIS — I48.0 PAROXYSMAL ATRIAL FIBRILLATION (HCC): Primary | ICD-10-CM

## 2023-08-10 PROBLEM — D68.69 SECONDARY HYPERCOAGULABLE STATE (HCC): Status: ACTIVE | Noted: 2023-08-10

## 2023-08-10 PROCEDURE — 3078F DIAST BP <80 MM HG: CPT | Performed by: NUCLEAR MEDICINE

## 2023-08-10 PROCEDURE — 3075F SYST BP GE 130 - 139MM HG: CPT | Performed by: NUCLEAR MEDICINE

## 2023-08-10 PROCEDURE — 1036F TOBACCO NON-USER: CPT | Performed by: NUCLEAR MEDICINE

## 2023-08-10 PROCEDURE — 99214 OFFICE O/P EST MOD 30 MIN: CPT | Performed by: NUCLEAR MEDICINE

## 2023-08-10 PROCEDURE — G8420 CALC BMI NORM PARAMETERS: HCPCS | Performed by: NUCLEAR MEDICINE

## 2023-08-10 PROCEDURE — G8428 CUR MEDS NOT DOCUMENT: HCPCS | Performed by: NUCLEAR MEDICINE

## 2023-08-10 PROCEDURE — G8400 PT W/DXA NO RESULTS DOC: HCPCS | Performed by: NUCLEAR MEDICINE

## 2023-08-10 PROCEDURE — 1123F ACP DISCUSS/DSCN MKR DOCD: CPT | Performed by: NUCLEAR MEDICINE

## 2023-08-10 PROCEDURE — 1090F PRES/ABSN URINE INCON ASSESS: CPT | Performed by: NUCLEAR MEDICINE

## 2023-08-10 NOTE — TELEPHONE ENCOUNTER
Pt is going to be seen by Dr Rick Culver next week     Dr Colin Torres is wondering if she would be able to replace her hydralazine with an arb?   If you could please advise at the appt on 8/17 and let us know     Please keep open for response

## 2023-08-10 NOTE — PROGRESS NOTES
Plan:  No follow-ups on file. Consider hydralazine tid  Consider amlodipine   Discuss with renal if she can try Diovan   Continue risk factor modification and medical management  Thank you for allowing me to participate in the care of your patient. Please don't hesitate to contact me regarding any further issues related to the patient care    Orders Placed:  No orders of the defined types were placed in this encounter. Prescribed:  No orders of the defined types were placed in this encounter. Discussed use, benefit, and side effects of prescribed medications. All patient questions answered. Pt voicedunderstanding. Instructed to continue current medications, diet and exercise. Continue risk factor modification and medical management. Patient agreed with treatment plan. Follow up as directed.     Electronically signedby Eleuterio Hayden MD on 8/10/2023 at 1:51 PM

## 2023-08-11 ENCOUNTER — HOSPITAL ENCOUNTER (OUTPATIENT)
Age: 83
Discharge: HOME | End: 2023-08-11
Payer: MEDICARE

## 2023-08-11 LAB
ANION GAP SERPL CALC-SCNC: 14 MEQ/L (ref 8–16)
BUN SERPL-MCNC: 29 MG/DL (ref 7–22)
CALCIUM SERPL-MCNC: 10.3 MG/DL (ref 8.5–10.5)
CHLORIDE SERPL-SCNC: 100 MEQ/L (ref 98–111)
CO2 SERPL-SCNC: 25 MEQ/L (ref 23–33)
CREAT SERPL-MCNC: 1.7 MG/DL (ref 0.4–1.2)
GFR SERPL CREATININE-BSD FRML MDRD: 29 ML/MIN/1.73M2
GLUCOSE SERPL-MCNC: 180 MG/DL (ref 70–108)
PHOSPHATE SERPL-MCNC: 2.7 MG/DL (ref 2.4–4.7)
POTASSIUM SERPL-SCNC: 4.4 MEQ/L (ref 3.5–5.2)
SODIUM SERPL-SCNC: 139 MEQ/L (ref 135–145)

## 2023-08-11 PROCEDURE — 80048 BASIC METABOLIC PNL TOTAL CA: CPT

## 2023-08-11 PROCEDURE — 36415 COLL VENOUS BLD VENIPUNCTURE: CPT

## 2023-08-11 PROCEDURE — 82306 VITAMIN D 25 HYDROXY: CPT

## 2023-08-11 PROCEDURE — 84100 ASSAY OF PHOSPHORUS: CPT

## 2023-08-11 PROCEDURE — 83970 ASSAY OF PARATHORMONE: CPT

## 2023-08-12 ENCOUNTER — HOSPITAL ENCOUNTER (OUTPATIENT)
Age: 83
Setting detail: SPECIMEN
Discharge: HOME | End: 2023-08-12
Payer: MEDICARE

## 2023-08-12 LAB
25(OH)D3 SERPL-MCNC: 81 NG/ML (ref 30–100)
CREAT UR-MCNC: 42.1 MG/DL
PROT UR-MCNC: 6 MG/DL
PROT/CREAT 24H UR: 0.14 MG/G{CREAT}
PTH-INTACT SERPL-MCNC: 90.2 PG/ML (ref 15–65)

## 2023-08-12 PROCEDURE — 82570 ASSAY OF URINE CREATININE: CPT

## 2023-08-12 PROCEDURE — 84156 ASSAY OF PROTEIN URINE: CPT

## 2023-08-17 ENCOUNTER — OFFICE VISIT (OUTPATIENT)
Dept: NEPHROLOGY | Age: 83
End: 2023-08-17
Payer: MEDICARE

## 2023-08-17 VITALS
WEIGHT: 99.8 LBS | SYSTOLIC BLOOD PRESSURE: 168 MMHG | BODY MASS INDEX: 20.16 KG/M2 | DIASTOLIC BLOOD PRESSURE: 66 MMHG | OXYGEN SATURATION: 95 % | HEART RATE: 68 BPM

## 2023-08-17 DIAGNOSIS — I12.9 HYPERTENSIVE RENAL DISEASE, STAGE 1 THROUGH STAGE 4 OR UNSPECIFIED CHRONIC KIDNEY DISEASE: ICD-10-CM

## 2023-08-17 DIAGNOSIS — N18.32 CHRONIC KIDNEY DISEASE, STAGE 3B (HCC): Primary | ICD-10-CM

## 2023-08-17 PROCEDURE — 99213 OFFICE O/P EST LOW 20 MIN: CPT | Performed by: INTERNAL MEDICINE

## 2023-08-17 PROCEDURE — G8420 CALC BMI NORM PARAMETERS: HCPCS | Performed by: INTERNAL MEDICINE

## 2023-08-17 PROCEDURE — 1123F ACP DISCUSS/DSCN MKR DOCD: CPT | Performed by: INTERNAL MEDICINE

## 2023-08-17 PROCEDURE — G8400 PT W/DXA NO RESULTS DOC: HCPCS | Performed by: INTERNAL MEDICINE

## 2023-08-17 PROCEDURE — G8427 DOCREV CUR MEDS BY ELIG CLIN: HCPCS | Performed by: INTERNAL MEDICINE

## 2023-08-17 PROCEDURE — 3078F DIAST BP <80 MM HG: CPT | Performed by: INTERNAL MEDICINE

## 2023-08-17 PROCEDURE — 1090F PRES/ABSN URINE INCON ASSESS: CPT | Performed by: INTERNAL MEDICINE

## 2023-08-17 PROCEDURE — 3074F SYST BP LT 130 MM HG: CPT | Performed by: INTERNAL MEDICINE

## 2023-08-17 PROCEDURE — 1036F TOBACCO NON-USER: CPT | Performed by: INTERNAL MEDICINE

## 2023-08-17 RX ORDER — VALSARTAN 80 MG/1
80 TABLET ORAL DAILY
Qty: 30 TABLET | Refills: 3 | Status: SHIPPED | OUTPATIENT
Start: 2023-08-17

## 2023-08-17 RX ORDER — HYDRALAZINE HYDROCHLORIDE 25 MG/1
25 TABLET, FILM COATED ORAL 3 TIMES DAILY
Qty: 90 TABLET | Refills: 3 | Status: SHIPPED | OUTPATIENT
Start: 2023-08-17

## 2023-08-17 NOTE — PROGRESS NOTES
[facility] AND [facility]  [address]  [facility] [address]  Dept: [phone]  Loc: [phone]  Office Progress Note  8/17/2023 11:56 AM      Pt Name:    Neville Galvez  YOB: 1940  Primary Care Physician:  VANDANA Collins - CNP     Chief Complaint:   Chief Complaint   Patient presents with    Chronic Kidney Disease     Stage 4        Background Information/Interval History:   80 yo white female with hx CKD III, HTN, breast cancer s/p lumpectomy and radiation about 10 years ago (Dr. Lexy Swain) who I am seeing for follow-up. She was previously a patient of Dr. Geovanni Crawford. She reports lot of heart issues in the past. She reports hx atrial fibrillation and moderate MR and AR. She had US in July 2020 which showed about 8 cm kidneys with simple renal cysts. She does report hx UTIs in the past.     She is here for 6 months follow-up. BP was high recently but starting to improve after hydralazine was increased to TID. However it is still high at times. She feels okay.       Past History:  Past Medical History:   Diagnosis Date    Arthritis     Atrial fibrillation ([address])     Breast cancer ([address]) 2010    Lumpectomy    CAD (coronary artery disease)     Cancer ([address])     BREAST    Chronic kidney disease     History of therapeutic radiation 2011    left IDC    HTN (hypertension) 4/13/2016    Hypertension     Mixed hyperlipidemia 7/6/2020    Nausea & vomiting     Neuromuscular disorder (HCC)     Osteopenia     Pneumonia of both lungs due to infectious organism     Rheumatic fever     as a child    Sinus infection     Thyroid disease      Past Surgical History:   Procedure Laterality Date    BREAST LUMPECTOMY Left 2011    BRONCHOSCOPY N/A 01/05/2020    BRONCHOSCOPY performed by Monico Christianson MD at [facility]    BUNIONECTOMY Left 2009    CARDIAC CATHETERIZATION  2010    CARDIOVERSION      DILATION AND CURETTAGE OF UTERUS      X2; SEVERAL

## 2023-08-21 RX ORDER — AMIODARONE HYDROCHLORIDE 200 MG/1
200 TABLET ORAL DAILY
Qty: 90 TABLET | Refills: 3 | Status: SHIPPED | OUTPATIENT
Start: 2023-08-21

## 2023-08-21 NOTE — TELEPHONE ENCOUNTER
Froilan Fritz called requesting a refill on the following medications:  Requested Prescriptions     Pending Prescriptions Disp Refills    amiodarone (CORDARONE) 200 MG tablet 84 tablet 0     Sig: Take 2 tablets by mouth 2 times daily Loading dose 400mg bid x 3 weeks then 200mg daily (2 Rx's sent)     Pharmacy [facility]: [facility]      Date of last visit: 8/10/2023  Date of next visit (if applicable): 3/5/6571

## 2023-09-18 ENCOUNTER — HOSPITAL ENCOUNTER (OUTPATIENT)
Age: 83
Discharge: HOME | End: 2023-09-18
Payer: MEDICARE

## 2023-09-18 DIAGNOSIS — I12.9 HYPERTENSIVE RENAL DISEASE, STAGE 1 THROUGH STAGE 4 OR UNSPECIFIED CHRONIC KIDNEY DISEASE: ICD-10-CM

## 2023-09-18 DIAGNOSIS — N18.32 CHRONIC KIDNEY DISEASE, STAGE 3B (HCC): ICD-10-CM

## 2023-09-18 LAB
ANION GAP SERPL CALC-SCNC: 11 MEQ/L (ref 8–16)
BUN SERPL-MCNC: 30 MG/DL (ref 7–22)
CALCIUM SERPL-MCNC: 10.2 MG/DL (ref 8.5–10.5)
CHLORIDE SERPL-SCNC: 102 MEQ/L (ref 98–111)
CO2 SERPL-SCNC: 27 MEQ/L (ref 23–33)
CREAT SERPL-MCNC: 1.6 MG/DL (ref 0.4–1.2)
GFR SERPL CREATININE-BSD FRML MDRD: 32 ML/MIN/1.73M2
GLUCOSE SERPL-MCNC: 133 MG/DL (ref 70–108)
POTASSIUM SERPL-SCNC: 3.9 MEQ/L (ref 3.5–5.2)
SODIUM SERPL-SCNC: 140 MEQ/L (ref 135–145)

## 2023-09-18 PROCEDURE — 80048 BASIC METABOLIC PNL TOTAL CA: CPT

## 2023-09-18 PROCEDURE — 36415 COLL VENOUS BLD VENIPUNCTURE: CPT

## 2023-09-18 PROCEDURE — 82784 ASSAY IGA/IGD/IGG/IGM EACH: CPT

## 2023-09-18 PROCEDURE — 84165 PROTEIN E-PHORESIS SERUM: CPT

## 2023-09-18 PROCEDURE — 86334 IMMUNOFIX E-PHORESIS SERUM: CPT

## 2023-09-18 PROCEDURE — 84155 ASSAY OF PROTEIN SERUM: CPT

## 2023-09-20 LAB — IMMUNOFIXATION ELECTROPHORESIS: NORMAL

## 2023-10-11 RX ORDER — FOLIC ACID 1 MG/1
TABLET ORAL
Qty: 90 TABLET | Refills: 3 | Status: SHIPPED | OUTPATIENT
Start: 2023-10-11

## 2023-10-11 RX ORDER — PANTOPRAZOLE SODIUM 40 MG/1
TABLET, DELAYED RELEASE ORAL
Qty: 180 TABLET | Refills: 1 | Status: SHIPPED | OUTPATIENT
Start: 2023-10-11

## 2023-11-13 RX ORDER — VALSARTAN 80 MG/1
TABLET ORAL
Qty: 90 TABLET | Refills: 3 | Status: SHIPPED | OUTPATIENT
Start: 2023-11-13

## 2023-12-04 RX ORDER — HYDRALAZINE HYDROCHLORIDE 25 MG/1
TABLET, FILM COATED ORAL
Qty: 90 TABLET | Refills: 5 | Status: SHIPPED | OUTPATIENT
Start: 2023-12-04

## 2023-12-12 RX ORDER — VALSARTAN 80 MG/1
80 TABLET ORAL DAILY
Qty: 90 TABLET | Refills: 3 | OUTPATIENT
Start: 2023-12-12 | End: 2024-03-11

## 2023-12-14 PROBLEM — R53.1 WEAKNESS: Status: RESOLVED | Noted: 2020-01-04 | Resolved: 2023-12-14

## 2023-12-14 PROBLEM — S72.142A CLOSED DISPLACED INTERTROCHANTERIC FRACTURE OF LEFT FEMUR, INITIAL ENCOUNTER (HCC): Status: RESOLVED | Noted: 2023-04-26 | Resolved: 2023-12-14

## 2023-12-14 PROBLEM — R11.0 NAUSEA: Status: RESOLVED | Noted: 2020-01-02 | Resolved: 2023-12-14

## 2023-12-14 PROBLEM — S72.002A CLOSED HIP FRACTURE, LEFT, INITIAL ENCOUNTER (HCC): Status: RESOLVED | Noted: 2023-04-26 | Resolved: 2023-12-14

## 2023-12-14 PROBLEM — N63.20 MASS OF LEFT BREAST: Status: RESOLVED | Noted: 2020-07-06 | Resolved: 2023-12-14

## 2023-12-14 PROBLEM — R92.8 ABNORMAL MAMMOGRAM: Status: RESOLVED | Noted: 2020-07-06 | Resolved: 2023-12-14

## 2023-12-28 RX ORDER — PANTOPRAZOLE SODIUM 40 MG/1
TABLET, DELAYED RELEASE ORAL
Qty: 180 TABLET | Refills: 0 | Status: SHIPPED | OUTPATIENT
Start: 2023-12-28

## 2023-12-28 NOTE — TELEPHONE ENCOUNTER
Fariba Fuentes called requesting a refill on the following medications:  Requested Prescriptions     Pending Prescriptions Disp Refills    pantoprazole (PROTONIX) 40 MG tablet 180 tablet 1     Pharmacy verified: [facility]  .     PHARMACY DOES NOT HAVE ANY REFILLS FOR PT, PLEASE RESEND    Date of last visit: 8/10/2023  Date of next visit (if applicable): 4/9/3581

## 2024-02-16 ENCOUNTER — HOSPITAL ENCOUNTER (OUTPATIENT)
Age: 84
Discharge: HOME | End: 2024-02-16
Payer: MEDICARE

## 2024-02-16 DIAGNOSIS — N18.32 CHRONIC KIDNEY DISEASE, STAGE 3B (HCC): ICD-10-CM

## 2024-02-16 DIAGNOSIS — I12.9 HYPERTENSIVE RENAL DISEASE, STAGE 1 THROUGH STAGE 4 OR UNSPECIFIED CHRONIC KIDNEY DISEASE: ICD-10-CM

## 2024-02-16 LAB
ANION GAP SERPL CALC-SCNC: 9 MEQ/L (ref 8–16)
BUN SERPL-MCNC: 36 MG/DL (ref 7–18)
CALCIUM SERPL-MCNC: 9.7 MG/DL (ref 8.5–10.1)
CHLORIDE SERPL-SCNC: 104 MEQ/L (ref 98–107)
CO2 SERPL-SCNC: 31 MEQ/L (ref 21–32)
CREAT SERPL-MCNC: 1.9 MG/DL (ref 0.6–1.3)
GFR SERPL CREATININE-BSD FRML MDRD: 26 ML/MIN/1.73M2
GLUCOSE SERPL-MCNC: 125 MG/DL (ref 74–106)
POTASSIUM SERPL-SCNC: 4.1 MEQ/L (ref 3.5–5.1)
SODIUM SERPL-SCNC: 144 MEQ/L (ref 136–145)

## 2024-02-16 PROCEDURE — 80048 BASIC METABOLIC PNL TOTAL CA: CPT

## 2024-02-16 PROCEDURE — 36415 COLL VENOUS BLD VENIPUNCTURE: CPT

## 2024-02-22 ENCOUNTER — OFFICE VISIT (OUTPATIENT)
Dept: NEPHROLOGY | Age: 84
End: 2024-02-22
Payer: MEDICARE

## 2024-02-22 VITALS
OXYGEN SATURATION: 97 % | DIASTOLIC BLOOD PRESSURE: 66 MMHG | HEART RATE: 63 BPM | HEIGHT: 59 IN | WEIGHT: 100 LBS | BODY MASS INDEX: 20.16 KG/M2 | SYSTOLIC BLOOD PRESSURE: 171 MMHG

## 2024-02-22 DIAGNOSIS — I12.9 HYPERTENSIVE RENAL DISEASE, STAGE 1 THROUGH STAGE 4 OR UNSPECIFIED CHRONIC KIDNEY DISEASE: ICD-10-CM

## 2024-02-22 DIAGNOSIS — N28.1 RENAL CYST: ICD-10-CM

## 2024-02-22 DIAGNOSIS — N18.32 CHRONIC KIDNEY DISEASE, STAGE 3B (HCC): Primary | ICD-10-CM

## 2024-02-22 PROBLEM — N18.4 CKD (CHRONIC KIDNEY DISEASE), STAGE IV (HCC): Status: ACTIVE | Noted: 2024-02-22

## 2024-02-22 PROCEDURE — 1090F PRES/ABSN URINE INCON ASSESS: CPT | Performed by: INTERNAL MEDICINE

## 2024-02-22 PROCEDURE — 3078F DIAST BP <80 MM HG: CPT | Performed by: INTERNAL MEDICINE

## 2024-02-22 PROCEDURE — G8420 CALC BMI NORM PARAMETERS: HCPCS | Performed by: INTERNAL MEDICINE

## 2024-02-22 PROCEDURE — 1036F TOBACCO NON-USER: CPT | Performed by: INTERNAL MEDICINE

## 2024-02-22 PROCEDURE — G8482 FLU IMMUNIZE ORDER/ADMIN: HCPCS | Performed by: INTERNAL MEDICINE

## 2024-02-22 PROCEDURE — G8427 DOCREV CUR MEDS BY ELIG CLIN: HCPCS | Performed by: INTERNAL MEDICINE

## 2024-02-22 PROCEDURE — 3077F SYST BP >= 140 MM HG: CPT | Performed by: INTERNAL MEDICINE

## 2024-02-22 PROCEDURE — 99214 OFFICE O/P EST MOD 30 MIN: CPT | Performed by: INTERNAL MEDICINE

## 2024-02-22 PROCEDURE — 1123F ACP DISCUSS/DSCN MKR DOCD: CPT | Performed by: INTERNAL MEDICINE

## 2024-02-22 PROCEDURE — G8400 PT W/DXA NO RESULTS DOC: HCPCS | Performed by: INTERNAL MEDICINE

## 2024-02-22 RX ORDER — HYDRALAZINE HYDROCHLORIDE 50 MG/1
50 TABLET, FILM COATED ORAL 3 TIMES DAILY
Qty: 90 TABLET | Refills: 5 | Status: SHIPPED | OUTPATIENT
Start: 2024-02-22

## 2024-02-22 RX ORDER — GUAIFENESIN 400 MG
TABLET ORAL
COMMUNITY

## 2024-02-22 NOTE — PROGRESS NOTES
[facility] [place] [facility]  [facility]  [address]  [address]  [place] [id number]  Dept: [phone]  Loc: [phone]  Office Progress Note  2/22/2024 10:03 AM      Pt Name:    Paola Titus  YOB: 1940  Primary Care Physician:  Renuka Rosas, APRN - CNP     Chief Complaint:   CKD IIIb     Background Information/Interval History:   82 yo white female with hx CKD III, HTN, breast cancer s/p lumpectomy and radiation about 10 years ago (Dr. Ruff) who I am seeing for follow-up. She was previously a patient of Dr. Dennis. She reports lot of heart issues in the past. She reports hx atrial fibrillation and moderate MR and AR. She had US in July 2020 which showed about 8 cm kidneys with simple renal cysts. She does report hx UTIs in the past.     Pt is here for follow-up. Feels okay. No new complaints. Serum creat is 1.9. Her BP at home is in 150 range. Today in office it is 177 systolic     Past History:  Past Medical History:   Diagnosis Date    Abnormal mammogram 7/6/2020    Acute respiratory failure with hypoxia (HCC)     Arthritis     Atrial fibrillation (HCC)     Bacterial pneumonia     Bilateral pleural effusion     Breast cancer (HCC) 2010    Lumpectomy    CAD (coronary artery disease)     Cancer (HCC)     BREAST    Chronic kidney disease     Closed displaced intertrochanteric fracture of left femur, initial encounter (formerly [facility]) 4/26/2023    History of therapeutic radiation 2011    left IDC    HTN (hypertension) 4/13/2016    Hypertension     Mass of left breast 7/6/2020    Metabolic acidosis     Mixed hyperlipidemia 7/6/2020    Nausea & vomiting     Neuromuscular disorder (HCC)     Osteopenia     Pneumonia of both lungs due to infectious organism     Rheumatic fever     as a child    Sinus infection     Thyroid disease      Past Surgical History:   Procedure Laterality Date    BREAST LUMPECTOMY Left 2011    BRONCHOSCOPY N/A 01/05/2020

## 2024-03-06 ENCOUNTER — OFFICE VISIT (OUTPATIENT)
Dept: CARDIOLOGY CLINIC | Age: 84
End: 2024-03-06
Payer: MEDICARE

## 2024-03-06 VITALS — DIASTOLIC BLOOD PRESSURE: 68 MMHG | SYSTOLIC BLOOD PRESSURE: 148 MMHG | HEART RATE: 64 BPM

## 2024-03-06 DIAGNOSIS — E78.01 FAMILIAL HYPERCHOLESTEROLEMIA: ICD-10-CM

## 2024-03-06 DIAGNOSIS — I48.0 PAROXYSMAL ATRIAL FIBRILLATION (HCC): Primary | ICD-10-CM

## 2024-03-06 DIAGNOSIS — I10 PRIMARY HYPERTENSION: ICD-10-CM

## 2024-03-06 DIAGNOSIS — R42 VERTIGO: ICD-10-CM

## 2024-03-06 PROCEDURE — G8420 CALC BMI NORM PARAMETERS: HCPCS | Performed by: NUCLEAR MEDICINE

## 2024-03-06 PROCEDURE — G8482 FLU IMMUNIZE ORDER/ADMIN: HCPCS | Performed by: NUCLEAR MEDICINE

## 2024-03-06 PROCEDURE — 3077F SYST BP >= 140 MM HG: CPT | Performed by: NUCLEAR MEDICINE

## 2024-03-06 PROCEDURE — 1036F TOBACCO NON-USER: CPT | Performed by: NUCLEAR MEDICINE

## 2024-03-06 PROCEDURE — 99214 OFFICE O/P EST MOD 30 MIN: CPT | Performed by: NUCLEAR MEDICINE

## 2024-03-06 PROCEDURE — 1090F PRES/ABSN URINE INCON ASSESS: CPT | Performed by: NUCLEAR MEDICINE

## 2024-03-06 PROCEDURE — G8400 PT W/DXA NO RESULTS DOC: HCPCS | Performed by: NUCLEAR MEDICINE

## 2024-03-06 PROCEDURE — 1123F ACP DISCUSS/DSCN MKR DOCD: CPT | Performed by: NUCLEAR MEDICINE

## 2024-03-06 PROCEDURE — 3078F DIAST BP <80 MM HG: CPT | Performed by: NUCLEAR MEDICINE

## 2024-03-06 PROCEDURE — G8427 DOCREV CUR MEDS BY ELIG CLIN: HCPCS | Performed by: NUCLEAR MEDICINE

## 2024-03-06 NOTE — PROGRESS NOTES
[facility] [place] [facility]  [facility] CARDIOLOGY  [address]  [address]  [facility] [address]  Dept: [phone]  Dept Fax: [phone]  Loc: [phone]    Visit Date: 3/6/2024    Paola Titus is a 83 y.o. female who presents todayfor:  Chief Complaint   Patient presents with    Follow-up    Hypertension    Atrial Fibrillation   Known HTN and A fib   Some palpitation  Know renal patient   BP is stable  Some dizziness  Constant   ?? Vertigo   No syncope  On statins for hyperlipidemia  No issues with the meds         HPI:  HPI  Past Medical History:   Diagnosis Date    Abnormal mammogram 7/6/2020    Acute respiratory failure with hypoxia (HCC)     Arthritis     Atrial fibrillation (HCC)     Bacterial pneumonia     Bilateral pleural effusion     Breast cancer (HCC) 2010    Lumpectomy    CAD (coronary artery disease)     Cancer (HCC)     BREAST    Chronic kidney disease     Closed displaced intertrochanteric fracture of left femur, initial encounter ([facility]) 4/26/2023    History of therapeutic radiation 2011    left IDC    HTN (hypertension) 4/13/2016    Hypertension     Mass of left breast 7/6/2020    Metabolic acidosis     Mixed hyperlipidemia 7/6/2020    Nausea & vomiting     Neuromuscular disorder (HCC)     Osteopenia     Pneumonia of both lungs due to infectious organism     Rheumatic fever     as a child    Sinus infection     Thyroid disease       Past Surgical History:   Procedure Laterality Date    BREAST LUMPECTOMY Left 2011    BRONCHOSCOPY N/A 01/05/2020    BRONCHOSCOPY performed by Calos Stephenson MD at [facility]    BUNIONECTOMY Left 2009    CARDIAC CATHETERIZATION  2010    CARDIOVERSION      DILATION AND CURETTAGE OF UTERUS      X2; SEVERAL YEARS AGO    HIP SURGERY Left 04/28/2023    LEFT INTERTAN performed by Singh Houser MD at [facility] OR    JOINT REPLACEMENT Right 2010    KNEE    NC BX OF BREAST, NEEDLE CORE, IMAGE GUIDE Left 03/26/2020    benign    NC BX OF BREAST, NEEDLE

## 2024-03-20 NOTE — PROGRESS NOTES
completed.  All questions were answered to the patient's satisfaction at this time and patient is agreeable to proceeding with plan of care. Patient understands to contact office in interim with any changes in condition/concerns.      (Please note that portions of this note may have been completed with a voice recognition program.  Efforts were made to edit the dictation but occasionally words are mis-transcribed.)    Electronically signed by VANDANA Montague CNP on 3/21/2024 at 12:48 PM

## 2024-03-21 ENCOUNTER — OFFICE VISIT (OUTPATIENT)
Dept: ENT CLINIC | Age: 84
End: 2024-03-21
Payer: MEDICARE

## 2024-03-21 VITALS
DIASTOLIC BLOOD PRESSURE: 64 MMHG | BODY MASS INDEX: 24.37 KG/M2 | TEMPERATURE: 97.3 F | RESPIRATION RATE: 18 BRPM | WEIGHT: 105.3 LBS | SYSTOLIC BLOOD PRESSURE: 106 MMHG | HEIGHT: 55 IN | HEART RATE: 53 BPM | OXYGEN SATURATION: 96 %

## 2024-03-21 DIAGNOSIS — R42 DIZZINESS: Primary | ICD-10-CM

## 2024-03-21 DIAGNOSIS — R26.89 IMBALANCE: ICD-10-CM

## 2024-03-21 DIAGNOSIS — H93.A3 PULSATILE TINNITUS OF BOTH EARS: ICD-10-CM

## 2024-03-21 PROCEDURE — G8482 FLU IMMUNIZE ORDER/ADMIN: HCPCS | Performed by: REGISTERED NURSE

## 2024-03-21 PROCEDURE — G8420 CALC BMI NORM PARAMETERS: HCPCS | Performed by: REGISTERED NURSE

## 2024-03-21 PROCEDURE — G8400 PT W/DXA NO RESULTS DOC: HCPCS | Performed by: REGISTERED NURSE

## 2024-03-21 PROCEDURE — 1090F PRES/ABSN URINE INCON ASSESS: CPT | Performed by: REGISTERED NURSE

## 2024-03-21 PROCEDURE — 3074F SYST BP LT 130 MM HG: CPT | Performed by: REGISTERED NURSE

## 2024-03-21 PROCEDURE — 1123F ACP DISCUSS/DSCN MKR DOCD: CPT | Performed by: REGISTERED NURSE

## 2024-03-21 PROCEDURE — G8427 DOCREV CUR MEDS BY ELIG CLIN: HCPCS | Performed by: REGISTERED NURSE

## 2024-03-21 PROCEDURE — 99214 OFFICE O/P EST MOD 30 MIN: CPT | Performed by: REGISTERED NURSE

## 2024-03-21 PROCEDURE — 1036F TOBACCO NON-USER: CPT | Performed by: REGISTERED NURSE

## 2024-03-21 PROCEDURE — 3078F DIAST BP <80 MM HG: CPT | Performed by: REGISTERED NURSE

## 2024-03-21 NOTE — PATIENT INSTRUCTIONS
Epley or Semont  maneuver, unless specifically instructed otherwise by your health care provider.  4. At one week after treatment, put yourself in the position that usually makes you dizzy.  Position yourself cautiously and under conditions in which you can't fall or hurt yourself. Let  your doctor know how you did.

## 2024-03-25 ENCOUNTER — TELEPHONE (OUTPATIENT)
Dept: CARDIOLOGY CLINIC | Age: 84
End: 2024-03-25

## 2024-03-25 NOTE — TELEPHONE ENCOUNTER
Pt called asking if Dr Mendoza could take a look at her medications   States the dizziness is getting worse and worse     But also states Bp is \"normal\"  Tilt it jaki for 4/12

## 2024-03-29 ENCOUNTER — APPOINTMENT (OUTPATIENT)
Dept: GENERAL RADIOLOGY | Age: 84
DRG: 640 | End: 2024-03-29
Payer: MEDICARE

## 2024-03-29 ENCOUNTER — APPOINTMENT (OUTPATIENT)
Dept: CT IMAGING | Age: 84
DRG: 640 | End: 2024-03-29
Payer: MEDICARE

## 2024-03-29 ENCOUNTER — HOSPITAL ENCOUNTER (INPATIENT)
Age: 84
LOS: 5 days | Discharge: NURSING, REHAB OR LONG-TERM CARE | DRG: 640 | End: 2024-04-03
Attending: EMERGENCY MEDICINE | Admitting: INTERNAL MEDICINE
Payer: MEDICARE

## 2024-03-29 DIAGNOSIS — E87.1 HYPONATREMIA: ICD-10-CM

## 2024-03-29 DIAGNOSIS — I34.2 NONRHEUMATIC MITRAL VALVE STENOSIS: ICD-10-CM

## 2024-03-29 DIAGNOSIS — I27.20 PULMONARY HTN (HCC): ICD-10-CM

## 2024-03-29 DIAGNOSIS — D50.0 IRON DEFICIENCY ANEMIA DUE TO CHRONIC BLOOD LOSS: ICD-10-CM

## 2024-03-29 DIAGNOSIS — E03.9 HYPOTHYROIDISM, UNSPECIFIED TYPE: ICD-10-CM

## 2024-03-29 DIAGNOSIS — I05.2 RHEUMATIC MITRAL STENOSIS WITH INSUFFICIENCY: ICD-10-CM

## 2024-03-29 DIAGNOSIS — R00.1 BRADYCARDIA: ICD-10-CM

## 2024-03-29 DIAGNOSIS — E03.9 ACQUIRED HYPOTHYROIDISM: ICD-10-CM

## 2024-03-29 DIAGNOSIS — I50.33 ACUTE ON CHRONIC HEART FAILURE WITH PRESERVED EJECTION FRACTION (HCC): ICD-10-CM

## 2024-03-29 DIAGNOSIS — R41.82 ALTERED MENTAL STATUS, UNSPECIFIED ALTERED MENTAL STATUS TYPE: Primary | ICD-10-CM

## 2024-03-29 DIAGNOSIS — M17.0 PRIMARY OSTEOARTHRITIS OF BOTH KNEES: ICD-10-CM

## 2024-03-29 DIAGNOSIS — M51.26 HERNIATED LUMBAR INTERVERTEBRAL DISC: ICD-10-CM

## 2024-03-29 DIAGNOSIS — N18.30 STAGE 3 CHRONIC KIDNEY DISEASE, UNSPECIFIED WHETHER STAGE 3A OR 3B CKD (HCC): ICD-10-CM

## 2024-03-29 DIAGNOSIS — H53.2 DOUBLE VISION: ICD-10-CM

## 2024-03-29 DIAGNOSIS — R79.89 ELEVATED TSH: ICD-10-CM

## 2024-03-29 PROBLEM — G92.8 TOXIC METABOLIC ENCEPHALOPATHY: Status: ACTIVE | Noted: 2024-03-29

## 2024-03-29 LAB
ALBUMIN SERPL BCG-MCNC: 4.2 G/DL (ref 3.5–5.1)
ALP SERPL-CCNC: 146 U/L (ref 38–126)
ALT SERPL W/O P-5'-P-CCNC: 36 U/L (ref 11–66)
ANION GAP SERPL CALC-SCNC: 17 MEQ/L (ref 8–16)
AST SERPL-CCNC: 65 U/L (ref 5–40)
BASOPHILS ABSOLUTE: 0 THOU/MM3 (ref 0–0.1)
BASOPHILS NFR BLD AUTO: 0.3 %
BILIRUB CONJ SERPL-MCNC: < 0.2 MG/DL (ref 0–0.3)
BILIRUB SERPL-MCNC: 0.6 MG/DL (ref 0.3–1.2)
BILIRUB UR QL STRIP: NEGATIVE
BUN SERPL-MCNC: 23 MG/DL (ref 7–22)
CALCIUM SERPL-MCNC: 9.8 MG/DL (ref 8.5–10.5)
CHARACTER UR: CLEAR
CHLORIDE 24H UR-SRATE: 51 MEQ/L
CHLORIDE SERPL-SCNC: 83 MEQ/L (ref 98–111)
CO2 SERPL-SCNC: 22 MEQ/L (ref 23–33)
COLOR UR: YELLOW
CREAT SERPL-MCNC: 1.2 MG/DL (ref 0.4–1.2)
DEPRECATED RDW RBC AUTO: 50.5 FL (ref 35–45)
EOSINOPHIL NFR BLD AUTO: 0.5 %
EOSINOPHILS ABSOLUTE: 0 THOU/MM3 (ref 0–0.4)
ERYTHROCYTE [DISTWIDTH] IN BLOOD BY AUTOMATED COUNT: 16 % (ref 11.5–14.5)
FERRITIN SERPL IA-MCNC: 119 NG/ML (ref 10–291)
FOLATE SERPL-MCNC: 17.9 NG/ML (ref 4.8–24.2)
GFR SERPL CREATININE-BSD FRML MDRD: 44 ML/MIN/1.73M2
GLUCOSE BLD STRIP.AUTO-MCNC: 132 MG/DL (ref 70–108)
GLUCOSE SERPL-MCNC: 121 MG/DL (ref 70–108)
GLUCOSE UR QL STRIP.AUTO: NEGATIVE MG/DL
HCT VFR BLD AUTO: 30.8 % (ref 37–47)
HGB BLD-MCNC: 9.9 GM/DL (ref 12–16)
HGB UR QL STRIP.AUTO: NEGATIVE
IMM GRANULOCYTES # BLD AUTO: 0.03 THOU/MM3 (ref 0–0.07)
IMM GRANULOCYTES NFR BLD AUTO: 0.5 %
INR PPP: 5.65 (ref 0.85–1.13)
IRON SATN MFR SERPL: 10 % (ref 20–50)
IRON SERPL-MCNC: 33 UG/DL (ref 50–170)
KETONES UR QL STRIP.AUTO: NEGATIVE
LACTIC ACID, SEPSIS: 0.9 MMOL/L (ref 0.5–1.9)
LEUKOCYTE ESTERASE UR QL STRIP.AUTO: NEGATIVE
LYMPHOCYTES ABSOLUTE: 0.3 THOU/MM3 (ref 1–4.8)
LYMPHOCYTES NFR BLD AUTO: 4.4 %
MAGNESIUM SERPL-MCNC: 1.9 MG/DL (ref 1.6–2.4)
MCH RBC QN AUTO: 27.8 PG (ref 26–33)
MCHC RBC AUTO-ENTMCNC: 32.1 GM/DL (ref 32.2–35.5)
MCV RBC AUTO: 86.5 FL (ref 81–99)
MONOCYTES ABSOLUTE: 0.5 THOU/MM3 (ref 0.4–1.3)
MONOCYTES NFR BLD AUTO: 8.7 %
NEUTROPHILS NFR BLD AUTO: 85.6 %
NITRITE UR QL STRIP.AUTO: NEGATIVE
NRBC BLD AUTO-RTO: 0 /100 WBC
OSMOLALITY SERPL CALC.SUM OF ELEC: 250.9 MOSMOL/KG (ref 275–300)
OSMOLALITY UR: 288 MOSMOL/KG (ref 250–750)
PH UR STRIP.AUTO: 7 [PH] (ref 5–9)
PLATELET # BLD AUTO: 205 THOU/MM3 (ref 130–400)
PMV BLD AUTO: 9.8 FL (ref 9.4–12.4)
POTASSIUM SERPL-SCNC: 3.6 MEQ/L (ref 3.5–5.2)
POTASSIUM UR-SCNC: 24.2 MEQ/L
PROT SERPL-MCNC: 6.9 G/DL (ref 6.1–8)
PROT UR STRIP.AUTO-MCNC: NEGATIVE MG/DL
RBC # BLD AUTO: 3.56 MILL/MM3 (ref 4.2–5.4)
SEGMENTED NEUTROPHILS ABSOLUTE COUNT: 5.1 THOU/MM3 (ref 1.8–7.7)
SODIUM SERPL-SCNC: 122 MEQ/L (ref 135–145)
SODIUM SERPL-SCNC: 122 MEQ/L (ref 135–145)
SODIUM SERPL-SCNC: 123 MEQ/L (ref 135–145)
SODIUM UR-SCNC: 60 MEQ/L
SP GR UR REFRACT.AUTO: 1.01 (ref 1–1.03)
T4 FREE SERPL-MCNC: 2.02 NG/DL (ref 0.93–1.68)
TIBC SERPL-MCNC: 325 UG/DL (ref 171–450)
TSH SERPL DL<=0.005 MIU/L-ACNC: 4.52 UIU/ML (ref 0.4–4.2)
UROBILINOGEN UR QL STRIP.AUTO: 0.2 EU/DL (ref 0–1)
VIT B12 SERPL-MCNC: > 2000 PG/ML (ref 211–911)
WBC # BLD AUTO: 5.9 THOU/MM3 (ref 4.8–10.8)

## 2024-03-29 PROCEDURE — 82607 VITAMIN B-12: CPT

## 2024-03-29 PROCEDURE — 71045 X-RAY EXAM CHEST 1 VIEW: CPT

## 2024-03-29 PROCEDURE — 87040 BLOOD CULTURE FOR BACTERIA: CPT

## 2024-03-29 PROCEDURE — 51701 INSERT BLADDER CATHETER: CPT

## 2024-03-29 PROCEDURE — 93005 ELECTROCARDIOGRAM TRACING: CPT | Performed by: EMERGENCY MEDICINE

## 2024-03-29 PROCEDURE — 36415 COLL VENOUS BLD VENIPUNCTURE: CPT

## 2024-03-29 PROCEDURE — 83540 ASSAY OF IRON: CPT

## 2024-03-29 PROCEDURE — 2580000003 HC RX 258: Performed by: EMERGENCY MEDICINE

## 2024-03-29 PROCEDURE — 93010 ELECTROCARDIOGRAM REPORT: CPT | Performed by: INTERNAL MEDICINE

## 2024-03-29 PROCEDURE — 1200000003 HC TELEMETRY R&B

## 2024-03-29 PROCEDURE — 83605 ASSAY OF LACTIC ACID: CPT

## 2024-03-29 PROCEDURE — 80053 COMPREHEN METABOLIC PANEL: CPT

## 2024-03-29 PROCEDURE — 84133 ASSAY OF URINE POTASSIUM: CPT

## 2024-03-29 PROCEDURE — 82728 ASSAY OF FERRITIN: CPT

## 2024-03-29 PROCEDURE — 72125 CT NECK SPINE W/O DYE: CPT

## 2024-03-29 PROCEDURE — 84295 ASSAY OF SERUM SODIUM: CPT

## 2024-03-29 PROCEDURE — 85025 COMPLETE CBC W/AUTO DIFF WBC: CPT

## 2024-03-29 PROCEDURE — 82746 ASSAY OF FOLIC ACID SERUM: CPT

## 2024-03-29 PROCEDURE — 72170 X-RAY EXAM OF PELVIS: CPT

## 2024-03-29 PROCEDURE — 2580000003 HC RX 258: Performed by: PHYSICIAN ASSISTANT

## 2024-03-29 PROCEDURE — 82948 REAGENT STRIP/BLOOD GLUCOSE: CPT

## 2024-03-29 PROCEDURE — 70450 CT HEAD/BRAIN W/O DYE: CPT

## 2024-03-29 PROCEDURE — 83935 ASSAY OF URINE OSMOLALITY: CPT

## 2024-03-29 PROCEDURE — 81003 URINALYSIS AUTO W/O SCOPE: CPT

## 2024-03-29 PROCEDURE — 99223 1ST HOSP IP/OBS HIGH 75: CPT | Performed by: PHYSICIAN ASSISTANT

## 2024-03-29 PROCEDURE — 83550 IRON BINDING TEST: CPT

## 2024-03-29 PROCEDURE — 83735 ASSAY OF MAGNESIUM: CPT

## 2024-03-29 PROCEDURE — 85610 PROTHROMBIN TIME: CPT

## 2024-03-29 PROCEDURE — 84443 ASSAY THYROID STIM HORMONE: CPT

## 2024-03-29 PROCEDURE — 6370000000 HC RX 637 (ALT 250 FOR IP): Performed by: PHYSICIAN ASSISTANT

## 2024-03-29 PROCEDURE — 82248 BILIRUBIN DIRECT: CPT

## 2024-03-29 PROCEDURE — 84439 ASSAY OF FREE THYROXINE: CPT

## 2024-03-29 PROCEDURE — 99285 EMERGENCY DEPT VISIT HI MDM: CPT

## 2024-03-29 PROCEDURE — 6360000002 HC RX W HCPCS: Performed by: PHYSICIAN ASSISTANT

## 2024-03-29 PROCEDURE — 84300 ASSAY OF URINE SODIUM: CPT

## 2024-03-29 PROCEDURE — 82436 ASSAY OF URINE CHLORIDE: CPT

## 2024-03-29 RX ORDER — ACETAMINOPHEN 650 MG/1
650 SUPPOSITORY RECTAL EVERY 6 HOURS PRN
Status: DISCONTINUED | OUTPATIENT
Start: 2024-03-29 | End: 2024-04-03 | Stop reason: HOSPADM

## 2024-03-29 RX ORDER — HYDRALAZINE HYDROCHLORIDE 25 MG/1
25 TABLET, FILM COATED ORAL 3 TIMES DAILY
Status: DISCONTINUED | OUTPATIENT
Start: 2024-03-29 | End: 2024-04-03 | Stop reason: HOSPADM

## 2024-03-29 RX ORDER — PANTOPRAZOLE SODIUM 40 MG/1
40 TABLET, DELAYED RELEASE ORAL
Status: DISCONTINUED | OUTPATIENT
Start: 2024-03-29 | End: 2024-04-03 | Stop reason: HOSPADM

## 2024-03-29 RX ORDER — FERROUS SULFATE 325(65) MG
325 TABLET ORAL
Status: DISCONTINUED | OUTPATIENT
Start: 2024-04-01 | End: 2024-04-03 | Stop reason: HOSPADM

## 2024-03-29 RX ORDER — POLYETHYLENE GLYCOL 3350 17 G/17G
17 POWDER, FOR SOLUTION ORAL DAILY PRN
Status: DISCONTINUED | OUTPATIENT
Start: 2024-03-29 | End: 2024-04-03 | Stop reason: HOSPADM

## 2024-03-29 RX ORDER — VALSARTAN 80 MG/1
80 TABLET ORAL DAILY
Status: DISCONTINUED | OUTPATIENT
Start: 2024-03-30 | End: 2024-04-03 | Stop reason: HOSPADM

## 2024-03-29 RX ORDER — LANOLIN ALCOHOL/MO/W.PET/CERES
3 CREAM (GRAM) TOPICAL NIGHTLY
Status: DISCONTINUED | OUTPATIENT
Start: 2024-03-29 | End: 2024-04-03 | Stop reason: HOSPADM

## 2024-03-29 RX ORDER — AMIODARONE HYDROCHLORIDE 200 MG/1
200 TABLET ORAL DAILY
Status: DISCONTINUED | OUTPATIENT
Start: 2024-03-30 | End: 2024-04-03 | Stop reason: HOSPADM

## 2024-03-29 RX ORDER — SODIUM CHLORIDE 9 MG/ML
INJECTION, SOLUTION INTRAVENOUS PRN
Status: DISCONTINUED | OUTPATIENT
Start: 2024-03-29 | End: 2024-04-03 | Stop reason: HOSPADM

## 2024-03-29 RX ORDER — ONDANSETRON 2 MG/ML
4 INJECTION INTRAMUSCULAR; INTRAVENOUS EVERY 6 HOURS PRN
Status: DISCONTINUED | OUTPATIENT
Start: 2024-03-29 | End: 2024-04-03 | Stop reason: HOSPADM

## 2024-03-29 RX ORDER — GABAPENTIN 100 MG/1
100 CAPSULE ORAL 2 TIMES DAILY
Status: DISCONTINUED | OUTPATIENT
Start: 2024-03-29 | End: 2024-04-03 | Stop reason: HOSPADM

## 2024-03-29 RX ORDER — ROSUVASTATIN CALCIUM 10 MG/1
5 TABLET, COATED ORAL EVERY EVENING
Status: DISCONTINUED | OUTPATIENT
Start: 2024-03-30 | End: 2024-04-03 | Stop reason: HOSPADM

## 2024-03-29 RX ORDER — BUMETANIDE 1 MG/1
1 TABLET ORAL DAILY
Status: DISCONTINUED | OUTPATIENT
Start: 2024-03-30 | End: 2024-04-02

## 2024-03-29 RX ORDER — ONDANSETRON 4 MG/1
4 TABLET, ORALLY DISINTEGRATING ORAL EVERY 8 HOURS PRN
Status: DISCONTINUED | OUTPATIENT
Start: 2024-03-29 | End: 2024-04-03 | Stop reason: HOSPADM

## 2024-03-29 RX ORDER — SODIUM CHLORIDE 0.9 % (FLUSH) 0.9 %
5-40 SYRINGE (ML) INJECTION PRN
Status: DISCONTINUED | OUTPATIENT
Start: 2024-03-29 | End: 2024-04-03 | Stop reason: HOSPADM

## 2024-03-29 RX ORDER — SODIUM CHLORIDE 0.9 % (FLUSH) 0.9 %
5-40 SYRINGE (ML) INJECTION EVERY 12 HOURS SCHEDULED
Status: DISCONTINUED | OUTPATIENT
Start: 2024-03-29 | End: 2024-04-03 | Stop reason: HOSPADM

## 2024-03-29 RX ORDER — ACETAMINOPHEN 325 MG/1
650 TABLET ORAL EVERY 6 HOURS PRN
Status: DISCONTINUED | OUTPATIENT
Start: 2024-03-29 | End: 2024-04-03 | Stop reason: HOSPADM

## 2024-03-29 RX ORDER — SODIUM CHLORIDE 9 MG/ML
INJECTION, SOLUTION INTRAVENOUS CONTINUOUS
Status: DISCONTINUED | OUTPATIENT
Start: 2024-03-29 | End: 2024-04-03

## 2024-03-29 RX ORDER — LEVOTHYROXINE SODIUM 0.05 MG/1
50 TABLET ORAL DAILY
Status: DISCONTINUED | OUTPATIENT
Start: 2024-03-30 | End: 2024-04-03 | Stop reason: HOSPADM

## 2024-03-29 RX ADMIN — Medication 3 MG: at 20:39

## 2024-03-29 RX ADMIN — SODIUM CHLORIDE, PRESERVATIVE FREE 10 ML: 5 INJECTION INTRAVENOUS at 20:43

## 2024-03-29 RX ADMIN — HYDRALAZINE HYDROCHLORIDE 25 MG: 25 TABLET, FILM COATED ORAL at 20:39

## 2024-03-29 RX ADMIN — SODIUM CHLORIDE 200 MG: 9 INJECTION, SOLUTION INTRAVENOUS at 20:38

## 2024-03-29 RX ADMIN — SODIUM CHLORIDE: 9 INJECTION, SOLUTION INTRAVENOUS at 12:32

## 2024-03-29 RX ADMIN — GABAPENTIN 100 MG: 100 CAPSULE ORAL at 20:39

## 2024-03-29 ASSESSMENT — PAIN - FUNCTIONAL ASSESSMENT
PAIN_FUNCTIONAL_ASSESSMENT: NONE - DENIES PAIN

## 2024-03-29 NOTE — ED PROVIDER NOTES
Parts of this transcriptions may have been dictated by use of voice recognition software and electronically transcribed, and parts may have been transcribed with the assistance of an ED scribe. The transcription may contain errors not detected in proofreading.    Electronically Signed: Franco Deleon DO, 03/29/24, 12:20 PM       Franco Deleon DO  03/29/24 [id number]

## 2024-03-29 NOTE — ED NOTES
Pt transported to 8A07 by cart in stable condition.   Called 8A and informed Sindi that the patient was on their way to the unit.

## 2024-03-29 NOTE — H&P
hydrocephalus, midline shift or mass effect.  . The paranasal sinuses and mastoid air cells are normally aerated.  There is no suspicious calvarial abnormality.      1. Stable CT scan of the brain, no interval change since previous MRI scan dated 3/29/2023.. **This report has been created using voice recognition software. It may contain minor errors which are inherent in voice recognition technology.** Final report electronically signed by DR RODOLFO SMITH on 3/29/2024 11:59 AM    CT CSpine W/O Contrast    Result Date: 3/29/2024  PROCEDURE: CT CERVICAL SPINE WO CONTRAST CLINICAL INFORMATION: AMS. COMPARISON: Cervical spine CT 4/13/2018. TECHNIQUE: 3 mm noncontrast axial images were obtained through the cervical spine with sagittal and coronal reconstructions. All CT scans at this facility use dose modulation, iterative reconstruction, and/or weight-based dosing when appropriate to reduce radiation dose to as low as reasonably achievable. FINDINGS: There is stable anterolisthesis of C3 on C4 and C4 on C5. There is an anterolisthesis of C7 on T1. The alignment is unchanged.  There is no fracture.  There is no prevertebral soft tissue swelling.  There is degenerative disc disease throughout. There are facet degenerative changes. There is no severe spinal canal stenosis. No suspicious osseous lesions are present. There are no suspicious findings in the cervical soft tissues.  There are no suspicious findings in the lung apices.      No fracture. **This report has been created using voice recognition software. It may contain minor errors which are inherent in voice recognition technology.** Final report electronically signed by Dr. Pippa Dos Santos on 3/29/2024 11:57 AM    XR PELVIS (1-2 VIEWS)    Result Date: 3/29/2024  PROCEDURE: XR PELVIS (1-2 VIEWS) CLINICAL INFORMATION: fall. Altered mental status. COMPARISON: Pelvis x-ray 4/26/2023. TECHNIQUE: AP view of the pelvis. FINDINGS: The pelvic ring is intact.  There is no

## 2024-03-29 NOTE — ED NOTES
ED to inpatient nurses report      Chief Complaint:  Chief Complaint   Patient presents with    Hypertension    Altered Mental Status     Present to ED from: home    MOA:     LOC: alert and and oriented to self  Mobility: Requires assistance * 1  Oxygen Baseline: RA    Current needs required: RA     Code Status:   Prior    What abnormal results were found and what did you give/do to treat them? none  Any procedures or intervention occur? none    Mental Status:  Level of Consciousness: Responds to voice (1)    Psych Assessment:        Vitals:  Patient Vitals for the past 24 hrs:   BP Temp Temp src Pulse Resp SpO2 Weight   03/29/24 1241 (!) 155/59 -- -- (!) 46 16 97 % --   03/29/24 1145 (!) 130/55 -- -- (!) 45 18 96 % --   03/29/24 1041 (!) 145/39 97.5 °F (36.4 °C) Oral 60 18 96 % 45.4 kg (100 lb)        LDAs:   Peripheral IV 03/29/24 Left Forearm (Active)   Site Assessment Clean, dry & intact 03/29/24 1241   Line Status Infusing 03/29/24 1241       Ambulatory Status:  No data recorded    Diagnosis:  DISPOSITION Admitted 03/29/2024 12:32:22 PM   Final diagnoses:   Altered mental status, unspecified altered mental status type   Hyponatremia        Consults:  None     Pain Score:  Pain Assessment  Pain Assessment: None - Denies Pain    C-SSRS:        Sepsis Screening:  Sepsis Risk Score: 1.46    Theresa Fall Risk:       Swallow Screening        Preferred Language:   English      ALLERGIES     Tramadol    SURGICAL HISTORY       Past Surgical History:   Procedure Laterality Date    BREAST LUMPECTOMY Left 2011    BRONCHOSCOPY N/A 01/05/2020    BRONCHOSCOPY performed by Calos Stephenson MD at [facility]    BUNIONECTOMY Left 2009    CARDIAC CATHETERIZATION  2010    CARDIOVERSION      DILATION AND CURETTAGE OF UTERUS      X2; SEVERAL YEARS AGO    HIP SURGERY Left 04/28/2023    LEFT INTERTAN performed by Singh Houser MD at [facility] OR    JOINT REPLACEMENT Right 2010    KNEE    GA BX OF BREAST, NEEDLE CORE, IMAGE GUIDE Left

## 2024-03-29 NOTE — ED TRIAGE NOTES
Pt presents to the ED from home with complaints of altered mental status. Pt lives at home and family has noticed a general decline in pt cognition over the past few days per daughter. Daughter state they took her to see PCP yesterday and had some abnormal labs. Pt is disoriented and unable to say the year, location, and reason in the ED. Pt daughter states she found her in on the ground this morning. Pt daughter also states she was feeding her soup last night and pt was using her hands to eat the soup.

## 2024-03-30 ENCOUNTER — APPOINTMENT (OUTPATIENT)
Dept: MRI IMAGING | Age: 84
DRG: 640 | End: 2024-03-30
Payer: MEDICARE

## 2024-03-30 PROBLEM — R41.82 ALTERED MENTAL STATUS: Status: ACTIVE | Noted: 2024-03-30

## 2024-03-30 LAB
ALBUMIN SERPL BCG-MCNC: 3.2 G/DL (ref 3.5–5.1)
ALP SERPL-CCNC: 113 U/L (ref 38–126)
ALT SERPL W/O P-5'-P-CCNC: 31 U/L (ref 11–66)
AMMONIA PLAS-MCNC: 23 UMOL/L (ref 11–60)
ANION GAP SERPL CALC-SCNC: 12 MEQ/L (ref 8–16)
ANION GAP SERPL CALC-SCNC: 13 MEQ/L (ref 8–16)
ARTERIAL PATENCY WRIST A: POSITIVE
AST SERPL-CCNC: 57 U/L (ref 5–40)
BASE EXCESS BLDA CALC-SCNC: 1.1 MMOL/L (ref -2.5–2.5)
BASOPHILS ABSOLUTE: 0 THOU/MM3 (ref 0–0.1)
BASOPHILS NFR BLD AUTO: 0.3 %
BDY SITE: ABNORMAL
BILIRUB CONJ SERPL-MCNC: < 0.2 MG/DL (ref 0–0.3)
BILIRUB SERPL-MCNC: 0.6 MG/DL (ref 0.3–1.2)
BUN SERPL-MCNC: 20 MG/DL (ref 7–22)
BUN SERPL-MCNC: 21 MG/DL (ref 7–22)
CALCIUM SERPL-MCNC: 8.9 MG/DL (ref 8.5–10.5)
CALCIUM SERPL-MCNC: 9.1 MG/DL (ref 8.5–10.5)
CHLORIDE SERPL-SCNC: 85 MEQ/L (ref 98–111)
CHLORIDE SERPL-SCNC: 88 MEQ/L (ref 98–111)
CK SERPL-CCNC: 709 U/L (ref 30–135)
CO2 SERPL-SCNC: 22 MEQ/L (ref 23–33)
CO2 SERPL-SCNC: 24 MEQ/L (ref 23–33)
COLLECTED BY:: ABNORMAL
CORTIS SERPL-MCNC: 13.83 UG/DL
CORTISOL COLLECTION INFO: NORMAL
CREAT SERPL-MCNC: 1.1 MG/DL (ref 0.4–1.2)
CREAT SERPL-MCNC: 1.2 MG/DL (ref 0.4–1.2)
CREAT UR-MCNC: 78.5 MG/DL
DEPRECATED RDW RBC AUTO: 51.1 FL (ref 35–45)
DEVICE: ABNORMAL
EOSINOPHIL NFR BLD AUTO: 1.6 %
EOSINOPHILS ABSOLUTE: 0 THOU/MM3 (ref 0–0.4)
ERYTHROCYTE [DISTWIDTH] IN BLOOD BY AUTOMATED COUNT: 16 % (ref 11.5–14.5)
FIO2 ON VENT O2 ANALYZER: 2 %
FLUAV RNA RESP QL NAA+PROBE: NOT DETECTED
FLUBV RNA RESP QL NAA+PROBE: NOT DETECTED
GFR SERPL CREATININE-BSD FRML MDRD: 44 ML/MIN/1.73M2
GFR SERPL CREATININE-BSD FRML MDRD: 49 ML/MIN/1.73M2
GLUCOSE BLD STRIP.AUTO-MCNC: 79 MG/DL (ref 70–108)
GLUCOSE BLD STRIP.AUTO-MCNC: 83 MG/DL (ref 70–108)
GLUCOSE BLD STRIP.AUTO-MCNC: 89 MG/DL (ref 70–108)
GLUCOSE BLD STRIP.AUTO-MCNC: 89 MG/DL (ref 70–108)
GLUCOSE SERPL-MCNC: 67 MG/DL (ref 70–108)
GLUCOSE SERPL-MCNC: 71 MG/DL (ref 70–108)
HCO3 BLDA-SCNC: 26 MMOL/L (ref 23–28)
HCT VFR BLD AUTO: 24.4 % (ref 37–47)
HCT VFR BLD AUTO: 25.5 % (ref 37–47)
HCT VFR BLD AUTO: 26.5 % (ref 37–47)
HGB BLD-MCNC: 7.9 GM/DL (ref 12–16)
HGB BLD-MCNC: 8 GM/DL (ref 12–16)
HGB BLD-MCNC: 8.5 GM/DL (ref 12–16)
IMM GRANULOCYTES # BLD AUTO: 0.02 THOU/MM3 (ref 0–0.07)
IMM GRANULOCYTES NFR BLD AUTO: 0.6 %
INR PPP: 7.67 (ref 0.85–1.13)
LACTATE SERPL-SCNC: 0.5 MMOL/L (ref 0.5–2)
LYMPHOCYTES ABSOLUTE: 0.2 THOU/MM3 (ref 1–4.8)
LYMPHOCYTES NFR BLD AUTO: 6.8 %
MAGNESIUM SERPL-MCNC: 1.8 MG/DL (ref 1.6–2.4)
MAGNESIUM SERPL-MCNC: 1.8 MG/DL (ref 1.6–2.4)
MCH RBC QN AUTO: 28.1 PG (ref 26–33)
MCHC RBC AUTO-ENTMCNC: 32.1 GM/DL (ref 32.2–35.5)
MCV RBC AUTO: 87.7 FL (ref 81–99)
MONOCYTES ABSOLUTE: 0.3 THOU/MM3 (ref 0.4–1.3)
MONOCYTES NFR BLD AUTO: 10.6 %
MRSA DNA SPEC QL NAA+PROBE: NEGATIVE
NEUTROPHILS NFR BLD AUTO: 80.1 %
NRBC BLD AUTO-RTO: 0 /100 WBC
OSMOLALITY SERPL: 260 MOSMOL/KG (ref 275–295)
OSMOLALITY SERPL: 261 MOSMOL/KG (ref 275–295)
OSMOLALITY UR: 366 MOSMOL/KG (ref 250–750)
PCO2 BLDA: 43 MMHG (ref 35–45)
PH BLDA: 7.39 [PH] (ref 7.35–7.45)
PLATELET # BLD AUTO: 156 THOU/MM3 (ref 130–400)
PMV BLD AUTO: 9.6 FL (ref 9.4–12.4)
PO2 BLDA: 59 MMHG (ref 71–104)
POTASSIUM SERPL-SCNC: 3.5 MEQ/L (ref 3.5–5.2)
POTASSIUM SERPL-SCNC: 3.6 MEQ/L (ref 3.5–5.2)
PROT SERPL-MCNC: 5.2 G/DL (ref 6.1–8)
RBC # BLD AUTO: 3.02 MILL/MM3 (ref 4.2–5.4)
RSV COMMENT: NORMAL
SAO2 % BLDA: 90 %
SARS-COV-2 RNA RESP QL NAA+PROBE: NOT DETECTED
SEGMENTED NEUTROPHILS ABSOLUTE COUNT: 2.5 THOU/MM3 (ref 1.8–7.7)
SODIUM SERPL-SCNC: 121 MEQ/L (ref 135–145)
SODIUM SERPL-SCNC: 121 MEQ/L (ref 135–145)
SODIUM SERPL-SCNC: 123 MEQ/L (ref 135–145)
SODIUM SERPL-SCNC: 123 MEQ/L (ref 135–145)
SODIUM SERPL-SCNC: 124 MEQ/L (ref 135–145)
SODIUM SERPL-SCNC: 126 MEQ/L (ref 135–145)
SODIUM UR-SCNC: 25 MEQ/L
UUN 24H UR-MCNC: 574 MG/DL
WBC # BLD AUTO: 3.1 THOU/MM3 (ref 4.8–10.8)

## 2024-03-30 PROCEDURE — 87641 MR-STAPH DNA AMP PROBE: CPT

## 2024-03-30 PROCEDURE — 80076 HEPATIC FUNCTION PANEL: CPT

## 2024-03-30 PROCEDURE — 82803 BLOOD GASES ANY COMBINATION: CPT

## 2024-03-30 PROCEDURE — 6370000000 HC RX 637 (ALT 250 FOR IP): Performed by: PHYSICIAN ASSISTANT

## 2024-03-30 PROCEDURE — 93005 ELECTROCARDIOGRAM TRACING: CPT

## 2024-03-30 PROCEDURE — 84540 ASSAY OF URINE/UREA-N: CPT

## 2024-03-30 PROCEDURE — 84300 ASSAY OF URINE SODIUM: CPT

## 2024-03-30 PROCEDURE — 85018 HEMOGLOBIN: CPT

## 2024-03-30 PROCEDURE — 82550 ASSAY OF CK (CPK): CPT

## 2024-03-30 PROCEDURE — 80048 BASIC METABOLIC PNL TOTAL CA: CPT

## 2024-03-30 PROCEDURE — 99233 SBSQ HOSP IP/OBS HIGH 50: CPT | Performed by: HOSPITALIST

## 2024-03-30 PROCEDURE — 82948 REAGENT STRIP/BLOOD GLUCOSE: CPT

## 2024-03-30 PROCEDURE — 36600 WITHDRAWAL OF ARTERIAL BLOOD: CPT

## 2024-03-30 PROCEDURE — 82533 TOTAL CORTISOL: CPT

## 2024-03-30 PROCEDURE — 82570 ASSAY OF URINE CREATININE: CPT

## 2024-03-30 PROCEDURE — 83935 ASSAY OF URINE OSMOLALITY: CPT

## 2024-03-30 PROCEDURE — 84295 ASSAY OF SERUM SODIUM: CPT

## 2024-03-30 PROCEDURE — 36415 COLL VENOUS BLD VENIPUNCTURE: CPT

## 2024-03-30 PROCEDURE — 82140 ASSAY OF AMMONIA: CPT

## 2024-03-30 PROCEDURE — 94761 N-INVAS EAR/PLS OXIMETRY MLT: CPT

## 2024-03-30 PROCEDURE — 2580000003 HC RX 258: Performed by: PHYSICIAN ASSISTANT

## 2024-03-30 PROCEDURE — 2700000000 HC OXYGEN THERAPY PER DAY

## 2024-03-30 PROCEDURE — 2060000000 HC ICU INTERMEDIATE R&B

## 2024-03-30 PROCEDURE — 70551 MRI BRAIN STEM W/O DYE: CPT

## 2024-03-30 PROCEDURE — 87636 SARSCOV2 & INF A&B AMP PRB: CPT

## 2024-03-30 PROCEDURE — 2580000003 HC RX 258: Performed by: EMERGENCY MEDICINE

## 2024-03-30 PROCEDURE — 6360000002 HC RX W HCPCS: Performed by: PHYSICIAN ASSISTANT

## 2024-03-30 PROCEDURE — 51798 US URINE CAPACITY MEASURE: CPT

## 2024-03-30 PROCEDURE — 83735 ASSAY OF MAGNESIUM: CPT

## 2024-03-30 PROCEDURE — 85025 COMPLETE CBC W/AUTO DIFF WBC: CPT

## 2024-03-30 PROCEDURE — 97166 OT EVAL MOD COMPLEX 45 MIN: CPT

## 2024-03-30 PROCEDURE — 85014 HEMATOCRIT: CPT

## 2024-03-30 PROCEDURE — 83605 ASSAY OF LACTIC ACID: CPT

## 2024-03-30 PROCEDURE — 83930 ASSAY OF BLOOD OSMOLALITY: CPT

## 2024-03-30 PROCEDURE — 97110 THERAPEUTIC EXERCISES: CPT

## 2024-03-30 PROCEDURE — 85610 PROTHROMBIN TIME: CPT

## 2024-03-30 RX ADMIN — PANTOPRAZOLE SODIUM 40 MG: 40 TABLET, DELAYED RELEASE ORAL at 16:13

## 2024-03-30 RX ADMIN — HYDRALAZINE HYDROCHLORIDE 25 MG: 25 TABLET, FILM COATED ORAL at 16:07

## 2024-03-30 RX ADMIN — HYDRALAZINE HYDROCHLORIDE 25 MG: 25 TABLET, FILM COATED ORAL at 20:29

## 2024-03-30 RX ADMIN — GABAPENTIN 100 MG: 100 CAPSULE ORAL at 20:30

## 2024-03-30 RX ADMIN — AMIODARONE HYDROCHLORIDE 200 MG: 200 TABLET ORAL at 08:31

## 2024-03-30 RX ADMIN — ROSUVASTATIN 5 MG: 10 TABLET, FILM COATED ORAL at 16:06

## 2024-03-30 RX ADMIN — PANTOPRAZOLE SODIUM 40 MG: 40 TABLET, DELAYED RELEASE ORAL at 04:50

## 2024-03-30 RX ADMIN — GABAPENTIN 100 MG: 100 CAPSULE ORAL at 08:31

## 2024-03-30 RX ADMIN — SODIUM CHLORIDE: 9 INJECTION, SOLUTION INTRAVENOUS at 11:09

## 2024-03-30 RX ADMIN — SODIUM CHLORIDE 200 MG: 9 INJECTION, SOLUTION INTRAVENOUS at 16:09

## 2024-03-30 RX ADMIN — LEVOTHYROXINE SODIUM 50 MCG: 0.05 TABLET ORAL at 04:50

## 2024-03-30 ASSESSMENT — PAIN DESCRIPTION - DESCRIPTORS: DESCRIPTORS: TINGLING

## 2024-03-30 ASSESSMENT — PAIN DESCRIPTION - FREQUENCY: FREQUENCY: CONTINUOUS

## 2024-03-30 ASSESSMENT — PAIN DESCRIPTION - LOCATION: LOCATION: HAND

## 2024-03-30 ASSESSMENT — PAIN SCALES - GENERAL: PAINLEVEL_OUTOF10: 7

## 2024-03-30 ASSESSMENT — PAIN - FUNCTIONAL ASSESSMENT: PAIN_FUNCTIONAL_ASSESSMENT: ACTIVITIES ARE NOT PREVENTED

## 2024-03-30 ASSESSMENT — PAIN DESCRIPTION - ONSET: ONSET: PROGRESSIVE

## 2024-03-30 ASSESSMENT — PAIN DESCRIPTION - ORIENTATION: ORIENTATION: RIGHT;LEFT

## 2024-03-30 ASSESSMENT — PAIN DESCRIPTION - PAIN TYPE: TYPE: CHRONIC PAIN

## 2024-03-30 NOTE — CARE COORDINATION
03/30/24 1125   Service Assessment   Patient Orientation Alert and Oriented  (verified with primary RN)   Cognition Alert   History Provided By Patient   Primary Caregiver Self   Accompanied By/Relationship alone   Support Systems Children;Family Members   Patient's Healthcare Decision Maker is: Legal Next of Kin  (request patient have her family bring in her POA documents)   PCP Verified by CM Yes   Prior Functional Level Independent in ADLs/IADLs   Current Functional Level Other (see comment)  (to be assessed)   Can patient return to prior living arrangement Unknown at present   Ability to make needs known: Good   Family able to assist with home care needs: Yes   Would you like for me to discuss the discharge plan with any other family members/significant others, and if so, who? No   Financial Resources Medicare;Other (Comment)  ([facility] [place] secondary)   Community Resources None   Discharge Planning   Type of Residence House   Living Arrangements Alone  (son stays with her from time to time)   Current Services Prior To Admission Durable Medical Equipment   Current DME Prior to Arrival Cane   Potential Assistance Needed Skilled Nursing Facility;Home Care;Durable Medical Equipment   Potential DME Needed Walker   DME Ordered? No   Potential Assistance Purchasing Medications No   Type of Home Care Services Nursing Services;PT;OT   Patient expects to be discharged to: House     Patient Goals/Plan/Treatment Preferences: Paola is from home alone, she states he son stays with her occasionally. She voices that her family is supportive. She has a cane at home. Feels weak. Discussed potential need for therapy at discharge. Will plan for follow up once therapy evals done.     If patient is discharged prior to next notation, then this note serves as note for discharge by case management.

## 2024-03-31 LAB
ALBUMIN SERPL BCG-MCNC: 3.1 G/DL (ref 3.5–5.1)
ALP SERPL-CCNC: 103 U/L (ref 38–126)
ALT SERPL W/O P-5'-P-CCNC: 28 U/L (ref 11–66)
ANION GAP SERPL CALC-SCNC: 10 MEQ/L (ref 8–16)
AST SERPL-CCNC: 41 U/L (ref 5–40)
BILIRUB CONJ SERPL-MCNC: < 0.2 MG/DL (ref 0–0.3)
BILIRUB SERPL-MCNC: 0.3 MG/DL (ref 0.3–1.2)
BUN SERPL-MCNC: 20 MG/DL (ref 7–22)
CALCIUM SERPL-MCNC: 8.5 MG/DL (ref 8.5–10.5)
CHLORIDE SERPL-SCNC: 92 MEQ/L (ref 98–111)
CO2 SERPL-SCNC: 24 MEQ/L (ref 23–33)
CREAT SERPL-MCNC: 1.2 MG/DL (ref 0.4–1.2)
DEPRECATED RDW RBC AUTO: 54.4 FL (ref 35–45)
ERYTHROCYTE [DISTWIDTH] IN BLOOD BY AUTOMATED COUNT: 16.4 % (ref 11.5–14.5)
GFR SERPL CREATININE-BSD FRML MDRD: 44 ML/MIN/1.73M2
GLUCOSE SERPL-MCNC: 76 MG/DL (ref 70–108)
HCT VFR BLD AUTO: 24.3 % (ref 37–47)
HGB BLD-MCNC: 7.6 GM/DL (ref 12–16)
INR PPP: 8.31 (ref 0.85–1.13)
MCH RBC QN AUTO: 28.4 PG (ref 26–33)
MCHC RBC AUTO-ENTMCNC: 31.3 GM/DL (ref 32.2–35.5)
MCV RBC AUTO: 90.7 FL (ref 81–99)
PLATELET # BLD AUTO: 146 THOU/MM3 (ref 130–400)
PMV BLD AUTO: 9.9 FL (ref 9.4–12.4)
POTASSIUM SERPL-SCNC: 3.4 MEQ/L (ref 3.5–5.2)
POTASSIUM SERPL-SCNC: 4.3 MEQ/L (ref 3.5–5.2)
PROT SERPL-MCNC: 5.2 G/DL (ref 6.1–8)
RBC # BLD AUTO: 2.68 MILL/MM3 (ref 4.2–5.4)
SODIUM SERPL-SCNC: 124 MEQ/L (ref 135–145)
SODIUM SERPL-SCNC: 125 MEQ/L (ref 135–145)
SODIUM SERPL-SCNC: 125 MEQ/L (ref 135–145)
SODIUM SERPL-SCNC: 126 MEQ/L (ref 135–145)
SODIUM SERPL-SCNC: 127 MEQ/L (ref 135–145)
SODIUM SERPL-SCNC: 129 MEQ/L (ref 135–145)
WBC # BLD AUTO: 3.9 THOU/MM3 (ref 4.8–10.8)

## 2024-03-31 PROCEDURE — 82248 BILIRUBIN DIRECT: CPT

## 2024-03-31 PROCEDURE — 93010 ELECTROCARDIOGRAM REPORT: CPT | Performed by: INTERNAL MEDICINE

## 2024-03-31 PROCEDURE — 85610 PROTHROMBIN TIME: CPT

## 2024-03-31 PROCEDURE — 80053 COMPREHEN METABOLIC PANEL: CPT

## 2024-03-31 PROCEDURE — 2060000000 HC ICU INTERMEDIATE R&B

## 2024-03-31 PROCEDURE — 51702 INSERT TEMP BLADDER CATH: CPT

## 2024-03-31 PROCEDURE — 99233 SBSQ HOSP IP/OBS HIGH 50: CPT | Performed by: HOSPITALIST

## 2024-03-31 PROCEDURE — 87086 URINE CULTURE/COLONY COUNT: CPT

## 2024-03-31 PROCEDURE — 84295 ASSAY OF SERUM SODIUM: CPT

## 2024-03-31 PROCEDURE — 6370000000 HC RX 637 (ALT 250 FOR IP): Performed by: PHYSICIAN ASSISTANT

## 2024-03-31 PROCEDURE — 2580000003 HC RX 258

## 2024-03-31 PROCEDURE — 84132 ASSAY OF SERUM POTASSIUM: CPT

## 2024-03-31 PROCEDURE — 84481 FREE ASSAY (FT-3): CPT

## 2024-03-31 PROCEDURE — 36415 COLL VENOUS BLD VENIPUNCTURE: CPT

## 2024-03-31 PROCEDURE — 6370000000 HC RX 637 (ALT 250 FOR IP)

## 2024-03-31 PROCEDURE — 85027 COMPLETE CBC AUTOMATED: CPT

## 2024-03-31 RX ORDER — POTASSIUM CHLORIDE 20 MEQ/1
40 TABLET, EXTENDED RELEASE ORAL ONCE
Status: COMPLETED | OUTPATIENT
Start: 2024-03-31 | End: 2024-03-31

## 2024-03-31 RX ORDER — POTASSIUM CHLORIDE 7.45 MG/ML
10 INJECTION INTRAVENOUS
Status: DISCONTINUED | OUTPATIENT
Start: 2024-03-31 | End: 2024-03-31

## 2024-03-31 RX ADMIN — HYDRALAZINE HYDROCHLORIDE 25 MG: 25 TABLET, FILM COATED ORAL at 15:29

## 2024-03-31 RX ADMIN — GABAPENTIN 100 MG: 100 CAPSULE ORAL at 08:23

## 2024-03-31 RX ADMIN — SODIUM CHLORIDE: 9 INJECTION, SOLUTION INTRAVENOUS at 02:11

## 2024-03-31 RX ADMIN — HYDRALAZINE HYDROCHLORIDE 25 MG: 25 TABLET, FILM COATED ORAL at 21:03

## 2024-03-31 RX ADMIN — LEVOTHYROXINE SODIUM 50 MCG: 0.05 TABLET ORAL at 04:18

## 2024-03-31 RX ADMIN — Medication 3 MG: at 00:25

## 2024-03-31 RX ADMIN — ROSUVASTATIN 5 MG: 10 TABLET, FILM COATED ORAL at 21:04

## 2024-03-31 RX ADMIN — ACETAMINOPHEN 650 MG: 325 TABLET ORAL at 04:18

## 2024-03-31 RX ADMIN — Medication 3 MG: at 21:03

## 2024-03-31 RX ADMIN — PANTOPRAZOLE SODIUM 40 MG: 40 TABLET, DELAYED RELEASE ORAL at 15:30

## 2024-03-31 RX ADMIN — POTASSIUM CHLORIDE 40 MEQ: 1500 TABLET, EXTENDED RELEASE ORAL at 12:17

## 2024-03-31 RX ADMIN — GABAPENTIN 100 MG: 100 CAPSULE ORAL at 21:03

## 2024-03-31 RX ADMIN — PANTOPRAZOLE SODIUM 40 MG: 40 TABLET, DELAYED RELEASE ORAL at 04:17

## 2024-03-31 ASSESSMENT — PAIN SCALES - GENERAL
PAINLEVEL_OUTOF10: 0
PAINLEVEL_OUTOF10: 5
PAINLEVEL_OUTOF10: 0

## 2024-03-31 ASSESSMENT — PAIN DESCRIPTION - PAIN TYPE: TYPE: ACUTE PAIN

## 2024-03-31 ASSESSMENT — PAIN DESCRIPTION - DESCRIPTORS: DESCRIPTORS: ACHING;DISCOMFORT

## 2024-03-31 ASSESSMENT — PAIN DESCRIPTION - LOCATION: LOCATION: GENERALIZED

## 2024-03-31 ASSESSMENT — PAIN - FUNCTIONAL ASSESSMENT: PAIN_FUNCTIONAL_ASSESSMENT: PREVENTS OR INTERFERES SOME ACTIVE ACTIVITIES AND ADLS

## 2024-03-31 ASSESSMENT — PAIN DESCRIPTION - ONSET: ONSET: PROGRESSIVE

## 2024-03-31 ASSESSMENT — PAIN DESCRIPTION - FREQUENCY: FREQUENCY: INTERMITTENT

## 2024-03-31 NOTE — PLAN OF CARE
Problem: Discharge Planning  Goal: Discharge to home or other facility with appropriate resources  Outcome: Progressing  Flowsheets (Taken 3/31/2024 0115)  Discharge to home or other facility with appropriate resources: Identify barriers to discharge with patient and caregiver     Problem: Safety - Adult  Goal: Free from fall injury  Outcome: Progressing  Flowsheets (Taken 3/31/2024 0115)  Free From Fall Injury: Instruct family/caregiver on patient safety     Problem: Pain  Goal: Verbalizes/displays adequate comfort level or baseline comfort level  Outcome: Progressing  Flowsheets (Taken 3/31/2024 0115)  Verbalizes/displays adequate comfort level or baseline comfort level:   Encourage patient to monitor pain and request assistance   Assess pain using appropriate pain scale   Implement non-pharmacological measures as appropriate and evaluate response

## 2024-04-01 ENCOUNTER — APPOINTMENT (OUTPATIENT)
Age: 84
DRG: 640 | End: 2024-04-01
Payer: MEDICARE

## 2024-04-01 ENCOUNTER — APPOINTMENT (OUTPATIENT)
Dept: GENERAL RADIOLOGY | Age: 84
DRG: 640 | End: 2024-04-01
Payer: MEDICARE

## 2024-04-01 LAB
ANION GAP SERPL CALC-SCNC: 12 MEQ/L (ref 8–16)
BUN SERPL-MCNC: 17 MG/DL (ref 7–22)
CALCIUM SERPL-MCNC: 9.1 MG/DL (ref 8.5–10.5)
CHLORIDE SERPL-SCNC: 101 MEQ/L (ref 98–111)
CO2 SERPL-SCNC: 21 MEQ/L (ref 23–33)
CREAT SERPL-MCNC: 1.1 MG/DL (ref 0.4–1.2)
DEPRECATED RDW RBC AUTO: 55.5 FL (ref 35–45)
ERYTHROCYTE [DISTWIDTH] IN BLOOD BY AUTOMATED COUNT: 16.7 % (ref 11.5–14.5)
GFR SERPL CREATININE-BSD FRML MDRD: 49 ML/MIN/1.73M2
GLUCOSE SERPL-MCNC: 79 MG/DL (ref 70–108)
HCT VFR BLD AUTO: 25.9 % (ref 37–47)
HGB BLD-MCNC: 8 GM/DL (ref 12–16)
INR PPP: 7.71 (ref 0.85–1.13)
MCH RBC QN AUTO: 28.2 PG (ref 26–33)
MCHC RBC AUTO-ENTMCNC: 30.9 GM/DL (ref 32.2–35.5)
MCV RBC AUTO: 91.2 FL (ref 81–99)
PLATELET # BLD AUTO: 149 THOU/MM3 (ref 130–400)
PMV BLD AUTO: 10.3 FL (ref 9.4–12.4)
POTASSIUM SERPL-SCNC: 4.3 MEQ/L (ref 3.5–5.2)
RBC # BLD AUTO: 2.84 MILL/MM3 (ref 4.2–5.4)
SODIUM SERPL-SCNC: 129 MEQ/L (ref 135–145)
SODIUM SERPL-SCNC: 133 MEQ/L (ref 135–145)
SODIUM SERPL-SCNC: 133 MEQ/L (ref 135–145)
SODIUM SERPL-SCNC: 134 MEQ/L (ref 135–145)
T3FREE SERPL-MCNC: 1.1 PG/ML (ref 2–4.4)
WBC # BLD AUTO: 3.5 THOU/MM3 (ref 4.8–10.8)

## 2024-04-01 PROCEDURE — 85027 COMPLETE CBC AUTOMATED: CPT

## 2024-04-01 PROCEDURE — 6370000000 HC RX 637 (ALT 250 FOR IP): Performed by: PHYSICIAN ASSISTANT

## 2024-04-01 PROCEDURE — 2580000003 HC RX 258

## 2024-04-01 PROCEDURE — 80048 BASIC METABOLIC PNL TOTAL CA: CPT

## 2024-04-01 PROCEDURE — 84295 ASSAY OF SERUM SODIUM: CPT

## 2024-04-01 PROCEDURE — 2060000000 HC ICU INTERMEDIATE R&B

## 2024-04-01 PROCEDURE — 92610 EVALUATE SWALLOWING FUNCTION: CPT

## 2024-04-01 PROCEDURE — 85610 PROTHROMBIN TIME: CPT

## 2024-04-01 PROCEDURE — 2580000003 HC RX 258: Performed by: PHYSICIAN ASSISTANT

## 2024-04-01 PROCEDURE — 97530 THERAPEUTIC ACTIVITIES: CPT

## 2024-04-01 PROCEDURE — 99233 SBSQ HOSP IP/OBS HIGH 50: CPT | Performed by: HOSPITALIST

## 2024-04-01 PROCEDURE — 36415 COLL VENOUS BLD VENIPUNCTURE: CPT

## 2024-04-01 PROCEDURE — 97535 SELF CARE MNGMENT TRAINING: CPT

## 2024-04-01 PROCEDURE — 71046 X-RAY EXAM CHEST 2 VIEWS: CPT

## 2024-04-01 RX ADMIN — PANTOPRAZOLE SODIUM 40 MG: 40 TABLET, DELAYED RELEASE ORAL at 15:37

## 2024-04-01 RX ADMIN — HYDRALAZINE HYDROCHLORIDE 25 MG: 25 TABLET, FILM COATED ORAL at 08:12

## 2024-04-01 RX ADMIN — FERROUS SULFATE TAB 325 MG (65 MG ELEMENTAL FE) 325 MG: 325 (65 FE) TAB at 08:13

## 2024-04-01 RX ADMIN — HYDRALAZINE HYDROCHLORIDE 25 MG: 25 TABLET, FILM COATED ORAL at 13:41

## 2024-04-01 RX ADMIN — HYDRALAZINE HYDROCHLORIDE 25 MG: 25 TABLET, FILM COATED ORAL at 20:29

## 2024-04-01 RX ADMIN — PANTOPRAZOLE SODIUM 40 MG: 40 TABLET, DELAYED RELEASE ORAL at 06:24

## 2024-04-01 RX ADMIN — GABAPENTIN 100 MG: 100 CAPSULE ORAL at 08:13

## 2024-04-01 RX ADMIN — VALSARTAN 80 MG: 80 TABLET ORAL at 16:50

## 2024-04-01 RX ADMIN — SODIUM CHLORIDE, PRESERVATIVE FREE 10 ML: 5 INJECTION INTRAVENOUS at 22:21

## 2024-04-01 RX ADMIN — ROSUVASTATIN 5 MG: 10 TABLET, FILM COATED ORAL at 20:29

## 2024-04-01 RX ADMIN — GABAPENTIN 100 MG: 100 CAPSULE ORAL at 20:29

## 2024-04-01 RX ADMIN — LEVOTHYROXINE SODIUM 50 MCG: 0.05 TABLET ORAL at 06:24

## 2024-04-01 RX ADMIN — SODIUM CHLORIDE: 9 INJECTION, SOLUTION INTRAVENOUS at 02:57

## 2024-04-01 RX ADMIN — Medication 3 MG: at 20:29

## 2024-04-01 ASSESSMENT — PAIN SCALES - GENERAL: PAINLEVEL_OUTOF10: 0

## 2024-04-01 NOTE — PLAN OF CARE
Problem: Discharge Planning  Goal: Discharge to home or other facility with appropriate resources  4/1/2024 1002 by Kia Ponce LSW  Outcome: Progressing  Flowsheets (Taken 4/1/2024 0303 by Juli Pichardo, RN)  Discharge to home or other facility with appropriate resources:   Identify barriers to discharge with patient and caregiver   Identify discharge learning needs (meds, wound care, etc)  Note: SW Consulted please see note dated 4/1

## 2024-04-01 NOTE — CARE COORDINATION
Collaborative Discharge Planning    Paola Titus  :  1940  MRN:  [id number]    ADMIT DATE:  3/29/2024      Discharge Planning Discharge Planning  Type of Residence: House  Living Arrangements: Alone (son stays with her from time to time)  Support Systems: Children, Family Members  Current Services Prior To Admission: Durable Medical Equipment  Current DME Prior to Arrival: Cane  Potential Assistance Needed: Skilled Nursing Facility, Home Care, Durable Medical Equipment  Potential DME Needed: Walker  DME Ordered?: No  Potential Assistance Purchasing Medications: No  Type of Home Care Services: Nursing Services, PT, OT  Patient expects to be discharged to:: House  White Board Notes /Social Work Whiteboard Notes  /Social Work Whiteboard:  SW/CM; [facility] [place] [facility] [place] made , DELANEY daughter Priscilla able to transport after work. Pt from home alone, presents confused    Discharge Plan SNF  From home alone; prefers [facility], Daughter/DELANEY Wells transports, son stays intermittently, confused at times, has RW, ambulates 3 steps w therapy    Discharge Milestones and Delays: Clinical status    Toxic Metabolic Encephalopathy/Fall/Supratherapeutic INR    INR 7.71; Coumadin held, H 8, WBC 3.5; monitor    Oxygen 3L; monitor    Await Medical Clearance for SNF      SIGNED:  Robson Quinn RN   2024, 11:58 AM

## 2024-04-01 NOTE — PLAN OF CARE
Problem: Discharge Planning  Goal: Discharge to home or other facility with appropriate resources  Outcome: Progressing  Flowsheets (Taken 4/1/2024 0303)  Discharge to home or other facility with appropriate resources:   Identify barriers to discharge with patient and caregiver   Identify discharge learning needs (meds, wound care, etc)     Problem: Safety - Adult  Goal: Free from fall injury  Outcome: Progressing  Flowsheets (Taken 4/1/2024 0303)  Free From Fall Injury: Instruct family/caregiver on patient safety     Problem: Pain  Goal: Verbalizes/displays adequate comfort level or baseline comfort level  Outcome: Progressing  Flowsheets (Taken 4/1/2024 0303)  Verbalizes/displays adequate comfort level or baseline comfort level:   Encourage patient to monitor pain and request assistance   Assess pain using appropriate pain scale     Problem: Skin/Tissue Integrity  Goal: Absence of new skin breakdown  Description: 1.  Monitor for areas of redness and/or skin breakdown  2.  Assess vascular access sites hourly  3.  Every 4-6 hours minimum:  Change oxygen saturation probe site  4.  Every 4-6 hours:  If on nasal continuous positive airway pressure, respiratory therapy assess nares and determine need for appliance change or resting period.  Outcome: Progressing   Care plan reviewed with patient.  Patient verbalize understanding of the plan of care and contribute to goal setting.

## 2024-04-01 NOTE — CARE COORDINATION
4/1/24, 9:52 AM EDT    DISCHARGE PLANNING EVALUATION    WILLOW spoke with POA: Patient's daughter Priscilla ([phone]). Informed her therapies are rec snf at discharge. Family prefers [facility] as she has been there in the past. Priscilla mentioned being able to transport if needed after work.     Post-acute (PAC) provider list was provided to patient. Patient was informed of their freedom to choose PAC provider. Discussed and offered to show the patient the relevant PAC Providers quality and resource use measures on Medicare Compare web site via computer based on patient's goals of care and treatment preferences. Questions regarding selection process were answered.    WILLOW spoke with Jasmin at [facility] and placed referral. No precert needed.     WILLOW completed discharge packet and placed on chart.     4/1/24, 12:01 PM EDT    WILLOW spoke with Jasmin at [facility], Patient is accepted once medically ready for discharge. Informed RIAN Yen

## 2024-04-01 NOTE — DISCHARGE INSTR - COC
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    Closed fracture of left hip (HCC) S72.002A    Double vision H53.2    History of gastric ulcer Z87.11    Elevated TSH R79.89    Balance problem R26.89    Secondary hypercoagulable state (HCC) D68.69    CKD (chronic kidney disease), stage IV (HCC) N18.4    Toxic metabolic encephalopathy G92.8    Altered mental status R41.82       Isolation/Infection:   Isolation            No Isolation          Patient Infection Status       Infection Onset Added Last Indicated Last Indicated By Review Planned Expiration Resolved Resolved By    None active    Resolved    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   01/06/20 Renuka Kuo, RN                       Nurse Assessment:  Last Vital Signs: BP (!) 142/59   Pulse 58   Temp 97.3 °F (36.3 °C) (Oral)   Resp 18   Wt 45.4 kg (100 lb)   SpO2 90%   BMI 23.24 kg/m²     Last documented pain score (0-10 scale): Pain Level: 0  Last Weight:   Wt Readings from Last 1 Encounters:   03/29/24 45.4 kg (100 lb)     Mental Status:  oriented and alert    IV Access:  - None    Nursing Mobility/ADLs:  Walking   Assisted  Transfer  Assisted  Bathing  Assisted  Dressing  Assisted  Toileting  Assisted  Feeding  Assisted  Med Admin  Assisted  Med Delivery   whole    Wound Care Documentation and Therapy:  Incision 04/28/23 Hip Left;Lateral (Active)   Number of days: 339        Elimination:  Continence:   Bowel: Yes  Bladder: Yes  Urinary Catheter: None   Colostomy/Ileostomy/Ileal Conduit: No       Date of Last BM: 4/1    Intake/Output Summary (Last 24 hours) at 4/1/2024 1203  Last data filed at 4/1/2024 1034  Gross per 24 hour   Intake 1600 ml   Output 825 ml   Net 775 ml     I/O last 3 completed shifts:  In: 2060 [P.O.:1160; I.V.:900]  Out: 1250 [Urine:1250]    Safety Concerns:     Sundowners [place] and At Risk for Falls    Impairments/Disabilities:

## 2024-04-01 NOTE — PLAN OF CARE
Problem: Discharge Planning  Goal: Discharge to home or other facility with appropriate resources  4/1/2024 1547 by Kiersten Lopez, RN  Outcome: Progressing  Flowsheets (Taken 4/1/2024 1547)  Discharge to home or other facility with appropriate resources:   Identify barriers to discharge with patient and caregiver   Identify discharge learning needs (meds, wound care, etc)   Refer to discharge planning if patient needs post-hospital services based on physician order or complex needs related to functional status, cognitive ability or social support system   Arrange for needed discharge resources and transportation as appropriate     Problem: Safety - Adult  Goal: Free from fall injury  4/1/2024 1547 by Kiersten Lopez, RN  Outcome: Progressing  Flowsheets (Taken 4/1/2024 1547)  Free From Fall Injury: Instruct family/caregiver on patient safety     Problem: Pain  Goal: Verbalizes/displays adequate comfort level or baseline comfort level  4/1/2024 1547 by Kiersten Lopez, RN  Outcome: Progressing  Flowsheets (Taken 4/1/2024 1547)  Verbalizes/displays adequate comfort level or baseline comfort level:   Encourage patient to monitor pain and request assistance   Administer analgesics based on type and severity of pain and evaluate response   Consider cultural and social influences on pain and pain management   Assess pain using appropriate pain scale   Implement non-pharmacological measures as appropriate and evaluate response     Problem: Skin/Tissue Integrity  Goal: Absence of new skin breakdown  Description: 1.  Monitor for areas of redness and/or skin breakdown  2.  Assess vascular access sites hourly  3.  Every 4-6 hours minimum:  Change oxygen saturation probe site  4.  Every 4-6 hours:  If on nasal continuous positive airway pressure, respiratory therapy assess nares and determine need for appliance change or resting period.  4/1/2024 1547 by Kiersten Lopez, RN  Outcome: Progressing  Note: Assess and

## 2024-04-02 ENCOUNTER — APPOINTMENT (OUTPATIENT)
Age: 84
DRG: 640 | End: 2024-04-02
Payer: MEDICARE

## 2024-04-02 LAB
ANION GAP SERPL CALC-SCNC: 10 MEQ/L (ref 8–16)
BACTERIA UR CULT: ABNORMAL
BUN SERPL-MCNC: 16 MG/DL (ref 7–22)
CALCIUM SERPL-MCNC: 8.9 MG/DL (ref 8.5–10.5)
CHLORIDE SERPL-SCNC: 99 MEQ/L (ref 98–111)
CO2 SERPL-SCNC: 23 MEQ/L (ref 23–33)
CREAT SERPL-MCNC: 1.1 MG/DL (ref 0.4–1.2)
DEPRECATED RDW RBC AUTO: 56.8 FL (ref 35–45)
ECHO AO ASC DIAM: 3.9 CM
ECHO AO ASCENDING AORTA INDEX: 2.81 CM/M2
ECHO AR MAX VEL PISA: 3.8 M/S
ECHO AV CUSP MM: 2 CM
ECHO AV PEAK GRADIENT: 13 MMHG
ECHO AV PEAK VELOCITY: 1.8 M/S
ECHO AV REGURGITANT PHT: 538 MS
ECHO AV VELOCITY RATIO: 0.5
ECHO BSA: 1.43 M2
ECHO EST RA PRESSURE: 15 MMHG
ECHO IVC PROX: 1.7 CM
ECHO LA AREA 2C: 31.6 CM2
ECHO LA AREA 4C: 30 CM2
ECHO LA DIAMETER INDEX: 3.17 CM/M2
ECHO LA DIAMETER: 4.4 CM
ECHO LA MAJOR AXIS: 6.7 CM
ECHO LA MINOR AXIS: 6.9 CM
ECHO LA VOL BP: 112 ML (ref 22–52)
ECHO LA VOL MOD A2C: 119 ML (ref 22–52)
ECHO LA VOL MOD A4C: 104 ML (ref 22–52)
ECHO LA VOL/BSA BIPLANE: 81 ML/M2 (ref 16–34)
ECHO LA VOLUME INDEX MOD A2C: 86 ML/M2 (ref 16–34)
ECHO LA VOLUME INDEX MOD A4C: 75 ML/M2 (ref 16–34)
ECHO LV FRACTIONAL SHORTENING: 40 % (ref 28–44)
ECHO LV INTERNAL DIMENSION DIASTOLE INDEX: 3.6 CM/M2
ECHO LV INTERNAL DIMENSION DIASTOLIC: 5 CM (ref 3.9–5.3)
ECHO LV INTERNAL DIMENSION SYSTOLIC INDEX: 2.16 CM/M2
ECHO LV INTERNAL DIMENSION SYSTOLIC: 3 CM
ECHO LV ISOVOLUMETRIC RELAXATION TIME (IVRT): 67 MS
ECHO LV IVSD: 1.1 CM (ref 0.6–0.9)
ECHO LV MASS 2D: 194.4 G (ref 67–162)
ECHO LV MASS INDEX 2D: 139.8 G/M2 (ref 43–95)
ECHO LV POSTERIOR WALL DIASTOLIC: 1 CM (ref 0.6–0.9)
ECHO LV RELATIVE WALL THICKNESS RATIO: 0.4
ECHO LVOT MEAN GRADIENT: 1 MMHG
ECHO LVOT PEAK GRADIENT: 3 MMHG
ECHO LVOT PEAK VELOCITY: 0.9 M/S
ECHO LVOT VTI: 19.8 CM
ECHO MV A VELOCITY: 1.23 M/S
ECHO MV E DECELERATION TIME (DT): 408 MS
ECHO MV E VELOCITY: 2.25 M/S
ECHO MV E/A RATIO: 1.83
ECHO MV LVOT VTI INDEX: 5.25
ECHO MV MAX VELOCITY: 2.7 M/S
ECHO MV MEAN GRADIENT: 10 MMHG
ECHO MV MEAN VELOCITY: 1.5 M/S
ECHO MV PEAK GRADIENT: 29 MMHG
ECHO MV REGURGITANT PEAK GRADIENT: 88 MMHG
ECHO MV REGURGITANT PEAK VELOCITY: 4.7 M/S
ECHO MV VTI: 104 CM
ECHO PULMONARY ARTERY SYSTOLIC PRESSURE (PASP): 75 MMHG
ECHO PV MAX VELOCITY: 0.6 M/S
ECHO PV PEAK GRADIENT: 1 MMHG
ECHO RIGHT VENTRICULAR SYSTOLIC PRESSURE (RVSP): 94 MMHG
ECHO RV INTERNAL DIMENSION: 2.5 CM
ECHO RV TAPSE: 1.9 CM (ref 1.7–?)
ECHO TV E WAVE: 0.5 M/S
ECHO TV REGURGITANT MAX VELOCITY: 4.44 M/S
ECHO TV REGURGITANT PEAK GRADIENT: 79 MMHG
ERYTHROCYTE [DISTWIDTH] IN BLOOD BY AUTOMATED COUNT: 17 % (ref 11.5–14.5)
GFR SERPL CREATININE-BSD FRML MDRD: 49 ML/MIN/1.73M2
GLUCOSE SERPL-MCNC: 89 MG/DL (ref 70–108)
HCT VFR BLD AUTO: 25.2 % (ref 37–47)
HGB BLD-MCNC: 7.7 GM/DL (ref 12–16)
INR PPP: 8.53 (ref 0.85–1.13)
MCH RBC QN AUTO: 28.1 PG (ref 26–33)
MCHC RBC AUTO-ENTMCNC: 30.6 GM/DL (ref 32.2–35.5)
MCV RBC AUTO: 92 FL (ref 81–99)
ORGANISM: ABNORMAL
PLATELET # BLD AUTO: 151 THOU/MM3 (ref 130–400)
PMV BLD AUTO: 10.1 FL (ref 9.4–12.4)
POTASSIUM SERPL-SCNC: 4.6 MEQ/L (ref 3.5–5.2)
RBC # BLD AUTO: 2.74 MILL/MM3 (ref 4.2–5.4)
SODIUM SERPL-SCNC: 132 MEQ/L (ref 135–145)
SODIUM SERPL-SCNC: 134 MEQ/L (ref 135–145)
WBC # BLD AUTO: 4.7 THOU/MM3 (ref 4.8–10.8)

## 2024-04-02 PROCEDURE — 92523 SPEECH SOUND LANG COMPREHEN: CPT

## 2024-04-02 PROCEDURE — 6370000000 HC RX 637 (ALT 250 FOR IP): Performed by: PHYSICIAN ASSISTANT

## 2024-04-02 PROCEDURE — 85610 PROTHROMBIN TIME: CPT

## 2024-04-02 PROCEDURE — 84295 ASSAY OF SERUM SODIUM: CPT

## 2024-04-02 PROCEDURE — 97530 THERAPEUTIC ACTIVITIES: CPT

## 2024-04-02 PROCEDURE — 97535 SELF CARE MNGMENT TRAINING: CPT

## 2024-04-02 PROCEDURE — 97162 PT EVAL MOD COMPLEX 30 MIN: CPT

## 2024-04-02 PROCEDURE — 99233 SBSQ HOSP IP/OBS HIGH 50: CPT | Performed by: HOSPITALIST

## 2024-04-02 PROCEDURE — 93306 TTE W/DOPPLER COMPLETE: CPT | Performed by: NUCLEAR MEDICINE

## 2024-04-02 PROCEDURE — 85027 COMPLETE CBC AUTOMATED: CPT

## 2024-04-02 PROCEDURE — 93306 TTE W/DOPPLER COMPLETE: CPT

## 2024-04-02 PROCEDURE — 2580000003 HC RX 258: Performed by: PHYSICIAN ASSISTANT

## 2024-04-02 PROCEDURE — 36415 COLL VENOUS BLD VENIPUNCTURE: CPT

## 2024-04-02 PROCEDURE — 80048 BASIC METABOLIC PNL TOTAL CA: CPT

## 2024-04-02 PROCEDURE — 97116 GAIT TRAINING THERAPY: CPT

## 2024-04-02 PROCEDURE — 6370000000 HC RX 637 (ALT 250 FOR IP)

## 2024-04-02 PROCEDURE — 1200000003 HC TELEMETRY R&B

## 2024-04-02 PROCEDURE — 86376 MICROSOMAL ANTIBODY EACH: CPT

## 2024-04-02 RX ORDER — BUMETANIDE 0.5 MG/1
0.5 TABLET ORAL DAILY
Status: DISCONTINUED | OUTPATIENT
Start: 2024-04-02 | End: 2024-04-03

## 2024-04-02 RX ADMIN — HYDRALAZINE HYDROCHLORIDE 25 MG: 25 TABLET, FILM COATED ORAL at 13:19

## 2024-04-02 RX ADMIN — HYDRALAZINE HYDROCHLORIDE 25 MG: 25 TABLET, FILM COATED ORAL at 20:07

## 2024-04-02 RX ADMIN — HYDRALAZINE HYDROCHLORIDE 25 MG: 25 TABLET, FILM COATED ORAL at 08:03

## 2024-04-02 RX ADMIN — SODIUM CHLORIDE, PRESERVATIVE FREE 10 ML: 5 INJECTION INTRAVENOUS at 20:07

## 2024-04-02 RX ADMIN — GABAPENTIN 100 MG: 100 CAPSULE ORAL at 20:06

## 2024-04-02 RX ADMIN — PANTOPRAZOLE SODIUM 40 MG: 40 TABLET, DELAYED RELEASE ORAL at 17:56

## 2024-04-02 RX ADMIN — GABAPENTIN 100 MG: 100 CAPSULE ORAL at 08:03

## 2024-04-02 RX ADMIN — BUMETANIDE 0.5 MG: 0.5 TABLET ORAL at 10:37

## 2024-04-02 RX ADMIN — FERROUS SULFATE TAB 325 MG (65 MG ELEMENTAL FE) 325 MG: 325 (65 FE) TAB at 08:03

## 2024-04-02 RX ADMIN — LEVOTHYROXINE SODIUM 50 MCG: 0.05 TABLET ORAL at 05:58

## 2024-04-02 RX ADMIN — VALSARTAN 80 MG: 80 TABLET ORAL at 17:56

## 2024-04-02 RX ADMIN — Medication 3 MG: at 20:06

## 2024-04-02 RX ADMIN — SODIUM CHLORIDE, PRESERVATIVE FREE 10 ML: 5 INJECTION INTRAVENOUS at 08:03

## 2024-04-02 RX ADMIN — ROSUVASTATIN 5 MG: 10 TABLET, FILM COATED ORAL at 20:06

## 2024-04-02 RX ADMIN — PANTOPRAZOLE SODIUM 40 MG: 40 TABLET, DELAYED RELEASE ORAL at 05:58

## 2024-04-02 NOTE — CARE COORDINATION
4/2/24, 9:53 AM EDT    Patient goals/plan/ treatment preferences discussed by  and .  Patient goals/plan/ treatment preferences reviewed with patient/ family.  Patient/ family verbalize understanding of discharge plan and are in agreement with goal/plan/treatment preferences.  Understanding was demonstrated using the teach back method.  AVS provided by RN at time of discharge, which includes all necessary medical information pertaining to the patients current course of illness, treatment, post-discharge goals of care, and treatment preferences.     Services At/After Discharge: Skilled Nursing Facility (SNF) [facility]    Patient may discharge today, to [facility]. Family to transport at discharge.    SW notified Meghana at [facility] of possible discharge today.     RN made aware and discharge instructions placed on chart.       IMM Letter  IMM Letter given to Patient/Family/Significant other/Guardian/POA/by:: registration  IMM Letter date given:: 03/29/24  IMM Letter time given:: 3998

## 2024-04-03 ENCOUNTER — APPOINTMENT (OUTPATIENT)
Age: 84
DRG: 640 | End: 2024-04-03
Attending: PHYSICIAN ASSISTANT
Payer: MEDICARE

## 2024-04-03 VITALS
DIASTOLIC BLOOD PRESSURE: 53 MMHG | BODY MASS INDEX: 25.22 KG/M2 | HEIGHT: 55 IN | HEART RATE: 55 BPM | TEMPERATURE: 98.1 F | OXYGEN SATURATION: 90 % | WEIGHT: 109 LBS | RESPIRATION RATE: 20 BRPM | SYSTOLIC BLOOD PRESSURE: 140 MMHG

## 2024-04-03 PROBLEM — I50.30 (HFPEF) HEART FAILURE WITH PRESERVED EJECTION FRACTION (HCC): Status: ACTIVE | Noted: 2024-04-03

## 2024-04-03 LAB
ANION GAP SERPL CALC-SCNC: 10 MEQ/L (ref 8–16)
BACTERIA BLD AEROBE CULT: NORMAL
BACTERIA BLD AEROBE CULT: NORMAL
BUN SERPL-MCNC: 16 MG/DL (ref 7–22)
CALCIUM SERPL-MCNC: 9.1 MG/DL (ref 8.5–10.5)
CHLORIDE SERPL-SCNC: 100 MEQ/L (ref 98–111)
CO2 SERPL-SCNC: 25 MEQ/L (ref 23–33)
CREAT SERPL-MCNC: 1.1 MG/DL (ref 0.4–1.2)
DEPRECATED RDW RBC AUTO: 56.8 FL (ref 35–45)
ECHO BSA: 1.43 M2
ERYTHROCYTE [DISTWIDTH] IN BLOOD BY AUTOMATED COUNT: 17.4 % (ref 11.5–14.5)
GFR SERPL CREATININE-BSD FRML MDRD: 49 ML/MIN/1.73M2
GLUCOSE SERPL-MCNC: 81 MG/DL (ref 70–108)
HCT VFR BLD AUTO: 25.2 % (ref 37–47)
HGB BLD-MCNC: 7.7 GM/DL (ref 12–16)
INR PPP: 5.74 (ref 0.85–1.13)
MCH RBC QN AUTO: 28.1 PG (ref 26–33)
MCHC RBC AUTO-ENTMCNC: 30.6 GM/DL (ref 32.2–35.5)
MCV RBC AUTO: 92 FL (ref 81–99)
PLATELET # BLD AUTO: 165 THOU/MM3 (ref 130–400)
PMV BLD AUTO: 9.8 FL (ref 9.4–12.4)
POTASSIUM SERPL-SCNC: 4.4 MEQ/L (ref 3.5–5.2)
RBC # BLD AUTO: 2.74 MILL/MM3 (ref 4.2–5.4)
SODIUM SERPL-SCNC: 135 MEQ/L (ref 135–145)
WBC # BLD AUTO: 4.7 THOU/MM3 (ref 4.8–10.8)

## 2024-04-03 PROCEDURE — 85610 PROTHROMBIN TIME: CPT

## 2024-04-03 PROCEDURE — 99239 HOSP IP/OBS DSCHRG MGMT >30: CPT | Performed by: HOSPITALIST

## 2024-04-03 PROCEDURE — 85027 COMPLETE CBC AUTOMATED: CPT

## 2024-04-03 PROCEDURE — 2580000003 HC RX 258: Performed by: PHYSICIAN ASSISTANT

## 2024-04-03 PROCEDURE — 97530 THERAPEUTIC ACTIVITIES: CPT

## 2024-04-03 PROCEDURE — 36415 COLL VENOUS BLD VENIPUNCTURE: CPT

## 2024-04-03 PROCEDURE — 80048 BASIC METABOLIC PNL TOTAL CA: CPT

## 2024-04-03 PROCEDURE — 6370000000 HC RX 637 (ALT 250 FOR IP)

## 2024-04-03 PROCEDURE — 6370000000 HC RX 637 (ALT 250 FOR IP): Performed by: PHYSICIAN ASSISTANT

## 2024-04-03 PROCEDURE — 97535 SELF CARE MNGMENT TRAINING: CPT

## 2024-04-03 PROCEDURE — 93270 REMOTE 30 DAY ECG REV/REPORT: CPT

## 2024-04-03 PROCEDURE — 84597 ASSAY OF VITAMIN K: CPT

## 2024-04-03 RX ORDER — HYDRALAZINE HYDROCHLORIDE 50 MG/1
25 TABLET, FILM COATED ORAL 3 TIMES DAILY
DISCHARGE
Start: 2024-04-03

## 2024-04-03 RX ORDER — BUMETANIDE 1 MG/1
1 TABLET ORAL DAILY
Status: DISCONTINUED | OUTPATIENT
Start: 2024-04-04 | End: 2024-04-03 | Stop reason: HOSPADM

## 2024-04-03 RX ORDER — FERROUS SULFATE 325(65) MG
325 TABLET ORAL
Qty: 30 TABLET | Refills: 3 | DISCHARGE
Start: 2024-04-04

## 2024-04-03 RX ORDER — BUMETANIDE 0.5 MG/1
0.5 TABLET ORAL ONCE
Status: COMPLETED | OUTPATIENT
Start: 2024-04-03 | End: 2024-04-03

## 2024-04-03 RX ADMIN — BUMETANIDE 0.5 MG: 0.5 TABLET ORAL at 11:15

## 2024-04-03 RX ADMIN — HYDRALAZINE HYDROCHLORIDE 25 MG: 25 TABLET, FILM COATED ORAL at 13:59

## 2024-04-03 RX ADMIN — HYDRALAZINE HYDROCHLORIDE 25 MG: 25 TABLET, FILM COATED ORAL at 08:34

## 2024-04-03 RX ADMIN — FERROUS SULFATE TAB 325 MG (65 MG ELEMENTAL FE) 325 MG: 325 (65 FE) TAB at 08:34

## 2024-04-03 RX ADMIN — PANTOPRAZOLE SODIUM 40 MG: 40 TABLET, DELAYED RELEASE ORAL at 06:26

## 2024-04-03 RX ADMIN — GABAPENTIN 100 MG: 100 CAPSULE ORAL at 08:34

## 2024-04-03 RX ADMIN — BUMETANIDE 0.5 MG: 0.5 TABLET ORAL at 08:34

## 2024-04-03 RX ADMIN — SODIUM CHLORIDE, PRESERVATIVE FREE 10 ML: 5 INJECTION INTRAVENOUS at 08:52

## 2024-04-03 ASSESSMENT — PAIN SCALES - GENERAL
PAINLEVEL_OUTOF10: 0

## 2024-04-03 NOTE — DISCHARGE INSTRUCTIONS
Follow-up with your PCP in 1 week  Follow-up with endocrinology, pulmonology in the CHF clinic in 2 weeks    Get repeat blood work in 1 week: CBC BMP  Get repeat blood work in 1 month: TSH    Stop taking amiodarone and levothyroxine  Do not take warfarin until subsequent INR checks, this should be occurring 3 times weekly at [facility]  Start taking iron supplement

## 2024-04-03 NOTE — CARE COORDINATION
4/3/24, 10:55 AM EDT    Patient goals/plan/ treatment preferences discussed by  and .  Patient goals/plan/ treatment preferences reviewed with patient/ family.  Patient/ family verbalize understanding of discharge plan and are in agreement with goal/plan/treatment preferences.  Understanding was demonstrated using the teach back method.  AVS provided by RN at time of discharge, which includes all necessary medical information pertaining to the patients current course of illness, treatment, post-discharge goals of care, and treatment preferences.     Services At/After Discharge: Skilled Nursing Facility (SNF) [facility]    Patient to discharge to [facility] today and daughter plans to transport.     SW notified Meghana at [facility].     RN made aware.       IMM Letter  IMM Letter given to Patient/Family/Significant other/Guardian/POA/by:: WESLY Jean CM  IMM Letter date given:: 04/02/24  IMM Letter time given:: 0940

## 2024-04-03 NOTE — PROGRESS NOTES
gastritis in the antrum. 2.  Erosive duodenitis. 3.  Gastritis.     PLAN:  1. Start PPI, should be twice a day for now. Dose can be reduced to  once a day later once the patient clinically improves. 2.  Advance the diet as tolerated. 3.  Elective colonoscopy to be done as an outpatient.      Kentrell Brown M.D. Objective:   Vitals: /84   Pulse 100   Temp 98.9 °F (37.2 °C) (Oral)   Resp 16   Ht 5' (1.524 m)   Wt 104 lb 8 oz (47.4 kg)   SpO2 96%   BMI 20.41 kg/m²     Intake/Output Summary (Last 24 hours) at 1/7/2020 1901  Last data filed at 1/7/2020 1439  Gross per 24 hour   Intake 1457.41 ml   Output 2175 ml   Net -717.59 ml     General appearance: alert and cooperative with exam.  Well groomed, well nourished  female, appears ill, not toxic. Lungs: clear to auscultation bilaterally  Heart: regular rate and rhythm, S1, S2 normal, no murmur, click, rub or gallop  Abdomen: soft, non-tender; bowel sounds normal; no masses,  no organomegaly  Extremities: extremities normal, atraumatic, no cyanosis or edema    Assessment and Plan:   1. Erosive ulcerative gastris and erosive duodenitis, continue PPI, avoid NSAIDs. 2. Elective colonoscopy as out patient   3. Generalized weakness - continue PT/OT per Attending. Follow up in GI Clinic after discharge in 2 week(s)    Patient Active Problem List:     AF (atrial fibrillation) (HCC)     Anticoagulated on Coumadin     HTN (hypertension)     Nausea     Physical deconditioning     Loss of appetite     Chronic renal failure, stage 3 (moderate) ([facility] [address]     Elevated troponin    VANDANA Roberts - CNP    Patient chart reviewed independently from the nurse practitioner and all the pertinent data assessment and plans were reviewed by myself. Laboratory data, Radiology results, medications all are reviewed by myself and care is discussed extensively with nurse practitioner and I agree with plan.   In addition, see orders and plans    Electronically signed by Eli Zambrano MD each by Does not apply route 2 times daily  Dispense: 1 kit; Refill: 0  - Lancets MISC; 1 each by Does not apply route 2 times daily  Dispense: 100 each; Refill: 3  - Alcohol Swabs 70 % PADS; 1 each by Does not apply route 3 times daily  Dispense: 100 each; Refill: 3  - blood glucose monitor strips; Test two times a day & as needed for symptoms of irregular blood glucose. Dispense sufficient amount for indicated testing frequency plus additional to accommodate PRN testing needs.  Dispense: 100 strip; Refill: 3  - Blood Glucose Monitoring Suppl (ONE TOUCH ULTRA 2) w/Device KIT; 1 kit by Does not apply route daily  Dispense: 1 kit; Refill: 0  - blood glucose test strips (ASCENSIA AUTODISC VI;ONE TOUCH ULTRA TEST VI) strip; 1 each by In Vitro route daily Two times a day.  Dispense: 100 each; Refill: 3    3. Screening for cardiovascular condition  - Lipid Panel    4. BMI 36.0-36.9,adult  - discussed weight loss, cut carbohydrates  - walk at least 30 minutes most days of the week    5. Hypertriglyceridemia  - atorvastatin (LIPITOR) 20 MG tablet; Take 1 tablet by mouth daily  Dispense: 90 tablet; Refill: 0      Return in about 3 months (around 7/3/2024) for DM, HgA1C, Medication Re-Evaluation.    Care discussed with patient. Patient educated on signs and symptoms of exacerbation and when to seek further medical attention. Advised to call for any problems, questions, or concerns. Patient verbalizes understanding and agrees with plan.     Medications reviewed and reconciled. Continue current medications.  Appropriate prescriptions are ordered. Risks and benefits of meds are discussed.     After visit summary provided.

## 2024-04-03 NOTE — DISCHARGE INSTR - MEDS
Hospital Warfarin Doses & INR Results  Date INR Warfarin Dose   3/29/24 5.65 HOLD   3/30/2024  7.67  HOLD     3/31/2024 8.31  HOLD    4/01/2024   7.71 HOLD    4/02/2024  8.53  HOLD     04/03/2024  5.74 HOLD                Warfarin Discharge Instructions:   Date Warfarin Dose   04/04/24 (tomorrow) INR to be checked at [facility]     Provider dosing warfarin: Paula  Next INR date: 04/04/24

## 2024-04-03 NOTE — PLAN OF CARE
Problem: Discharge Planning  Goal: Discharge to home or other facility with appropriate resources  Outcome: Progressing  Flowsheets (Taken 4/3/2024 0651)  Discharge to home or other facility with appropriate resources:   Identify barriers to discharge with patient and caregiver   Identify discharge learning needs (meds, wound care, etc)     Problem: Safety - Adult  Goal: Free from fall injury  Outcome: Progressing  Flowsheets (Taken 4/3/2024 0651)  Free From Fall Injury: Instruct family/caregiver on patient safety     Problem: Pain  Goal: Verbalizes/displays adequate comfort level or baseline comfort level  Outcome: Progressing  Flowsheets (Taken 4/3/2024 0651)  Verbalizes/displays adequate comfort level or baseline comfort level:   Encourage patient to monitor pain and request assistance   Administer analgesics based on type and severity of pain and evaluate response   Consider cultural and social influences on pain and pain management   Assess pain using appropriate pain scale   Implement non-pharmacological measures as appropriate and evaluate response   Notify Licensed Independent Practitioner if interventions unsuccessful or patient reports new pain     Problem: Skin/Tissue Integrity  Goal: Absence of new skin breakdown  Description: 1.  Monitor for areas of redness and/or skin breakdown  2.  Assess vascular access sites hourly  3.  Every 4-6 hours minimum:  Change oxygen saturation probe site  4.  Every 4-6 hours:  If on nasal continuous positive airway pressure, respiratory therapy assess nares and determine need for appliance change or resting period.  Outcome: Progressing  Note: Pt will remain free of skin breakdown     Problem: Neurosensory - Adult  Goal: Achieves stable or improved neurological status  Outcome: Progressing  Flowsheets (Taken 4/3/2024 0651)  Achieves stable or improved neurological status:   Assess for and report changes in neurological status   Maintain blood pressure and fluid volume

## 2024-04-03 NOTE — DISCHARGE SUMMARY
Resident Discharge Summary (Hospitalist)      Patient Identification:   Paola Titus   : 1940  MRN: [id number]   Account: [id number]   Patient's PCP: Renuka Rosas APRN - CNP    Admit Date: 3/29/2024   Discharge Date:   24      Admitting Physician: No admitting provider for patient encounter.  Discharge Physician: Richardson Ramon MD       Discharge Diagnoses:  Acute encephalopathy: Resolved.  Likely secondary to decreased oral intake and confounded by concern patient not appropriately taking medications as prescribed.  CT head negative.  Sodium on arrival 122.  Fall: Patient was found down by her daughter for an unknown duration.  Unknown if patient lost consciousness.  CT head negative.  Other imaging negative for trauma.  Orthostatic vitals negative.  Hypovolemic hypotonic hyponatremia: Sodium 122 on arrival, serum osmolality 250.  Secondary to dehydration and decreased p.o. intake.  There is also concern patient has been taking medications inappropriately most notably diuretics.  Patient was on fluid restriction and patient  Acute hypoxic respiratory failure: On arrival O2 saturation injection 87% on room air.  Likely due to volume overload and decompensated CHF in setting of IVF due to above.  Nursing home to continue O2 as necessary  HFrEF: Diuretics initially held in setting of hypovolemic hypotonic hyponatremia.  Were able to be resumed as towards the end of her stay there was evidence of volume overload.  GDMT tolerating valsartan, will hold beta-blocker due to bradycardia  Hypothyroidism: TSH 4.52, free T4 2.02, free T3 1.10.  Some minor concern for central hyperthyroidism however more likely patient inappropriately took her Synthroid vs amiodarone usage.  TSH not suppressed due to feedback loop and likely not enough time to see levels change.  Will follow-up outpatient with endocrine and hold home dose of Synthroid.  Will stop amiodarone.  Supratherapeutic INR: Peaked at

## 2024-04-03 NOTE — PROGRESS NOTES
Clinical Pharmacy Note                                               Warfarin Discharge Recommendations    Patient discharged from [facility] today on warfarin.    Warfarin indication: Atrial fibrillation or Atrial flutter  INR goal during admission: 2.0-3.0  Previous home warfarin regimen: 2 mg daily  Interacting medications at discharge: none  Coumadin 2 mg tabs    Hospital Warfarin Doses & INR Results  Date INR Warfarin Dose   3/29/24 5.65 HOLD   3/30/2024  7.67  HOLD     3/31/2024 8.31  HOLD    4/01/2024   7.71 HOLD    4/02/2024  8.53  HOLD     04/03/2024  5.74 HOLD                Recent INRs:  Recent Labs     04/03/24  0352   INR 5.74*     Warfarin Discharge Instructions:   Date Warfarin Dose   04/04/24 (tomorrow) INR to be checked at [facility]     Provider dosing warfarin: Paula  Next INR date: 04/04/24     
    Internal Medicine Resident Progress Note    Name: Paola Titus, female, : 1940, MRN: [id number]    PCP: Renuka Rosas APRN - CNP    Date of Admission: 3/29/2024  Date of Service: Pt seen/examined on 24      Assessment/Plan:  Acute encephalopathy, improving.  Likely secondary to dehydration, however there is concern that patient may have been taking her medications inappropriately, including diuretics.  CT head negative for any acute findings, sodium 122 on arrival appears dehydrated clinically.  Increase of sodium 8 points in 24 hours. MRI brain without contrast negative for any acute findings.  SLP eval on 2024 recommended regular diet with thin liquids.  Monitor for signs of mentation changes, will need repeat head imaging if patient deteriorates.  Fall, unknown if patient has loss of consciousness, was found down by her daughter unknown duration.  Head CT negative for any acute findings.  Imaging negative for any traumatic injuries.  Orthostatic vital signs negative on 3/31/2024.  Continue PT/OT, OT recommends discharge to SNF versus subacute nursing facility.  Pending PT recommendations at this time.  Possible symptomatic bradycardia: Patient with a history of A-fib with bradycardia status post cardioversion.  Is on amiodarone chronically 200 mg daily.  Telemetry shows normal sinus rhythm with first-degree AV block.    Continue to hold amiodarone  Echo ordered, follow results  Recommend outpatient follow-up with cardiology, will require event monitor at discharge.    Hypovolemic hypotonic hyponatremia: Improving.  Presentation sodium 122 serum osmolality 250.  secondary to dehydration and decreased oral intake.  On admit urine chloride 51, urine osmolality 288, urine potassium 24.2, urine sodium 60.  3/ urine creatinine 78.5, urine osmolality 366, send urine sodium 25, urine urea 574.  Hold NS 75 mL/h, sodium change in past 24 hours +8.  Continue 6 hours-hour sodium checks 
    Internal Medicine Resident Progress Note    Name: Paola Titus, female, : 1940, MRN: [id number]    PCP: Renuka Rosas APRN - CNP    Date of Admission: 3/29/2024  Date of Service: Pt seen/examined on 24      Assessment/Plan:  Acute confused state, likely secondary to dehydration: CT head negative for any acute findings, sodium 122 on arrival appears dehydrated clinically.  -MRI brain without contrast negative for any acute findings  -SLP eval for swallow screen  Monitor for signs of mentation changes, will need repeat head imaging if patient starts to deteriorate.    Fall: Unknown if patient has loss of consciousness, was found down by her daughter unknown duration.  Head CT negative for any acute findings.  Imaging negative for any traumatic injuries.  -PT OT  Orthostatic vital signs ordered    Possible symptomatic bradycardia: Patient with a history of A-fib with bradycardia status post cardioversion.  Is on amiodarone chronically 200 mg daily.  Hold amiodarone  Echo ordered  Trend LFTs  Orthostatic vital signs  Will discuss with daughter about need for potential cardiac pacemaker  EKG    Hypovolemic hypotonic hyponatremia: Presentation sodium 122 serum osmolality 250.  Likely secondary to dehydration and decreased oral intake.  -TSH ordered  -NS 75 mL/h  -4-hour sodium checks with goal correction 4 to 6 mEq in the first 24 hours    Questionable acute hypoxic respiratory failure: Patient saturating 87% on room air, was started on 2 L and upgraded to 4 L nasal cannula.    -Currently on 4 L saturating 95% wean as tolerated    Severe iron deficiency anemia: IV Venofer for x 2    HFrEF, not in acute exacerbation: Last echo 2020 showing EF 60%.  Outpatient takes Bumex 1 mg daily  -Hold Bumex secondary to dehydration  Monitor clinically for signs of fluid overload    Supratherapeutic INR: INR on presentation 5.65, repeat 7.67, possibly secondary to altered mental status and medication 
0730 Report taken from nurse Carolina.    Head to Toe Assessment    [facility] Student Nurse  Head to Toe Assessment Performed at 1000  Skin  General skin appearance / Description: pink, dry, intact  Incisions / Wounds: large bruise RUE    Neuro/Head  LOC: A+O x 4  Speech: clear and appropriate  Eyes PERRLA 2 mm  Symmetry of face / tongue: symmetrical  JVD of neck: n/a    Chest  Heart sounds: S1, S2   Apical Pulse: 55  Dyspnea: n/a  Lung sounds: diminished  Cough: n/a    Abdomen/ Pelvis  Bowel sounds: active x4  Passing flatus: yes  Palpation / Inspection: soft, flat, non-tender  Last BM: Pt states 3/29/2024 prior to admission  Stool assessment: no stool assessed at this time   Any GI issues: n/a  Urinary issues: n/a  Continence: alexandra  Urine color / turbidity: yellow, clear    Extremities  Edema: Non-pitting BUE, +1 pitting BLE  Altered Sensation: n/a  Upper extremity push / pulls: moderate, equal bilaterally  Left Arm Radial Pulse: +2   Left Upper extremity Capillary Refill: <3 sec  Right Arm Radial Pulse: +2   Right Upper extremity Capillary Refill: <3 sec  Lower extremity pedal push / pulls: moderate, equal bilaterally  Left pedal pulse: +2   Left posterior tibialis pulse: +2  Left lower extremity capillary refill: <3 sec  Right pedal pulse: +2   Right posterior tibialis pulse: +2   Right lower extremity capillary refill: <3 sec  Drift of extremities: n/a  Pain: Denies pain    Other issues: n/a    1130 Reassessment done  1330 Reported off to primary RNGe.    1330 Kareen MATA / SN    
ABG obtained on 2L nc. Pt increased to 4L nc after ABG results.  
CLINICAL PHARMACY: DISCHARGE MED RECONCILIATION/REVIEW    [facility] Patient?: No  Total # of Interventions Recommended: 0  Total # Interventions Accepted: 0  Intervention Severity:   - Level 1 Intervention Present?: No   - Level 2 #: 0   - Level 3 #: 0   Time Spent (min): 15    Cuong Ribeiro, PharmD  4/3/2024 3:26 PM       
Discharge teaching and instructions for diagnosis/procedure of hyponatremia completed with patient using teachback method. AVS reviewed. Printed prescriptions given to patient. Patient voiced understanding regarding prescriptions, follow up appointments, and care of self at home. Discharged in a wheelchair to  skilled nursing per family    Per Dr. Ramon's request, faxed information to Dr. Harrington's office as she reviews all consults before accepting patient. Notified him of this. Discharge papers given and reviewed with patient's daughter. Made her aware of all blood work that needs completed.  
Pt admitted to  [facility] ambulatory.     Complaints: None.      Vital signs obtained. Assessment and data collection initiated. Two nurse skin assessment performed by Skyla JARRELL and Roxane JARRELL.    Swallow Screen completed and documented before transfer to .  Policies and procedures for  explained. All questions answered with no further questions at this time. Fall prevention and safety brochure discussed with patient.  Bed alarm on. Call light in reach. Oriented to room.      
[facility]  STRZ ICU STEPDOWN TELEMETRY 4K  Occupational Therapy  Daily Note  Time:   Time In: 0835  Time Out: 0900  Timed Code Treatment Minutes: 25 Minutes  Minutes: 25          Date: 2024  Patient Name: Paola Titus,   Gender: female      Room: [facility]16/016-A  MRN: [id number]  : 1940  (84 y.o.)  Referring Practitioner: Eagle López PA-C  Diagnosis: hyponatremia  Additional Pertinent Hx: Per MD H & P: 84 y.o. female with a history of paroxysmal atrial fibrillation status post cardioversion, HFrEF, hypertension, CKD stage IIIb, hypothyroidism, GERD, and hyperlipidemia who [facility] with chief complaint of altered mental status.  History is provided by the patient as well as her daughter as the patient is currently noted to be encephalopathic.  The patient is normally independent at baseline.  She lives by herself and she has family members come check on her.  The patient's daughter notes over the last week or so, she has had decreased oral intake.  She went to see her PCP yesterday and was reported that she was anemic and had low sodium in her blood.  This morning, the patient's daughter went to check on the patient and found her laying on the floor next to her bed.  She was noted to be confused at that time.  She was brought to the hospital for further evaluation.  In the emergency department, she was found to have a sodium of 122 and was found to be anemic.    Restrictions/Precautions:  Restrictions/Precautions: Fall Risk     Social/Functional History:  Lives With: Alone (reports son will stay with her a few months out of the year)  Type of Home: House  Home Layout: One level, Work area in basement  Home Access: Stairs to enter with rails (1)  Home Equipment: Walker, rolling   Bathroom Shower/Tub: Tub/Shower unit  Bathroom Toilet: Standard  Bathroom Equipment: Shower chair       ADL Assistance: Independent  Homemaking Assistance: Independent  Ambulation Assistance: 
[facility]  PHYSICAL THERAPY MISSED TREATMENT NOTE  STRZ ICU STEPDOWN TELEMETRY 4K    Date: 2024  Patient Name: Paola Titus        MRN: [id number]   : 1940  (84 y.o.)  Gender: female                REASON FOR MISSED TREATMENT:  Missed Treat.  At time of attempt pt was eating lunch and RN was providing education to the pt and her family. PT to check back as time allows.     Stella Xie PT, DPT      
[facility]  SPEECH THERAPY  STRZ ICU STEPDOWN TELEMETRY 4K  Clinical Swallow Evaluation      SLP Individual Minutes  Time In: 1339  Time Out: 1351  Minutes: 12  Timed Code Treatment Minutes: 0 Minutes       DIET ORDER RECOMMENDATIONS AFTER EVALUATION: Regular diet + thin liquids    Date: 2024  Patient Name: Paola Titus      CSN: [id number]   : 1940  (84 y.o.)  Gender: female   Referring Physician:  Eagle López PA-C     Diagnosis: Toxic metabolic encephalopathy    History of Present Illness/Injury: Patient presents to [facility] with above diagnoses. Per physician note, \"Paola Titus is a 84 y.o. female with a history of paroxysmal atrial fibrillation status post cardioversion, HFrEF, hypertension, CKD stage IIIb, hypothyroidism, GERD, and hyperlipidemia who presented to [facility] with chief complaint of altered mental status.  History is provided by the patient as well as her daughter as the patient is currently noted to be encephalopathic.  The patient is normally independent at baseline.  She lives by herself and she has family members come check on her.  The patient's daughter notes over the last week or so, she has had decreased oral intake.  She went to see her PCP yesterday and was reported that she was anemic and had low sodium in her blood.  This morning, the patient's daughter went to check on the patient and found her laying on the floor next to her bed.  She was noted to be confused at that time.  She was brought to the hospital for further evaluation.  In the emergency department, she was found to have a sodium of 122 and was found to be anemic.  Her INR was additionally noted to be elevated.  The patient denies any recent illnesses.  No recent fevers, chills, cough, shortness of breath, nausea, or vomiting.  She is unable to provide a thorough history and it is noted to perseverate.  She is currently alert and oriented only to person.  She is able to state her birthdate.  
[facility]  INPATIENT PHYSICAL THERAPY  EVALUATION  [facility] ICU STEPDOWN TELEMETRY 4K - 4K-16/016-A    Time In: 1100  Time Out: 1131  Timed Code Treatment Minutes: 23 Minutes  Minutes: 31          Date: 2024  Patient Name: Paola Titus,  Gender:  female        MRN: [id number]  : 1940  (84 y.o.)      Referring Practitioner: Joselyn Garcia MD  Diagnosis: hyponatremia  Additional Pertinent Hx: Per H&P 3/29:Paola Titus is a 84 y.o. female with a history of paroxysmal atrial fibrillation status post cardioversion, HFrEF, hypertension, CKD stage IIIb, hypothyroidism, GERD, and hyperlipidemia who presented to [facility] with chief complaint of altered mental status.  History is provided by the patient as well as her daughter as the patient is currently noted to be encephalopathic.  The patient is normally independent at baseline.  She lives by herself and she has family members come check on her.  The patient's daughter notes over the last week or so, she has had decreased oral intake.  She went to see her PCP yesterday and was reported that she was anemic and had low sodium in her blood.  This morning, the patient's daughter went to check on the patient and found her laying on the floor next to her bed.  She was noted to be confused at that time.  She was brought to the hospital for further evaluation.  In the emergency department, she was found to have a sodium of 122 and was found to be anemic.  Her INR was additionally noted to be elevated.  The patient denies any recent illnesses.  No recent fevers, chills, cough, shortness of breath, nausea, or vomiting.  She is unable to provide a thorough history and it is noted to perseverate.  She is currently alert and oriented only to person.  She is able to state her birthdate.  The patient does take a water pill and it is believed that she has been taking it.  Her the patient's daughter is uncertain whether or not she has been taking her 
Warfarin Pharmacy Consult Note    Paola Titus is a 84 y.o. female for whom pharmacy has been asked to manage warfarin therapy.     Consulting Provider: CLEO Dunbar  Warfarin Indication: Atrial fibrillation or Atrial flutter  Target INR range: 2.0-3.0   Warfarin dose prior to admission: 2 mg daily (per Rx bottle)  Outpatient warfarin provider: VANDANA Rodriguez    Recent Labs     03/29/24  1101   INR 5.65*     Recent Labs     03/29/24  1048   HGB 9.9*        Concurrent anticoagulants/antiplatelets: none  Significant warfarin drug-drug interactions: amiodarone, levothyroxine    Date INR Warfarin Dose   3/29/24 5.65 HOLD                                   INR will be monitored routinely until therapeutic INR is achieved.    Pharmacy will continue to follow. Thank you for the consult,   Cuong Ribeiro, PharmD  3/29/2024 5:50 PM      
Warfarin Pharmacy Consult Note    Warfarin Indication: Atrial fibrillation or Atrial flutter  Target INR: 2.0-3.0  Dose prior to admission: 2 mg daily       Recent Labs     03/30/24  0031 03/31/24  0357 04/01/24  0352   INR 7.67* 8.31* 7.71*     Recent Labs     03/30/24  0031 03/30/24  0155 03/30/24  0910 03/31/24  0357 04/01/24  0352   HGB 8.5*   < > 7.9* 7.6* 8.0*     --   --  146 149    < > = values in this interval not displayed.     Concurrent anticoagulants/antiplatelets: none  Significant warfarin drug-drug interactions: amiodarone, levothyroxine     Date INR Warfarin Dose   3/29/24 5.65 HOLD   3/30/2024  7.67  HOLD     3/31/2024 8.31  HOLD    4/01/2024   7.71 HOLD                                  Monitoring:                   INR will be monitored daily until therapeutic INR is achieved.    **Please contact pharmacy for discharge instructions when indicated**    Jessi CruzD, BCPS   4/1/2024  10:26 AM      
Warfarin Pharmacy Consult Note    Warfarin Indication: Atrial fibrillation or Atrial flutter  Target INR: 2.0-3.0  Dose prior to admission: 2 mg daily       Recent Labs     03/31/24  0357 04/01/24  0352 04/02/24  0439   INR 8.31* 7.71* 8.53*     Recent Labs     03/31/24  0357 04/01/24  0352 04/02/24  0439   HGB 7.6* 8.0* 7.7*    149 151     Concurrent anticoagulants/antiplatelets: none  Significant warfarin drug-drug interactions: amiodarone (on hold), levothyroxine     Date INR Warfarin Dose   3/29/24 5.65 HOLD   3/30/2024  7.67  HOLD     3/31/2024 8.31  HOLD    4/01/2024   7.71 HOLD    4/02/2024  8.53  HOLD                      Monitoring:                   INR will be monitored daily until therapeutic INR is achieved.    **Please contact pharmacy for discharge instructions when indicated**    Fanny Ferris PharmD, BCPS   4/2/2024  4:01 PM     
Warfarin Pharmacy Consult Note    Warfarin Indication: Atrial fibrillation or Atrial flutter  Target INR: 2.0-3.0  Dose prior to admission: 2 mg daily    Recent Labs     03/29/24  1101 03/30/24  0031   INR 5.65* 7.67*     Recent Labs     03/29/24  1048 03/30/24  0031 03/30/24  0155   HGB 9.9* 8.5* 8.0*    156  --      Concurrent anticoagulants/antiplatelets: none  Significant warfarin drug-drug interactions: amiodarone, levothyroxine     Date INR Warfarin Dose   3/29/24 5.65 HOLD   3/30/2024  7.67  HOLD                                                Monitoring:                   INR will be monitored daily until therapeutic INR is achieved.    **Please contact pharmacy for discharge instructions when indicated**    Hien Jung, PharmD, BCPS  3/30/2024  9:27 AM      
Warfarin Pharmacy Consult Note    Warfarin Indication: Atrial fibrillation or Atrial flutter  Target INR: 2.0-3.0  Dose prior to admission: 2 mg daily    Recent Labs     03/29/24  1101 03/30/24  0031 03/31/24  0357   INR 5.65* 7.67* 8.31*     Recent Labs     03/29/24  1048 03/30/24  0031 03/30/24  0155 03/30/24  0910 03/31/24  0357   HGB 9.9* 8.5* 8.0* 7.9* 7.6*    156  --   --  146     Concurrent anticoagulants/antiplatelets: none  Significant warfarin drug-drug interactions: amiodarone, levothyroxine     Date INR Warfarin Dose   3/29/24 5.65 HOLD   3/30/2024  7.67  HOLD     3/31/2024 8.31  HOLD                                          Monitoring:                   INR will be monitored daily until therapeutic INR is achieved.    **Please contact pharmacy for discharge instructions when indicated**    Hien Jung, PharmD, BCPS  3/31/2024  10:12 AM      
Warfarin Pharmacy Consult Note    Warfarin Indication: Atrial fibrillation or Atrial flutter  Target INR: 2.0-3.0  Dose prior to admission: 2 mg daily    Recent Labs     04/01/24  0352 04/02/24  0439 04/03/24  0352   INR 7.71* 8.53* 5.74*     Recent Labs     04/01/24  0352 04/02/24  0439 04/03/24 0352   HGB 8.0* 7.7* 7.7*    151 165     Concurrent anticoagulants/antiplatelets: none  Significant warfarin drug-drug interactions: amiodarone (on hold), levothyroxine     Date INR Warfarin Dose   3/29/24 5.65 HOLD   3/30/2024  7.67  HOLD     3/31/2024 8.31  HOLD    4/01/2024   7.71 HOLD    4/02/2024  8.53  HOLD     04/03/2024  5.74 HOLD                 Monitoring:                   INR will be monitored daily until therapeutic INR is achieved.    **Please contact pharmacy for discharge instructions when indicated**    Cuong Ribeiro, PharmD  4/3/2024 7:19 AM      
  Distance: To and from bathroom  O2 line management, no LOB     ADDITIONAL ACTIVITIES:    AM-PAC Inpatient Daily Activity Raw Score: 17  AM-PAC Inpatient ADL T-Scale Score : 37.26  ADL Inpatient CMS 0-100% Score: 50.11    ASSESSMENT:     Activity Tolerance:  Patient tolerance of  treatment: Good treatment tolerance      Discharge Recommendations: Subacute/skilled nursing facility  Equipment Recommendations: Other: monitor pending progress  Plan: Times Per Week: 5x  Current Treatment Recommendations: Balance training, Functional mobility training, Endurance training, Safety education & training, Self-Care / ADL, Strengthening    Education:  Learners: Patient  Role of OT, Plan of Care, ADL's, IADL's, Home Safety, Importance of Increasing Activity, Fall Prevention, Assistive Device Safety, and Pursed Lip Breathing    Goals  Short Term Goals  Time Frame for Short Term Goals: until discharge  Short Term Goal 1: Pt will complete various sit-stand t/fs including BSC with CGA x 1  Short Term Goal 2: Pt will tolerate standing 2-3 min with CGA for increased ease of toileting routine  Short Term Goal 3: Pt will complete mobility to BSC or recliner with RW, min A, & 0-2 vcs for safe technique  Short Term Goal 4: Pt will complete BADL routine with mod A & min vcs for safety & sequencing  Long Term Goals  Time Frame for Long Term Goals : No LTG set d/t short ELOS    Following session, patient left in safe position with all fall risk precautions in place.        
Recommendations: Subacute/skilled nursing facility  Equipment Recommendations: Other: monitor pending progress  Plan: Times Per Week: 5x  Current Treatment Recommendations: Balance training, Functional mobility training, Endurance training, Safety education & training, Self-Care / ADL, Strengthening    Education:  Learners: Patient  Role of OT, Plan of Care, ADL's, IADL's, Equipment Education, Home Safety, Importance of Increasing Activity, Fall Prevention, and Assistive Device Safety    Goals  Short Term Goals  Time Frame for Short Term Goals: until discharge  Short Term Goal 1: Pt will complete various sit-stand t/fs including BSC with CGA x 1  Short Term Goal 2: Pt will tolerate standing 2-3 min with CGA for increased ease of toileting routine  Short Term Goal 3: Pt will complete mobility to BSC or recliner with RW, min A, & 0-2 vcs for safe technique  Short Term Goal 4: Pt will complete BADL routine with mod A & min vcs for safety & sequencing  Long Term Goals  Time Frame for Long Term Goals : No LTG set d/t short ELOS    Following session, patient left in safe position with all fall risk precautions in place.        
  Recent Labs     03/31/24  0357 04/01/24 0352 04/02/24 0439   WBC 3.9* 3.5* 4.7*   HGB 7.6* 8.0* 7.7*   HCT 24.3* 25.9* 25.2*    149 151     Recent Labs     03/31/24  0356 03/31/24  0840 03/31/24  2031 04/01/24  0135 04/01/24  0352 04/01/24  1111 04/01/24  1744 04/02/24  0439   *   < > 129*   < > 134* 133* 129* 132*   K 3.4*  --  4.3  --  4.3  --   --  4.6   CL 92*  --   --   --  101  --   --  99   CO2 24  --   --   --  21*  --   --  23   BUN 20  --   --   --  17  --   --  16   CREATININE 1.2  --   --   --  1.1  --   --  1.1   CALCIUM 8.5  --   --   --  9.1  --   --  8.9    < > = values in this interval not displayed.     Recent Labs     03/31/24 0356   AST 41*   ALT 28   BILIDIR <0.2   BILITOT 0.3   ALKPHOS 103     Recent Labs     03/31/24 0357 04/01/24 0352 04/02/24 0439   INR 8.31* 7.71* 8.53*     No results for input(s): \"TROPONINT\" in the last 72 hours.  No results for input(s): \"PROCAL\" in the last 72 hours.   Lab Results   Component Value Date/Time    NITRU NEGATIVE 03/29/2024 11:39 AM    WBCUA 25-50 04/27/2023 10:15 AM    BACTERIA MANY 04/27/2023 10:15 AM    RBCUA 0-2 04/27/2023 10:15 AM    BLOODU NEGATIVE 03/29/2024 11:39 AM    SPECGRAV 1.009 03/29/2024 11:39 AM    GLUCOSEU NEGATIVE 01/13/2020 04:45 AM       Radiology: XR CHEST (2 VW)    Result Date: 4/1/2024  PROCEDURE: XR CHEST (2 VW) CLINICAL INFORMATION: Dyspnea COMPARISON: 3/29/2024 TECHNIQUE: AP upright and lateral views of the chest were obtained.     1. Moderate hepatomegaly. Suboptimal inflation of the lungs. 2. Moderate interstitial pneumonia/edema both mid and lower lung fields. 3. Overall appearance of chest has worsened since prior. **This report has been created using voice recognition software.  It may contain minor errors which are inherent in voice recognition technology.** Final report electronically signed by Dr. Robson Johnson on 4/1/2024 2:01 PM       LDA: []CVC / []PICC / []Midline / [x]Granger / []Drains / []Mediport 
0-2 vcs for safe technique  Short Term Goal 4: Pt will complete BADL routine with mod A & min vcs for safety & sequencing  Long Term Goals  Time Frame for Long Term Goals : No LTG set d/t short ELOS    AM-PAC Inpatient Daily Activity Raw Score: 13  AM-PAC Inpatient ADL T-Scale Score : 32.03    Following session, patient left in safe position with all fall risk precautions in place.                
goals and Plan of Care, Reviewed recommendations for follow-up, Education Related to Potential Risks and Complications Due to Impairment/Illness/Injury, and Education Related to Health Promotion and Wellness  Evaluation of Education: Verbalizes understanding and Needs further instruction    PLAN:  Skilled SLP intervention on acute care 3-5 x per week or until goals met and/or pt plateaus in function.  Specific interventions for next session may include: cognitive rehabilitation, dietary analysis .    PATIENT GOAL:    Did not state.  Will further assess during treatment.    SHORT TERM GOALS:  Short Term Goals  Time Frame for Short Term Goals: 2 weeks  Goal 1: Patient will utilize safe swallow strategies to consume a regular diet + thin liquids without overt s/s of aspiration or pulmonary decline to achieve hydration/nutritional goals.  Goal 2: Patient will complete recall tasks (immediate, delayed, working) with focus on memory strategies with 80% accuracy given mod cues to improve recall of pertinent daily information.  Goal 3: Patient will complete executive functioning and reasoning tasks (medications, finances, time) with 75% accuracy given mod cues to resume ADL/IADL completion with least amount of supervision/assistance.  Goal 4: Patient will complete thought organization tasks with 75% accuracy given mod cues to improve processing speed and organziation during ADL/IADL completion.  Goal 5: Patient will complete attention tasks (sustained, selective, alternating, divided) with no more than 8 errors/redirections until task completion to resume multi-tasking during ADL/IADL completion and safely resume active driving.      LONG TERM GOALS:  No long term goals recommended d/t short CARLOS Vega M.A., CCC-SLP [id number]          
rhonchi.  Cardiovascular: Normal rate, regular rhythm with normal S1/S2 without murmurs.    No lower extremity edema.   Abdomen: Soft, non-tender, non-distended with normal bowel sounds.  Musculoskeletal: No joint swelling or tenderness. Normal tone. No abnormal movements.   Skin: Warm and dry. No rashes or lesions.  Neurologic:  No focal sensory/motor deficits in the upper or lower extremities. Cranial nerves:  grossly non-focal 2-12.     Psychiatric: Alert and oriented, normal insight and thought content.   Capillary Refill: Brisk,< 3 seconds.  Peripheral Pulses: +2 palpable, equal bilaterally.       Labs:   Recent Labs     03/29/24  1048 03/30/24  0031 03/30/24  0155 03/30/24  0910 03/31/24  0357   WBC 5.9 3.1*  --   --  3.9*   HGB 9.9* 8.5* 8.0* 7.9* 7.6*   HCT 30.8* 26.5* 25.5* 24.4* 24.3*    156  --   --  146     Recent Labs     03/30/24  0031 03/30/24  0910 03/30/24  1309 03/31/24  0048 03/31/24  0356 03/31/24  0840   * 123*  124*   < > 124* 126* 127*   K 3.5 3.6  --   --  3.4*  --    CL 85* 88*  --   --  92*  --    CO2 24 22*  --   --  24  --    BUN 21 20  --   --  20  --    CREATININE 1.2 1.1  --   --  1.2  --    CALCIUM 9.1 8.9  --   --  8.5  --     < > = values in this interval not displayed.     Recent Labs     03/29/24  1048 03/30/24  0155 03/31/24  0356   AST 65* 57* 41*   ALT 36 31 28   BILIDIR <0.2 <0.2 <0.2   BILITOT 0.6 0.6 0.3   ALKPHOS 146* 113 103     Recent Labs     03/29/24  1101 03/30/24  0031 03/31/24  0357   INR 5.65* 7.67* 8.31*     No results for input(s): \"TROPONINT\" in the last 72 hours.  No results for input(s): \"PROCAL\" in the last 72 hours.   Lab Results   Component Value Date/Time    NITRU NEGATIVE 03/29/2024 11:39 AM    WBCUA 25-50 04/27/2023 10:15 AM    BACTERIA MANY 04/27/2023 10:15 AM    RBCUA 0-2 04/27/2023 10:15 AM    BLOODU NEGATIVE 03/29/2024 11:39 AM    SPECGRAV 1.009 03/29/2024 11:39 AM    GLUCOSEU NEGATIVE 01/13/2020 04:45 AM       DVT prophylaxis:    []

## 2024-04-04 ENCOUNTER — COMMUNITY CARE MANAGEMENT (OUTPATIENT)
Facility: CLINIC | Age: 84
End: 2024-04-04

## 2024-04-04 LAB
EKG ATRIAL RATE: 46 BPM
EKG ATRIAL RATE: 50 BPM
EKG P AXIS: 48 DEGREES
EKG P AXIS: 77 DEGREES
EKG P-R INTERVAL: 354 MS
EKG P-R INTERVAL: 370 MS
EKG Q-T INTERVAL: 498 MS
EKG Q-T INTERVAL: 558 MS
EKG QRS DURATION: 108 MS
EKG QRS DURATION: 118 MS
EKG QTC CALCULATION (BAZETT): 454 MS
EKG QTC CALCULATION (BAZETT): 488 MS
EKG R AXIS: -23 DEGREES
EKG R AXIS: -68 DEGREES
EKG T AXIS: 17 DEGREES
EKG T AXIS: 82 DEGREES
EKG VENTRICULAR RATE: 46 BPM
EKG VENTRICULAR RATE: 50 BPM
THYROPEROXIDASE AB SERPL IA-ACNC: < 4 IU/ML (ref 0–25)

## 2024-04-09 LAB — PHYTONADIONE SERPL-MCNC: NORMAL NG/L

## 2024-04-12 ENCOUNTER — HOSPITAL ENCOUNTER (OUTPATIENT)
Age: 84
End: 2024-04-12
Attending: REGISTERED NURSE
Payer: MEDICARE

## 2024-04-12 VITALS — HEIGHT: 58 IN | BODY MASS INDEX: 20.99 KG/M2 | WEIGHT: 100 LBS

## 2024-04-12 DIAGNOSIS — R26.89 IMBALANCE: ICD-10-CM

## 2024-04-12 DIAGNOSIS — R42 DIZZINESS: ICD-10-CM

## 2024-04-12 PROBLEM — E43 SEVERE MALNUTRITION (HCC): Status: RESOLVED | Noted: 2020-01-04 | Resolved: 2024-04-12

## 2024-04-12 PROBLEM — N18.4 CKD (CHRONIC KIDNEY DISEASE), STAGE IV (HCC): Status: RESOLVED | Noted: 2024-02-22 | Resolved: 2024-04-12

## 2024-04-12 LAB
ECHO BSA: 1.36 M2
TILT CV INITIAL SUPINE HEART RATE: 67 BPM
TILT CV INITIAL SUPINE MAX BP: NORMAL BPM
TILT CV INITIAL TILT BLOOD PRESSURE: NORMAL MMHG
TILT CV INITIAL TILT HEART RATE: 69 BPM
TILT CV MAX BP BLOOD PRESSURE: NORMAL MMHG
TILT CV MAX BP HEART RATE: 63 BPM
TILT CV MAX BP MINUTES: 20
TILT CV MAX HEART RATE: 69 BPM
TILT CV MAX HR BLOOD PRESSURE: NORMAL MMHG
TILT CV MAX HR MINUTES: 2
TILT CV MINIMUM BP BLOOD PRESSURE: NORMAL MMHG
TILT CV MINIMUM BP HEART RATE: 60 BPM
TILT CV MINIMUM BP MINUTES: 29
TILT CV MINIMUM HR BP: NORMAL MMHG
TILT CV MINIMUM HR HEART RATE: 60 BPM
TILT CV MINIMUM HR MINUTES: 29

## 2024-04-12 PROCEDURE — 2580000003 HC RX 258: Performed by: INTERNAL MEDICINE

## 2024-04-12 PROCEDURE — 93660 TILT TABLE EVALUATION: CPT | Performed by: INTERNAL MEDICINE

## 2024-04-12 PROCEDURE — 93660 TILT TABLE EVALUATION: CPT

## 2024-04-12 RX ORDER — SODIUM CHLORIDE 9 MG/ML
INJECTION, SOLUTION INTRAVENOUS CONTINUOUS
Status: DISCONTINUED | OUTPATIENT
Start: 2024-04-12 | End: 2024-04-15 | Stop reason: HOSPADM

## 2024-04-12 RX ADMIN — SODIUM CHLORIDE: 9 INJECTION, SOLUTION INTRAVENOUS at 14:31

## 2024-04-14 LAB — ECHO BSA: 1.43 M2

## 2024-04-18 ENCOUNTER — HOSPITAL ENCOUNTER (OUTPATIENT)
Dept: GENERAL RADIOLOGY | Age: 84
Discharge: HOME | End: 2024-04-18
Payer: MEDICARE

## 2024-04-18 ENCOUNTER — TELEPHONE (OUTPATIENT)
Dept: CARDIOLOGY CLINIC | Age: 84
End: 2024-04-18

## 2024-04-18 ENCOUNTER — HOSPITAL ENCOUNTER (OUTPATIENT)
Age: 84
Discharge: HOME | End: 2024-04-18
Payer: MEDICARE

## 2024-04-18 ENCOUNTER — OFFICE VISIT (OUTPATIENT)
Dept: CARDIOLOGY CLINIC | Age: 84
End: 2024-04-18
Payer: MEDICARE

## 2024-04-18 VITALS
RESPIRATION RATE: 18 BRPM | HEART RATE: 64 BPM | BODY MASS INDEX: 21.45 KG/M2 | DIASTOLIC BLOOD PRESSURE: 68 MMHG | HEIGHT: 58 IN | SYSTOLIC BLOOD PRESSURE: 162 MMHG | WEIGHT: 102.2 LBS

## 2024-04-18 DIAGNOSIS — I50.32 CHRONIC DIASTOLIC CONGESTIVE HEART FAILURE, NYHA CLASS 3 (HCC): Primary | ICD-10-CM

## 2024-04-18 DIAGNOSIS — I50.32 CHRONIC DIASTOLIC CONGESTIVE HEART FAILURE, NYHA CLASS 3 (HCC): ICD-10-CM

## 2024-04-18 DIAGNOSIS — R60.0 EDEMA OF BOTH LOWER LEGS: ICD-10-CM

## 2024-04-18 DIAGNOSIS — I05.0 MITRAL VALVE STENOSIS, UNSPECIFIED ETIOLOGY: ICD-10-CM

## 2024-04-18 DIAGNOSIS — I10 PRIMARY HYPERTENSION: Chronic | ICD-10-CM

## 2024-04-18 DIAGNOSIS — I48.0 PAROXYSMAL ATRIAL FIBRILLATION (HCC): ICD-10-CM

## 2024-04-18 DIAGNOSIS — D50.0 IRON DEFICIENCY ANEMIA DUE TO CHRONIC BLOOD LOSS: ICD-10-CM

## 2024-04-18 PROCEDURE — 1123F ACP DISCUSS/DSCN MKR DOCD: CPT | Performed by: NURSE PRACTITIONER

## 2024-04-18 PROCEDURE — 71046 X-RAY EXAM CHEST 2 VIEWS: CPT

## 2024-04-18 PROCEDURE — G8400 PT W/DXA NO RESULTS DOC: HCPCS | Performed by: NURSE PRACTITIONER

## 2024-04-18 PROCEDURE — 1111F DSCHRG MED/CURRENT MED MERGE: CPT | Performed by: NURSE PRACTITIONER

## 2024-04-18 PROCEDURE — G8427 DOCREV CUR MEDS BY ELIG CLIN: HCPCS | Performed by: NURSE PRACTITIONER

## 2024-04-18 PROCEDURE — 99215 OFFICE O/P EST HI 40 MIN: CPT | Performed by: NURSE PRACTITIONER

## 2024-04-18 PROCEDURE — 3078F DIAST BP <80 MM HG: CPT | Performed by: NURSE PRACTITIONER

## 2024-04-18 PROCEDURE — G8420 CALC BMI NORM PARAMETERS: HCPCS | Performed by: NURSE PRACTITIONER

## 2024-04-18 PROCEDURE — 3077F SYST BP >= 140 MM HG: CPT | Performed by: NURSE PRACTITIONER

## 2024-04-18 PROCEDURE — 1090F PRES/ABSN URINE INCON ASSESS: CPT | Performed by: NURSE PRACTITIONER

## 2024-04-18 PROCEDURE — 1036F TOBACCO NON-USER: CPT | Performed by: NURSE PRACTITIONER

## 2024-04-18 RX ORDER — POTASSIUM CHLORIDE 20 MEQ/1
20 TABLET, EXTENDED RELEASE ORAL DAILY
Qty: 90 TABLET | Refills: 1
Start: 2024-04-18

## 2024-04-18 ASSESSMENT — ENCOUNTER SYMPTOMS
ABDOMINAL DISTENTION: 0
ABDOMINAL PAIN: 0
COUGH: 0
SHORTNESS OF BREATH: 1
WHEEZING: 0

## 2024-04-18 NOTE — PROGRESS NOTES
Heart Failure Clinic       Visit Date: 4/18/2024  Cardiologist:  Dr. Mendoza  Primary Care Physician: Renuka Rodriguez, APRN - CNP    Paola Titus is a 84 y.o. female who presents today for:  Chief Complaint   Patient presents with    New Patient       HPI:     TYPE HF: HFpEF 55-60% 2024   Cause: multifactorial  Device: no  HX: HTN, GERD, CKD, Iron deficiency anemia, breast CA 2014 w/ radiation  Dry Wt:  102 on 4/18/24      Paola Titus is a 84 y.o. female who presents to the office for a new patient visit in the heart failure clinic.    Concerns today: here today as a new pt referred from recent hospitalization. She present with confusion and her Na was found to be low d/y hyponatremia secondary to poor oral intake. She has known hx of CHF and has been on bumex for several years. She has noted LANDERS for last couple years with lower leg swelling both of which have worsened over the last year. She denies bloating and orthopnea. Last chest xray does show pulmonary edema. She does respond to her bumex. Does not consume a high Na/fluid diet.       Patient follows:      Hospitalization:      Date of evaluation: 3/29/2024     Activity: Limited currently d/t deconditioning d/t hospital stay and LANDERS - on oxygen  Diet: educated    Patient has:  Chest Pain: no  SOB: yes  Orthopnea/PND: no  CALI: no  Edema: yes  Fatigue: yes  Abdominal bloating: no  Cough: no  Appetite: poor      Past Medical History:   Diagnosis Date    Abnormal mammogram 7/6/2020    Acute respiratory failure with hypoxia (HCC)     Arthritis     Atrial fibrillation (HCC)     Bacterial pneumonia     Bilateral pleural effusion     Breast cancer (HCC) 2010    Lumpectomy    CAD (coronary artery disease)     Cancer (HCC)     BREAST    Chronic kidney disease     Closed displaced intertrochanteric fracture of left femur, initial encounter (HCC) 4/26/2023    History of therapeutic radiation 2011    left IDC    HTN (hypertension) 4/13/2016    Hypertension

## 2024-04-18 NOTE — PATIENT INSTRUCTIONS
You may receive a survey regarding the care you received during your visit.  Your input is valuable to us.  We encourage you to complete and return your survey.  We hope you will choose us in the future for your healthcare needs.    Your nurses today were Jovon.  Office hours:   Mon-Thurs 8-4:30  Friday 8-12  Phone: [phone]    Continue:  Continue current medications  Daily weights and record  Fluid restriction of 2 Liters per day  Limit sodium in diet to around 1072-5015 mg/day  Monitor BP  Activity as tolerated     Call the Heart Failure Clinic for any of the following symptoms:   Weight gain of -3 pounds in 1 day or 5 pounds in 1 week  Increased shortness of breath  Shortness of breath while laying down  Cough  Chest pain  Swelling in feet, ankles or legs  Bloating in abdomen  Fatigue      Repeat blood work in 1 week    Increase bumex to 1mg daily  Start potassium 20meq daily     Chest xray today     Continue diet/fluid adherence  Continue daily wts.  F/U w/ Cardiology  F/U in clinic in 4 weeks

## 2024-04-25 LAB
BUN BLDV-MCNC: 23 MG/DL
CALCIUM SERPL-MCNC: 10.3 MG/DL
CHLORIDE BLD-SCNC: 102 MMOL/L
CO2: 29 MMOL/L
CREAT SERPL-MCNC: 1.8 MG/DL
EGFR: NORMAL
GLUCOSE BLD-MCNC: 129 MG/DL
POTASSIUM SERPL-SCNC: 4.2 MMOL/L
SODIUM BLD-SCNC: 142 MMOL/L

## 2024-04-26 ENCOUNTER — TELEPHONE (OUTPATIENT)
Dept: CARDIOLOGY CLINIC | Age: 84
End: 2024-04-26

## 2024-04-26 DIAGNOSIS — R06.02 SHORTNESS OF BREATH: ICD-10-CM

## 2024-04-26 DIAGNOSIS — I05.0 MITRAL VALVE STENOSIS, UNSPECIFIED ETIOLOGY: Primary | ICD-10-CM

## 2024-04-26 NOTE — TELEPHONE ENCOUNTER
Dr. Mendoza    Could we please do a ROGELIO to look at mitral valve. Pt is symptomatic with fluid on exam.

## 2024-04-29 NOTE — TELEPHONE ENCOUNTER
ROGELIO scheduled for 5/8/24 at 3:30pm with Dr. Mendoza, patient to arrive at 2:00pm. On schedule. Cee in OR scheduling notified.     Patient was given all instructions verbally. All questions answered.

## 2024-05-01 RX ORDER — SODIUM CHLORIDE 9 MG/ML
25 INJECTION, SOLUTION INTRAVENOUS PRN
Status: DISCONTINUED | OUTPATIENT
Start: 2024-05-01 | End: 2024-05-08 | Stop reason: HOSPADM

## 2024-05-01 RX ORDER — SODIUM CHLORIDE 9 MG/ML
INJECTION, SOLUTION INTRAVENOUS CONTINUOUS
Status: DISCONTINUED | OUTPATIENT
Start: 2024-05-01 | End: 2024-05-08 | Stop reason: HOSPADM

## 2024-05-01 RX ORDER — SODIUM CHLORIDE 0.9 % (FLUSH) 0.9 %
5-40 SYRINGE (ML) INJECTION PRN
Status: DISCONTINUED | OUTPATIENT
Start: 2024-05-01 | End: 2024-05-08 | Stop reason: HOSPADM

## 2024-05-01 RX ORDER — SODIUM CHLORIDE 0.9 % (FLUSH) 0.9 %
5-40 SYRINGE (ML) INJECTION EVERY 12 HOURS SCHEDULED
Status: DISCONTINUED | OUTPATIENT
Start: 2024-05-01 | End: 2024-05-08 | Stop reason: HOSPADM

## 2024-05-08 ENCOUNTER — HOSPITAL ENCOUNTER (OUTPATIENT)
Age: 84
Discharge: HOME | End: 2024-05-08
Payer: MEDICARE

## 2024-05-08 ENCOUNTER — APPOINTMENT (OUTPATIENT)
Age: 84
End: 2024-05-08
Attending: NUCLEAR MEDICINE
Payer: MEDICARE

## 2024-05-08 ENCOUNTER — HOSPITAL ENCOUNTER (OUTPATIENT)
Age: 84
Setting detail: OUTPATIENT SURGERY
Discharge: HOME | End: 2024-05-08
Attending: NUCLEAR MEDICINE | Admitting: NUCLEAR MEDICINE
Payer: MEDICARE

## 2024-05-08 VITALS
BODY MASS INDEX: 19.06 KG/M2 | SYSTOLIC BLOOD PRESSURE: 140 MMHG | DIASTOLIC BLOOD PRESSURE: 64 MMHG | TEMPERATURE: 97.4 F | WEIGHT: 90.8 LBS | HEART RATE: 60 BPM | HEIGHT: 58 IN | RESPIRATION RATE: 16 BRPM | OXYGEN SATURATION: 96 %

## 2024-05-08 DIAGNOSIS — R06.02 SHORTNESS OF BREATH: ICD-10-CM

## 2024-05-08 DIAGNOSIS — I05.0 MITRAL VALVE STENOSIS, UNSPECIFIED ETIOLOGY: ICD-10-CM

## 2024-05-08 LAB
ALBUMIN SERPL BCG-MCNC: 4.2 G/DL (ref 3.5–5.1)
ALP SERPL-CCNC: 131 U/L (ref 38–126)
ALT SERPL W/O P-5'-P-CCNC: 8 U/L (ref 11–66)
ANION GAP SERPL CALC-SCNC: 16 MEQ/L (ref 8–16)
AST SERPL-CCNC: 21 U/L (ref 5–40)
BILIRUB SERPL-MCNC: 0.4 MG/DL (ref 0.3–1.2)
BUN SERPL-MCNC: 29 MG/DL (ref 7–22)
CALCIUM SERPL-MCNC: 10.5 MG/DL (ref 8.5–10.5)
CHLORIDE SERPL-SCNC: 102 MEQ/L (ref 98–111)
CHOLESTEROL, FASTING: 148 MG/DL (ref 100–199)
CO2 SERPL-SCNC: 25 MEQ/L (ref 23–33)
CREAT SERPL-MCNC: 1.6 MG/DL (ref 0.4–1.2)
ECHO BSA: 1.3 M2
ECHO MV AREA PHT: 169.2 CM2
ECHO MV MAX VELOCITY: 2.3 M/S
ECHO MV MEAN GRADIENT: 6 MMHG
ECHO MV MEAN VELOCITY: 1.3 M/S
ECHO MV PEAK GRADIENT: 20 MMHG
ECHO MV PRESSURE HALF TIME (PHT): 1.3 MS
ECHO MV VTI: 76.4 CM
GFR SERPL CREATININE-BSD FRML MDRD: 31 ML/MIN/1.73M2
GLUCOSE FASTING: 85 MG/DL (ref 70–108)
HDLC SERPL-MCNC: 61 MG/DL
LDLC SERPL CALC-MCNC: 65 MG/DL
POTASSIUM SERPL-SCNC: 4.2 MEQ/L (ref 3.5–5.2)
PROT SERPL-MCNC: 7 G/DL (ref 6.1–8)
SODIUM SERPL-SCNC: 143 MEQ/L (ref 135–145)
TRIGLYCERIDE, FASTING: 111 MG/DL (ref 0–199)

## 2024-05-08 PROCEDURE — 80061 LIPID PANEL: CPT

## 2024-05-08 PROCEDURE — 93312 ECHO TRANSESOPHAGEAL: CPT

## 2024-05-08 PROCEDURE — 36415 COLL VENOUS BLD VENIPUNCTURE: CPT

## 2024-05-08 PROCEDURE — 6360000002 HC RX W HCPCS: Performed by: NUCLEAR MEDICINE

## 2024-05-08 PROCEDURE — 80053 COMPREHEN METABOLIC PANEL: CPT

## 2024-05-08 PROCEDURE — 93320 DOPPLER ECHO COMPLETE: CPT | Performed by: NUCLEAR MEDICINE

## 2024-05-08 PROCEDURE — 99152 MOD SED SAME PHYS/QHP 5/>YRS: CPT | Performed by: NUCLEAR MEDICINE

## 2024-05-08 PROCEDURE — 7100000010 HC PHASE II RECOVERY - FIRST 15 MIN: Performed by: NUCLEAR MEDICINE

## 2024-05-08 PROCEDURE — 93312 ECHO TRANSESOPHAGEAL: CPT | Performed by: NUCLEAR MEDICINE

## 2024-05-08 PROCEDURE — 93319 3D ECHO IMG CGEN CAR ANOMAL: CPT | Performed by: NUCLEAR MEDICINE

## 2024-05-08 PROCEDURE — 2580000003 HC RX 258: Performed by: NUCLEAR MEDICINE

## 2024-05-08 PROCEDURE — 7100000011 HC PHASE II RECOVERY - ADDTL 15 MIN: Performed by: NUCLEAR MEDICINE

## 2024-05-08 PROCEDURE — 6370000000 HC RX 637 (ALT 250 FOR IP)

## 2024-05-08 RX ORDER — FENTANYL CITRATE 50 UG/ML
INJECTION, SOLUTION INTRAMUSCULAR; INTRAVENOUS PRN
Status: DISCONTINUED | OUTPATIENT
Start: 2024-05-08 | End: 2024-05-08 | Stop reason: ALTCHOICE

## 2024-05-08 RX ORDER — AMOXICILLIN 500 MG/1
500 CAPSULE ORAL 3 TIMES DAILY
COMMUNITY
Start: 2024-05-02 | End: 2024-05-15

## 2024-05-08 RX ORDER — MIDAZOLAM HYDROCHLORIDE 1 MG/ML
INJECTION INTRAMUSCULAR; INTRAVENOUS PRN
Status: DISCONTINUED | OUTPATIENT
Start: 2024-05-08 | End: 2024-05-08 | Stop reason: ALTCHOICE

## 2024-05-08 RX ADMIN — SODIUM CHLORIDE: 9 INJECTION, SOLUTION INTRAVENOUS at 14:09

## 2024-05-08 ASSESSMENT — PAIN - FUNCTIONAL ASSESSMENT
PAIN_FUNCTIONAL_ASSESSMENT: NONE - DENIES PAIN
PAIN_FUNCTIONAL_ASSESSMENT: 0-10
PAIN_FUNCTIONAL_ASSESSMENT: NONE - DENIES PAIN
PAIN_FUNCTIONAL_ASSESSMENT: NONE - DENIES PAIN
PAIN_FUNCTIONAL_ASSESSMENT: ACTIVITIES ARE NOT PREVENTED

## 2024-05-08 ASSESSMENT — PAIN DESCRIPTION - DESCRIPTORS: DESCRIPTORS: SHARP

## 2024-05-08 NOTE — PROGRESS NOTES
Patient admitted to endoscopy for a ROGELIO with Dr. Rodriguez. Procedure verified and consent signed.

## 2024-05-08 NOTE — H&P
[facility]  Sedation/Analgesia History & Physical    Pt Name: Paola Titus  Account number: [id number]  MRN: [id number]  YOB: 1940  Provider Performing Procedure: Car Rodriguez MD MD [facility]  Primary Care Physician: Renuka Rosas APRN - ARIELA  Date: 5/8/2024    PRE-PROCEDURE    Code Status: FULL CODE  Brief History/Pre-Procedure Diagnosis:   MS and MR      Consent: : I have discussed with the patient risks, benefits, and alternatives (and relevant risks, benefits, and side effects related to alternatives or not receiving care), and likelihood of the success.   The patient and/or representative appear to understand and agree to proceed.  The discussion encompasses risks, benefits, and side effects related to the alternatives and the risks related to not receiving the proposed care, treatment, and services.         MEDICAL HISTORY  []ASHD/ANGINA/MI/CHF   [x]Hypertension  []Diabetes  []Hyperlipidemia  []Smoking  []Family Hx of ASHD  []Additional information:       has a past medical history of Abnormal mammogram, Acute respiratory failure with hypoxia (HCC), Arthritis, Atrial fibrillation (HCC), Bacterial pneumonia, Bilateral pleural effusion, Breast cancer (HCC), CAD (coronary artery disease), Cancer (HCC), Chronic kidney disease, Closed displaced intertrochanteric fracture of left femur, initial encounter ([facility]), History of therapeutic radiation, HTN (hypertension), Hypertension, Mass of left breast, Metabolic acidosis, Mixed hyperlipidemia, Nausea & vomiting, Neuromuscular disorder (HCC), Osteopenia, Pneumonia of both lungs due to infectious organism, Rheumatic fever, Sinus infection, and Thyroid disease.    SURGICAL HISTORY   has a past surgical history that includes Bunionectomy (Left, 2009); Dilation and curettage of uterus; Cardiac catheterization (2010); Tonsillectomy; joint replacement (Right, 2010); Upper gastrointestinal endoscopy (N/A, 01/03/2020); bronchoscopy (N/A,  01/05/2020); Upper gastrointestinal endoscopy (N/A, 01/11/2020); pr bx of breast, needle core, image guide (Left, 03/26/2020); pr bx of breast, needle core, image guide (Left, 03/26/2020); pr bx of breast, needle core, image guide (Left, 03/26/2020); pr bx of breast, needle core, image guide (Left, 12/09/2010); Breast lumpectomy (Left, 2011); hip surgery (Left, 04/28/2023); and Cardioversion.  Additional information:       ALLERGIES   Allergies as of 04/29/2024    (No Known Allergies)     Additional information:       MEDICATIONS   Aspirin  [x] 81 mg  [] 325 mg  [] None  Antiplatelet drug therapy use last 5 days  []No []Yes  Coumadin Use Last 5 Days []No []Yes  Other anticoagulant use last 5 days  []No []Yes    Current Facility-Administered Medications:     sodium chloride flush 0.9 % injection 5-40 mL, 5-40 mL, IntraVENous, 2 times per day, Car Rodriguez MD    sodium chloride flush 0.9 % injection 5-40 mL, 5-40 mL, IntraVENous, PRN, Car Rodriguez MD    0.9 % sodium chloride infusion, 25 mL, IntraVENous, PRN, Car Rodriguez MD    0.9 % sodium chloride infusion, , IntraVENous, Continuous, Car Rodriguez MD  Prior to Admission medications    Medication Sig Start Date End Date Taking? Authorizing Provider   potassium chloride (KLOR-CON M) 20 MEQ extended release tablet Take 1 tablet by mouth daily 4/18/24   Evelina Beckham APRN - CNP   hydrALAZINE (APRESOLINE) 50 MG tablet Take 0.5 tablets by mouth 3 times daily 4/3/24   Richardson Ramon MD   ferrous sulfate (IRON 325) 325 (65 Fe) MG tablet Take 1 tablet by mouth daily (with breakfast) 4/4/24   Richardson Ramon MD   Cyanocobalamin (VITAMIN B-12) 2000 MCG TBCR Take by mouth    ProviderYenny MD   pantoprazole (PROTONIX) 40 MG tablet TAKE ONE TABLET, TWO TIMES A DAY, BY MOUTH. 12/28/23   Car Rodriguez MD   valsartan (DIOVAN) 80 MG tablet TAKE ONE TABLET DAILY, BY MOUTH. 11/13/23   Ag Frederick MD   folic acid

## 2024-05-08 NOTE — PROGRESS NOTES
Awake and talking. Denies pain or discomfort. Dr Rodriguez spoke with pt and family regarding findings and plan of care.

## 2024-05-14 ENCOUNTER — TELEPHONE (OUTPATIENT)
Dept: CARDIOLOGY CLINIC | Age: 84
End: 2024-05-14

## 2024-05-14 ENCOUNTER — HOSPITAL ENCOUNTER (OUTPATIENT)
Dept: AUDIOLOGY | Age: 84
Discharge: HOME | End: 2024-05-14

## 2024-05-14 ENCOUNTER — OFFICE VISIT (OUTPATIENT)
Dept: ENT CLINIC | Age: 84
End: 2024-05-14
Payer: MEDICARE

## 2024-05-14 VITALS
DIASTOLIC BLOOD PRESSURE: 78 MMHG | HEART RATE: 62 BPM | TEMPERATURE: 97.7 F | SYSTOLIC BLOOD PRESSURE: 130 MMHG | RESPIRATION RATE: 14 BRPM | WEIGHT: 93.9 LBS | OXYGEN SATURATION: 99 % | HEIGHT: 58 IN | BODY MASS INDEX: 19.71 KG/M2

## 2024-05-14 DIAGNOSIS — R26.89 IMBALANCE: Primary | ICD-10-CM

## 2024-05-14 DIAGNOSIS — H93.A3 PULSATILE TINNITUS OF BOTH EARS: ICD-10-CM

## 2024-05-14 PROCEDURE — 99212 OFFICE O/P EST SF 10 MIN: CPT | Performed by: REGISTERED NURSE

## 2024-05-14 PROCEDURE — 9990000010 HC NO CHARGE VISIT: Performed by: AUDIOLOGIST

## 2024-05-14 PROCEDURE — 1090F PRES/ABSN URINE INCON ASSESS: CPT | Performed by: REGISTERED NURSE

## 2024-05-14 PROCEDURE — G8420 CALC BMI NORM PARAMETERS: HCPCS | Performed by: REGISTERED NURSE

## 2024-05-14 PROCEDURE — G8400 PT W/DXA NO RESULTS DOC: HCPCS | Performed by: REGISTERED NURSE

## 2024-05-14 PROCEDURE — 1036F TOBACCO NON-USER: CPT | Performed by: REGISTERED NURSE

## 2024-05-14 PROCEDURE — 3078F DIAST BP <80 MM HG: CPT | Performed by: REGISTERED NURSE

## 2024-05-14 PROCEDURE — G8427 DOCREV CUR MEDS BY ELIG CLIN: HCPCS | Performed by: REGISTERED NURSE

## 2024-05-14 PROCEDURE — 3075F SYST BP GE 130 - 139MM HG: CPT | Performed by: REGISTERED NURSE

## 2024-05-14 PROCEDURE — 1123F ACP DISCUSS/DSCN MKR DOCD: CPT | Performed by: REGISTERED NURSE

## 2024-05-14 NOTE — TELEPHONE ENCOUNTER
Pt cancelled appt with Evelina on 5/15/24. Pt stated she did not feel like she needed to follow up and that \"everything was doing alright\". Cancelled appt and instructed to call office back if she needed appt in the future. Pt voiced understanding.

## 2024-05-14 NOTE — TELEPHONE ENCOUNTER
Spoke with patient  Informed  that Evelina wanted to discuss ROGELIO and heart valve with her at this appointment   Appointment back on schedule for tomorrow

## 2024-05-14 NOTE — PROGRESS NOTES
Patient here for audiogram and tympanogram today, per the recommendation of Sofia ROSS. The patient states that she is no longer having pulsatile tinnitus and does not feel that it is necessary for a hearing evaluation to be completed. She explains that the pulsatile tinnitus resolved after her heart was shocked due to Afib. She is noticing some improvement in her balance with vestibular rehabilitation. Discussed this with Sofia and she said that it is fine to not complete the audiogram today.

## 2024-05-15 ENCOUNTER — OFFICE VISIT (OUTPATIENT)
Dept: CARDIOLOGY CLINIC | Age: 84
End: 2024-05-15
Payer: MEDICARE

## 2024-05-15 VITALS
SYSTOLIC BLOOD PRESSURE: 140 MMHG | BODY MASS INDEX: 19.61 KG/M2 | OXYGEN SATURATION: 98 % | DIASTOLIC BLOOD PRESSURE: 70 MMHG | HEART RATE: 62 BPM | HEIGHT: 58 IN | WEIGHT: 93.4 LBS

## 2024-05-15 DIAGNOSIS — I50.32 CHRONIC DIASTOLIC CONGESTIVE HEART FAILURE, NYHA CLASS 3 (HCC): Primary | ICD-10-CM

## 2024-05-15 DIAGNOSIS — I05.0 RHEUMATIC MITRAL STENOSIS: ICD-10-CM

## 2024-05-15 DIAGNOSIS — I48.0 PAROXYSMAL ATRIAL FIBRILLATION (HCC): ICD-10-CM

## 2024-05-15 DIAGNOSIS — Z91.89 AT RISK FOR FLUID VOLUME OVERLOAD: ICD-10-CM

## 2024-05-15 PROCEDURE — 99214 OFFICE O/P EST MOD 30 MIN: CPT | Performed by: NURSE PRACTITIONER

## 2024-05-15 PROCEDURE — 1036F TOBACCO NON-USER: CPT | Performed by: NURSE PRACTITIONER

## 2024-05-15 PROCEDURE — 1123F ACP DISCUSS/DSCN MKR DOCD: CPT | Performed by: NURSE PRACTITIONER

## 2024-05-15 PROCEDURE — 1090F PRES/ABSN URINE INCON ASSESS: CPT | Performed by: NURSE PRACTITIONER

## 2024-05-15 PROCEDURE — G8427 DOCREV CUR MEDS BY ELIG CLIN: HCPCS | Performed by: NURSE PRACTITIONER

## 2024-05-15 PROCEDURE — 3078F DIAST BP <80 MM HG: CPT | Performed by: NURSE PRACTITIONER

## 2024-05-15 PROCEDURE — 3077F SYST BP >= 140 MM HG: CPT | Performed by: NURSE PRACTITIONER

## 2024-05-15 PROCEDURE — G8400 PT W/DXA NO RESULTS DOC: HCPCS | Performed by: NURSE PRACTITIONER

## 2024-05-15 PROCEDURE — G8420 CALC BMI NORM PARAMETERS: HCPCS | Performed by: NURSE PRACTITIONER

## 2024-05-15 ASSESSMENT — ENCOUNTER SYMPTOMS
SHORTNESS OF BREATH: 1
WHEEZING: 0
ABDOMINAL PAIN: 0

## 2024-05-15 NOTE — PROGRESS NOTES
pericardial effusion.       Echo Findings 4/2024    Left Ventricle Normal left ventricular systolic function with a visually estimated EF of 55 - 60%. Left ventricle size is normal. Normal wall thickness. Normal wall motion. Normal diastolic function.   Left Atrium Left atrium is severely dilated.   Right Ventricle Right ventricle is mildly dilated. Normal systolic function.   Right Atrium Right atrium is moderately dilated.   Aortic Valve Mildly thickened cusp. Mildly calcified cusp. Mild regurgitation. No stenosis.   Mitral Valve Findings consistent with myxomatous degeneration. Moderately thickened leaflet. Moderately calcified leaflet. Severely restricted motion. Mild regurgitation. Moderate to severe stenosis noted.   Tricuspid Valve Valve structure is normal. Mild to moderate regurgitation. No stenosis noted.   Pulmonic Valve The pulmonic valve was not well visualized. Valve structure is normal. Trace regurgitation. No stenosis noted.   Pulmonary Artery Severe pulmonary hypertension present.   Aorta Normal sized aortic root and ascending aorta.   IVC/Hepatic Veins IVC diameter is less than or equal to 21 mm and decreases greater than 50% during inspiration; therefore the estimated right atrial pressure is normal (~3 mmHg). IVC is dilated.   Pericardium No pericardial effusion.       CATH/STRESS:     IMPRESSION:  1.  Preserved systolic function of the left ventricle.  Ejection  fraction about 55%.  No mitral regurgitation.  No gradient across the  aortic valve.  2.  Mitral valve prolapse.  3.  Patent dominant RCA.  4.  Patent left main.  5.  Patent circumflex.  6.  Nonobstructive disease of mid LAD about 20%.  7.  About 70% narrowing of the mid, moderate sized diagonal artery.  8.  Very tortuous abdominal aorta and successful deployment of the  Angio-Seal closure device.     RECOMMENDATION:  At this point, continue with the current treatment,  risk factor modification, periodic followup.  Consider

## 2024-05-15 NOTE — PATIENT INSTRUCTIONS
You may receive a survey regarding the care you received during your visit.  Your input is valuable to us.  We encourage you to complete and return your survey.  We hope you will choose us in the future for your healthcare needs.    Your nurses today were Jovon.  Office hours:   Mon-Thurs 8-4:30  Friday 8-12  Phone: [phone]    Continue:  Continue current medications  Daily weights and record  Fluid restriction of 2 Liters per day  Limit sodium in diet to around 8254-6664 mg/day  Monitor BP  Activity as tolerated     Call the Heart Failure Clinic for any of the following symptoms:   Weight gain of -3 pounds in 1 day or 5 pounds in 1 week  Increased shortness of breath  Shortness of breath while laying down  Cough  Chest pain  Swelling in feet, ankles or legs  Bloating in abdomen  Fatigue

## 2024-06-06 ENCOUNTER — HOSPITAL ENCOUNTER (OUTPATIENT)
Age: 84
Discharge: HOME | End: 2024-06-06
Payer: MEDICARE

## 2024-06-06 LAB
ALBUMIN SERPL BCG-MCNC: 4.6 G/DL (ref 3.5–5.1)
ALP SERPL-CCNC: 154 U/L (ref 38–126)
ALT SERPL W/O P-5'-P-CCNC: 12 U/L (ref 11–66)
ANION GAP SERPL CALC-SCNC: 11 MEQ/L (ref 8–16)
AST SERPL-CCNC: 26 U/L (ref 5–40)
BILIRUB SERPL-MCNC: 0.3 MG/DL (ref 0.3–1.2)
BUN SERPL-MCNC: 36 MG/DL (ref 7–22)
CALCIUM SERPL-MCNC: 10.4 MG/DL (ref 8.5–10.5)
CHLORIDE SERPL-SCNC: 100 MEQ/L (ref 98–111)
CO2 SERPL-SCNC: 28 MEQ/L (ref 23–33)
CREAT SERPL-MCNC: 1.5 MG/DL (ref 0.4–1.2)
GFR SERPL CREATININE-BSD FRML MDRD: 34 ML/MIN/1.73M2
GLUCOSE SERPL-MCNC: 106 MG/DL (ref 70–108)
POTASSIUM SERPL-SCNC: 4.4 MEQ/L (ref 3.5–5.2)
PROT SERPL-MCNC: 7.3 G/DL (ref 6.1–8)
SODIUM SERPL-SCNC: 139 MEQ/L (ref 135–145)

## 2024-06-06 PROCEDURE — 85025 COMPLETE CBC W/AUTO DIFF WBC: CPT

## 2024-06-06 PROCEDURE — 36415 COLL VENOUS BLD VENIPUNCTURE: CPT

## 2024-06-06 PROCEDURE — 86300 IMMUNOASSAY TUMOR CA 15-3: CPT

## 2024-06-06 PROCEDURE — 80053 COMPREHEN METABOLIC PANEL: CPT

## 2024-06-07 LAB
ANISOCYTOSIS BLD QL SMEAR: PRESENT
BASOPHILS ABSOLUTE: 0 THOU/MM3 (ref 0–0.1)
BASOPHILS NFR BLD AUTO: 0.9 %
DEPRECATED RDW RBC AUTO: 69.1 FL (ref 35–45)
EOSINOPHIL NFR BLD AUTO: 4 %
EOSINOPHILS ABSOLUTE: 0.2 THOU/MM3 (ref 0–0.4)
ERYTHROCYTE [DISTWIDTH] IN BLOOD BY AUTOMATED COUNT: 18.5 % (ref 11.5–14.5)
HCT VFR BLD AUTO: 40.5 % (ref 37–47)
HGB BLD-MCNC: 12.3 GM/DL (ref 12–16)
IMM GRANULOCYTES # BLD AUTO: 0.01 THOU/MM3 (ref 0–0.07)
IMM GRANULOCYTES NFR BLD AUTO: 0.2 %
LYMPHOCYTES ABSOLUTE: 2.2 THOU/MM3 (ref 1–4.8)
LYMPHOCYTES NFR BLD AUTO: 39.5 %
MCH RBC QN AUTO: 31 PG (ref 26–33)
MCHC RBC AUTO-ENTMCNC: 30.4 GM/DL (ref 32.2–35.5)
MCV RBC AUTO: 102 FL (ref 81–99)
MONOCYTES ABSOLUTE: 0.5 THOU/MM3 (ref 0.4–1.3)
MONOCYTES NFR BLD AUTO: 9.1 %
NEUTROPHILS ABSOLUTE: 2.5 THOU/MM3 (ref 1.8–7.7)
NEUTROPHILS NFR BLD AUTO: 46.3 %
NRBC BLD AUTO-RTO: 0 /100 WBC
PLATELET # BLD AUTO: 166 THOU/MM3 (ref 130–400)
PLATELET BLD QL SMEAR: ADEQUATE
PMV BLD AUTO: 10.8 FL (ref 9.4–12.4)
RBC # BLD AUTO: 3.97 MILL/MM3 (ref 4.2–5.4)
SCAN OF BLOOD SMEAR: NORMAL
WBC # BLD AUTO: 5.5 THOU/MM3 (ref 4.8–10.8)

## 2024-06-10 LAB — CANCER AG27-29 SERPL-ACNC: 32 U/ML (ref 0–38)

## 2024-06-13 ENCOUNTER — HOSPITAL ENCOUNTER (OUTPATIENT)
Dept: MAMMOGRAPHY | Age: 84
Discharge: HOME | End: 2024-06-13
Payer: MEDICARE

## 2024-06-13 DIAGNOSIS — Z12.31 VISIT FOR SCREENING MAMMOGRAM: ICD-10-CM

## 2024-06-13 PROCEDURE — 77063 BREAST TOMOSYNTHESIS BI: CPT

## 2024-07-19 ENCOUNTER — OFFICE VISIT (OUTPATIENT)
Dept: NEUROLOGY | Age: 84
End: 2024-07-19
Payer: MEDICARE

## 2024-07-19 VITALS
HEART RATE: 64 BPM | HEIGHT: 58 IN | SYSTOLIC BLOOD PRESSURE: 134 MMHG | DIASTOLIC BLOOD PRESSURE: 70 MMHG | OXYGEN SATURATION: 97 % | WEIGHT: 99.6 LBS | BODY MASS INDEX: 20.91 KG/M2

## 2024-07-19 DIAGNOSIS — R26.89 BALANCE PROBLEM: Primary | ICD-10-CM

## 2024-07-19 DIAGNOSIS — H53.2 DOUBLE VISION: ICD-10-CM

## 2024-07-19 DIAGNOSIS — Z86.73 HISTORY OF STROKE: ICD-10-CM

## 2024-07-19 PROCEDURE — 1123F ACP DISCUSS/DSCN MKR DOCD: CPT | Performed by: NURSE PRACTITIONER

## 2024-07-19 PROCEDURE — G8420 CALC BMI NORM PARAMETERS: HCPCS | Performed by: NURSE PRACTITIONER

## 2024-07-19 PROCEDURE — 1036F TOBACCO NON-USER: CPT | Performed by: NURSE PRACTITIONER

## 2024-07-19 PROCEDURE — 3078F DIAST BP <80 MM HG: CPT | Performed by: NURSE PRACTITIONER

## 2024-07-19 PROCEDURE — G8400 PT W/DXA NO RESULTS DOC: HCPCS | Performed by: NURSE PRACTITIONER

## 2024-07-19 PROCEDURE — 99213 OFFICE O/P EST LOW 20 MIN: CPT | Performed by: NURSE PRACTITIONER

## 2024-07-19 PROCEDURE — 1090F PRES/ABSN URINE INCON ASSESS: CPT | Performed by: NURSE PRACTITIONER

## 2024-07-19 PROCEDURE — G8427 DOCREV CUR MEDS BY ELIG CLIN: HCPCS | Performed by: NURSE PRACTITIONER

## 2024-07-19 PROCEDURE — 3075F SYST BP GE 130 - 139MM HG: CPT | Performed by: NURSE PRACTITIONER

## 2024-07-19 NOTE — PROGRESS NOTES
NEUROLOGY OUT PATIENT FOLLOW UP NOTE:  7/19/20242:02 PM    Paola Titus is here for follow up for double vision           No Known Allergies    Current Outpatient Medications:     potassium chloride (KLOR-CON M) 20 MEQ extended release tablet, Take 1 tablet by mouth daily, Disp: 90 tablet, Rfl: 1    hydrALAZINE (APRESOLINE) 50 MG tablet, Take 0.5 tablets by mouth 3 times daily, Disp: , Rfl:     ferrous sulfate (IRON 325) 325 (65 Fe) MG tablet, Take 1 tablet by mouth daily (with breakfast), Disp: 30 tablet, Rfl: 3    Cyanocobalamin (VITAMIN B-12) 2000 MCG TBCR, Take by mouth, Disp: , Rfl:     pantoprazole (PROTONIX) 40 MG tablet, TAKE ONE TABLET, TWO TIMES A DAY, BY MOUTH., Disp: 180 tablet, Rfl: 0    valsartan (DIOVAN) 80 MG tablet, TAKE ONE TABLET DAILY, BY MOUTH., Disp: 90 tablet, Rfl: 3    folic acid (FOLVITE) 1 MG tablet, TAKE ONE TABLET DAILY, BY MOUTH., Disp: 90 tablet, Rfl: 3    gabapentin (NEURONTIN) 100 MG capsule, Take 1 capsule by mouth in the morning and at bedtime., Disp: , Rfl:     bumetanide (BUMEX) 1 MG tablet, Take 1 tablet by mouth daily, Disp: , Rfl:     Coenzyme Q10 (CO Q 10 PO), Take by mouth, Disp: , Rfl:     Probiotic Product (PROBIOTIC PO), Take 1 capsule by mouth nightly, Disp: , Rfl:     rosuvastatin (CRESTOR) 5 MG tablet, Take 1 tablet by mouth every evening Indications: Blood Cholesterol Abnormal, Disp: 90 tablet, Rfl: 3    warfarin (COUMADIN) 2 MG tablet, Take as instructed by the physician following the INR results, Disp: 30 tablet, Rfl: 0    acetaminophen (TYLENOL) 650 MG extended release tablet, Take 1 tablet by mouth 2 times daily as needed Indications: Arthritis, Disp: , Rfl:     Biotin 1000 MCG TABS, Take 1,000 mcg by mouth nightly , Disp: , Rfl:     I reviewed the past medical history, allergies, medications, social history and family history.       PE:   Vitals:    07/19/24 1341   BP: 134/70   Site: Right Upper Arm   Position: Sitting   Cuff Size: Small Adult   Pulse: 64

## 2024-09-20 ENCOUNTER — HOSPITAL ENCOUNTER (OUTPATIENT)
Dept: MRI IMAGING | Age: 84
Discharge: HOME | End: 2024-09-20
Payer: MEDICARE

## 2024-09-20 DIAGNOSIS — M51.26 DISPLACEMENT OF LUMBAR INTERVERTEBRAL DISC: ICD-10-CM

## 2024-09-20 PROCEDURE — 72148 MRI LUMBAR SPINE W/O DYE: CPT

## 2024-10-02 ENCOUNTER — TELEPHONE (OUTPATIENT)
Dept: CARDIOLOGY CLINIC | Age: 84
End: 2024-10-02

## 2024-10-02 NOTE — TELEPHONE ENCOUNTER
Pt last seen by Dr. Mendoza 3-6-24.  Pt is needing carpal tunnel surgery with Dr. Enrique CARMICHAEL.  Pt is on Coumadin    Fax [phone]

## 2024-10-22 ENCOUNTER — HOSPITAL ENCOUNTER (OUTPATIENT)
Dept: GENERAL RADIOLOGY | Age: 84
Discharge: HOME | End: 2024-10-22
Payer: MEDICARE

## 2024-10-22 ENCOUNTER — HOSPITAL ENCOUNTER (OUTPATIENT)
Age: 84
Discharge: HOME | End: 2024-10-22
Payer: MEDICARE

## 2024-10-22 DIAGNOSIS — M41.9 MILD SCOLIOSIS: ICD-10-CM

## 2024-10-22 DIAGNOSIS — M48.062 LUMBAR STENOSIS WITH NEUROGENIC CLAUDICATION: ICD-10-CM

## 2024-10-22 DIAGNOSIS — M54.50 LOW BACK PAIN, UNSPECIFIED BACK PAIN LATERALITY, UNSPECIFIED CHRONICITY, UNSPECIFIED WHETHER SCIATICA PRESENT: ICD-10-CM

## 2024-10-22 PROCEDURE — 72110 X-RAY EXAM L-2 SPINE 4/>VWS: CPT

## 2024-10-24 NOTE — DISCHARGE INSTR - DIET
DIET CARDIAC     Good nutrition is important when healing from an illness, injury, or surgery. Follow any nutrition recommendations given to you during your hospital stay.  If you were given an oral nutrition supplement while in the hospital, continue to take this supplement at home. You can take it with meals, in-between meals, and/or before bedtime. These supplements can be purchased at most local grocery stores, pharmacies, and chain super-stores.  If you have any questions about your diet or nutrition, call the hospital and ask for the dietitian. · Be safe with medicines. If your doctor prescribed medicine, take it exactly as prescribed. Call your doctor if you think you are having a problem with your medicine. You will get more details on the specific medicines your doctor prescribes.
1-2x/week

## 2024-10-30 ENCOUNTER — TELEPHONE (OUTPATIENT)
Dept: CARDIOLOGY CLINIC | Age: 84
End: 2024-10-30

## 2024-10-30 DIAGNOSIS — I71.21 ANEURYSM OF ASCENDING AORTA WITHOUT RUPTURE (HCC): Primary | ICD-10-CM

## 2024-11-01 NOTE — TELEPHONE ENCOUNTER
CTA ordered for 11/05. Please keep open for results.  
Darrell.  
Per central scheduling, Cta scheduled at [facility] 11-05-24  
Pt verbalized understanding.   Order given to scheduling.  Left detailed message for Regina that CT Scan will be ordered.    Keep open to follow results and call Regina back with update.         
Regina called from preop and patient was cleared for carpal tunnel surgery.   She was reviewing chart and is following up regarding thoracic aneurysm.   Last CT of chest I can see- 1/7/2020-THORACIC AORTA:   1. 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    The surgery team is asking if a CT Scan of her chest could be ordered to check aneurysm size before surgery is done.     We need to call Regina back at [phone] with Dr. Mendoza's recommendations.           
no

## 2024-11-05 ENCOUNTER — HOSPITAL ENCOUNTER (OUTPATIENT)
Age: 84
Discharge: HOME | End: 2024-11-05
Attending: NUCLEAR MEDICINE
Payer: MEDICARE

## 2024-11-05 ENCOUNTER — HOSPITAL ENCOUNTER (OUTPATIENT)
Dept: CT IMAGING | Age: 84
Discharge: HOME | End: 2024-11-05
Attending: NUCLEAR MEDICINE
Payer: MEDICARE

## 2024-11-05 DIAGNOSIS — I71.21 ANEURYSM OF ASCENDING AORTA WITHOUT RUPTURE (HCC): ICD-10-CM

## 2024-11-05 LAB
CREAT BLD-MCNC: 1.6 MG/DL (ref 0.5–1.2)
GFR SERPL CREATININE-BSD FRML MDRD: 31 ML/MIN/1.73M2

## 2024-11-05 PROCEDURE — 82565 ASSAY OF CREATININE: CPT

## 2024-11-05 PROCEDURE — 6360000004 HC RX CONTRAST MEDICATION: Performed by: NUCLEAR MEDICINE

## 2024-11-05 PROCEDURE — 71275 CT ANGIOGRAPHY CHEST: CPT

## 2024-11-05 RX ORDER — IOPAMIDOL 755 MG/ML
100 INJECTION, SOLUTION INTRAVASCULAR
Status: COMPLETED | OUTPATIENT
Start: 2024-11-05 | End: 2024-11-05

## 2024-11-05 RX ADMIN — IOPAMIDOL 100 ML: 755 INJECTION, SOLUTION INTRAVENOUS at 15:47

## 2024-11-07 ENCOUNTER — HOSPITAL ENCOUNTER (OUTPATIENT)
Age: 84
Discharge: HOME | End: 2024-11-07
Payer: MEDICARE

## 2024-11-07 LAB
CREAT SERPL-MCNC: 1.4 MG/DL (ref 0.4–1.2)
GFR SERPL CREATININE-BSD FRML MDRD: 37 ML/MIN/1.73M2

## 2024-11-07 PROCEDURE — 36415 COLL VENOUS BLD VENIPUNCTURE: CPT

## 2024-11-07 PROCEDURE — 82565 ASSAY OF CREATININE: CPT

## 2025-02-19 ENCOUNTER — HOSPITAL ENCOUNTER (OUTPATIENT)
Age: 85
Discharge: HOME | End: 2025-02-19
Payer: MEDICARE

## 2025-02-19 DIAGNOSIS — N18.32 CHRONIC KIDNEY DISEASE, STAGE 3B (HCC): ICD-10-CM

## 2025-02-19 DIAGNOSIS — I12.9 HYPERTENSIVE RENAL DISEASE, STAGE 1 THROUGH STAGE 4 OR UNSPECIFIED CHRONIC KIDNEY DISEASE: ICD-10-CM

## 2025-02-19 LAB
25(OH)D3 SERPL-MCNC: 86 NG/ML (ref 30–100)
ANION GAP SERPL CALC-SCNC: 12 MEQ/L (ref 8–16)
BUN SERPL-MCNC: 32 MG/DL (ref 7–22)
CALCIUM SERPL-MCNC: 10.6 MG/DL (ref 8.2–9.6)
CHLORIDE SERPL-SCNC: 105 MEQ/L (ref 98–111)
CO2 SERPL-SCNC: 25 MEQ/L (ref 23–33)
CREAT SERPL-MCNC: 1.5 MG/DL (ref 0.4–1.2)
CREAT UR-MCNC: 18.3 MG/DL
GFR SERPL CREATININE-BSD FRML MDRD: 34 ML/MIN/1.73M2
GLUCOSE SERPL-MCNC: 100 MG/DL (ref 70–108)
POTASSIUM SERPL-SCNC: 4.4 MEQ/L (ref 3.5–5.2)
PROT UR-MCNC: 10.9 MG/DL
PROT/CREAT 24H UR: 0.6 MG/G{CREAT}
SODIUM SERPL-SCNC: 142 MEQ/L (ref 135–145)

## 2025-02-19 PROCEDURE — 84156 ASSAY OF PROTEIN URINE: CPT

## 2025-02-19 PROCEDURE — 80048 BASIC METABOLIC PNL TOTAL CA: CPT

## 2025-02-19 PROCEDURE — 82306 VITAMIN D 25 HYDROXY: CPT

## 2025-02-19 PROCEDURE — 36415 COLL VENOUS BLD VENIPUNCTURE: CPT

## 2025-02-19 PROCEDURE — 82570 ASSAY OF URINE CREATININE: CPT

## 2025-02-25 ENCOUNTER — OFFICE VISIT (OUTPATIENT)
Dept: NEPHROLOGY | Age: 85
End: 2025-02-25
Payer: MEDICARE

## 2025-02-25 VITALS
DIASTOLIC BLOOD PRESSURE: 84 MMHG | HEART RATE: 88 BPM | BODY MASS INDEX: 21.53 KG/M2 | SYSTOLIC BLOOD PRESSURE: 144 MMHG | WEIGHT: 103 LBS | OXYGEN SATURATION: 98 %

## 2025-02-25 DIAGNOSIS — N18.32 CHRONIC KIDNEY DISEASE, STAGE 3B (HCC): Primary | ICD-10-CM

## 2025-02-25 DIAGNOSIS — I12.9 HYPERTENSIVE RENAL DISEASE, STAGE 1 THROUGH STAGE 4 OR UNSPECIFIED CHRONIC KIDNEY DISEASE: ICD-10-CM

## 2025-02-25 PROCEDURE — 1090F PRES/ABSN URINE INCON ASSESS: CPT | Performed by: INTERNAL MEDICINE

## 2025-02-25 PROCEDURE — G8427 DOCREV CUR MEDS BY ELIG CLIN: HCPCS | Performed by: INTERNAL MEDICINE

## 2025-02-25 PROCEDURE — 99213 OFFICE O/P EST LOW 20 MIN: CPT | Performed by: INTERNAL MEDICINE

## 2025-02-25 PROCEDURE — 3079F DIAST BP 80-89 MM HG: CPT | Performed by: INTERNAL MEDICINE

## 2025-02-25 PROCEDURE — 1123F ACP DISCUSS/DSCN MKR DOCD: CPT | Performed by: INTERNAL MEDICINE

## 2025-02-25 PROCEDURE — 3077F SYST BP >= 140 MM HG: CPT | Performed by: INTERNAL MEDICINE

## 2025-02-25 PROCEDURE — G8400 PT W/DXA NO RESULTS DOC: HCPCS | Performed by: INTERNAL MEDICINE

## 2025-02-25 PROCEDURE — G8420 CALC BMI NORM PARAMETERS: HCPCS | Performed by: INTERNAL MEDICINE

## 2025-02-25 PROCEDURE — 1036F TOBACCO NON-USER: CPT | Performed by: INTERNAL MEDICINE

## 2025-02-25 PROCEDURE — 1159F MED LIST DOCD IN RCRD: CPT | Performed by: INTERNAL MEDICINE

## 2025-02-25 NOTE — PROGRESS NOTES
[facility] [place] [facility]  [facility] KIDNEY AND [facility]YPERT[facility]  [address]  [address]  [place] [address]  Dept: [phone]  Loc: [phone]  Office Progress Note  2/25/2025 2:26 PM      Pt Name:    Paola Titus  YOB: 1940  Primary Care Physician:  Renuka Rosas, APRN - CNP     Chief Complaint:   CKD IIIb     Background Information/Interval History:   85 yo white female with hx CKD III, HTN, breast cancer s/p lumpectomy and radiation about 10 years ago (Dr. Ruff) who I am seeing for follow-up. She was previously a patient of Dr. Dennis. She reports lot of heart issues in the past. She reports hx atrial fibrillation and moderate MR and AR. She had US in July 2020 which showed about 8 cm kidneys with simple renal cysts. She does report hx UTIs in the past.     Here for 1 year follow-up. Doing alright. No sp complaints. Here with her brother. Says Bp at home is in 130s.      Past History:  Past Medical History:   Diagnosis Date    Abnormal mammogram 7/6/2020    Acute respiratory failure with hypoxia (HCC)     Arthritis     Atrial fibrillation (HCC)     Bacterial pneumonia     Bilateral pleural effusion     Breast cancer (HCC) 2010    Lumpectomy    CAD (coronary artery disease)     Cancer (HCC)     BREAST    Chronic kidney disease     Closed displaced intertrochanteric fracture of left femur, initial encounter ([facility]) 4/26/2023    History of therapeutic radiation 2011    left IDC    HTN (hypertension) 4/13/2016    Hypertension     Mass of left breast 7/6/2020    Metabolic acidosis     Mixed hyperlipidemia 7/6/2020    Nausea & vomiting     Neuromuscular disorder (HCC)     Osteopenia     Pneumonia of both lungs due to infectious organism     Rheumatic fever     as a child    Sinus infection     Thyroid disease      Past Surgical History:   Procedure Laterality Date    BREAST LUMPECTOMY Left 2011    BRONCHOSCOPY N/A 01/05/2020    BRONCHOSCOPY performed by Calos Stephenson,

## 2025-03-12 ENCOUNTER — OFFICE VISIT (OUTPATIENT)
Dept: CARDIOLOGY CLINIC | Age: 85
End: 2025-03-12
Payer: MEDICARE

## 2025-03-12 ENCOUNTER — TELEPHONE (OUTPATIENT)
Dept: CARDIOLOGY CLINIC | Age: 85
End: 2025-03-12

## 2025-03-12 VITALS
HEART RATE: 75 BPM | SYSTOLIC BLOOD PRESSURE: 153 MMHG | WEIGHT: 100 LBS | DIASTOLIC BLOOD PRESSURE: 83 MMHG | BODY MASS INDEX: 20.99 KG/M2 | HEIGHT: 58 IN

## 2025-03-12 DIAGNOSIS — I48.0 PAROXYSMAL ATRIAL FIBRILLATION (HCC): Primary | ICD-10-CM

## 2025-03-12 DIAGNOSIS — I10 PRIMARY HYPERTENSION: ICD-10-CM

## 2025-03-12 DIAGNOSIS — I50.33 ACUTE ON CHRONIC HEART FAILURE WITH PRESERVED EJECTION FRACTION (HCC): ICD-10-CM

## 2025-03-12 PROCEDURE — 93000 ELECTROCARDIOGRAM COMPLETE: CPT | Performed by: NUCLEAR MEDICINE

## 2025-03-12 PROCEDURE — 1090F PRES/ABSN URINE INCON ASSESS: CPT | Performed by: NUCLEAR MEDICINE

## 2025-03-12 PROCEDURE — 99214 OFFICE O/P EST MOD 30 MIN: CPT | Performed by: NUCLEAR MEDICINE

## 2025-03-12 PROCEDURE — G8427 DOCREV CUR MEDS BY ELIG CLIN: HCPCS | Performed by: NUCLEAR MEDICINE

## 2025-03-12 PROCEDURE — 1159F MED LIST DOCD IN RCRD: CPT | Performed by: NUCLEAR MEDICINE

## 2025-03-12 PROCEDURE — 3077F SYST BP >= 140 MM HG: CPT | Performed by: NUCLEAR MEDICINE

## 2025-03-12 PROCEDURE — 1036F TOBACCO NON-USER: CPT | Performed by: NUCLEAR MEDICINE

## 2025-03-12 PROCEDURE — 3079F DIAST BP 80-89 MM HG: CPT | Performed by: NUCLEAR MEDICINE

## 2025-03-12 PROCEDURE — G8400 PT W/DXA NO RESULTS DOC: HCPCS | Performed by: NUCLEAR MEDICINE

## 2025-03-12 PROCEDURE — G8420 CALC BMI NORM PARAMETERS: HCPCS | Performed by: NUCLEAR MEDICINE

## 2025-03-12 PROCEDURE — 1123F ACP DISCUSS/DSCN MKR DOCD: CPT | Performed by: NUCLEAR MEDICINE

## 2025-03-12 RX ORDER — HYDRALAZINE HYDROCHLORIDE 25 MG/1
25 TABLET, FILM COATED ORAL 3 TIMES DAILY
COMMUNITY
Start: 2025-03-07

## 2025-03-12 RX ORDER — AMIODARONE HYDROCHLORIDE 200 MG/1
200 TABLET ORAL SEE ADMIN INSTRUCTIONS
Qty: 90 TABLET | Refills: 1 | Status: SHIPPED | OUTPATIENT
Start: 2025-03-12

## 2025-03-12 RX ORDER — LEVOTHYROXINE SODIUM 50 UG/1
50 TABLET ORAL DAILY
COMMUNITY
Start: 2025-03-07

## 2025-03-12 RX ORDER — AMIODARONE HYDROCHLORIDE 200 MG/1
200 TABLET ORAL SEE ADMIN INSTRUCTIONS
COMMUNITY
End: 2025-03-12 | Stop reason: SDUPTHER

## 2025-03-12 NOTE — TELEPHONE ENCOUNTER
Patient states she follows with [facility] Coumadin clinic.  Called [phone].  Per coumadin clinic patient follows with [place] office. They said to call [phone].  Called number. Office closed on Wednesday was able to leave a message stating Dr. Mendoza wanted Coumadin clinic to know patient is being started on Amiodarone.

## 2025-03-12 NOTE — PROGRESS NOTES
[facility] [place] [facility]  [facility] CARDIOLOGY  [address]  [address]  [place] [address]  Dept: [phone]  Dept Fax: [phone]  Loc: [phone]    Visit Date: 3/12/2025    Paola Titus is a 84 y.o. female who presents todayfor:  Chief Complaint   Patient presents with    Follow-up    Atrial Fibrillation    Hypertension     She thinks she is in A fib   Had cardioversion two years ago   She is maintained on coumadin all along   No stopping lately   Does have PAF to start with   Some more dyspnea  No chest pain  Some dizziness  No syncope  Some fatigue  BP is stable  No bleeding issues      HPI:  HPI  Past Medical History:   Diagnosis Date    Abnormal mammogram 7/6/2020    Acute respiratory failure with hypoxia (HCC)     Arthritis     Atrial fibrillation (HCC)     Bacterial pneumonia     Bilateral pleural effusion     Breast cancer (HCC) 2010    Lumpectomy    CAD (coronary artery disease)     Cancer (HCC)     BREAST    Chronic kidney disease     Closed displaced intertrochanteric fracture of left femur, initial encounter ([facility]) 4/26/2023    History of therapeutic radiation 2011    left IDC    HTN (hypertension) 4/13/2016    Hypertension     Mass of left breast 7/6/2020    Metabolic acidosis     Mixed hyperlipidemia 7/6/2020    Nausea & vomiting     Neuromuscular disorder (HCC)     Osteopenia     Pneumonia of both lungs due to infectious organism     Rheumatic fever     as a child    Sinus infection     Thyroid disease       Past Surgical History:   Procedure Laterality Date    BREAST LUMPECTOMY Left 2011    BRONCHOSCOPY N/A 01/05/2020    BRONCHOSCOPY performed by Calos Stephenson MD at [facility]    BUNIONECTOMY Left 2009    CARDIAC CATHETERIZATION  2010    CARDIOVERSION      DILATION AND CURETTAGE OF UTERUS      X2; SEVERAL YEARS AGO    ENDOSCOPY, COLON, DIAGNOSTIC      HIP SURGERY Left 04/28/2023    LEFT INTERTAN performed by Singh Houser MD at [facility] OR    JOINT REPLACEMENT

## 2025-03-20 NOTE — PRE-PROCEDURE INSTRUCTIONS
Notified of Cardioversion procedure arrival time 1300 on Monday  [place] on 2nd floor [facility]  NPO after midnight  Bring drivers license and insurance information  Wear comfortable clean clothes  Shower morning of and night before with liquid antibacterial soap  Remove jewelry   Bring medications in original bottles - may take am meds with small amount of water.    Do not take any diabetic meds day of procedure.  Made aware of visitors limit to 2 at a time  Follow all instructions given by your physician  Please notify doctor office if you need to cancel or reschedule your procedure   needed at discharge  If you use CPap or BiPap for sleep apnea, please bring machine with you  Leave valuables at home

## 2025-03-21 ENCOUNTER — PREP FOR PROCEDURE (OUTPATIENT)
Dept: CARDIOLOGY | Age: 85
End: 2025-03-21

## 2025-03-21 RX ORDER — SODIUM CHLORIDE 0.9 % (FLUSH) 0.9 %
5-40 SYRINGE (ML) INJECTION EVERY 12 HOURS SCHEDULED
Status: CANCELLED | OUTPATIENT
Start: 2025-03-21

## 2025-03-21 RX ORDER — SODIUM CHLORIDE 0.9 % (FLUSH) 0.9 %
5-40 SYRINGE (ML) INJECTION PRN
Status: CANCELLED | OUTPATIENT
Start: 2025-03-21

## 2025-03-21 RX ORDER — SODIUM CHLORIDE 9 MG/ML
INJECTION, SOLUTION INTRAVENOUS PRN
Status: CANCELLED | OUTPATIENT
Start: 2025-03-21

## 2025-03-24 ENCOUNTER — HOSPITAL ENCOUNTER (OUTPATIENT)
Age: 85
Discharge: HOME | End: 2025-03-26
Attending: NUCLEAR MEDICINE
Payer: MEDICARE

## 2025-03-24 VITALS
WEIGHT: 100 LBS | RESPIRATION RATE: 15 BRPM | HEIGHT: 58 IN | HEART RATE: 58 BPM | DIASTOLIC BLOOD PRESSURE: 51 MMHG | BODY MASS INDEX: 20.99 KG/M2 | OXYGEN SATURATION: 93 % | TEMPERATURE: 97.5 F | SYSTOLIC BLOOD PRESSURE: 114 MMHG

## 2025-03-24 DIAGNOSIS — I48.0 PAROXYSMAL ATRIAL FIBRILLATION (HCC): ICD-10-CM

## 2025-03-24 DIAGNOSIS — I50.33 ACUTE ON CHRONIC HEART FAILURE WITH PRESERVED EJECTION FRACTION (HCC): ICD-10-CM

## 2025-03-24 DIAGNOSIS — I10 PRIMARY HYPERTENSION: ICD-10-CM

## 2025-03-24 LAB
ANION GAP SERPL CALC-SCNC: 14 MEQ/L (ref 8–16)
APTT PPP: 52.3 SECONDS (ref 22–38)
BUN SERPL-MCNC: 28 MG/DL (ref 8–23)
CALCIUM SERPL-MCNC: 10.7 MG/DL (ref 8.8–10.2)
CHLORIDE SERPL-SCNC: 103 MEQ/L (ref 98–111)
CO2 SERPL-SCNC: 23 MEQ/L (ref 22–29)
CREAT SERPL-MCNC: 1.7 MG/DL (ref 0.5–0.9)
DEPRECATED RDW RBC AUTO: 55.1 FL (ref 35–45)
ECHO BSA: 1.36 M2
EKG ATRIAL RATE: 304 BPM
EKG ATRIAL RATE: 57 BPM
EKG P AXIS: 129 DEGREES
EKG P AXIS: 59 DEGREES
EKG P-R INTERVAL: 342 MS
EKG Q-T INTERVAL: 422 MS
EKG Q-T INTERVAL: 502 MS
EKG QRS DURATION: 90 MS
EKG QRS DURATION: 96 MS
EKG QTC CALCULATION (BAZETT): 474 MS
EKG QTC CALCULATION (BAZETT): 488 MS
EKG R AXIS: -24 DEGREES
EKG R AXIS: 85 DEGREES
EKG T AXIS: 33 DEGREES
EKG T AXIS: 33 DEGREES
EKG VENTRICULAR RATE: 57 BPM
EKG VENTRICULAR RATE: 76 BPM
ERYTHROCYTE [DISTWIDTH] IN BLOOD BY AUTOMATED COUNT: 14.5 % (ref 11.5–14.5)
GFR SERPL CREATININE-BSD FRML MDRD: 29 ML/MIN/1.73M2
GLUCOSE SERPL-MCNC: 98 MG/DL (ref 74–109)
HCT VFR BLD AUTO: 43.9 % (ref 37–47)
HGB BLD-MCNC: 13.7 GM/DL (ref 12–16)
INR PPP: 4.38 (ref 0.85–1.13)
MCH RBC QN AUTO: 32.2 PG (ref 26–33)
MCHC RBC AUTO-ENTMCNC: 31.2 GM/DL (ref 32.2–35.5)
MCV RBC AUTO: 103.1 FL (ref 81–99)
PLATELET # BLD AUTO: 209 THOU/MM3 (ref 130–400)
PMV BLD AUTO: 9.8 FL (ref 9.4–12.4)
POTASSIUM SERPL-SCNC: 4.2 MEQ/L (ref 3.5–5.2)
RBC # BLD AUTO: 4.26 MILL/MM3 (ref 4.2–5.4)
SODIUM SERPL-SCNC: 140 MEQ/L (ref 135–145)
WBC # BLD AUTO: 6.5 THOU/MM3 (ref 4.8–10.8)

## 2025-03-24 PROCEDURE — 85610 PROTHROMBIN TIME: CPT

## 2025-03-24 PROCEDURE — 93005 ELECTROCARDIOGRAM TRACING: CPT | Performed by: NUCLEAR MEDICINE

## 2025-03-24 PROCEDURE — 2500000003 HC RX 250 WO HCPCS: Performed by: NUCLEAR MEDICINE

## 2025-03-24 PROCEDURE — 80048 BASIC METABOLIC PNL TOTAL CA: CPT

## 2025-03-24 PROCEDURE — 92960 CARDIOVERSION ELECTRIC EXT: CPT

## 2025-03-24 PROCEDURE — 85730 THROMBOPLASTIN TIME PARTIAL: CPT

## 2025-03-24 PROCEDURE — 6360000002 HC RX W HCPCS: Performed by: NUCLEAR MEDICINE

## 2025-03-24 PROCEDURE — 85027 COMPLETE CBC AUTOMATED: CPT

## 2025-03-24 PROCEDURE — 93005 ELECTROCARDIOGRAM TRACING: CPT | Performed by: STUDENT IN AN ORGANIZED HEALTH CARE EDUCATION/TRAINING PROGRAM

## 2025-03-24 PROCEDURE — 7100000011 HC PHASE II RECOVERY - ADDTL 15 MIN: Performed by: NUCLEAR MEDICINE

## 2025-03-24 PROCEDURE — 7100000010 HC PHASE II RECOVERY - FIRST 15 MIN: Performed by: NUCLEAR MEDICINE

## 2025-03-24 PROCEDURE — 36415 COLL VENOUS BLD VENIPUNCTURE: CPT

## 2025-03-24 PROCEDURE — 2580000003 HC RX 258: Performed by: STUDENT IN AN ORGANIZED HEALTH CARE EDUCATION/TRAINING PROGRAM

## 2025-03-24 RX ORDER — NALOXONE HYDROCHLORIDE 0.4 MG/ML
INJECTION, SOLUTION INTRAMUSCULAR; INTRAVENOUS; SUBCUTANEOUS PRN
Status: COMPLETED | OUTPATIENT
Start: 2025-03-24 | End: 2025-03-24

## 2025-03-24 RX ORDER — MIDAZOLAM HYDROCHLORIDE 1 MG/ML
INJECTION, SOLUTION INTRAMUSCULAR; INTRAVENOUS PRN
Status: COMPLETED | OUTPATIENT
Start: 2025-03-24 | End: 2025-03-24

## 2025-03-24 RX ORDER — SODIUM CHLORIDE 9 MG/ML
INJECTION, SOLUTION INTRAVENOUS PRN
Status: DISCONTINUED | OUTPATIENT
Start: 2025-03-24 | End: 2025-03-27 | Stop reason: HOSPADM

## 2025-03-24 RX ORDER — SODIUM CHLORIDE 0.9 % (FLUSH) 0.9 %
5-40 SYRINGE (ML) INJECTION EVERY 12 HOURS SCHEDULED
Status: DISCONTINUED | OUTPATIENT
Start: 2025-03-24 | End: 2025-03-27 | Stop reason: HOSPADM

## 2025-03-24 RX ORDER — FENTANYL CITRATE 50 UG/ML
INJECTION, SOLUTION INTRAMUSCULAR; INTRAVENOUS PRN
Status: COMPLETED | OUTPATIENT
Start: 2025-03-24 | End: 2025-03-24

## 2025-03-24 RX ORDER — SODIUM CHLORIDE 0.9 % (FLUSH) 0.9 %
5-40 SYRINGE (ML) INJECTION PRN
Status: DISCONTINUED | OUTPATIENT
Start: 2025-03-24 | End: 2025-03-27 | Stop reason: HOSPADM

## 2025-03-24 RX ORDER — FLUMAZENIL 0.1 MG/ML
INJECTION INTRAVENOUS PRN
Status: COMPLETED | OUTPATIENT
Start: 2025-03-24 | End: 2025-03-24

## 2025-03-24 RX ADMIN — FENTANYL CITRATE 25 MCG: 50 INJECTION, SOLUTION INTRAMUSCULAR; INTRAVENOUS at 15:12

## 2025-03-24 RX ADMIN — NALOXONE HYDROCHLORIDE 0.4 MG: 0.4 INJECTION, SOLUTION INTRAMUSCULAR; INTRAVENOUS; SUBCUTANEOUS at 15:15

## 2025-03-24 RX ADMIN — FLUMAZENIL 0.2 MG: 0.1 INJECTION INTRAVENOUS at 15:15

## 2025-03-24 RX ADMIN — MIDAZOLAM 1 MG: 1 INJECTION INTRAMUSCULAR; INTRAVENOUS at 15:09

## 2025-03-24 RX ADMIN — MIDAZOLAM 1 MG: 1 INJECTION INTRAMUSCULAR; INTRAVENOUS at 15:12

## 2025-03-24 RX ADMIN — SODIUM CHLORIDE 75 ML/HR: 0.9 INJECTION, SOLUTION INTRAVENOUS at 14:11

## 2025-03-24 RX ADMIN — FENTANYL CITRATE 25 MCG: 50 INJECTION, SOLUTION INTRAMUSCULAR; INTRAVENOUS at 15:09

## 2025-03-24 ASSESSMENT — PAIN SCALES - GENERAL: PAINLEVEL_OUTOF10: 0

## 2025-03-24 NOTE — PLAN OF CARE
Problem: Pain  Goal: Verbalizes/displays adequate comfort level or baseline comfort level  3/24/2025 1555 by Austen Henderson, RN  Outcome: Adequate for Discharge  3/24/2025 1435 by Austen Henderson, RN  Outcome: Progressing     Problem: Safety - Adult  Goal: Free from fall injury  3/24/2025 1555 by Austen Henderson, RN  Outcome: Adequate for Discharge  3/24/2025 1435 by Austen Henderson, RN  Outcome: Progressing     
  Problem: Pain  Goal: Verbalizes/displays adequate comfort level or baseline comfort level  Outcome: Progressing     Problem: Safety - Adult  Goal: Free from fall injury  Outcome: Progressing   Pt to have cardioversion  
36.8

## 2025-03-24 NOTE — H&P
[facility]  Sedation/Analgesia History & Physical    Pt Name: Paola Titus  Account number: [id number]  MRN: [id number]  YOB: 1940  Provider Performing Procedure: Car Rodriguez MD MD [facility]  Primary Care Physician: Renuka Rosas APRN - ARIELA  Date: 3/24/2025    PRE-PROCEDURE    Code Status: FULL CODE  Brief History/Pre-Procedure Diagnosis:   A fib      Consent: : I have discussed with the patient risks, benefits, and alternatives (and relevant risks, benefits, and side effects related to alternatives or not receiving care), and likelihood of the success.   The patient and/or representative appear to understand and agree to proceed.  The discussion encompasses risks, benefits, and side effects related to the alternatives and the risks related to not receiving the proposed care, treatment, and services.         MEDICAL HISTORY  []ASHD/ANGINA/MI/CHF   [x]Hypertension  []Diabetes  []Hyperlipidemia  []Smoking  []Family Hx of ASHD  []Additional information:       has a past medical history of Abnormal mammogram, Acute respiratory failure with hypoxia (HCC), Arthritis, Atrial fibrillation (HCC), Bacterial pneumonia, Bilateral pleural effusion, Breast cancer (HCC), CAD (coronary artery disease), Cancer (HCC), Chronic kidney disease, Closed displaced intertrochanteric fracture of left femur, initial encounter ([facility]), History of therapeutic radiation, HTN (hypertension), Hypertension, Mass of left breast, Metabolic acidosis, Mixed hyperlipidemia, Nausea & vomiting, Neuromuscular disorder (HCC), Osteopenia, Pneumonia of both lungs due to infectious organism, Rheumatic fever, Sinus infection, and Thyroid disease.    SURGICAL HISTORY   has a past surgical history that includes Bunionectomy (Left, 2009); Dilation and curettage of uterus; Cardiac catheterization (2010); Tonsillectomy; joint replacement (Right, 2010); Upper gastrointestinal endoscopy (N/A, 01/03/2020); bronchoscopy (N/A,

## 2025-03-24 NOTE — FLOWSHEET NOTE
Patient admitted to 2E09  Ambulatory for cardioversion.  Patient NPO. Patient accompanied by her son.  Vital signs obtained.   Assessment and data collection intiated.   Oriented to room.  Policies and procedures for 2E explained.   All questions answered with no further questions at this time.   Fall prevention and safety precautions discussed with patient.     Explained patients right to have family, representative or physician notified of their admission.  Patient has Declined for physician to be notified.  Patient has Declined for family/representative to be notified.

## 2025-03-24 NOTE — FLOWSHEET NOTE
03/24/25 1651   AVS Reviewed   AVS & discharge instructions reviewed with patient and/or representative? Yes   Reviewed instructions with Patient;Other (name and relationship in comment)  (son)   Level of Understanding Questions answered;Verbalized understanding

## 2025-03-24 NOTE — FLOWSHEET NOTE
Iv fluids stopped. Iv catheter removed. Telemetry off. Assisted pt with dressing for discharge to home post cardioversion. Dangled and up to br. Pt alert and cooperative. Son present. Questions addressed.

## 2025-03-24 NOTE — FLOWSHEET NOTE
Took over care of pt post cardioversion. Pt alert and cooperative. 0.9 normal saline cont. Denies pain or needs. Resting with easy resp.

## 2025-04-07 ENCOUNTER — OFFICE VISIT (OUTPATIENT)
Dept: CARDIOLOGY CLINIC | Age: 85
End: 2025-04-07
Payer: MEDICARE

## 2025-04-07 VITALS
HEIGHT: 59 IN | WEIGHT: 101 LBS | SYSTOLIC BLOOD PRESSURE: 168 MMHG | HEART RATE: 59 BPM | DIASTOLIC BLOOD PRESSURE: 71 MMHG | BODY MASS INDEX: 20.36 KG/M2

## 2025-04-07 DIAGNOSIS — I48.91 ATRIAL FIBRILLATION STATUS POST CARDIOVERSION (HCC): ICD-10-CM

## 2025-04-07 DIAGNOSIS — I10 PRIMARY HYPERTENSION: ICD-10-CM

## 2025-04-07 DIAGNOSIS — I48.0 PAROXYSMAL ATRIAL FIBRILLATION (HCC): Primary | ICD-10-CM

## 2025-04-07 PROCEDURE — G8420 CALC BMI NORM PARAMETERS: HCPCS | Performed by: PHYSICIAN ASSISTANT

## 2025-04-07 PROCEDURE — 1159F MED LIST DOCD IN RCRD: CPT | Performed by: PHYSICIAN ASSISTANT

## 2025-04-07 PROCEDURE — 99214 OFFICE O/P EST MOD 30 MIN: CPT | Performed by: PHYSICIAN ASSISTANT

## 2025-04-07 PROCEDURE — G8427 DOCREV CUR MEDS BY ELIG CLIN: HCPCS | Performed by: PHYSICIAN ASSISTANT

## 2025-04-07 PROCEDURE — 1090F PRES/ABSN URINE INCON ASSESS: CPT | Performed by: PHYSICIAN ASSISTANT

## 2025-04-07 PROCEDURE — 1123F ACP DISCUSS/DSCN MKR DOCD: CPT | Performed by: PHYSICIAN ASSISTANT

## 2025-04-07 PROCEDURE — 1036F TOBACCO NON-USER: CPT | Performed by: PHYSICIAN ASSISTANT

## 2025-04-07 NOTE — PROGRESS NOTES
(HCC)              Above findings and plan of care were discussed with patient in details, patient's questions were answered.     Disposition:      Follow up with Dr Mendoza as scheduled or sooner if needed    Assessment & Plan  1. Hypertension:  - Continue current medication regimen: amiodarone 200 mg daily, Bumex 1 mg daily, hydralazine 25 mg three times a day, potassium 20 mEq daily, rosuvastatin 5 mg at night, valsartan 80 mg daily, and Coumadin.  - Monitor blood pressure closely.  - Consider increasing hydralazine dosage to 50 mg if blood pressure remains consistently high.    2. Atrial fibrillation status post cardioversion:  - Maintain current medications.  - Monitor for any recurrence of arrhythmia.  - Report any symptoms of irregular rhythm immediately.       The patient (or guardian, if applicable) and other individuals in attendance with the patient were advised that Artificial Intelligence will be utilized during this visit to record, process the conversation to generate a clinical note, and support improvement of the AI technology. The patient (or guardian, if applicable) and other individuals in attendance at the appointment consented to the use of AI, including the recording.                   Devon Taveras MPAS, PAMaricarmenC

## 2025-04-25 ENCOUNTER — HOSPITAL ENCOUNTER (EMERGENCY)
Age: 85
Discharge: HOME | End: 2025-04-25
Attending: EMERGENCY MEDICINE
Payer: MEDICARE

## 2025-04-25 ENCOUNTER — APPOINTMENT (OUTPATIENT)
Dept: CT IMAGING | Age: 85
End: 2025-04-25
Payer: MEDICARE

## 2025-04-25 VITALS
BODY MASS INDEX: 21.2 KG/M2 | DIASTOLIC BLOOD PRESSURE: 59 MMHG | OXYGEN SATURATION: 93 % | RESPIRATION RATE: 16 BRPM | SYSTOLIC BLOOD PRESSURE: 162 MMHG | WEIGHT: 101 LBS | HEART RATE: 65 BPM | HEIGHT: 58 IN | TEMPERATURE: 98.6 F

## 2025-04-25 DIAGNOSIS — R42 DIZZINESS: Primary | ICD-10-CM

## 2025-04-25 LAB
ALBUMIN SERPL BCP-MCNC: 3.4 GM/DL (ref 3.4–5)
ALP SERPL-CCNC: 198 U/L (ref 46–116)
ALT SERPL W P-5'-P-CCNC: 10 U/L (ref 14–63)
ANION GAP SERPL CALC-SCNC: 7 MEQ/L (ref 8–16)
APTT: 40.8 SECONDS (ref 22–38)
AST SERPL W P-5'-P-CCNC: 16 U/L (ref 15–37)
BASOPHILS # BLD: 0.6 % (ref 0–3)
BASOPHILS ABSOLUTE: 0 THOU/MM3 (ref 0–0.1)
BILIRUB SERPL-MCNC: 0.6 MG/DL (ref 0.2–1)
BUN SERPL-MCNC: 31 MG/DL (ref 7–18)
CALCIUM SERPL-MCNC: 10 MG/DL (ref 8.5–10.1)
CHLORIDE SERPL-SCNC: 97 MEQ/L (ref 98–107)
CO2 SERPL-SCNC: 27 MEQ/L (ref 21–32)
CREAT SERPL-MCNC: 2.3 MG/DL (ref 0.6–1.3)
EOSINOPHILS ABSOLUTE: 0.1 THOU/MM3 (ref 0–0.5)
EOSINOPHILS RELATIVE PERCENT: 2.8 % (ref 0–4)
GFR SERPL CREATININE-BSD FRML MDRD: 20 ML/MIN/1.73M2
GLUCOSE SERPL-MCNC: 104 MG/DL (ref 74–106)
HCT VFR BLD CALC: 36.4 % (ref 37–47)
HEMOGLOBIN: 11.4 GM/DL (ref 12–16)
IMMATURE GRANS (ABS): 0 THOU/MM3 (ref 0–0.07)
IMMATURE GRANULOCYTES %: 0 %
INR BLD: 3.28 (ref 0.85–1.13)
LYMPHOCYTES # BLD AUTO: 18.6 % (ref 15–47)
LYMPHOCYTES ABSOLUTE: 1 THOU/MM3 (ref 1–4.8)
MAGNESIUM SERPL-MCNC: 2.2 MG/DL (ref 1.8–2.4)
MCH RBC QN AUTO: 32.1 PG (ref 26–32)
MCHC RBC AUTO-ENTMCNC: 31.3 GM/DL (ref 31–35)
MCV RBC AUTO: 102.5 FL (ref 81–99)
MONOCYTES: 0.6 THOU/MM3 (ref 0.3–1.3)
MONOCYTES: 11.1 % (ref 0–12)
NEUTROPHILS ABSOLUTE: 3.5 THOU/MM3 (ref 1.8–7.7)
PDW BLD-RTO: 14.7 % (ref 11.5–14.9)
PLATELET # BLD AUTO: 216 THOU/MM3 (ref 130–400)
PMV BLD AUTO: 9 FL (ref 9.4–12.4)
POTASSIUM SERPL-SCNC: 4.6 MEQ/L (ref 3.5–5.1)
PROT SERPL-MCNC: 7.4 GM/DL (ref 6.4–8.2)
RBC # BLD: 3.55 MILL/MM3 (ref 4.1–5.3)
SEG NEUTROPHILS: 66.5 % (ref 43–75)
SODIUM SERPL-SCNC: 131 MEQ/L (ref 136–145)
WBC # BLD: 5.3 THOU/MM3 (ref 4.8–10.8)

## 2025-04-25 PROCEDURE — 85610 PROTHROMBIN TIME: CPT

## 2025-04-25 PROCEDURE — 83735 ASSAY OF MAGNESIUM: CPT

## 2025-04-25 PROCEDURE — 85025 COMPLETE CBC W/AUTO DIFF WBC: CPT

## 2025-04-25 PROCEDURE — 70450 CT HEAD/BRAIN W/O DYE: CPT

## 2025-04-25 PROCEDURE — 80053 COMPREHEN METABOLIC PANEL: CPT

## 2025-04-25 PROCEDURE — 99284 EMERGENCY DEPT VISIT MOD MDM: CPT

## 2025-04-25 PROCEDURE — 6370000000 HC RX 637 (ALT 250 FOR IP): Performed by: EMERGENCY MEDICINE

## 2025-04-25 PROCEDURE — 85730 THROMBOPLASTIN TIME PARTIAL: CPT

## 2025-04-25 PROCEDURE — 93005 ELECTROCARDIOGRAM TRACING: CPT | Performed by: EMERGENCY MEDICINE

## 2025-04-25 RX ORDER — MECLIZINE HCL 25MG 25 MG/1
25 TABLET, CHEWABLE ORAL ONCE
Status: COMPLETED | OUTPATIENT
Start: 2025-04-25 | End: 2025-04-25

## 2025-04-25 RX ADMIN — MECLIZINE HYDROCHLORIDE 25 MG: 25 TABLET, CHEWABLE ORAL at 14:55

## 2025-04-25 ASSESSMENT — PAIN - FUNCTIONAL ASSESSMENT: PAIN_FUNCTIONAL_ASSESSMENT: NONE - DENIES PAIN

## 2025-04-25 NOTE — DISCHARGE INSTR - COC
Continuity of Care Form    Patient Name: Paola Titus   :  1940  MRN:  [id number]    Admit date:  2025  Discharge date:  ***    Code Status Order: Prior   Advance Directives:     Admitting Physician:  No admitting provider for patient encounter.  PCP: Renuka Rosas APRN - CNP    Discharging Nurse: ***  Discharging Hospital Unit/Room#: E7/E7  Discharging Unit Phone Number: ***    Emergency Contact:   Extended Emergency Contact Information  Primary Emergency Contact: ZACHMIMI   [facility]  Home Phone: [phone]  Relation: Child  Secondary Emergency Contact: Ellen Pichardo  Home Phone: [phone]  Relation: Other  Preferred language: English    Past Surgical History:  Past Surgical History:   Procedure Laterality Date    BREAST LUMPECTOMY Left     BRONCHOSCOPY N/A 2020    BRONCHOSCOPY performed by Calos Stephenson MD at [facility] Endoscopy    BUNIONECTOMY Left     CARDIAC CATHETERIZATION  2010    CARDIOVERSION      DILATION AND CURETTAGE OF UTERUS      X2; SEVERAL YEARS AGO    ENDOSCOPY, COLON, DIAGNOSTIC      HIP SURGERY Left 2023    LEFT INTERTAN performed by Singh Houser MD at [facility] OR    JOINT REPLACEMENT Right     KNEE    KS BX OF BREAST, NEEDLE CORE, IMAGE GUIDE Left 2020    benign    KS BX OF BREAST, NEEDLE CORE, IMAGE GUIDE Left 2020    benign    KS BX OF BREAST, NEEDLE CORE, IMAGE GUIDE Left 2020    benign    KS BX OF BREAST, NEEDLE CORE, IMAGE GUIDE Left 2010    IDC     TONSILLECTOMY      TRANSESOPHAGEAL ECHOCARDIOGRAM N/A 2024    TRANSESOPHAGEAL ECHOCARDIOGRAM performed by Car Rodriguez MD at [facility] ENDOSCOPY    UPPER GASTROINTESTINAL ENDOSCOPY N/A 2020    EGD ESOPHAGOGASTRODUODENOSCOPY performed by Charleen Farmer MD at [facility] Endoscopy    UPPER GASTROINTESTINAL ENDOSCOPY N/A 2020    EGD DIAGNOSTIC ONLY performed by Charleen Farmer MD at [facility] Endoscopy       Immunization History:   Immunization History  of breast ([facility]) C50.919    Mixed hyperlipidemia E78.2    Osteoarthritis of left ankle 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    Closed fracture of left hip ([facility]) S72.002A    Double vision H53.2    History of gastric ulcer Z87.11    Elevated TSH R79.89    Balance problem R26.89    Secondary hypercoagulable state D68.69    Toxic metabolic encephalopathy G92.8    Altered mental status R41.82    (HFpEF) heart failure with preserved ejection fraction (HCC) I50.30    Acute on chronic heart failure with preserved ejection fraction (HCC) I50.33    Paroxysmal atrial fibrillation ([facility]) I48.0       Isolation/Infection:   Isolation            No Isolation          Patient Infection Status    No active infections.   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 01/06/20 Renuka Kuo RN                         Nurse Assessment:  Last Vital Signs: BP (!) 162/59   Pulse 65   Temp 98.6 °F (37 °C) (Temporal)   Resp 16   Ht 1.473 m (4' 10\")   Wt 45.8 kg (101 lb)   SpO2 93%   BMI 21.11 kg/m²     Last documented pain score (0-10 scale):    Last Weight:   Wt Readings from Last 1 Encounters:   04/25/25 45.8 kg (101 lb)     Mental Status:  {IP PT MENTAL STATUS:20030}    IV Access:  { NORTH IV ACCESS:594668762}    Nursing Mobility/ADLs:  Walking   {CHP DME ADLs:516874972}  Transfer  {CHP DME ADLs:346707655}  Bathing  {CHP DME ADLs:054497913}  Dressing  {CHP DME ADLs:338574980}  Toileting  {CHP DME ADLs:623362272}  Feeding  {CHP DME ADLs:360069511}  Med Admin  {CHP DME ADLs:742488164}  Med Delivery   { NORTH MED Delivery:803200931}    Wound Care Documentation and Therapy:        Elimination:  Continence:   Bowel: {YES / NO:19727}  Bladder: {YES / NO:19727}  Urinary Catheter: {Urinary Catheter:116976011}   Colostomy/Ileostomy/Ileal Conduit: {YES /

## 2025-04-25 NOTE — ED PROVIDER NOTES
[facility] Emergency Department  [address]  [facility] [address]  Phone: [phone]  EMERGENCY DEPARTMENT ENCOUNTER      Pt Name: Paola Titus  MRN: [id number]  Birthdate 1940  Date of evaluation: 4/25/2025  Provider: Ja Abarca MD    CHIEF COMPLAINT       Chief Complaint   Patient presents with    Dizziness         HISTORY OF PRESENT ILLNESS      Paola Titus is a 85 y.o. female who presents to the emergency department with above noted complaint.  Patient has chronic recurrent dizzy episodes.  She says it is worse the last day or so.  Denies other symptoms headache neck pain chest pain abdominal pain vomiting or other problems.        REVIEW OF SYSTEMS     Dizziness without weakness or fall trauma  Review of Systems  All systems negative except as marked.     PAST MEDICAL HISTORY     Past Medical History:   Diagnosis Date    Abnormal mammogram 7/6/2020    Acute respiratory failure with hypoxia (HCC)     Arthritis     Atrial fibrillation (HCC)     Bacterial pneumonia     Bilateral pleural effusion     Breast cancer (HCC) 2010    Lumpectomy    CAD (coronary artery disease)     Cancer (HCC)     BREAST    Chronic kidney disease     Closed displaced intertrochanteric fracture of left femur, initial encounter ([facility]) 4/26/2023    History of therapeutic radiation 2011    left IDC    HTN (hypertension) 4/13/2016    Hypertension     Mass of left breast 7/6/2020    Metabolic acidosis     Mixed hyperlipidemia 7/6/2020    Nausea & vomiting     Neuromuscular disorder (HCC)     Osteopenia     Pneumonia of both lungs due to infectious organism     Rheumatic fever     as a child    Sinus infection     Thyroid disease          SURGICAL HISTORY       Past Surgical History:   Procedure Laterality Date    BREAST LUMPECTOMY Left 2011    BRONCHOSCOPY N/A 01/05/2020    BRONCHOSCOPY performed by Calos Stephenson MD at [facility] Endoscopy    BUNIONECTOMY Left 2009    CARDIAC CATHETERIZATION  2010     EKG      Imaging that is independently reviewed with the radiologist report, not interpreted by me are:   CT head without contrast    Imaging that is independently reviewed and interpreted by me as:  Not applicable    See more data below for the lab and radiology tests and orders.    Medical Decision Making  Amount and/or Complexity of Data Reviewed  Labs: ordered.  Radiology: ordered.  ECG/medicine tests: ordered.        3)  Treatment and Disposition    Patient repeat assessment:      Disposition discussion with patient/family:  Family at bedside son-in-law    Case discussed with a consulting clinician:  No    Social determinants of health impacting treatment or disposition:  Not applicable    Shared Decision Making:  Not applicable    Code Status Discussion:  Not applicable      FINAL  REASSESSMENT   4:05 PM     Labs and scans are otherwise unremarkable though creatinine is slightly elevated.  She is on Bumex.  Could be a function of some dizziness or orthostatic issues.  Will hold her Bumex for the next couple days until she can have follow-up with her primary care.  Sounds like this been a chronic recurrent episode.  She is not a good candidate for any other neurological intervention such as tPA, further blood thinners surgery etc.  She is already on Coumadin and is therapeutic.  She has seen a doctor in the past in regards to dizziness although that was over a year ago.  I encouraged her to follow-up with your primary care this neurology team.    CRITICAL CARE TIME   None    PROCEDURES:  None  Procedures     FINAL IMPRESSION      1. Dizziness          DISPOSITION/PLAN     DISPOSITION Decision To Discharge 04/25/2025 03:39:38 PM   DISPOSITION CONDITION Stable           PATIENT REFERRED TO:  Renuka Rosas, APRN - CNP  [address] [facility] [address]  [phone]      Follow up from ER condition      DISCHARGE MEDICATIONS:  Current Discharge Medication List          (Please note that portions

## 2025-04-25 NOTE — DISCHARGE INSTRUCTIONS
Do not take your Bumex to the next 2 days.  Continue all other medications.  Monitor for worsening symptoms or progression.  Use a walker if you feel unstable on your feet or feel off balance.  Get up slowly from a seated position to make sure your blood pressure does not drop.

## 2025-04-26 LAB
EKG ATRIAL RATE: 63 BPM
EKG P AXIS: 46 DEGREES
EKG P-R INTERVAL: 292 MS
EKG Q-T INTERVAL: 448 MS
EKG QRS DURATION: 94 MS
EKG QTC CALCULATION (BAZETT): 458 MS
EKG R AXIS: -19 DEGREES
EKG T AXIS: 30 DEGREES
EKG VENTRICULAR RATE: 63 BPM

## 2025-04-26 PROCEDURE — 93010 ELECTROCARDIOGRAM REPORT: CPT | Performed by: INTERNAL MEDICINE

## 2025-05-13 ENCOUNTER — TELEPHONE (OUTPATIENT)
Dept: CARDIOLOGY CLINIC | Age: 85
End: 2025-05-13

## 2025-05-13 NOTE — TELEPHONE ENCOUNTER
Pt calling.  C/O increased SOB with activity.  She is not SOB at rest but any activity she states she has been struggling.  Denies any edema.  BP 8 am 134/60 10 am 170/(something).     Please advise.

## 2025-05-14 ENCOUNTER — APPOINTMENT (OUTPATIENT)
Dept: CT IMAGING | Age: 85
End: 2025-05-14
Payer: MEDICARE

## 2025-05-14 ENCOUNTER — APPOINTMENT (OUTPATIENT)
Dept: GENERAL RADIOLOGY | Age: 85
End: 2025-05-14
Payer: MEDICARE

## 2025-05-14 ENCOUNTER — HOSPITAL ENCOUNTER (INPATIENT)
Age: 85
LOS: 8 days | Discharge: HOME | End: 2025-05-23
Attending: EMERGENCY MEDICINE | Admitting: INTERNAL MEDICINE
Payer: MEDICARE

## 2025-05-14 DIAGNOSIS — I50.9 ACUTE ON CHRONIC CONGESTIVE HEART FAILURE, UNSPECIFIED HEART FAILURE TYPE (HCC): ICD-10-CM

## 2025-05-14 DIAGNOSIS — J96.01 ACUTE HYPOXIC RESPIRATORY FAILURE (HCC): ICD-10-CM

## 2025-05-14 DIAGNOSIS — I50.9 ACUTE CONGESTIVE HEART FAILURE, UNSPECIFIED HEART FAILURE TYPE (HCC): Primary | ICD-10-CM

## 2025-05-14 LAB
ALBUMIN SERPL BCG-MCNC: 3.7 G/DL (ref 3.4–4.9)
ALP SERPL-CCNC: 148 U/L (ref 38–126)
ALT SERPL W/O P-5'-P-CCNC: 11 U/L (ref 10–35)
ANION GAP SERPL CALC-SCNC: 13 MEQ/L (ref 8–16)
ARTERIAL PATENCY WRIST A: POSITIVE
AST SERPL-CCNC: 30 U/L (ref 10–35)
BASE EXCESS BLDA CALC-SCNC: 5.1 MMOL/L (ref -2.5–2.5)
BASOPHILS ABSOLUTE: 0 THOU/MM3 (ref 0–0.1)
BASOPHILS NFR BLD AUTO: 0.3 %
BDY SITE: ABNORMAL
BILIRUB CONJ SERPL-MCNC: 0.2 MG/DL (ref 0–0.2)
BILIRUB SERPL-MCNC: 0.7 MG/DL (ref 0.3–1.2)
BUN SERPL-MCNC: 43 MG/DL (ref 8–23)
CALCIUM SERPL-MCNC: 9.8 MG/DL (ref 8.8–10.2)
CHLORIDE SERPL-SCNC: 94 MEQ/L (ref 98–111)
CO2 SERPL-SCNC: 23 MEQ/L (ref 22–29)
COLLECTED BY:: ABNORMAL
CREAT SERPL-MCNC: 2.3 MG/DL (ref 0.5–0.9)
DEPRECATED RDW RBC AUTO: 55.7 FL (ref 35–45)
DEVICE: ABNORMAL
EOSINOPHIL NFR BLD AUTO: 1.7 %
EOSINOPHILS ABSOLUTE: 0.1 THOU/MM3 (ref 0–0.4)
ERYTHROCYTE [DISTWIDTH] IN BLOOD BY AUTOMATED COUNT: 15 % (ref 11.5–14.5)
FIO2 ON VENT O2 ANALYZER: 32 %
GFR SERPL CREATININE-BSD FRML MDRD: 20 ML/MIN/1.73M2
GLUCOSE SERPL-MCNC: 133 MG/DL (ref 74–109)
HCO3 BLDA-SCNC: 31 MMOL/L (ref 23–28)
HCT VFR BLD AUTO: 35.6 % (ref 37–47)
HGB BLD-MCNC: 11.1 GM/DL (ref 12–16)
IMM GRANULOCYTES # BLD AUTO: 0.04 THOU/MM3 (ref 0–0.07)
IMM GRANULOCYTES NFR BLD AUTO: 0.7 %
LIPASE SERPL-CCNC: 33 U/L (ref 13–60)
LYMPHOCYTES ABSOLUTE: 0.7 THOU/MM3 (ref 1–4.8)
LYMPHOCYTES NFR BLD AUTO: 11.4 %
MCH RBC QN AUTO: 31.7 PG (ref 26–33)
MCHC RBC AUTO-ENTMCNC: 31.2 GM/DL (ref 32.2–35.5)
MCV RBC AUTO: 101.7 FL (ref 81–99)
MONOCYTES ABSOLUTE: 0.6 THOU/MM3 (ref 0.4–1.3)
MONOCYTES NFR BLD AUTO: 10.7 %
NEUTROPHILS ABSOLUTE: 4.5 THOU/MM3 (ref 1.8–7.7)
NEUTROPHILS NFR BLD AUTO: 75.2 %
NRBC BLD AUTO-RTO: 0 /100 WBC
NT-PROBNP SERPL IA-MCNC: ABNORMAL PG/ML (ref 0–449)
OSMOLALITY SERPL CALC.SUM OF ELEC: 273.5 MOSMOL/KG (ref 275–300)
PCO2 BLDA: 52 MMHG (ref 35–45)
PH BLDA: 7.39 [PH] (ref 7.35–7.45)
PLATELET # BLD AUTO: 229 THOU/MM3 (ref 130–400)
PMV BLD AUTO: 9.4 FL (ref 9.4–12.4)
PO2 BLDA: 72 MMHG (ref 71–104)
POTASSIUM SERPL-SCNC: 5.3 MEQ/L (ref 3.5–5.2)
PROT SERPL-MCNC: 7.2 G/DL (ref 6.4–8.3)
RBC # BLD AUTO: 3.5 MILL/MM3 (ref 4.2–5.4)
SAO2 % BLDA: 94 %
SODIUM SERPL-SCNC: 130 MEQ/L (ref 135–145)
TROPONIN, HIGH SENSITIVITY: 61 NG/L (ref 0–12)
VENTILATION MODE VENT: ABNORMAL
WBC # BLD AUTO: 6 THOU/MM3 (ref 4.8–10.8)

## 2025-05-14 PROCEDURE — 83880 ASSAY OF NATRIURETIC PEPTIDE: CPT

## 2025-05-14 PROCEDURE — 93005 ELECTROCARDIOGRAM TRACING: CPT | Performed by: EMERGENCY MEDICINE

## 2025-05-14 PROCEDURE — 83690 ASSAY OF LIPASE: CPT

## 2025-05-14 PROCEDURE — 99285 EMERGENCY DEPT VISIT HI MDM: CPT

## 2025-05-14 PROCEDURE — 80076 HEPATIC FUNCTION PANEL: CPT

## 2025-05-14 PROCEDURE — 85610 PROTHROMBIN TIME: CPT

## 2025-05-14 PROCEDURE — 36415 COLL VENOUS BLD VENIPUNCTURE: CPT

## 2025-05-14 PROCEDURE — 80048 BASIC METABOLIC PNL TOTAL CA: CPT

## 2025-05-14 PROCEDURE — 85025 COMPLETE CBC W/AUTO DIFF WBC: CPT

## 2025-05-14 PROCEDURE — 84484 ASSAY OF TROPONIN QUANT: CPT

## 2025-05-14 PROCEDURE — 82803 BLOOD GASES ANY COMBINATION: CPT

## 2025-05-14 PROCEDURE — 71045 X-RAY EXAM CHEST 1 VIEW: CPT

## 2025-05-14 PROCEDURE — 36600 WITHDRAWAL OF ARTERIAL BLOOD: CPT

## 2025-05-14 RX ORDER — BUMETANIDE 0.25 MG/ML
2 INJECTION, SOLUTION INTRAMUSCULAR; INTRAVENOUS ONCE
Status: COMPLETED | OUTPATIENT
Start: 2025-05-15 | End: 2025-05-15

## 2025-05-14 ASSESSMENT — PAIN - FUNCTIONAL ASSESSMENT: PAIN_FUNCTIONAL_ASSESSMENT: NONE - DENIES PAIN

## 2025-05-14 NOTE — TELEPHONE ENCOUNTER
I called and talked to patient.   Appt scheduled on 5/21 at 1130 with Devon in [place].   She verbalized appt date, time and location.

## 2025-05-15 ENCOUNTER — APPOINTMENT (OUTPATIENT)
Age: 85
End: 2025-05-15
Payer: MEDICARE

## 2025-05-15 PROBLEM — E87.5 HYPERKALEMIA: Status: ACTIVE | Noted: 2025-05-15

## 2025-05-15 PROBLEM — R42 DIZZINESS: Status: ACTIVE | Noted: 2025-05-15

## 2025-05-15 PROBLEM — R30.0 DYSURIA: Status: ACTIVE | Noted: 2025-05-15

## 2025-05-15 PROBLEM — J96.01 ACUTE RESPIRATORY FAILURE WITH HYPOXIA (HCC): Status: ACTIVE | Noted: 2025-05-15

## 2025-05-15 LAB
ALBUMIN SERPL BCG-MCNC: 3.2 G/DL (ref 3.4–4.9)
ALP SERPL-CCNC: 134 U/L (ref 38–126)
ALT SERPL W/O P-5'-P-CCNC: < 5 U/L (ref 10–35)
ANION GAP SERPL CALC-SCNC: 10 MEQ/L (ref 8–16)
AST SERPL-CCNC: 14 U/L (ref 10–35)
BASOPHILS ABSOLUTE: 0 THOU/MM3 (ref 0–0.1)
BASOPHILS NFR BLD AUTO: 0.4 %
BILIRUB CONJ SERPL-MCNC: 0.4 MG/DL (ref 0–0.2)
BILIRUB SERPL-MCNC: 0.7 MG/DL (ref 0.3–1.2)
BILIRUB UR QL STRIP.AUTO: NEGATIVE
BUN SERPL-MCNC: 42 MG/DL (ref 8–23)
CALCIUM SERPL-MCNC: 10 MG/DL (ref 8.8–10.2)
CHARACTER UR: CLEAR
CHLORIDE SERPL-SCNC: 98 MEQ/L (ref 98–111)
CHOLEST SERPL-MCNC: 110 MG/DL (ref 100–199)
CO2 SERPL-SCNC: 26 MEQ/L (ref 22–29)
COLOR, UA: YELLOW
CREAT SERPL-MCNC: 2.2 MG/DL (ref 0.5–0.9)
DEPRECATED RDW RBC AUTO: 54.3 FL (ref 35–45)
ECHO AO ASC DIAM: 3.6 CM
ECHO AO ASCENDING AORTA INDEX: 2.67 CM/M2
ECHO AR MAX VEL PISA: 2.8 M/S
ECHO AV CUSP MM: 1.8 CM
ECHO AV MEAN GRADIENT: 9 MMHG
ECHO AV MEAN VELOCITY: 1.4 M/S
ECHO AV PEAK GRADIENT: 15 MMHG
ECHO AV PEAK VELOCITY: 1.9 M/S
ECHO AV REGURGITANT PHT: 580 MS
ECHO AV VELOCITY RATIO: 0.58
ECHO AV VTI: 44.5 CM
ECHO BSA: 1.36 M2
ECHO EST RA PRESSURE: 3 MMHG
ECHO LA AREA 2C: 32.7 CM2
ECHO LA AREA 4C: 37.1 CM2
ECHO LA DIAMETER INDEX: 2.81 CM/M2
ECHO LA DIAMETER: 3.8 CM
ECHO LA MAJOR AXIS: 7.8 CM
ECHO LA MINOR AXIS: 7.1 CM
ECHO LA VOL BP: 138 ML (ref 22–52)
ECHO LA VOL MOD A2C: 128 ML (ref 22–52)
ECHO LA VOL MOD A4C: 140 ML (ref 22–52)
ECHO LA VOL/BSA BIPLANE: 102 ML/M2 (ref 16–34)
ECHO LA VOLUME INDEX MOD A2C: 95 ML/M2 (ref 16–34)
ECHO LA VOLUME INDEX MOD A4C: 104 ML/M2 (ref 16–34)
ECHO LV E' LATERAL VELOCITY: 6 CM/S
ECHO LV E' SEPTAL VELOCITY: 3.7 CM/S
ECHO LV EDV A2C: 72 ML
ECHO LV EDV A4C: 85 ML
ECHO LV EDV INDEX A4C: 63 ML/M2
ECHO LV EDV NDEX A2C: 53 ML/M2
ECHO LV EJECTION FRACTION 3D: 55 %
ECHO LV EJECTION FRACTION A2C: 56 %
ECHO LV EJECTION FRACTION A4C: 55 %
ECHO LV EJECTION FRACTION BIPLANE: 55 % (ref 55–100)
ECHO LV ESV A2C: 32 ML
ECHO LV ESV A4C: 39 ML
ECHO LV ESV INDEX A2C: 24 ML/M2
ECHO LV ESV INDEX A4C: 29 ML/M2
ECHO LV FRACTIONAL SHORTENING: 35 % (ref 28–44)
ECHO LV INTERNAL DIMENSION DIASTOLE INDEX: 3.56 CM/M2
ECHO LV INTERNAL DIMENSION DIASTOLIC: 4.8 CM (ref 3.9–5.3)
ECHO LV INTERNAL DIMENSION SYSTOLIC INDEX: 2.3 CM/M2
ECHO LV INTERNAL DIMENSION SYSTOLIC: 3.1 CM
ECHO LV ISOVOLUMETRIC RELAXATION TIME (IVRT): 70 MS
ECHO LV IVSD: 1 CM (ref 0.6–0.9)
ECHO LV MASS 2D: 170.2 G (ref 67–162)
ECHO LV MASS INDEX 2D: 126.1 G/M2 (ref 43–95)
ECHO LV POSTERIOR WALL DIASTOLIC: 1 CM (ref 0.6–0.9)
ECHO LV RELATIVE WALL THICKNESS RATIO: 0.42
ECHO LVOT AV VTI INDEX: 0.53
ECHO LVOT MEAN GRADIENT: 2 MMHG
ECHO LVOT PEAK GRADIENT: 5 MMHG
ECHO LVOT PEAK VELOCITY: 1.1 M/S
ECHO LVOT VTI: 23.6 CM
ECHO MV A VELOCITY: 1.06 M/S
ECHO MV E DECELERATION TIME (DT): 548 MS
ECHO MV E VELOCITY: 1.8 M/S
ECHO MV E/A RATIO: 1.7
ECHO MV E/E' LATERAL: 30
ECHO MV E/E' RATIO (AVERAGED): 39.32
ECHO MV E/E' SEPTAL: 48.65
ECHO MV LVOT VTI INDEX: 3.42
ECHO MV MAX VELOCITY: 2.1 M/S
ECHO MV MEAN GRADIENT: 7 MMHG
ECHO MV MEAN VELOCITY: 1.2 M/S
ECHO MV PEAK GRADIENT: 18 MMHG
ECHO MV REGURGITANT PEAK GRADIENT: 55 MMHG
ECHO MV REGURGITANT PEAK VELOCITY: 3.7 M/S
ECHO MV VTI: 80.6 CM
ECHO PULMONARY ARTERY END DIASTOLIC PRESSURE: 7 MMHG
ECHO PV MAX VELOCITY: 0.4 M/S
ECHO PV PEAK GRADIENT: 1 MMHG
ECHO PV REGURGITANT MAX VELOCITY: 1.3 M/S
ECHO RIGHT VENTRICULAR SYSTOLIC PRESSURE (RVSP): 55 MMHG
ECHO RV INTERNAL DIMENSION: 3.1 CM
ECHO RV TAPSE: 1.7 CM (ref 1.7–?)
ECHO TV E WAVE: 0.5 M/S
ECHO TV REGURGITANT MAX VELOCITY: 3.62 M/S
ECHO TV REGURGITANT PEAK GRADIENT: 52 MMHG
EKG ATRIAL RATE: 63 BPM
EKG P AXIS: 41 DEGREES
EKG P-R INTERVAL: 280 MS
EKG Q-T INTERVAL: 420 MS
EKG QRS DURATION: 98 MS
EKG QTC CALCULATION (BAZETT): 429 MS
EKG R AXIS: -31 DEGREES
EKG T AXIS: 50 DEGREES
EKG VENTRICULAR RATE: 63 BPM
EOSINOPHIL NFR BLD AUTO: 4.8 %
EOSINOPHILS ABSOLUTE: 0.2 THOU/MM3 (ref 0–0.4)
ERYTHROCYTE [DISTWIDTH] IN BLOOD BY AUTOMATED COUNT: 14.6 % (ref 11.5–14.5)
GFR SERPL CREATININE-BSD FRML MDRD: 21 ML/MIN/1.73M2
GLUCOSE SERPL-MCNC: 91 MG/DL (ref 74–109)
GLUCOSE UR QL STRIP.AUTO: NEGATIVE MG/DL
HCT VFR BLD AUTO: 33.4 % (ref 37–47)
HDLC SERPL-MCNC: 43 MG/DL
HGB BLD-MCNC: 10.6 GM/DL (ref 12–16)
HGB UR QL STRIP.AUTO: NEGATIVE
IMM GRANULOCYTES # BLD AUTO: 0.01 THOU/MM3 (ref 0–0.07)
IMM GRANULOCYTES NFR BLD AUTO: 0.2 %
INR PPP: 1.79 (ref 0.85–1.13)
INR PPP: 1.93 (ref 0.85–1.13)
KETONES UR QL STRIP.AUTO: NEGATIVE
LDLC SERPL CALC-MCNC: 53 MG/DL
LYMPHOCYTES ABSOLUTE: 0.5 THOU/MM3 (ref 1–4.8)
LYMPHOCYTES NFR BLD AUTO: 9.9 %
MAGNESIUM SERPL-MCNC: 2.2 MG/DL (ref 1.6–2.6)
MCH RBC QN AUTO: 32.2 PG (ref 26–33)
MCHC RBC AUTO-ENTMCNC: 31.7 GM/DL (ref 32.2–35.5)
MCV RBC AUTO: 101.5 FL (ref 81–99)
MONOCYTES ABSOLUTE: 0.6 THOU/MM3 (ref 0.4–1.3)
MONOCYTES NFR BLD AUTO: 11.9 %
NEUTROPHILS ABSOLUTE: 3.6 THOU/MM3 (ref 1.8–7.7)
NEUTROPHILS NFR BLD AUTO: 72.8 %
NITRITE UR QL STRIP: NEGATIVE
NRBC BLD AUTO-RTO: 0 /100 WBC
OSMOLALITY SERPL CALC.SUM OF ELEC: 278.3 MOSMOL/KG (ref 275–300)
PH UR STRIP.AUTO: 5.5 [PH] (ref 5–9)
PLATELET # BLD AUTO: 194 THOU/MM3 (ref 130–400)
PMV BLD AUTO: 9.2 FL (ref 9.4–12.4)
POTASSIUM SERPL-SCNC: 4.4 MEQ/L (ref 3.5–5.2)
PROT SERPL-MCNC: 6.7 G/DL (ref 6.4–8.3)
PROT UR STRIP.AUTO-MCNC: NEGATIVE MG/DL
RBC # BLD AUTO: 3.29 MILL/MM3 (ref 4.2–5.4)
SODIUM SERPL-SCNC: 134 MEQ/L (ref 135–145)
SP GR UR REFRACT.AUTO: 1.01 (ref 1–1.03)
TRIGL SERPL-MCNC: 69 MG/DL (ref 0–199)
TROPONIN, HIGH SENSITIVITY: 19 NG/L (ref 0–12)
TSH SERPL DL<=0.05 MIU/L-ACNC: 0.85 UIU/ML (ref 0.27–4.2)
UROBILINOGEN, URINE: 0.2 EU/DL (ref 0–1)
WBC # BLD AUTO: 5 THOU/MM3 (ref 4.8–10.8)
WBC #/AREA URNS HPF: NEGATIVE /[HPF]

## 2025-05-15 PROCEDURE — 93306 TTE W/DOPPLER COMPLETE: CPT | Performed by: NUCLEAR MEDICINE

## 2025-05-15 PROCEDURE — 82248 BILIRUBIN DIRECT: CPT

## 2025-05-15 PROCEDURE — 36415 COLL VENOUS BLD VENIPUNCTURE: CPT

## 2025-05-15 PROCEDURE — 51701 INSERT BLADDER CATHETER: CPT

## 2025-05-15 PROCEDURE — 6360000002 HC RX W HCPCS

## 2025-05-15 PROCEDURE — 85025 COMPLETE CBC W/AUTO DIFF WBC: CPT

## 2025-05-15 PROCEDURE — 99223 1ST HOSP IP/OBS HIGH 75: CPT

## 2025-05-15 PROCEDURE — 51798 US URINE CAPACITY MEASURE: CPT

## 2025-05-15 PROCEDURE — 2500000003 HC RX 250 WO HCPCS

## 2025-05-15 PROCEDURE — 85610 PROTHROMBIN TIME: CPT

## 2025-05-15 PROCEDURE — 96374 THER/PROPH/DIAG INJ IV PUSH: CPT

## 2025-05-15 PROCEDURE — 93306 TTE W/DOPPLER COMPLETE: CPT

## 2025-05-15 PROCEDURE — 80061 LIPID PANEL: CPT

## 2025-05-15 PROCEDURE — 84443 ASSAY THYROID STIM HORMONE: CPT

## 2025-05-15 PROCEDURE — 6370000000 HC RX 637 (ALT 250 FOR IP)

## 2025-05-15 PROCEDURE — 81003 URINALYSIS AUTO W/O SCOPE: CPT

## 2025-05-15 PROCEDURE — 80053 COMPREHEN METABOLIC PANEL: CPT

## 2025-05-15 PROCEDURE — 1200000003 HC TELEMETRY R&B

## 2025-05-15 PROCEDURE — 83735 ASSAY OF MAGNESIUM: CPT

## 2025-05-15 PROCEDURE — 93010 ELECTROCARDIOGRAM REPORT: CPT | Performed by: INTERNAL MEDICINE

## 2025-05-15 RX ORDER — WARFARIN SODIUM 2 MG/1
2 TABLET ORAL
Status: COMPLETED | OUTPATIENT
Start: 2025-05-15 | End: 2025-05-15

## 2025-05-15 RX ORDER — ONDANSETRON 2 MG/ML
4 INJECTION INTRAMUSCULAR; INTRAVENOUS EVERY 6 HOURS PRN
Status: DISCONTINUED | OUTPATIENT
Start: 2025-05-15 | End: 2025-05-23 | Stop reason: HOSPADM

## 2025-05-15 RX ORDER — SODIUM CHLORIDE 9 MG/ML
INJECTION, SOLUTION INTRAVENOUS PRN
Status: DISCONTINUED | OUTPATIENT
Start: 2025-05-15 | End: 2025-05-23 | Stop reason: HOSPADM

## 2025-05-15 RX ORDER — AMIODARONE HYDROCHLORIDE 200 MG/1
200 TABLET ORAL DAILY
Status: DISCONTINUED | OUTPATIENT
Start: 2025-05-15 | End: 2025-05-23 | Stop reason: HOSPADM

## 2025-05-15 RX ORDER — SODIUM CHLORIDE 0.9 % (FLUSH) 0.9 %
5-40 SYRINGE (ML) INJECTION EVERY 12 HOURS SCHEDULED
Status: DISCONTINUED | OUTPATIENT
Start: 2025-05-15 | End: 2025-05-23 | Stop reason: HOSPADM

## 2025-05-15 RX ORDER — POLYETHYLENE GLYCOL 3350 17 G/17G
17 POWDER, FOR SOLUTION ORAL DAILY PRN
Status: DISCONTINUED | OUTPATIENT
Start: 2025-05-15 | End: 2025-05-23 | Stop reason: HOSPADM

## 2025-05-15 RX ORDER — ACETAMINOPHEN 325 MG/1
650 TABLET ORAL EVERY 6 HOURS PRN
Status: DISCONTINUED | OUTPATIENT
Start: 2025-05-15 | End: 2025-05-23 | Stop reason: HOSPADM

## 2025-05-15 RX ORDER — GABAPENTIN 100 MG/1
100 CAPSULE ORAL 2 TIMES DAILY
Status: DISCONTINUED | OUTPATIENT
Start: 2025-05-15 | End: 2025-05-23 | Stop reason: HOSPADM

## 2025-05-15 RX ORDER — BUMETANIDE 0.25 MG/ML
2 INJECTION, SOLUTION INTRAMUSCULAR; INTRAVENOUS DAILY
Status: DISCONTINUED | OUTPATIENT
Start: 2025-05-15 | End: 2025-05-16

## 2025-05-15 RX ORDER — HYDRALAZINE HYDROCHLORIDE 25 MG/1
25 TABLET, FILM COATED ORAL 3 TIMES DAILY
Status: DISCONTINUED | OUTPATIENT
Start: 2025-05-15 | End: 2025-05-23 | Stop reason: HOSPADM

## 2025-05-15 RX ORDER — ACETAMINOPHEN 650 MG/1
650 SUPPOSITORY RECTAL EVERY 6 HOURS PRN
Status: DISCONTINUED | OUTPATIENT
Start: 2025-05-15 | End: 2025-05-23 | Stop reason: HOSPADM

## 2025-05-15 RX ORDER — FERROUS SULFATE 325(65) MG
325 TABLET ORAL
Status: DISCONTINUED | OUTPATIENT
Start: 2025-05-15 | End: 2025-05-15

## 2025-05-15 RX ORDER — LEVOTHYROXINE SODIUM 50 UG/1
50 TABLET ORAL DAILY
Status: DISCONTINUED | OUTPATIENT
Start: 2025-05-15 | End: 2025-05-23 | Stop reason: HOSPADM

## 2025-05-15 RX ORDER — SODIUM CHLORIDE 0.9 % (FLUSH) 0.9 %
5-40 SYRINGE (ML) INJECTION PRN
Status: DISCONTINUED | OUTPATIENT
Start: 2025-05-15 | End: 2025-05-23 | Stop reason: HOSPADM

## 2025-05-15 RX ORDER — VALSARTAN 80 MG/1
80 TABLET ORAL DAILY
Status: DISCONTINUED | OUTPATIENT
Start: 2025-05-15 | End: 2025-05-21

## 2025-05-15 RX ORDER — PANTOPRAZOLE SODIUM 40 MG/1
40 TABLET, DELAYED RELEASE ORAL
Status: DISCONTINUED | OUTPATIENT
Start: 2025-05-15 | End: 2025-05-23 | Stop reason: HOSPADM

## 2025-05-15 RX ORDER — ONDANSETRON 4 MG/1
4 TABLET, ORALLY DISINTEGRATING ORAL EVERY 8 HOURS PRN
Status: DISCONTINUED | OUTPATIENT
Start: 2025-05-15 | End: 2025-05-23 | Stop reason: HOSPADM

## 2025-05-15 RX ADMIN — GABAPENTIN 100 MG: 100 CAPSULE ORAL at 20:26

## 2025-05-15 RX ADMIN — LEVOTHYROXINE SODIUM 50 MCG: 0.05 TABLET ORAL at 04:41

## 2025-05-15 RX ADMIN — PANTOPRAZOLE SODIUM 40 MG: 40 TABLET, DELAYED RELEASE ORAL at 04:40

## 2025-05-15 RX ADMIN — PANTOPRAZOLE SODIUM 40 MG: 40 TABLET, DELAYED RELEASE ORAL at 15:41

## 2025-05-15 RX ADMIN — SODIUM CHLORIDE, PRESERVATIVE FREE 10 ML: 5 INJECTION INTRAVENOUS at 09:14

## 2025-05-15 RX ADMIN — BUMETANIDE 2 MG: 0.25 INJECTION INTRAMUSCULAR; INTRAVENOUS at 09:15

## 2025-05-15 RX ADMIN — GABAPENTIN 100 MG: 100 CAPSULE ORAL at 09:15

## 2025-05-15 RX ADMIN — VALSARTAN 80 MG: 80 TABLET ORAL at 09:15

## 2025-05-15 RX ADMIN — AMIODARONE HYDROCHLORIDE 200 MG: 200 TABLET ORAL at 09:15

## 2025-05-15 RX ADMIN — SODIUM CHLORIDE, PRESERVATIVE FREE 10 ML: 5 INJECTION INTRAVENOUS at 20:27

## 2025-05-15 RX ADMIN — WARFARIN SODIUM 2 MG: 2 TABLET ORAL at 18:10

## 2025-05-15 RX ADMIN — BUMETANIDE 2 MG: 0.25 INJECTION INTRAMUSCULAR; INTRAVENOUS at 00:13

## 2025-05-15 NOTE — PROGRESS NOTES
Patient received sacramental anointing by a . If you are in need of  support, please call [phone]. If you are in need of a  after 6:30pm, please call the house supervisor for the on-call .      Annamarie Santos  [facility] Health Coordinator  [phone]

## 2025-05-15 NOTE — H&P
Hospitalist History & Physical    Patient:  Paola Titus    Unit/Bed:05/005A  YOB: 1940  MRN: [id number]   Acct: [id number]   PCP: Renuka Rosas APRN - CNP  Code Status: Prior    Date of Service: Pt seen/examined on 05/15/25 and admitted to Inpatient with expected LOS greater than two midnights due to medical therapy.     Chief Complaint: Dizziness, Fall    Assessment/Plan:    Acute respiratory with hypoxia secondary to exacerbation of chronic HFpEF: Reports increased SOB over the past few weeks. SpO2 83% on arrival, placed on 3LNC, baseline is room air. CXR cardiomegaly, mild interstitial thickening in both lungs extending to the periphery could suggest a degree of interstitial edema. BNP 43268, chronically elevated. ABG: pH 7.39, PCO2 52, PO2 72, HCO3 31, BE 5.1. 5/8/24 ROGELIO: EF 50-55%. Bumex 2mg IV x1 given in ED.   Wean O2 as tolerated to maintain SpO2 >90%  Bumex 2mg IV Daily (Bumex 1 mg PO Daily at home)  Pulmonary hygiene: IS, C&DB  Daily weights, Strict I&O  Echo in AM  2L fluid restriction  Consult IP Heart failure nurse coordinator  Consider Cardiology consult pending clinical course    Hyperkalemia, mild: K 5.3. Uncertain etiology. Anticipate correction after Bumex.   BMP in AM    Acute on chronic hypotonic hyponatremia: Na 130. Likely secondary to poor oral intake.   UA and lytes pending  BMP in AM    Acute on chronic dizziness: Complains of worsening dizziness for the past 3 days. Seen by PCP on 5/8/25 for same complaint. Has seen Neurology and ENT in the past for same complaint and no definitive diagnosis made. 4/2024: Tilt Table test negative. PCP discontinued B12, biotin, folic acid, iron, and cholesterol medications to assess impact on dizziness. Could also be related to poor oxygenation due to above.   Orthostatic BP QS  Echo in AM  Follow up with PCP after discharge     Mechanical fall: Reports slipping in the kitchen and lowering herself to the floor, denies

## 2025-05-15 NOTE — ED NOTES
ED nurse-to-nurse bedside report    Chief Complaint   Patient presents with    Fall    Dizziness      LOC: alert and orientated to name, place, date  Vital signs   Vitals:    05/14/25 2151 05/14/25 2154 05/14/25 2200 05/14/25 2320   BP:  (!) 151/65     Pulse: 66 65 59 55   Resp: 15  13 21   Temp:  98.7 °F (37.1 °C)     TempSrc:  Oral     SpO2: (!) 83%  97%    Weight: 45.4 kg (100 lb)         Pain:    Pain Interventions: mar  Pain Goal: 0  Oxygen: Yes    Current needs required 1-5   Telemetry: Yes  LDAs:   Peripheral IV 05/14/25 Right;Anterior Forearm (Active)   Site Assessment Clean, dry & intact 05/14/25 2154     Continuous Infusions:   Mobility: Requires assistance * 1  Mccallum Fall Risk Score:        No data to display              Fall Interventions: socks, call light,   Report given to: subhash JARRELL   
ED to inpatient nurses report      Chief Complaint:  Chief Complaint   Patient presents with    Fall    Dizziness     Present to ED from: home    MOA:     LOC: alert and orientated to name, place, date  Mobility: Requires assistance * 1  Oxygen Baseline:     Current needs required: 1-5 LNC     Code Status:   Prior    What abnormal results were found and what did you give/do to treat them? See chart and results   Any procedures or intervention occur? See chart     Mental Status:       Psych Assessment:        Vitals:  Patient Vitals for the past 24 hrs:   BP Temp Temp src Pulse Resp SpO2 Weight   05/14/25 2320 -- -- -- 55 21 -- --   05/14/25 2200 -- -- -- 59 13 97 % --   05/14/25 2154 (!) 151/65 98.7 °F (37.1 °C) Oral 65 -- -- --   05/14/25 2151 -- -- -- 66 15 (!) 83 % 45.4 kg (100 lb)        LDAs:   Peripheral IV 05/14/25 Right;Anterior Forearm (Active)   Site Assessment Clean, dry & intact 05/14/25 2154       Ambulatory Status:  Presents to emergency department  because of falls (Syncope, seizure, or loss of consciousness): Yes, Age > 70: Yes, Altered Mental Status, Intoxication with alcohol or substance confusion (Disorientation, impaired judgment, poor safety awaremess, or inability to follow instructions): Yes, Impaired Mobility: Ambulates or transfers with assistive devices or assistance; Unable to ambulate or transer.: Yes, Nursing Judgement: Yes    Diagnosis:  DISPOSITION     Final diagnoses:   None        Consults:  None     Pain Score:  Pain Assessment  Pain Assessment: None - Denies Pain    C-SSRS:   Risk of Suicide: No Risk    Sepsis Screening:       Shari Fall Risk:  Chandler 1 Fall Risk  Presents to emergency department  because of falls (Syncope, seizure, or loss of consciousness): Yes  Age > 70: Yes  Altered Mental Status, Intoxication with alcohol or substance confusion (Disorientation, impaired judgment, poor safety awaremess, or inability to follow instructions): Yes  Impaired Mobility: 
Patient and family member updated on POC. ABG drawn. Patient resting in bed. Respirations easy and unlabored. No distress noted. Call light within reach.    
Patient medicated per MAR and purewick placed. Patient updated on admission status. Dr. Marie at bedside updating patient. Patient resting quietly in bed with call light in reach. Side rails x2. Patient remains on monitor.   
Pt arrives to ED from home with family with c/o dizziness and fall  Pt states she has had worsening dizziness for about 3 days, she states today she slipped in the kitchen, was able to lower herself slowly. Did not hit her head, denies injury at this time  Pt states she has extensive medical history. She is Aox4 on arrival, is able to stand from wheelchair and get herself into bed.     SPO2 noted at 83% on RA, pt reports she has increased SOB over past few weeks as well.   Placed on 3 LNC, with improvement to 95%.   Family reports concern related to past TIA's, no confusion is noted from family.     
Quality 111:Pneumonia Vaccination Status For Older Adults: Pneumococcal vaccine (PPSV23) administered on or after patient’s 60th birthday and before the end of the measurement period
Report received from WESLY Mallory. Resp called for ABG at this time.   
Spoke to  staff who approved patient transfer to [facility]. Patient transported upstairs in stable condition.  
Detail Level: Detailed
Quality 110: Preventive Care And Screening: Influenza Immunization: Influenza Immunization Administered during Influenza season

## 2025-05-15 NOTE — PROGRESS NOTES
Warfarin Pharmacy Consult Note    Paola Titus is a 85 y.o. female for whom pharmacy has been asked to manage warfarin therapy.     Consulting Provider: Katy Claros CNP  Indication: Atrial fibrillation  Target INR: 2.0-3.0   Warfarin dose prior to admission: 1 mg MWF, 2 mg TThSS  Outpatient warfarin provider: [facility]    Recent Labs     05/14/25 2200 05/15/25  0558   HGB 11.1* 10.6*    194     Recent Labs     05/14/25  2200 05/15/25  0558   INR 1.93* 1.79*     Concurrent anticoagulants/antiplatelets: none  Significant warfarin drug-drug interactions: amiodarone, levothyroxine    Date INR Warfarin Dose   5/15/2025 1.79 2 mg                                   INR will be monitored routinely until therapeutic INR is achieved.    Pharmacy will continue to follow. Thank you for the consult,     Hien Jung, PharmD, BCPS  5/15/2025  3:24 PM

## 2025-05-15 NOTE — ED PROVIDER NOTES
CNP    sodium chloride flush 0.9 % injection 5-40 mL, 5-40 mL, IntraVENous, PRN, Katy Claros APRN - CNP    0.9 % sodium chloride infusion, , IntraVENous, PRN, Katy Claros APRN - CNP    ondansetron (ZOFRAN-ODT) disintegrating tablet 4 mg, 4 mg, Oral, Q8H PRN **OR** ondansetron (ZOFRAN) injection 4 mg, 4 mg, IntraVENous, Q6H PRN, Katy Claros APRN - CNP    polyethylene glycol (GLYCOLAX) packet 17 g, 17 g, Oral, Daily PRN, Katy Claros APRN - CNP    acetaminophen (TYLENOL) tablet 650 mg, 650 mg, Oral, Q6H PRN **OR** acetaminophen (TYLENOL) suppository 650 mg, 650 mg, Rectal, Q6H PRN, Katy Claros APRN - CNP    bumetanide (BUMEX) injection 2 mg, 2 mg, IntraVENous, Daily, Katy Claros APRN - CNP    amiodarone (CORDARONE) tablet 200 mg, 200 mg, Oral, See Admin Instructions, Katy Claros APRN - CNP    ferrous sulfate (IRON 325) tablet 325 mg, 325 mg, Oral, Daily with breakfast, Katy Claros APRN - CNP    gabapentin (NEURONTIN) capsule 100 mg, 100 mg, Oral, BID, Katy Claros APRN - CNP    hydrALAZINE (APRESOLINE) tablet 25 mg, 25 mg, Oral, TID, Katy Claros APRN - CNP    levothyroxine (SYNTHROID) tablet 50 mcg, 50 mcg, Oral, Daily, Katy Claros APRN - CNP    pantoprazole (PROTONIX) tablet 40 mg, 40 mg, Oral, BID AC, Katy Claros APRN - CNP    valsartan (DIOVAN) tablet 80 mg, 80 mg, Oral, Daily, Katy Claros APRN - CNP    Current Discharge Medication List        CONTINUE these medications which have NOT CHANGED    Details   levothyroxine (SYNTHROID) 50 MCG tablet Take 1 tablet by mouth Daily      hydrALAZINE (APRESOLINE) 25 MG tablet Take 1 tablet by mouth 3 times daily      potassium chloride (KLOR-CON M) 20 MEQ extended release tablet Take 1 tablet by mouth daily  Qty: 90 tablet, Refills: 1      pantoprazole (PROTONIX) 40 MG tablet TAKE ONE TABLET, TWO TIMES A DAY, BY MOUTH.  Qty: 180 tablet, Refills: 0      valsartan (DIOVAN) 80  patient.    Pertinent previous and/or external records reviewed:  Patient's chart reviewed .    Case discussed with specialties other than Emergency Medicine: Hospitalist      MEDICAL DECISION MAKING       Differential Diagnosis includes but is not limited to:  Acute CHF   Pneumonia  PE  Takotsubo        Decision Rules/Clinical Scores utilized:  Not Applicable       Code Status:  Not addressed during this ED visit    Social determinants of health impacting treatment or disposition:  Considered and not applicable     MIPS documentation:  N/A    Medical Decision Making    85-year-old female with PMHx of CHF, dizziness, CKD, proximal atrial fibrillation on Coumadin who presents to the ED complaining of dizziness and fall.  Upon arrival patient was hypoxic satting around 83% on room air and she is not usually on oxygen so she was placed on 2L NC O2 with improvement satting at 96%.  On physical, patient appears chronically ill laying in bed does not appear in acute distress.  Her work of breathing is normal with a respiratory rate around 14.  She had bilateral lower lobe rhonchi and rails.  She had a soft nontender abdomen.  She had bilateral lower limb tenderness without erythema or swelling appreciated.  Patient's labs showed markedly elevated BNP 34,090, initial troponin was 61 with repeat being 19.  Given patient's CKD with a creatinine of 2.3 CTA chest was not done and she would need a VQ perfusion test to rule out PE however patient does not have clinical picture of massive PE.  Patient's chest x-ray showed cardiomegaly with mild incisional thinning getting in both legs extending to the periphery suggesting a degree of interstitial edema.  Patient updated on results and informed that she likely has acute CHF exacerbation and given Bumex 2 mg.  She was advised to be admitted for further workup and observation for acute hypoxic respiratory failure secondary to the CHF exacerbation.  She agreed with plan hospitalist

## 2025-05-15 NOTE — CARE COORDINATION
Case Management Assessment Initial Evaluation    Date/Time of Evaluation: 5/15/2025 7:27 AM  Assessment Completed by: Jenifer Kohli RN    If patient is discharged prior to next notation, then this note serves as note for discharge by case management.    Patient Name: Paola Titus                   YOB: 1940  Diagnosis: Acute respiratory failure with hypoxia (HCC) [J96.01]  Acute congestive heart failure, unspecified heart failure type (HCC) [I50.9]  Acute on chronic congestive heart failure, unspecified heart failure type (HCC) [I50.9]  Acute hypoxic respiratory failure (HCC) [J96.01]                   Date / Time: 2025  9:46 PM  Location: [address] [place] [address]     Patient Admission Status: Inpatient   Readmission Risk Low 0-14, Mod 15-19), High > 20: Readmission Risk Score: 18.5    Current PCP: Renuka Rosas APRN - [facility]  Health Care Decision Makers:   Primary Decision Maker: MIMI SERRANO - Child - [phone]    Secondary Decision Maker: GARRY CALDWELL - Child - [phone]    Additional Case Management Notes: From ED, BNP 41509.0, telemetry, Heart failure Nurse Coordinator consult, Pharmacy to dose Coumadin,  INR 1.79, IV Bumex, Zofran prn, Protonix, Diovan.     Procedures:   5/15 ECHO    Imagin/14 CXR Cardiomegaly. Mild interstitial thickening in both lungs extending to the   periphery could suggest a degree of interstitial edema.         Patient Goals/Plan/Treatment Preferences: Met with Paola. She currently lives at home with her son. She is independent, uses a cane at times. Plan is to return home at discharge. She denies need for DME and declines HH. Will follow.       05/15/25 1048   Service Assessment   Patient Orientation Alert and Oriented   Cognition Alert   History Provided By Patient   Primary Caregiver Self   Support Systems Children   Patient's Healthcare Decision Maker is: Named in Scanned ACP Document   PCP Verified by CM Yes   Last Visit to PCP Within

## 2025-05-15 NOTE — PROGRESS NOTES
Pt admitted to  6K17 from ED and via cart/stretcher.   Complaints: Shortness of breath.    IV none infusing into the forearm right, condition patent and no redness IV site free of s/s of infection or infiltration. Vital signs obtained. Assessment and data collection initiated. Two nurse skin assessment performed by This RN and Annamarie/Daniel RN. Oriented to room. Policies and procedures for 6K explained. This RN discussed hourly rounding with patient addressing 5 P's. Fall prevention and safety brochure discussed with patient.  Bed alarm on. Call light in reach.  T Patient has Declined for physician to be notified.  Patient has Declined for family/representative to be notified. All questions answered with no further questions at this time.

## 2025-05-15 NOTE — PLAN OF CARE
Problem: Chronic Conditions and Co-morbidities  Goal: Patient's chronic conditions and co-morbidity symptoms are monitored and maintained or improved  Outcome: Progressing  Flowsheets  Taken 5/15/2025 1103 by Patricia Sterling RN  Care Plan - Patient's Chronic Conditions and Co-Morbidity Symptoms are Monitored and Maintained or Improved:   Monitor and assess patient's chronic conditions and comorbid symptoms for stability, deterioration, or improvement   Collaborate with multidisciplinary team to address chronic and comorbid conditions and prevent exacerbation or deterioration  Taken 5/15/2025 0144 by Yamilka Stevens RN  Care Plan - Patient's Chronic Conditions and Co-Morbidity Symptoms are Monitored and Maintained or Improved:   Monitor and assess patient's chronic conditions and comorbid symptoms for stability, deterioration, or improvement   Collaborate with multidisciplinary team to address chronic and comorbid conditions and prevent exacerbation or deterioration     Problem: Discharge Planning  Goal: Discharge to home or other facility with appropriate resources  Outcome: Progressing  Flowsheets  Taken 5/15/2025 1103 by Patricia Sterling RN  Discharge to home or other facility with appropriate resources:   Identify barriers to discharge with patient and caregiver   Identify discharge learning needs (meds, wound care, etc)  Taken 5/15/2025 0144 by Ymailka Stevens RN  Discharge to home or other facility with appropriate resources: Identify barriers to discharge with patient and caregiver     Problem: Safety - Adult  Goal: Free from fall injury  Outcome: Progressing  Flowsheets (Taken 5/15/2025 1103)  Free From Fall Injury:   Instruct family/caregiver on patient safety   Based on caregiver fall risk screen, instruct family/caregiver to ask for assistance with transferring infant if caregiver noted to have fall risk factors  Note: No falls noted this shift. Continue falling star program. Bed alarm on, bed in low

## 2025-05-15 NOTE — PLAN OF CARE
Patient was admitted after midnight for acute hypoxic respiratory failure secondary to pulmonary edema.  Denies any history of CHF.  Echo was pending.  Started on IV Bumex, continue. Wean O2 as tolerated.     Creatinine was noted to be 2.3 on arrival, has been trending up over the last year.  Creatinine 1.7 in March, 2.3 in April.  UA is bland, no proteinuria.  Monitor BMP closely with diuresis, can consider nephrology consult if worsening.     Electronically signed by Arabella Alfonso PA-C on 5/15/2025 at 5:42 PM

## 2025-05-15 NOTE — CONSULTS
[facility]   HEART FAILURE PROGRAM          NAME:  Paola Titus  MEDICAL RECORD NUMBER:  [id number]  AGE: 85 y.o.   GENDER: female  : 1940  Attending: Arabella Alfonso PA-C TODAY'S DATE:  5/15/2025    Subjective:     VISIT TYPE: Heart Failure Education at bedside   Cardiac Medications       Vasodilators       hydrALAZINE (APRESOLINE) tablet 25 mg 25 mg, Oral, 3 TIMES DAILY, Hold for SBP <120       Antiarrhythmics Type III       amiodarone (CORDARONE) tablet 200 mg 200 mg, Oral, DAILY       Loop Diuretics       bumetanide (BUMEX) injection 2 mg 2 mg, IntraVENous, DAILY       Angiotensin II Receptor Antagonists       valsartan (DIOVAN) tablet 80 mg 80 mg, Oral, DAILY, Hold for SBP <115. Keep K >4 and Mg >2.       Coumarin Anticoagulants       warfarin placeholder: dosing by pharmacy Oral, Rx Placeholder, Please contact the pharmacy if a dose is not entered by 1600.  Review INR prior to warfarin administration.            Results:   Is this patient a 30 day readmission patient: No      ADMISSION DIAGNOSIS:   Acute respiratory failure with hypoxia (HCC) [J96.01]  Acute congestive heart failure, unspecified heart failure type (HCC) [I50.9]  Acute on chronic congestive heart failure, unspecified heart failure type (HCC) [I50.9]  Acute hypoxic respiratory failure (HCC) [J96.01]     BP (!) 141/55   Pulse 54   Temp 97.8 °F (36.6 °C) (Oral)   Resp 16   Ht 1.473 m (4' 10\")   Wt 45.2 kg (99 lb 11.2 oz)   SpO2 96%   BMI 20.84 kg/m²     ADMIT:  Weight - Scale: 45.4 kg (100 lb)    TODAY: Weight - Scale: 45.2 kg (99 lb 11.2 oz)    Wt Readings from Last 5 Encounters:   05/15/25 45.2 kg (99 lb 11.2 oz)   25 45.8 kg (101 lb)   25 45.8 kg (101 lb)   25 45.4 kg (100 lb)   25 45.4 kg (100 lb)          Intake/Output Summary (Last 24 hours) at 5/15/2025 1253  Last data filed at 5/15/2025 0506  Gross per 24 hour   Intake 340 ml   Output 1210 ml   Net -870 ml       Lab Results    Component Value Date/Time    PROBNP 90818.0 05/14/2025 10:00 PM       Lab Results   Component Value Date     (L) 05/15/2025    K 4.4 05/15/2025    CL 98 05/15/2025    CO2 26 05/15/2025    BUN 42 (H) 05/15/2025    CREATININE 2.2 (H) 05/15/2025    GLUCOSE 91 05/15/2025    CALCIUM 10.0 05/15/2025    BILITOT 0.7 05/15/2025    ALKPHOS 134 (H) 05/15/2025    AST 14 05/15/2025    ALT <5 (L) 05/15/2025    LABGLOM 21 (A) 05/15/2025       Lab Results   Component Value Date     (L) 05/15/2025    K 4.4 05/15/2025    CL 98 05/15/2025    CO2 26 05/15/2025     Lab Results   Component Value Date    IRON 33 (L) 03/29/2024    TIBC 325 03/29/2024    FERRITIN 119 03/29/2024         Xray Result (most recent):  XR CHEST PORTABLE 05/14/2025    Narrative  1 view chest x-ray    Comparison: CR/SR - XR CHEST PORTABLE - 6/30/23 13:50 EDT    Findings:  Heart is enlarged without pulmonary vascular congestion.  Calcification in the thoracic aorta.    Mild interstitial thickening in both lungs extending to the periphery.  No pleural effusion or pneumothorax.  Levoscoliosis in the lower thoracic spine.    Impression  Cardiomegaly. Mild interstitial thickening in both lungs extending to the  periphery could suggest a degree of interstitial edema.    This document has been electronically signed by: Lane Maxwell MD on  05/14/2025 11:42 PM        ECHOCARDIOGRAM:     05/08/24    ROGELIO (PRN CONTRAST/BUBBLE/3D) 05/08/2024  6:10 PM (Final)    Interpretation Summary    Left Ventricle: Normal left ventricular systolic function with a visually estimated EF of 50 - 55%. Left ventricle size is normal. Normal wall thickness. Normal wall motion.    Aortic Valve: Mild regurgitation.    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 Mildly thickened leaflet. Mildly calcified leaflet. Mild to moderate regurgitation. Moderate stenosis noted.    Left Atrium: Left atrium is moderately dilated. Normal sized appendage. No left atrial appendage

## 2025-05-16 LAB
ANION GAP SERPL CALC-SCNC: 12 MEQ/L (ref 8–16)
BUN SERPL-MCNC: 44 MG/DL (ref 8–23)
CALCIUM SERPL-MCNC: 9.3 MG/DL (ref 8.8–10.2)
CHLORIDE SERPL-SCNC: 99 MEQ/L (ref 98–111)
CO2 SERPL-SCNC: 26 MEQ/L (ref 22–29)
CREAT SERPL-MCNC: 2 MG/DL (ref 0.5–0.9)
GFR SERPL CREATININE-BSD FRML MDRD: 24 ML/MIN/1.73M2
GLUCOSE SERPL-MCNC: 85 MG/DL (ref 74–109)
INR PPP: 1.86 (ref 0.85–1.13)
MAGNESIUM SERPL-MCNC: 2.2 MG/DL (ref 1.6–2.6)
POTASSIUM SERPL-SCNC: 4 MEQ/L (ref 3.5–5.2)
SODIUM SERPL-SCNC: 137 MEQ/L (ref 135–145)

## 2025-05-16 PROCEDURE — 85610 PROTHROMBIN TIME: CPT

## 2025-05-16 PROCEDURE — 6370000000 HC RX 637 (ALT 250 FOR IP)

## 2025-05-16 PROCEDURE — 6360000002 HC RX W HCPCS

## 2025-05-16 PROCEDURE — 99233 SBSQ HOSP IP/OBS HIGH 50: CPT | Performed by: PHYSICIAN ASSISTANT

## 2025-05-16 PROCEDURE — 83735 ASSAY OF MAGNESIUM: CPT

## 2025-05-16 PROCEDURE — 6360000002 HC RX W HCPCS: Performed by: PHYSICIAN ASSISTANT

## 2025-05-16 PROCEDURE — 36415 COLL VENOUS BLD VENIPUNCTURE: CPT

## 2025-05-16 PROCEDURE — 1200000003 HC TELEMETRY R&B

## 2025-05-16 PROCEDURE — 2500000003 HC RX 250 WO HCPCS

## 2025-05-16 PROCEDURE — 80048 BASIC METABOLIC PNL TOTAL CA: CPT

## 2025-05-16 RX ORDER — BUMETANIDE 0.25 MG/ML
2 INJECTION, SOLUTION INTRAMUSCULAR; INTRAVENOUS 2 TIMES DAILY
Status: DISCONTINUED | OUTPATIENT
Start: 2025-05-16 | End: 2025-05-19

## 2025-05-16 RX ADMIN — VALSARTAN 80 MG: 80 TABLET ORAL at 08:06

## 2025-05-16 RX ADMIN — GABAPENTIN 100 MG: 100 CAPSULE ORAL at 20:27

## 2025-05-16 RX ADMIN — SODIUM CHLORIDE, PRESERVATIVE FREE 10 ML: 5 INJECTION INTRAVENOUS at 08:06

## 2025-05-16 RX ADMIN — HYDRALAZINE HYDROCHLORIDE 25 MG: 25 TABLET ORAL at 08:06

## 2025-05-16 RX ADMIN — HYDRALAZINE HYDROCHLORIDE 25 MG: 25 TABLET ORAL at 14:46

## 2025-05-16 RX ADMIN — PANTOPRAZOLE SODIUM 40 MG: 40 TABLET, DELAYED RELEASE ORAL at 14:46

## 2025-05-16 RX ADMIN — LEVOTHYROXINE SODIUM 50 MCG: 0.05 TABLET ORAL at 05:50

## 2025-05-16 RX ADMIN — PANTOPRAZOLE SODIUM 40 MG: 40 TABLET, DELAYED RELEASE ORAL at 05:49

## 2025-05-16 RX ADMIN — BUMETANIDE 2 MG: 0.25 INJECTION INTRAMUSCULAR; INTRAVENOUS at 08:06

## 2025-05-16 RX ADMIN — GABAPENTIN 100 MG: 100 CAPSULE ORAL at 08:06

## 2025-05-16 RX ADMIN — SODIUM CHLORIDE, PRESERVATIVE FREE 10 ML: 5 INJECTION INTRAVENOUS at 19:59

## 2025-05-16 RX ADMIN — Medication 0.5 MG: at 17:18

## 2025-05-16 RX ADMIN — BUMETANIDE 2 MG: 0.25 INJECTION INTRAMUSCULAR; INTRAVENOUS at 17:17

## 2025-05-16 RX ADMIN — AMIODARONE HYDROCHLORIDE 200 MG: 200 TABLET ORAL at 08:06

## 2025-05-16 NOTE — PLAN OF CARE
Problem: Chronic Conditions and Co-morbidities  Goal: Patient's chronic conditions and co-morbidity symptoms are monitored and maintained or improved  5/16/2025 0039 by Sheila Alvarez RN  Outcome: Progressing  Flowsheets (Taken 5/15/2025 2000)  Care Plan - Patient's Chronic Conditions and Co-Morbidity Symptoms are Monitored and Maintained or Improved: Monitor and assess patient's chronic conditions and comorbid symptoms for stability, deterioration, or improvement  5/15/2025 1103 by Patricia Sterling RN  Outcome: Progressing  Flowsheets  Taken 5/15/2025 1103 by Ptaricia Sterling RN  Care Plan - Patient's Chronic Conditions and Co-Morbidity Symptoms are Monitored and Maintained or Improved:   Monitor and assess patient's chronic conditions and comorbid symptoms for stability, deterioration, or improvement   Collaborate with multidisciplinary team to address chronic and comorbid conditions and prevent exacerbation or deterioration  Taken 5/15/2025 0144 by Yamilka Stevens RN  Care Plan - Patient's Chronic Conditions and Co-Morbidity Symptoms are Monitored and Maintained or Improved:   Monitor and assess patient's chronic conditions and comorbid symptoms for stability, deterioration, or improvement   Collaborate with multidisciplinary team to address chronic and comorbid conditions and prevent exacerbation or deterioration     Problem: Discharge Planning  Goal: Discharge to home or other facility with appropriate resources  5/16/2025 0039 by Sheila Alvarez RN  Outcome: Progressing  Flowsheets (Taken 5/15/2025 2000)  Discharge to home or other facility with appropriate resources: Identify barriers to discharge with patient and caregiver  5/15/2025 1103 by Patricia Sterling RN  Outcome: Progressing  Flowsheets  Taken 5/15/2025 1103 by Patricia Sterling RN  Discharge to home or other facility with appropriate resources:   Identify barriers to discharge with patient and caregiver   Identify discharge learning needs (meds, wound  care, etc)  Taken 5/15/2025 0144 by Yamilka Stevens RN  Discharge to home or other facility with appropriate resources: Identify barriers to discharge with patient and caregiver     Problem: Safety - Adult  Goal: Free from fall injury  5/16/2025 0039 by Sheila Alvarez, RN  Outcome: Progressing  5/15/2025 1103 by Patricia Sterling RN  Outcome: Progressing  Flowsheets (Taken 5/15/2025 1103)  Free From Fall Injury:   Instruct family/caregiver on patient safety   Based on caregiver fall risk screen, instruct family/caregiver to ask for assistance with transferring infant if caregiver noted to have fall risk factors  Note: No falls noted this shift. Continue falling star program. Bed alarm on, bed in low position. Call light and personal belongings in reach.  Patient uses call light appropriately.      Problem: ABCDS Injury Assessment  Goal: Absence of physical injury  5/16/2025 0039 by Sheila Alvarez, RN  Outcome: Progressing  5/15/2025 1103 by Patricia Sterling RN  Outcome: Progressing  Flowsheets (Taken 5/15/2025 1103)  Absence of Physical Injury: Implement safety measures based on patient assessment

## 2025-05-16 NOTE — PROGRESS NOTES
Hospitalist Progress Note      Patient:  Paola Titus 85 y.o. female       : 1940  Unit/Bed:[facility]17/017-A    Date of Admission: 2025      ASSESSMENT AND PLAN    Active Problems  Acute hypoxic respiratory failure  Acute HFpEF  CXR on arrival showing cardiomegaly and interstitial edema. +BLE edema.   Echo completed, EF 50 to 55%.  Mild to moderate aortic regurgitation, moderate mitral stenosis.  Started on IV Bumex, increase to twice daily.  Consider adding aldactone if renal function continues to improve.   2L  fluid restriction. 2g salt restriction.  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   Cr 2.3 on arrival, 2.0 today after diuretics.  UA bland.  May have some cardiorenal syndrome. Continue Bumex as above.  Hold valsartan.    BMP daily. Consider nephrology consult if worsening renal function.   Moderate mitral stenosis   Also noted on ROGELIO 2024. Follows with Dr. Mendoza.   Hyponatremia, mild  Likely due to hypovolemia.  Resolved with diuretics.  Mechanical fall  PT/OT    Chronic Conditions (reviewed, stable, and home medications resumed, unless otherwise stated)  Chronic lightheadedness. Seen by PCP on 25 for same complaint. Has seen Neurology and ENT in the past for same complaint and no definitive diagnosis made. 2024: Tilt Table test negative. PCP discontinued B12, biotin, folic acid, iron, and cholesterol medications to assess impact on dizziness. Could also be related to poor oxygenation due to above.  Orthostats negative  PAF.  With secondary hypercoagulable state, on Coumadin.  Status post cardioversion.  Continue amiodarone. Follows with Dr. Mendoza.   Moderate mitral stenosis. noted on ROGELIO 2024.   Essential hypertension  Hypothyroidism  Chronic back pain  History of

## 2025-05-16 NOTE — CARE COORDINATION
5/16/25, 3:12 PM EDT    Anticipate discharge this weekend. Home with son. Denies needs.  Patient goals/plan/ treatment preferences discussed by  and .  Patient goals/plan/ treatment preferences reviewed with patient/ family.  Patient/ family verbalize understanding of discharge plan and are in agreement with goal/plan/treatment preferences.  Understanding was demonstrated using the teach back method.  AVS provided by RN at time of discharge, which includes all necessary medical information pertaining to the patients current course of illness, treatment, post-discharge goals of care, and treatment preferences.     Services At/After Discharge: None

## 2025-05-16 NOTE — PROGRESS NOTES
Warfarin Pharmacy Consult Note    **Please contact pharmacist for discharge instructions**  Warfarin Indication: Atrial fibrillation  Target INR: 2.0-3.0  Dose prior to admission: 1 mg Sun, Tues, Wed, Thurs, Sat, No Coumadin Mon, Fri     Recent Labs     05/14/25  2200 05/15/25  0558   HGB 11.1* 10.6*    194     Recent Labs     05/14/25  2200 05/15/25  0558 05/16/25  0554   INR 1.93* 1.79* 1.86*     Concurrent anticoagulants/antiplatelets: none  Significant warfarin drug-drug interactions: amiodarone, levothyroxine     Date INR Warfarin Dose   5/15/2025 1.79 2 mg    5/16/2025 1.86   0.5 mg                                               Monitoring:                   INR will be monitored daily.    Hien Jung, PharmD, BCPS  5/16/2025  12:41 PM

## 2025-05-17 PROBLEM — I50.9 ACUTE CONGESTIVE HEART FAILURE (HCC): Status: ACTIVE | Noted: 2025-05-17

## 2025-05-17 LAB
ANION GAP SERPL CALC-SCNC: 12 MEQ/L (ref 8–16)
BUN SERPL-MCNC: 42 MG/DL (ref 8–23)
CALCIUM SERPL-MCNC: 9.1 MG/DL (ref 8.8–10.2)
CHLORIDE SERPL-SCNC: 97 MEQ/L (ref 98–111)
CO2 SERPL-SCNC: 28 MEQ/L (ref 22–29)
CREAT SERPL-MCNC: 2 MG/DL (ref 0.5–0.9)
DEPRECATED RDW RBC AUTO: 53.5 FL (ref 35–45)
ERYTHROCYTE [DISTWIDTH] IN BLOOD BY AUTOMATED COUNT: 14.4 % (ref 11.5–14.5)
GFR SERPL CREATININE-BSD FRML MDRD: 24 ML/MIN/1.73M2
GLUCOSE SERPL-MCNC: 92 MG/DL (ref 74–109)
HCT VFR BLD AUTO: 31 % (ref 37–47)
HGB BLD-MCNC: 9.6 GM/DL (ref 12–16)
INR PPP: 2.12 (ref 0.85–1.13)
MAGNESIUM SERPL-MCNC: 1.9 MG/DL (ref 1.6–2.6)
MCH RBC QN AUTO: 31.5 PG (ref 26–33)
MCHC RBC AUTO-ENTMCNC: 31 GM/DL (ref 32.2–35.5)
MCV RBC AUTO: 101.6 FL (ref 81–99)
PLATELET # BLD AUTO: 230 THOU/MM3 (ref 130–400)
PMV BLD AUTO: 9.4 FL (ref 9.4–12.4)
POTASSIUM SERPL-SCNC: 3.6 MEQ/L (ref 3.5–5.2)
RBC # BLD AUTO: 3.05 MILL/MM3 (ref 4.2–5.4)
SODIUM SERPL-SCNC: 137 MEQ/L (ref 135–145)
WBC # BLD AUTO: 4.8 THOU/MM3 (ref 4.8–10.8)

## 2025-05-17 PROCEDURE — 83735 ASSAY OF MAGNESIUM: CPT

## 2025-05-17 PROCEDURE — 99232 SBSQ HOSP IP/OBS MODERATE 35: CPT | Performed by: INTERNAL MEDICINE

## 2025-05-17 PROCEDURE — 2500000003 HC RX 250 WO HCPCS

## 2025-05-17 PROCEDURE — 1200000000 HC SEMI PRIVATE

## 2025-05-17 PROCEDURE — 85610 PROTHROMBIN TIME: CPT

## 2025-05-17 PROCEDURE — 80048 BASIC METABOLIC PNL TOTAL CA: CPT

## 2025-05-17 PROCEDURE — 85027 COMPLETE CBC AUTOMATED: CPT

## 2025-05-17 PROCEDURE — 6370000000 HC RX 637 (ALT 250 FOR IP)

## 2025-05-17 PROCEDURE — 6370000000 HC RX 637 (ALT 250 FOR IP): Performed by: PHARMACIST

## 2025-05-17 PROCEDURE — 6360000002 HC RX W HCPCS: Performed by: PHYSICIAN ASSISTANT

## 2025-05-17 PROCEDURE — 36415 COLL VENOUS BLD VENIPUNCTURE: CPT

## 2025-05-17 PROCEDURE — 99222 1ST HOSP IP/OBS MODERATE 55: CPT | Performed by: INTERNAL MEDICINE

## 2025-05-17 RX ORDER — WARFARIN SODIUM 2 MG/1
1 TABLET ORAL
Status: COMPLETED | OUTPATIENT
Start: 2025-05-17 | End: 2025-05-17

## 2025-05-17 RX ADMIN — GABAPENTIN 100 MG: 100 CAPSULE ORAL at 19:38

## 2025-05-17 RX ADMIN — HYDRALAZINE HYDROCHLORIDE 25 MG: 25 TABLET ORAL at 08:07

## 2025-05-17 RX ADMIN — GABAPENTIN 100 MG: 100 CAPSULE ORAL at 08:07

## 2025-05-17 RX ADMIN — AMIODARONE HYDROCHLORIDE 200 MG: 200 TABLET ORAL at 08:07

## 2025-05-17 RX ADMIN — BUMETANIDE 2 MG: 0.25 INJECTION INTRAMUSCULAR; INTRAVENOUS at 16:06

## 2025-05-17 RX ADMIN — SODIUM CHLORIDE, PRESERVATIVE FREE 10 ML: 5 INJECTION INTRAVENOUS at 19:38

## 2025-05-17 RX ADMIN — PANTOPRAZOLE SODIUM 40 MG: 40 TABLET, DELAYED RELEASE ORAL at 16:06

## 2025-05-17 RX ADMIN — LEVOTHYROXINE SODIUM 50 MCG: 0.05 TABLET ORAL at 05:47

## 2025-05-17 RX ADMIN — PANTOPRAZOLE SODIUM 40 MG: 40 TABLET, DELAYED RELEASE ORAL at 05:48

## 2025-05-17 RX ADMIN — POLYETHYLENE GLYCOL 3350 17 G: 17 POWDER, FOR SOLUTION ORAL at 18:51

## 2025-05-17 RX ADMIN — SODIUM CHLORIDE, PRESERVATIVE FREE 10 ML: 5 INJECTION INTRAVENOUS at 08:07

## 2025-05-17 RX ADMIN — BUMETANIDE 2 MG: 0.25 INJECTION INTRAMUSCULAR; INTRAVENOUS at 08:07

## 2025-05-17 RX ADMIN — WARFARIN SODIUM 1 MG: 2 TABLET ORAL at 18:51

## 2025-05-17 NOTE — PROGRESS NOTES
Hospitalist Progress Note      Patient:  Paola Titus 85 y.o. female       : 1940  Unit/Bed:[facility]17/017-A    Date of Admission: 2025      ASSESSMENT AND PLAN    Active Problems  Acute hypoxic respiratory failure  Acute HFpEF  CXR on arrival showing cardiomegaly and interstitial edema. +BLE edema.   Echo completed, EF 50 to 55%.  Mild to moderate aortic regurgitation, moderate mitral stenosis.  Cont IV Bumex BID   2L  fluid restriction. 2g salt restriction.  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   BMP daily.   Nephrology following    Moderate mitral stenosis   Also noted on ROGELIO 2024. Follows with Dr. Mendoza.   Hyponatremia, mild  Likely due to hypovolemia.  Resolved with diuretics.  Mechanical fall  PT/OT    Chronic Conditions (reviewed, stable, and home medications resumed, unless otherwise stated)  Chronic lightheadedness. Seen by PCP on 25 for same complaint. Has seen Neurology and ENT in the past for same complaint and no definitive diagnosis made. 2024: Tilt Table test negative. PCP discontinued B12, biotin, folic acid, iron, and cholesterol medications to assess impact on dizziness. Could also be related to poor oxygenation due to above.  Orthostats negative  PAF.  With secondary hypercoagulable state, on Coumadin.  Status post cardioversion.  Continue amiodarone. Follows with Dr. Mendoza.   Moderate mitral stenosis. noted on ROGELIO 2024.   Essential hypertension  Hypothyroidism  Chronic back pain  History of CVA/TIA  History of breast cancer      LDA: []CVC / []PICC / []Midline / [][place] / []Drains / []Mediport / [x]None  Antibiotics: no  Steroids: no  Labs (still needed?): [x]Yes / []No  IVF (still needed?): []Yes / [x]No    Level of care: []Step Down / [x]Med-Surg  Bed

## 2025-05-17 NOTE — PROGRESS NOTES
Warfarin Pharmacy Consult Note    **Please contact pharmacist for discharge instructions**  Warfarin Indication: Atrial fibrillation/Atrial flutter  Target INR: 2.0-3.0  Dose prior to admission: No warfarin MF, 1mg all other days    Recent Labs     05/14/25  2200 05/15/25  0558 05/17/25  0334   HGB 11.1* 10.6* 9.6*    194 230     Recent Labs     05/15/25  0558 05/16/25  0554 05/17/25  0334   INR 1.79* 1.86* 2.12*     Concurrent anticoagulants/antiplatelets: none  Significant warfarin drug-drug interactions: amiodarone, levothyroxine     Date INR Warfarin Dose   5/15/25 1.79 2 mg    5/16/25 1.86   0.5 mg    5/17/25   2.12 1 mg                                        Monitoring:                   INR will be monitored daily.    Tess AGUSTIN.Ph., BCPS., 5/17/2025,3:01 PM

## 2025-05-17 NOTE — CONSULTS
Rheumatic fever     as a child    Sinus infection     Thyroid disease        Past Surgical History:  Past Surgical History:   Procedure Laterality Date    BREAST LUMPECTOMY Left 2011    BRONCHOSCOPY N/A 01/05/2020    BRONCHOSCOPY performed by Calos Stephenson MD at [facility]    BUNIONECTOMY Left 2009    CARDIAC CATHETERIZATION  2010    CARDIOVERSION      DILATION AND CURETTAGE OF UTERUS      X2; SEVERAL YEARS AGO    ENDOSCOPY, COLON, DIAGNOSTIC      HIP SURGERY Left 04/28/2023    LEFT INTERTAN performed by Singh Houser MD at [facility] OR    JOINT REPLACEMENT Right 2010    KNEE    ME BX OF BREAST, NEEDLE CORE, IMAGE GUIDE Left 03/26/2020    benign    ME BX OF BREAST, NEEDLE CORE, IMAGE GUIDE Left 03/26/2020    benign    ME BX OF BREAST, NEEDLE CORE, IMAGE GUIDE Left 03/26/2020    benign    ME BX OF BREAST, NEEDLE CORE, IMAGE GUIDE Left 12/09/2010    IDC     TONSILLECTOMY      TRANSESOPHAGEAL ECHOCARDIOGRAM N/A 5/8/2024    TRANSESOPHAGEAL ECHOCARDIOGRAM performed by Car Rodriguez MD at [facility]    UPPER GASTROINTESTINAL ENDOSCOPY N/A 01/03/2020    EGD ESOPHAGOGASTRODUODENOSCOPY performed by Charleen Farmer MD at [facility]    UPPER GASTROINTESTINAL ENDOSCOPY N/A 01/11/2020    EGD DIAGNOSTIC ONLY performed by Charleen Farmer MD at [facility]       Family History:  Family History   Problem Relation Age of Onset    Arthritis Mother     High Blood Pressure Mother     No Known Problems Father     Cancer Sister         LEUKEMIA    Arthritis Brother     Asthma Brother     Colon Polyps Brother 62    Heart Disease Brother     Breast Cancer Maternal Aunt     Breast Cancer Maternal Cousin 60    Breast Cancer Maternal Cousin 60    Breast Cancer Maternal Cousin     Breast Cancer Maternal Cousin     Breast Cancer Daughter 52       Social History:  Social History     Socioeconomic History    Marital status:      Spouse name: Not on file    Number of children: Not on file    Years of education: Not on file       gabapentin (NEURONTIN) 100 MG capsule Take 1 capsule by mouth in the morning and at bedtime.   Yes Yenny Iqbal MD   bumetanide (BUMEX) 1 MG tablet Take 1 tablet by mouth daily   Yes Yenny Iqbal MD   warfarin (COUMADIN) 2 MG tablet Take as instructed by the physician following the INR results 1/18/20  Yes Maninder Tuttle MD   acetaminophen (TYLENOL) 650 MG extended release tablet Take 1 tablet by mouth 2 times daily as needed Indications: Arthritis   Yes Yenny Iqbal MD   amiodarone (CORDARONE) 200 MG tablet Take 1 tablet by mouth See Admin Instructions Take 1 tablet twice a day for 7 days. Then 1 tablet daily. 3/12/25   Car Rodriguez MD   ferrous sulfate (IRON 325) 325 (65 Fe) MG tablet Take 1 tablet by mouth daily (with breakfast)  Patient not taking: Reported on 5/15/2025 4/4/24   Richardson Ramon MD   Cyanocobalamin (VITAMIN B-12) 2000 MCG TBCR Take by mouth  Patient not taking: Reported on 5/15/2025    Yenny Iqbal MD   folic acid (FOLVITE) 1 MG tablet TAKE ONE TABLET DAILY, BY MOUTH.  Patient not taking: Reported on 5/15/2025 10/11/23   Leopold, Paige L, APRN - CNP   Coenzyme Q10 (CO Q 10 PO) Take by mouth  Patient not taking: Reported on 5/15/2025    Yenny Iqbal MD   Probiotic Product (PROBIOTIC PO) Take 1 capsule by mouth nightly  Patient not taking: Reported on 5/15/2025    Yenny Iqbal MD   rosuvastatin (CRESTOR) 5 MG tablet Take 1 tablet by mouth every evening Indications: Blood Cholesterol Abnormal  Patient not taking: Reported on 5/15/2025 7/27/20   Car Rodriguez MD   Biotin 1000 MCG TABS Take 1,000 mcg by mouth nightly   Patient not taking: Reported on 5/15/2025    Yenny Iqbal MD       Review of Systems:  Constitutional: no fever or chills +dizziness  Head: No headaches  Eyes: no blurry vision, no discharge  Ears: no ear pain or hearing changes  Nose: no runny nose or epistaxis  Respiratory:

## 2025-05-18 ENCOUNTER — APPOINTMENT (OUTPATIENT)
Dept: ULTRASOUND IMAGING | Age: 85
End: 2025-05-18
Payer: MEDICARE

## 2025-05-18 LAB
ANION GAP SERPL CALC-SCNC: 12 MEQ/L (ref 8–16)
BUN SERPL-MCNC: 42 MG/DL (ref 8–23)
CALCIUM SERPL-MCNC: 9.6 MG/DL (ref 8.8–10.2)
CHLORIDE 24H UR-SRATE: 48 MEQ/L
CHLORIDE SERPL-SCNC: 98 MEQ/L (ref 98–111)
CO2 SERPL-SCNC: 29 MEQ/L (ref 22–29)
CREAT SERPL-MCNC: 2.1 MG/DL (ref 0.5–0.9)
GFR SERPL CREATININE-BSD FRML MDRD: 23 ML/MIN/1.73M2
GLUCOSE SERPL-MCNC: 92 MG/DL (ref 74–109)
INR PPP: 2.05 (ref 0.85–1.13)
MAGNESIUM SERPL-MCNC: 2 MG/DL (ref 1.6–2.6)
POTASSIUM SERPL-SCNC: 3.4 MEQ/L (ref 3.5–5.2)
POTASSIUM UR-SCNC: 24.4 MEQ/L
SODIUM SERPL-SCNC: 139 MEQ/L (ref 135–145)
SODIUM UR-SCNC: 44 MEQ/L

## 2025-05-18 PROCEDURE — 99232 SBSQ HOSP IP/OBS MODERATE 35: CPT | Performed by: INTERNAL MEDICINE

## 2025-05-18 PROCEDURE — 6370000000 HC RX 637 (ALT 250 FOR IP): Performed by: PHARMACIST

## 2025-05-18 PROCEDURE — 2500000003 HC RX 250 WO HCPCS

## 2025-05-18 PROCEDURE — 80048 BASIC METABOLIC PNL TOTAL CA: CPT

## 2025-05-18 PROCEDURE — 85610 PROTHROMBIN TIME: CPT

## 2025-05-18 PROCEDURE — 6370000000 HC RX 637 (ALT 250 FOR IP)

## 2025-05-18 PROCEDURE — 76770 US EXAM ABDO BACK WALL COMP: CPT

## 2025-05-18 PROCEDURE — 6370000000 HC RX 637 (ALT 250 FOR IP): Performed by: INTERNAL MEDICINE

## 2025-05-18 PROCEDURE — 6360000002 HC RX W HCPCS: Performed by: PHYSICIAN ASSISTANT

## 2025-05-18 PROCEDURE — 84300 ASSAY OF URINE SODIUM: CPT

## 2025-05-18 PROCEDURE — 83735 ASSAY OF MAGNESIUM: CPT

## 2025-05-18 PROCEDURE — 84133 ASSAY OF URINE POTASSIUM: CPT

## 2025-05-18 PROCEDURE — 1200000000 HC SEMI PRIVATE

## 2025-05-18 PROCEDURE — 82436 ASSAY OF URINE CHLORIDE: CPT

## 2025-05-18 PROCEDURE — 36415 COLL VENOUS BLD VENIPUNCTURE: CPT

## 2025-05-18 RX ORDER — POTASSIUM CHLORIDE 1500 MG/1
40 TABLET, EXTENDED RELEASE ORAL ONCE
Status: COMPLETED | OUTPATIENT
Start: 2025-05-18 | End: 2025-05-18

## 2025-05-18 RX ORDER — METOLAZONE 5 MG/1
5 TABLET ORAL ONCE
Status: COMPLETED | OUTPATIENT
Start: 2025-05-18 | End: 2025-05-18

## 2025-05-18 RX ORDER — WARFARIN SODIUM 2 MG/1
1 TABLET ORAL
Status: COMPLETED | OUTPATIENT
Start: 2025-05-18 | End: 2025-05-18

## 2025-05-18 RX ADMIN — GABAPENTIN 100 MG: 100 CAPSULE ORAL at 20:13

## 2025-05-18 RX ADMIN — BUMETANIDE 2 MG: 0.25 INJECTION INTRAMUSCULAR; INTRAVENOUS at 18:15

## 2025-05-18 RX ADMIN — LEVOTHYROXINE SODIUM 50 MCG: 0.05 TABLET ORAL at 05:10

## 2025-05-18 RX ADMIN — BUMETANIDE 2 MG: 0.25 INJECTION INTRAMUSCULAR; INTRAVENOUS at 08:36

## 2025-05-18 RX ADMIN — AMIODARONE HYDROCHLORIDE 200 MG: 200 TABLET ORAL at 08:35

## 2025-05-18 RX ADMIN — WARFARIN SODIUM 1 MG: 2 TABLET ORAL at 18:34

## 2025-05-18 RX ADMIN — GABAPENTIN 100 MG: 100 CAPSULE ORAL at 08:34

## 2025-05-18 RX ADMIN — PANTOPRAZOLE SODIUM 40 MG: 40 TABLET, DELAYED RELEASE ORAL at 05:10

## 2025-05-18 RX ADMIN — SODIUM CHLORIDE, PRESERVATIVE FREE 10 ML: 5 INJECTION INTRAVENOUS at 08:34

## 2025-05-18 RX ADMIN — PANTOPRAZOLE SODIUM 40 MG: 40 TABLET, DELAYED RELEASE ORAL at 15:09

## 2025-05-18 RX ADMIN — METOLAZONE 5 MG: 5 TABLET ORAL at 13:07

## 2025-05-18 RX ADMIN — POTASSIUM CHLORIDE 40 MEQ: 1500 TABLET, EXTENDED RELEASE ORAL at 08:43

## 2025-05-18 RX ADMIN — SODIUM CHLORIDE, PRESERVATIVE FREE 10 ML: 5 INJECTION INTRAVENOUS at 20:13

## 2025-05-18 NOTE — PROGRESS NOTES
[facility]   Nephrology progress note  5/18/2025, 11:44 AM      Pt Name:    Paola Titus  MRN:     [id number]     YOB: 1940  Admit Date:    5/14/2025  9:46 PM    Chief Complaint: Nephrology following for JONATHAN/CKD.    Subjective:  Patient was seen and examined this morning.   Still has some LANDERS.   On 1 L NC.   Urine output was 800 mL overnight per report.     Objective:  24HR INTAKE/OUTPUT:    Intake/Output Summary (Last 24 hours) at 5/18/2025 1144  Last data filed at 5/18/2025 0854  Gross per 24 hour   Intake 200 ml   Output 800 ml   Net -600 ml         I/O last 3 completed shifts:  In: 110 [P.O.:110]  Out: 1100 [Urine:1100]  I/O this shift:  In: 150 [P.O.:150]  Out: -    Admission weight: 45.4 kg (100 lb)  Wt Readings from Last 3 Encounters:   05/16/25 45 kg (99 lb 3.3 oz)   04/25/25 45.8 kg (101 lb)   04/07/25 45.8 kg (101 lb)        Vitals :   Vitals:    05/17/25 1935 05/17/25 2322 05/18/25 0345 05/18/25 0828   BP: (!) 142/58 105/60 (!) 107/55 (!) 124/55   Pulse: 55 57 54 60   Resp: 18 18 18 18   Temp: 98 °F (36.7 °C) 98.1 °F (36.7 °C) 97.8 °F (36.6 °C) 97.7 °F (36.5 °C)   TempSrc: Oral Oral Oral Oral   SpO2: 97% 96% 93% 91%   Weight:       Height:           Physical examination  General Appearance: alert and cooperative with exam, appears comfortable, no distress  Mouth/Throat: Oral mucosa moist  Neck: No JVD  Lungs: bibasilar crackles  Heart:  S1, S2 heard  GI: soft, non-tender, no guarding  Extremities: no leg edema    Medications:  Infusion:    sodium chloride       Meds:    metOLazone  5 mg Oral Once    bumetanide  2 mg IntraVENous BID    sodium chloride flush  5-40 mL IntraVENous 2 times per day    amiodarone  200 mg Oral Daily    gabapentin  100 mg Oral BID    [Held by provider] hydrALAZINE  25 mg Oral TID    levothyroxine  50 mcg Oral Daily    pantoprazole  40 mg Oral BID AC    [Held by provider] valsartan  80 mg Oral Daily    warfarin placeholder: dosing by pharmacy

## 2025-05-18 NOTE — PROGRESS NOTES
Warfarin Pharmacy Consult Note    **Please contact pharmacist for discharge instructions**  Warfarin Indication: Atrial fibrillation  Target INR: 2.0-3.0  Dose prior to admission: No warfarin MF, 1mg all other days    Recent Labs     05/17/25  0334   HGB 9.6*        Recent Labs     05/16/25  0554 05/17/25  0334 05/18/25  0600   INR 1.86* 2.12* 2.05*     Concurrent anticoagulants/antiplatelets: none  Significant warfarin drug-drug interactions: amiodarone, levothyroxine     Date INR Warfarin Dose   5/15/25 1.79 2 mg    5/16/25 1.86   0.5 mg    5/17/25   2.12 1 mg    5/18/25  2.05  1 mg                                Monitoring:                   INR will be monitored daily.    Tess GRANTPh., BCPS., 5/18/2025,1:54 PM

## 2025-05-18 NOTE — PLAN OF CARE
Problem: Chronic Conditions and Co-morbidities  Goal: Patient's chronic conditions and co-morbidity symptoms are monitored and maintained or improved  Outcome: Progressing  Flowsheets (Taken 5/18/2025 0139)  Care Plan - Patient's Chronic Conditions and Co-Morbidity Symptoms are Monitored and Maintained or Improved:   Monitor and assess patient's chronic conditions and comorbid symptoms for stability, deterioration, or improvement   Collaborate with multidisciplinary team to address chronic and comorbid conditions and prevent exacerbation or deterioration   Update acute care plan with appropriate goals if chronic or comorbid symptoms are exacerbated and prevent overall improvement and discharge     Problem: Discharge Planning  Goal: Discharge to home or other facility with appropriate resources  Outcome: Progressing  Flowsheets (Taken 5/18/2025 0139)  Discharge to home or other facility with appropriate resources:   Identify barriers to discharge with patient and caregiver   Identify discharge learning needs (meds, wound care, etc)     Problem: Safety - Adult  Goal: Free from fall injury  Outcome: Progressing  Note: Bed locked & in low position, call light in reach, side-rails up x2, bed/chair alarm utilized, non-slip socks on when ambulating, reminded patient to use call light to call for assistance.      Problem: ABCDS Injury Assessment  Goal: Absence of physical injury  Outcome: Progressing  Flowsheets (Taken 5/18/2025 0139)  Absence of Physical Injury: Implement safety measures based on patient assessment   Care plan reviewed with patient.  Patient verbalizes understanding of the care plan and contributed to goal setting.

## 2025-05-18 NOTE — PROGRESS NOTES
Hospitalist Progress Note      Patient:  Paola Titus 85 y.o. female       : 1940  Unit/Bed:[facility]17/017-A    Date of Admission: 2025      ASSESSMENT AND PLAN    Active Problems  Acute hypoxic respiratory failure  Acute HFpEF  CXR on arrival showing cardiomegaly and interstitial edema. +BLE edema.   Echo completed, EF 50 to 55%.  Mild to moderate aortic regurgitation, moderate mitral stenosis.  Cont IV Bumex BID, added metolazone today    2L  fluid restriction. 2g salt restriction.  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   BMP daily.   Nephrology following    Moderate mitral stenosis   Also noted on ROGELIO 2024. Follows with Dr. Mendoza.   Hyponatremia, mild  Likely due to hypovolemia.  Resolved with diuretics.  Mechanical fall  PT/OT    Chronic Conditions (reviewed, stable, and home medications resumed, unless otherwise stated)  Chronic lightheadedness. Seen by PCP on 25 for same complaint. Has seen Neurology and ENT in the past for same complaint and no definitive diagnosis made. 2024: Tilt Table test negative. PCP discontinued B12, biotin, folic acid, iron, and cholesterol medications to assess impact on dizziness. Could also be related to poor oxygenation due to above.  Orthostats negative  PAF.  With secondary hypercoagulable state, on Coumadin.  Status post cardioversion.  Continue amiodarone. Follows with Dr. Mendoza.   Moderate mitral stenosis. noted on ROGELIO 2024.   Essential hypertension  Hypothyroidism  Chronic back pain  History of CVA/TIA  History of breast cancer      LDA: []CVC / []PICC / []Midline / [][place] / []Drains / []Mediport / [x]None  Antibiotics: no  Steroids: no  Labs (still needed?): [x]Yes / []No  IVF (still needed?): []Yes / [x]No    Level of care: []Step  Down / [x]Med-Surg  Bed Status: [x]Inpatient / []Observation  Telemetry: [x]Yes / []No  PT/OT: [x]Yes / []No    DVT Prophylaxis: [] Lovenox / [] Heparin / [] SCDs / [x] Already on Systemic Anticoagulation / [] None     Expected discharge date:  1-2 days  Disposition: home     Code status: Full Code     ===================================================================    Chief Complaint: dizziness, SOB  Subjective (past 24 hours):   No acute events overnight. Sitting up in chair,, patient reports her shortness of breath and lower extremity edema are improving daily. No chest pain.  Denies lightheadedness or dizziness at this time      Initial HPI 5/14/2025 per chart review:  Paola Titus is a 85 y.o. female with PMHx of CHF, Dizziness, CKD, PAF who presented to [facility] with chief complaint of Dizziness, Fall. Patient reports worsening SOB over the past couple of weeks. States she has SOB with exertion. Reports worsening dizziness over the past couple of days. States today she was ambulating with her cane in her kitchen and she stumbled and hit her side on the counter and had to lower herself to the ground. Denies hitting her head or LOC. States she has been having increased falls recently and has started using a cane for the past few days. Reports some intermittent constipation which she takes MOM for at home, last BM was 2 days ago. Reports a poor appetite due to nausea. Reports urinary urgency. Denies any pain. Denies headache or lightheadedness. Denies fever or chills. Denies chest pain. Denies abdominal pain. Denies dysuria or hematuria.      Medications:    Infusion Medications    sodium chloride      Scheduled Medications    metOLazone  5 mg Oral Once    bumetanide  2 mg IntraVENous BID    sodium chloride flush  5-40 mL IntraVENous 2 times per day    amiodarone  200 mg Oral Daily    gabapentin  100 mg Oral BID    [Held by provider] hydrALAZINE  25 mg Oral TID    levothyroxine  50 mcg Oral Daily    pantoprazole

## 2025-05-18 NOTE — PLAN OF CARE
Problem: Chronic Conditions and Co-morbidities  Goal: Patient's chronic conditions and co-morbidity symptoms are monitored and maintained or improved  5/18/2025 1035 by Codie Rosen RN  Outcome: Progressing  5/18/2025 0139 by Warren Storm RN  Outcome: Progressing  Flowsheets (Taken 5/18/2025 0139)  Care Plan - Patient's Chronic Conditions and Co-Morbidity Symptoms are Monitored and Maintained or Improved:   Monitor and assess patient's chronic conditions and comorbid symptoms for stability, deterioration, or improvement   Collaborate with multidisciplinary team to address chronic and comorbid conditions and prevent exacerbation or deterioration   Update acute care plan with appropriate goals if chronic or comorbid symptoms are exacerbated and prevent overall improvement and discharge     Problem: Discharge Planning  Goal: Discharge to home or other facility with appropriate resources  5/18/2025 1035 by Codie Rosen RN  Outcome: Progressing  5/18/2025 0139 by Warren Storm RN  Outcome: Progressing  Flowsheets (Taken 5/18/2025 0139)  Discharge to home or other facility with appropriate resources:   Identify barriers to discharge with patient and caregiver   Identify discharge learning needs (meds, wound care, etc)     Problem: Safety - Adult  Goal: Free from fall injury  5/18/2025 1035 by Codie Rosen RN  Outcome: Progressing  5/18/2025 0139 by Warren Storm RN  Outcome: Progressing  Note: Bed locked & in low position, call light in reach, side-rails up x2, bed/chair alarm utilized, non-slip socks on when ambulating, reminded patient to use call light to call for assistance.      Problem: ABCDS Injury Assessment  Goal: Absence of physical injury  5/18/2025 1035 by Codie Rosen RN  Outcome: Progressing  5/18/2025 0139 by Warren Storm RN  Outcome: Progressing  Flowsheets (Taken 5/18/2025 0139)  Absence of Physical Injury: Implement safety measures based on patient assessment   Patient aware and

## 2025-05-19 PROBLEM — N18.32 ACUTE KIDNEY INJURY SUPERIMPOSED ON STAGE 3B CHRONIC KIDNEY DISEASE (HCC): Status: ACTIVE | Noted: 2020-01-02

## 2025-05-19 PROBLEM — N17.9 ACUTE KIDNEY INJURY SUPERIMPOSED ON STAGE 3B CHRONIC KIDNEY DISEASE (HCC): Status: ACTIVE | Noted: 2020-01-02

## 2025-05-19 LAB
ANION GAP SERPL CALC-SCNC: 11 MEQ/L (ref 8–16)
BUN SERPL-MCNC: 45 MG/DL (ref 8–23)
CALCIUM SERPL-MCNC: 9.9 MG/DL (ref 8.8–10.2)
CHLORIDE SERPL-SCNC: 96 MEQ/L (ref 98–111)
CO2 SERPL-SCNC: 32 MEQ/L (ref 22–29)
CREAT SERPL-MCNC: 1.9 MG/DL (ref 0.5–0.9)
GFR SERPL CREATININE-BSD FRML MDRD: 25 ML/MIN/1.73M2
GLUCOSE SERPL-MCNC: 104 MG/DL (ref 74–109)
INR PPP: 2.12 (ref 0.85–1.13)
MAGNESIUM SERPL-MCNC: 1.9 MG/DL (ref 1.6–2.6)
POTASSIUM SERPL-SCNC: 3.5 MEQ/L (ref 3.5–5.2)
SODIUM SERPL-SCNC: 139 MEQ/L (ref 135–145)

## 2025-05-19 PROCEDURE — 6370000000 HC RX 637 (ALT 250 FOR IP): Performed by: INTERNAL MEDICINE

## 2025-05-19 PROCEDURE — 97535 SELF CARE MNGMENT TRAINING: CPT

## 2025-05-19 PROCEDURE — 83735 ASSAY OF MAGNESIUM: CPT

## 2025-05-19 PROCEDURE — 97166 OT EVAL MOD COMPLEX 45 MIN: CPT

## 2025-05-19 PROCEDURE — 99232 SBSQ HOSP IP/OBS MODERATE 35: CPT | Performed by: PHYSICIAN ASSISTANT

## 2025-05-19 PROCEDURE — 1200000000 HC SEMI PRIVATE

## 2025-05-19 PROCEDURE — 6370000000 HC RX 637 (ALT 250 FOR IP)

## 2025-05-19 PROCEDURE — 80048 BASIC METABOLIC PNL TOTAL CA: CPT

## 2025-05-19 PROCEDURE — 97530 THERAPEUTIC ACTIVITIES: CPT

## 2025-05-19 PROCEDURE — 6360000002 HC RX W HCPCS: Performed by: PHYSICIAN ASSISTANT

## 2025-05-19 PROCEDURE — 36415 COLL VENOUS BLD VENIPUNCTURE: CPT

## 2025-05-19 PROCEDURE — 99232 SBSQ HOSP IP/OBS MODERATE 35: CPT | Performed by: INTERNAL MEDICINE

## 2025-05-19 PROCEDURE — 97162 PT EVAL MOD COMPLEX 30 MIN: CPT

## 2025-05-19 PROCEDURE — 6360000002 HC RX W HCPCS: Performed by: INTERNAL MEDICINE

## 2025-05-19 PROCEDURE — 85610 PROTHROMBIN TIME: CPT

## 2025-05-19 PROCEDURE — 2500000003 HC RX 250 WO HCPCS

## 2025-05-19 RX ORDER — POTASSIUM CHLORIDE 1500 MG/1
20 TABLET, EXTENDED RELEASE ORAL DAILY
Status: DISCONTINUED | OUTPATIENT
Start: 2025-05-19 | End: 2025-05-23 | Stop reason: HOSPADM

## 2025-05-19 RX ORDER — WARFARIN SODIUM 2 MG/1
1 TABLET ORAL
Status: COMPLETED | OUTPATIENT
Start: 2025-05-19 | End: 2025-05-19

## 2025-05-19 RX ORDER — BUMETANIDE 0.25 MG/ML
1 INJECTION, SOLUTION INTRAMUSCULAR; INTRAVENOUS 2 TIMES DAILY
Status: DISCONTINUED | OUTPATIENT
Start: 2025-05-19 | End: 2025-05-21

## 2025-05-19 RX ADMIN — GABAPENTIN 100 MG: 100 CAPSULE ORAL at 21:13

## 2025-05-19 RX ADMIN — GABAPENTIN 100 MG: 100 CAPSULE ORAL at 07:45

## 2025-05-19 RX ADMIN — BUMETANIDE 1 MG: 0.25 INJECTION INTRAMUSCULAR; INTRAVENOUS at 17:38

## 2025-05-19 RX ADMIN — SODIUM CHLORIDE, PRESERVATIVE FREE 10 ML: 5 INJECTION INTRAVENOUS at 21:13

## 2025-05-19 RX ADMIN — WARFARIN SODIUM 1 MG: 2 TABLET ORAL at 17:59

## 2025-05-19 RX ADMIN — LEVOTHYROXINE SODIUM 50 MCG: 0.05 TABLET ORAL at 05:58

## 2025-05-19 RX ADMIN — BUMETANIDE 2 MG: 0.25 INJECTION INTRAMUSCULAR; INTRAVENOUS at 07:44

## 2025-05-19 RX ADMIN — PANTOPRAZOLE SODIUM 40 MG: 40 TABLET, DELAYED RELEASE ORAL at 05:58

## 2025-05-19 RX ADMIN — SODIUM CHLORIDE, PRESERVATIVE FREE 10 ML: 5 INJECTION INTRAVENOUS at 07:44

## 2025-05-19 RX ADMIN — AMIODARONE HYDROCHLORIDE 200 MG: 200 TABLET ORAL at 07:45

## 2025-05-19 RX ADMIN — POTASSIUM CHLORIDE 20 MEQ: 1500 TABLET, EXTENDED RELEASE ORAL at 11:03

## 2025-05-19 RX ADMIN — PANTOPRAZOLE SODIUM 40 MG: 40 TABLET, DELAYED RELEASE ORAL at 17:38

## 2025-05-19 NOTE — PROGRESS NOTES
[facility]   Nephrology progress note  5/19/2025, 10:28 AM      Pt Name:    Paola Titus  MRN:     [id number]     YOB: 1940  Admit Date:    5/14/2025  9:46 PM    Chief Complaint: Nephrology following for JONATHAN/CKD and diuretic management    Subjective:  Patient was seen and examined this morning  No chest pain or shortness of breath  Feels better  On nasal cannula    Objective:  24HR INTAKE/OUTPUT:    Intake/Output Summary (Last 24 hours) at 5/19/2025 1028  Last data filed at 5/19/2025 0841  Gross per 24 hour   Intake 340 ml   Output 1850 ml   Net -1510 ml         I/O last 3 completed shifts:  In: 300 [P.O.:300]  Out: 2550 [Urine:2550]  I/O this shift:  In: 190 [P.O.:180; I.V.:10]  Out: 100 [Urine:100]   Admission weight: 45.4 kg (100 lb)  Wt Readings from Last 3 Encounters:   05/16/25 45 kg (99 lb 3.3 oz)   04/25/25 45.8 kg (101 lb)   04/07/25 45.8 kg (101 lb)        Vitals :   Vitals:    05/18/25 2004 05/18/25 2321 05/19/25 0359 05/19/25 0730   BP: (!) 127/54 125/62 (!) 126/55 (!) 105/54   Pulse: 57 60 58 53   Resp: 18 18 18 18   Temp: 98 °F (36.7 °C) 98.1 °F (36.7 °C) 98 °F (36.7 °C) 97.6 °F (36.4 °C)   TempSrc: Oral Oral Oral Oral   SpO2: 96% 93% 94% 96%   Weight:       Height:           Physical examination  General Appearance: alert and cooperative with exam, appears comfortable, no distress  Mouth/Throat: Oral mucosa moist  Neck: No JVD  Lungs: Air entry diminished  Heart:  S1, S2 heard  GI: soft, non-tender, no guarding  Extremities: No significant LE edema    Medications:  Infusion:    sodium chloride       Meds:    warfarin  1 mg Oral Once    bumetanide  2 mg IntraVENous BID    sodium chloride flush  5-40 mL IntraVENous 2 times per day    amiodarone  200 mg Oral Daily    gabapentin  100 mg Oral BID    [Held by provider] hydrALAZINE  25 mg Oral TID    levothyroxine  50 mcg Oral Daily    pantoprazole  40 mg Oral BID AC    [Held by provider] valsartan  80 mg Oral Daily

## 2025-05-19 NOTE — PROGRESS NOTES
[facility]  INPATIENT PHYSICAL THERAPY  EVALUATION  [facility] RENAL TELEMETRY 6K - 6K-17/017-A    Discharge Recommendations: Home with Home health PT  Equipment Recommendations:               Time In: 911  Time Out: 945  Timed Code Treatment Minutes: 23 Minutes  Minutes: 34          Date: 2025  Patient Name: Paola Titus,  Gender:  female        MRN: [id number]  : 1940  (85 y.o.)      Referring Practitioner: Arabella Alfonso PA-C  Diagnosis: Acute respiratory failure with hypoxia (HCC)  Additional Pertinent Hx: Per H&P: \"Paola Titus is a 85 y.o. female with PMHx of CHF, Dizziness, CKD, PAF who presented to [facility] with chief complaint of Dizziness, Fall. Patient reports worsening SOB over the past couple of weeks. States she has SOB with exertion. Reports worsening dizziness over the past couple of days. States today she was ambulating with her cane in her kitchen and she stumbled and hit her side on the counter and had to lower herself to the ground. Denies hitting her head or LOC. States she has been having increased falls recently and has started using a cane for the past few days. Reports some intermittent constipation which she takes MOM for at home, last BM was 2 days ago. Reports a poor appetite due to nausea. Reports urinary urgency. Denies any pain. Denies headache or lightheadedness. Denies fever or chills. Denies chest pain. Denies abdominal pain. Denies dysuria or hematuria.\"     Restrictions/Precautions:  Restrictions/Precautions: General Precautions, Fall Risk           Subjective:  Chart Reviewed: Yes  Patient assessed for rehabilitation services?: Yes  Family/Caregiver Present: No  Subjective: Received clearance from RN to see patient this date. Pt seated in bedside chair when PT arrived. Pt is agreeable to therapy at this date.    General:  Orientation Level: Oriented X4  Overall Cognitive Status: WFL  Vision: Impaired  Vision Exceptions: Wears glasses at all times  Hearing:  Limitations Requiring Skilled Therapeutic Intervention: Decreased functional mobility , Decreased endurance, Decreased body mechanics, Decreased strength, Decreased balance, Decreased posture  Assessment: Pt is an 84 y/o female with a chief complaint of dizziness and a fall. See above for details regarding current condition and PMH. Pt previously lived in two story home with 1 ARELY with her son. Prior to hospital admission, pt was independent with all ADLs and functional mobility tasks. Upon evaluation, pt demonstrates impairments of decreased strength, gait deviations, decreased endurance, posturing, minimal bed mobility deficits, and is functioning below baseline. Pt would benefit from skilled PT to address these impairments and return to PLOF.  Therapy Prognosis: Good    Requires PT Follow-Up: Yes    Patient Education:      .    Patient Education  Education Given To: Patient  Education Provided: Role of Therapy, Plan of Care  Education Method: Verbal  Barriers to Learning: None  Education Outcome: Verbalized understanding, Demonstrated understanding       Plan:  Current Treatment Recommendations: Strengthening, Balance training, Functional mobility training, Endurance training, Gait training, Home exercise program, Patient/Caregiver education & training, Therapeutic activities  General Plan:  (5x GM)    Goals:  Patient Goals : just to get more \"loosened up\"  Short Term Goals  Time Frame for Short Term Goals: Prior to hospital d/c  Short Term Goal 1: Patient will be able to ambulate 35 ft with rollator/RW independently to be able to ambulate in the home safely  Short Term Goal 2: Patient will be able to perform STS to rollator/RW independently  Short Term Goal 3: Patient will be able to perform supine <> sit independently with use of bedrails as needed  Short Term Goal 4: Pt will be able to complete one step independently in order to enter the home  Short Term Goal 5: Patient will be able to ambulate 100 ft with

## 2025-05-19 NOTE — PROGRESS NOTES
Warfarin Pharmacy Consult Note    **Please contact pharmacist for discharge instructions**  Warfarin Indication: Atrial fibrillation  Target INR: 2.0-3.0  Dose prior to admission: No warfarin MF, 1mg all other days    Recent Labs     05/17/25  0334   HGB 9.6*        Recent Labs     05/17/25  0334 05/18/25  0600 05/19/25  0534   INR 2.12* 2.05* 2.12*     Concurrent anticoagulants/antiplatelets: none  Significant warfarin drug-drug interactions: amiodarone, levothyroxine     Date INR Warfarin Dose   5/15/25 1.79 2 mg    5/16/25  1.86   0.5 mg    5/17/25  2.12 1 mg    5/18/25   2.05    1 mg     05/19/25 2.12 1 mg                        Monitoring:                   INR will be monitored daily.    Jessi UlrichD  PGY2 Resident   5/19/2025 9:53 AM

## 2025-05-19 NOTE — CARE COORDINATION
5/19/25, 3:59 PM EDT    DISCHARGE ON GOING EVALUATION    [facility] day: 4  Location: -17/017-A Reason for admit: Acute respiratory failure with hypoxia (HCC) [J96.01]  Acute congestive heart failure, unspecified heart failure type (HCC) [I50.9]  Acute on chronic congestive heart failure, unspecified heart failure type (HCC) [I50.9]  Acute hypoxic respiratory failure (HCC) [J96.01]     Procedures: 5/15 echo: EF 50-55%. Mild to mod regurg. Left atrium is severely dilated.     Imaging since last note: 5/18 renal US: neg    Barriers to Discharge: 92% on 1L O2. IV bumex bid. Plan O2 eval and likely discharge tomorrow.     PCP: Renuka Rosas APRN - CNP  Readmission Risk Score: 18.3    Patient Goals/Plan/Treatment Preferences: Home with son. New CHF clinic. Denies further needs.

## 2025-05-19 NOTE — PROGRESS NOTES
[facility]  INPATIENT OCCUPATIONAL THERAPY  STR[facility] RENAL TELEMETRY 6K  EVALUATION      Discharge Recommendations: Continue to assess pending progress, Home with Home health OT  Equipment Recommendations: No        Time In: 1021  Time Out: 1040  Timed Code Treatment Minutes: 11 Minutes  Minutes: 19          Date: 2025  Patient Name: Paola Titus,   Gender: female      MRN: [id number]  : 1940  (85 y.o.)  Referring Practitioner: Arabella Alfonso PA-C  Diagnosis: Acute respiratory failure with hypoxia (HCC)  Additional Pertinent Hx: Paola Titus is a 85 y.o. female with PMHx of CHF, Dizziness, CKD, PAF who presented to [facility] with chief complaint of Dizziness, Fall. Patient reports worsening SOB over the past couple of weeks. States she has SOB with exertion. Reports worsening dizziness over the past couple of days. States today she was ambulating with her cane in her kitchen and she stumbled and hit her side on the counter and had to lower herself to the ground. Denies hitting her head or LOC. States she has been having increased falls recently and has started using a cane for the past few days. Reports some intermittent constipation which she takes MOM for at home, last BM was 2 days ago. Reports a poor appetite due to nausea. Reports urinary urgency. Denies any pain. Denies headache or lightheadedness. Denies fever or chills. Denies chest pain. Denies abdominal pain. Denies dysuria or hematuria.    Restrictions/Precautions:  Restrictions/Precautions: General Precautions, Fall Risk    Subjective  Chart Reviewed: Yes, Orders, History and Physical  Patient assessed for rehabilitation services?: Yes    Subjective: RN okayed OT session. Upon arrival patient was resting in bed. Pt was agreeable to OT session.    Pain: 0/10: Pt denies     Vitals: Vitals not assessed per clinical judgement, see nursing flowsheet    Social/Functional History:  Lives With: Son  Type of Home: House  Home Layout: Two

## 2025-05-19 NOTE — PROGRESS NOTES
Hospitalist Progress Note      Patient:  Paola Titus 85 y.o. female       : 1940  Unit/Bed:[facility]17/017-A    Date of Admission: 2025      ASSESSMENT AND PLAN    Active Problems  Acute hypoxic respiratory failure  Acute HFpEF  CXR on arrival showing cardiomegaly and interstitial edema. +BLE edema.   Echo completed, EF 50 to 55%.  Mild to moderate aortic regurgitation, moderate mitral stenosis.  Cont IV Bumex BID, added metolazone     2L  fluid restriction. 2g salt restriction.  Strict I's and O's, daily standing weights  Wean O2 as tolerated. 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 Baseline around 1.7.   BP soft, hydralazine and valsartan on hold. Avoid hypotension to prevent further worsening renal function.   BMP daily.   Nephrology following    Moderate mitral stenosis   Also noted on ROGELIO 2024. Follows with Dr. Mendoza.   Hyponatremia, mild; resolved.   Likely due to hypervolemia.  Resolved with diuretics.  Mechanical fall  PT/OT    Chronic Conditions (reviewed, stable, and home medications resumed, unless otherwise stated)  Chronic lightheadedness. Seen by PCP on 25 for same complaint. Has seen Neurology and ENT in the past for same complaint and no definitive diagnosis made. 2024: Tilt Table test negative. PCP discontinued B12, biotin, folic acid, iron, and cholesterol medications to assess impact on dizziness. Could also be related to poor oxygenation due to above.  Orthostats negative  PAF.  With secondary hypercoagulable state, on Coumadin.  Status post cardioversion.  Continue amiodarone. Follows with Dr. Mendoza.   Moderate mitral stenosis. noted on ROGELIO 2024.   Essential hypertension  Hypothyroidism  Chronic back pain  History of CVA/TIA  History of breast cancer      LDA: []CVC / []PICC / []Midline / [][place] /  []Drains / []Mediport / [x]None  Antibiotics: no  Steroids: no  Labs (still needed?): [x]Yes / []No  IVF (still needed?): []Yes / [x]No    Level of care: []Step Down / [x]Med-Surg  Bed Status: [x]Inpatient / []Observation  Telemetry: [x]Yes / []No  PT/OT: [x]Yes / []No    DVT Prophylaxis: [] Lovenox / [] Heparin / [] SCDs / [x] Already on Systemic Anticoagulation / [] None     Expected discharge date:  1-2 days  Disposition: home     Code status: Full Code     ===================================================================    Chief Complaint: dizziness, SOB  Subjective (past 24 hours):   Patient reports improvement in her shortness of breath and lower extremity edema.  Still has crackles on exam. Patient is wanting to go home.  On 1 L nasal cannula.  Discussed likely going home oxygen evaluation tomorrow if still plan O2.  Patient is agreeable.       Initial HPI 5/14/2025 per chart review:  Paola Titus is a 85 y.o. female with PMHx of CHF, Dizziness, CKD, PAF who presented to [facility] with chief complaint of Dizziness, Fall. Patient reports worsening SOB over the past couple of weeks. States she has SOB with exertion. Reports worsening dizziness over the past couple of days. States today she was ambulating with her cane in her kitchen and she stumbled and hit her side on the counter and had to lower herself to the ground. Denies hitting her head or LOC. States she has been having increased falls recently and has started using a cane for the past few days. Reports some intermittent constipation which she takes MOM for at home, last BM was 2 days ago. Reports a poor appetite due to nausea. Reports urinary urgency. Denies any pain. Denies headache or lightheadedness. Denies fever or chills. Denies chest pain. Denies abdominal pain. Denies dysuria or hematuria.      Medications:    Infusion Medications    sodium chloride      Scheduled Medications    warfarin  1 mg Oral Once    bumetanide  1 mg IntraVENous BID     Ambulate 20feet with RW min assist by DC

## 2025-05-19 NOTE — PLAN OF CARE
Problem: Chronic Conditions and Co-morbidities  Goal: Patient's chronic conditions and co-morbidity symptoms are monitored and maintained or improved  5/19/2025 1129 by Codie Rosen RN  Outcome: Progressing  5/19/2025 0416 by Daniela Rizzo RN  Outcome: Progressing     Problem: Discharge Planning  Goal: Discharge to home or other facility with appropriate resources  5/19/2025 1129 by Codie Rosen RN  Outcome: Progressing  5/19/2025 0416 by Daniela Rizzo RN  Outcome: Progressing     Problem: Safety - Adult  Goal: Free from fall injury  5/19/2025 1129 by Codie Rosen RN  Outcome: Progressing  5/19/2025 0416 by Daniela Rizzo RN  Outcome: Progressing     Problem: ABCDS Injury Assessment  Goal: Absence of physical injury  5/19/2025 1129 by Codie Rosen RN  Outcome: Progressing  5/19/2025 0416 by Daniela Rizzo RN  Outcome: Progressing   Patient aware and updated on plan of care

## 2025-05-20 LAB
ANION GAP SERPL CALC-SCNC: 10 MEQ/L (ref 8–16)
BUN SERPL-MCNC: 53 MG/DL (ref 8–23)
CALCIUM SERPL-MCNC: 9.7 MG/DL (ref 8.8–10.2)
CHLORIDE SERPL-SCNC: 94 MEQ/L (ref 98–111)
CO2 SERPL-SCNC: 33 MEQ/L (ref 22–29)
CREAT SERPL-MCNC: 2.4 MG/DL (ref 0.5–0.9)
DEPRECATED RDW RBC AUTO: 52.2 FL (ref 35–45)
ERYTHROCYTE [DISTWIDTH] IN BLOOD BY AUTOMATED COUNT: 14.3 % (ref 11.5–14.5)
GFR SERPL CREATININE-BSD FRML MDRD: 19 ML/MIN/1.73M2
GLUCOSE SERPL-MCNC: 103 MG/DL (ref 74–109)
HCT VFR BLD AUTO: 34.1 % (ref 37–47)
HGB BLD-MCNC: 10.7 GM/DL (ref 12–16)
INR PPP: 2.18 (ref 0.85–1.13)
MCH RBC QN AUTO: 31.5 PG (ref 26–33)
MCHC RBC AUTO-ENTMCNC: 31.4 GM/DL (ref 32.2–35.5)
MCV RBC AUTO: 100.3 FL (ref 81–99)
PLATELET # BLD AUTO: 285 THOU/MM3 (ref 130–400)
PMV BLD AUTO: 9.6 FL (ref 9.4–12.4)
POTASSIUM SERPL-SCNC: 3.9 MEQ/L (ref 3.5–5.2)
RBC # BLD AUTO: 3.4 MILL/MM3 (ref 4.2–5.4)
SODIUM SERPL-SCNC: 137 MEQ/L (ref 135–145)
WBC # BLD AUTO: 5.2 THOU/MM3 (ref 4.8–10.8)

## 2025-05-20 PROCEDURE — 97535 SELF CARE MNGMENT TRAINING: CPT

## 2025-05-20 PROCEDURE — 85027 COMPLETE CBC AUTOMATED: CPT

## 2025-05-20 PROCEDURE — 2700000000 HC OXYGEN THERAPY PER DAY

## 2025-05-20 PROCEDURE — 85610 PROTHROMBIN TIME: CPT

## 2025-05-20 PROCEDURE — 80048 BASIC METABOLIC PNL TOTAL CA: CPT

## 2025-05-20 PROCEDURE — 97530 THERAPEUTIC ACTIVITIES: CPT

## 2025-05-20 PROCEDURE — 36415 COLL VENOUS BLD VENIPUNCTURE: CPT

## 2025-05-20 PROCEDURE — 6360000002 HC RX W HCPCS: Performed by: PHYSICIAN ASSISTANT

## 2025-05-20 PROCEDURE — 99232 SBSQ HOSP IP/OBS MODERATE 35: CPT | Performed by: INTERNAL MEDICINE

## 2025-05-20 PROCEDURE — 1200000000 HC SEMI PRIVATE

## 2025-05-20 PROCEDURE — 2500000003 HC RX 250 WO HCPCS

## 2025-05-20 PROCEDURE — 6370000000 HC RX 637 (ALT 250 FOR IP): Performed by: PHARMACIST

## 2025-05-20 PROCEDURE — 6370000000 HC RX 637 (ALT 250 FOR IP): Performed by: INTERNAL MEDICINE

## 2025-05-20 PROCEDURE — P9047 ALBUMIN (HUMAN), 25%, 50ML: HCPCS | Performed by: PHYSICIAN ASSISTANT

## 2025-05-20 PROCEDURE — 6370000000 HC RX 637 (ALT 250 FOR IP)

## 2025-05-20 RX ORDER — WARFARIN SODIUM 2 MG/1
1 TABLET ORAL
Status: COMPLETED | OUTPATIENT
Start: 2025-05-20 | End: 2025-05-20

## 2025-05-20 RX ORDER — ALBUMIN (HUMAN) 12.5 G/50ML
25 SOLUTION INTRAVENOUS ONCE
Status: COMPLETED | OUTPATIENT
Start: 2025-05-20 | End: 2025-05-20

## 2025-05-20 RX ADMIN — SODIUM CHLORIDE, PRESERVATIVE FREE 10 ML: 5 INJECTION INTRAVENOUS at 22:02

## 2025-05-20 RX ADMIN — ALBUMIN (HUMAN) 25 G: 0.25 INJECTION, SOLUTION INTRAVENOUS at 11:51

## 2025-05-20 RX ADMIN — POTASSIUM CHLORIDE 20 MEQ: 1500 TABLET, EXTENDED RELEASE ORAL at 09:51

## 2025-05-20 RX ADMIN — GABAPENTIN 100 MG: 100 CAPSULE ORAL at 09:51

## 2025-05-20 RX ADMIN — PANTOPRAZOLE SODIUM 40 MG: 40 TABLET, DELAYED RELEASE ORAL at 05:49

## 2025-05-20 RX ADMIN — PANTOPRAZOLE SODIUM 40 MG: 40 TABLET, DELAYED RELEASE ORAL at 14:14

## 2025-05-20 RX ADMIN — WARFARIN SODIUM 1 MG: 2 TABLET ORAL at 17:57

## 2025-05-20 RX ADMIN — POLYETHYLENE GLYCOL 3350 17 G: 17 POWDER, FOR SOLUTION ORAL at 14:14

## 2025-05-20 RX ADMIN — ACETAMINOPHEN 650 MG: 325 TABLET ORAL at 05:49

## 2025-05-20 RX ADMIN — GABAPENTIN 100 MG: 100 CAPSULE ORAL at 22:00

## 2025-05-20 RX ADMIN — AMIODARONE HYDROCHLORIDE 200 MG: 200 TABLET ORAL at 09:51

## 2025-05-20 RX ADMIN — SODIUM CHLORIDE, PRESERVATIVE FREE 10 ML: 5 INJECTION INTRAVENOUS at 09:51

## 2025-05-20 RX ADMIN — LEVOTHYROXINE SODIUM 50 MCG: 0.05 TABLET ORAL at 05:49

## 2025-05-20 ASSESSMENT — PAIN DESCRIPTION - LOCATION: LOCATION: GENERALIZED;HEAD

## 2025-05-20 ASSESSMENT — PAIN SCALES - GENERAL
PAINLEVEL_OUTOF10: 0
PAINLEVEL_OUTOF10: 2
PAINLEVEL_OUTOF10: 4

## 2025-05-20 ASSESSMENT — PAIN SCALES - WONG BAKER: WONGBAKER_NUMERICALRESPONSE: NO HURT

## 2025-05-20 ASSESSMENT — PAIN DESCRIPTION - DESCRIPTORS: DESCRIPTORS: ACHING;NAGGING

## 2025-05-20 ASSESSMENT — PAIN - FUNCTIONAL ASSESSMENT: PAIN_FUNCTIONAL_ASSESSMENT: ACTIVITIES ARE NOT PREVENTED

## 2025-05-20 NOTE — PROGRESS NOTES
Prayer and encouragement    05/20/25 1533   Encounter Summary   Encounter Overview/Reason  Encounter   Service Provided For Patient   Referral/Consult From Rounding   Support System Family members   Last Encounter  05/20/25   Complexity of Encounter Low   Begin Time 1146   End Time  1152   Total Time Calculated 6 min   Spiritual/Emotional needs   Type Spiritual Support   Assessment/Intervention/Outcome   Assessment Hopeful   Intervention Empowerment     .

## 2025-05-20 NOTE — PROGRESS NOTES
Hospitalist Progress Note      Patient:  Paola Titus 85 y.o. female       : 1940  Unit/Bed:-17/017-A    Date of Admission: 2025      ASSESSMENT AND PLAN    Active Problems  Acute hypoxic respiratory failure  Acute HFpEF  CXR on arrival showing cardiomegaly and interstitial edema. +BLE edema.   Echo completed, EF 50 to 55%.  Mild to moderate aortic regurgitation, moderate mitral stenosis.  Cont IV Bumex BID, added metolazone     2L  fluid restriction. 2g salt restriction.  Strict I's and O's, daily standing weights  Wean O2 as tolerated. Plan Home O2 eval tomorrow  Nephrology following   Given hypotension and bump in Cr, Bumex held today. See below.   CKD stage IIIb  Appears baseline approx 1.7, but creatinine has been uptrending for the last year. Cr 2.3 on arrival, improved initially close to baseline.   Nephrology consult appreciated, notes reviewed.   BP soft, hydralazine and valsartan on hold. Avoid hypotension to prevent further worsening renal function.   Cr bumped today to 2.4. Hold Bumex per Neph  BMP daily.   Hypotension   BP 85/60, MAP 62. Avoid IVF given hypoxia and HFpEF. Will give IV Albumin x1. May need to consider starting low-dose midodrine if blood pressure remains low.  Moderate mitral stenosis   Also noted on ROGELIO 2024. Follows with Dr. Mendoza.   Hyponatremia, mild; resolved.   Likely due to hypervolemia.  Resolved with diuretics.  Mechanical fall  PT/OT    Chronic Conditions (reviewed, stable, and home medications resumed, unless otherwise stated)  Chronic lightheadedness. Seen by PCP on 25 for same complaint. Has seen Neurology and ENT in the past for same complaint and no definitive diagnosis made. 2024: Tilt Table test negative. PCP discontinued B12, biotin, folic acid, iron, and cholesterol medications to assess impact on dizziness. Could also be related to poor oxygenation due to above.  Orthostats negative  PAF.  With secondary hypercoagulable state, on

## 2025-05-20 NOTE — PROGRESS NOTES
[facility]  STRZ RENAL TELEMETRY 6K  Occupational Therapy  Daily Note    Discharge Recommendations: Home with assist as needed  Equipment Recommendations: No         Time In: 0850  Time Out: 0939  Timed Code Treatment Minutes: 41 Minutes  Minutes: 49     Variance: 8  Reason:  (was with hospitalist)    Date: 2025  Patient Name: Paola Titus,   Gender: female      Room: [address] [place] [address]  MRN: [id number]  : 1940  (85 y.o.)  Referring Practitioner: Arabella Alfonso PA-C  Diagnosis: Acute respiratory failure with hypoxia (HCC)  Additional Pertinent Hx: Paola Titus is a 85 y.o. female with PMHx of CHF, Dizziness, CKD, PAF who presented to [facility] with chief complaint of Dizziness, Fall. Patient reports worsening SOB over the past couple of weeks. States she has SOB with exertion. Reports worsening dizziness over the past couple of days. States today she was ambulating with her cane in her kitchen and she stumbled and hit her side on the counter and had to lower herself to the ground. Denies hitting her head or LOC. States she has been having increased falls recently and has started using a cane for the past few days. Reports some intermittent constipation which she takes MOM for at home, last BM was 2 days ago. Reports a poor appetite due to nausea. Reports urinary urgency. Denies any pain. Denies headache or lightheadedness. Denies fever or chills. Denies chest pain. Denies abdominal pain. Denies dysuria or hematuria.    Restrictions/Precautions:  Restrictions/Precautions: General Precautions, Fall Risk      Social/Functional History:  Lives With: Son  Type of Home: House  Home Layout: Two level  Home Access: Stairs to enter without rails  Entrance Stairs - Number of Steps: 1 ARELY  Home Equipment: Cane, Walker - 4-Wheeled   Bathroom Shower/Tub: Tub only, Walk-in shower  Bathroom Toilet: Handicap height  Bathroom Equipment: Grab bars in shower, Shower chair       Prior Level of Assist for ADLs:

## 2025-05-20 NOTE — CARE COORDINATION
5/20/25, 2:16 PM EDT    DISCHARGE ON GOING EVALUATION    [facility] day: 5  Location: -17/017-A Reason for admit: Acute respiratory failure with hypoxia (HCC) [J96.01]  Acute congestive heart failure, unspecified heart failure type (HCC) [I50.9]  Acute on chronic congestive heart failure, unspecified heart failure type (HCC) [I50.9]  Acute hypoxic respiratory failure (HCC) [J96.01]     Procedures: 5/15 echo: EF 50-55%. Mild to mod regurg. Left atrium is severely dilated.     Imaging since last note: none    Barriers to Discharge: 95% on RA. IV bumex discontinued. Anticipated discharge     PCP: Renuka Rosas APRN - CNP  Readmission Risk Score: 19.3    Patient Goals/Plan/Treatment Preferences: Plans to return home with son. Monitor of oxygen needs.

## 2025-05-20 NOTE — PROGRESS NOTES
[facility]   Nephrology progress note  5/20/2025, 8:50 AM      Pt Name:    Paola Titus  MRN:     [id number]     YOB: 1940  Admit Date:    5/14/2025  9:46 PM    Chief Complaint: Nephrology following for JONATHAN/CKD and diuretic management    Subjective:  Patient was seen and examined this morning  No chest pain or shortness of breath  Feels better  On nasal cannula    Objective:  24HR INTAKE/OUTPUT:    Intake/Output Summary (Last 24 hours) at 5/20/2025 0850  Last data filed at 5/20/2025 0553  Gross per 24 hour   Intake 150 ml   Output 900 ml   Net -750 ml         I/O last 3 completed shifts:  In: 340 [P.O.:330; I.V.:10]  Out: 2400 [Urine:2400]  No intake/output data recorded.   Admission weight: 45.4 kg (100 lb)  Wt Readings from Last 3 Encounters:   05/20/25 42.8 kg (94 lb 5.7 oz)   04/25/25 45.8 kg (101 lb)   04/07/25 45.8 kg (101 lb)        Vitals :   Vitals:    05/19/25 2031 05/19/25 2343 05/20/25 0409 05/20/25 0553   BP: (!) 98/51 (!) 94/45 (!) 102/52    Pulse: 66 52 57    Resp: 18 16 18    Temp: 99 °F (37.2 °C) 98.9 °F (37.2 °C) 98.5 °F (36.9 °C)    TempSrc: Oral Oral Oral    SpO2: 96% 92% 92%    Weight:    42.8 kg (94 lb 5.7 oz)   Height:           Physical examination  General Appearance: alert and cooperative with exam, appears comfortable, no distress  Mouth/Throat: Oral mucosa moist  Neck: No JVD  Lungs: Air entry diminished  Heart:  S1, S2 heard  GI: soft, non-tender, no guarding  Extremities: No significant LE edema    Medications:  Infusion:    sodium chloride       Meds:    bumetanide  1 mg IntraVENous BID    potassium chloride  20 mEq Oral Daily    sodium chloride flush  5-40 mL IntraVENous 2 times per day    amiodarone  200 mg Oral Daily    gabapentin  100 mg Oral BID    [Held by provider] hydrALAZINE  25 mg Oral TID    levothyroxine  50 mcg Oral Daily    pantoprazole  40 mg Oral BID AC    [Held by provider] valsartan  80 mg Oral Daily    warfarin placeholder: dosing

## 2025-05-20 NOTE — PROGRESS NOTES
Warfarin Pharmacy Consult Note    **Please contact pharmacist for discharge instructions**  Warfarin Indication: Atrial fibrillation  Target INR: 2.0-3.0  Dose prior to admission: No warfarin MF, 1 mg all other days    Recent Labs     05/20/25  0606   HGB 10.7*        Recent Labs     05/18/25  0600 05/19/25  0534 05/20/25  0606   INR 2.05* 2.12* 2.18*     Concurrent anticoagulants/antiplatelets: none  Significant warfarin drug-drug interactions: amiodarone, levothyroxine     Date INR Warfarin Dose   5/15/25 1.79 2 mg   5/16/25  1.86   0.5 mg    5/17/25  2.12 1 mg    5/18/25   2.05    1 mg    5/19/25 2.12 1 mg    5/20/25 2.18 1 mg             Monitoring:                   INR will be monitored daily.    Sindi Jha, PharmD, BCPS, BCCCP  5/20/2025 1:41 PM

## 2025-05-20 NOTE — PLAN OF CARE
Problem: Chronic Conditions and Co-morbidities  Goal: Patient's chronic conditions and co-morbidity symptoms are monitored and maintained or improved  5/20/2025 0110 by Ming Patrick RN  Outcome: Progressing  5/20/2025 0110 by Ming Patrick RN  Outcome: Progressing  5/19/2025 1129 by Codie Rosen RN  Outcome: Progressing     Problem: Discharge Planning  Goal: Discharge to home or other facility with appropriate resources  5/20/2025 0110 by Ming Patrick RN  Outcome: Progressing  5/20/2025 0110 by Ming Patrick RN  Outcome: Progressing  5/19/2025 1129 by Codie Rosen RN  Outcome: Progressing     Problem: Safety - Adult  Goal: Free from fall injury  5/20/2025 0110 by Ming Patrick RN  Outcome: Progressing  5/20/2025 0110 by Ming Patrick RN  Outcome: Progressing  5/19/2025 1129 by Codie Rosen RN  Outcome: Progressing     Problem: ABCDS Injury Assessment  Goal: Absence of physical injury  5/20/2025 0110 by Ming Patrick RN  Outcome: Progressing  5/20/2025 0110 by Ming Patrick RN  Outcome: Progressing  5/19/2025 1129 by Codie Rosen RN  Outcome: Progressing

## 2025-05-21 LAB
ANION GAP SERPL CALC-SCNC: 12 MEQ/L (ref 8–16)
BUN SERPL-MCNC: 51 MG/DL (ref 8–23)
CALCIUM SERPL-MCNC: 9.4 MG/DL (ref 8.8–10.2)
CHLORIDE SERPL-SCNC: 92 MEQ/L (ref 98–111)
CO2 SERPL-SCNC: 29 MEQ/L (ref 22–29)
CREAT SERPL-MCNC: 2 MG/DL (ref 0.5–0.9)
FOLATE SERPL-MCNC: > 20 NG/ML (ref 4.6–34.8)
GFR SERPL CREATININE-BSD FRML MDRD: 24 ML/MIN/1.73M2
GLUCOSE SERPL-MCNC: 96 MG/DL (ref 74–109)
INR PPP: 2.61 (ref 0.85–1.13)
MAGNESIUM SERPL-MCNC: 2.1 MG/DL (ref 1.6–2.6)
POTASSIUM SERPL-SCNC: 3.7 MEQ/L (ref 3.5–5.2)
SODIUM SERPL-SCNC: 133 MEQ/L (ref 135–145)
VIT B12 SERPL-MCNC: > 2000 PG/ML (ref 232–1245)

## 2025-05-21 PROCEDURE — 2500000003 HC RX 250 WO HCPCS

## 2025-05-21 PROCEDURE — 82607 VITAMIN B-12: CPT

## 2025-05-21 PROCEDURE — 97110 THERAPEUTIC EXERCISES: CPT

## 2025-05-21 PROCEDURE — 85610 PROTHROMBIN TIME: CPT

## 2025-05-21 PROCEDURE — 6370000000 HC RX 637 (ALT 250 FOR IP): Performed by: INTERNAL MEDICINE

## 2025-05-21 PROCEDURE — 97116 GAIT TRAINING THERAPY: CPT

## 2025-05-21 PROCEDURE — 36415 COLL VENOUS BLD VENIPUNCTURE: CPT

## 2025-05-21 PROCEDURE — 99232 SBSQ HOSP IP/OBS MODERATE 35: CPT | Performed by: INTERNAL MEDICINE

## 2025-05-21 PROCEDURE — 80048 BASIC METABOLIC PNL TOTAL CA: CPT

## 2025-05-21 PROCEDURE — 82746 ASSAY OF FOLIC ACID SERUM: CPT

## 2025-05-21 PROCEDURE — 83735 ASSAY OF MAGNESIUM: CPT

## 2025-05-21 PROCEDURE — 97530 THERAPEUTIC ACTIVITIES: CPT

## 2025-05-21 PROCEDURE — 6370000000 HC RX 637 (ALT 250 FOR IP)

## 2025-05-21 PROCEDURE — 1200000000 HC SEMI PRIVATE

## 2025-05-21 RX ORDER — BUMETANIDE 1 MG/1
1 TABLET ORAL 2 TIMES DAILY
Status: DISCONTINUED | OUTPATIENT
Start: 2025-05-22 | End: 2025-05-23 | Stop reason: HOSPADM

## 2025-05-21 RX ADMIN — AMIODARONE HYDROCHLORIDE 200 MG: 200 TABLET ORAL at 08:11

## 2025-05-21 RX ADMIN — PANTOPRAZOLE SODIUM 40 MG: 40 TABLET, DELAYED RELEASE ORAL at 05:34

## 2025-05-21 RX ADMIN — GABAPENTIN 100 MG: 100 CAPSULE ORAL at 20:56

## 2025-05-21 RX ADMIN — SODIUM CHLORIDE, PRESERVATIVE FREE 10 ML: 5 INJECTION INTRAVENOUS at 08:11

## 2025-05-21 RX ADMIN — SODIUM CHLORIDE, PRESERVATIVE FREE 10 ML: 5 INJECTION INTRAVENOUS at 20:56

## 2025-05-21 RX ADMIN — POTASSIUM CHLORIDE 20 MEQ: 1500 TABLET, EXTENDED RELEASE ORAL at 08:11

## 2025-05-21 RX ADMIN — LEVOTHYROXINE SODIUM 50 MCG: 0.05 TABLET ORAL at 05:34

## 2025-05-21 RX ADMIN — PANTOPRAZOLE SODIUM 40 MG: 40 TABLET, DELAYED RELEASE ORAL at 15:36

## 2025-05-21 RX ADMIN — GABAPENTIN 100 MG: 100 CAPSULE ORAL at 08:11

## 2025-05-21 ASSESSMENT — PAIN SCALES - WONG BAKER
WONGBAKER_NUMERICALRESPONSE: NO HURT

## 2025-05-21 ASSESSMENT — PAIN SCALES - GENERAL
PAINLEVEL_OUTOF10: 0

## 2025-05-21 NOTE — PROGRESS NOTES
[facility]  OCCUPATIONAL THERAPY MISSED TREATMENT NOTE  STRZ RENAL TELEMETRY 6K  6K-17/017-A      Date: 2025  Patient Name: Paola Titus        CSN: [id number]   : 1940  (85 y.o.)  Gender: female   Referring Practitioner: Arabella Alfonso PA-C            REASON FOR MISSED TREATMENT:  patient working with PT upon OT arrival. OT to check back as able and time allows.                 Principal Discharge DX:	Pancreatitis   1

## 2025-05-21 NOTE — PROGRESS NOTES
[facility]  INPATIENT PHYSICAL THERAPY  DAILY NOTE  STRZ RENAL TELEMETRY 6K - 6K-17/017-A      Discharge Recommendations: Home with Home Health PT with frequent check ins from family  Equipment Recommendations:               Time In: 915  Time Out: 1008  Timed Code Treatment Minutes: 53 Minutes  Minutes: 53          Date: 2025  Patient Name: Paola Titus,  Gender:  female        MRN: [id number]  : 1940  (85 y.o.)     Referring Practitioner: Arabella Alfonso PA-C  Diagnosis: Acute respiratory failure with hypoxia (HCC)  Additional Pertinent Hx: Per H&P: \"Paola Titus is a 85 y.o. female with PMHx of CHF, Dizziness, CKD, PAF who presented to [facility] with chief complaint of Dizziness, Fall. Patient reports worsening SOB over the past couple of weeks. States she has SOB with exertion. Reports worsening dizziness over the past couple of days. States today she was ambulating with her cane in her kitchen and she stumbled and hit her side on the counter and had to lower herself to the ground. Denies hitting her head or LOC. States she has been having increased falls recently and has started using a cane for the past few days. Reports some intermittent constipation which she takes MOM for at home, last BM was 2 days ago. Reports a poor appetite due to nausea. Reports urinary urgency. Denies any pain. Denies headache or lightheadedness. Denies fever or chills. Denies chest pain. Denies abdominal pain. Denies dysuria or hematuria.\"     Prior Level of Function:  Lives With: Son  Type of Home: House  Home Layout: Two level  Home Access: Stairs to enter without rails  Entrance Stairs - Number of Steps: 1 ARELY  Home Equipment: Cane, Walker - 4-Wheeled   Bathroom Shower/Tub: Tub only, Walk-in shower  Bathroom Toilet: Handicap height  Bathroom Equipment: Grab bars in shower, Shower chair    Prior Level of Assist for ADLs: Independent  Prior Level of Assist for Homemaking: Independent  Homemaking  Goal 1: Patient will be able to ambulate 35 ft with rollator/RW independently to be able to ambulate in the home safely  Short Term Goal 2: Patient will be able to perform STS to rollator/RW independently  Short Term Goal 3: Patient will be able to perform supine <> sit independently with use of bedrails as needed  Short Term Goal 4: Pt will be able to complete one step independently in order to enter the home  Short Term Goal 5: Patient will be able to ambulate 100 ft with rollator/RW with supervision in order to progress to community ambulation  Long Term Goals  Time Frame for Long Term Goals : NA d/t short ELOS    Following session, patient left in safe position. Pt in bedside chair following session, all needs and call light in reach, alarm on.

## 2025-05-21 NOTE — PROGRESS NOTES
Warfarin Pharmacy Consult Note    **Please contact pharmacist for discharge instructions**  Warfarin Indication: Atrial fibrillation  Target INR: 2.0-3.0  Dose prior to admission: No warfarin MF, 1 mg all other days    Recent Labs     05/20/25  0606   HGB 10.7*        Recent Labs     05/19/25  0534 05/20/25  0606 05/21/25  0536   INR 2.12* 2.18* 2.61*     Concurrent anticoagulants/antiplatelets: none  Significant warfarin drug-drug interactions: amiodarone (started Mar 2025), levothyroxine     Date INR Warfarin Dose   5/15/25 1.79 2 mg   5/16/25  1.86   0.5 mg    5/17/25  2.12 1 mg    5/18/25   2.05    1 mg    5/19/25 2.12 1 mg    5/20/25 2.18 1 mg    5/21/25  2.61  None     Monitoring:                   INR will be monitored daily.    Sindi Jha, PharmD, BCPS, BCCCP  5/21/2025 9:55 AM

## 2025-05-21 NOTE — PROGRESS NOTES
Hospitalist Progress Note      Patient:  Paola Titus 85 y.o. female       : 1940  Unit/Bed:-17/017-A    Date of Admission: 2025    Disposition continue inpatient care urine output approximately 850 mL 2025 overnight    ASSESSMENT AND PLAN    Active Problems  Acute hypoxic respiratory failure  Acute HFpEF  CXR on arrival showing cardiomegaly and interstitial edema. +BLE edema.   Echo completed, EF 50 to 55%.  Mild to moderate aortic regurgitation, moderate mitral stenosis.  Cont IV Bumex BID, added metolazone     2L  fluid restriction. 2g salt restriction.  Strict I's and O's, daily standing weights  Wean O2 as tolerated. Plan Home O2 eval tomorrow  Nephrology following   Given hypotension and bump in Cr, Bumex held 2025 and 2025 anticipated Bumex renewal will be 2025 see below.   CKD stage IIIb  Appears baseline approx 1.7, but creatinine has been uptrending for the last year. Cr 2.3 on arrival, improved initially close to baseline.   Nephrology consult appreciated, notes reviewed.   BP soft, hydralazine and valsartan on hold. Avoid hypotension to prevent further worsening renal function.   Cr bumped today to 2.4. Hold Bumex per Neph  BMP daily.   Hypotension   BP 85/60, MAP 62. Avoid IVF given hypoxia and HFpEF. Will give IV Albumin x1. May need to consider starting low-dose midodrine if blood pressure remains low.  Moderate mitral stenosis   Also noted on ROGELIO 2024. Follows with Dr. Mendoza.   Hyponatremia, mild; resolved.   Likely due to hypervolemia.  Resolved with diuretics.  Mechanical fall  PT/OT    Chronic Conditions (reviewed, stable, and home medications resumed, unless otherwise stated)  Chronic lightheadedness. Seen by PCP on 25 for same complaint. Has seen Neurology and ENT in the past for same complaint and no definitive diagnosis made. 2024: Tilt Table test negative. PCP discontinued B12, biotin, folic acid, iron, and cholesterol medications to assess  Conjunctivae/corneas clear.  HENT: Head normal appearing. Nares normal. Oral mucosa moist.  Hearing intact.   Neck: Supple, with full range of motion. Trachea midline.  No gross JVD appreciated.  Respiratory:  Normal effort.  Basilar crackles  Cardiovascular: Normal rate, regular rhythm with normal S1/S2 without murmurs.    Minimal lower extremity edema.   Abdomen: Soft, non-tender, non-distended with normal bowel sounds.  Musculoskeletal: No joint swelling or tenderness. Normal tone. No abnormal movements.   Skin: Warm and dry. No rashes or lesions.  Neurologic:  No focal sensory/motor deficits in the upper or lower extremities. Cranial nerves:  grossly non-focal 2-12.     Psychiatric: Alert and oriented, normal insight and thought content.   Capillary Refill: Brisk,< 3 seconds.       Labs/Radiology: See chart or assessment above.     Electronically signed by Isaias Morales MD on 5/21/2025 at 4:31 PM

## 2025-05-21 NOTE — PROGRESS NOTES
[facility]   Nephrology progress note  5/21/2025, 11:08 AM      Pt Name:    Paola Titus  MRN:     [id number]     YOB: 1940  Admit Date:    5/14/2025  9:46 PM    Chief Complaint: Nephrology following for JONATHAN/CKD and diuretic management    Subjective:  Patient was seen and examined this morning  No chest pain or shortness of breath  Feels better  On nasal cannula    Objective:  24HR INTAKE/OUTPUT:    Intake/Output Summary (Last 24 hours) at 5/21/2025 1108  Last data filed at 5/21/2025 0834  Gross per 24 hour   Intake 10 ml   Output 1050 ml   Net -1040 ml         I/O last 3 completed shifts:  In: 160 [P.O.:150; I.V.:10]  Out: 1200 [Urine:1200]  I/O this shift:  In: -   Out: 200 [Urine:200]   Admission weight: 45.4 kg (100 lb)  Wt Readings from Last 3 Encounters:   05/21/25 42.4 kg (93 lb 7.6 oz)   04/25/25 45.8 kg (101 lb)   04/07/25 45.8 kg (101 lb)        Vitals :   Vitals:    05/21/25 0031 05/21/25 0457 05/21/25 0539 05/21/25 0800   BP: (!) 123/58 (!) 125/58  (!) 142/64   Pulse: 56 51  54   Resp: 16 16  16   Temp: 98.2 °F (36.8 °C) 98.4 °F (36.9 °C)  98 °F (36.7 °C)   TempSrc: Oral Oral  Oral   SpO2: 90% 94%  95%   Weight:   42.4 kg (93 lb 7.6 oz)    Height:           Physical examination  General Appearance: alert and cooperative with exam, appears comfortable, no distress  Mouth/Throat: Oral mucosa moist  Neck: No JVD  Lungs: Air entry diminished  Heart:  S1, S2 heard  GI: soft, non-tender, no guarding    Medications:  Infusion:    sodium chloride       Meds:    [Held by provider] bumetanide  1 mg IntraVENous BID    potassium chloride  20 mEq Oral Daily    sodium chloride flush  5-40 mL IntraVENous 2 times per day    amiodarone  200 mg Oral Daily    gabapentin  100 mg Oral BID    [Held by provider] hydrALAZINE  25 mg Oral TID    levothyroxine  50 mcg Oral Daily    pantoprazole  40 mg Oral BID AC    [Held by provider] valsartan  80 mg Oral Daily    warfarin placeholder: dosing

## 2025-05-21 NOTE — PROGRESS NOTES
A home oxygen evaluation has been completed.     [x]Patient is an inpatient. It is expected that the patient will be discharged within the next 48 hours. Qualified provider to write order for home prescription if patient qualifies. Social service/care managers will arrange for home oxygen.  If patient is active, arrange for Home Medical supplier to assess for Oxygen Conserving Device per pulse oximetry.  []Patient is an outpatient. Results will be faxed to the ordering provider. Qualified provider to write order for home prescription if patient qualifies and arranges for home oxygen.    Patient was placed on room air for 60 minutes. SpO2 was 90 % on room air at rest.  Patient was walked for 6 minutes. SpO2 was 73 % during walking. Patients SpO2 was below 89% and qualified for home oxygen. Oxygen was applied at 2 lpm via nasal cannula to maintain a SpO2 between 90-92% while walking. Actual SpO2 was 93 %.      Note: For any SpO2 at 89% see policy and procedure for possible qualifications.

## 2025-05-22 LAB
ANION GAP SERPL CALC-SCNC: 10 MEQ/L (ref 8–16)
BUN SERPL-MCNC: 45 MG/DL (ref 8–23)
CALCIUM SERPL-MCNC: 10.2 MG/DL (ref 8.8–10.2)
CHLORIDE SERPL-SCNC: 96 MEQ/L (ref 98–111)
CO2 SERPL-SCNC: 31 MEQ/L (ref 22–29)
CREAT SERPL-MCNC: 1.7 MG/DL (ref 0.5–0.9)
GFR SERPL CREATININE-BSD FRML MDRD: 29 ML/MIN/1.73M2
GLUCOSE SERPL-MCNC: 90 MG/DL (ref 74–109)
INR PPP: 2.3 (ref 0.85–1.13)
POTASSIUM SERPL-SCNC: 3.9 MEQ/L (ref 3.5–5.2)
SODIUM SERPL-SCNC: 137 MEQ/L (ref 135–145)

## 2025-05-22 PROCEDURE — 2500000003 HC RX 250 WO HCPCS

## 2025-05-22 PROCEDURE — 6370000000 HC RX 637 (ALT 250 FOR IP)

## 2025-05-22 PROCEDURE — 6370000000 HC RX 637 (ALT 250 FOR IP): Performed by: INTERNAL MEDICINE

## 2025-05-22 PROCEDURE — 85610 PROTHROMBIN TIME: CPT

## 2025-05-22 PROCEDURE — 97116 GAIT TRAINING THERAPY: CPT

## 2025-05-22 PROCEDURE — 97110 THERAPEUTIC EXERCISES: CPT

## 2025-05-22 PROCEDURE — 36415 COLL VENOUS BLD VENIPUNCTURE: CPT

## 2025-05-22 PROCEDURE — 99232 SBSQ HOSP IP/OBS MODERATE 35: CPT | Performed by: INTERNAL MEDICINE

## 2025-05-22 PROCEDURE — 6370000000 HC RX 637 (ALT 250 FOR IP): Performed by: PHARMACIST

## 2025-05-22 PROCEDURE — 1200000000 HC SEMI PRIVATE

## 2025-05-22 PROCEDURE — 97530 THERAPEUTIC ACTIVITIES: CPT

## 2025-05-22 PROCEDURE — 80048 BASIC METABOLIC PNL TOTAL CA: CPT

## 2025-05-22 RX ORDER — WARFARIN SODIUM 2 MG/1
1 TABLET ORAL
Status: COMPLETED | OUTPATIENT
Start: 2025-05-22 | End: 2025-05-22

## 2025-05-22 RX ADMIN — SODIUM CHLORIDE, PRESERVATIVE FREE 10 ML: 5 INJECTION INTRAVENOUS at 19:57

## 2025-05-22 RX ADMIN — PANTOPRAZOLE SODIUM 40 MG: 40 TABLET, DELAYED RELEASE ORAL at 17:11

## 2025-05-22 RX ADMIN — PANTOPRAZOLE SODIUM 40 MG: 40 TABLET, DELAYED RELEASE ORAL at 05:20

## 2025-05-22 RX ADMIN — LEVOTHYROXINE SODIUM 50 MCG: 0.05 TABLET ORAL at 05:20

## 2025-05-22 RX ADMIN — WARFARIN SODIUM 1 MG: 2 TABLET ORAL at 17:10

## 2025-05-22 RX ADMIN — BUMETANIDE 1 MG: 1 TABLET ORAL at 08:32

## 2025-05-22 RX ADMIN — BUMETANIDE 1 MG: 1 TABLET ORAL at 17:11

## 2025-05-22 RX ADMIN — GABAPENTIN 100 MG: 100 CAPSULE ORAL at 08:32

## 2025-05-22 RX ADMIN — AMIODARONE HYDROCHLORIDE 200 MG: 200 TABLET ORAL at 08:32

## 2025-05-22 RX ADMIN — POTASSIUM CHLORIDE 20 MEQ: 1500 TABLET, EXTENDED RELEASE ORAL at 08:32

## 2025-05-22 RX ADMIN — GABAPENTIN 100 MG: 100 CAPSULE ORAL at 19:57

## 2025-05-22 ASSESSMENT — PAIN SCALES - WONG BAKER
WONGBAKER_NUMERICALRESPONSE: NO HURT

## 2025-05-22 ASSESSMENT — PAIN SCALES - GENERAL
PAINLEVEL_OUTOF10: 0

## 2025-05-22 NOTE — PROGRESS NOTES
[facility]  STRZ RENAL TELEMETRY 6K  Occupational Therapy  Daily Note    Discharge Recommendations: Home with assist as needed and Home with Home Health OT  Equipment Recommendations: No        Time In: 1012  Time Out: 1027  Timed Code Treatment Minutes: 15 Minutes  Minutes: 15          Date: 2025  Patient Name: Paola Titus,   Gender: female      Room: [address] [place] [address]  MRN: [id number]  : 1940  (85 y.o.)  Referring Practitioner: Arabella Alfonso PA-C  Diagnosis: Acute respiratory failure with hypoxia (HCC)  Additional Pertinent Hx: Paola Titus is a 85 y.o. female with PMHx of CHF, Dizziness, CKD, PAF who presented to [facility] with chief complaint of Dizziness, Fall. Patient reports worsening SOB over the past couple of weeks. States she has SOB with exertion. Reports worsening dizziness over the past couple of days. States today she was ambulating with her cane in her kitchen and she stumbled and hit her side on the counter and had to lower herself to the ground. Denies hitting her head or LOC. States she has been having increased falls recently and has started using a cane for the past few days. Reports some intermittent constipation which she takes MOM for at home, last BM was 2 days ago. Reports a poor appetite due to nausea. Reports urinary urgency. Denies any pain. Denies headache or lightheadedness. Denies fever or chills. Denies chest pain. Denies abdominal pain. Denies dysuria or hematuria.    Restrictions/Precautions:  Restrictions/Precautions: General Precautions, Fall Risk     Social/Functional History:  Lives With: Son  Type of Home: House  Home Layout: Two level  Home Access: Stairs to enter without rails  Entrance Stairs - Number of Steps: 1 ARELY  Home Equipment: Cane, Walker - 4-Wheeled   Bathroom Shower/Tub: Tub only, Walk-in shower  Bathroom Toilet: Handicap height  Bathroom Equipment: Grab bars in shower, Shower chair       Prior Level of Assist for ADLs: Independent  Prior

## 2025-05-22 NOTE — PROGRESS NOTES
Warfarin Pharmacy Consult Note    **Please contact pharmacist for discharge instructions**  Warfarin Indication: Atrial fibrillation  Target INR: 2.0-3.0  Dose prior to admission: None MF, 1 mg all other days    Recent Labs     05/20/25  0606   HGB 10.7*        Recent Labs     05/20/25  0606 05/21/25  0536 05/22/25  0547   INR 2.18* 2.61* 2.30*     Concurrent anticoagulants/antiplatelets: none  Significant warfarin drug-drug interactions: amiodarone (started Mar 2025), levothyroxine     Date INR Warfarin Dose   5/15/25 1.79 2 mg   5/16/25  1.86   0.5 mg    5/17/25  2.12 1 mg    5/18/25   2.05     1 mg    5/19/25 2.12 1 mg    5/20/25 2.18 1 mg    5/21/25  2.61  None   5/22/25 2.30 1 mg        Monitoring:                   INR will be monitored daily.    Elvia Kim PharmD, BCPS 5/22/2025 9:21 AM

## 2025-05-22 NOTE — PROGRESS NOTES
[facility]  INPATIENT PHYSICAL THERAPY  DAILY NOTE  STRZ RENAL TELEMETRY 6K - 6K-17/017-A      Discharge Recommendations:  recommend home w/ family assist and home health PT   Equipment Recommendations:                   Time in 0814  Time out 0854   Total time 40 min   Timed minutes 40          Date: 2025  Patient Name: Paola Titus,  Gender:  female        MRN: [id number]  : 1940  (85 y.o.)     Referring Practitioner: Arabella Alfonso PA-C  Diagnosis: Acute respiratory failure with hypoxia (HCC)  Additional Pertinent Hx: Per H&P: \"Paola Titus is a 85 y.o. female with PMHx of CHF, Dizziness, CKD, PAF who presented to [facility] with chief complaint of Dizziness, Fall. Patient reports worsening SOB over the past couple of weeks. States she has SOB with exertion. Reports worsening dizziness over the past couple of days. States today she was ambulating with her cane in her kitchen and she stumbled and hit her side on the counter and had to lower herself to the ground. Denies hitting her head or LOC. States she has been having increased falls recently and has started using a cane for the past few days. Reports some intermittent constipation which she takes MOM for at home, last BM was 2 days ago. Reports a poor appetite due to nausea. Reports urinary urgency. Denies any pain. Denies headache or lightheadedness. Denies fever or chills. Denies chest pain. Denies abdominal pain. Denies dysuria or hematuria.\"     Prior Level of Function:  Lives With: Son  Type of Home: House  Home Layout: Two level  Home Access: Stairs to enter without rails  Entrance Stairs - Number of Steps: 1 ARELY  Home Equipment: Cane, Walker - 4-Wheeled   Bathroom Shower/Tub: Tub only, Walk-in shower  Bathroom Toilet: Handicap height  Bathroom Equipment: Grab bars in shower, Shower chair    Prior Level of Assist for ADLs: Independent  Prior Level of Assist for Homemaking: Independent  Homemaking Responsibilities: Yes  Active

## 2025-05-22 NOTE — PROGRESS NOTES
ondansetron, polyethylene glycol, acetaminophen **OR** acetaminophen     Lab Data :  CBC:   Recent Labs     05/20/25  0606   WBC 5.2   HGB 10.7*   HCT 34.1*        CMP:  Recent Labs     05/20/25  0606 05/21/25  0536 05/22/25  0547    133* 137   K 3.9 3.7 3.9   CL 94* 92* 96*   CO2 33* 29 31*   BUN 53* 51* 45*   CREATININE 2.4* 2.0* 1.7*   GLUCOSE 103 96 90   CALCIUM 9.7 9.4 10.2   MG  --  2.1  --      EF 50 to 55%      Assessment and Plan:  CKD IIIb  Previous biopsy showed arterionephrosclerosis  Baseline serum creatinine typically runs around 1.7  Creatinine today is down to 2.0.  Overall stable, close to baseline  Continue Bumex  Ultrasound showed left renal cyst  Mild metabolic alkalosis due to diuretics.  Improved.  Hypokalemia.  Improved with replacement  Hypertension.  Atrial fibrillation  Hx breast cancer  Disposition per hospitalist    D/W patient     Ag Frederick MD  [facility]    This report has been created using voice recognition software. It may contain minor errors which are inherent in voice recognition technology

## 2025-05-22 NOTE — PROGRESS NOTES
Hospitalist Progress Note      Patient:  Paola Titus 85 y.o. female       : 1940  Unit/Bed:-17/017-A    Date of Admission: 2025    Disposition continue inpatient care urine output approximately 850 mL 2025 , 25 decreased uop to 200ml but no signs of fluid overload, creatinine improved to 1.7mg/dl, anticipate discharge 25    ASSESSMENT AND PLAN    Active Problems  Acute hypoxic respiratory failure  Acute HFpEF  CXR on arrival showing cardiomegaly and interstitial edema. +BLE edema.   Echo completed, EF 50 to 55%.  Mild to moderate aortic regurgitation, moderate mitral stenosis.  Cont IV Bumex BID, added metolazone     2L  fluid restriction. 2g salt restriction.  Strict I's and O's, daily standing weights  Wean O2 as tolerated. Plan Home O2 eval tomorrow  Nephrology following   Given hypotension and bump in Cr, Bumex held 2025 and 2025 anticipated Bumex renewal has been resumed 2025 via oral administration see below.   CKD stage IIIb  Appears baseline approx 1.7, but creatinine has been uptrending for the last year. Cr 2.3 on arrival, improved initially close to baseline.   Nephrology consult appreciated, notes reviewed.   BP soft, hydralazine and valsartan on hold. Avoid hypotension to prevent further worsening renal function.   Cr decreased to 1.7 mg/dl and oral bumex resumed 25  BMP daily.   Hypotension   BP 85/60, MAP 62. Avoid IVF given hypoxia and HFpEF. Will give IV Albumin x1. May need to consider starting low-dose midodrine if blood pressure remains low. As of 25 blood pressure stable  Moderate mitral stenosis   Also noted on ROGELIO 2024. Follows with Dr. Mendoza.   Hyponatremia, mild; resolved.   Likely due to hypervolemia.  Resolved with diuretics.  Mechanical fall  PT/OT    Chronic Conditions (reviewed, stable, and home medications resumed, unless otherwise stated)  Chronic lightheadedness. Seen by PCP on 25 for same complaint. Has seen

## 2025-05-22 NOTE — CARE COORDINATION
5/22/25, 3:02 PM EDT    DISCHARGE ON GOING EVALUATION    [facility] day: 7  Location: -17/017-A Reason for admit: Acute respiratory failure with hypoxia (HCC) [J96.01]  Acute congestive heart failure, unspecified heart failure type (HCC) [I50.9]  Acute on chronic congestive heart failure, unspecified heart failure type (HCC) [I50.9]  Acute hypoxic respiratory failure (HCC) [J96.01]     Procedures:   5/15 echo: EF 50-55%. Mild to mod regurg. Left atrium is severely dilated.     Imaging since last note: none    Barriers to Discharge: Transitioning to PO bumex. Creatinine 2.0. Hospitalist and Nephro following.     PCP: Renuka Rosas APRN - CNP  Readmission Risk Score: 19    Patient Goals/Plan/Treatment Preferences: Plans home with son.

## 2025-05-23 VITALS
TEMPERATURE: 98 F | BODY MASS INDEX: 19.44 KG/M2 | HEART RATE: 58 BPM | RESPIRATION RATE: 16 BRPM | HEIGHT: 58 IN | WEIGHT: 92.59 LBS | OXYGEN SATURATION: 97 % | DIASTOLIC BLOOD PRESSURE: 56 MMHG | SYSTOLIC BLOOD PRESSURE: 116 MMHG

## 2025-05-23 LAB
ANION GAP SERPL CALC-SCNC: 10 MEQ/L (ref 8–16)
BUN SERPL-MCNC: 48 MG/DL (ref 8–23)
CALCIUM SERPL-MCNC: 10 MG/DL (ref 8.8–10.2)
CHLORIDE SERPL-SCNC: 97 MEQ/L (ref 98–111)
CO2 SERPL-SCNC: 33 MEQ/L (ref 22–29)
CREAT SERPL-MCNC: 1.7 MG/DL (ref 0.5–0.9)
GFR SERPL CREATININE-BSD FRML MDRD: 29 ML/MIN/1.73M2
GLUCOSE SERPL-MCNC: 104 MG/DL (ref 74–109)
INR PPP: 2.04 (ref 0.85–1.13)
POTASSIUM SERPL-SCNC: 3.6 MEQ/L (ref 3.5–5.2)
SODIUM SERPL-SCNC: 140 MEQ/L (ref 135–145)

## 2025-05-23 PROCEDURE — 99232 SBSQ HOSP IP/OBS MODERATE 35: CPT | Performed by: INTERNAL MEDICINE

## 2025-05-23 PROCEDURE — 36415 COLL VENOUS BLD VENIPUNCTURE: CPT

## 2025-05-23 PROCEDURE — 99239 HOSP IP/OBS DSCHRG MGMT >30: CPT | Performed by: INTERNAL MEDICINE

## 2025-05-23 PROCEDURE — 6370000000 HC RX 637 (ALT 250 FOR IP)

## 2025-05-23 PROCEDURE — 85610 PROTHROMBIN TIME: CPT

## 2025-05-23 PROCEDURE — 6370000000 HC RX 637 (ALT 250 FOR IP): Performed by: PHARMACIST

## 2025-05-23 PROCEDURE — 80048 BASIC METABOLIC PNL TOTAL CA: CPT

## 2025-05-23 PROCEDURE — 6370000000 HC RX 637 (ALT 250 FOR IP): Performed by: INTERNAL MEDICINE

## 2025-05-23 PROCEDURE — 2500000003 HC RX 250 WO HCPCS

## 2025-05-23 RX ORDER — BUMETANIDE 1 MG/1
1 TABLET ORAL 2 TIMES DAILY
Qty: 30 TABLET | Refills: 3 | Status: SHIPPED | OUTPATIENT
Start: 2025-05-23 | End: 2025-05-23

## 2025-05-23 RX ORDER — BUMETANIDE 1 MG/1
1 TABLET ORAL 2 TIMES DAILY
Qty: 30 TABLET | Refills: 3 | Status: SHIPPED | OUTPATIENT
Start: 2025-05-23

## 2025-05-23 RX ADMIN — AMIODARONE HYDROCHLORIDE 200 MG: 200 TABLET ORAL at 09:01

## 2025-05-23 RX ADMIN — SODIUM CHLORIDE, PRESERVATIVE FREE 10 ML: 5 INJECTION INTRAVENOUS at 09:02

## 2025-05-23 RX ADMIN — PANTOPRAZOLE SODIUM 40 MG: 40 TABLET, DELAYED RELEASE ORAL at 06:16

## 2025-05-23 RX ADMIN — BUMETANIDE 1 MG: 1 TABLET ORAL at 09:01

## 2025-05-23 RX ADMIN — ACETAMINOPHEN 650 MG: 325 TABLET ORAL at 11:39

## 2025-05-23 RX ADMIN — Medication 0.5 MG: at 12:14

## 2025-05-23 RX ADMIN — GABAPENTIN 100 MG: 100 CAPSULE ORAL at 09:01

## 2025-05-23 RX ADMIN — POTASSIUM CHLORIDE 20 MEQ: 1500 TABLET, EXTENDED RELEASE ORAL at 09:01

## 2025-05-23 RX ADMIN — PANTOPRAZOLE SODIUM 40 MG: 40 TABLET, DELAYED RELEASE ORAL at 16:57

## 2025-05-23 RX ADMIN — LEVOTHYROXINE SODIUM 50 MCG: 0.05 TABLET ORAL at 06:16

## 2025-05-23 RX ADMIN — BUMETANIDE 1 MG: 1 TABLET ORAL at 16:57

## 2025-05-23 ASSESSMENT — PAIN SCALES - GENERAL: PAINLEVEL_OUTOF10: 3

## 2025-05-23 ASSESSMENT — PAIN DESCRIPTION - ORIENTATION: ORIENTATION: MID

## 2025-05-23 ASSESSMENT — PAIN DESCRIPTION - LOCATION: LOCATION: HEAD

## 2025-05-23 ASSESSMENT — PAIN DESCRIPTION - DESCRIPTORS: DESCRIPTORS: ACHING

## 2025-05-23 NOTE — PROGRESS NOTES
Discharge teaching and instructions for diagnosis/procedure of acute respiratory failure with hypoxia completed with patient using teachback method. AVS reviewed. Patient voiced understanding regarding prescriptions, follow up appointments, and care of self at home. Patient sent home and educated on home oxygen with 2 liters from [employer]. Discharged in a wheelchair to  home with support per family.

## 2025-05-23 NOTE — PROGRESS NOTES
Warfarin Pharmacy Consult Note    **Please contact pharmacist for discharge instructions**  Warfarin Indication: Atrial fibrillation  Target INR: 2.0-3.0  Dose prior to admission: none MF, 1mg TWThSS    No results for input(s): \"HGB\", \"PLT\" in the last 72 hours.  Recent Labs     05/21/25  0536 05/22/25  0547 05/23/25  0502   INR 2.61* 2.30* 2.04*     Concurrent anticoagulants/antiplatelets: none  Significant warfarin drug-drug interactions: amiodarone (started Mar 2025), levothyroxine     Date INR Warfarin Dose   5/15/25 1.79 2 mg   5/16/25  1.86   0.5 mg    5/17/25  2.12 1 mg    5/18/25   2.05     1 mg    5/19/25 2.12 1 mg    5/20/25 2.18 1 mg    5/21/25  2.61  None   5/22/25 2.30 1 mg   5/23/25 2.04 0.5 mg       Monitoring:                   INR will be monitored daily.    Nicci Gay, Pharm.D., BCPS, BCCCP 5/23/2025 10:52 AM

## 2025-05-23 NOTE — PROGRESS NOTES
Clinical Pharmacy Note                                               Warfarin Discharge Recommendations    Patient discharged from [facility] today on warfarin.    Warfarin indication: Atrial fibrillation  INR goal during admission: 2.0-3.0  Previous home warfarin regimen: none on MF, 1mg TWThSS  Interacting medications at discharge: amiodarone, levothyroxine  Coumadin 2 mg tabs    Hospital Warfarin Doses & INR Results  Date INR Warfarin Dose   5/15/25 1.79 2 mg   5/16/25  1.86   0.5 mg    5/17/25  2.12 1 mg    5/18/25   2.05     1 mg    5/19/25 2.12 1 mg    5/20/25 2.18 1 mg    5/21/25  2.61  None   5/22/25 2.30 1 mg   5/23/25 2.04 0.5 mg       Recent INRs:  Recent Labs     05/23/25  0502   INR 2.04*     Warfarin Discharge Instructions:   Date Warfarin Dose   5/24 (tomorrow) 1 mg (1/2 tablet)   5/25 1 mg (1/2 tablet)   5/26 0 mg ( no dose )   5/27 1 mg (1/2 tablet)   5/28 1 mg (1/2 tablet)   5/29 1 mg (1/2 tablet)     Provider dosing warfarin: [facility] in [place]  Next INR date: Friday 5/30 - please call for appointment

## 2025-05-23 NOTE — CARE COORDINATION
5/23/25, 10:38 AM EDT    Home with son.   Patient goals/plan/ treatment preferences discussed by  and .  Patient goals/plan/ treatment preferences reviewed with patient/ family.  Patient/ family verbalize understanding of discharge plan and are in agreement with goal/plan/treatment preferences.  Understanding was demonstrated using the teach back method.  AVS provided by RN at time of discharge, which includes all necessary medical information pertaining to the patients current course of illness, treatment, post-discharge goals of care, and treatment preferences.     Services At/After Discharge: DME     New oxygen requirement of 2L continuous. Paola has no preference for provider, referral faxed to [facility].

## 2025-05-23 NOTE — PROGRESS NOTES
[facility]   Nephrology progress note  5/23/2025, 10:14 AM      Pt Name:    Paola Titus  MRN:     [id number]     YOB: 1940  Admit Date:    5/14/2025  9:46 PM    Chief Complaint: Nephrology following for JONATHAN/CKD and diuretic management    Subjective:  Patient was seen and examined this morning  No chest pain or shortness of breath  Feels better  On nasal cannula    Objective:  24HR INTAKE/OUTPUT:    Intake/Output Summary (Last 24 hours) at 5/23/2025 1014  Last data filed at 5/23/2025 0950  Gross per 24 hour   Intake 200 ml   Output --   Net 200 ml         I/O last 3 completed shifts:  In: 10 [I.V.:10]  Out: -   I/O this shift:  In: 200 [P.O.:200]  Out: -    Admission weight: 45.4 kg (100 lb)  Wt Readings from Last 3 Encounters:   05/22/25 42 kg (92 lb 9.5 oz)   04/25/25 45.8 kg (101 lb)   04/07/25 45.8 kg (101 lb)        Vitals :   Vitals:    05/22/25 1946 05/22/25 2359 05/23/25 0400 05/23/25 0845   BP: (!) 135/57 (!) 121/52 (!) 111/55 (!) 116/56   Pulse: 60 55 52 58   Resp: 18 18 16 16   Temp: 97.4 °F (36.3 °C) 97.4 °F (36.3 °C) 97.9 °F (36.6 °C) 98 °F (36.7 °C)   TempSrc: Oral Oral Oral Oral   SpO2: 97% 95% 93% 97%   Weight:       Height:           Physical examination  General Appearance: alert and cooperative with exam, appears comfortable, no distress  Neck: No JVD  Lungs: Air entry diminished  Heart:  S1, S2 heard  GI: soft, non-tender, no guarding    Medications:  Infusion:    sodium chloride       Meds:    bumetanide  1 mg Oral BID    potassium chloride  20 mEq Oral Daily    sodium chloride flush  5-40 mL IntraVENous 2 times per day    amiodarone  200 mg Oral Daily    gabapentin  100 mg Oral BID    [Held by provider] hydrALAZINE  25 mg Oral TID    levothyroxine  50 mcg Oral Daily    pantoprazole  40 mg Oral BID AC    warfarin placeholder: dosing by pharmacy   Oral RX Placeholder     Meds prn: sodium chloride flush, sodium chloride, ondansetron **OR** ondansetron,

## 2025-05-23 NOTE — DISCHARGE INSTR - PHARMACY
Warfarin Discharge Instructions:   Date Warfarin Dose   5/24 (tomorrow) 1 mg (1/2 tablet)   5/25 1 mg (1/2 tablet)   5/26 0 mg (no dose)   5/27 1 mg (1/2 tablet)   5/28 1 mg (1/2 tablet)   5/29 1 mg (1/2 tablet)     Provider dosing warfarin: [facility] in [place]  Next INR date: Friday 5/30 at 1:30 pm

## 2025-05-23 NOTE — PLAN OF CARE
Problem: Chronic Conditions and Co-morbidities  Goal: Patient's chronic conditions and co-morbidity symptoms are monitored and maintained or improved  5/22/2025 2249 by Reina Fuller RN  Outcome: Progressing  Flowsheets (Taken 5/22/2025 2249)  Care Plan - Patient's Chronic Conditions and Co-Morbidity Symptoms are Monitored and Maintained or Improved:   Monitor and assess patient's chronic conditions and comorbid symptoms for stability, deterioration, or improvement   Collaborate with multidisciplinary team to address chronic and comorbid conditions and prevent exacerbation or deterioration   Update acute care plan with appropriate goals if chronic or comorbid symptoms are exacerbated and prevent overall improvement and discharge     Problem: Discharge Planning  Goal: Discharge to home or other facility with appropriate resources  5/22/2025 2249 by Reina Fuller RN  Outcome: Progressing  Flowsheets (Taken 5/22/2025 2249)  Discharge to home or other facility with appropriate resources:   Identify barriers to discharge with patient and caregiver   Arrange for needed discharge resources and transportation as appropriate   Identify discharge learning needs (meds, wound care, etc)   Arrange for interpreters to assist at discharge as needed   Refer to discharge planning if patient needs post-hospital services based on physician order or complex needs related to functional status, cognitive ability or social support system     Problem: Safety - Adult  Goal: Free from fall injury  5/22/2025 2249 by Reina Fuller RN  Outcome: Progressing  Flowsheets (Taken 5/22/2025 2249)  Free From Fall Injury:   Based on caregiver fall risk screen, instruct family/caregiver to ask for assistance with transferring infant if caregiver noted to have fall risk factors   Instruct family/caregiver on patient safety     Problem: ABCDS Injury Assessment  Goal: Absence of physical injury  5/22/2025 2249 by Reina Fuller, WESLY  Outcome:

## 2025-05-23 NOTE — PROGRESS NOTES
[facility]  PHYSICAL THERAPY MISSED TREATMENT NOTE  STRZ RENAL TELEMETRY 6K    Date: 2025  Patient Name: Paola Titus        MRN: [id number]   : 1940  (85 y.o.)  Gender: female   Referring Practitioner: Arabella Alfonso PA-C            REASON FOR MISSED TREATMENT:  Attempted to see pt for therapy this afternoon pt dressed and ready for discharge. She voiced frustrations about the discharge process and that she is still waiting for O2 prior to return home. Offered a listening ear and she had no questions about physical therapy or mobility when she goes home. Did attempt to call her nurse 2 times to see if she was able to clarify pts question and frustration regarding her O2 but the nurse didn't answer her phone .

## 2025-05-23 NOTE — DISCHARGE SUMMARY
Range    Sodium 137 135 - 145 meq/L    Potassium 3.6 3.5 - 5.2 meq/L    Chloride 97 (L) 98 - 111 meq/L    CO2 28 22 - 29 meq/L    Glucose 92 74 - 109 mg/dL    BUN 42 (H) 8 - 23 mg/dL    Creatinine 2.0 (H) 0.5 - 0.9 mg/dL    Calcium 9.1 8.8 - 10.2 mg/dL   Magnesium   Result Value Ref Range    Magnesium 1.9 1.6 - 2.6 mg/dL   Protime-INR   Result Value Ref Range    INR 2.12 (H) 0.85 - 1.13   CBC   Result Value Ref Range    WBC 4.8 4.8 - 10.8 thou/mm3    RBC 3.05 (L) 4.20 - 5.40 mill/mm3    Hemoglobin 9.6 (L) 12.0 - 16.0 gm/dl    Hematocrit 31.0 (L) 37.0 - 47.0 %    .6 (H) 81.0 - 99.0 fL    MCH 31.5 26.0 - 33.0 pg    MCHC 31.0 (L) 32.2 - 35.5 gm/dl    RDW-CV 14.4 11.5 - 14.5 %    RDW-SD 53.5 (H) 35.0 - 45.0 fL    Platelets 230 130 - 400 thou/mm3    MPV 9.4 9.4 - 12.4 fL   Anion Gap   Result Value Ref Range    Anion Gap 12.0 8.0 - 16.0 meq/L   Glomerular Filtration Rate, Estimated   Result Value Ref Range    Est, Glom Filt Rate 24 (A) >60 ml/min/1.73m2   Electrolytes urine random   Result Value Ref Range    Sodium, Ur 44 meq/l    Potassium, Urine 24.4 meq/l    Chloride, Urine 48.0 meq/l   Basic Metabolic Panel   Result Value Ref Range    Sodium 139 135 - 145 meq/L    Potassium 3.4 (L) 3.5 - 5.2 meq/L    Chloride 98 98 - 111 meq/L    CO2 29 22 - 29 meq/L    Glucose 92 74 - 109 mg/dL    BUN 42 (H) 8 - 23 mg/dL    Creatinine 2.1 (H) 0.5 - 0.9 mg/dL    Calcium 9.6 8.8 - 10.2 mg/dL   Magnesium   Result Value Ref Range    Magnesium 2.0 1.6 - 2.6 mg/dL   Protime-INR   Result Value Ref Range    INR 2.05 (H) 0.85 - 1.13   Anion Gap   Result Value Ref Range    Anion Gap 12.0 8.0 - 16.0 meq/L   Glomerular Filtration Rate, Estimated   Result Value Ref Range    Est, Glom Filt Rate 23 (A) >60 ml/min/1.73m2   Basic Metabolic Panel   Result Value Ref Range    Sodium 139 135 - 145 meq/L    Potassium 3.5 3.5 - 5.2 meq/L    Chloride 96 (L) 98 - 111 meq/L    CO2 32 (H) 22 - 29 meq/L    Glucose 104 74 - 109 mg/dL    BUN 45 (H) 8 - 23  mg/dL    Creatinine 1.9 (H) 0.5 - 0.9 mg/dL    Calcium 9.9 8.8 - 10.2 mg/dL   Magnesium   Result Value Ref Range    Magnesium 1.9 1.6 - 2.6 mg/dL   Protime-INR   Result Value Ref Range    INR 2.12 (H) 0.85 - 1.13   Anion Gap   Result Value Ref Range    Anion Gap 11.0 8.0 - 16.0 meq/L   Glomerular Filtration Rate, Estimated   Result Value Ref Range    Est, Glom Filt Rate 25 (A) >60 ml/min/1.73m2   Protime-INR   Result Value Ref Range    INR 2.18 (H) 0.85 - 1.13   CBC   Result Value Ref Range    WBC 5.2 4.8 - 10.8 thou/mm3    RBC 3.40 (L) 4.20 - 5.40 mill/mm3    Hemoglobin 10.7 (L) 12.0 - 16.0 gm/dl    Hematocrit 34.1 (L) 37.0 - 47.0 %    .3 (H) 81.0 - 99.0 fL    MCH 31.5 26.0 - 33.0 pg    MCHC 31.4 (L) 32.2 - 35.5 gm/dl    RDW-CV 14.3 11.5 - 14.5 %    RDW-SD 52.2 (H) 35.0 - 45.0 fL    Platelets 285 130 - 400 thou/mm3    MPV 9.6 9.4 - 12.4 fL   Basic Metabolic Panel   Result Value Ref Range    Sodium 137 135 - 145 meq/L    Potassium 3.9 3.5 - 5.2 meq/L    Chloride 94 (L) 98 - 111 meq/L    CO2 33 (H) 22 - 29 meq/L    Glucose 103 74 - 109 mg/dL    BUN 53 (H) 8 - 23 mg/dL    Creatinine 2.4 (H) 0.5 - 0.9 mg/dL    Calcium 9.7 8.8 - 10.2 mg/dL   Glomerular Filtration Rate, Estimated   Result Value Ref Range    Est, Glom Filt Rate 19 (A) >60 ml/min/1.73m2   Anion Gap   Result Value Ref Range    Anion Gap 10.0 8.0 - 16.0 meq/L   Basic Metabolic Panel   Result Value Ref Range    Sodium 133 (L) 135 - 145 meq/L    Potassium 3.7 3.5 - 5.2 meq/L    Chloride 92 (L) 98 - 111 meq/L    CO2 29 22 - 29 meq/L    Glucose 96 74 - 109 mg/dL    BUN 51 (H) 8 - 23 mg/dL    Creatinine 2.0 (H) 0.5 - 0.9 mg/dL    Calcium 9.4 8.8 - 10.2 mg/dL   Protime-INR   Result Value Ref Range    INR 2.61 (H) 0.85 - 1.13   Vitamin B12 & Folate   Result Value Ref Range    Vitamin B-12 > 2000 (H) 232 - 1245 pg/mL    Folate > 20.0 4.6 - 34.8 ng/mL   Magnesium   Result Value Ref Range    Magnesium 2.1 1.6 - 2.6 mg/dL   Anion Gap   Result Value Ref Range

## 2025-05-23 NOTE — PLAN OF CARE
Patient was evaluated today for the diagnosis of  acute hypoxia .  I entered a DME order for home oxygen at 2 lpm because the diagnosis and testing require the patient to have supplemental oxygen.  Condition will improve or be benefited by oxygen use.  The patient is  able to perform good mobility in a home setting and therefore does require the use of a portable oxygen system.  The need for this equipment was discussed with the patient and she understands and is in agreement.

## 2025-05-24 NOTE — PROGRESS NOTES
Physician Progress Note      PATIENT:               RUEL RAMACHANDRAN  CSN #:                  [id number]  :                       1940  ADMIT DATE:       2025 9:46 PM  DISCH DATE:        2025 6:26 PM  RESPONDING  PROVIDER #:        Isaias Morales MD          QUERY TEXT:    Congestive Heart Failure is documented in the medical record IM Progress Notes   by Arabella Alfonso PA-C on .  Please document further specificity regarding   the most likely etiology of the CHF:    The clinical indicators include:  This 85 y.o. female who presents  for evaluation of fall and dizziness.    -HTN, CAD, CKD    -Acute respiratory with hypoxia secondary to exacerbation of chronic HFpEF:   Reports increased SOB over the past few weeks (H&P 5/15, Katy Claros,   APRN - CNP)    -Acute HFpEF, CXR on arrival showing cardiomegaly and interstitial edema. +BLE   edema (IM PN , Arabella Alfonso PA-C)    -Cont IV Bumex BID, added metolazone  (IM PN , Arabella Alfonso PA-C)    -CAD (coronary artery disease) (Consults , Eloisa Vega DO)    -Left Ventricle: Low normal left ventricular systolic function with a visually   estimated EF of 50 - 55%. Left ventricle size is normal. Normal wall   thickness. Normal wall motion. Normal diastolic function (Echo 5/15)    -Aortic Valve: Mildly calcified cusps. Mild sclerosis of the aortic valve   cusps. 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 There is   moderate annular calcification noted. Moderately thickened subvalvular   apparatus. Mild regurgitation. Moderate stenosis noted (Echo 5/15)    -NT Pro-BNP: 04688 ()    -IV bumetanide (BUMEX), Cardiology Consulted    Thank You  Medardo PINZON CDS  Options provided:  -- CHF related to Hypertensive Heart Disease  -- CHF related to Hypertensive Heart Disease and CAD  -- CHF not related to Hypertension but related to CAD  -- CHF related to  Hypertensive Heart Disease and Valvular Heart Disease  -- CHF not related to Hypertension but related to valvular heart disease  -- CHF related to HTN, CAD, and valvular disease  -- Other - I will add my own diagnosis  -- Disagree - Not applicable / Not valid  -- Disagree - Clinically unable to determine / Unknown  -- Refer to Clinical Documentation Reviewer    PROVIDER RESPONSE TEXT:    This patient has CHF not related to hypertensive heart disease, CHF is related   to CAD.    Query created by: Medardo Fierro on 5/22/2025 2:40 AM      Electronically signed by:  Isaias Morales MD 5/24/2025 10:46 AM

## 2025-05-29 ENCOUNTER — TELEPHONE (OUTPATIENT)
Dept: CARDIOLOGY CLINIC | Age: 85
End: 2025-05-29

## 2025-06-03 NOTE — PATIENT INSTRUCTIONS
Your Provider for Today: Devon Taveras PA-C  Your nurses for today: Viola JARRELL    You may receive a survey regarding the care you received during your visit.  Your input is valuable to us.  We encourage you to complete and return your survey.  We hope you will choose us in the future for your healthcare needs.     Mohs Method Verbiage: An incision at a 45 degree angle following the standard Mohs approach was done and the specimen was harvested as a microscopic controlled layer.

## 2025-06-04 ENCOUNTER — OFFICE VISIT (OUTPATIENT)
Dept: CARDIOLOGY CLINIC | Age: 85
End: 2025-06-04
Payer: MEDICARE

## 2025-06-04 VITALS
WEIGHT: 97.6 LBS | SYSTOLIC BLOOD PRESSURE: 128 MMHG | OXYGEN SATURATION: 96 % | HEIGHT: 58 IN | DIASTOLIC BLOOD PRESSURE: 60 MMHG | HEART RATE: 55 BPM | BODY MASS INDEX: 20.49 KG/M2

## 2025-06-04 DIAGNOSIS — I48.0 PAROXYSMAL ATRIAL FIBRILLATION (HCC): Primary | ICD-10-CM

## 2025-06-04 DIAGNOSIS — R42 DIZZINESS: ICD-10-CM

## 2025-06-04 DIAGNOSIS — I10 PRIMARY HYPERTENSION: ICD-10-CM

## 2025-06-04 DIAGNOSIS — I50.32 CHRONIC DIASTOLIC CONGESTIVE HEART FAILURE, NYHA CLASS 3 (HCC): ICD-10-CM

## 2025-06-04 PROCEDURE — 1111F DSCHRG MED/CURRENT MED MERGE: CPT | Performed by: PHYSICIAN ASSISTANT

## 2025-06-04 PROCEDURE — 1159F MED LIST DOCD IN RCRD: CPT | Performed by: PHYSICIAN ASSISTANT

## 2025-06-04 PROCEDURE — 99214 OFFICE O/P EST MOD 30 MIN: CPT | Performed by: PHYSICIAN ASSISTANT

## 2025-06-04 PROCEDURE — G8420 CALC BMI NORM PARAMETERS: HCPCS | Performed by: PHYSICIAN ASSISTANT

## 2025-06-04 PROCEDURE — 1123F ACP DISCUSS/DSCN MKR DOCD: CPT | Performed by: PHYSICIAN ASSISTANT

## 2025-06-04 PROCEDURE — G8427 DOCREV CUR MEDS BY ELIG CLIN: HCPCS | Performed by: PHYSICIAN ASSISTANT

## 2025-06-04 PROCEDURE — 1036F TOBACCO NON-USER: CPT | Performed by: PHYSICIAN ASSISTANT

## 2025-06-04 PROCEDURE — 1090F PRES/ABSN URINE INCON ASSESS: CPT | Performed by: PHYSICIAN ASSISTANT

## 2025-06-04 NOTE — PROGRESS NOTES
Pt here for hospital f/u    Denies chest pain or SOB    Se is on O2 at night now.   
  [email or web address]         Cath:   Results for orders placed or performed during the hospital encounter of 19   Diagnostic Cardiac Cath Lab Procedure    Transcription                           [place] [address]                            CARDIAC CATHETERIZATION    PATIENT NAME: RUEL RAMACHANDRAN                     :        1940  MED REC NO:   [id number]                           ROOM:       0016  ACCOUNT NO:   [id number]                           ADMIT DATE: 2019  PROVIDER:     Kit Christopher M.D.    DATE OF PROCEDURE:  2019    INDICATION FOR PROCEDURE:  This is a patient who is an 79-year-old lady  with complaints of chest pain, shortness of breath, atrial fibrillation,  abnormal nuclear stress test, treated medically, continued to be  symptomatic, admitted for heart cath and possible intervention.    PROCEDURES PERFORMED:  1.  IV conscious sedation.  The patient was given IV conscious sedation  in increment dosages by the circulating cath lab RN, monitored by the  cath lab monitor tech under my supervision.  The procedure started at  09:40 and finished at 10:00 a.m.  No acute complication from the  procedure.  2.  Left heart cath.  Right femoral artery cannulated with a 6-Kyrgyz  long sheath.  The patient has extremely tortuous iliac artery and  abdominal aortic artery.  Coronary angiogram showed the RCA is a  dominant artery and it was patent.  Left main was patent, bifurcates to  the LAD and circumflex.  The circumflex was patent.  The LAD was patent.  Nonobstructive disease of about 20% in mid LAD and the origin of the  diagonal artery, which the diagonal artery has about 70% narrowing in  its mid course.  It is a small branch.    The left ventriculogram showed a preserved systolic function of

## 2025-06-13 ENCOUNTER — TELEPHONE (OUTPATIENT)
Dept: CARDIOLOGY CLINIC | Age: 85
End: 2025-06-13

## 2025-06-16 ENCOUNTER — HOSPITAL ENCOUNTER (EMERGENCY)
Age: 85
Discharge: HOME | End: 2025-06-16
Attending: FAMILY MEDICINE
Payer: MEDICARE

## 2025-06-16 VITALS
TEMPERATURE: 98 F | OXYGEN SATURATION: 92 % | WEIGHT: 94 LBS | BODY MASS INDEX: 19.73 KG/M2 | DIASTOLIC BLOOD PRESSURE: 64 MMHG | SYSTOLIC BLOOD PRESSURE: 178 MMHG | HEART RATE: 72 BPM | RESPIRATION RATE: 15 BRPM | HEIGHT: 58 IN

## 2025-06-16 DIAGNOSIS — E86.0 DEHYDRATION: Primary | ICD-10-CM

## 2025-06-16 LAB
ACETONE BLOOD: NEGATIVE
ALBUMIN SERPL BCP-MCNC: 3.6 GM/DL (ref 3.4–5)
ALP SERPL-CCNC: 137 U/L (ref 46–116)
ALT SERPL W P-5'-P-CCNC: 20 U/L (ref 14–63)
ANION GAP SERPL CALC-SCNC: 10 MEQ/L (ref 8–16)
AST SERPL W P-5'-P-CCNC: 43 U/L (ref 15–37)
BASOPHILS # BLD: 0.8 % (ref 0–3)
BASOPHILS ABSOLUTE: 0 THOU/MM3 (ref 0–0.1)
BILIRUB SERPL-MCNC: 0.5 MG/DL (ref 0.2–1)
BUN SERPL-MCNC: 37 MG/DL (ref 7–18)
CALCIUM SERPL-MCNC: 10.1 MG/DL (ref 8.5–10.1)
CHLORIDE SERPL-SCNC: 86 MEQ/L (ref 98–107)
CO2 SERPL-SCNC: 26 MEQ/L (ref 21–32)
CREAT SERPL-MCNC: 1.8 MG/DL (ref 0.6–1.3)
EOSINOPHILS ABSOLUTE: 0.2 THOU/MM3 (ref 0–0.5)
EOSINOPHILS RELATIVE PERCENT: 4 % (ref 0–4)
GFR SERPL CREATININE-BSD FRML MDRD: 27 ML/MIN/1.73M2
GLUCOSE SERPL-MCNC: 135 MG/DL (ref 74–106)
HCT VFR BLD CALC: 33.9 % (ref 37–47)
HEMOGLOBIN: 11 GM/DL (ref 12–16)
IMMATURE GRANS (ABS): 0 THOU/MM3 (ref 0–0.07)
IMMATURE GRANULOCYTES %: 0 %
INR BLD: 2.03 (ref 0.85–1.13)
LYMPHOCYTES # BLD AUTO: 14.4 % (ref 15–47)
LYMPHOCYTES ABSOLUTE: 0.7 THOU/MM3 (ref 1–4.8)
MCH RBC QN AUTO: 31 PG (ref 26–32)
MCHC RBC AUTO-ENTMCNC: 32.4 GM/DL (ref 31–35)
MCV RBC AUTO: 95.5 FL (ref 81–99)
MONOCYTES: 0.5 THOU/MM3 (ref 0.3–1.3)
MONOCYTES: 10.9 % (ref 0–12)
NEUTROPHILS ABSOLUTE: 3.4 THOU/MM3 (ref 1.8–7.7)
PDW BLD-RTO: 14.3 % (ref 11.5–14.9)
PLATELET # BLD AUTO: 246 THOU/MM3 (ref 130–400)
PMV BLD AUTO: 8.7 FL (ref 9.4–12.4)
POTASSIUM SERPL-SCNC: 4.1 MEQ/L (ref 3.5–5.1)
PROT SERPL-MCNC: 8.6 GM/DL (ref 6.4–8.2)
RBC # BLD: 3.55 MILL/MM3 (ref 4.1–5.3)
SEG NEUTROPHILS: 69.7 % (ref 43–75)
SODIUM SERPL-SCNC: 122 MEQ/L (ref 136–145)
TROPONIN, HIGH SENSITIVITY: 20.5 PG/ML (ref 0–51.3)
WBC # BLD: 4.9 THOU/MM3 (ref 4.8–10.8)

## 2025-06-16 PROCEDURE — 82010 KETONE BODYS QUAN: CPT

## 2025-06-16 PROCEDURE — 85610 PROTHROMBIN TIME: CPT

## 2025-06-16 PROCEDURE — 85025 COMPLETE CBC W/AUTO DIFF WBC: CPT

## 2025-06-16 PROCEDURE — 93005 ELECTROCARDIOGRAM TRACING: CPT | Performed by: FAMILY MEDICINE

## 2025-06-16 PROCEDURE — 99284 EMERGENCY DEPT VISIT MOD MDM: CPT

## 2025-06-16 PROCEDURE — 84484 ASSAY OF TROPONIN QUANT: CPT

## 2025-06-16 PROCEDURE — 2580000003 HC RX 258: Performed by: FAMILY MEDICINE

## 2025-06-16 PROCEDURE — 96360 HYDRATION IV INFUSION INIT: CPT

## 2025-06-16 PROCEDURE — 80053 COMPREHEN METABOLIC PANEL: CPT

## 2025-06-16 RX ORDER — 0.9 % SODIUM CHLORIDE 0.9 %
250 INTRAVENOUS SOLUTION INTRAVENOUS ONCE
Status: COMPLETED | OUTPATIENT
Start: 2025-06-16 | End: 2025-06-16

## 2025-06-16 RX ORDER — MECLIZINE HCL 25MG 25 MG/1
25 TABLET, CHEWABLE ORAL ONCE
Status: DISCONTINUED | OUTPATIENT
Start: 2025-06-16 | End: 2025-06-16

## 2025-06-16 RX ADMIN — SODIUM CHLORIDE 250 ML: 0.9 INJECTION, SOLUTION INTRAVENOUS at 21:01

## 2025-06-16 ASSESSMENT — PAIN - FUNCTIONAL ASSESSMENT
PAIN_FUNCTIONAL_ASSESSMENT: NONE - DENIES PAIN
PAIN_FUNCTIONAL_ASSESSMENT: NONE - DENIES PAIN

## 2025-06-17 LAB
EKG ATRIAL RATE: 67 BPM
EKG P AXIS: 66 DEGREES
EKG P-R INTERVAL: 370 MS
EKG Q-T INTERVAL: 438 MS
EKG QRS DURATION: 106 MS
EKG QTC CALCULATION (BAZETT): 462 MS
EKG R AXIS: -3 DEGREES
EKG T AXIS: 54 DEGREES
EKG VENTRICULAR RATE: 67 BPM

## 2025-06-17 PROCEDURE — 93010 ELECTROCARDIOGRAM REPORT: CPT | Performed by: INTERNAL MEDICINE

## 2025-06-17 NOTE — ED NOTES
Discharge teaching and instructions for condition explained to patient. AVS reviewed. Patient voiced understanding regarding follow up appointments and care of self at home. Pt discharged to home in stable condition per son's care.

## 2025-06-17 NOTE — DISCHARGE INSTRUCTIONS
Paola Titus,    It has been my absolute pleasure to serve you while in the emergency department at [facility] today.  Please do remember to take all medications as prescribed.  Please make sure you follow-up with your PCP within 7 days.  Please follow-up with any and all specialists as we discussed.  Please return to the ER in case of any worsening of symptoms.  I wish you a speedy recovery!    Sincerely,    Dr. Hernandez Barrientos MD.

## 2025-06-17 NOTE — DISCHARGE INSTR - COC
Continuity of Care Form    Patient Name: Paola Titus   :  1940  MRN:  [id number]    Admit date:  2025  Discharge date:  ***    Code Status Order: Prior   Advance Directives:     Admitting Physician:  No admitting provider for patient encounter.  PCP: Renuka Rosas APRN - CNP    Discharging Nurse: ***  Discharging Hospital Unit/Room#: E2/E2  Discharging Unit Phone Number: ***    Emergency Contact:   Extended Emergency Contact Information  Primary Emergency Contact: ZACHMIMI   [facility]  Home Phone: [phone]  Relation: Child  Secondary Emergency Contact: Ellen Pichardo  Home Phone: [phone]  Relation: Other  Preferred language: English    Past Surgical History:  Past Surgical History:   Procedure Laterality Date    BREAST LUMPECTOMY Left     BRONCHOSCOPY N/A 2020    BRONCHOSCOPY performed by Calos Stephenson MD at [facility]    BUNIONECTOMY Left     CARDIAC CATHETERIZATION  2010    CARDIOVERSION      DILATION AND CURETTAGE OF UTERUS      X2; SEVERAL YEARS AGO    ENDOSCOPY, COLON, DIAGNOSTIC      HIP SURGERY Left 2023    LEFT INTERTAN performed by Singh Houser MD at [facility] OR    JOINT REPLACEMENT Right     KNEE    ID BX OF BREAST, NEEDLE CORE, IMAGE GUIDE Left 2020    benign    ID BX OF BREAST, NEEDLE CORE, IMAGE GUIDE Left 2020    benign    ID BX OF BREAST, NEEDLE CORE, IMAGE GUIDE Left 2020    benign    ID BX OF BREAST, NEEDLE CORE, IMAGE GUIDE Left 2010    IDC     TONSILLECTOMY      TRANSESOPHAGEAL ECHOCARDIOGRAM N/A 2024    TRANSESOPHAGEAL ECHOCARDIOGRAM performed by Car Rodriguez MD at [facility] ENDOSCOPY    UPPER GASTROINTESTINAL ENDOSCOPY N/A 2020    EGD ESOPHAGOGASTRODUODENOSCOPY performed by Charleen Farmer MD at [facility]    UPPER GASTROINTESTINAL ENDOSCOPY N/A 2020    EGD DIAGNOSTIC ONLY performed by Charleen Farmer MD at [facility]       Immunization History:   Immunization History

## 2025-06-17 NOTE — ED PROVIDER NOTES
[facility]  eMERGENCY dEPARTMENT eNCOUnter          CHIEF COMPLAINT       Chief Complaint   Patient presents with    Dehydration       Nurses Notes reviewed and I agree except as noted in the HPI.    HISTORY OF PRESENT ILLNESS    Paola Titus is a 85 y.o. female who presents to the Emergency Department for the evaluation of dizziness.  Patient says that she has been dealing with problems related to dizziness for the past month or so.  She had an echocardiogram in May 2025 which showed an ejection fraction of 50 to 55%.  She says her cardiologist is Dr. Rodriguez.  She had a recent hospitalization from 5/14/2025 through 5/23/2025 secondary to acute respiratory failure with hypoxia secondary to acute congestive heart failure and acute kidney injury.  She also has a history of paroxysmal atrial fibrillation.  She received IV diuretics during hospitalization.  She says that she was taken off of diuretic last week and plan is for her to be on a different antihypertensive medication as of this coming weekend.  Patient is on Coumadin for history of atrial fibrillation.  Patient denies any chest pains.  She reports exertional dyspnea.  Denies any leg swelling.  Denies any fevers or chills.      The HPI was provided by the patient.     REVIEW OF SYSTEMS       Eyes: no vision changes  Ears, Nose, Mouth, Throat: no hearing disturbance, no nasal swelling, no epistaxis, no difficulty swallowing  Cardiovascular: no chest pains  Respiratory: no SOB, no cough  Gastrointestinal: no abdominal pain, no nausea, no vomiting, no diarrhea  Genitourinary: no change in urinary frequency, no dysuria symptoms  Musculoskeletal: no joint pains, no muscle pains  Integumentary (skin and/or breast): no rashes, no swelling, no redness  Neurological: + dizziness, no weakness, no facial droop, no confusion  Psychiatric: no depression, no anxiety    MEDICAL HISTORY    has a past medical history of Abnormal mammogram, Acute

## 2025-06-19 ENCOUNTER — HOSPITAL ENCOUNTER (OUTPATIENT)
Age: 85
Discharge: HOME | End: 2025-06-19
Payer: MEDICARE

## 2025-06-19 LAB
25(OH)D3 SERPL-MCNC: 74 NG/ML (ref 30–100)
ANION GAP SERPL CALC-SCNC: 14 MEQ/L (ref 8–16)
BUN SERPL-MCNC: 32 MG/DL (ref 8–23)
CALCIUM SERPL-MCNC: 9.6 MG/DL (ref 8.8–10.2)
CHLORIDE SERPL-SCNC: 92 MEQ/L (ref 98–111)
CO2 SERPL-SCNC: 24 MEQ/L (ref 22–29)
CREAT SERPL-MCNC: 1.6 MG/DL (ref 0.5–0.9)
GFR SERPL CREATININE-BSD FRML MDRD: 31 ML/MIN/1.73M2
GLUCOSE SERPL-MCNC: 113 MG/DL (ref 74–109)
POTASSIUM SERPL-SCNC: 4.6 MEQ/L (ref 3.5–5.2)
SODIUM SERPL-SCNC: 130 MEQ/L (ref 135–145)

## 2025-06-19 PROCEDURE — 82306 VITAMIN D 25 HYDROXY: CPT

## 2025-06-19 PROCEDURE — 80048 BASIC METABOLIC PNL TOTAL CA: CPT

## 2025-06-19 PROCEDURE — 36415 COLL VENOUS BLD VENIPUNCTURE: CPT

## 2025-06-24 ENCOUNTER — APPOINTMENT (OUTPATIENT)
Dept: CT IMAGING | Age: 85
End: 2025-06-24
Payer: MEDICARE

## 2025-06-24 ENCOUNTER — HOSPITAL ENCOUNTER (EMERGENCY)
Age: 85
Discharge: HOME | End: 2025-06-24
Attending: EMERGENCY MEDICINE
Payer: MEDICARE

## 2025-06-24 ENCOUNTER — HOSPITAL ENCOUNTER (OUTPATIENT)
Age: 85
Discharge: HOME | End: 2025-06-24
Payer: MEDICARE

## 2025-06-24 ENCOUNTER — OFFICE VISIT (OUTPATIENT)
Dept: NEPHROLOGY | Age: 85
End: 2025-06-24
Payer: MEDICARE

## 2025-06-24 VITALS
OXYGEN SATURATION: 99 % | RESPIRATION RATE: 18 BRPM | SYSTOLIC BLOOD PRESSURE: 171 MMHG | WEIGHT: 93 LBS | TEMPERATURE: 98.1 F | HEIGHT: 59 IN | BODY MASS INDEX: 18.75 KG/M2 | DIASTOLIC BLOOD PRESSURE: 47 MMHG | HEART RATE: 53 BPM

## 2025-06-24 VITALS
SYSTOLIC BLOOD PRESSURE: 128 MMHG | BODY MASS INDEX: 18.75 KG/M2 | WEIGHT: 93 LBS | HEIGHT: 59 IN | OXYGEN SATURATION: 95 % | HEART RATE: 56 BPM | DIASTOLIC BLOOD PRESSURE: 60 MMHG
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
## 2025-06-24 DIAGNOSIS — E87.1 HYPONATREMIA: ICD-10-CM

## 2025-06-24 DIAGNOSIS — I12.9 HYPERTENSIVE RENAL DISEASE, STAGE 1 THROUGH STAGE 4 OR UNSPECIFIED CHRONIC KIDNEY DISEASE: ICD-10-CM

## 2025-06-24 DIAGNOSIS — N18.32 CHRONIC KIDNEY DISEASE, STAGE 3B (HCC): Primary | ICD-10-CM

## 2025-06-24 DIAGNOSIS — N30.00 ACUTE CYSTITIS WITHOUT HEMATURIA: ICD-10-CM

## 2025-06-24 DIAGNOSIS — N18.32 CHRONIC KIDNEY DISEASE, STAGE 3B (HCC): ICD-10-CM

## 2025-06-24 DIAGNOSIS — S09.90XA INJURY OF HEAD, INITIAL ENCOUNTER: ICD-10-CM

## 2025-06-24 LAB
AMORPH SED URNS QL MICRO: ABNORMAL
BACTERIA URNS QL MICRO: ABNORMAL
BILIRUB UR QL STRIP.AUTO: NEGATIVE
CASTS #/AREA URNS LPF: ABNORMAL /LPF
CHARACTER UR: ABNORMAL
CHLORIDE 24H UR-SRATE: 51 MEQ/L
COLOR UR AUTO: ABNORMAL
CRYSTALS VITF MICRO: ABNORMAL
EPI CELLS #/AREA URNS HPF: ABNORMAL /HPF
GLUCOSE UR QL STRIP.AUTO: NEGATIVE MG/DL
HGB UR STRIP.AUTO-MCNC: NEGATIVE MG/L
KETONES UR QL STRIP.AUTO: NEGATIVE
LEUKOCYTE ESTERASE UR QL STRIP.AUTO: ABNORMAL
MUCOUS THREADS URNS QL MICRO: ABNORMAL
NITRITE UR QL STRIP.AUTO: NEGATIVE
OSMOLALITY UR: 343 MOSMOL/KG (ref 250–750)
PH UR STRIP.AUTO: 6 [PH] (ref 5–9)
PROT UR STRIP.AUTO-MCNC: NEGATIVE MG/DL
RBC #/AREA URNS HPF: ABNORMAL /HPF
REFLEX TO URINE C & S: ABNORMAL
SODIUM UR-SCNC: 42 MEQ/L
SP GR UR STRIP.AUTO: 1.01 (ref 1–1.03)
URATE SERPL-MCNC: 5.6 MG/DL (ref 2.4–5.7)
UROBILINOGEN UR STRIP-ACNC: 0.2 EU/DL (ref 0–1)
WBC # UR STRIP.AUTO: ABNORMAL /HPF

## 2025-06-24 PROCEDURE — 99284 EMERGENCY DEPT VISIT MOD MDM: CPT

## 2025-06-24 PROCEDURE — 1036F TOBACCO NON-USER: CPT | Performed by: INTERNAL MEDICINE

## 2025-06-24 PROCEDURE — 87086 URINE CULTURE/COLONY COUNT: CPT

## 2025-06-24 PROCEDURE — 84550 ASSAY OF BLOOD/URIC ACID: CPT

## 2025-06-24 PROCEDURE — G8427 DOCREV CUR MEDS BY ELIG CLIN: HCPCS | Performed by: INTERNAL MEDICINE

## 2025-06-24 PROCEDURE — 82436 ASSAY OF URINE CHLORIDE: CPT

## 2025-06-24 PROCEDURE — 1159F MED LIST DOCD IN RCRD: CPT | Performed by: INTERNAL MEDICINE

## 2025-06-24 PROCEDURE — 87077 CULTURE AEROBIC IDENTIFY: CPT

## 2025-06-24 PROCEDURE — 87186 SC STD MICRODIL/AGAR DIL: CPT

## 2025-06-24 PROCEDURE — G8420 CALC BMI NORM PARAMETERS: HCPCS | Performed by: INTERNAL MEDICINE

## 2025-06-24 PROCEDURE — 99213 OFFICE O/P EST LOW 20 MIN: CPT | Performed by: INTERNAL MEDICINE

## 2025-06-24 PROCEDURE — 81001 URINALYSIS AUTO W/SCOPE: CPT

## 2025-06-24 PROCEDURE — 36415 COLL VENOUS BLD VENIPUNCTURE: CPT

## 2025-06-24 PROCEDURE — 1123F ACP DISCUSS/DSCN MKR DOCD: CPT | Performed by: INTERNAL MEDICINE

## 2025-06-24 PROCEDURE — 6370000000 HC RX 637 (ALT 250 FOR IP): Performed by: EMERGENCY MEDICINE

## 2025-06-24 PROCEDURE — 70450 CT HEAD/BRAIN W/O DYE: CPT

## 2025-06-24 PROCEDURE — 84300 ASSAY OF URINE SODIUM: CPT

## 2025-06-24 PROCEDURE — 1090F PRES/ABSN URINE INCON ASSESS: CPT | Performed by: INTERNAL MEDICINE

## 2025-06-24 PROCEDURE — 83935 ASSAY OF URINE OSMOLALITY: CPT

## 2025-06-24 RX ORDER — BUMETANIDE 1 MG/1
1 TABLET ORAL DAILY
Qty: 30 TABLET | Refills: 3
Start: 2025-06-24

## 2025-06-24 RX ORDER — CEPHALEXIN 500 MG/1
500 CAPSULE ORAL ONCE
Status: COMPLETED | OUTPATIENT
Start: 2025-06-24 | End: 2025-06-24

## 2025-06-24 RX ORDER — CEPHALEXIN 500 MG/1
500 CAPSULE ORAL 2 TIMES DAILY
Qty: 20 CAPSULE | Refills: 0 | Status: SHIPPED | OUTPATIENT
Start: 2025-06-24 | End: 2025-07-04

## 2025-06-24 RX ADMIN — CEPHALEXIN 500 MG: 500 CAPSULE ORAL at 19:04

## 2025-06-24 ASSESSMENT — ENCOUNTER SYMPTOMS
BACK PAIN: 0
ABDOMINAL PAIN: 0
NAUSEA: 0
SHORTNESS OF BREATH: 0
BLURRED VISION: 0
DOUBLE VISION: 0

## 2025-06-24 ASSESSMENT — PAIN SCALES - GENERAL
PAINLEVEL_OUTOF10: 4
PAINLEVEL_OUTOF10: 5

## 2025-06-24 ASSESSMENT — PAIN - FUNCTIONAL ASSESSMENT
PAIN_FUNCTIONAL_ASSESSMENT: 0-10
PAIN_FUNCTIONAL_ASSESSMENT: 0-10

## 2025-06-24 ASSESSMENT — PAIN DESCRIPTION - DESCRIPTORS
DESCRIPTORS: ACHING
DESCRIPTORS: ACHING

## 2025-06-24 ASSESSMENT — PAIN DESCRIPTION - ORIENTATION
ORIENTATION: POSTERIOR
ORIENTATION: POSTERIOR

## 2025-06-24 ASSESSMENT — PAIN DESCRIPTION - PAIN TYPE
TYPE: ACUTE PAIN
TYPE: ACUTE PAIN

## 2025-06-24 ASSESSMENT — PAIN DESCRIPTION - LOCATION
LOCATION: HEAD
LOCATION: HEAD

## 2025-06-24 NOTE — ED NOTES
Pt lost her balance and fell in the kitchen hitting the back of her head on the wall. Denies pain of neck with palpation, denies LOC. Pt is on Coumadin. Pt does balance therapy/PT in [place]. Pt is alert but somewhat forgetful. Pt saw BINDU Rosas CNP today, had outpt blood work and urine done and was recommended to get a CT of her head but it was not ordered. Pt & her son decided to she should be seen in the ED.

## 2025-06-24 NOTE — PROGRESS NOTES
[facility] [place] [facility]  [facility]  [address]  [address]  [place] [address]  Dept: [phone]  Loc: [phone]  Office Progress Note  6/24/2025 2:32 PM      Pt Name:    Paola Titus  YOB: 1940  Primary Care Physician:  Renuka Rosas, APRN - CNP     Chief Complaint:   Chief Complaint   Patient presents with    Follow-Up from Hospital     [facility] JONATHAN/CKD          Background Information/Interval History:   86 yo white female with hx CKD III, HTN, breast cancer s/p lumpectomy and radiation about 10 years ago (Dr. Ruff) who I am seeing for follow-up. She was previously a patient of Dr. Dennis. She reports lot of heart issues in the past. She reports hx atrial fibrillation and moderate MR and AR. She had US in July 2020 which showed about 8 cm kidneys with simple renal cysts. She does report hx UTIs in the past.     Here for post hospital follow-up. Fell yesterday because she lost her balance. She feels well. No headaches. She refused to go to ED. She says she spoke with PCP office this morning and they also recommended to go to ED but she refused. She says she feels well.      Past History:  Past Medical History:   Diagnosis Date    Abnormal mammogram 7/6/2020    Acute congestive heart failure (HCC) 5/17/2025    Acute respiratory failure with hypoxia (HCC)     Arthritis     Atrial fibrillation (HCC)     Bacterial pneumonia     Bilateral pleural effusion     Breast cancer (HCC) 2010    Lumpectomy    CAD (coronary artery disease)     Cancer (HCC)     BREAST    Chronic kidney disease     Closed displaced intertrochanteric fracture of left femur, initial encounter ([facility]) 4/26/2023    History of therapeutic radiation 2011    left IDC    HTN (hypertension) 4/13/2016    Hypertension     Mass of left breast 7/6/2020    Metabolic acidosis     Mixed hyperlipidemia 7/6/2020    Nausea & vomiting     Neuromuscular disorder (HCC)     Osteopenia

## 2025-06-24 NOTE — DISCHARGE INSTRUCTIONS
Rest, make position changes slowly.  Ice as needed for pain and swelling.  Tylenol as needed for pain.  Take the antibiotic as prescribed for urinary tract infection.  Plan a follow-up visit with your doctor to recheck for your head injury and for urinary infection.

## 2025-06-24 NOTE — ED PROVIDER NOTES
[facility]  eMERGENCY dEPARTMENT eNCOUnter             [facility] [place]    CHIEF COMPLAINT    Chief Complaint   Patient presents with    Fall     Pt lost her balance and fell hitting the back of her head on the wall last night.        Nurses Notes reviewed and I agree except as noted in the HPI.    HPI    Paola Titus is a 85 y.o. female who presents stating that she came here on the advice of her primary care provider.  She states that last night she slipped and fell backward in the kitchen, striking the back of her head on a wall.  She denies any pain in her neck, but does have some tenderness in her scalp.  She had no loss of consciousness.  She was sent here to get outpatient blood work and a urine sample done, and was told to get a CT of her head, but no order was given.  She decided to come through the ED to get her CT.  She is on chronic anticoagulant therapy with Coumadin.    REVIEW OF SYSTEMS      Review of Systems   Constitutional: Negative.    HENT:  Negative for congestion.    Eyes:  Negative for blurred vision and double vision.   Respiratory:  Negative for shortness of breath.    Cardiovascular:  Negative for chest pain and palpitations.   Gastrointestinal:  Negative for abdominal pain and nausea.   Musculoskeletal:  Positive for falls. Negative for back pain and neck pain.   Neurological:  Positive for headaches. Negative for dizziness and focal weakness.        Chronic gait disturbance, going to PT for that.   All other systems reviewed and are negative.        PAST MEDICAL HISTORY     has a past medical history of Abnormal mammogram, Acute congestive heart failure (HCC), Acute respiratory failure with hypoxia (HCC), Arthritis, Atrial fibrillation (HCC), Bacterial pneumonia, Bilateral pleural effusion, Breast cancer (HCC), CAD (coronary artery disease), Cancer (HCC), Chronic kidney disease, Closed displaced intertrochanteric fracture of left femur, initial

## 2025-06-25 ENCOUNTER — RESULTS FOLLOW-UP (OUTPATIENT)
Dept: NEPHROLOGY | Age: 85
End: 2025-06-25

## 2025-06-25 DIAGNOSIS — N18.32 CHRONIC KIDNEY DISEASE, STAGE 3B (HCC): Primary | ICD-10-CM

## 2025-06-25 DIAGNOSIS — I12.9 HYPERTENSIVE RENAL DISEASE, STAGE 1 THROUGH STAGE 4 OR UNSPECIFIED CHRONIC KIDNEY DISEASE: ICD-10-CM

## 2025-06-25 DIAGNOSIS — N28.1 RENAL CYST: ICD-10-CM

## 2025-06-25 DIAGNOSIS — E87.1 HYPONATREMIA: ICD-10-CM

## 2025-06-26 ENCOUNTER — RESULTS FOLLOW-UP (OUTPATIENT)
Dept: EMERGENCY DEPT | Age: 85
End: 2025-06-26

## 2025-06-26 LAB
BACTERIA UR CULT: ABNORMAL
ORGANISM: ABNORMAL

## 2025-06-27 ENCOUNTER — HOSPITAL ENCOUNTER (OUTPATIENT)
Age: 85
Discharge: HOME | End: 2025-06-27
Payer: MEDICARE

## 2025-06-27 DIAGNOSIS — N28.1 RENAL CYST: ICD-10-CM

## 2025-06-27 DIAGNOSIS — N18.32 CHRONIC KIDNEY DISEASE, STAGE 3B (HCC): ICD-10-CM

## 2025-06-27 DIAGNOSIS — E87.1 HYPONATREMIA: ICD-10-CM

## 2025-06-27 DIAGNOSIS — I12.9 HYPERTENSIVE RENAL DISEASE, STAGE 1 THROUGH STAGE 4 OR UNSPECIFIED CHRONIC KIDNEY DISEASE: ICD-10-CM

## 2025-06-27 LAB
ANION GAP SERPL CALC-SCNC: 10 MEQ/L (ref 8–16)
BUN SERPL-MCNC: 24 MG/DL (ref 8–23)
CALCIUM SERPL-MCNC: 9.5 MG/DL (ref 8.8–10.2)
CHLORIDE SERPL-SCNC: 89 MEQ/L (ref 98–111)
CO2 SERPL-SCNC: 24 MEQ/L (ref 22–29)
CREAT SERPL-MCNC: 1.6 MG/DL (ref 0.5–0.9)
GFR SERPL CREATININE-BSD FRML MDRD: 31 ML/MIN/1.73M2
GLUCOSE SERPL-MCNC: 155 MG/DL (ref 74–109)
POTASSIUM SERPL-SCNC: 4.4 MEQ/L (ref 3.5–5.2)
SODIUM SERPL-SCNC: 123 MEQ/L (ref 135–145)

## 2025-06-27 PROCEDURE — 80048 BASIC METABOLIC PNL TOTAL CA: CPT

## 2025-06-27 PROCEDURE — 36415 COLL VENOUS BLD VENIPUNCTURE: CPT

## 2025-06-30 ENCOUNTER — RESULTS FOLLOW-UP (OUTPATIENT)
Dept: NEPHROLOGY | Age: 85
End: 2025-06-30

## 2025-06-30 DIAGNOSIS — N28.1 RENAL CYST: ICD-10-CM

## 2025-06-30 DIAGNOSIS — N18.32 CHRONIC KIDNEY DISEASE, STAGE 3B (HCC): Primary | ICD-10-CM

## 2025-06-30 DIAGNOSIS — E87.1 HYPONATREMIA: ICD-10-CM

## 2025-06-30 DIAGNOSIS — I12.9 HYPERTENSIVE RENAL DISEASE, STAGE 1 THROUGH STAGE 4 OR UNSPECIFIED CHRONIC KIDNEY DISEASE: ICD-10-CM

## 2025-06-30 RX ORDER — SODIUM CHLORIDE 1 G/1
1 TABLET ORAL 2 TIMES DAILY
COMMUNITY

## 2025-06-30 RX ORDER — SODIUM CHLORIDE 1 G/1
1 TABLET ORAL 2 TIMES DAILY
Qty: 6 TABLET | Refills: 0 | Status: CANCELLED | OUTPATIENT
Start: 2025-06-30

## 2025-06-30 NOTE — TELEPHONE ENCOUNTER
----- Message from Dr. Eloisa Vega DO sent at 6/30/2025  1:55 PM EDT -----  Patient's sodium is low. How does she feel? If not feeling well advise to go to ED.   If feeling ok and not swollen then advise to hold bumex for next 3 days.   Start sodium chloride 1 gram bid x 3 days  Limit fluids to < 50 oz  Repeat bmp along with urine osmol, urine sodium on Thursday

## 2025-06-30 NOTE — RESULT ENCOUNTER NOTE
Patient's sodium is low. How does she feel? If not feeling well advise to go to ED.   If feeling ok and not swollen then advise to hold bumex for next 3 days.   Start sodium chloride 1 gram bid x 3 days  Limit fluids to < 50 oz  Repeat bmp along with urine osmol, urine sodium on Thursday

## 2025-06-30 NOTE — TELEPHONE ENCOUNTER
I spoke with Priscilla call daughter.  She is going to check on Paola to see how she is doing. If she is not feeling well, she will take her to the ED.  Script is pending to [facility] pharmacy and labs are ordered.     If she is feeling well and not swollen, she will hold the BUMEX for the next three days and limit fluid restriction.

## 2025-07-01 NOTE — TELEPHONE ENCOUNTER
Pt called she stated that she is feeling a little better and that she bought sodium chloride over the counter. The script was not sent to the pharmacy. She understands that she is to take the sodium chloride 1 g bid x 3 days, hold bumex x 3 days, limit fluids to <50 oz and repeat labs next week. She will repeat labs at [facility]. If she does not feel better, she will go to ED.

## 2025-07-03 ENCOUNTER — HOSPITAL ENCOUNTER (OUTPATIENT)
Age: 85
Discharge: HOME | End: 2025-07-03
Payer: MEDICARE

## 2025-07-03 DIAGNOSIS — N28.1 RENAL CYST: ICD-10-CM

## 2025-07-03 DIAGNOSIS — E87.1 HYPONATREMIA: ICD-10-CM

## 2025-07-03 DIAGNOSIS — N18.32 CHRONIC KIDNEY DISEASE, STAGE 3B (HCC): ICD-10-CM

## 2025-07-03 DIAGNOSIS — I12.9 HYPERTENSIVE RENAL DISEASE, STAGE 1 THROUGH STAGE 4 OR UNSPECIFIED CHRONIC KIDNEY DISEASE: ICD-10-CM

## 2025-07-03 LAB
ANION GAP SERPL CALC-SCNC: 10 MEQ/L (ref 8–16)
BUN SERPL-MCNC: 24 MG/DL (ref 8–23)
CALCIUM SERPL-MCNC: 9.9 MG/DL (ref 8.8–10.2)
CHLORIDE SERPL-SCNC: 95 MEQ/L (ref 98–111)
CO2 SERPL-SCNC: 24 MEQ/L (ref 22–29)
CREAT SERPL-MCNC: 1.3 MG/DL (ref 0.5–0.9)
GFR SERPL CREATININE-BSD FRML MDRD: 40 ML/MIN/1.73M2
GLUCOSE SERPL-MCNC: 104 MG/DL (ref 74–109)
POTASSIUM SERPL-SCNC: 5.3 MEQ/L (ref 3.5–5.2)
SODIUM SERPL-SCNC: 129 MEQ/L (ref 135–145)

## 2025-07-03 PROCEDURE — 36415 COLL VENOUS BLD VENIPUNCTURE: CPT

## 2025-07-03 PROCEDURE — 80048 BASIC METABOLIC PNL TOTAL CA: CPT

## 2025-07-04 ENCOUNTER — RESULTS FOLLOW-UP (OUTPATIENT)
Dept: NEPHROLOGY | Age: 85
End: 2025-07-04

## 2025-07-04 DIAGNOSIS — E87.5 HYPERKALEMIA: ICD-10-CM

## 2025-07-04 DIAGNOSIS — E87.1 HYPONATREMIA: Primary | ICD-10-CM

## 2025-07-04 NOTE — RESULT ENCOUNTER NOTE
Sodium still low but improved.   Continue to stay under 50 ounces of fluids.  Encourage increased protein intake.  K is higher - STOP Kcl.   Repeat bmp again in 1 week - forward the results to Dr. Frederick

## 2025-07-11 ENCOUNTER — HOSPITAL ENCOUNTER (OUTPATIENT)
Age: 85
Discharge: HOME | End: 2025-07-11
Payer: MEDICARE

## 2025-07-11 DIAGNOSIS — E87.5 HYPERKALEMIA: ICD-10-CM

## 2025-07-11 DIAGNOSIS — N28.1 RENAL CYST: ICD-10-CM

## 2025-07-11 DIAGNOSIS — N18.32 CHRONIC KIDNEY DISEASE, STAGE 3B (HCC): ICD-10-CM

## 2025-07-11 DIAGNOSIS — I12.9 HYPERTENSIVE RENAL DISEASE, STAGE 1 THROUGH STAGE 4 OR UNSPECIFIED CHRONIC KIDNEY DISEASE: ICD-10-CM

## 2025-07-11 DIAGNOSIS — E87.1 HYPONATREMIA: ICD-10-CM

## 2025-07-11 LAB
ANION GAP SERPL CALC-SCNC: 12 MEQ/L (ref 8–16)
BUN SERPL-MCNC: 27 MG/DL (ref 8–23)
CALCIUM SERPL-MCNC: 9.9 MG/DL (ref 8.8–10.2)
CHLORIDE 24H UR-SRATE: 53 MEQ/L
CHLORIDE SERPL-SCNC: 94 MEQ/L (ref 98–111)
CO2 SERPL-SCNC: 29 MEQ/L (ref 22–29)
CREAT SERPL-MCNC: 1.6 MG/DL (ref 0.5–0.9)
GFR SERPL CREATININE-BSD FRML MDRD: 31 ML/MIN/1.73M2
GLUCOSE SERPL-MCNC: 106 MG/DL (ref 74–109)
OSMOLALITY UR: 237 MOSMOL/KG (ref 250–750)
POTASSIUM SERPL-SCNC: 4 MEQ/L (ref 3.5–5.2)
SODIUM SERPL-SCNC: 135 MEQ/L (ref 135–145)
SODIUM UR-SCNC: 50 MEQ/L

## 2025-07-11 PROCEDURE — 83935 ASSAY OF URINE OSMOLALITY: CPT

## 2025-07-11 PROCEDURE — 84300 ASSAY OF URINE SODIUM: CPT

## 2025-07-11 PROCEDURE — 82436 ASSAY OF URINE CHLORIDE: CPT

## 2025-07-11 PROCEDURE — 80048 BASIC METABOLIC PNL TOTAL CA: CPT

## 2025-07-11 PROCEDURE — 36415 COLL VENOUS BLD VENIPUNCTURE: CPT

## 2025-08-08 ENCOUNTER — HOSPITAL ENCOUNTER (INPATIENT)
Age: 85
LOS: 6 days | Discharge: NURSING, REHAB OR LONG-TERM CARE | DRG: 312 | End: 2025-08-15
Attending: EMERGENCY MEDICINE | Admitting: INTERNAL MEDICINE
Payer: MEDICARE

## 2025-08-08 ENCOUNTER — APPOINTMENT (OUTPATIENT)
Dept: GENERAL RADIOLOGY | Age: 85
DRG: 312 | End: 2025-08-08
Payer: MEDICARE

## 2025-08-08 ENCOUNTER — APPOINTMENT (OUTPATIENT)
Dept: CT IMAGING | Age: 85
DRG: 312 | End: 2025-08-08
Payer: MEDICARE

## 2025-08-08 DIAGNOSIS — E87.1 HYPONATREMIA: ICD-10-CM

## 2025-08-08 DIAGNOSIS — I25.9 CHEST PAIN DUE TO MYOCARDIAL ISCHEMIA, UNSPECIFIED ISCHEMIC CHEST PAIN TYPE: Primary | ICD-10-CM
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
## 2025-08-08 PROBLEM — R07.9 CHEST PAIN: Status: ACTIVE | Noted: 2025-08-08

## 2025-08-08 LAB
ALBUMIN SERPL BCG-MCNC: 4 G/DL (ref 3.4–4.9)
ALP SERPL-CCNC: 71 U/L (ref 38–126)
ALT SERPL W/O P-5'-P-CCNC: 17 U/L (ref 10–35)
ANION GAP SERPL CALC-SCNC: 13 MEQ/L (ref 8–16)
AST SERPL-CCNC: 30 U/L (ref 10–35)
BASOPHILS ABSOLUTE: 0 THOU/MM3 (ref 0–0.1)
BASOPHILS NFR BLD AUTO: 1 %
BILIRUB CONJ SERPL-MCNC: < 0.1 MG/DL (ref 0–0.2)
BILIRUB SERPL-MCNC: 0.3 MG/DL (ref 0.3–1.2)
BILIRUB UR QL STRIP.AUTO: NEGATIVE
BUN SERPL-MCNC: 26 MG/DL (ref 8–23)
CALCIUM SERPL-MCNC: 9.9 MG/DL (ref 8.8–10.2)
CHARACTER UR: CLEAR
CHLORIDE SERPL-SCNC: 100 MEQ/L (ref 98–111)
CO2 SERPL-SCNC: 26 MEQ/L (ref 22–29)
COLOR, UA: YELLOW
CREAT SERPL-MCNC: 1.6 MG/DL (ref 0.5–0.9)
DEPRECATED RDW RBC AUTO: 55.3 FL (ref 35–45)
EOSINOPHIL NFR BLD AUTO: 8.3 %
EOSINOPHILS ABSOLUTE: 0.3 THOU/MM3 (ref 0–0.4)
ERYTHROCYTE [DISTWIDTH] IN BLOOD BY AUTOMATED COUNT: 15.5 % (ref 11.5–14.5)
FLUAV RNA RESP QL NAA+PROBE: NOT DETECTED
FLUBV RNA RESP QL NAA+PROBE: NOT DETECTED
GFR SERPL CREATININE-BSD FRML MDRD: 31 ML/MIN/1.73M2
GLUCOSE SERPL-MCNC: 100 MG/DL (ref 74–109)
GLUCOSE UR QL STRIP.AUTO: NEGATIVE MG/DL
HCT VFR BLD AUTO: 37.1 % (ref 37–47)
HGB BLD-MCNC: 11.8 GM/DL (ref 12–16)
HGB UR QL STRIP.AUTO: NEGATIVE
IMM GRANULOCYTES # BLD AUTO: 0.01 THOU/MM3 (ref 0–0.07)
IMM GRANULOCYTES NFR BLD AUTO: 0.3 %
INR PPP: 1.71 (ref 0.85–1.13)
KETONES UR QL STRIP.AUTO: NEGATIVE
LIPASE SERPL-CCNC: 49 U/L (ref 13–60)
LYMPHOCYTES ABSOLUTE: 1 THOU/MM3 (ref 1–4.8)
LYMPHOCYTES NFR BLD AUTO: 24.1 %
MCH RBC QN AUTO: 31.1 PG (ref 26–33)
MCHC RBC AUTO-ENTMCNC: 31.8 GM/DL (ref 32.2–35.5)
MCV RBC AUTO: 97.9 FL (ref 81–99)
MONOCYTES ABSOLUTE: 0.5 THOU/MM3 (ref 0.4–1.3)
MONOCYTES NFR BLD AUTO: 13 %
NEUTROPHILS ABSOLUTE: 2.1 THOU/MM3 (ref 1.8–7.7)
NEUTROPHILS NFR BLD AUTO: 53.3 %
NITRITE UR QL STRIP: NEGATIVE
NRBC BLD AUTO-RTO: 0 /100 WBC
NT-PROBNP SERPL IA-MCNC: 2369 PG/ML (ref 0–449)
OSMOLALITY SERPL CALC.SUM OF ELEC: 282.4 MOSMOL/KG (ref 275–300)
PH UR STRIP.AUTO: 8.5 [PH] (ref 5–9)
PLATELET # BLD AUTO: 192 THOU/MM3 (ref 130–400)
PMV BLD AUTO: 8.9 FL (ref 9.4–12.4)
POTASSIUM SERPL-SCNC: 4.7 MEQ/L (ref 3.5–5.2)
PROT SERPL-MCNC: 7.1 G/DL (ref 6.4–8.3)
PROT UR STRIP.AUTO-MCNC: NEGATIVE MG/DL
PROTHROMBIN TIME: 19.9 SECONDS (ref 10–13.5)
RBC # BLD AUTO: 3.79 MILL/MM3 (ref 4.2–5.4)
SARS-COV-2 RNA RESP QL NAA+PROBE: NOT DETECTED
SODIUM SERPL-SCNC: 139 MEQ/L (ref 135–145)
SP GR UR REFRACT.AUTO: 1.01 (ref 1–1.03)
T4 FREE SERPL-MCNC: 1.7 NG/DL (ref 0.92–1.68)
TROPONIN, HIGH SENSITIVITY: 43 NG/L (ref 0–12)
TSH SERPL DL<=0.05 MIU/L-ACNC: 5.31 UIU/ML (ref 0.27–4.2)
UROBILINOGEN, URINE: 0.2 EU/DL (ref 0–1)
WBC # BLD AUTO: 4 THOU/MM3 (ref 4.8–10.8)
WBC #/AREA URNS HPF: NEGATIVE /[HPF]

## 2025-08-08 PROCEDURE — 74177 CT ABD & PELVIS W/CONTRAST: CPT

## 2025-08-08 PROCEDURE — 2500000003 HC RX 250 WO HCPCS

## 2025-08-08 PROCEDURE — 6360000002 HC RX W HCPCS

## 2025-08-08 PROCEDURE — 80076 HEPATIC FUNCTION PANEL: CPT

## 2025-08-08 PROCEDURE — 84484 ASSAY OF TROPONIN QUANT: CPT

## 2025-08-08 PROCEDURE — 85610 PROTHROMBIN TIME: CPT

## 2025-08-08 PROCEDURE — 81003 URINALYSIS AUTO W/O SCOPE: CPT

## 2025-08-08 PROCEDURE — 93005 ELECTROCARDIOGRAM TRACING: CPT

## 2025-08-08 PROCEDURE — 83880 ASSAY OF NATRIURETIC PEPTIDE: CPT

## 2025-08-08 PROCEDURE — 72125 CT NECK SPINE W/O DYE: CPT

## 2025-08-08 PROCEDURE — 84443 ASSAY THYROID STIM HORMONE: CPT

## 2025-08-08 PROCEDURE — 70450 CT HEAD/BRAIN W/O DYE: CPT

## 2025-08-08 PROCEDURE — 6370000000 HC RX 637 (ALT 250 FOR IP)

## 2025-08-08 PROCEDURE — 80048 BASIC METABOLIC PNL TOTAL CA: CPT

## 2025-08-08 PROCEDURE — 85025 COMPLETE CBC W/AUTO DIFF WBC: CPT

## 2025-08-08 PROCEDURE — 36415 COLL VENOUS BLD VENIPUNCTURE: CPT

## 2025-08-08 PROCEDURE — 6360000004 HC RX CONTRAST MEDICATION: Performed by: EMERGENCY MEDICINE

## 2025-08-08 PROCEDURE — 76376 3D RENDER W/INTRP POSTPROCES: CPT

## 2025-08-08 PROCEDURE — 96361 HYDRATE IV INFUSION ADD-ON: CPT

## 2025-08-08 PROCEDURE — 99285 EMERGENCY DEPT VISIT HI MDM: CPT

## 2025-08-08 PROCEDURE — 96374 THER/PROPH/DIAG INJ IV PUSH: CPT

## 2025-08-08 PROCEDURE — 71260 CT THORAX DX C+: CPT

## 2025-08-08 PROCEDURE — G0378 HOSPITAL OBSERVATION PER HR: HCPCS

## 2025-08-08 PROCEDURE — 83690 ASSAY OF LIPASE: CPT

## 2025-08-08 PROCEDURE — 84439 ASSAY OF FREE THYROXINE: CPT

## 2025-08-08 PROCEDURE — 2580000003 HC RX 258

## 2025-08-08 PROCEDURE — 87636 SARSCOV2 & INF A&B AMP PRB: CPT

## 2025-08-08 RX ORDER — HYDRALAZINE HYDROCHLORIDE 20 MG/ML
5 INJECTION INTRAMUSCULAR; INTRAVENOUS ONCE
Status: COMPLETED | OUTPATIENT
Start: 2025-08-08 | End: 2025-08-08

## 2025-08-08 RX ORDER — SODIUM CHLORIDE 9 MG/ML
INJECTION, SOLUTION INTRAVENOUS PRN
Status: DISCONTINUED | OUTPATIENT
Start: 2025-08-08 | End: 2025-08-15 | Stop reason: HOSPADM

## 2025-08-08 RX ORDER — ACETAMINOPHEN 325 MG/1
650 TABLET ORAL EVERY 6 HOURS PRN
Status: DISCONTINUED | OUTPATIENT
Start: 2025-08-08 | End: 2025-08-15 | Stop reason: HOSPADM

## 2025-08-08 RX ORDER — 0.9 % SODIUM CHLORIDE 0.9 %
500 INTRAVENOUS SOLUTION INTRAVENOUS ONCE
Status: COMPLETED | OUTPATIENT
Start: 2025-08-08 | End: 2025-08-08

## 2025-08-08 RX ORDER — ONDANSETRON 2 MG/ML
4 INJECTION INTRAMUSCULAR; INTRAVENOUS EVERY 6 HOURS PRN
Status: DISCONTINUED | OUTPATIENT
Start: 2025-08-08 | End: 2025-08-15 | Stop reason: HOSPADM

## 2025-08-08 RX ORDER — VALSARTAN 80 MG/1
80 TABLET ORAL DAILY
Status: DISCONTINUED | OUTPATIENT
Start: 2025-08-09 | End: 2025-08-10

## 2025-08-08 RX ORDER — ONDANSETRON 4 MG/1
4 TABLET, ORALLY DISINTEGRATING ORAL EVERY 8 HOURS PRN
Status: DISCONTINUED | OUTPATIENT
Start: 2025-08-08 | End: 2025-08-15 | Stop reason: HOSPADM

## 2025-08-08 RX ORDER — GABAPENTIN 100 MG/1
100 CAPSULE ORAL 2 TIMES DAILY
Status: DISCONTINUED | OUTPATIENT
Start: 2025-08-08 | End: 2025-08-15 | Stop reason: HOSPADM

## 2025-08-08 RX ORDER — IOPAMIDOL 755 MG/ML
80 INJECTION, SOLUTION INTRAVASCULAR
Status: COMPLETED | OUTPATIENT
Start: 2025-08-08 | End: 2025-08-08

## 2025-08-08 RX ORDER — AMIODARONE HYDROCHLORIDE 200 MG/1
200 TABLET ORAL DAILY
Status: DISCONTINUED | OUTPATIENT
Start: 2025-08-09 | End: 2025-08-09

## 2025-08-08 RX ORDER — WARFARIN SODIUM 2 MG/1
1 TABLET ORAL
Status: COMPLETED | OUTPATIENT
Start: 2025-08-08 | End: 2025-08-08

## 2025-08-08 RX ORDER — PANTOPRAZOLE SODIUM 40 MG/1
40 TABLET, DELAYED RELEASE ORAL
Status: DISCONTINUED | OUTPATIENT
Start: 2025-08-09 | End: 2025-08-15 | Stop reason: HOSPADM

## 2025-08-08 RX ORDER — POLYETHYLENE GLYCOL 3350 17 G/17G
17 POWDER, FOR SOLUTION ORAL DAILY PRN
Status: DISCONTINUED | OUTPATIENT
Start: 2025-08-08 | End: 2025-08-15 | Stop reason: HOSPADM

## 2025-08-08 RX ORDER — LEVOTHYROXINE SODIUM 50 UG/1
50 TABLET ORAL DAILY
Status: DISCONTINUED | OUTPATIENT
Start: 2025-08-09 | End: 2025-08-15 | Stop reason: HOSPADM

## 2025-08-08 RX ORDER — BUMETANIDE 1 MG/1
1 TABLET ORAL DAILY
Status: DISCONTINUED | OUTPATIENT
Start: 2025-08-09 | End: 2025-08-12

## 2025-08-08 RX ORDER — HYDRALAZINE HYDROCHLORIDE 20 MG/ML
5 INJECTION INTRAMUSCULAR; INTRAVENOUS EVERY 6 HOURS PRN
Status: DISCONTINUED | OUTPATIENT
Start: 2025-08-09 | End: 2025-08-10

## 2025-08-08 RX ORDER — SODIUM CHLORIDE 0.9 % (FLUSH) 0.9 %
5-40 SYRINGE (ML) INJECTION PRN
Status: DISCONTINUED | OUTPATIENT
Start: 2025-08-08 | End: 2025-08-15 | Stop reason: HOSPADM

## 2025-08-08 RX ORDER — SODIUM CHLORIDE 0.9 % (FLUSH) 0.9 %
5-40 SYRINGE (ML) INJECTION EVERY 12 HOURS SCHEDULED
Status: DISCONTINUED | OUTPATIENT
Start: 2025-08-08 | End: 2025-08-15 | Stop reason: HOSPADM

## 2025-08-08 RX ORDER — ACETAMINOPHEN 650 MG/1
650 SUPPOSITORY RECTAL EVERY 6 HOURS PRN
Status: DISCONTINUED | OUTPATIENT
Start: 2025-08-08 | End: 2025-08-15 | Stop reason: HOSPADM

## 2025-08-08 RX ADMIN — WARFARIN SODIUM 1 MG: 2 TABLET ORAL at 23:25

## 2025-08-08 RX ADMIN — IOPAMIDOL 80 ML: 755 INJECTION, SOLUTION INTRAVENOUS at 17:55

## 2025-08-08 RX ADMIN — SODIUM CHLORIDE, PRESERVATIVE FREE 10 ML: 5 INJECTION INTRAVENOUS at 23:35

## 2025-08-08 RX ADMIN — GABAPENTIN 100 MG: 100 CAPSULE ORAL at 23:25

## 2025-08-08 RX ADMIN — HYDRALAZINE HYDROCHLORIDE 5 MG: 20 INJECTION INTRAMUSCULAR; INTRAVENOUS at 23:27

## 2025-08-08 RX ADMIN — SODIUM CHLORIDE 500 ML: 0.9 INJECTION, SOLUTION INTRAVENOUS at 18:30

## 2025-08-08 ASSESSMENT — PAIN - FUNCTIONAL ASSESSMENT
PAIN_FUNCTIONAL_ASSESSMENT: 0-10
PAIN_FUNCTIONAL_ASSESSMENT: 0-10

## 2025-08-08 ASSESSMENT — PAIN DESCRIPTION - LOCATION
LOCATION: CHEST
LOCATION: GENERALIZED
LOCATION: CHEST

## 2025-08-08 ASSESSMENT — HEART SCORE: ECG: NON-SPECIFC REPOLARIZATION DISTURBANCE/LBTB/PM

## 2025-08-08 ASSESSMENT — PAIN SCALES - GENERAL
PAINLEVEL_OUTOF10: 2
PAINLEVEL_OUTOF10: 7
PAINLEVEL_OUTOF10: 1
PAINLEVEL_OUTOF10: 2

## 2025-08-09 LAB
ANION GAP SERPL CALC-SCNC: 11 MEQ/L (ref 8–16)
BASOPHILS ABSOLUTE: 0.1 THOU/MM3 (ref 0–0.1)
BASOPHILS NFR BLD AUTO: 1.2 %
BUN SERPL-MCNC: 20 MG/DL (ref 8–23)
CALCIUM SERPL-MCNC: 9.7 MG/DL (ref 8.8–10.2)
CHLORIDE SERPL-SCNC: 104 MEQ/L (ref 98–111)
CO2 SERPL-SCNC: 24 MEQ/L (ref 22–29)
CREAT SERPL-MCNC: 1.3 MG/DL (ref 0.5–0.9)
DEPRECATED RDW RBC AUTO: 58.3 FL (ref 35–45)
EKG ATRIAL RATE: 61 BPM
EKG P AXIS: 68 DEGREES
EKG P-R INTERVAL: 324 MS
EKG Q-T INTERVAL: 444 MS
EKG QRS DURATION: 96 MS
EKG QTC CALCULATION (BAZETT): 446 MS
EKG R AXIS: -12 DEGREES
EKG T AXIS: 49 DEGREES
EKG VENTRICULAR RATE: 61 BPM
EOSINOPHIL NFR BLD AUTO: 11.2 %
EOSINOPHILS ABSOLUTE: 0.5 THOU/MM3 (ref 0–0.4)
ERYTHROCYTE [DISTWIDTH] IN BLOOD BY AUTOMATED COUNT: 15.7 % (ref 11.5–14.5)
GFR SERPL CREATININE-BSD FRML MDRD: 40 ML/MIN/1.73M2
GLUCOSE SERPL-MCNC: 74 MG/DL (ref 74–109)
HCT VFR BLD AUTO: 36.8 % (ref 37–47)
HGB BLD-MCNC: 11.3 GM/DL (ref 12–16)
IMM GRANULOCYTES # BLD AUTO: 0.01 THOU/MM3 (ref 0–0.07)
IMM GRANULOCYTES NFR BLD AUTO: 0.2 %
INR PPP: 1.45 (ref 0.85–1.13)
LYMPHOCYTES ABSOLUTE: 0.8 THOU/MM3 (ref 1–4.8)
LYMPHOCYTES NFR BLD AUTO: 18.9 %
MAGNESIUM SERPL-MCNC: 2.4 MG/DL (ref 1.6–2.6)
MCH RBC QN AUTO: 31 PG (ref 26–33)
MCHC RBC AUTO-ENTMCNC: 30.7 GM/DL (ref 32.2–35.5)
MCV RBC AUTO: 100.8 FL (ref 81–99)
MONOCYTES ABSOLUTE: 0.5 THOU/MM3 (ref 0.4–1.3)
MONOCYTES NFR BLD AUTO: 12 %
NEUTROPHILS ABSOLUTE: 2.4 THOU/MM3 (ref 1.8–7.7)
NEUTROPHILS NFR BLD AUTO: 56.5 %
NRBC BLD AUTO-RTO: 0 /100 WBC
PLATELET # BLD AUTO: 182 THOU/MM3 (ref 130–400)
PMV BLD AUTO: 9.3 FL (ref 9.4–12.4)
POTASSIUM SERPL-SCNC: 4.2 MEQ/L (ref 3.5–5.2)
PROTHROMBIN TIME: 16.9 SECONDS (ref 10–13.5)
RBC # BLD AUTO: 3.65 MILL/MM3 (ref 4.2–5.4)
SODIUM SERPL-SCNC: 139 MEQ/L (ref 135–145)
TROPONIN, HIGH SENSITIVITY: 41 NG/L (ref 0–12)
WBC # BLD AUTO: 4.2 THOU/MM3 (ref 4.8–10.8)

## 2025-08-09 PROCEDURE — 1200000000 HC SEMI PRIVATE

## 2025-08-09 PROCEDURE — 99223 1ST HOSP IP/OBS HIGH 75: CPT | Performed by: INTERNAL MEDICINE

## 2025-08-09 PROCEDURE — 36415 COLL VENOUS BLD VENIPUNCTURE: CPT

## 2025-08-09 PROCEDURE — 85610 PROTHROMBIN TIME: CPT

## 2025-08-09 PROCEDURE — 2500000003 HC RX 250 WO HCPCS

## 2025-08-09 PROCEDURE — 6370000000 HC RX 637 (ALT 250 FOR IP)

## 2025-08-09 PROCEDURE — 6360000002 HC RX W HCPCS

## 2025-08-09 PROCEDURE — 83735 ASSAY OF MAGNESIUM: CPT

## 2025-08-09 PROCEDURE — 2580000003 HC RX 258: Performed by: INTERNAL MEDICINE

## 2025-08-09 PROCEDURE — 85025 COMPLETE CBC W/AUTO DIFF WBC: CPT

## 2025-08-09 PROCEDURE — 1200000003 HC TELEMETRY R&B

## 2025-08-09 PROCEDURE — 51798 US URINE CAPACITY MEASURE: CPT

## 2025-08-09 PROCEDURE — 80048 BASIC METABOLIC PNL TOTAL CA: CPT

## 2025-08-09 PROCEDURE — 84484 ASSAY OF TROPONIN QUANT: CPT

## 2025-08-09 PROCEDURE — 6370000000 HC RX 637 (ALT 250 FOR IP): Performed by: INTERNAL MEDICINE

## 2025-08-09 PROCEDURE — 93010 ELECTROCARDIOGRAM REPORT: CPT | Performed by: INTERNAL MEDICINE

## 2025-08-09 PROCEDURE — 6370000000 HC RX 637 (ALT 250 FOR IP): Performed by: PHARMACIST

## 2025-08-09 RX ORDER — AMIODARONE HYDROCHLORIDE 200 MG/1
200 TABLET ORAL DAILY
Status: DISCONTINUED | OUTPATIENT
Start: 2025-08-10 | End: 2025-08-15 | Stop reason: HOSPADM

## 2025-08-09 RX ORDER — AMIODARONE HYDROCHLORIDE 200 MG/1
100 TABLET ORAL DAILY
Status: DISCONTINUED | OUTPATIENT
Start: 2025-08-10 | End: 2025-08-09

## 2025-08-09 RX ORDER — ISOSORBIDE MONONITRATE 30 MG/1
30 TABLET, EXTENDED RELEASE ORAL DAILY
Status: DISCONTINUED | OUTPATIENT
Start: 2025-08-09 | End: 2025-08-10

## 2025-08-09 RX ORDER — SODIUM CHLORIDE 9 MG/ML
INJECTION, SOLUTION INTRAVENOUS CONTINUOUS
Status: DISCONTINUED | OUTPATIENT
Start: 2025-08-09 | End: 2025-08-10

## 2025-08-09 RX ADMIN — GABAPENTIN 100 MG: 100 CAPSULE ORAL at 20:19

## 2025-08-09 RX ADMIN — PANTOPRAZOLE SODIUM 40 MG: 40 TABLET, DELAYED RELEASE ORAL at 06:00

## 2025-08-09 RX ADMIN — PANTOPRAZOLE SODIUM 40 MG: 40 TABLET, DELAYED RELEASE ORAL at 18:48

## 2025-08-09 RX ADMIN — SODIUM CHLORIDE: 0.9 INJECTION, SOLUTION INTRAVENOUS at 11:38

## 2025-08-09 RX ADMIN — ACETAMINOPHEN 650 MG: 325 TABLET ORAL at 11:41

## 2025-08-09 RX ADMIN — VALSARTAN 80 MG: 80 TABLET ORAL at 10:19

## 2025-08-09 RX ADMIN — Medication 1.5 MG: at 18:48

## 2025-08-09 RX ADMIN — SODIUM CHLORIDE, PRESERVATIVE FREE 5 ML: 5 INJECTION INTRAVENOUS at 10:19

## 2025-08-09 RX ADMIN — HYDRALAZINE HYDROCHLORIDE 5 MG: 20 INJECTION INTRAMUSCULAR; INTRAVENOUS at 20:47

## 2025-08-09 RX ADMIN — LEVOTHYROXINE SODIUM 50 MCG: 0.05 TABLET ORAL at 06:00

## 2025-08-09 RX ADMIN — GABAPENTIN 100 MG: 100 CAPSULE ORAL at 10:19

## 2025-08-09 RX ADMIN — ISOSORBIDE MONONITRATE 30 MG: 30 TABLET, EXTENDED RELEASE ORAL at 11:14

## 2025-08-09 ASSESSMENT — PAIN DESCRIPTION - ORIENTATION: ORIENTATION: POSTERIOR

## 2025-08-09 ASSESSMENT — PAIN SCALES - GENERAL
PAINLEVEL_OUTOF10: 0
PAINLEVEL_OUTOF10: 8
PAINLEVEL_OUTOF10: 0
PAINLEVEL_OUTOF10: 5

## 2025-08-09 ASSESSMENT — PAIN DESCRIPTION - LOCATION: LOCATION: HEAD

## 2025-08-09 ASSESSMENT — PAIN DESCRIPTION - DESCRIPTORS: DESCRIPTORS: ACHING

## 2025-08-09 ASSESSMENT — PAIN - FUNCTIONAL ASSESSMENT: PAIN_FUNCTIONAL_ASSESSMENT: 0-10

## 2025-08-10 LAB
AMORPH SED URNS QL MICRO: NORMAL
ANION GAP SERPL CALC-SCNC: 10 MEQ/L (ref 8–16)
BACTERIA: NORMAL
BASOPHILS ABSOLUTE: 0.1 THOU/MM3 (ref 0–0.1)
BASOPHILS NFR BLD AUTO: 1.2 %
BILIRUB UR QL STRIP: NEGATIVE
BUN SERPL-MCNC: 17 MG/DL (ref 8–23)
CALCIUM SERPL-MCNC: 9.4 MG/DL (ref 8.8–10.2)
CASTS #/AREA URNS LPF: NORMAL /LPF
CHARACTER UR: CLEAR
CHLORIDE SERPL-SCNC: 98 MEQ/L (ref 98–111)
CO2 SERPL-SCNC: 22 MEQ/L (ref 22–29)
COLOR UR: YELLOW
CREAT SERPL-MCNC: 1.1 MG/DL (ref 0.5–0.9)
CRYSTALS URNS QL MICRO: NORMAL
DEPRECATED RDW RBC AUTO: 55.8 FL (ref 35–45)
EOSINOPHIL NFR BLD AUTO: 11.9 %
EOSINOPHILS ABSOLUTE: 0.5 THOU/MM3 (ref 0–0.4)
EPITHELIAL CELLS, UA: NORMAL /HPF
ERYTHROCYTE [DISTWIDTH] IN BLOOD BY AUTOMATED COUNT: 15.7 % (ref 11.5–14.5)
GFR SERPL CREATININE-BSD FRML MDRD: 49 ML/MIN/1.73M2
GLUCOSE SERPL-MCNC: 88 MG/DL (ref 74–109)
GLUCOSE UR QL STRIP.AUTO: NEGATIVE MG/DL
HCT VFR BLD AUTO: 32.8 % (ref 37–47)
HGB BLD-MCNC: 10.6 GM/DL (ref 12–16)
HGB UR QL STRIP.AUTO: NEGATIVE
IMM GRANULOCYTES # BLD AUTO: 0.01 THOU/MM3 (ref 0–0.07)
IMM GRANULOCYTES NFR BLD AUTO: 0.2 %
INR PPP: 1.29 (ref 0.85–1.13)
KETONES UR QL STRIP.AUTO: NEGATIVE
LEUKOCYTE ESTERASE UR QL STRIP.AUTO: NEGATIVE
LYMPHOCYTES ABSOLUTE: 0.8 THOU/MM3 (ref 1–4.8)
LYMPHOCYTES NFR BLD AUTO: 18.7 %
MCH RBC QN AUTO: 31.6 PG (ref 26–33)
MCHC RBC AUTO-ENTMCNC: 32.3 GM/DL (ref 32.2–35.5)
MCV RBC AUTO: 97.9 FL (ref 81–99)
MONOCYTES ABSOLUTE: 0.6 THOU/MM3 (ref 0.4–1.3)
MONOCYTES NFR BLD AUTO: 12.9 %
MUCOUS THREADS URNS QL MICRO: NORMAL
NEUTROPHILS ABSOLUTE: 2.4 THOU/MM3 (ref 1.8–7.7)
NEUTROPHILS NFR BLD AUTO: 55.1 %
NITRITE UR QL STRIP.AUTO: NEGATIVE
NRBC BLD AUTO-RTO: 0 /100 WBC
OSMOLALITY SERPL: 275 MOSMOL/KG (ref 275–295)
OSMOLALITY UR: 345 MOSMOL/KG (ref 250–750)
PH UR STRIP.AUTO: 6.5 [PH] (ref 5–9)
PLATELET # BLD AUTO: 166 THOU/MM3 (ref 130–400)
PMV BLD AUTO: 9.5 FL (ref 9.4–12.4)
POTASSIUM SERPL-SCNC: 3.6 MEQ/L (ref 3.5–5.2)
PROT UR STRIP.AUTO-MCNC: NEGATIVE MG/DL
PROTHROMBIN TIME: 15.1 SECONDS (ref 10–13.5)
RBC # BLD AUTO: 3.35 MILL/MM3 (ref 4.2–5.4)
RBC #/AREA URNS HPF: NORMAL /HPF
SODIUM SERPL-SCNC: 128 MEQ/L (ref 135–145)
SODIUM SERPL-SCNC: 130 MEQ/L (ref 135–145)
SODIUM UR-SCNC: 86 MEQ/L
SPECIFIC GRAVITY UA: 1.01 (ref 1–1.03)
UROBILINOGEN, URINE: 0.2 EU/DL (ref 0–1)
WBC # BLD AUTO: 4.3 THOU/MM3 (ref 4.8–10.8)
WBC #/AREA URNS HPF: NORMAL /HPF

## 2025-08-10 PROCEDURE — 1200000003 HC TELEMETRY R&B

## 2025-08-10 PROCEDURE — 51701 INSERT BLADDER CATHETER: CPT

## 2025-08-10 PROCEDURE — 6370000000 HC RX 637 (ALT 250 FOR IP): Performed by: NURSE PRACTITIONER

## 2025-08-10 PROCEDURE — 85610 PROTHROMBIN TIME: CPT

## 2025-08-10 PROCEDURE — 6360000002 HC RX W HCPCS: Performed by: INTERNAL MEDICINE

## 2025-08-10 PROCEDURE — 2500000003 HC RX 250 WO HCPCS

## 2025-08-10 PROCEDURE — 83935 ASSAY OF URINE OSMOLALITY: CPT

## 2025-08-10 PROCEDURE — 51798 US URINE CAPACITY MEASURE: CPT

## 2025-08-10 PROCEDURE — 85025 COMPLETE CBC W/AUTO DIFF WBC: CPT

## 2025-08-10 PROCEDURE — 80048 BASIC METABOLIC PNL TOTAL CA: CPT

## 2025-08-10 PROCEDURE — 84300 ASSAY OF URINE SODIUM: CPT

## 2025-08-10 PROCEDURE — 81001 URINALYSIS AUTO W/SCOPE: CPT

## 2025-08-10 PROCEDURE — 6370000000 HC RX 637 (ALT 250 FOR IP): Performed by: PHARMACIST

## 2025-08-10 PROCEDURE — 6370000000 HC RX 637 (ALT 250 FOR IP): Performed by: INTERNAL MEDICINE

## 2025-08-10 PROCEDURE — 6370000000 HC RX 637 (ALT 250 FOR IP)

## 2025-08-10 PROCEDURE — 99232 SBSQ HOSP IP/OBS MODERATE 35: CPT | Performed by: NURSE PRACTITIONER

## 2025-08-10 PROCEDURE — 1200000000 HC SEMI PRIVATE

## 2025-08-10 PROCEDURE — 83930 ASSAY OF BLOOD OSMOLALITY: CPT

## 2025-08-10 PROCEDURE — 84295 ASSAY OF SERUM SODIUM: CPT

## 2025-08-10 PROCEDURE — 36415 COLL VENOUS BLD VENIPUNCTURE: CPT

## 2025-08-10 PROCEDURE — 6360000002 HC RX W HCPCS

## 2025-08-10 RX ORDER — VALSARTAN 160 MG/1
160 TABLET ORAL DAILY
Status: DISCONTINUED | OUTPATIENT
Start: 2025-08-10 | End: 2025-08-13

## 2025-08-10 RX ORDER — WARFARIN SODIUM 2.5 MG/1
2.5 TABLET ORAL
Status: COMPLETED | OUTPATIENT
Start: 2025-08-10 | End: 2025-08-10

## 2025-08-10 RX ORDER — HYDRALAZINE HYDROCHLORIDE 20 MG/ML
10 INJECTION INTRAMUSCULAR; INTRAVENOUS EVERY 6 HOURS PRN
Status: DISCONTINUED | OUTPATIENT
Start: 2025-08-10 | End: 2025-08-15 | Stop reason: HOSPADM

## 2025-08-10 RX ADMIN — LEVOTHYROXINE SODIUM 50 MCG: 0.05 TABLET ORAL at 06:13

## 2025-08-10 RX ADMIN — AMIODARONE HYDROCHLORIDE 200 MG: 200 TABLET ORAL at 09:15

## 2025-08-10 RX ADMIN — WARFARIN SODIUM 2.5 MG: 2.5 TABLET ORAL at 18:40

## 2025-08-10 RX ADMIN — ACETAMINOPHEN 650 MG: 325 TABLET ORAL at 09:18

## 2025-08-10 RX ADMIN — HYDRALAZINE HYDROCHLORIDE 5 MG: 20 INJECTION INTRAMUSCULAR; INTRAVENOUS at 04:52

## 2025-08-10 RX ADMIN — HYDRALAZINE HYDROCHLORIDE 5 MG: 20 INJECTION INTRAMUSCULAR; INTRAVENOUS at 13:38

## 2025-08-10 RX ADMIN — GABAPENTIN 100 MG: 100 CAPSULE ORAL at 09:14

## 2025-08-10 RX ADMIN — HYDRALAZINE HYDROCHLORIDE 10 MG: 20 INJECTION INTRAMUSCULAR; INTRAVENOUS at 17:06

## 2025-08-10 RX ADMIN — PANTOPRAZOLE SODIUM 40 MG: 40 TABLET, DELAYED RELEASE ORAL at 06:13

## 2025-08-10 RX ADMIN — VALSARTAN 160 MG: 160 TABLET ORAL at 09:16

## 2025-08-10 RX ADMIN — SODIUM CHLORIDE, PRESERVATIVE FREE 5 ML: 5 INJECTION INTRAVENOUS at 09:14

## 2025-08-10 RX ADMIN — PANTOPRAZOLE SODIUM 40 MG: 40 TABLET, DELAYED RELEASE ORAL at 17:06

## 2025-08-10 RX ADMIN — GABAPENTIN 100 MG: 100 CAPSULE ORAL at 19:42

## 2025-08-10 RX ADMIN — ACETAMINOPHEN 650 MG: 325 TABLET ORAL at 17:05

## 2025-08-10 RX ADMIN — SODIUM CHLORIDE, PRESERVATIVE FREE 10 ML: 5 INJECTION INTRAVENOUS at 19:43

## 2025-08-10 ASSESSMENT — PAIN DESCRIPTION - ORIENTATION
ORIENTATION: LEFT;UPPER
ORIENTATION: LOWER
ORIENTATION: LOWER

## 2025-08-10 ASSESSMENT — PAIN DESCRIPTION - LOCATION
LOCATION: BACK
LOCATION: BACK
LOCATION: HEAD

## 2025-08-10 ASSESSMENT — PAIN DESCRIPTION - PAIN TYPE: TYPE: CHRONIC PAIN

## 2025-08-10 ASSESSMENT — PAIN - FUNCTIONAL ASSESSMENT
PAIN_FUNCTIONAL_ASSESSMENT: 0-10
PAIN_FUNCTIONAL_ASSESSMENT: ACTIVITIES ARE NOT PREVENTED

## 2025-08-10 ASSESSMENT — PAIN DESCRIPTION - FREQUENCY: FREQUENCY: CONTINUOUS

## 2025-08-10 ASSESSMENT — PAIN DESCRIPTION - DESCRIPTORS
DESCRIPTORS: ACHING
DESCRIPTORS: ACHING

## 2025-08-10 ASSESSMENT — PAIN SCALES - GENERAL
PAINLEVEL_OUTOF10: 5
PAINLEVEL_OUTOF10: 5
PAINLEVEL_OUTOF10: 6

## 2025-08-10 ASSESSMENT — PAIN DESCRIPTION - ONSET: ONSET: ON-GOING

## 2025-08-11 PROBLEM — R29.6 FREQUENT FALLS: Status: ACTIVE | Noted: 2025-08-11

## 2025-08-11 PROBLEM — N28.1 RENAL CYST: Status: ACTIVE | Noted: 2025-08-11

## 2025-08-11 LAB
ANION GAP SERPL CALC-SCNC: 12 MEQ/L (ref 8–16)
BASOPHILS ABSOLUTE: 0 THOU/MM3 (ref 0–0.1)
BASOPHILS NFR BLD AUTO: 0.7 %
BUN SERPL-MCNC: 15 MG/DL (ref 8–23)
CALCIUM SERPL-MCNC: 9.4 MG/DL (ref 8.8–10.2)
CHLORIDE SERPL-SCNC: 95 MEQ/L (ref 98–111)
CO2 SERPL-SCNC: 20 MEQ/L (ref 22–29)
CORTIS SERPL-MCNC: 16.5 UG/DL
CORTISOL COLLECTION INFO: NORMAL
CREAT SERPL-MCNC: 1.1 MG/DL (ref 0.5–0.9)
CREAT UR-MCNC: 32.5 MG/DL
DEPRECATED RDW RBC AUTO: 55.5 FL (ref 35–45)
EOSINOPHIL NFR BLD AUTO: 10.9 %
EOSINOPHILS ABSOLUTE: 0.4 THOU/MM3 (ref 0–0.4)
ERYTHROCYTE [DISTWIDTH] IN BLOOD BY AUTOMATED COUNT: 15.5 % (ref 11.5–14.5)
GFR SERPL CREATININE-BSD FRML MDRD: 49 ML/MIN/1.73M2
GLUCOSE SERPL-MCNC: 89 MG/DL (ref 74–109)
HCT VFR BLD AUTO: 35.2 % (ref 37–47)
HGB BLD-MCNC: 11.6 GM/DL (ref 12–16)
IMM GRANULOCYTES # BLD AUTO: 0.01 THOU/MM3 (ref 0–0.07)
IMM GRANULOCYTES NFR BLD AUTO: 0.2 %
INR PPP: 1.37 (ref 0.85–1.13)
LYMPHOCYTES ABSOLUTE: 0.8 THOU/MM3 (ref 1–4.8)
LYMPHOCYTES NFR BLD AUTO: 19.7 %
MCH RBC QN AUTO: 31.7 PG (ref 26–33)
MCHC RBC AUTO-ENTMCNC: 33 GM/DL (ref 32.2–35.5)
MCV RBC AUTO: 96.2 FL (ref 81–99)
MONOCYTES ABSOLUTE: 0.5 THOU/MM3 (ref 0.4–1.3)
MONOCYTES NFR BLD AUTO: 12.2 %
NEUTROPHILS ABSOLUTE: 2.3 THOU/MM3 (ref 1.8–7.7)
NEUTROPHILS NFR BLD AUTO: 56.3 %
NRBC BLD AUTO-RTO: 0 /100 WBC
OSMOLALITY UR: 253 MOSMOL/KG (ref 250–750)
PLATELET # BLD AUTO: 182 THOU/MM3 (ref 130–400)
PMV BLD AUTO: 9.5 FL (ref 9.4–12.4)
POTASSIUM SERPL-SCNC: 3.9 MEQ/L (ref 3.5–5.2)
PROTHROMBIN TIME: 16 SECONDS (ref 10–13.5)
RBC # BLD AUTO: 3.66 MILL/MM3 (ref 4.2–5.4)
SODIUM SERPL-SCNC: 124 MEQ/L (ref 135–145)
SODIUM SERPL-SCNC: 127 MEQ/L (ref 135–145)
SODIUM UR-SCNC: 50 MEQ/L
TSH SERPL DL<=0.05 MIU/L-ACNC: 5.58 UIU/ML (ref 0.27–4.2)
WBC # BLD AUTO: 4.1 THOU/MM3 (ref 4.8–10.8)

## 2025-08-11 PROCEDURE — 6370000000 HC RX 637 (ALT 250 FOR IP): Performed by: INTERNAL MEDICINE

## 2025-08-11 PROCEDURE — 82570 ASSAY OF URINE CREATININE: CPT

## 2025-08-11 PROCEDURE — 6370000000 HC RX 637 (ALT 250 FOR IP): Performed by: NURSE PRACTITIONER

## 2025-08-11 PROCEDURE — 51701 INSERT BLADDER CATHETER: CPT

## 2025-08-11 PROCEDURE — 36415 COLL VENOUS BLD VENIPUNCTURE: CPT

## 2025-08-11 PROCEDURE — 83935 ASSAY OF URINE OSMOLALITY: CPT

## 2025-08-11 PROCEDURE — 6370000000 HC RX 637 (ALT 250 FOR IP)

## 2025-08-11 PROCEDURE — 6360000002 HC RX W HCPCS: Performed by: INTERNAL MEDICINE

## 2025-08-11 PROCEDURE — 85025 COMPLETE CBC W/AUTO DIFF WBC: CPT

## 2025-08-11 PROCEDURE — 84443 ASSAY THYROID STIM HORMONE: CPT

## 2025-08-11 PROCEDURE — 82533 TOTAL CORTISOL: CPT

## 2025-08-11 PROCEDURE — 2500000003 HC RX 250 WO HCPCS

## 2025-08-11 PROCEDURE — 99232 SBSQ HOSP IP/OBS MODERATE 35: CPT | Performed by: NURSE PRACTITIONER

## 2025-08-11 PROCEDURE — 1200000003 HC TELEMETRY R&B

## 2025-08-11 PROCEDURE — 84300 ASSAY OF URINE SODIUM: CPT

## 2025-08-11 PROCEDURE — 99222 1ST HOSP IP/OBS MODERATE 55: CPT | Performed by: INTERNAL MEDICINE

## 2025-08-11 PROCEDURE — 80048 BASIC METABOLIC PNL TOTAL CA: CPT

## 2025-08-11 PROCEDURE — 84295 ASSAY OF SERUM SODIUM: CPT

## 2025-08-11 PROCEDURE — 51798 US URINE CAPACITY MEASURE: CPT

## 2025-08-11 PROCEDURE — 85610 PROTHROMBIN TIME: CPT

## 2025-08-11 PROCEDURE — 99233 SBSQ HOSP IP/OBS HIGH 50: CPT | Performed by: NURSE PRACTITIONER

## 2025-08-11 PROCEDURE — 1200000000 HC SEMI PRIVATE

## 2025-08-11 RX ORDER — WARFARIN SODIUM 2.5 MG/1
2.5 TABLET ORAL
Status: COMPLETED | OUTPATIENT
Start: 2025-08-11 | End: 2025-08-11

## 2025-08-11 RX ORDER — SODIUM CHLORIDE 1 G/1
2 TABLET ORAL
Status: COMPLETED | OUTPATIENT
Start: 2025-08-11 | End: 2025-08-12

## 2025-08-11 RX ADMIN — VALSARTAN 160 MG: 160 TABLET ORAL at 08:59

## 2025-08-11 RX ADMIN — SODIUM CHLORIDE 2 G: 1 TABLET ORAL at 16:02

## 2025-08-11 RX ADMIN — LEVOTHYROXINE SODIUM 50 MCG: 0.05 TABLET ORAL at 05:14

## 2025-08-11 RX ADMIN — GABAPENTIN 100 MG: 100 CAPSULE ORAL at 08:59

## 2025-08-11 RX ADMIN — HYDRALAZINE HYDROCHLORIDE 10 MG: 20 INJECTION INTRAMUSCULAR; INTRAVENOUS at 20:07

## 2025-08-11 RX ADMIN — GABAPENTIN 100 MG: 100 CAPSULE ORAL at 19:36

## 2025-08-11 RX ADMIN — AMIODARONE HYDROCHLORIDE 200 MG: 200 TABLET ORAL at 08:59

## 2025-08-11 RX ADMIN — HYDRALAZINE HYDROCHLORIDE 10 MG: 20 INJECTION INTRAMUSCULAR; INTRAVENOUS at 12:07

## 2025-08-11 RX ADMIN — SODIUM CHLORIDE, PRESERVATIVE FREE 10 ML: 5 INJECTION INTRAVENOUS at 19:36

## 2025-08-11 RX ADMIN — PANTOPRAZOLE SODIUM 40 MG: 40 TABLET, DELAYED RELEASE ORAL at 05:13

## 2025-08-11 RX ADMIN — SODIUM CHLORIDE 2 G: 1 TABLET ORAL at 12:15

## 2025-08-11 RX ADMIN — SODIUM CHLORIDE, PRESERVATIVE FREE 10 ML: 5 INJECTION INTRAVENOUS at 09:00

## 2025-08-11 RX ADMIN — PANTOPRAZOLE SODIUM 40 MG: 40 TABLET, DELAYED RELEASE ORAL at 16:02

## 2025-08-11 RX ADMIN — WARFARIN SODIUM 2.5 MG: 2.5 TABLET ORAL at 17:29

## 2025-08-11 ASSESSMENT — PAIN SCALES - GENERAL
PAINLEVEL_OUTOF10: 0
PAINLEVEL_OUTOF10: 0

## 2025-08-12 LAB
ANION GAP SERPL CALC-SCNC: 10 MEQ/L (ref 8–16)
BASOPHILS ABSOLUTE: 0.1 THOU/MM3 (ref 0–0.1)
BASOPHILS NFR BLD AUTO: 1.5 %
BUN SERPL-MCNC: 14 MG/DL (ref 8–23)
CALCIUM SERPL-MCNC: 9.3 MG/DL (ref 8.5–10.5)
CHLORIDE SERPL-SCNC: 95 MEQ/L (ref 98–111)
CO2 SERPL-SCNC: 21 MEQ/L (ref 22–29)
CREAT SERPL-MCNC: 1 MG/DL (ref 0.5–0.9)
DEPRECATED RDW RBC AUTO: 55.3 FL (ref 35–45)
EOSINOPHIL NFR BLD AUTO: 13 %
EOSINOPHILS ABSOLUTE: 0.5 THOU/MM3 (ref 0–0.4)
ERYTHROCYTE [DISTWIDTH] IN BLOOD BY AUTOMATED COUNT: 15.5 % (ref 11.5–14.5)
GFR SERPL CREATININE-BSD FRML MDRD: 55 ML/MIN/1.73M2
GLUCOSE SERPL-MCNC: 92 MG/DL (ref 74–109)
HCT VFR BLD AUTO: 34.7 % (ref 37–47)
HGB BLD-MCNC: 11.2 GM/DL (ref 12–16)
IMM GRANULOCYTES # BLD AUTO: 0.01 THOU/MM3 (ref 0–0.07)
IMM GRANULOCYTES NFR BLD AUTO: 0.3 %
INR PPP: 1.83 (ref 0.85–1.13)
LYMPHOCYTES ABSOLUTE: 0.6 THOU/MM3 (ref 1–4.8)
LYMPHOCYTES NFR BLD AUTO: 14.8 %
MCH RBC QN AUTO: 30.9 PG (ref 26–33)
MCHC RBC AUTO-ENTMCNC: 32.3 GM/DL (ref 32.2–35.5)
MCV RBC AUTO: 95.6 FL (ref 81–99)
MONOCYTES ABSOLUTE: 0.5 THOU/MM3 (ref 0.4–1.3)
MONOCYTES NFR BLD AUTO: 13.7 %
NEUTROPHILS ABSOLUTE: 2.2 THOU/MM3 (ref 1.8–7.7)
NEUTROPHILS NFR BLD AUTO: 56.7 %
NRBC BLD AUTO-RTO: 0 /100 WBC
PLATELET # BLD AUTO: 176 THOU/MM3 (ref 130–400)
PMV BLD AUTO: 9.3 FL (ref 9.4–12.4)
POTASSIUM SERPL-SCNC: 3.7 MEQ/L (ref 3.5–5.2)
PROTHROMBIN TIME: 21.3 SECONDS (ref 10–13.5)
RBC # BLD AUTO: 3.63 MILL/MM3 (ref 4.2–5.4)
SODIUM SERPL-SCNC: 126 MEQ/L (ref 135–145)
SODIUM SERPL-SCNC: 127 MEQ/L (ref 135–145)
URATE SERPL-MCNC: 3.1 MG/DL (ref 2.4–5.7)
WBC # BLD AUTO: 3.9 THOU/MM3 (ref 4.8–10.8)

## 2025-08-12 PROCEDURE — 6370000000 HC RX 637 (ALT 250 FOR IP): Performed by: NURSE PRACTITIONER

## 2025-08-12 PROCEDURE — 84550 ASSAY OF BLOOD/URIC ACID: CPT

## 2025-08-12 PROCEDURE — 84295 ASSAY OF SERUM SODIUM: CPT

## 2025-08-12 PROCEDURE — 80048 BASIC METABOLIC PNL TOTAL CA: CPT

## 2025-08-12 PROCEDURE — 1200000000 HC SEMI PRIVATE

## 2025-08-12 PROCEDURE — 36415 COLL VENOUS BLD VENIPUNCTURE: CPT

## 2025-08-12 PROCEDURE — 97162 PT EVAL MOD COMPLEX 30 MIN: CPT

## 2025-08-12 PROCEDURE — 2500000003 HC RX 250 WO HCPCS

## 2025-08-12 PROCEDURE — 6370000000 HC RX 637 (ALT 250 FOR IP): Performed by: INTERNAL MEDICINE

## 2025-08-12 PROCEDURE — 85610 PROTHROMBIN TIME: CPT

## 2025-08-12 PROCEDURE — 1200000003 HC TELEMETRY R&B

## 2025-08-12 PROCEDURE — 6370000000 HC RX 637 (ALT 250 FOR IP)

## 2025-08-12 PROCEDURE — 97535 SELF CARE MNGMENT TRAINING: CPT

## 2025-08-12 PROCEDURE — 6360000002 HC RX W HCPCS: Performed by: INTERNAL MEDICINE

## 2025-08-12 PROCEDURE — 97166 OT EVAL MOD COMPLEX 45 MIN: CPT

## 2025-08-12 PROCEDURE — 99232 SBSQ HOSP IP/OBS MODERATE 35: CPT | Performed by: INTERNAL MEDICINE

## 2025-08-12 PROCEDURE — 97530 THERAPEUTIC ACTIVITIES: CPT

## 2025-08-12 PROCEDURE — 85025 COMPLETE CBC W/AUTO DIFF WBC: CPT

## 2025-08-12 RX ORDER — TAMSULOSIN HYDROCHLORIDE 0.4 MG/1
0.4 CAPSULE ORAL DAILY
Status: DISCONTINUED | OUTPATIENT
Start: 2025-08-12 | End: 2025-08-13

## 2025-08-12 RX ORDER — BUMETANIDE 1 MG/1
1 TABLET ORAL DAILY
Status: DISCONTINUED | OUTPATIENT
Start: 2025-08-12 | End: 2025-08-15 | Stop reason: HOSPADM

## 2025-08-12 RX ORDER — BUMETANIDE 1 MG/1
1 TABLET ORAL DAILY
Status: DISCONTINUED | OUTPATIENT
Start: 2025-08-13 | End: 2025-08-12

## 2025-08-12 RX ORDER — WARFARIN SODIUM 1 MG/1
1 TABLET ORAL
Status: COMPLETED | OUTPATIENT
Start: 2025-08-12 | End: 2025-08-12

## 2025-08-12 RX ORDER — SODIUM CHLORIDE 1 G/1
2 TABLET ORAL
Status: DISCONTINUED | OUTPATIENT
Start: 2025-08-12 | End: 2025-08-15 | Stop reason: HOSPADM

## 2025-08-12 RX ORDER — FUROSEMIDE 20 MG/1
20 TABLET ORAL DAILY
Status: DISCONTINUED | OUTPATIENT
Start: 2025-08-12 | End: 2025-08-12

## 2025-08-12 RX ADMIN — AMIODARONE HYDROCHLORIDE 200 MG: 200 TABLET ORAL at 09:33

## 2025-08-12 RX ADMIN — LEVOTHYROXINE SODIUM 50 MCG: 0.05 TABLET ORAL at 06:03

## 2025-08-12 RX ADMIN — SODIUM CHLORIDE 2 G: 1 TABLET ORAL at 13:44

## 2025-08-12 RX ADMIN — WARFARIN SODIUM 1 MG: 1 TABLET ORAL at 18:51

## 2025-08-12 RX ADMIN — PANTOPRAZOLE SODIUM 40 MG: 40 TABLET, DELAYED RELEASE ORAL at 06:03

## 2025-08-12 RX ADMIN — PANTOPRAZOLE SODIUM 40 MG: 40 TABLET, DELAYED RELEASE ORAL at 17:20

## 2025-08-12 RX ADMIN — BUMETANIDE 1 MG: 1 TABLET ORAL at 21:17

## 2025-08-12 RX ADMIN — SODIUM CHLORIDE, PRESERVATIVE FREE 10 ML: 5 INJECTION INTRAVENOUS at 21:17

## 2025-08-12 RX ADMIN — VALSARTAN 160 MG: 160 TABLET ORAL at 09:33

## 2025-08-12 RX ADMIN — SODIUM CHLORIDE 2 G: 1 TABLET ORAL at 17:20

## 2025-08-12 RX ADMIN — SODIUM CHLORIDE, PRESERVATIVE FREE 10 ML: 5 INJECTION INTRAVENOUS at 09:36

## 2025-08-12 RX ADMIN — GABAPENTIN 100 MG: 100 CAPSULE ORAL at 21:15

## 2025-08-12 RX ADMIN — TAMSULOSIN HYDROCHLORIDE 0.4 MG: 0.4 CAPSULE ORAL at 13:45

## 2025-08-12 RX ADMIN — GABAPENTIN 100 MG: 100 CAPSULE ORAL at 09:34

## 2025-08-12 RX ADMIN — HYDRALAZINE HYDROCHLORIDE 10 MG: 20 INJECTION INTRAMUSCULAR; INTRAVENOUS at 11:59

## 2025-08-12 RX ADMIN — SODIUM CHLORIDE 2 G: 1 TABLET ORAL at 09:33

## 2025-08-12 ASSESSMENT — PAIN SCALES - GENERAL: PAINLEVEL_OUTOF10: 0

## 2025-08-13 LAB
ANION GAP SERPL CALC-SCNC: 11 MEQ/L (ref 8–16)
BUN SERPL-MCNC: 19 MG/DL (ref 8–23)
CALCIUM SERPL-MCNC: 8.6 MG/DL (ref 8.5–10.5)
CHLORIDE SERPL-SCNC: 97 MEQ/L (ref 98–111)
CO2 SERPL-SCNC: 20 MEQ/L (ref 22–29)
CREAT SERPL-MCNC: 1.3 MG/DL (ref 0.5–0.9)
GFR SERPL CREATININE-BSD FRML MDRD: 40 ML/MIN/1.73M2
GLUCOSE SERPL-MCNC: 82 MG/DL (ref 74–109)
INR PPP: 2.26 (ref 0.85–1.13)
POTASSIUM SERPL-SCNC: 3.7 MEQ/L (ref 3.5–5.2)
PROTHROMBIN TIME: 26.2 SECONDS (ref 10–13.5)
SODIUM SERPL-SCNC: 128 MEQ/L (ref 135–145)

## 2025-08-13 PROCEDURE — 1200000003 HC TELEMETRY R&B

## 2025-08-13 PROCEDURE — 80048 BASIC METABOLIC PNL TOTAL CA: CPT

## 2025-08-13 PROCEDURE — 51798 US URINE CAPACITY MEASURE: CPT

## 2025-08-13 PROCEDURE — 97110 THERAPEUTIC EXERCISES: CPT

## 2025-08-13 PROCEDURE — 85610 PROTHROMBIN TIME: CPT

## 2025-08-13 PROCEDURE — 6370000000 HC RX 637 (ALT 250 FOR IP): Performed by: NURSE PRACTITIONER

## 2025-08-13 PROCEDURE — 36415 COLL VENOUS BLD VENIPUNCTURE: CPT

## 2025-08-13 PROCEDURE — 6370000000 HC RX 637 (ALT 250 FOR IP): Performed by: INTERNAL MEDICINE

## 2025-08-13 PROCEDURE — 6370000000 HC RX 637 (ALT 250 FOR IP)

## 2025-08-13 PROCEDURE — 99232 SBSQ HOSP IP/OBS MODERATE 35: CPT | Performed by: INTERNAL MEDICINE

## 2025-08-13 PROCEDURE — 97535 SELF CARE MNGMENT TRAINING: CPT

## 2025-08-13 PROCEDURE — 2500000003 HC RX 250 WO HCPCS

## 2025-08-13 PROCEDURE — 1200000000 HC SEMI PRIVATE

## 2025-08-13 PROCEDURE — 97530 THERAPEUTIC ACTIVITIES: CPT

## 2025-08-13 PROCEDURE — 6370000000 HC RX 637 (ALT 250 FOR IP): Performed by: PHARMACIST

## 2025-08-13 RX ORDER — TAMSULOSIN HYDROCHLORIDE 0.4 MG/1
0.4 CAPSULE ORAL NIGHTLY
Status: DISCONTINUED | OUTPATIENT
Start: 2025-08-14 | End: 2025-08-15 | Stop reason: HOSPADM

## 2025-08-13 RX ORDER — WARFARIN SODIUM 2 MG/1
2 TABLET ORAL
Status: COMPLETED | OUTPATIENT
Start: 2025-08-13 | End: 2025-08-13

## 2025-08-13 RX ADMIN — SODIUM CHLORIDE, PRESERVATIVE FREE 10 ML: 5 INJECTION INTRAVENOUS at 20:23

## 2025-08-13 RX ADMIN — SODIUM CHLORIDE 2 G: 1 TABLET ORAL at 12:05

## 2025-08-13 RX ADMIN — TAMSULOSIN HYDROCHLORIDE 0.4 MG: 0.4 CAPSULE ORAL at 08:55

## 2025-08-13 RX ADMIN — SODIUM CHLORIDE, PRESERVATIVE FREE 10 ML: 5 INJECTION INTRAVENOUS at 14:29

## 2025-08-13 RX ADMIN — LEVOTHYROXINE SODIUM 50 MCG: 0.05 TABLET ORAL at 05:17

## 2025-08-13 RX ADMIN — BUMETANIDE 1 MG: 1 TABLET ORAL at 08:55

## 2025-08-13 RX ADMIN — SODIUM CHLORIDE 2 G: 1 TABLET ORAL at 16:44

## 2025-08-13 RX ADMIN — VALSARTAN 160 MG: 160 TABLET ORAL at 08:55

## 2025-08-13 RX ADMIN — GABAPENTIN 100 MG: 100 CAPSULE ORAL at 08:55

## 2025-08-13 RX ADMIN — GABAPENTIN 100 MG: 100 CAPSULE ORAL at 20:19

## 2025-08-13 RX ADMIN — SODIUM CHLORIDE 2 G: 1 TABLET ORAL at 08:55

## 2025-08-13 RX ADMIN — PANTOPRAZOLE SODIUM 40 MG: 40 TABLET, DELAYED RELEASE ORAL at 16:44

## 2025-08-13 RX ADMIN — AMIODARONE HYDROCHLORIDE 200 MG: 200 TABLET ORAL at 08:56

## 2025-08-13 RX ADMIN — PANTOPRAZOLE SODIUM 40 MG: 40 TABLET, DELAYED RELEASE ORAL at 05:18

## 2025-08-13 RX ADMIN — WARFARIN SODIUM 2 MG: 2 TABLET ORAL at 17:00

## 2025-08-13 ASSESSMENT — PAIN SCALES - GENERAL
PAINLEVEL_OUTOF10: 0

## 2025-08-14 DIAGNOSIS — I12.9 HYPERTENSIVE RENAL DISEASE, STAGE 1 THROUGH STAGE 4 OR UNSPECIFIED CHRONIC KIDNEY DISEASE: ICD-10-CM

## 2025-08-14 DIAGNOSIS — N18.32 CHRONIC KIDNEY DISEASE, STAGE 3B (HCC): Primary | ICD-10-CM

## 2025-08-14 LAB
ANION GAP SERPL CALC-SCNC: 10 MEQ/L (ref 8–16)
BUN SERPL-MCNC: 21 MG/DL (ref 8–23)
CALCIUM SERPL-MCNC: 9.4 MG/DL (ref 8.5–10.5)
CHLORIDE SERPL-SCNC: 101 MEQ/L (ref 98–111)
CO2 SERPL-SCNC: 22 MEQ/L (ref 22–29)
CREAT SERPL-MCNC: 1.4 MG/DL (ref 0.5–0.9)
GFR SERPL CREATININE-BSD FRML MDRD: 37 ML/MIN/1.73M2
GLUCOSE SERPL-MCNC: 81 MG/DL (ref 74–109)
INR PPP: 2.46 (ref 0.85–1.13)
POTASSIUM SERPL-SCNC: 4.1 MEQ/L (ref 3.5–5.2)
PROTHROMBIN TIME: 28.5 SECONDS (ref 10–13.5)
SODIUM SERPL-SCNC: 133 MEQ/L (ref 135–145)

## 2025-08-14 PROCEDURE — 51798 US URINE CAPACITY MEASURE: CPT

## 2025-08-14 PROCEDURE — 6370000000 HC RX 637 (ALT 250 FOR IP): Performed by: INTERNAL MEDICINE

## 2025-08-14 PROCEDURE — 1200000000 HC SEMI PRIVATE

## 2025-08-14 PROCEDURE — 6370000000 HC RX 637 (ALT 250 FOR IP)

## 2025-08-14 PROCEDURE — 85610 PROTHROMBIN TIME: CPT

## 2025-08-14 PROCEDURE — 99232 SBSQ HOSP IP/OBS MODERATE 35: CPT | Performed by: INTERNAL MEDICINE

## 2025-08-14 PROCEDURE — 6360000002 HC RX W HCPCS: Performed by: INTERNAL MEDICINE

## 2025-08-14 PROCEDURE — 80048 BASIC METABOLIC PNL TOTAL CA: CPT

## 2025-08-14 PROCEDURE — 92610 EVALUATE SWALLOWING FUNCTION: CPT

## 2025-08-14 PROCEDURE — 36415 COLL VENOUS BLD VENIPUNCTURE: CPT

## 2025-08-14 PROCEDURE — 2500000003 HC RX 250 WO HCPCS

## 2025-08-14 RX ORDER — AMIODARONE HYDROCHLORIDE 200 MG/1
200 TABLET ORAL DAILY
Qty: 30 TABLET | Refills: 1 | Status: SHIPPED | OUTPATIENT
Start: 2025-08-15

## 2025-08-14 RX ORDER — WARFARIN SODIUM 2 MG/1
1 TABLET ORAL
Status: COMPLETED | OUTPATIENT
Start: 2025-08-14 | End: 2025-08-14

## 2025-08-14 RX ORDER — SODIUM CHLORIDE 1 G/1
2 TABLET ORAL
Qty: 90 TABLET | Refills: 3 | Status: SHIPPED | OUTPATIENT
Start: 2025-08-14 | End: 2025-08-22

## 2025-08-14 RX ORDER — TAMSULOSIN HYDROCHLORIDE 0.4 MG/1
0.4 CAPSULE ORAL NIGHTLY
Qty: 30 CAPSULE | Refills: 3 | Status: SHIPPED | OUTPATIENT
Start: 2025-08-14

## 2025-08-14 RX ADMIN — LEVOTHYROXINE SODIUM 50 MCG: 0.05 TABLET ORAL at 05:56

## 2025-08-14 RX ADMIN — SODIUM CHLORIDE 2 G: 1 TABLET ORAL at 18:06

## 2025-08-14 RX ADMIN — PANTOPRAZOLE SODIUM 40 MG: 40 TABLET, DELAYED RELEASE ORAL at 16:05

## 2025-08-14 RX ADMIN — HYDRALAZINE HYDROCHLORIDE 10 MG: 20 INJECTION INTRAMUSCULAR; INTRAVENOUS at 16:00

## 2025-08-14 RX ADMIN — GABAPENTIN 100 MG: 100 CAPSULE ORAL at 20:13

## 2025-08-14 RX ADMIN — PANTOPRAZOLE SODIUM 40 MG: 40 TABLET, DELAYED RELEASE ORAL at 05:56

## 2025-08-14 RX ADMIN — WARFARIN SODIUM 1 MG: 2 TABLET ORAL at 13:08

## 2025-08-14 RX ADMIN — SODIUM CHLORIDE, PRESERVATIVE FREE 10 ML: 5 INJECTION INTRAVENOUS at 09:05

## 2025-08-14 RX ADMIN — SODIUM CHLORIDE 2 G: 1 TABLET ORAL at 09:05

## 2025-08-14 RX ADMIN — ACETAMINOPHEN 650 MG: 325 TABLET ORAL at 21:11

## 2025-08-14 RX ADMIN — GABAPENTIN 100 MG: 100 CAPSULE ORAL at 09:05

## 2025-08-14 RX ADMIN — TAMSULOSIN HYDROCHLORIDE 0.4 MG: 0.4 CAPSULE ORAL at 21:09

## 2025-08-14 RX ADMIN — AMIODARONE HYDROCHLORIDE 200 MG: 200 TABLET ORAL at 09:05

## 2025-08-14 RX ADMIN — BUMETANIDE 1 MG: 1 TABLET ORAL at 09:05

## 2025-08-14 RX ADMIN — SODIUM CHLORIDE 2 G: 1 TABLET ORAL at 13:08

## 2025-08-14 ASSESSMENT — PAIN SCALES - GENERAL
PAINLEVEL_OUTOF10: 6
PAINLEVEL_OUTOF10: 0

## 2025-08-14 ASSESSMENT — PAIN DESCRIPTION - LOCATION: LOCATION: BACK

## 2025-08-14 ASSESSMENT — PAIN DESCRIPTION - DESCRIPTORS: DESCRIPTORS: ACHING

## 2025-08-14 ASSESSMENT — PAIN DESCRIPTION - ORIENTATION: ORIENTATION: MID;LOWER

## 2025-08-14 ASSESSMENT — PAIN - FUNCTIONAL ASSESSMENT: PAIN_FUNCTIONAL_ASSESSMENT: 0-10

## 2025-08-15 VITALS
OXYGEN SATURATION: 94 % | WEIGHT: 89.95 LBS | RESPIRATION RATE: 16 BRPM | HEIGHT: 59 IN | BODY MASS INDEX: 18.13 KG/M2 | HEART RATE: 61 BPM | SYSTOLIC BLOOD PRESSURE: 163 MMHG | DIASTOLIC BLOOD PRESSURE: 66 MMHG | TEMPERATURE: 97.3 F

## 2025-08-15 LAB
ANION GAP SERPL CALC-SCNC: 10 MEQ/L (ref 8–16)
BUN SERPL-MCNC: 26 MG/DL (ref 8–23)
CALCIUM SERPL-MCNC: 9.4 MG/DL (ref 8.5–10.5)
CHLORIDE SERPL-SCNC: 104 MEQ/L (ref 98–111)
CO2 SERPL-SCNC: 24 MEQ/L (ref 22–29)
CREAT SERPL-MCNC: 1.7 MG/DL (ref 0.5–0.9)
GFR SERPL CREATININE-BSD FRML MDRD: 29 ML/MIN/1.73M2
GLUCOSE SERPL-MCNC: 82 MG/DL (ref 74–109)
INR PPP: 2.74 (ref 0.85–1.13)
POTASSIUM SERPL-SCNC: 3.8 MEQ/L (ref 3.5–5.2)
PROTHROMBIN TIME: 31.7 SECONDS (ref 10–13.5)
SODIUM SERPL-SCNC: 138 MEQ/L (ref 135–145)

## 2025-08-15 PROCEDURE — 6370000000 HC RX 637 (ALT 250 FOR IP): Performed by: INTERNAL MEDICINE

## 2025-08-15 PROCEDURE — 97530 THERAPEUTIC ACTIVITIES: CPT

## 2025-08-15 PROCEDURE — 99232 SBSQ HOSP IP/OBS MODERATE 35: CPT | Performed by: INTERNAL MEDICINE

## 2025-08-15 PROCEDURE — 80048 BASIC METABOLIC PNL TOTAL CA: CPT

## 2025-08-15 PROCEDURE — 97535 SELF CARE MNGMENT TRAINING: CPT

## 2025-08-15 PROCEDURE — 2500000003 HC RX 250 WO HCPCS

## 2025-08-15 PROCEDURE — 6370000000 HC RX 637 (ALT 250 FOR IP)

## 2025-08-15 PROCEDURE — 99239 HOSP IP/OBS DSCHRG MGMT >30: CPT | Performed by: INTERNAL MEDICINE

## 2025-08-15 PROCEDURE — 36415 COLL VENOUS BLD VENIPUNCTURE: CPT

## 2025-08-15 PROCEDURE — 85610 PROTHROMBIN TIME: CPT

## 2025-08-15 RX ORDER — WARFARIN SODIUM 2 MG/1
1 TABLET ORAL
Status: COMPLETED | OUTPATIENT
Start: 2025-08-15 | End: 2025-08-15

## 2025-08-15 RX ADMIN — SODIUM CHLORIDE, PRESERVATIVE FREE 10 ML: 5 INJECTION INTRAVENOUS at 08:11

## 2025-08-15 RX ADMIN — SODIUM CHLORIDE 2 G: 1 TABLET ORAL at 11:19

## 2025-08-15 RX ADMIN — BUMETANIDE 1 MG: 1 TABLET ORAL at 08:11

## 2025-08-15 RX ADMIN — GABAPENTIN 100 MG: 100 CAPSULE ORAL at 08:11

## 2025-08-15 RX ADMIN — SODIUM CHLORIDE 2 G: 1 TABLET ORAL at 08:11

## 2025-08-15 RX ADMIN — WARFARIN SODIUM 1 MG: 2 TABLET ORAL at 11:19

## 2025-08-15 RX ADMIN — PANTOPRAZOLE SODIUM 40 MG: 40 TABLET, DELAYED RELEASE ORAL at 05:59

## 2025-08-15 RX ADMIN — LEVOTHYROXINE SODIUM 50 MCG: 0.05 TABLET ORAL at 05:59

## 2025-08-15 RX ADMIN — AMIODARONE HYDROCHLORIDE 200 MG: 200 TABLET ORAL at 08:11

## 2025-08-15 ASSESSMENT — PAIN SCALES - GENERAL: PAINLEVEL_OUTOF10: 0

## 2025-08-21 ENCOUNTER — HOSPITAL ENCOUNTER (OUTPATIENT)
Dept: GENERAL RADIOLOGY | Age: 85
Discharge: HOME | End: 2025-08-21
Payer: MEDICARE

## 2025-08-21 DIAGNOSIS — N18.32 CHRONIC KIDNEY DISEASE, STAGE 3B (HCC): ICD-10-CM

## 2025-08-21 DIAGNOSIS — I12.9 HYPERTENSIVE RENAL DISEASE, STAGE 1 THROUGH STAGE 4 OR UNSPECIFIED CHRONIC KIDNEY DISEASE: ICD-10-CM

## 2025-08-21 LAB
ANION GAP SERPL CALC-SCNC: 11 MEQ/L (ref 8–16)
BUN SERPL-MCNC: 25 MG/DL (ref 8–23)
CALCIUM SERPL-MCNC: 9.8 MG/DL (ref 8.5–10.5)
CHLORIDE SERPL-SCNC: 104 MEQ/L (ref 98–111)
CO2 SERPL-SCNC: 27 MEQ/L (ref 22–29)
CREAT SERPL-MCNC: 1.5 MG/DL (ref 0.5–0.9)
GFR SERPL CREATININE-BSD FRML MDRD: 34 ML/MIN/1.73M2
GLUCOSE SERPL-MCNC: 80 MG/DL (ref 74–109)
POTASSIUM SERPL-SCNC: 4.3 MEQ/L (ref 3.5–5.2)
SODIUM SERPL-SCNC: 142 MEQ/L (ref 135–145)

## 2025-08-21 PROCEDURE — 80048 BASIC METABOLIC PNL TOTAL CA: CPT

## 2025-08-21 PROCEDURE — 36415 COLL VENOUS BLD VENIPUNCTURE: CPT

## 2025-08-22 ENCOUNTER — OFFICE VISIT (OUTPATIENT)
Dept: NEPHROLOGY | Age: 85
End: 2025-08-22
Payer: MEDICARE

## 2025-08-22 VITALS
BODY MASS INDEX: 19.94 KG/M2 | OXYGEN SATURATION: 58 % | HEIGHT: 58 IN | DIASTOLIC BLOOD PRESSURE: 70 MMHG | HEART RATE: 58 BPM | SYSTOLIC BLOOD PRESSURE: 142 MMHG | WEIGHT: 95 LBS

## 2025-08-22 DIAGNOSIS — E87.1 HYPONATREMIA: ICD-10-CM

## 2025-08-22 DIAGNOSIS — I12.9 HYPERTENSIVE RENAL DISEASE, STAGE 1 THROUGH STAGE 4 OR UNSPECIFIED CHRONIC KIDNEY DISEASE: ICD-10-CM

## 2025-08-22 DIAGNOSIS — N28.1 RENAL CYST: ICD-10-CM

## 2025-08-22 DIAGNOSIS — N18.32 CHRONIC KIDNEY DISEASE, STAGE 3B (HCC): Primary | ICD-10-CM

## 2025-08-22 PROCEDURE — 1090F PRES/ABSN URINE INCON ASSESS: CPT | Performed by: INTERNAL MEDICINE

## 2025-08-22 PROCEDURE — 1111F DSCHRG MED/CURRENT MED MERGE: CPT | Performed by: INTERNAL MEDICINE

## 2025-08-22 PROCEDURE — 1123F ACP DISCUSS/DSCN MKR DOCD: CPT | Performed by: INTERNAL MEDICINE

## 2025-08-22 PROCEDURE — 99214 OFFICE O/P EST MOD 30 MIN: CPT | Performed by: INTERNAL MEDICINE

## 2025-08-22 PROCEDURE — 1159F MED LIST DOCD IN RCRD: CPT | Performed by: INTERNAL MEDICINE

## 2025-08-22 PROCEDURE — G8427 DOCREV CUR MEDS BY ELIG CLIN: HCPCS | Performed by: INTERNAL MEDICINE

## 2025-08-22 PROCEDURE — G8420 CALC BMI NORM PARAMETERS: HCPCS | Performed by: INTERNAL MEDICINE

## 2025-08-22 PROCEDURE — 1036F TOBACCO NON-USER: CPT | Performed by: INTERNAL MEDICINE

## 2025-08-22 RX ORDER — VALSARTAN 80 MG/1
TABLET ORAL
COMMUNITY
Start: 2025-08-15

## 2025-08-22 RX ORDER — SODIUM CHLORIDE 1 G/1
2 TABLET ORAL 2 TIMES DAILY
Qty: 120 TABLET | Refills: 3
Start: 2025-08-22

## 2025-08-22 RX ORDER — POTASSIUM CHLORIDE 1500 MG/1
TABLET, EXTENDED RELEASE ORAL
COMMUNITY
Start: 2025-08-15

## (undated) DEVICE — CONMED SCOPE SAVER BITE BLOCK, 20X27 MM: Brand: SCOPE SAVER

## (undated) DEVICE — SINGLE USE SUCTION VALVE MAJ-209: Brand: SINGLE USE SUCTION VALVE (STERILE)

## (undated) DEVICE — SUTURE VCRL + SZ 2-0 L27IN ABSRB VLT L26MM CT-2 1/2 CIR VCP333H

## (undated) DEVICE — TRAP SPEC COLL 40CC MUCOUS CALIB UNIV CONN FOR OPN SUCT

## (undated) DEVICE — SOLUTION IV IRRIG POUR BRL 0.9% SODIUM CHL 2F7124

## (undated) DEVICE — NEEDLE SPNL L3.5IN PNK HUB S STL REG WALL FIT STYL W/ QNCKE

## (undated) DEVICE — 450 ML BOTTLE OF 0.05% CHLORHEXIDINE GLUCONATE IN 99.95% STERILE WATER FOR IRRIGATION, USP AND APPLICATOR.: Brand: IRRISEPT ANTIMICROBIAL WOUND LAVAGE

## (undated) DEVICE — GLOVE ORANGE PI 8 1/2   MSG9085

## (undated) DEVICE — CONTAINER,SPECIMEN,PNEU TUBE,4OZ,OR STRL: Brand: MEDLINE

## (undated) DEVICE — HIP PINNING: Brand: MEDLINE INDUSTRIES, INC.

## (undated) DEVICE — GUIDE PIN 3.2MM X 343MM: Brand: TRIGEN

## (undated) DEVICE — MEDI-VAC NON-CONDUCTIVE SUCTION TUBING 6MM X 1.8M (6FT.) L: Brand: CARDINAL HEALTH

## (undated) DEVICE — SYSTEM SKIN CLSR 22CM DERMBND PRINEO

## (undated) DEVICE — SET LNR RED GRN W/ BASE CLEANASCOPE

## (undated) DEVICE — SINGLE USE BIOPSY VALVE MAJ-210: Brand: SINGLE USE BIOPSY VALVE (STERILE)

## (undated) DEVICE — ROYAL SILK SURGICAL GOWN, XXL: Brand: CONVERTORS

## (undated) DEVICE — 4.0MM SHORT PILOT DRILL WITH AO CONNECTOR: Brand: TRIGEN

## (undated) DEVICE — KIT INF CTRL 2OZ LUB TBNG L12FT DBL END BRSH SYR OP4

## (undated) DEVICE — SYRINGE, LUER LOCK, 60ML: Brand: MEDLINE

## (undated) DEVICE — BIOGUARD A/W CLEANING ADAPTER